# Patient Record
Sex: FEMALE | Race: WHITE | NOT HISPANIC OR LATINO | Employment: OTHER | URBAN - METROPOLITAN AREA
[De-identification: names, ages, dates, MRNs, and addresses within clinical notes are randomized per-mention and may not be internally consistent; named-entity substitution may affect disease eponyms.]

---

## 2017-03-09 DIAGNOSIS — I10 ESSENTIAL HYPERTENSION: ICD-10-CM

## 2017-03-09 RX ORDER — LISINOPRIL 10 MG/1
10 TABLET ORAL DAILY
Qty: 90 TABLET | Refills: 3 | Status: SHIPPED | OUTPATIENT
Start: 2017-03-09 | End: 2018-03-27

## 2017-05-16 ENCOUNTER — ALLIED HEALTH/NURSE VISIT (OUTPATIENT)
Dept: CARDIOLOGY | Facility: CLINIC | Age: 82
End: 2017-05-16
Attending: INTERNAL MEDICINE
Payer: MEDICARE

## 2017-05-16 DIAGNOSIS — I45.89 CHRONOTROPIC INCOMPETENCE: ICD-10-CM

## 2017-05-16 DIAGNOSIS — I44.30 ATRIOVENTRICULAR BLOCK, INCOMPLETE: Primary | ICD-10-CM

## 2017-05-16 PROCEDURE — 93294 REM INTERROG EVL PM/LDLS PM: CPT | Performed by: INTERNAL MEDICINE

## 2017-05-16 PROCEDURE — 93296 REM INTERROG EVL PM/IDS: CPT | Mod: ZF

## 2017-05-17 NOTE — PROGRESS NOTES
"Scheduled [employer] Carelink remote pacemaker transmission received and reviewed. Device transmission sent per MD orders. Her presenting rhythm is AP/ VS @ 80 bpm. 17 AT/AF episodes recorded lasting up to 2 hours. AF burden= 0.1%. 5 NSVT episodes recorded. The available EGMs show 1:1 AV conduction. Normal device function. AP= 98% and  <0.1%. 1 short V-V interval recorded. Lead trends appear stable. Battery estimates 9.5 years to [employer]MILE[employer]. Pt notified of transmission results. Plan for pt to RTC within the next 4 weeks with Dr. Lazo and pacemaker check to discuss AF. Previous episode max was 23 minutes and she is not anticoagulated.      5.25.17: Pt called stating she doesn't want to start coumadin, so she doesn't think she needs to see Dr. Lazo right now. She will call with any questions or concerns or increase of episodes. She states \"maybe I drank too much coffee or forgot to take some of my pills\". She states she will \"lay off\" the coffee and \"remember all my pills\". Per her request she will be scheduled for a device check and appointment with Dr. Lazo in 3 months.  "

## 2017-05-25 ENCOUNTER — TELEPHONE (OUTPATIENT)
Dept: CARDIOLOGY | Facility: CLINIC | Age: 82
End: 2017-05-25

## 2017-05-25 DIAGNOSIS — I10 ESSENTIAL HYPERTENSION: ICD-10-CM

## 2017-05-25 RX ORDER — HYDROCHLOROTHIAZIDE 12.5 MG/1
12.5 CAPSULE ORAL DAILY
Qty: 90 CAPSULE | Refills: 3 | Status: SHIPPED | OUTPATIENT
Start: 2017-05-25 | End: 2018-06-21

## 2017-05-25 NOTE — TELEPHONE ENCOUNTER
Per device nurses patient needs to see Dr. Lazo within 4 weeks. I called patient to offer her an appointment on 6/6/17 (double book any time ok per Lila) or 6/20/17 (double book any time before 1000). She stated that she runs a  and will check with her daughters to see who can take off/what date and will call me back to schedule it.

## 2017-08-11 ENCOUNTER — PRE VISIT (OUTPATIENT)
Dept: CARDIOLOGY | Facility: CLINIC | Age: 82
End: 2017-08-11

## 2017-08-11 DIAGNOSIS — I48.0 PAROXYSMAL ATRIAL FIBRILLATION (H): Primary | ICD-10-CM

## 2017-08-14 ENCOUNTER — ALLIED HEALTH/NURSE VISIT (OUTPATIENT)
Dept: CARDIOLOGY | Facility: CLINIC | Age: 82
End: 2017-08-14
Attending: NURSE PRACTITIONER
Payer: MEDICARE

## 2017-08-14 VITALS
SYSTOLIC BLOOD PRESSURE: 120 MMHG | OXYGEN SATURATION: 98 % | HEART RATE: 63 BPM | BODY MASS INDEX: 22.32 KG/M2 | WEIGHT: 126 LBS | DIASTOLIC BLOOD PRESSURE: 70 MMHG | HEIGHT: 63 IN

## 2017-08-14 DIAGNOSIS — I44.30 ATRIOVENTRICULAR BLOCK, INCOMPLETE: Primary | ICD-10-CM

## 2017-08-14 DIAGNOSIS — I48.0 PAROXYSMAL ATRIAL FIBRILLATION (H): ICD-10-CM

## 2017-08-14 PROCEDURE — 93288 INTERROG EVL PM/LDLS PM IP: CPT | Mod: 26 | Performed by: INTERNAL MEDICINE

## 2017-08-14 PROCEDURE — 93010 ELECTROCARDIOGRAM REPORT: CPT | Mod: ZP | Performed by: INTERNAL MEDICINE

## 2017-08-14 PROCEDURE — 99212 OFFICE O/P EST SF 10 MIN: CPT | Mod: ZF

## 2017-08-14 PROCEDURE — 93280 PM DEVICE PROGR EVAL DUAL: CPT | Mod: ZF

## 2017-08-14 PROCEDURE — 93005 ELECTROCARDIOGRAM TRACING: CPT | Mod: ZF

## 2017-08-14 PROCEDURE — 99213 OFFICE O/P EST LOW 20 MIN: CPT | Mod: 25 | Performed by: NURSE PRACTITIONER

## 2017-08-14 RX ORDER — POTASSIUM CHLORIDE 20MEQ/15ML
1 LIQUID (ML) ORAL EVERY OTHER DAY
COMMUNITY
End: 2021-05-06

## 2017-08-14 RX ORDER — LISINOPRIL/HYDROCHLOROTHIAZIDE 10-12.5 MG
TABLET ORAL
COMMUNITY
Start: 2016-01-15 | End: 2017-08-14

## 2017-08-14 RX ORDER — METOPROLOL TARTRATE 25 MG/1
25 TABLET, FILM COATED ORAL
COMMUNITY
End: 2017-08-14

## 2017-08-14 RX ORDER — GLUCOSAMINE HCL 500 MG
TABLET ORAL
COMMUNITY
End: 2021-05-06

## 2017-08-14 ASSESSMENT — PAIN SCALES - GENERAL: PAINLEVEL: NO PAIN (0)

## 2017-08-14 NOTE — PROGRESS NOTES
HPI:   Isadora Mendez returns for followup. Her past medical history includes non obstructive CAD (30-40% in 2009) tachybrady arrythmias including VT, sinus pauses (SSS) and with pacer insertion 2010 and paraxysmal afib    5/17/2016 She had frequent presyncope related to sinus pauses as long as 4.5 seconds. She ultimately agreed to placement of a dual-chamber permanent pacemaker with complete resolution of her symptoms. Her A-fib history includes one episode lasting over 6 hours, relatively assymptoamtic few second bursts. We have had several discussions about antithrombotic therapy. Her CHADS2-vasc score is 4 (age and hypertension). She has declined warfarin. She has been taking  an alternative medication (nattokinase) that is a herbal medication with reported anti=thrombotic effects, but her INR was normal. Pacemaker evaluation today shows normal function. There have been no episodes of atrial fibrillation longer than a few seconds.   Atrial Fibrtillation: longest prior episode of 6 hours, currently she has only had a few seconds at most.  We discussed chads2-vasc risk scores of 4, the risk of stroke, but her burden is very low. We did recommend anti-coagulation, but we did compromise by allowing frequent remote checks with intiation of therapy with warfarin vs. apixaban once this occurs.   Pacemaker: no significant changes, she need q3 month battery follow ups given the recall on her device.  BP at home when relaxed is reported at low 100's, but highs of 160s when active, thus we will continue with metoprolol, hctz, and lisinopril.  Wide pulse pressure likely decreased aortic compliance.     8/16/2017  Visit with June Mckinley NP  Had an incident of the sudden did not feel well with low blood pressures 90's/50's, normal heart rate and shortness of breath.  She reduced her metoprolol to 25 qd instead of BID. She has felt better this week and has resumed BID metoprolol.  Her NT pro BNP is significantly elevated at 3,938  today. No overt signs of CHF (lungs clear, no edema). Neck veins are mildly distended. Screening echo today with LVH and LAE but LV functioned appeared normal. Will obtain full echo to assess EF% and assess for any new wall motion abnormality. Coronary angio in 2009 without significant disease. Unknown cause for elevated Nt BNP but possibly acute diastolic dysfunction, small infarct, viral illness. She takes low dose aspirin and Nattokinase for anticoagulation and declines warfarin or NOACS.  Blood pressure: taking metoprolol 25 mg BID, HCTZ 12.5 mg per day and lisinopril 10 mg per day with recent low BP in the 90's/50's so she reduced her metoprolol to oncer per day until a few days ago resumed BID when her BP's increased. Todays BP's 110-130's systolic at rest. Microalbumin is significantly elevated with normal range creatinine, slightly reduced GFR from 72 to now 55 and upper range BUN at 28.     9/14/2016 Dual chamber ppm generator change    8/14/2017  Today she presents with concerns of chest discomfort for the past few weeks occurring mainly in the evening while trying to slee.  Denies pain with activity. Has a hx of left rib fx and did stumble ~2-3 weeks ago and fell on left side.  Pain has improved in the past 4-5 days.  Improves with burping and avoiding spicy foods.  Has taken Tylenol x 1 which has helped the pain slightly. Took tums x1 with no improvement otherwise has not taken any other antiacids. Walks to and plays in the park with her  children daily with no CV symptoms.  Does not regularly exercise. Denies headaches, dizziness, syncope, angina, dyspnea at rest or with exertion, palpitations, orthopnea, PND, abdominal pain, pedal edema, claudication, or any new numbness/weakness, hematuria, hematochezia, and epistaxis.    Today's EKG Atrial paced.  Unchanged from previous EKG; reviewed with Dr Hill      Current cardiac medications include lisinopril 10 mg daily, metoprolol XL 25 mg twice  daily, hydrocholorothiazide 12.5 mg daily, aspirin 81 mg, potassium 20 mEq daily.        PAST MEDICAL HISTORY:  Past Medical History:   Diagnosis Date     Atrial fibrillation (H) 2011     Facial fracture (H) 2006     Hypertension      NSVT (nonsustained ventricular tachycardia) (H) 2009    during exercise echo, neg EP study     Pacemaker 7-1-2010     S/P cardiac catheterization 2009    30-40% non obstructive lesion     Ventricular tachycardia, nonsustained (H) 2009    during stress echo       CURRENT MEDICATIONS:  Current Outpatient Prescriptions   Medication Sig Dispense Refill     MAGNESIUM PO one daily       hydrochlorothiazide (MICROZIDE) 12.5 MG capsule Take 1 capsule (12.5 mg) by mouth daily 90 capsule 3     lisinopril (PRINIVIL/ZESTRIL) 10 MG tablet Take 1 tablet (10 mg) by mouth daily 90 tablet 3     metoprolol (TOPROL-XL) 50 MG 24 hr tablet Take 0.5 tablets (25 mg) by mouth 2 times daily 90 tablet 3     Pyridoxine HCl (VITAMIN B-6 PO) Take 1 tablet by mouth daily       NEW MED Cumin - capsule daily  Vitor - capsule daily       Cyanocobalamin (VITAMIN B12 PO) Take 1 tablet by mouth daily.       BIOTIN PO Take 1 capsule by mouth daily.       Calcium Carbonate-Vitamin D (CALCIUM 500 + D PO) Take 1 tablet by mouth 2 times daily.       potassium chloride (KAYCIEL) 20 MEQ/15ML (10%) solution        coenzyme Q-10 100 MG TABS        NEW MED Mineral drops       COLLAGEN PO Take 1 capsule by mouth 2 times daily.       NEW MED 1 tablet daily Nattokinase, from fermented soybean .        aspirin 81 MG tablet Take 1 tablet by mouth daily.         PAST SURGICAL HISTORY:  Past Surgical History:   Procedure Laterality Date     IMPLANT PACEMAKER       PPM  6/30/2010    Permanent Pacemaker       ALLERGIES:   No Known Allergies      SOCIAL HISTORY:  Social History   Substance Use Topics     Smoking status: Never Smoker     Smokeless tobacco: Not on file     Alcohol use No       ROS:   Constitutional: No fever, chills, or  "sweats. Weight stable.   ENT: No visual disturbance, ear ache, epistaxis, sore throat.   Cardiovascular: As per HPI.   Respiratory: No cough, hemoptysis.    GI: No nausea, vomiting, hematemesis, melena, or hematochezia.   : No hematuria.   Integument: Negative.   Psychiatric: Negative.   Hematologic:  Easy bruising, no easy bleeding.  Neuro: Negative.   Endocrinology: No significant heat or cold intolerance   Musculoskeletal: No myalgia.    Exam:  /70 (BP Location: Left arm, Patient Position: Chair, Cuff Size: Adult Regular)  Pulse 63  Ht 1.6 m (5' 3\")  Wt 57.2 kg (126 lb)  SpO2 98%  BMI 22.32 kg/m2  GENERAL APPEARANCE: thin, healthy female alert and no distress  HEENT: no icterus, no central cyanosis  NECK: no adenopathy, no asymmetry, JVP not elevated  RESPIRATORY: lungs clear to auscultation - no rales, rhonchi or wheezes, no use of accessory muscles, no retractions, respirations are unlabored, normal respiratory rate  CARDIOVASCULAR: regular rhythm, normal S1, S2, no S3 or S4 and no murmur, click or rub, precordium quiet with normal PMI.  ABDOMEN: soft, non tender  EXTREMITIES: peripheral pulses normal, no edema, no bruits  NEURO: alert and oriented to person/place/time, normal speech,and affect  VASC: Radial, dorsalis pedis and posterior tibialis pulses are normal in volumes and symmetric bilaterally.   SKIN: no ecchymoses, no rashes        Labs:  CBC RESULTS:   Lab Results   Component Value Date    WBC 5.8 09/14/2016    RBC 4.26 09/14/2016    HGB 12.7 09/14/2016    HCT 39.4 09/14/2016    MCV 93 09/14/2016    MCH 29.8 09/14/2016    MCHC 32.2 09/14/2016    RDW 14.2 09/14/2016     09/14/2016       BMP RESULTS:  Lab Results   Component Value Date     09/14/2016    POTASSIUM 4.2 09/14/2016    CHLORIDE 104 09/14/2016    CO2 29 09/14/2016    ANIONGAP 7 09/14/2016    GLC 89 09/14/2016    BUN 21 09/14/2016    CR 0.81 09/14/2016    GFRESTIMATED 67 09/14/2016    GFRESTBLACK 81 09/14/2016    INO " 9.2 09/14/2016        INR RESULTS:  Lab Results   Component Value Date    INR 1.13 09/14/2016    INR 1.04 09/03/2013    INR 1.01 10/29/2012    INR 1.00 08/21/2012       Procedures:  ECHO 9/7/2017  Interpretation Summary  Global and regional left ventricular function is normal with an EF of 60-65%.  Right ventricular function, chamber size, wall motion, and thickness are  normal.  Dilation of the inferior vena cava is present with abnormal respiratory  variation in diameter.  Trivial pericardial effusion is present.  ______________________________________________________________________________           Left Ventricle  Global and regional left ventricular function is normal with an EF of 60-65%.  Left ventricular size is normal. Mild concentric wall thickening consistent  with left ventricular hypertrophy is present. Left ventricular diastolic  function cannot be assessed. No regional wall motion abnormalities are seen.     Right Ventricle  Right ventricular function, chamber size, wall motion, and thickness are  normal. A pacemaker lead is noted in the right ventricle.  Atria  Mild to moderate left atrial enlargement is present. Moderate to severe right  atrial enlargement is present.     Mitral Valve  Mild mitral annular calcification is present. Mild mitral insufficiency is  present.     Aortic Valve  Aortic valve is normal in structure and function.     Tricuspid Valve  The tricuspid valve is normal. Trace to mild tricuspid insufficiency is  present. The right ventricular systolic pressure is approximated at 15.3 mmHg  plus the right atrial pressure.     Pulmonic Valve  The pulmonic valve is normal. Trace to mild pulmonic insufficiency is  present.     Vessels  The aorta root is normal. The pulmonary artery is normal. Dilation of the  inferior vena cava is present with abnormal respiratory variation in  diameter.  Pericardium  Trivial pericardial effusion is  present.     ______________________________________________________________________________  MMode/2D Measurements & Calculations  IVSd: 0.74 cm  LVIDd: 4.2 cm  LVIDs: 2.8 cm  LVPWd: 0.48 cm  FS: 33.4 %  EDV(Teich): 76.4 ml  ESV(Teich): 28.7 ml  LV mass(C)d: 70.4 grams  Ao root diam: 2.7 cm  LVOT diam: 1.9 cm  LVOT area: 2.7 cm2  LA Volume (BP): 66.5 ml     LA Volume Index (BP): 42.4 ml/m2           Doppler Measurements & Calculations  MV E max sherley: 101.1 cm/sec  MV dec time: 0.17 sec  LV V1 max P.3 mmHg  LV V1 max: 76.4 cm/sec  LV V1 VTI: 14.3 cm  SV(LVOT): 39.3 ml  PA acc time: 0.10 sec  TR max sherley: 194.9 cm/sec  TR max PG: 15.3 mmHg    Lila Luo RN 2013 11:33 AM Signed   ECHOCARD STRESS ECHO 25 Sep 2009 09:23 AM   Normal exercise echo without ischemia or infarct at adequate rate pressure   product and borderline workload. However, non-sustained monomorphic VT   (LBBBtype, inferior axis) developed during exercise. The pt did not have sx.   Pt admitted to  for evaluation   PatientHeight: 63 in   PatientWeight: 120 lbs   BSA 1.6 m^2   Stress Findings   Maximum workload 75 farrar.   Peak MVO2 22.2 ml/kg/min.   Percent predicted MVO2 116 %.   RPP 23,380.   Patient was given 2ml mixture of 1.5ml Definity and 8.5ml saline.   Aortic Valve   The aortic valve is normal in structure and function.   Mitral Valve   The mitral valve is normal in structure and function.   There is mild mitral regurgitation.   Tricuspid Valve   There is trace tricuspid regurgitation.   RVSP = 29 mmHg + RAP.   Procedure:   Stress Echo Bike with two dimensional, color and spectral Doppler performed.   Contrast Definity.   Doppler Measurements & Calculations   TR Max P mmHg   Stress Results   Target HR: 121 bpm Maximum Predicted HR: 142 bpm   Stage Duration(mm:ss) HR(bpm) BP DOSE Comment   Baseline 00:00 67 148/48   Peak 03:28 140 167/71   Stress Duration: 03:28 mm:ss Recovery Time: 00:00 mm:ss   Maximum Stress HR: 140 bpm  METS:   Interpreting Physician: Pietro Patel MD electronically signed on   2009 09:23:09.      Assessment and Plan:   Isadora Mendez is an 86-year-old woman with a history of symptomatic sinus node dysfunction that resolved after she ultimately agreed to placement of a dual-chamber permanent pacemaker.     Atrial fibrillation  We discussed in detail with the patient management/treatment options for Atrial fibrillation includin. Stroke Prophylaxis:  CHADSVASC=age, female, HTN, CAD  4, corresponding to a 4.0% annual stroke / systemic emolism event rate. indicating need for long term oral anticoagulation.  In the past she has refused anticoagulation discussed risk and benefit of anticoagulation and the types of anticoagulation.   We discussed in length anticoagulants include warfarin (Coumadin) and the noval oral anticoagulant agents: dabigatran (Pradaxa), rivaroxaban (Xarelto), and apixaban (Eliquis). The benefits of warfarin include: low cost, established track record in reduction of stroke and systemic embolism, the ability to reverse the agents, the ability to titrate the agent.  Also discussed the disadvantages of warfarin include: frequent phlebotomy for INRs, difficulty in regulation of INR [typically 60-65% of INRs within therapeutic range], and dietary constraints secondary to bioavailability. The benefits of the novel oral anticoagulants, as a class, include: no phlebotomy, similar or significantly lower risk of stroke or systemic embolism depending on the agent, and similar or significantly lower risk of bleeding, particularly intracranial bleeding. The disadvantages of the novel agents, as a class, include: higher cost, the inability to reverse the agents (except Pradaxa), and the need to be cautious in patients with CKD. At this time she would like not to purse anticoagulation.  She will return in 6 months to visit with Dr. Lazo.   2. Rate Control: Currently on metoprolol XL 25 mg twice  daily.     4. Risk Factor Management: Continue current BP regemine and NIKHIL evaluation as needed.        Return to clinic in 6 months.        IBRAHIMA Townsend, CNP

## 2017-08-14 NOTE — PATIENT INSTRUCTIONS
It was a pleasure to see you in clinic today.  Please do not hesitate to call us if you have any questions or concerns.  Please return to clinic in 6 mos for a pacemaker check.    Next remote 9/18/17    Malia Wu R.N.  Electrophysiology nurse specialist  [facility]  [address] [place] [id number]     Aracelis Campbell  [phone] business hours    After business hours please call [phone], option #4, and ask for Job code [id number].

## 2017-08-14 NOTE — NURSING NOTE
Chief Complaint   Patient presents with     Follow Up For     ekg; device prior     Vitals were taken and medications were reconciled. EKG was performed    Africa Hernandez MA  11:05 AM

## 2017-08-14 NOTE — MR AVS SNAPSHOT
After Visit Summary   8/14/2017    Isadora Mendez    MRN: [id number]           Patient Information     Date Of Birth          3/9/1931        Visit Information        Provider Department      8/14/2017 11:30 AM Ann Hoffman APRN CNP [facility]        Today's Diagnoses     Paroxysmal atrial fibrillation (H)          Care Instructions    Cardiology Provider you saw in clinic today: IBRAHIMA Townsend CNP    Medication Changes:  None at this time    Labs/Tests needed:  None at this time     Follow-up Visit:  6 months with Dr. Lazo    Further Instructions:      You will receive all normal lab and testing results via [employer] or mail if not reviewed in clinic today. Please contact our office if you need assistance with setting up MyChart.    If you need a medication refill please contact your pharmacy. Please allow 3 business days for your refill to be completed.     As always, thank you for trusting us with your health care needs!    If you have any questions regarding your visit please contact your care team:   Cardiology  Telephone Number    EP RN  Electrophysiology Nurse Coordinator [phone]     Call for EP procedure scheduling concerns  SANJAY Evans  [phone]           Device Clinic (Pacemakers, ICDs, Loop)   RN's : Malia Palomo Connie, Dawn During business hours: [phone]    After business hours:   [phone]- select option 4 and ask for job code [id number].          Understanding Atrial Fibrillation    An arrhythmia is any problem with the speed or pattern of the heartbeat. Atrial fibrillation (AFib) is a common type of arrhythmia. It causes fast, chaotic electrical signals in the atria. This leads to poor functioning of the heart. It also affects how much blood your heart can pump out to the body.  Afib may occur once in a while and go away on its own. Or it may continue for longer periods and need treatment.  AFib can lead to serious problems, such  as stroke. Your healthcare provider will need to monitor and manage it.  What happens during atrial fibrillation?   The heart has an electrical system that sends signals to control the heartbeat. As signals move through the heart, they tell the heart s upper chambers (atria) and lower chambers (ventricles) when to squeeze (contract) and relax. This lets blood move through the heart and out to the body and lungs.  With AFib, the atria receive abnormal signals. This causes them to contract in a fast and irregular way, and out of sync with the ventricles. When this happens, the atria also have a harder time moving blood into the ventricles. Blood may then pool in the atria, which increases the risk for blood clots and stroke. The ventricles also may contract too quickly and irregularly. As a result, they may not pump blood to the body and lungs as well as they should. This can weaken the heart muscle over time and cause heart failure.  What causes atrial fibrillation?  AFib is more common in older adults. It has many possible causes including:    Coronary artery disease    Heart valve disease    Heart attack    Heart surgery    High blood pressure    Thyroid disease    Diabetes    Lung disease    Sleep apnea    Heavy alcohol use  In some cases of AFib, doctors do not know the cause.  What are the symptoms of atrial fibrillation?  AFib may or may not cause symptoms. If symptoms do occur, they may include:    A fast, pounding, irregular heartbeat    Shortness of breath    Tiredness    Dizziness or fainting    Chest pain  How is atrial fibrillation treated?  Treatments for AFib can include any of the options below.    Medicines. You may be prescribed:    Heart rate medicines to help slow down the heartbeat    Heart rhythm medicines to help the heart beat more regularly    Anti-clotting medicines to help reduce the risk for blood clots and stroke    Electrical cardioversion. Your healthcare provider uses special pads or  paddles to send one or more brief electrical shocks to the heart. This can help reset the heartbeat to normal.    Ablation. Long, thin tubes called catheters are threaded through a blood vessel to the heart. There, the catheters send out hot or cold energy to the areas causing the abnormal signals. This energy destroys the problem tissue or cells. This improves the chances that your heart will stay in normal rhythm without using medicines. If your heart rate and rhythm can t be controlled, you may need ablation and a pacemaker. These will help control the heart rate and regularity of the heartbeat.    Surgery. During surgery, your healthcare provider may use different methods to create scar tissue in the areas of the heart causing the abnormal signals. The scar tissue disrupts the abnormal signals and may stop AFib from occurring.  What are the complications of atrial fibrillation?  These can include:    Blood clots    Stroke    Heart failure. This problem occurs when the heart muscle weakens so much that it can no longer pump blood well.  When should I call my healthcare provider?  Call your healthcare provider right away if you have any of these:    Symptoms that don t get better with treatment, or get worse    New symptoms   Date Last Reviewed: 5/1/2016 2000-2017 [employer]. [address] [place] [address]. All rights reserved. This information is not intended as a substitute for professional medical care. Always follow your healthcare professional's instructions.                Follow-ups after your visit        Follow-up notes from your care team     Return in about 6 months (around 2/14/2018) for Violet Device check prior.      Your next 10 appointments already scheduled     Oct 06, 2017 12:30 PM CDT   (Arrive by 12:15 PM)   New Patient Visit with Jovana Macias MD   [facility] ([facility])    [address] [place] [address]  "[facility] [id number] [phone]            2018  9:00 AM CST   (Arrive by 8:45 AM)   Pacemaker Check with Uc Cv Device 1   [facility] ([facility])    [address] [place] [id number] [phone]            2018  9:30 AM CST   (Arrive by 9:15 AM)   RETURN ATRIAL FIBULATION VISIT with Dale Lazo MD   [facility] ([facility])    [address] [place] [id number] [phone]              Who to contact     If you have questions or need follow up information about today's clinic visit or your schedule please contact [facility] directly at [phone].  Normal or non-critical lab and imaging results will be communicated to you by [employer], letter or phone within 4 business days after the clinic has received the results. If you do not hear from us within 7 days, please contact the clinic through [employer] or phone. If you have a critical or abnormal lab result, we will notify you by phone as soon as possible.  Submit refill requests through [employer] or call your pharmacy and they will forward the refill request to us. Please allow 3 business days for your refill to be completed.          Additional Information About Your Visit        [employer] Information     [employer] lets you send messages to your doctor, view your test results, renew your prescriptions, schedule appointments and more. To sign up, go to [email or web address] . Click on \"Log in\" on the left side of the screen, which will take you to the Welcome page. Then click on \"Sign up Now\" on the right side of the page.     You will be asked to enter the access code listed below, as well as some personal information. Please follow the directions to create your username and password.     Your access code is: [email or web address]  Expires: 8/15/2017  8:11 AM     Your access code will  in 90 days. If you need " "help or a new code, please call your [place] clinic or [phone].        Care EveryWhere ID     This is your Care EveryWhere ID. This could be used by other organizations to access your [place] medical records  [id number]        Your Vitals Were     Pulse Height Pulse Oximetry BMI (Body Mass Index)          63 1.6 m (5' 3\") 98% 22.32 kg/m2         Blood Pressure from Last 3 Encounters:   08/14/17 120/70   09/14/16 135/57   08/26/16 130/67    Weight from Last 3 Encounters:   08/14/17 57.2 kg (126 lb)   09/14/16 56.2 kg (123 lb 14.4 oz)   08/26/16 56.2 kg (123 lb 14.4 oz)              Today, you had the following     No orders found for display         Today's Medication Changes          These changes are accurate as of: 8/14/17  1:08 PM.  If you have any questions, ask your nurse or doctor.               Stop taking these medicines if you haven't already. Please contact your care team if you have questions.     lisinopril-hydrochlorothiazide 10-12.5 MG per tablet   Commonly known as:  PRINZIDE/ZESTORETIC   Stopped by:  Ann Hoffman APRN CNP           metoprolol 25 MG tablet   Commonly known as:  LOPRESSOR   Stopped by:  Ann Hoffman APRN CNP                    Primary Care Provider    Physician No Ref-Primary       No address on file        Equal Access to Services     [facility] HospitalNAIDA : Hadii aad ku hadasho Soomaali, waaxda luqadaha, qaybta kaalmada adeegyada, nela osorio . So [facility] [phone].    ATENCIÓN: Si habla español, tiene a jason disposición servicios gratuitos de asistencia lingüística. Lebroname al [phone].    We comply with applicable federal civil rights laws and [place] laws. We do not discriminate on the basis of race, color, national origin, age, disability sex, sexual orientation or gender identity.            Thank you!     Thank you for choosing [facility]  for your care. Our goal is always to provide you with excellent care. " Hearing back from our patients is one way we can continue to improve our services. Please take a few minutes to complete the written survey that you may receive in the mail after your visit with us. Thank you!             Your Updated Medication List - Protect others around you: Learn how to safely use, store and throw away your medicines at www.disposemymeds.org.          This list is accurate as of: 8/14/17  1:08 PM.  Always use your most recent med list.                   Brand Name Dispense Instructions for use Diagnosis    aspirin 81 MG tablet      Take 1 tablet by mouth daily.        BIOTIN PO      Take 1 capsule by mouth daily.    Hypertension, New onset atrial fibrillation (H), Hyperlipidemia LDL goal <100       CALCIUM 500 + D PO      Take 1 tablet by mouth 2 times daily.        coenzyme Q-10 100 MG Tabs           COLLAGEN PO      Take 1 capsule by mouth 2 times daily.        hydrochlorothiazide 12.5 MG capsule    MICROZIDE    90 capsule    Take 1 capsule (12.5 mg) by mouth daily    Essential hypertension       lisinopril 10 MG tablet    PRINIVIL/ZESTRIL    90 tablet    Take 1 tablet (10 mg) by mouth daily    Essential hypertension       MAGNESIUM PO      one daily        metoprolol 50 MG 24 hr tablet    TOPROL-XL    90 tablet    Take 0.5 tablets (25 mg) by mouth 2 times daily    Essential hypertension       * NEW MED      1 tablet daily Nattokinase, from fermented soybean .    Hypertension, New onset atrial fibrillation (H)       * NEW MED      Cumin - capsule daily Vitor - capsule daily        * NEW MED      Mineral drops        potassium chloride 20 MEQ/15ML (10%) solution    KAYCIEL          VITAMIN B-6 PO      Take 1 tablet by mouth daily    Hypertension, Hyperlipidemia LDL goal <100       VITAMIN B12 PO      Take 1 tablet by mouth daily.        * Notice:  This list has 3 medication(s) that are the same as other medications prescribed for you. Read the directions carefully, and ask your doctor or other  care provider to review them with you.

## 2017-08-14 NOTE — MR AVS SNAPSHOT
After Visit Summary   8/14/2017    Isadora Mendez    MRN: [id number]           Patient Information     Date Of Birth          3/9/1931        Visit Information        Provider Department      8/14/2017 10:30 AM 1, Uc Cv Device [facility]        Today's Diagnoses     Atrioventricular block, incomplete    -  1      Care Instructions    It was a pleasure to see you in clinic today.  Please do not hesitate to call us if you have any questions or concerns.  Please return to clinic in 6 mos for a pacemaker check.    Next remote 9/18/17    Malia Wu R.N.  Electrophysiology nurse specialist  [facility]  [address] [place] [id number]     Aracelis Campbell  [phone] business hours    After business hours please call [phone], option #4, and ask for Job code [id number].                  Follow-ups after your visit        Follow-up notes from your care team     Discussed this visit Return in about 6 months (around 2/14/2018) for Pacemaker check, Dr Lazo.      Your next 10 appointments already scheduled     Feb 20, 2018  9:00 AM CST   (Arrive by 8:45 AM)   Pacemaker Check with Uc Cv Device 1   [facility] ([facility] [place])    [address] [place] [id number] [phone]            Feb 20, 2018  9:30 AM CST   (Arrive by 9:15 AM)   RETURN ATRIAL FIBULATION VISIT with Dale Lazo MD   [facility] ([facility] [place])    [address] [place] [id number] [phone]              Who to contact     If you have questions or need follow up information about today's clinic visit or your schedule please contact [facility] directly at [phone].  Normal or non-critical lab and imaging results will be communicated to you by MyChart, letter or phone within 4 business days after the clinic has received the results. If you do not hear  "from us within 7 days, please contact the clinic through [employer] or phone. If you have a critical or abnormal lab result, we will notify you by phone as soon as possible.  Submit refill requests through [employer] or call your pharmacy and they will forward the refill request to us. Please allow 3 business days for your refill to be completed.          Additional Information About Your Visit        [email or web address] Information     [employer] lets you send messages to your doctor, view your test results, renew your prescriptions, schedule appointments and more. To sign up, go to [email or web address] . Click on \"Log in\" on the left side of the screen, which will take you to the Welcome page. Then click on \"Sign up Now\" on the right side of the page.     You will be asked to enter the access code listed below, as well as some personal information. Please follow the directions to create your username and password.     Your access code is: [email or web address]  Expires: 8/15/2017  8:11 AM     Your access code will  in 90 days. If you need help or a new code, please call your [facility] clinic or [phone].        Care EveryWhere ID     This is your Care EveryWhere ID. This could be used by other organizations to access your [facility] medical records  [id number]         Blood Pressure from Last 3 Encounters:   17 120/70   16 135/57   16 130/67    Weight from Last 3 Encounters:   17 57.2 kg (126 lb)   16 56.2 kg (123 lb 14.4 oz)   16 56.2 kg (123 lb 14.4 oz)              We Performed the Following     PM DEVICE PROGRAMMING EVAL, DUAL LEAD PACER          Today's Medication Changes          These changes are accurate as of: 17 11:53 AM.  If you have any questions, ask your nurse or doctor.               Stop taking these medicines if you haven't already. Please contact your care team if you have questions.     lisinopril-hydrochlorothiazide 10-12.5 MG per tablet   Commonly known as:  " PRINZIDE/ZESTORETIC   Stopped by:  Ann Hoffman APRN CNP           metoprolol 25 MG tablet   Commonly known as:  LOPRESSOR   Stopped by:  Ann Hoffman APRN CNP                    Primary Care Provider    Physician No Ref-Primary       No address on file        Equal Access to Services     DWAYNE VAUGHAN : Hadii aad ku hadasho Soomaali, waaxda luqadaha, qaybta kaalmada adeegyada, waxay idiin hayaan adeeg kharash la'aan . So [facility] [phone].    ATENCIÓN: Si habla español, tiene a jason disposición servicios gratuitos de asistencia lingüística. Llame al [phone].    We comply with applicable federal civil rights laws and [place] laws. We do not discriminate on the basis of race, color, national origin, age, disability sex, sexual orientation or gender identity.            Thank you!     Thank you for choosing [facility]  for your care. Our goal is always to provide you with excellent care. Hearing back from our patients is one way we can continue to improve our services. Please take a few minutes to complete the written survey that you may receive in the mail after your visit with us. Thank you!             Your Updated Medication List - Protect others around you: Learn how to safely use, store and throw away your medicines at www.disposemymeds.org.          This list is accurate as of: 8/14/17 11:53 AM.  Always use your most recent med list.                   Brand Name Dispense Instructions for use Diagnosis    aspirin 81 MG tablet      Take 1 tablet by mouth daily.        BIOTIN PO      Take 1 capsule by mouth daily.    Hypertension, New onset atrial fibrillation (H), Hyperlipidemia LDL goal <100       CALCIUM 500 + D PO      Take 1 tablet by mouth 2 times daily.        coenzyme Q-10 100 MG Tabs           COLLAGEN PO      Take 1 capsule by mouth 2 times daily.        hydrochlorothiazide 12.5 MG capsule    MICROZIDE    90 capsule    Take 1 capsule (12.5 mg) by mouth daily     Essential hypertension       lisinopril 10 MG tablet    PRINIVIL/ZESTRIL    90 tablet    Take 1 tablet (10 mg) by mouth daily    Essential hypertension       MAGNESIUM PO      one daily        metoprolol 50 MG 24 hr tablet    TOPROL-XL    90 tablet    Take 0.5 tablets (25 mg) by mouth 2 times daily    Essential hypertension       * NEW MED      1 tablet daily Nattokinase, from fermented soybean .    Hypertension, New onset atrial fibrillation (H)       * NEW MED      Cumin - capsule daily Vitor - capsule daily        * NEW MED      Mineral drops        potassium chloride 20 MEQ/15ML (10%) solution    KAYCIEL          VITAMIN B-6 PO      Take 1 tablet by mouth daily    Hypertension, Hyperlipidemia LDL goal <100       VITAMIN B12 PO      Take 1 tablet by mouth daily.        * Notice:  This list has 3 medication(s) that are the same as other medications prescribed for you. Read the directions carefully, and ask your doctor or other care provider to review them with you.

## 2017-08-14 NOTE — LETTER
8/14/2017      RE: Isadora Mendez  [address] [facility] [id number]       Dear Colleague,    Thank you for the opportunity to participate in the care of your patient, Isadora Mendez, at the [facility] HEART [facility] at [facility]. Please see a copy of my visit note below.    HPI:   Isadora Mendez returns for followup. Her past medical history includes non obstructive CAD (30-40% in 2009) tachybrady arrythmias including VT, sinus pauses (SSS) and with pacer insertion 2010 and paraxysmal afib    5/17/2016 She had frequent presyncope related to sinus pauses as long as 4.5 seconds. She ultimately agreed to placement of a dual-chamber permanent pacemaker with complete resolution of her symptoms. Her A-fib history includes one episode lasting over 6 hours, relatively assymptoamtic few second bursts. We have had several discussions about antithrombotic therapy. Her CHADS2-vasc score is 4 (age and hypertension). She has declined warfarin. She has been taking  an alternative medication (nattokinase) that is a herbal medication with reported anti=thrombotic effects, but her INR was normal. Pacemaker evaluation today shows normal function. There have been no episodes of atrial fibrillation longer than a few seconds.   Atrial Fibrtillation: longest prior episode of 6 hours, currently she has only had a few seconds at most.  We discussed chads2-vasc risk scores of 4, the risk of stroke, but her burden is very low. We did recommend anti-coagulation, but we did compromise by allowing frequent remote checks with intiation of therapy with warfarin vs. apixaban once this occurs.   Pacemaker: no significant changes, she need q3 month battery follow ups given the recall on her device.  BP at home when relaxed is reported at low 100's, but highs of 160s when active, thus we will continue with metoprolol, hctz, and lisinopril.  Wide pulse pressure likely decreased aortic compliance.     8/16/2017  Visit  with June Mckinley, NP  Had an incident of the sudden did not feel well with low blood pressures 90's/50's, normal heart rate and shortness of breath.  She reduced her metoprolol to 25 qd instead of BID. She has felt better this week and has resumed BID metoprolol.  Her NT pro BNP is significantly elevated at 3,938 today. No overt signs of CHF (lungs clear, no edema). Neck veins are mildly distended. Screening echo today with LVH and LAE but LV functioned appeared normal. Will obtain full echo to assess EF% and assess for any new wall motion abnormality. Coronary angio in 2009 without significant disease. Unknown cause for elevated Nt BNP but possibly acute diastolic dysfunction, small infarct, viral illness. She takes low dose aspirin and Nattokinase for anticoagulation and declines warfarin or NOACS.  Blood pressure: taking metoprolol 25 mg BID, HCTZ 12.5 mg per day and lisinopril 10 mg per day with recent low BP in the 90's/50's so she reduced her metoprolol to oncer per day until a few days ago resumed BID when her BP's increased. Todays BP's 110-130's systolic at rest. Microalbumin is significantly elevated with normal range creatinine, slightly reduced GFR from 72 to now 55 and upper range BUN at 28.     9/14/2016 Dual chamber ppm generator change    8/14/2017  Today she presents with concerns of chest discomfort for the past few weeks occurring mainly in the evening while trying to slee.  Denies pain with activity. Has a hx of left rib fx and did stumble ~2-3 weeks ago and fell on left side.  Pain has improved in the past 4-5 days.  Improves with burping and avoiding spicy foods.  Has taken Tylenol x 1 which has helped the pain slightly. Took tums x1 with no improvement otherwise has not taken any other antiacids. Walks to and plays in the park with her  children daily with no CV symptoms.  Does not regularly exercise. Denies headaches, dizziness, syncope, angina, dyspnea at rest or with exertion,  palpitations, orthopnea, PND, abdominal pain, pedal edema, claudication, or any new numbness/weakness, hematuria, hematochezia, and epistaxis.    Today's EKG Atrial paced.  Unchanged from previous EKG; reviewed with Dr Hill      Current cardiac medications include lisinopril 10 mg daily, metoprolol XL 25 mg twice daily, hydrocholorothiazide 12.5 mg daily, aspirin 81 mg, potassium 20 mEq daily.        PAST MEDICAL HISTORY:  Past Medical History:   Diagnosis Date     Atrial fibrillation (H) 2011     Facial fracture (H) 2006     Hypertension      NSVT (nonsustained ventricular tachycardia) (H) 2009    during exercise echo, neg EP study     Pacemaker 7-1-2010     S/P cardiac catheterization 2009    30-40% non obstructive lesion     Ventricular tachycardia, nonsustained (H) 2009    during stress echo       CURRENT MEDICATIONS:  Current Outpatient Prescriptions   Medication Sig Dispense Refill     MAGNESIUM PO one daily       hydrochlorothiazide (MICROZIDE) 12.5 MG capsule Take 1 capsule (12.5 mg) by mouth daily 90 capsule 3     lisinopril (PRINIVIL/ZESTRIL) 10 MG tablet Take 1 tablet (10 mg) by mouth daily 90 tablet 3     metoprolol (TOPROL-XL) 50 MG 24 hr tablet Take 0.5 tablets (25 mg) by mouth 2 times daily 90 tablet 3     Pyridoxine HCl (VITAMIN B-6 PO) Take 1 tablet by mouth daily       NEW MED Cumin - capsule daily  Vitor - capsule daily       Cyanocobalamin (VITAMIN B12 PO) Take 1 tablet by mouth daily.       BIOTIN PO Take 1 capsule by mouth daily.       Calcium Carbonate-Vitamin D (CALCIUM 500 + D PO) Take 1 tablet by mouth 2 times daily.       potassium chloride (KAYCIEL) 20 MEQ/15ML (10%) solution        coenzyme Q-10 100 MG TABS        NEW MED Mineral drops       COLLAGEN PO Take 1 capsule by mouth 2 times daily.       NEW MED 1 tablet daily Nattokinase, from fermented soybean .        aspirin 81 MG tablet Take 1 tablet by mouth daily.         PAST SURGICAL HISTORY:  Past Surgical History:   Procedure  "Laterality Date     IMPLANT PACEMAKER       PPM  6/30/2010    Permanent Pacemaker       ALLERGIES:   No Known Allergies      SOCIAL HISTORY:  Social History   Substance Use Topics     Smoking status: Never Smoker     Smokeless tobacco: Not on file     Alcohol use No       ROS:   Constitutional: No fever, chills, or sweats. Weight stable.   ENT: No visual disturbance, ear ache, epistaxis, sore throat.   Cardiovascular: As per HPI.   Respiratory: No cough, hemoptysis.    GI: No nausea, vomiting, hematemesis, melena, or hematochezia.   : No hematuria.   Integument: Negative.   Psychiatric: Negative.   Hematologic:  Easy bruising, no easy bleeding.  Neuro: Negative.   Endocrinology: No significant heat or cold intolerance   Musculoskeletal: No myalgia.    Exam:  /70 (BP Location: Left arm, Patient Position: Chair, Cuff Size: Adult Regular)  Pulse 63  Ht 1.6 m (5' 3\")  Wt 57.2 kg (126 lb)  SpO2 98%  BMI 22.32 kg/m2  GENERAL APPEARANCE: thin, healthy female alert and no distress  HEENT: no icterus, no central cyanosis  NECK: no adenopathy, no asymmetry, JVP not elevated  RESPIRATORY: lungs clear to auscultation - no rales, rhonchi or wheezes, no use of accessory muscles, no retractions, respirations are unlabored, normal respiratory rate  CARDIOVASCULAR: regular rhythm, normal S1, S2, no S3 or S4 and no murmur, click or rub, precordium quiet with normal PMI.  ABDOMEN: soft, non tender  EXTREMITIES: peripheral pulses normal, no edema, no bruits  NEURO: alert and oriented to person/place/time, normal speech,and affect  VASC: Radial, dorsalis pedis and posterior tibialis pulses are normal in volumes and symmetric bilaterally.   SKIN: no ecchymoses, no rashes        Labs:  CBC RESULTS:   Lab Results   Component Value Date    WBC 5.8 09/14/2016    RBC 4.26 09/14/2016    HGB 12.7 09/14/2016    HCT 39.4 09/14/2016    MCV 93 09/14/2016    MCH 29.8 09/14/2016    MCHC 32.2 09/14/2016    RDW 14.2 09/14/2016     " 09/14/2016       BMP RESULTS:  Lab Results   Component Value Date     09/14/2016    POTASSIUM 4.2 09/14/2016    CHLORIDE 104 09/14/2016    CO2 29 09/14/2016    ANIONGAP 7 09/14/2016    GLC 89 09/14/2016    BUN 21 09/14/2016    CR 0.81 09/14/2016    GFRESTIMATED 67 09/14/2016    GFRESTBLACK 81 09/14/2016    INO 9.2 09/14/2016        INR RESULTS:  Lab Results   Component Value Date    INR 1.13 09/14/2016    INR 1.04 09/03/2013    INR 1.01 10/29/2012    INR 1.00 08/21/2012       Procedures:  ECHO 9/7/2017  Interpretation Summary  Global and regional left ventricular function is normal with an EF of 60-65%.  Right ventricular function, chamber size, wall motion, and thickness are  normal.  Dilation of the inferior vena cava is present with abnormal respiratory  variation in diameter.  Trivial pericardial effusion is present.  ______________________________________________________________________________           Left Ventricle  Global and regional left ventricular function is normal with an EF of 60-65%.  Left ventricular size is normal. Mild concentric wall thickening consistent  with left ventricular hypertrophy is present. Left ventricular diastolic  function cannot be assessed. No regional wall motion abnormalities are seen.     Right Ventricle  Right ventricular function, chamber size, wall motion, and thickness are  normal. A pacemaker lead is noted in the right ventricle.  Atria  Mild to moderate left atrial enlargement is present. Moderate to severe right  atrial enlargement is present.     Mitral Valve  Mild mitral annular calcification is present. Mild mitral insufficiency is  present.     Aortic Valve  Aortic valve is normal in structure and function.     Tricuspid Valve  The tricuspid valve is normal. Trace to mild tricuspid insufficiency is  present. The right ventricular systolic pressure is approximated at 15.3 mmHg  plus the right atrial pressure.     Pulmonic Valve  The pulmonic valve is normal.  Trace to mild pulmonic insufficiency is  present.     Vessels  The aorta root is normal. The pulmonary artery is normal. Dilation of the  inferior vena cava is present with abnormal respiratory variation in  diameter.  Pericardium  Trivial pericardial effusion is present.     ______________________________________________________________________________  MMode/2D Measurements & Calculations  IVSd: 0.74 cm  LVIDd: 4.2 cm  LVIDs: 2.8 cm  LVPWd: 0.48 cm  FS: 33.4 %  EDV(Teich): 76.4 ml  ESV(Teich): 28.7 ml  LV mass(C)d: 70.4 grams  Ao root diam: 2.7 cm  LVOT diam: 1.9 cm  LVOT area: 2.7 cm2  LA Volume (BP): 66.5 ml     LA Volume Index (BP): 42.4 ml/m2           Doppler Measurements & Calculations  MV E max sherley: 101.1 cm/sec  MV dec time: 0.17 sec  LV V1 max P.3 mmHg  LV V1 max: 76.4 cm/sec  LV V1 VTI: 14.3 cm  SV(LVOT): 39.3 ml  PA acc time: 0.10 sec  TR max sherley: 194.9 cm/sec  TR max PG: 15.3 mmHg    Lila Luo RN 2013 11:33 AM Signed   ECHOCARD STRESS ECHO 25 Sep 2009 09:23 AM   Normal exercise echo without ischemia or infarct at adequate rate pressure   product and borderline workload. However, non-sustained monomorphic VT   (LBBBtype, inferior axis) developed during exercise. The pt did not have sx.   Pt admitted to  for evaluation   PatientHeight: 63 in   PatientWeight: 120 lbs   BSA 1.6 m^2   Stress Findings   Maximum workload 75 farrar.   Peak MVO2 22.2 ml/kg/min.   Percent predicted MVO2 116 %.   RPP 23,380.   Patient was given 2ml mixture of 1.5ml Definity and 8.5ml saline.   Aortic Valve   The aortic valve is normal in structure and function.   Mitral Valve   The mitral valve is normal in structure and function.   There is mild mitral regurgitation.   Tricuspid Valve   There is trace tricuspid regurgitation.   RVSP = 29 mmHg + RAP.   Procedure:   Stress Echo Bike with two dimensional, color and spectral Doppler performed.   Contrast Definity.   Doppler Measurements & Calculations   TR Max P  mmHg   Stress Results   Target HR: 121 bpm Maximum Predicted HR: 142 bpm   Stage Duration(mm:ss) HR(bpm) BP DOSE Comment   Baseline 00:00 67 148/48   Peak 03:28 140 167/71   Stress Duration: 03:28 mm:ss Recovery Time: 00:00 mm:ss   Maximum Stress HR: 140 bpm METS:   Interpreting Physician: Pietro Patel MD electronically signed on   2009 09:23:09.      Assessment and Plan:   Isadora Mendez is an 86-year-old woman with a history of symptomatic sinus node dysfunction that resolved after she ultimately agreed to placement of a dual-chamber permanent pacemaker.     Atrial fibrillation  We discussed in detail with the patient management/treatment options for Atrial fibrillation includin. Stroke Prophylaxis:  CHADSVASC=age, female, HTN, CAD  4, corresponding to a 4.0% annual stroke / systemic emolism event rate. indicating need for long term oral anticoagulation.  In the past she has refused anticoagulation discussed risk and benefit of anticoagulation and the types of anticoagulation.   We discussed in length anticoagulants include warfarin (Coumadin) and the noval oral anticoagulant agents: dabigatran (Pradaxa), rivaroxaban (Xarelto), and apixaban (Eliquis). The benefits of warfarin include: low cost, established track record in reduction of stroke and systemic embolism, the ability to reverse the agents, the ability to titrate the agent.  Also discussed the disadvantages of warfarin include: frequent phlebotomy for INRs, difficulty in regulation of INR [typically 60-65% of INRs within therapeutic range], and dietary constraints secondary to bioavailability. The benefits of the novel oral anticoagulants, as a class, include: no phlebotomy, similar or significantly lower risk of stroke or systemic embolism depending on the agent, and similar or significantly lower risk of bleeding, particularly intracranial bleeding. The disadvantages of the novel agents, as a class, include: higher cost, the inability to  reverse the agents (except Pradaxa), and the need to be cautious in patients with CKD. At this time she would like not to purse anticoagulation.  She will return in 6 months to visit with Dr. Lazo.   2. Rate Control: Currently on metoprolol XL 25 mg twice daily.     4. Risk Factor Management: Continue current BP regemine and NIKHIL evaluation as needed.        Return to clinic in 6 months.        IBRAHIMA Townsend, CNP

## 2017-08-14 NOTE — PATIENT INSTRUCTIONS
Cardiology Provider you saw in clinic today: IBRAHIMA Townsend CNP    Medication Changes:  None at this time    Labs/Tests needed:  None at this time     Follow-up Visit:  6 months with Dr. Lazo    Further Instructions:      You will receive all normal lab and testing results via [facility] or mail if not reviewed in clinic today. Please contact our office if you need assistance with setting up MyChart.    If you need a medication refill please contact your pharmacy. Please allow 3 business days for your refill to be completed.     As always, thank you for trusting us with your health care needs!    If you have any questions regarding your visit please contact your care team:   Cardiology  Telephone Number    EP RN  Electrophysiology Nurse Coordinator [phone]     Call for EP procedure scheduling concerns  SANJAY Evans  [phone]           Device Clinic (Pacemakers, ICDs, Loop)   RN's : Malia Palomo Connie, Dawn During business hours: [phone]    After business hours:   [phone]- select option 4 and ask for job code [id number].          Understanding Atrial Fibrillation    An arrhythmia is any problem with the speed or pattern of the heartbeat. Atrial fibrillation (AFib) is a common type of arrhythmia. It causes fast, chaotic electrical signals in the atria. This leads to poor functioning of the heart. It also affects how much blood your heart can pump out to the body.  Afib may occur once in a while and go away on its own. Or it may continue for longer periods and need treatment.  AFib can lead to serious problems, such as stroke. Your healthcare provider will need to monitor and manage it.  What happens during atrial fibrillation?   The heart has an electrical system that sends signals to control the heartbeat. As signals move through the heart, they tell the heart s upper chambers (atria) and lower chambers (ventricles) when to squeeze (contract) and relax. This lets blood move  through the heart and out to the body and lungs.  With AFib, the atria receive abnormal signals. This causes them to contract in a fast and irregular way, and out of sync with the ventricles. When this happens, the atria also have a harder time moving blood into the ventricles. Blood may then pool in the atria, which increases the risk for blood clots and stroke. The ventricles also may contract too quickly and irregularly. As a result, they may not pump blood to the body and lungs as well as they should. This can weaken the heart muscle over time and cause heart failure.  What causes atrial fibrillation?  AFib is more common in older adults. It has many possible causes including:    Coronary artery disease    Heart valve disease    Heart attack    Heart surgery    High blood pressure    Thyroid disease    Diabetes    Lung disease    Sleep apnea    Heavy alcohol use  In some cases of AFib, doctors do not know the cause.  What are the symptoms of atrial fibrillation?  AFib may or may not cause symptoms. If symptoms do occur, they may include:    A fast, pounding, irregular heartbeat    Shortness of breath    Tiredness    Dizziness or fainting    Chest pain  How is atrial fibrillation treated?  Treatments for AFib can include any of the options below.    Medicines. You may be prescribed:    Heart rate medicines to help slow down the heartbeat    Heart rhythm medicines to help the heart beat more regularly    Anti-clotting medicines to help reduce the risk for blood clots and stroke    Electrical cardioversion. Your healthcare provider uses special pads or paddles to send one or more brief electrical shocks to the heart. This can help reset the heartbeat to normal.    Ablation. Long, thin tubes called catheters are threaded through a blood vessel to the heart. There, the catheters send out hot or cold energy to the areas causing the abnormal signals. This energy destroys the problem tissue or cells. This improves the  chances that your heart will stay in normal rhythm without using medicines. If your heart rate and rhythm can t be controlled, you may need ablation and a pacemaker. These will help control the heart rate and regularity of the heartbeat.    Surgery. During surgery, your healthcare provider may use different methods to create scar tissue in the areas of the heart causing the abnormal signals. The scar tissue disrupts the abnormal signals and may stop AFib from occurring.  What are the complications of atrial fibrillation?  These can include:    Blood clots    Stroke    Heart failure. This problem occurs when the heart muscle weakens so much that it can no longer pump blood well.  When should I call my healthcare provider?  Call your healthcare provider right away if you have any of these:    Symptoms that don t get better with treatment, or get worse    New symptoms   Date Last Reviewed: 5/1/2016 2000-2017 [employer]. [address] [place] [id number]. All rights reserved. This information is not intended as a substitute for professional medical care. Always follow your healthcare professional's instructions.

## 2017-08-14 NOTE — PROGRESS NOTES
Pt seen in clinic for evaluation and iterative programming of her [employer]tronic dual chamber pacemaker per MD order.  She looks well and she states that she is OK but she has been having some chest and arm paint the past couple of days.  She has an appt to see Ann Castillo NP today in clinic.  Her pacemaker check shows 1 NSVT episode for 3 sec 169 bpm.  She is RV pacing <0.1% of the time, her heart rate histograms show good heart rate variation and her pacemaker battery estimates 9 years left.  We will plan for her to send a remote check on 9/18/17 for wrap-it and see her back in clinic in 6 mos or 3 mos if she returns to see Dr Lazo    Dual pacemaker

## 2017-11-13 ENCOUNTER — ALLIED HEALTH/NURSE VISIT (OUTPATIENT)
Dept: CARDIOLOGY | Facility: CLINIC | Age: 82
End: 2017-11-13
Attending: INTERNAL MEDICINE
Payer: MEDICARE

## 2017-11-13 ENCOUNTER — OFFICE VISIT (OUTPATIENT)
Dept: CARDIOLOGY | Facility: CLINIC | Age: 82
End: 2017-11-13
Attending: NURSE PRACTITIONER
Payer: MEDICARE

## 2017-11-13 ENCOUNTER — PRE VISIT (OUTPATIENT)
Dept: CARDIOLOGY | Facility: CLINIC | Age: 82
End: 2017-11-13

## 2017-11-13 VITALS
SYSTOLIC BLOOD PRESSURE: 133 MMHG | HEIGHT: 63 IN | DIASTOLIC BLOOD PRESSURE: 77 MMHG | BODY MASS INDEX: 23.04 KG/M2 | HEART RATE: 67 BPM | WEIGHT: 130 LBS | OXYGEN SATURATION: 97 %

## 2017-11-13 DIAGNOSIS — Z95.0 CARDIAC PACEMAKER IN SITU: ICD-10-CM

## 2017-11-13 DIAGNOSIS — I50.9 CONGESTIVE HEART FAILURE, UNSPECIFIED CONGESTIVE HEART FAILURE CHRONICITY, UNSPECIFIED CONGESTIVE HEART FAILURE TYPE: ICD-10-CM

## 2017-11-13 DIAGNOSIS — I44.30 ATRIOVENTRICULAR BLOCK, INCOMPLETE: Primary | ICD-10-CM

## 2017-11-13 DIAGNOSIS — I48.0 PAROXYSMAL ATRIAL FIBRILLATION (H): ICD-10-CM

## 2017-11-13 DIAGNOSIS — R07.89 ATYPICAL CHEST PAIN: Primary | ICD-10-CM

## 2017-11-13 DIAGNOSIS — I50.9 CONGESTIVE HEART FAILURE, UNSPECIFIED CONGESTIVE HEART FAILURE CHRONICITY, UNSPECIFIED CONGESTIVE HEART FAILURE TYPE: Primary | ICD-10-CM

## 2017-11-13 LAB
ANION GAP SERPL CALCULATED.3IONS-SCNC: 5 MMOL/L (ref 3–14)
BUN SERPL-MCNC: 20 MG/DL (ref 7–30)
CALCIUM SERPL-MCNC: 9.4 MG/DL (ref 8.5–10.1)
CHLORIDE SERPL-SCNC: 106 MMOL/L (ref 94–109)
CO2 SERPL-SCNC: 26 MMOL/L (ref 20–32)
CREAT SERPL-MCNC: 0.91 MG/DL (ref 0.52–1.04)
ERYTHROCYTE [DISTWIDTH] IN BLOOD BY AUTOMATED COUNT: 13.6 % (ref 10–15)
GFR SERPL CREATININE-BSD FRML MDRD: 59 ML/MIN/1.7M2
GLUCOSE SERPL-MCNC: 130 MG/DL (ref 70–99)
HCT VFR BLD AUTO: 35.3 % (ref 35–47)
HGB BLD-MCNC: 11.5 G/DL (ref 11.7–15.7)
MCH RBC QN AUTO: 30.7 PG (ref 26.5–33)
MCHC RBC AUTO-ENTMCNC: 32.6 G/DL (ref 31.5–36.5)
MCV RBC AUTO: 94 FL (ref 78–100)
NT-PROBNP SERPL-MCNC: 484 PG/ML (ref 0–450)
PLATELET # BLD AUTO: 175 10E9/L (ref 150–450)
POTASSIUM SERPL-SCNC: 4.5 MMOL/L (ref 3.4–5.3)
RBC # BLD AUTO: 3.75 10E12/L (ref 3.8–5.2)
SODIUM SERPL-SCNC: 137 MMOL/L (ref 133–144)
TROPONIN I SERPL-MCNC: <0.015 UG/L (ref 0–0.04)
WBC # BLD AUTO: 6.4 10E9/L (ref 4–11)

## 2017-11-13 PROCEDURE — 93005 ELECTROCARDIOGRAM TRACING: CPT | Mod: ZF

## 2017-11-13 PROCEDURE — 83880 ASSAY OF NATRIURETIC PEPTIDE: CPT | Performed by: NURSE PRACTITIONER

## 2017-11-13 PROCEDURE — 93280 PM DEVICE PROGR EVAL DUAL: CPT | Mod: ZF

## 2017-11-13 PROCEDURE — 85027 COMPLETE CBC AUTOMATED: CPT | Performed by: NURSE PRACTITIONER

## 2017-11-13 PROCEDURE — 80048 BASIC METABOLIC PNL TOTAL CA: CPT | Performed by: NURSE PRACTITIONER

## 2017-11-13 PROCEDURE — 93010 ELECTROCARDIOGRAM REPORT: CPT | Mod: ZP | Performed by: INTERNAL MEDICINE

## 2017-11-13 PROCEDURE — 93288 INTERROG EVL PM/LDLS PM IP: CPT | Mod: 26 | Performed by: INTERNAL MEDICINE

## 2017-11-13 PROCEDURE — 99214 OFFICE O/P EST MOD 30 MIN: CPT | Mod: 25 | Performed by: NURSE PRACTITIONER

## 2017-11-13 PROCEDURE — 36415 COLL VENOUS BLD VENIPUNCTURE: CPT | Performed by: NURSE PRACTITIONER

## 2017-11-13 PROCEDURE — 84484 ASSAY OF TROPONIN QUANT: CPT | Performed by: NURSE PRACTITIONER

## 2017-11-13 ASSESSMENT — PAIN SCALES - GENERAL: PAINLEVEL: NO PAIN (0)

## 2017-11-13 NOTE — MR AVS SNAPSHOT
After Visit Summary   11/13/2017    Isadora Mendez    MRN: [id number]           Patient Information     Date Of Birth          3/9/1931        Visit Information        Provider Department      11/13/2017 3:30 PM Ann Hoffman APRN CNP [facility]        Today's Diagnoses     Atypical chest pain          Care Instructions    Cardiology Provider you saw in clinic today: IBRAHIMA Townsend, CNP    Medication Changes:    Purchase extra strength tylenol 500 mg.  Take 2 tablets (1000 mg); three times per day.  For three days.  See if the pain decreases.     Take the first dose when you get home.        Labs/Tests needed:  ECHO      Follow-up Visit:  After the ECHO    Further Instructions:  If your symptoms worsen or do not improve please proceed to the emergency room    You will receive all normal lab and testing results via [employer] or mail if not reviewed in clinic today. Please contact our office if you need assistance with setting up MyChart.    If you need a medication refill please contact your pharmacy. Please allow 3 business days for your refill to be completed.     As always, thank you for trusting us with your health care needs!    If you have any questions regarding your visit please contact your care team:   Cardiology  Telephone Number    EP RN  Electrophysiology Nurse Coordinator [phone]     Call for EP procedure scheduling concerns  SANJAY Evans  [phone]           Device Clinic (Pacemakers, ICDs, Loop)   RN's : Malia Palomo Connie, Dawn During business hours: [phone]    After business hours:   [phone]- select option 4 and ask for job code [phone].                  Follow-ups after your visit        Follow-up notes from your care team     Return for Ann Hoffman NP, ECHO.      Your next 10 appointments already scheduled     Nov 21, 2017 10:30 AM CST   Ech Complete with [address] Echo ([facility])    [address] [id number] [phone]           1. Please bring or wear a comfortable two-piece outfit. 2. You may eat, drink and take your normal medicines. 3. For any questions that cannot be answered, please contact the ordering physician            Nov 21, 2017 11:30 AM CST   (Arrive by 11:15 AM)   RETURN ARRHYTHMIA with IBRAHIMA Beck CNP   [facility] ([facility])    [address] [place] [id number] [phone]            Dec 01, 2017  9:00 AM CST   (Arrive by 8:45 AM)   New Patient Visit with Jovana Macias MD   [facility] ([facility])    [address] [place] [id number] [phone]            Feb 20, 2018  9:00 AM CST   (Arrive by 8:45 AM)   Pacemaker Check with Uc Cv Device 1   [facility] ([facility])    [address] [place] [address] [phone]            Feb 20, 2018  9:30 AM CST   (Arrive by 9:15 AM)   RETURN ATRIAL FIBULATION VISIT with Dale Lazo MD   [facility])    [address] [place] [id number] [phone]              Future tests that were ordered for you today     Open Future Orders        Priority Expected Expires Ordered    Echocardiogram Complete Routine  11/13/2018 11/13/2017            Who to contact     If you have questions or need follow up information about today's clinic visit or your schedule please contact [facility] directly at [phone].  Normal or non-critical lab and imaging results will be communicated to you by MyChart, letter or phone within 4 business days after the clinic has received the results. If you do not hear from us within 7 days, please contact the clinic through MyChart or phone. If you have a critical or abnormal lab  "result, we will notify you by phone as soon as possible.  Submit refill requests through [employer] or call your pharmacy and they will forward the refill request to us. Please allow 3 business days for your refill to be completed.          Additional Information About Your Visit        [employer] Information     [employer] lets you send messages to your doctor, view your test results, renew your prescriptions, schedule appointments and more. To sign up, go to [email or web address] . Click on \"Log in\" on the left side of the screen, which will take you to the Welcome page. Then click on \"Sign up Now\" on the right side of the page.     You will be asked to enter the access code listed below, as well as some personal information. Please follow the directions to create your username and password.     Your access code is: [email or web address]  Expires: 2017  5:30 AM     Your access code will  in 90 days. If you need help or a new code, please call your [facility] clinic or [phone].        Care EveryWhere ID     This is your Care EveryWhere ID. This could be used by other organizations to access your [facility] medical records  [id number]        Your Vitals Were     Pulse Height Pulse Oximetry BMI (Body Mass Index)          67 1.594 m (5' 2.75\") 97% 23.21 kg/m2         Blood Pressure from Last 3 Encounters:   17 133/77   17 120/70   16 135/57    Weight from Last 3 Encounters:   17 59 kg (130 lb)   17 57.2 kg (126 lb)   16 56.2 kg (123 lb 14.4 oz)              We Performed the Following     EKG 12-lead, tracing only (Future)        Primary Care Provider    Physician No Ref-Primary       NO REF-PRIMARY PHYSICIAN        Equal Access to Services     DWAYNE VAUGHAN : Whit Graves, paolo campos, seb zhang, nela valenzuela. [facility] [phone].    ATENCIÓN: Si habla español, tiene a jason disposición servicios gratuitos de asistencia " lingüísticaSelvin Hurtado al [phone].    We comply with applicable federal civil rights laws and [place] laws. We do not discriminate on the basis of race, color, national origin, age, disability, sex, sexual orientation, or gender identity.            Thank you!     Thank you for choosing [facility]  for your care. Our goal is always to provide you with excellent care. Hearing back from our patients is one way we can continue to improve our services. Please take a few minutes to complete the written survey that you may receive in the mail after your visit with us. Thank you!             Your Updated Medication List - Protect others around you: Learn how to safely use, store and throw away your medicines at www.disposemymeds.org.          This list is accurate as of: 11/13/17  5:04 PM.  Always use your most recent med list.                   Brand Name Dispense Instructions for use Diagnosis    aspirin 81 MG tablet      Take 1 tablet by mouth daily.        BIOTIN PO      Take 1 capsule by mouth daily.    Hypertension, New onset atrial fibrillation (H), Hyperlipidemia LDL goal <100       CALCIUM 500 + D PO      Take 1 tablet by mouth 2 times daily.        coenzyme Q-10 100 MG Tabs           COLLAGEN PO      Take 1 capsule by mouth 2 times daily.        hydrochlorothiazide 12.5 MG capsule    MICROZIDE    90 capsule    Take 1 capsule (12.5 mg) by mouth daily    Essential hypertension       lisinopril 10 MG tablet    PRINIVIL/ZESTRIL    90 tablet    Take 1 tablet (10 mg) by mouth daily    Essential hypertension       MAGNESIUM PO      one daily        metoprolol 50 MG 24 hr tablet    TOPROL-XL    90 tablet    Take 0.5 tablets (25 mg) by mouth 2 times daily    Essential hypertension       * NEW MED      1 tablet daily Nattokinase, from fermented soybean .    Hypertension, New onset atrial fibrillation (H)       * NEW MED      Cumin - capsule daily Vitor - capsule daily        * NEW MED      Mineral drops         potassium chloride 20 MEQ/15ML (10%) solution    ELISE          VITAMIN B-6 PO      Take 1 tablet by mouth daily    Hypertension, Hyperlipidemia LDL goal <100       VITAMIN B12 PO      Take 1 tablet by mouth daily.        VITAMIN C PO           * Notice:  This list has 3 medication(s) that are the same as other medications prescribed for you. Read the directions carefully, and ask your doctor or other care provider to review them with you.

## 2017-11-13 NOTE — MR AVS SNAPSHOT
After Visit Summary   11/13/2017    Isadora Mendez    MRN: [id number]           Patient Information     Date Of Birth          3/9/1931        Visit Information        Provider Department      11/13/2017 3:00 PM 1, Uc Cv Device [facility]        Today's Diagnoses     Atrioventricular block, incomplete    -  1      Care Instructions    It was a pleasure to see you in clinic today.  Please do not hesitate to call with any questions or concerns.  We look forward to seeing you in clinic at your next device check in 3 months.    Josefina Roger, RN, MS, CCRN  Electrophysiology Nurse Clinician  [facility]    During Business Hours Please Call:  [phone]  After Hours Please Call:  [phone] - select option #4 and ask for job code [phone]                                  Follow-ups after your visit        Follow-up notes from your care team     Return in about 3 months (around 2/13/2018) for Pacemaker Check.      Your next 10 appointments already scheduled     Dec 01, 2017  9:00 AM CST   (Arrive by 8:45 AM)   New Patient Visit with Jovana Macias MD   [facility] ([facility])    [address] [place] [id number] [phone]            Feb 20, 2018  9:00 AM CST   (Arrive by 8:45 AM)   Pacemaker Check with Uc Cv Device 1   [facility])    [address] [place] [id number] [phone]            Feb 20, 2018  9:30 AM CST   (Arrive by 9:15 AM)   RETURN ATRIAL FIBULATION VISIT with Dale Lazo MD   [facility])    [address] [place] [id number] [phone]              Who to contact     If you have questions or need follow up information about today's clinic visit or your schedule please contact [facility] directly at [phone].  Normal or  "non-critical lab and imaging results will be communicated to you by MyChart, letter or phone within 4 business days after the clinic has received the results. If you do not hear from us within 7 days, please contact the clinic through [employer] or phone. If you have a critical or abnormal lab result, we will notify you by phone as soon as possible.  Submit refill requests through [employer] or call your pharmacy and they will forward the refill request to us. Please allow 3 business days for your refill to be completed.          Additional Information About Your Visit        [employer] Information     Stats[employer] lets you send messages to your doctor, view your test results, renew your prescriptions, schedule appointments and more. To sign up, go to [email or web address] . Click on \"Log in\" on the left side of the screen, which will take you to the Welcome page. Then click on \"Sign up Now\" on the right side of the page.     You will be asked to enter the access code listed below, as well as some personal information. Please follow the directions to create your username and password.     Your access code is: [email or web address]  Expires: 2017  5:30 AM     Your access code will  in 90 days. If you need help or a new code, please call your [facility] clinic or [phone].        Care EveryWhere ID     This is your Care EveryWhere ID. This could be used by other organizations to access your [facility] medical records  [id number]         Blood Pressure from Last 3 Encounters:   17 133/77   17 120/70   16 135/57    Weight from Last 3 Encounters:   17 59 kg (130 lb)   17 57.2 kg (126 lb)   16 56.2 kg (123 lb 14.4 oz)              We Performed the Following     PM DEVICE PROGRAMMING EVAL, DUAL LEAD PACER        Primary Care Provider    Physician No Ref-Primary       NO REF-PRIMARY PHYSICIAN        Equal Access to Services     DWAYNE VAUGHAN AH: Hadii paolo Aguero, " nela beltran ah. [facility] [phone].    ATENCIÓN: Si david williamson, tiene a jason disposición servicios gratuitos de asistencia lingüística. Bryant al [phone].    We comply with applicable federal civil rights laws and [place] laws. We do not discriminate on the basis of race, color, national origin, age, disability, sex, sexual orientation, or gender identity.            Thank you!     Thank you for choosing [facility]  for your care. Our goal is always to provide you with excellent care. Hearing back from our patients is one way we can continue to improve our services. Please take a few minutes to complete the written survey that you may receive in the mail after your visit with us. Thank you!             Your Updated Medication List - Protect others around you: Learn how to safely use, store and throw away your medicines at www.disposemymeds.org.          This list is accurate as of: 11/13/17  4:18 PM.  Always use your most recent med list.                   Brand Name Dispense Instructions for use Diagnosis    aspirin 81 MG tablet      Take 1 tablet by mouth daily.        BIOTIN PO      Take 1 capsule by mouth daily.    Hypertension, New onset atrial fibrillation (H), Hyperlipidemia LDL goal <100       CALCIUM 500 + D PO      Take 1 tablet by mouth 2 times daily.        coenzyme Q-10 100 MG Tabs           COLLAGEN PO      Take 1 capsule by mouth 2 times daily.        hydrochlorothiazide 12.5 MG capsule    MICROZIDE    90 capsule    Take 1 capsule (12.5 mg) by mouth daily    Essential hypertension       lisinopril 10 MG tablet    PRINIVIL/ZESTRIL    90 tablet    Take 1 tablet (10 mg) by mouth daily    Essential hypertension       MAGNESIUM PO      one daily        metoprolol 50 MG 24 hr tablet    TOPROL-XL    90 tablet    Take 0.5 tablets (25 mg) by mouth 2 times daily    Essential hypertension       * NEW MED      1 tablet daily Nattokinase, from  fermented soybean .    Hypertension, New onset atrial fibrillation (H)       * NEW MED      Cumin - capsule daily Vitor - capsule daily        * NEW MED      Mineral drops        potassium chloride 20 MEQ/15ML (10%) solution    ELISE          VITAMIN B-6 PO      Take 1 tablet by mouth daily    Hypertension, Hyperlipidemia LDL goal <100       VITAMIN B12 PO      Take 1 tablet by mouth daily.        VITAMIN C PO           * Notice:  This list has 3 medication(s) that are the same as other medications prescribed for you. Read the directions carefully, and ask your doctor or other care provider to review them with you.

## 2017-11-13 NOTE — LETTER
11/13/2017      RE: Isadora Mendez  [address] [facility] [address]       Dear Colleague,    Thank you for the opportunity to participate in the care of your patient, Isadora Mendez, at the [facility]. Please see a copy of my visit note below.      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 States that activity level is high as she continues to manage and work at a  with at least 2 children daily.    States her symptoms of chest discomfort started 3 days ago and are characterized by heaviness on left side radiating at times to her left elbow, associated with stomach ache,slight nausea, gas, causing her not to sleep, lying down makes it worse, during the day the pain is better and was able to shop and continue with her  (ages 3 and 5 year old).  Is lifting the 3 year old onto the couch. She does have a history of fx rib from 6/2017.   Walked up and down the stairs carrying laundry and the pain did not worsen.  Took 650 mg of aspirin which relieved the symptoms. Denies headaches, dizziness, syncope, angina, dyspnea at rest or with exertion, dry cough, palpitations, orthopnea, PND, abdominal edema, pedal edema, or claudication.    When asking patient to point to where the pain is point to the area and when the provider pushes down in the area medial of her left arm am able to reproduce similar pain at less severity .    Today s Device check: normal check  Medtronic dual lead pacemaker per MD orders.  Normal pacemaker function.  617 AT/AF episodes recorded - < 1 min - 2 hrs 41 min in duration.  1 NSVT  episode recorded that appears to be SVT - 3 sec, 170 bpm.  Intrinsic rhythm = SB @ 52 bpm.  AP = 98.9%.   = <0.1%.  Estimated battery longevity to [facility] = 9 years.         Presenting EKG:   Atrial paced at 80 bpm with incomplete RBBB,     Rhythm monitoring:  Current cardiac medications:  Lisinopril 10 mg daily, hydrochlorothiazide 12.5 mg daily, metoprolol XL 50 mg daily, ASA 81 mg daily          Current Outpatient Prescriptions   Medication Sig Dispense Refill     potassium chloride (KAYCIEL) 20 MEQ/15ML (10%) solution        MAGNESIUM PO one daily       coenzyme Q-10 100 MG TABS        hydrochlorothiazide (MICROZIDE) 12.5 MG capsule Take 1 capsule (12.5 mg) by mouth daily 90 capsule 3     lisinopril (PRINIVIL/ZESTRIL) 10 MG tablet Take 1 tablet (10 mg) by mouth daily 90 tablet 3     metoprolol (TOPROL-XL) 50 MG 24 hr tablet Take 0.5 tablets (25 mg) by mouth 2 times daily 90 tablet 3     Pyridoxine HCl (VITAMIN B-6 PO) Take 1 tablet by mouth daily       NEW MED Cumin - capsule daily  Vitor - capsule daily       NEW MED Mineral drops       COLLAGEN PO Take 1 capsule by mouth 2 times daily.       Cyanocobalamin (VITAMIN B12 PO) Take 1 tablet by mouth daily.       NEW MED 1 tablet daily Nattokinase, from fermented soybean .        BIOTIN PO Take 1 capsule by mouth daily.       aspirin 81 MG tablet Take 1 tablet by mouth daily.       Calcium Carbonate-Vitamin D (CALCIUM 500 + D PO) Take 1 tablet by mouth 2 times daily.         Past Medical History:   Diagnosis Date     Atrial fibrillation (H) 2011     Facial fracture (H) 2006     Hypertension      NSVT (nonsustained ventricular tachycardia) (H) 2009    during exercise echo, neg EP study     Pacemaker 7-1-2010     S/P cardiac catheterization 2009    30-40% non obstructive lesion     Ventricular tachycardia, nonsustained (H) 2009    during stress echo       Past Surgical History:   Procedure Laterality Date     IMPLANT PACEMAKER       PPM  6/30/2010    Permanent Pacemaker  "      Family History   Problem Relation Age of Onset     Cardiovascular Father 76      age 80, MI 76 and CHF 80's     Cardiovascular Paternal Grandfather       age 76 of CVD     Myocardial Infarction Mother 88     lived to [facility] in [place]       Social History   Substance Use Topics     Smoking status: Never Smoker     Smokeless tobacco: Not on file     Alcohol use No       No Known Allergies      ROS:   Negative except for as indicated in HPI.    Physical Examination:  Vitals: /77 (BP Location: Left arm, Patient Position: Chair, Cuff Size: Adult Regular)  Pulse 67  Ht 1.594 m (5' 2.75\")  Wt 59 kg (130 lb)  SpO2 97%  BMI 23.21 kg/m2  BMI= Body mass index is 23.21 kg/(m^2).    GENERAL APPEARANCE: healthy, alert, and no acute distress  HEENT: no icterus, no xanthelasmas, normal pupil size and reaction, no cyanosis.  NECK: no asymmetry, thyroid normal to palpation, carotid upstrokes are normal bilaterally, no cervical bruits, no JVD   CHEST: lungs clear to auscultation - no rales, rhonchi or wheezes, no use of accessory muscles, no retractions, respirations are unlabored, normal respiratory rate  CARDIOVASCULAR: regular rhythm, normal S1 with physiologic split S2, no S3 or S4 and no murmur, click or rub, precordium quiet with normal PMI.  ABDOMEN: soft, non-tender, without hepatosplenomegaly, no masses palpable, bowel sounds normal, no abdominal bruits  EXTREMITIES: no clubbing, cyanosis, or edema  NEURO: alert and oriented to person/place/time, normal speech, gait and affect  VASC: Radial, and posterior tibialis pulses +1 and symmetric bilaterally.   SKIN: no ecchymoses, no rashes    Laboratory:       Lab Results   Component Value Date     2017    Lab Results   Component Value Date    CHLORIDE 106 2017    Lab Results   Component Value Date    BUN 20 2017      Lab Results   Component Value Date    POTASSIUM 4.5 2017    Lab Results   Component Value Date    CO2 26 2017    Lab " Results   Component Value Date    CR 0.91 2017        Lab Results   Component Value Date    NTBNP 484 (H) 2017     Lab Results   Component Value Date    WBC 6.4 2017    HGB 11.5 (L) 2017    HCT 35.3 2017    MCV 94 2017     2017     Lab Results   Component Value Date    TROPI <0.015 2017       No results found for: CKTOTAL, CKMB, TROPN  Cholesterol (mg/dL)   Date Value   2016 139   10/27/2014 155   2013 171   2012 173     Cholesterol/HDL Ratio (no units)   Date Value   10/27/2014 1.8   2013 2.6   2012 2.4   2010 2.7     HDL Cholesterol (mg/dL)   Date Value   2016 65   10/27/2014 84   2013 67   2012 73     LDL Cholesterol Calculated (mg/dL)   Date Value   2016 61   10/27/2014 61   2013 89   2012 88     ECHO:   No results found for this or any previous visit (from the past 8760 hour(s)).      Assessment and recommendations:    Atypical chest pain  - EKG reviewed with Dr. Hill of normal atrial pacing   - Troponins and BMP not elevated  - BMP and CBC unchanged from previous  - Purchase extra strength tylenol 500 mg.  Take 2 tablets (1000 mg); three times per day.  For three days.  See if the pain decreases.   Take the first dose when you get home.      HTN  - controlled  - lisinopril 10 mg daily    Ppm  - normal function    Atrial fibrillation  - patient is unaware of her atrial fibrillation.  She does not know when she is in atrial fibrillation.   -We discussed in detail with the patient management/treatment options for Darrian includin. Stroke Prophylaxis:  CHADSVASC=female, age++, HTN  4, corresponding to a 4.0% annual stroke / systemic emolism event rate. indicating need for long term oral anticoagulation.  She does not want to be on anticoagulation.  We have had this discussion in the past.  She will return in a few weeks to further discuss.   2. Rate Control: Continue with metoprolol  XL 25 mg BID.         Will return next week with ECHO.         Patient expresses understanding and agreement with the plan.    I appreciate the chance to help with Isadora Mendez [facility]. Please let me know if you have any questions or concerns.    IBRAHIMA Townsend, CNP

## 2017-11-13 NOTE — PATIENT INSTRUCTIONS
Cardiology Provider you saw in clinic today: IBRAHIMA Townsend, CNP    Medication Changes:    Purchase extra strength tylenol 500 mg.  Take 2 tablets (1000 mg); three times per day.  For three days.  See if the pain decreases.     Take the first dose when you get home.        Labs/Tests needed:  ECHO      Follow-up Visit:  After the ECHO    Further Instructions:  If your symptoms worsen or do not improve please proceed to the emergency room    You will receive all normal lab and testing results via [employer]hart or mail if not reviewed in clinic today. Please contact our office if you need assistance with setting up MyChart.    If you need a medication refill please contact your pharmacy. Please allow 3 business days for your refill to be completed.     As always, thank you for trusting us with your health care needs!    If you have any questions regarding your visit please contact your care team:   Cardiology  Telephone Number    EP RN  Electrophysiology Nurse Coordinator [phone]     Call for EP procedure scheduling concerns  SANJAY Evans  [phone]           Device Clinic (Pacemakers, ICDs, Loop)   RN's : Malia Palomo Connie, Dawn During business hours: [phone]    After business hours:   [phone]- select option 4 and ask for job code [phone].

## 2017-11-13 NOTE — PROGRESS NOTES
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 States that activity level is high as she continues to manage and work at a  with at least 2 children daily.    States her symptoms of chest discomfort started 3 days ago and are characterized by heaviness on left side radiating at times to her left elbow, associated with stomach ache,slight nausea, gas, causing her not to sleep, lying down makes it worse, during the day the pain is better and was able to shop and continue with her  (ages 3 and 5 year old).  Is lifting the 3 year old onto the couch. She does have a history of fx rib from 6/2017.   Walked up and down the stairs carrying laundry and the pain did not worsen.  Took 650 mg of aspirin which relieved the symptoms. Denies headaches, dizziness, syncope, angina, dyspnea at rest or with exertion, dry cough, palpitations, orthopnea, PND, abdominal edema, pedal edema, or claudication.    When asking patient to point to where the pain is point to the area and when the provider pushes down in the area medial of her left arm am able to reproduce similar pain at less severity .    Today s Device check: normal check  Medtronic dual lead pacemaker per MD orders.  Normal pacemaker function.  617 AT/AF episodes recorded - < 1 min - 2 hrs 41 min in duration.  1 NSVT episode recorded that appears to be SVT - 3 sec, 170 bpm.  Intrinsic rhythm = SB @ 52 bpm.  AP = 98.9%.   = <0.1%.  Estimated battery longevity to EMILEE = 9 years.        Presenting EKG:   Atrial paced at 80 bpm with incomplete RBBB,     Rhythm monitoring:  Current cardiac medications:  Lisinopril 10 mg  daily, hydrochlorothiazide 12.5 mg daily, metoprolol XL 50 mg daily, ASA 81 mg daily          Current Outpatient Prescriptions   Medication Sig Dispense Refill     potassium chloride (KAYCIEL) 20 MEQ/15ML (10%) solution        MAGNESIUM PO one daily       coenzyme Q-10 100 MG TABS        hydrochlorothiazide (MICROZIDE) 12.5 MG capsule Take 1 capsule (12.5 mg) by mouth daily 90 capsule 3     lisinopril (PRINIVIL/ZESTRIL) 10 MG tablet Take 1 tablet (10 mg) by mouth daily 90 tablet 3     metoprolol (TOPROL-XL) 50 MG 24 hr tablet Take 0.5 tablets (25 mg) by mouth 2 times daily 90 tablet 3     Pyridoxine HCl (VITAMIN B-6 PO) Take 1 tablet by mouth daily       NEW MED Cumin - capsule daily  Vitor - capsule daily       NEW MED Mineral drops       COLLAGEN PO Take 1 capsule by mouth 2 times daily.       Cyanocobalamin (VITAMIN B12 PO) Take 1 tablet by mouth daily.       NEW MED 1 tablet daily Nattokinase, from fermented soybean .        BIOTIN PO Take 1 capsule by mouth daily.       aspirin 81 MG tablet Take 1 tablet by mouth daily.       Calcium Carbonate-Vitamin D (CALCIUM 500 + D PO) Take 1 tablet by mouth 2 times daily.         Past Medical History:   Diagnosis Date     Atrial fibrillation (H)      Facial fracture (H)      Hypertension      NSVT (nonsustained ventricular tachycardia) (H)     during exercise echo, neg EP study     Pacemaker 2010     S/P cardiac catheterization     30-40% non obstructive lesion     Ventricular tachycardia, nonsustained (H)     during stress echo       Past Surgical History:   Procedure Laterality Date     IMPLANT PACEMAKER       PPM  2010    Permanent Pacemaker       Family History   Problem Relation Age of Onset     Cardiovascular Father 76      age 80, MI 76 and CHF 80's     Cardiovascular Paternal Grandfather       age 76 of CVD     Myocardial Infarction Mother 88     lived to 100       Social History   Substance Use Topics     Smoking status: Never  "Smoker     Smokeless tobacco: Not on file     Alcohol use No       No Known Allergies      ROS:   Negative except for as indicated in HPI.    Physical Examination:  Vitals: /77 (BP Location: Left arm, Patient Position: Chair, Cuff Size: Adult Regular)  Pulse 67  Ht 1.594 m (5' 2.75\")  Wt 59 kg (130 lb)  SpO2 97%  BMI 23.21 kg/m2  BMI= Body mass index is 23.21 kg/(m^2).    GENERAL APPEARANCE: healthy, alert, and no acute distress  HEENT: no icterus, no xanthelasmas, normal pupil size and reaction, no cyanosis.  NECK: no asymmetry, thyroid normal to palpation, carotid upstrokes are normal bilaterally, no cervical bruits, no JVD   CHEST: lungs clear to auscultation - no rales, rhonchi or wheezes, no use of accessory muscles, no retractions, respirations are unlabored, normal respiratory rate  CARDIOVASCULAR: regular rhythm, normal S1 with physiologic split S2, no S3 or S4 and no murmur, click or rub, precordium quiet with normal PMI.  ABDOMEN: soft, non-tender, without hepatosplenomegaly, no masses palpable, bowel sounds normal, no abdominal bruits  EXTREMITIES: no clubbing, cyanosis, or edema  NEURO: alert and oriented to person/place/time, normal speech, gait and affect  VASC: Radial, and posterior tibialis pulses +1 and symmetric bilaterally.   SKIN: no ecchymoses, no rashes    Laboratory:       Lab Results   Component Value Date     11/13/2017    Lab Results   Component Value Date    CHLORIDE 106 11/13/2017    Lab Results   Component Value Date    BUN 20 11/13/2017      Lab Results   Component Value Date    POTASSIUM 4.5 11/13/2017    Lab Results   Component Value Date    CO2 26 11/13/2017    Lab Results   Component Value Date    CR 0.91 11/13/2017        Lab Results   Component Value Date    NTBNP 484 (H) 11/13/2017     Lab Results   Component Value Date    WBC 6.4 11/13/2017    HGB 11.5 (L) 11/13/2017    HCT 35.3 11/13/2017    MCV 94 11/13/2017     11/13/2017     Lab Results   Component " Value Date    TROPI <0.015 2017       No results found for: CKTOTAL, CKMB, TROPN  Cholesterol (mg/dL)   Date Value   2016 139   10/27/2014 155   2013 171   2012 173     Cholesterol/HDL Ratio (no units)   Date Value   10/27/2014 1.8   2013 2.6   2012 2.4   2010 2.7     HDL Cholesterol (mg/dL)   Date Value   2016 65   10/27/2014 84   2013 67   2012 73     LDL Cholesterol Calculated (mg/dL)   Date Value   2016 61   10/27/2014 61   2013 89   2012 88     ECHO:   No results found for this or any previous visit (from the past 8760 hour(s)).      Assessment and recommendations:    Atypical chest pain  - EKG reviewed with Dr. Hill of normal atrial pacing   - Troponins and BMP not elevated  - BMP and CBC unchanged from previous  - Purchase extra strength tylenol 500 mg.  Take 2 tablets (1000 mg); three times per day.  For three days.  See if the pain decreases.   Take the first dose when you get home.      HTN  - controlled  - lisinopril 10 mg daily    Ppm  - normal function    Atrial fibrillation  - patient is unaware of her atrial fibrillation.  She does not know when she is in atrial fibrillation.   -We discussed in detail with the patient management/treatment options for A.fib includin. Stroke Prophylaxis:  CHADSVASC=female, age++, HTN  4, corresponding to a 4.0% annual stroke / systemic emolism event rate. indicating need for long term oral anticoagulation.  She does not want to be on anticoagulation.  We have had this discussion in the past.  She will return in a few weeks to further discuss.   2. Rate Control: Continue with metoprolol XL 25 mg BID.         Will return next week with ECHO.         Patient expresses understanding and agreement with the plan.    I appreciate the chance [facility]. Please let me know if you have any questions or concerns.      Ann Hoffman, APRN, CNP

## 2017-11-13 NOTE — PROGRESS NOTES
"Patient called device RN this morning to report ~ 3 day history of intermittent left sided chest heaviness that occasionally radiates down to her left elbow.  Patient also reports intermittent nausea and \"sour stomach\".  Patient states that the heaviness is much worse when she lays down and does not allow her to sleep.  Patient also states that when she is laying down she feels a little more \"breathless\".  Encouraged patient to go to the ER for further assessment.  Patient stated that she could not go at this time because her  kids were on there way to her house.  Patient states she could come this afternoon.  Discussed patient with Ann Hoffman NP who can see her this afternoon.  Patient instructed to go immediately to the ER with increase in symptoms.  Patient seen in clinic for evaluation and iterative programming of her Medtronic dual lead pacemaker per MD orders.  Patient is scheduled to see Ann Hoffman NP today.  Normal pacemaker function.  617 AT/AF episodes recorded - < 1 min - 2 hrs 41 min in duration.  1 NSVT episode recorded that appears to be SVT - 3 sec, 170 bpm.  Intrinsic rhythm = SB @ 52 bpm.  AP = 98.9%.   = <0.1%.  Estimated battery longevity to EMILEE = 9 years.  Patient reports that she currently has #2-3/10 left sided chest heaviness radiating to her left arm.  Ann Hoffman NP notified of interrogation results.    Dual lead pacemaker        "

## 2017-11-13 NOTE — PATIENT INSTRUCTIONS
It was a pleasure to see you in clinic today.  Please do not hesitate to call with any questions or concerns.  We look forward to seeing you in clinic at your next device check in 3 months.    Josefina Roger, RN, MS, CCRN  Electrophysiology Nurse Clinician  [facility]    During Business Hours Please Call:  [phone]  After Hours Please Call:  [phone] - select option #4 and ask for job code [phone]

## 2017-11-14 ENCOUNTER — PRE VISIT (OUTPATIENT)
Dept: CARDIOLOGY | Facility: CLINIC | Age: 82
End: 2017-11-14

## 2017-11-14 LAB — INTERPRETATION ECG - MUSE: NORMAL

## 2017-11-14 NOTE — TELEPHONE ENCOUNTER
Called patient this am.  States she took Tylenol last night, slept and felt better this am.  Pain has reduced this am.  IBRAHIMA Townsend, CNP

## 2017-11-21 ENCOUNTER — RADIANT APPOINTMENT (OUTPATIENT)
Dept: CARDIOLOGY | Facility: CLINIC | Age: 82
End: 2017-11-21

## 2017-11-21 ENCOUNTER — OFFICE VISIT (OUTPATIENT)
Dept: CARDIOLOGY | Facility: CLINIC | Age: 82
End: 2017-11-21
Attending: NURSE PRACTITIONER
Payer: COMMERCIAL

## 2017-11-21 VITALS
HEART RATE: 71 BPM | SYSTOLIC BLOOD PRESSURE: 178 MMHG | WEIGHT: 128.9 LBS | DIASTOLIC BLOOD PRESSURE: 76 MMHG | HEIGHT: 63 IN | BODY MASS INDEX: 22.84 KG/M2 | OXYGEN SATURATION: 99 %

## 2017-11-21 DIAGNOSIS — R07.89 ATYPICAL CHEST PAIN: ICD-10-CM

## 2017-11-21 DIAGNOSIS — Z95.0 CARDIAC PACEMAKER IN SITU: ICD-10-CM

## 2017-11-21 DIAGNOSIS — I48.0 PAROXYSMAL ATRIAL FIBRILLATION (H): ICD-10-CM

## 2017-11-21 DIAGNOSIS — I10 ESSENTIAL HYPERTENSION: Primary | ICD-10-CM

## 2017-11-21 PROCEDURE — 99214 OFFICE O/P EST MOD 30 MIN: CPT | Mod: ZP | Performed by: NURSE PRACTITIONER

## 2017-11-21 PROCEDURE — 40000809 ZZH STATISTIC NO DOCUMENTATION TO SUPPORT CHARGE

## 2017-11-21 PROCEDURE — 99212 OFFICE O/P EST SF 10 MIN: CPT | Mod: 25,ZF

## 2017-11-21 ASSESSMENT — PAIN SCALES - GENERAL: PAINLEVEL: NO PAIN (0)

## 2017-11-21 NOTE — NURSING NOTE
Chief Complaint   Patient presents with     Follow Up For     1 week follow up with echo prior     Vitals were taken and medications were reconciled.    Norah Harrell CMA     11:28 AM

## 2017-11-21 NOTE — MR AVS SNAPSHOT
After Visit Summary   11/21/2017    Isadora Mendez    MRN: [id number]           Patient Information     Date Of Birth          3/9/1931        Visit Information        Provider Department      11/21/2017 11:30 AM Ann Hoffman APRN CNP [facility]        Care Instructions    Cardiology Provider you saw in clinic today: IBRAHIMA Townsend CNP    Medication Changes:  Please discuss anticoagulation with Dr. Macias    Labs/Tests needed:  I will send you a letter in the mail regarding the ECHO results     Follow-up Visit:  With Dr. Lazo in February    Further Instructions:      You will receive all normal lab and testing results via [employer] or mail if not reviewed in clinic today. Please contact our office if you need assistance with setting up MyChart.    If you need a medication refill please contact your pharmacy. Please allow 3 business days for your refill to be completed.     As always, thank you for trusting us with your health care needs!    If you have any questions regarding your visit please contact your care team:   Cardiology  Telephone Number    EP RN  Electrophysiology Nurse Coordinator [phone]     Call for EP procedure scheduling concerns  SANJAY Evans  [phone]           Device Clinic (Pacemakers, ICDs, Loop)   RN's : Malia Palomo Connie, Dawn During business hours: [phone]    After business hours:   [phone]- select option 4 and ask for job code [phone].          Apixaban Oral tablet  What is this medicine?  APIXABAN (a PIX a ban) is an anticoagulant (blood thinner). It is used to lower the chance of stroke in people with a medical condition called atrial fibrillation. It is also used to treat or prevent blood clots in the lungs or in the veins.  This medicine may be used for other purposes; ask your health care provider or pharmacist if you have questions.  What should I tell my health care provider before I take this  medicine?  They need to know if you have any of these conditions:    bleeding disorders    bleeding in the brain    blood in your stools (black or tarry stools) or if you have blood in your vomit    history of stomach bleeding    kidney disease    liver disease    mechanical heart valve    an unusual or allergic reaction to apixaban, other medicines, foods, dyes, or preservatives    pregnant or trying to get pregnant    breast-feeding  How should I use this medicine?  Take this medicine by mouth with a glass of water. Follow the directions on the prescription label. You can take it with or without food. If it upsets your stomach, take it with food. Take your medicine at regular intervals. Do not take it more often than directed. Do not stop taking except on your doctor's advice. Stopping this medicine may increase your risk of a blot clot. Be sure to refill your prescription before you run out of medicine.  Talk to your pediatrician regarding the use of this medicine in children. Special care may be needed.  Overdosage: If you think you have taken too much of this medicine contact a poison control center or emergency room at once.  NOTE: This medicine is only for you. Do not share this medicine with others.  What if I miss a dose?  If you miss a dose, take it as soon as you can. If it is almost time for your next dose, take only that dose. Do not take double or extra doses.  What may interact with this medicine?  This medicine may interact with the following:    aspirin and aspirin-like medicines    certain medicines for fungal infections like ketoconazole and itraconazole    certain medicines for seizures like carbamazepine and phenytoin    certain medicines that treat or prevent blood clots like warfarin, enoxaparin, and dalteparin    clarithromycin    NSAIDs, medicines for pain and inflammation, like ibuprofen or naproxen    rifampin    ritonavir    Starla's wort  This list may not describe all possible  interactions. Give your health care provider a list of all the medicines, herbs, non-prescription drugs, or dietary supplements you use. Also tell them if you smoke, drink alcohol, or use illegal drugs. Some items may interact with your medicine.  What should I watch for while using this medicine?  Notify your doctor or health care professional and seek emergency treatment if you develop breathing problems; changes in vision; chest pain; severe, sudden headache; pain, swelling, warmth in the leg; trouble speaking; sudden numbness or weakness of the face, arm, or leg. These can be signs that your condition has gotten worse.  If you are going to have surgery, tell your doctor or health care professional that you are taking this medicine.  Tell your health care professional that you use this medicine before you have a spinal or epidural procedure. Sometimes people who take this medicine have bleeding problems around the spine when they have a spinal or epidural procedure. This bleeding is very rare. If you have a spinal or epidural procedure while on this medicine, call your health care professional immediately if you have back pain, numbness or tingling (especially in your legs and feet), muscle weakness, paralysis, or loss of bladder or bowel control.  Avoid sports and activities that might cause injury while you are using this medicine. Severe falls or injuries can cause unseen bleeding. Be careful when using sharp tools or knives. Consider using an electric razor. Take special care brushing or flossing your teeth. Report any injuries, bruising, or red spots on the skin to your doctor or health care professional.  What side effects may I notice from receiving this medicine?  Side effects that you should report to your doctor or health care professional as soon as possible:    allergic reactions like skin rash, itching or hives, swelling of the face, lips, or tongue    signs and symptoms of bleeding such as bloody or  black, tarry stools; red or dark-brown urine; spitting up blood or brown material that looks like coffee grounds; red spots on the skin; unusual bruising or bleeding from the eye, gums, or nose  This list may not describe all possible side effects. Call your doctor for medical advice about side effects. You may report side effects to FDA at 6-575-MUN-4501.  Where should I keep my medicine?  Keep out of the reach of children.  Store at room temperature between 20 and 25 degrees C (68 and 77 degrees F). Throw away any unused medicine after the expiration date.  NOTE: This sheet is a summary. It may not cover all possible information. If you have questions about this medicine, talk to your doctor, pharmacist, or health care provider.  NOTE:This sheet is a summary. It may not cover all possible information. If you have questions about this medicine, talk to your doctor, pharmacist, or health care provider. Copyright  2016 [employer]        Warfarin tablets  Brand Names: Coumadin, Jantoven  What is this medicine?  WARFARIN (WAR far in) is an anticoagulant. It is used to treat or prevent clots in the veins, arteries, lungs, or heart.  How should I use this medicine?  Take this medicine by mouth with a glass of water. Follow the directions on the prescription label. You can take this medicine with or without food. Take your medicine at the same time each day. Do not take it more often than directed. Do not stop taking except on your doctor's advice. Stopping this medicine may increase your risk of a blood clot. Be sure to refill your prescription before you run out of medicine.  If your doctor or healthcare professional calls to change your dose, write down the dose and any other instructions. Always read the dose and instructions back to him or her to make sure you understand them. Tell your doctor or healthcare professional what strength of tablets you have on hand. Ask how many tablets you should take to equal your  new dose. Write the date on the new instructions and keep them near your medicine. If you are told to stop taking your medicine until your next blood test, call your doctor or healthcare professional if you do not hear anything within 24 hours of the test to find out your new dose or when to restart your prior dose.  A special MedGuide will be given to you by the pharmacist with each prescription and refill. Be sure to read this information carefully each time.  Talk to your pediatrician regarding the use of this medicine in children. Special care may be needed.  What side effects may I notice from receiving this medicine?  Side effects that you should report to your doctor or health care professional as soon as possible:    allergic reactions like skin rash, itching or hives, swelling of the face, lips, or tongue    breathing problems    chest pain    dizziness    headache    heavy menstrual bleeding or vaginal bleeding    pain in the lower back or side    painful, blue or purple toes    painful skin ulcers that do not go away    signs and symptoms of bleeding such as bloody or black, tarry stools; red or dark-brown urine; spitting up blood or brown material that looks like coffee grounds; red spots on the skin; unusual bruising or bleeding from the eye, gums, or nose    stomach pain    unusually weak or tired  Side effects that usually do not require medical attention (report to your doctor or health care professional if they continue or are bothersome):    diarrhea    hair loss  What may interact with this medicine?  Do not take this medicine with any of the following medications:    agents that prevent or dissolve blood clots    aspirin or other salicylates    danshen    dextrothyroxine    mifepristone    Wood's Wort    red yeast rice  This medicine may also interact with the following medications:    acetaminophen    agents that lower cholesterol    alcohol    allopurinol    amiodarone    antibiotics or  medicines for treating bacterial, fungal or viral infections    azathioprine    barbiturate medicines for inducing sleep or treating seizures    certain medicines for diabetes    certain medicines for heart rhythm problems    certain medicines for high blood pressure    chloral hydrate    cisapride    disulfiram    female hormones, including contraceptive or birth control pills    general anesthetics    herbal or dietary products like garlic, ginkgo, ginseng, green tea, or kava kava    influenza virus vaccine    male hormones    medicines for mental depression or psychosis    medicines for some types of cancer    medicines for stomach problems    methylphenidate    NSAIDs, medicines for pain and inflammation, like ibuprofen or naproxen    propoxyphene    quinidine, quinine    raloxifene    seizure or epilepsy medicine like carbamazepine, phenytoin, and valproic acid    steroids like cortisone and prednisone    tamoxifen    thyroid medicine    tramadol    vitamin c, vitamin e, and vitamin K    zafirlukast    zileuton  What if I miss a dose?  It is important not to miss a dose. If you miss a dose, call your healthcare provider. Take the dose as soon as possible on the same day. If it is almost time for your next dose, take only that dose. Do not take double or extra doses to make up for a missed dose.  Where should I keep my medicine?  Keep out of the reach of children.  Store at room temperature between 15 and 30 degrees C (59 and 86 degrees F). Protect from light. Throw away any unused medicine after the expiration date. Do not flush down the toilet.  What should I tell my health care provider before I take this medicine?  They need to know if you have any of these conditions:    alcoholism    anemia    bleeding disorders    cancer    diabetes    heart disease    high blood pressure    history of bleeding in the gastrointestinal tract    history of stroke or other brain injury or disease    kidney or liver  disease    protein C deficiency    protein S deficiency    psychosis or dementia    recent injury, recent or planned surgery or procedure    an unusual or allergic reaction to warfarin, other medicines, foods, dyes, or preservatives    pregnant or trying to get pregnant    breast-feeding  What should I watch for while using this medicine?  Visit your doctor or health care professional for regular checks on your progress. You will need to have a blood test called a PT/INR regularly. The PT/INR blood test is done to make sure you are getting the right dose of this medicine. It is important to not miss your appointment for the blood tests. When you first start taking this medicine, these tests are done often. Once the correct dose is determined and you take your medicine properly, these tests can be done less often.  Notify your doctor or health care professional and seek emergency treatment if you develop breathing problems; changes in vision; chest pain; severe, sudden headache; pain, swelling, warmth in the leg; trouble speaking; sudden numbness or weakness of the face, arm or leg. These can be signs that your condition has gotten worse.  While you are taking this medicine, carry an identification card with your name, the name and dose of medicine(s) being used, and the name and phone number of your doctor or health care professional or person to contact in an emergency.  Do not start taking or stop taking any medicines or over-the-counter medicines except on the advice of your doctor or health care professional.  You should discuss your diet with your doctor or health care professional. Do not make major changes in your diet. Vitamin K can affect how well this medicine works. Many foods contain vitamin K. It is important to eat a consistent amount of foods with vitamin K. Other foods with vitamin K that you should eat in consistent amounts are asparagus, basil, beef or pork liver, black eyed peas, broccoli, brussel  sprouts, cabbage, chickpeas, cucumber with peel, green onions, green tea, okra, parsley, peas, thyme, and green leafy vegetables like beet greens, walt greens, endive, kale, mustard greens, spinach, turnip greens, watercress, or certain lettuces like green leaf or jeri.  This medicine can cause birth defects or bleeding in an unborn child. Women of childbearing age should use effective birth control while taking this medicine. If a woman becomes pregnant while taking this medicine, she should discuss the potential risks and her options with her health care professional.  Avoid sports and activities that might cause injury while you are using this medicine. Severe falls or injuries can cause unseen bleeding. Be careful when using sharp tools or knives. Consider using an electric razor. Take special care brushing or flossing your teeth. Report any injuries, bruising, or red spots on the skin to your doctor or health care professional.  If you have an illness that causes vomiting, diarrhea, or fever for more than a few days, contact your doctor. Also check with your doctor if you are unable to eat for several days. These problems can change the effect of this medicine.  Even after you stop taking this medicine, it takes several days before your body recovers its normal ability to clot blood. Ask your doctor or health care professional how long you need to be careful. If you are going to have surgery or dental work, tell your doctor or health care professional that you have been taking this medicine.  NOTE:This sheet is a summary. It may not cover all possible information. If you have questions about this medicine, talk to your doctor, pharmacist, or health care provider. Copyright  2017 [employer]                Follow-ups after your visit        Your next 10 appointments already scheduled     Dec 01, 2017  9:00 AM CST   (Arrive by 8:45 AM)   New Patient Visit with Jovana Macias MD   [facility]  "Clinic ([facility])    [address]  4th Floor  [facility] [address]   [phone]            Dec 18, 2017  1:30 PM CST   (Arrive by 1:15 PM)   Return Visit with IBRAHIMA Brown CNP [facility] ([facility])    [address] [place] [address]  5th Floor  [facility] [id number] [phone]            Feb 20, 2018  9:00 AM CST   (Arrive by 8:45 AM)   Pacemaker Check with Uc Cv Device 1   [facility] ([facility])    [address] [place] [address]  3rd Floor  [facility] [id number] [phone]            Feb 20, 2018  9:30 AM CST   (Arrive by 9:15 AM)   RETURN ATRIAL FIBULATION VISIT with Dale Lazo MD   [facility] ([facility])    [address] [place] [address]  [address] [facility] [id number] [phone]              Who to contact     If you have questions or need follow up information about today's clinic visit or your schedule please contact [facility] directly at [phone].  Normal or non-critical lab and imaging results will be communicated to you by MyChart, letter or phone within 4 business days after the clinic has received the results. If you do not hear from us within 7 days, please contact the clinic through MyChart or phone. If you have a critical or abnormal lab result, we will notify you by phone as soon as possible.  Submit refill requests through [employer] or call your pharmacy and they will forward the refill request to us. Please allow 3 business days for your refill to be completed.          Additional Information About Your Visit        [employer]hart Information     [employer] lets you send messages to your doctor, view your test results, renew your prescriptions, schedule appointments and more. To sign up, go to [email or web address] . Click on \"Log in\" on the left side of the screen, which will take you to the Welcome " "page. Then click on \"Sign up Now\" on the right side of the page.     You will be asked to enter the access code listed below, as well as some personal information. Please follow the directions to create your username and password.     Your access code is: [email or web address]  Expires: 2017  5:30 AM     Your access code will  in 90 days. If you need help or a new code, please call your [facility] clinic or [phone].        Care EveryWhere ID     This is your Care EveryWhere ID. This could be used by other organizations to access your [facility] medical records  [id number]        Your Vitals Were     Pulse Height Pulse Oximetry BMI (Body Mass Index)          71 1.6 m (5' 3\") 99% 22.83 kg/m2         Blood Pressure from Last 3 Encounters:   17 178/76   17 133/77   17 120/70    Weight from Last 3 Encounters:   17 58.5 kg (128 lb 14.4 oz)   17 59 kg (130 lb)   17 57.2 kg (126 lb)              Today, you had the following     No orders found for display       Primary Care Provider    Physician No Ref-Primary       NO REF-PRIMARY PHYSICIAN        Equal Access to Services     [facility]: Hadii aad ku hadasho Sosbali, waaxda luqadaha, qaybta kaalmada adeegyada, nela mongen aura osorio . So [facility] [phone].    ATENCIÓN: Si habla español, tiene a jason disposición servicios gratuitos de asistencia lingüística. Llame al [phone].    We comply with applicable federal civil rights laws and [place] laws. We do not discriminate on the basis of race, color, national origin, age, disability, sex, sexual orientation, or gender identity.            Thank you!     Thank you for choosing [facility]  for your care. Our goal is always to provide you with excellent care. Hearing back from our patients is one way we can continue to improve our services. Please take a few minutes to complete the written survey that you may receive in the mail after your visit with us. " Thank you!             Your Updated Medication List - Protect others around you: Learn how to safely use, store and throw away your medicines at www.disposemymeds.org.          This list is accurate as of: 11/21/17 12:02 PM.  Always use your most recent med list.                   Brand Name Dispense Instructions for use Diagnosis    aspirin 81 MG tablet      Take 1 tablet by mouth daily.        BIOTIN PO      Take 1 capsule by mouth daily.    Hypertension, New onset atrial fibrillation (H), Hyperlipidemia LDL goal <100       CALCIUM 500 + D PO      Take 1 tablet by mouth 2 times daily.        coenzyme Q-10 100 MG Tabs           COLLAGEN PO      Take 1 capsule by mouth 2 times daily.        hydrochlorothiazide 12.5 MG capsule    MICROZIDE    90 capsule    Take 1 capsule (12.5 mg) by mouth daily    Essential hypertension       lisinopril 10 MG tablet    PRINIVIL/ZESTRIL    90 tablet    Take 1 tablet (10 mg) by mouth daily    Essential hypertension       MAGNESIUM PO      one daily        metoprolol 50 MG 24 hr tablet    TOPROL-XL    90 tablet    Take 0.5 tablets (25 mg) by mouth 2 times daily    Essential hypertension       * NEW MED      1 tablet daily Nattokinase, from fermented soybean .    Hypertension, New onset atrial fibrillation (H)       * NEW MED      Cumin - capsule daily Vitor - capsule daily        * NEW MED      Mineral drops        potassium chloride 20 MEQ/15ML (10%) solution    KAGELACIO          VITAMIN B-6 PO      Take 1 tablet by mouth daily    Hypertension, Hyperlipidemia LDL goal <100       VITAMIN B12 PO      Take 1 tablet by mouth daily.        VITAMIN C PO           * Notice:  This list has 3 medication(s) that are the same as other medications prescribed for you. Read the directions carefully, and ask your doctor or other care provider to review them with you.

## 2017-11-21 NOTE — PATIENT INSTRUCTIONS
Cardiology Provider you saw in clinic today: IBRAHIMA Townsend CNP    Medication Changes:  Please discuss anticoagulation with Dr. Macias    Labs/Tests needed:  I will send you a letter in the mail regarding the ECHO results     Follow-up Visit:  With Dr. Lazo in February    Further Instructions:      You will receive all normal lab and testing results via [facility] or mail if not reviewed in clinic today. Please contact our office if you need assistance with setting up MyChart.    If you need a medication refill please contact your pharmacy. Please allow 3 business days for your refill to be completed.     As always, thank you for trusting us with your health care needs!    If you have any questions regarding your visit please contact your care team:   Cardiology  Telephone Number    EP RN  Electrophysiology Nurse Coordinator [phone]     Call for EP procedure scheduling concerns  SANJAY Evans  [phone]           [facility] (Pacemakers, ICDs, Loop)   RN's : Malia Palomo Connie, Dawn During business hours: [phone]    After business hours:   [phone]- select option 4 and ask for job code [phone].          Apixaban Oral tablet  What is this medicine?  APIXABAN (a PIX a ban) is an anticoagulant (blood thinner). It is used to lower the chance of stroke in people with a medical condition called atrial fibrillation. It is also used to treat or prevent blood clots in the lungs or in the veins.  This medicine may be used for other purposes; ask your health care provider or pharmacist if you have questions.  What should I tell my health care provider before I take this medicine?  They need to know if you have any of these conditions:    bleeding disorders    bleeding in the brain    blood in your stools (black or tarry stools) or if you have blood in your vomit    history of stomach bleeding    kidney disease    liver disease    mechanical heart valve    an unusual or allergic  reaction to apixaban, other medicines, foods, dyes, or preservatives    pregnant or trying to get pregnant    breast-feeding  How should I use this medicine?  Take this medicine by mouth with a glass of water. Follow the directions on the prescription label. You can take it with or without food. If it upsets your stomach, take it with food. Take your medicine at regular intervals. Do not take it more often than directed. Do not stop taking except on your doctor's advice. Stopping this medicine may increase your risk of a blot clot. Be sure to refill your prescription before you run out of medicine.  Talk to your pediatrician regarding the use of this medicine in children. Special care may be needed.  Overdosage: If you think you have taken too much of this medicine contact a poison control center or emergency room at once.  NOTE: This medicine is only for you. Do not share this medicine with others.  What if I miss a dose?  If you miss a dose, take it as soon as you can. If it is almost time for your next dose, take only that dose. Do not take double or extra doses.  What may interact with this medicine?  This medicine may interact with the following:    aspirin and aspirin-like medicines    certain medicines for fungal infections like ketoconazole and itraconazole    certain medicines for seizures like carbamazepine and phenytoin    certain medicines that treat or prevent blood clots like warfarin, enoxaparin, and dalteparin    clarithromycin    NSAIDs, medicines for pain and inflammation, like ibuprofen or naproxen    rifampin    ritonavir    Starla's wort  This list may not describe all possible interactions. Give your health care provider a list of all the medicines, herbs, non-prescription drugs, or dietary supplements you use. Also tell them if you smoke, drink alcohol, or use illegal drugs. Some items may interact with your medicine.  What should I watch for while using this medicine?  Notify your doctor or  health care professional and seek emergency treatment if you develop breathing problems; changes in vision; chest pain; severe, sudden headache; pain, swelling, warmth in the leg; trouble speaking; sudden numbness or weakness of the face, arm, or leg. These can be signs that your condition has gotten worse.  If you are going to have surgery, tell your doctor or health care professional that you are taking this medicine.  Tell your health care professional that you use this medicine before you have a spinal or epidural procedure. Sometimes people who take this medicine have bleeding problems around the spine when they have a spinal or epidural procedure. This bleeding is very rare. If you have a spinal or epidural procedure while on this medicine, call your health care professional immediately if you have back pain, numbness or tingling (especially in your legs and feet), muscle weakness, paralysis, or loss of bladder or bowel control.  Avoid sports and activities that might cause injury while you are using this medicine. Severe falls or injuries can cause unseen bleeding. Be careful when using sharp tools or knives. Consider using an electric razor. Take special care brushing or flossing your teeth. Report any injuries, bruising, or red spots on the skin to your doctor or health care professional.  What side effects may I notice from receiving this medicine?  Side effects that you should report to your doctor or health care professional as soon as possible:    allergic reactions like skin rash, itching or hives, swelling of the face, lips, or tongue    signs and symptoms of bleeding such as bloody or black, tarry stools; red or dark-brown urine; spitting up blood or brown material that looks like coffee grounds; red spots on the skin; unusual bruising or bleeding from the eye, gums, or nose  This list may not describe all possible side effects. Call your doctor for medical advice about side effects. You may report  side effects to FDA at 3-192-FDA-0576.  Where should I keep my medicine?  Keep out of the reach of children.  Store at room temperature between 20 and 25 degrees C (68 and 77 degrees F). Throw away any unused medicine after the expiration date.  NOTE: This sheet is a summary. It may not cover all possible information. If you have questions about this medicine, talk to your doctor, pharmacist, or health care provider.  NOTE:This sheet is a summary. It may not cover all possible information. If you have questions about this medicine, talk to your doctor, pharmacist, or health care provider. Copyright  2016 [employer]        Warfarin tablets  Brand Names: Coumadin, Jantoven  What is this medicine?  WARFARIN (WAR far in) is an anticoagulant. It is used to treat or prevent clots in the veins, arteries, lungs, or heart.  How should I use this medicine?  Take this medicine by mouth with a glass of water. Follow the directions on the prescription label. You can take this medicine with or without food. Take your medicine at the same time each day. Do not take it more often than directed. Do not stop taking except on your doctor's advice. Stopping this medicine may increase your risk of a blood clot. Be sure to refill your prescription before you run out of medicine.  If your doctor or healthcare professional calls to change your dose, write down the dose and any other instructions. Always read the dose and instructions back to him or her to make sure you understand them. Tell your doctor or healthcare professional what strength of tablets you have on hand. Ask how many tablets you should take to equal your new dose. Write the date on the new instructions and keep them near your medicine. If you are told to stop taking your medicine until your next blood test, call your doctor or healthcare professional if you do not hear anything within 24 hours of the test to find out your new dose or when to restart your prior dose.  A  special MedGuide will be given to you by the pharmacist with each prescription and refill. Be sure to read this information carefully each time.  Talk to your pediatrician regarding the use of this medicine in children. Special care may be needed.  What side effects may I notice from receiving this medicine?  Side effects that you should report to your doctor or health care professional as soon as possible:    allergic reactions like skin rash, itching or hives, swelling of the face, lips, or tongue    breathing problems    chest pain    dizziness    headache    heavy menstrual bleeding or vaginal bleeding    pain in the lower back or side    painful, blue or purple toes    painful skin ulcers that do not go away    signs and symptoms of bleeding such as bloody or black, tarry stools; red or dark-brown urine; spitting up blood or brown material that looks like coffee grounds; red spots on the skin; unusual bruising or bleeding from the eye, gums, or nose    stomach pain    unusually weak or tired  Side effects that usually do not require medical attention (report to your doctor or health care professional if they continue or are bothersome):    diarrhea    hair loss  What may interact with this medicine?  Do not take this medicine with any of the following medications:    agents that prevent or dissolve blood clots    aspirin or other salicylates    danshen    dextrothyroxine    mifepristone    Starla's Wort    red yeast rice  This medicine may also interact with the following medications:    acetaminophen    agents that lower cholesterol    alcohol    allopurinol    amiodarone    antibiotics or medicines for treating bacterial, fungal or viral infections    azathioprine    barbiturate medicines for inducing sleep or treating seizures    certain medicines for diabetes    certain medicines for heart rhythm problems    certain medicines for high blood pressure    chloral hydrate    cisapride    disulfiram    female  hormones, including contraceptive or birth control pills    general anesthetics    herbal or dietary products like garlic, ginkgo, ginseng, green tea, or kava kava    influenza virus vaccine    male hormones    medicines for mental depression or psychosis    medicines for some types of cancer    medicines for stomach problems    methylphenidate    NSAIDs, medicines for pain and inflammation, like ibuprofen or naproxen    propoxyphene    quinidine, quinine    raloxifene    seizure or epilepsy medicine like carbamazepine, phenytoin, and valproic acid    steroids like cortisone and prednisone    tamoxifen    thyroid medicine    tramadol    vitamin c, vitamin e, and vitamin K    zafirlukast    zileuton  What if I miss a dose?  It is important not to miss a dose. If you miss a dose, call your healthcare provider. Take the dose as soon as possible on the same day. If it is almost time for your next dose, take only that dose. Do not take double or extra doses to make up for a missed dose.  Where should I keep my medicine?  Keep out of the reach of children.  Store at room temperature between 15 and 30 degrees C (59 and 86 degrees F). Protect from light. Throw away any unused medicine after the expiration date. Do not flush down the toilet.  What should I tell my health care provider before I take this medicine?  They need to know if you have any of these conditions:    alcoholism    anemia    bleeding disorders    cancer    diabetes    heart disease    high blood pressure    history of bleeding in the gastrointestinal tract    history of stroke or other brain injury or disease    kidney or liver disease    protein C deficiency    protein S deficiency    psychosis or dementia    recent injury, recent or planned surgery or procedure    an unusual or allergic reaction to warfarin, other medicines, foods, dyes, or preservatives    pregnant or trying to get pregnant    breast-feeding  What should I watch for while using this  medicine?  Visit your doctor or health care professional for regular checks on your progress. You will need to have a blood test called a PT/INR regularly. The PT/INR blood test is done to make sure you are getting the right dose of this medicine. It is important to not miss your appointment for the blood tests. When you first start taking this medicine, these tests are done often. Once the correct dose is determined and you take your medicine properly, these tests can be done less often.  Notify your doctor or health care professional and seek emergency treatment if you develop breathing problems; changes in vision; chest pain; severe, sudden headache; pain, swelling, warmth in the leg; trouble speaking; sudden numbness or weakness of the face, arm or leg. These can be signs that your condition has gotten worse.  While you are taking this medicine, carry an identification card with your name, the name and dose of medicine(s) being used, and the name and phone number of your doctor or health care professional or person to contact in an emergency.  Do not start taking or stop taking any medicines or over-the-counter medicines except on the advice of your doctor or health care professional.  You should discuss your diet with your doctor or health care professional. Do not make major changes in your diet. Vitamin K can affect how well this medicine works. Many foods contain vitamin K. It is important to eat a consistent amount of foods with vitamin K. Other foods with vitamin K that you should eat in consistent amounts are asparagus, basil, beef or pork liver, black eyed peas, broccoli, brussel sprouts, cabbage, chickpeas, cucumber with peel, green onions, green tea, okra, parsley, peas, thyme, and green leafy vegetables like beet greens, walt greens, endive, kale, mustard greens, spinach, turnip greens, watercress, or certain lettuces like green leaf or jeri.  This medicine can cause birth defects or bleeding in  an unborn child. Women of childbearing age should use effective birth control while taking this medicine. If a woman becomes pregnant while taking this medicine, she should discuss the potential risks and her options with her health care professional.  Avoid sports and activities that might cause injury while you are using this medicine. Severe falls or injuries can cause unseen bleeding. Be careful when using sharp tools or knives. Consider using an electric razor. Take special care brushing or flossing your teeth. Report any injuries, bruising, or red spots on the skin to your doctor or health care professional.  If you have an illness that causes vomiting, diarrhea, or fever for more than a few days, contact your doctor. Also check with your doctor if you are unable to eat for several days. These problems can change the effect of this medicine.  Even after you stop taking this medicine, it takes several days before your body recovers its normal ability to clot blood. Ask your doctor or health care professional how long you need to be careful. If you are going to have surgery or dental work, tell your doctor or health care professional that you have been taking this medicine.  NOTE:This sheet is a summary. It may not cover all possible information. If you have questions about this medicine, talk to your doctor, pharmacist, or health care provider. Copyright  2017 [employer]

## 2017-11-21 NOTE — PROGRESS NOTES
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 States that activity level is high as she continues to manage and work at a  with at least 2 children daily.  States her symptoms of chest discomfort started 3 days ago and are characterized by heaviness on left side radiating at times to her left elbow, associated with stomach ache,slight nausea, gas, causing her not to sleep, lying down makes it worse, during the day the pain is better and was able to shop and continue with her  (ages 3 and 5 year old).  Is lifting the 3 year old onto the couch. She does have a history of fx rib from 6/2017. Walked up and down the stairs carrying laundry and the pain did not worsen.  Took 650 mg of aspirin which relieved the symptoms. Denies headaches, dizziness, syncope, angina, dyspnea at rest or with exertion, dry cough, palpitations, orthopnea, PND, abdominal edema, pedal edema, or claudication.    When asking patient to point to where the pain is point to the area and when the provider pushes down in the area medial of her left arm am able to reproduce similar pain at less severity . Today s Device check: normal check  Medtronic dual lead pacemaker per MD orders.  Normal pacemaker function.  617 AT/AF episodes recorded - < 1 min - 2 hrs 41 min in duration.  1 NSVT episode recorded that appears to be SVT - 3 sec, 170 bpm.  Intrinsic rhythm = SB @ 52 bpm.  AP = 98.9%.   = <0.1%.  Estimated battery longevity to [employer]MI[employer] = 9 years.  Presenting EKG:   Atrial paced at 80 bpm with incomplete RBBB,       11/21/2017   Today she presents after having an ECHO.   After 11/13  visit she was called and stated the chest pain improved with Tylenol.   Has taken ibuprofen, heat packs, TyLenol intermittently since 11/13 with good success in relief of the pain.  Today her blood pressure is elevated in clinic today.  She states she didn't take her medicine this morning.  She states she usually them in the morning however if she feels good therefore doesn't always take them or takes them at noon.    Denies headaches, dizziness, syncope, angina, dyspnea at rest or with exertion, palpitations, orthopnea, PND, pedal edema, claudication, or any new numbness/weakness, hematuria, hematochezia, and epistaxis.    Current cardiac medications:  Lisinopril 10 mg daily, hydrochlorothiazide 12.5 mg daily, metoprolol XL 50 mg daily, ASA 81 mg daily        Current Outpatient Prescriptions   Medication Sig Dispense Refill     Ascorbic Acid (VITAMIN C PO)        potassium chloride (KAYCIEL) 20 MEQ/15ML (10%) solution        MAGNESIUM PO one daily       coenzyme Q-10 100 MG TABS        hydrochlorothiazide (MICROZIDE) 12.5 MG capsule Take 1 capsule (12.5 mg) by mouth daily 90 capsule 3     lisinopril (PRINIVIL/ZESTRIL) 10 MG tablet Take 1 tablet (10 mg) by mouth daily 90 tablet 3     metoprolol (TOPROL-XL) 50 MG 24 hr tablet Take 0.5 tablets (25 mg) by mouth 2 times daily 90 tablet 3     Pyridoxine HCl (VITAMIN B-6 PO) Take 1 tablet by mouth daily       NEW MED Cumin - capsule daily  Vitor - capsule daily       NEW MED Mineral drops       COLLAGEN PO Take 1 capsule by mouth 2 times daily.       Cyanocobalamin (VITAMIN B12 PO) Take 1 tablet by mouth daily.       NEW MED 1 tablet daily Nattokinase, from fermented soybean .        BIOTIN PO Take 1 capsule by mouth daily.       aspirin 81 MG tablet Take 1 tablet by mouth daily.       Calcium Carbonate-Vitamin D (CALCIUM 500 + D PO) Take 1 tablet by mouth 2 times daily.         Past Medical History:   Diagnosis Date     Atrial fibrillation (H) 2011     Facial fracture (H)  "2006     Hypertension      NSVT (nonsustained ventricular tachycardia) (H) 2009    during exercise echo, neg EP study     Pacemaker 2010     S/P cardiac catheterization     30-40% non obstructive lesion     Ventricular tachycardia, nonsustained (H)     during stress echo       Past Surgical History:   Procedure Laterality Date     IMPLANT PACEMAKER       PPM  2010    Permanent Pacemaker       Family History   Problem Relation Age of Onset     Cardiovascular Father 76      age 80, MI 76 and CHF 80's     Cardiovascular Paternal Grandfather       age 76 of CVD     Myocardial Infarction Mother 88     lived to [facility]       Social History   Substance Use Topics     Smoking status: Never Smoker     Smokeless tobacco: Never Used     Alcohol use No       No Known Allergies      ROS:   Negative except for as indicated in HPI.    Physical Examination:  Vitals: /76 (BP Location: Left arm, Patient Position: Chair, Cuff Size: Adult Small)  Pulse 71  Ht 1.6 m (5' 3\")  Wt 58.5 kg (128 lb 14.4 oz)  SpO2 99%  BMI 22.83 kg/m2  BMI= Body mass index is 22.83 kg/(m^2).    GENERAL APPEARANCE: healthy, alert, and no acute distress  HEENT: no icterus, no xanthelasmas, normal pupil size and reaction, no cyanosis.  NECK: no asymmetry, thyroid normal to palpation, carotid upstrokes are normal bilaterally, no cervical bruits, no JVD   CHEST: lungs clear to auscultation - no rales, rhonchi or wheezes, no use of accessory muscles, no retractions, respirations are unlabored, normal respiratory rate  CARDIOVASCULAR: regular rhythm, normal S1 with physiologic split S2, no S3 or S4 and no murmur, click or rub, precordium quiet with normal PMI.  ABDOMEN: soft, non-tender, without hepatosplenomegaly, no masses palpable, bowel sounds normal, no abdominal bruits  EXTREMITIES: no clubbing, cyanosis, or 2+ edema  NEURO: alert and oriented to person/place/time, normal speech, gait and affect  VASC: Radial, and posterior " tibialis pulses +1 and symmetric bilaterally.   SKIN: no ecchymoses, no rashes    Laboratory:       Lab Results   Component Value Date     2017    Lab Results   Component Value Date    CHLORIDE 106 2017    Lab Results   Component Value Date    BUN 20 2017      Lab Results   Component Value Date    POTASSIUM 4.5 2017    Lab Results   Component Value Date    CO2 26 2017    Lab Results   Component Value Date    CR 0.91 2017        Lab Results   Component Value Date    NTBNP 484 (H) 2017     Lab Results   Component Value Date    WBC 6.4 2017    HGB 11.5 (L) 2017    HCT 35.3 2017    MCV 94 2017     2017     Lab Results   Component Value Date    TROPI <0.015 2017       No results found for: CKTOTAL, CKMB, TROPN  Cholesterol (mg/dL)   Date Value   2016 139   10/27/2014 155   2013 171   2012 173     Cholesterol/HDL Ratio (no units)   Date Value   10/27/2014 1.8   2013 2.6   2012 2.4   2010 2.7     HDL Cholesterol (mg/dL)   Date Value   2016 65   10/27/2014 84   2013 67   2012 73     LDL Cholesterol Calculated (mg/dL)   Date Value   2016 61   10/27/2014 61   2013 89   2012 88     ECHO:   No results found for this or any previous visit (from the past 8760 hour(s)).      Assessment and recommendations:    Atypical chest pain  - intermittent pain which is relieved with ibuprofen, tylenol or heat.    - awaiting the ECHO results    HTN  - uncontrolled - didn't take medication today  - lisinopril 10 mg daily  - HCTZ 12.5 mg   - Metoprolol 25 mg daily    Ppm  - normal function    Atrial fibrillation  - patient is unaware of her atrial fibrillation.  She does not know when she is in atrial fibrillation.   -We discussed in detail with the patient management/treatment options for A.fib includin. Stroke Prophylaxis:  CHADSVASC=female, age++, HTN  4, corresponding to a  4.0% annual stroke / systemic [facility] event rate. indicating need for long term oral anticoagulation.  She does not want to be on anticoagulation.  We have had this discussion in the past.  She doesn't want to start today; provided written information.     2. Rate Control: Continue with metoprolol XL 25 mg BID.       Patient expresses understanding and agreement with the plan.    I appreciate the chance to help with [facility]. Please let me know if you have any questions or concerns.      IBRAHIMA Townsend, CNP

## 2017-11-21 NOTE — LETTER
11/21/2017      RE: Isadora Mendez  [address] [facility] [address]       Dear Colleague,    Thank you for the opportunity to participate in the care of your patient, Isadora Mendez, at the [facility] HEART [facility]. Please see a copy of my visit note below.      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 States that activity level is high as she continues to manage and work at a  with at least 2 children daily.  States her symptoms of chest discomfort started 3 days ago and are characterized by heaviness on left side radiating at times to her left elbow, associated with stomach ache,slight nausea, gas, causing her not to sleep, lying down makes it worse, during the day the pain is better and was able to shop and continue with her  (ages 3 and 5 year old).  Is lifting the 3 year old onto the couch. She does have a history of fx rib from 6/2017. Walked up and down the stairs carrying laundry and the pain did not worsen.  Took 650 mg of aspirin which relieved the symptoms. Denies headaches, dizziness, syncope, angina, dyspnea at rest or with exertion, dry cough, palpitations, orthopnea, PND, abdominal edema, pedal edema, or claudication.    When asking patient to point to where the pain is point to the area and when the provider pushes down in the area medial of her left arm am able to reproduce similar pain at less severity . Today s Device check: normal check  Medtronic dual lead pacemaker per MD orders.  Normal pacemaker function.  617 AT/AF episodes recorded - < 1 min - 2 hrs 41 min in duration.  1  NSVT episode recorded that appears to be SVT - 3 sec, 170 bpm.  Intrinsic rhythm = SB @ 52 bpm.  AP = 98.9%.   = <0.1%.  Estimated battery longevity to [facility] = 9 years.  Presenting EKG:   Atrial paced at 80 bpm with incomplete RBBB,       11/21/2017   Today she presents after having an ECHO.   After 11/13 visit she was called and stated the chest pain improved with Tylenol.   Has taken ibuprofen, heat packs, TyLenol intermittently since 11/13 with good success in relief of the pain.  Today her blood pressure is elevated in clinic today.  She states she didn't take her medicine this morning.  She states she usually them in the morning however if she feels good therefore doesn't always take them or takes them at noon.    Denies headaches, dizziness, syncope, angina, dyspnea at rest or with exertion, palpitations, orthopnea, PND, pedal edema, claudication, or any new numbness/weakness, hematuria, hematochezia, and epistaxis.    Current cardiac medications:  Lisinopril 10 mg daily, hydrochlorothiazide 12.5 mg daily, metoprolol XL 50 mg daily, ASA 81 mg daily        Current Outpatient Prescriptions   Medication Sig Dispense Refill     Ascorbic Acid (VITAMIN C PO)        potassium chloride (KAYCIEL) 20 MEQ/15ML (10%) solution        MAGNESIUM PO one daily       coenzyme Q-10 100 MG TABS        hydrochlorothiazide (MICROZIDE) 12.5 MG capsule Take 1 capsule (12.5 mg) by mouth daily 90 capsule 3     lisinopril (PRINIVIL/ZESTRIL) 10 MG tablet Take 1 tablet (10 mg) by mouth daily 90 tablet 3     metoprolol (TOPROL-XL) 50 MG 24 hr tablet Take 0.5 tablets (25 mg) by mouth 2 times daily 90 tablet 3     Pyridoxine HCl (VITAMIN B-6 PO) Take 1 tablet by mouth daily       NEW MED Cumin - capsule daily  Vitor - capsule daily       NEW MED Mineral drops       COLLAGEN PO Take 1 capsule by mouth 2 times daily.       Cyanocobalamin (VITAMIN B12 PO) Take 1 tablet by mouth daily.       NEW MED 1 tablet daily Nattokinase, from fermented  "soybean .        BIOTIN PO Take 1 capsule by mouth daily.       aspirin 81 MG tablet Take 1 tablet by mouth daily.       Calcium Carbonate-Vitamin D (CALCIUM 500 + D PO) Take 1 tablet by mouth 2 times daily.         Past Medical History:   Diagnosis Date     Atrial fibrillation (H)      Facial fracture (H)      Hypertension      NSVT (nonsustained ventricular tachycardia) (H)     during exercise echo, neg EP study     Pacemaker 2010     S/P cardiac catheterization     30-40% non obstructive lesion     Ventricular tachycardia, nonsustained (H)     during stress echo       Past Surgical History:   Procedure Laterality Date     IMPLANT PACEMAKER       PPM  2010    Permanent Pacemaker       Family History   Problem Relation Age of Onset     Cardiovascular Father 76      age 80, MI 76 and CHF 80's     Cardiovascular Paternal Grandfather       age 76 of CVD     Myocardial Infarction Mother 88     lived to [facility]       Social History   Substance Use Topics     Smoking status: Never Smoker     Smokeless tobacco: Never Used     Alcohol use No       No Known Allergies      ROS:   Negative except for as indicated in HPI.    Physical Examination:  Vitals: /76 (BP Location: Left arm, Patient Position: Chair, Cuff Size: Adult Small)  Pulse 71  Ht 1.6 m (5' 3\")  Wt 58.5 kg (128 lb 14.4 oz)  SpO2 99%  BMI 22.83 kg/m2  BMI= Body mass index is 22.83 kg/(m^2).    GENERAL APPEARANCE: healthy, alert, and no acute distress  HEENT: no icterus, no xanthelasmas, normal pupil size and reaction, no cyanosis.  NECK: no asymmetry, thyroid normal to palpation, carotid upstrokes are normal bilaterally, no cervical bruits, no JVD   CHEST: lungs clear to auscultation - no rales, rhonchi or wheezes, no use of accessory muscles, no retractions, respirations are unlabored, normal respiratory rate  CARDIOVASCULAR: regular rhythm, normal S1 with physiologic split S2, no S3 or S4 and no murmur, click or rub, " precordium quiet with normal PMI.  ABDOMEN: soft, non-tender, without hepatosplenomegaly, no masses palpable, bowel sounds normal, no abdominal bruits  EXTREMITIES: no clubbing, cyanosis, or 2+ edema  NEURO: alert and oriented to person/place/time, normal speech, gait and affect  VASC: Radial, and posterior tibialis pulses +1 and symmetric bilaterally.   SKIN: no ecchymoses, no rashes    Laboratory:       Lab Results   Component Value Date     11/13/2017    Lab Results   Component Value Date    CHLORIDE 106 11/13/2017    Lab Results   Component Value Date    BUN 20 11/13/2017      Lab Results   Component Value Date    POTASSIUM 4.5 11/13/2017    Lab Results   Component Value Date    CO2 26 11/13/2017    Lab Results   Component Value Date    CR 0.91 11/13/2017        Lab Results   Component Value Date    NTBNP 484 (H) 11/13/2017     Lab Results   Component Value Date    WBC 6.4 11/13/2017    HGB 11.5 (L) 11/13/2017    HCT 35.3 11/13/2017    MCV 94 11/13/2017     11/13/2017     Lab Results   Component Value Date    TROPI <0.015 11/13/2017       No results found for: CKTOTAL, CKMB, TROPN  Cholesterol (mg/dL)   Date Value   08/26/2016 139   10/27/2014 155   09/03/2013 171   08/21/2012 173     Cholesterol/HDL Ratio (no units)   Date Value   10/27/2014 1.8   09/03/2013 2.6   08/21/2012 2.4   08/31/2010 2.7     HDL Cholesterol (mg/dL)   Date Value   08/26/2016 65   10/27/2014 84   09/03/2013 67   08/21/2012 73     LDL Cholesterol Calculated (mg/dL)   Date Value   08/26/2016 61   10/27/2014 61   09/03/2013 89   08/21/2012 88     ECHO:   No results found for this or any previous visit (from the past 8760 hour(s)).      Assessment and recommendations:    Atypical chest pain  - intermittent pain which is relieved with ibuprofen, tylenol or heat.    - awaiting the ECHO results    HTN  - uncontrolled - didn't take medication today  - lisinopril 10 mg daily  - HCTZ 12.5 mg   - Metoprolol 25 mg daily    Ppm  - normal  function    Atrial fibrillation  - patient is unaware of her atrial fibrillation.  She does not know when she is in atrial fibrillation.   -We discussed in detail with the patient management/treatment options for Darrian includin. Stroke Prophylaxis:  CHADSVASC=female, age++, HTN  4, corresponding to a 4.0% annual stroke / systemic emolism event rate. indicating need for long term oral anticoagulation.  She does not want to be on anticoagulation.  We have had this discussion in the past.  She doesn't want to start today; provided written information.     2. Rate Control: Continue with metoprolol XL 25 mg BID.       Patient expresses understanding and agreement with the plan.    I appreciate the chance to help with [facility]. Please let me know if you have any questions or concerns.      IBRAHIMA Townsend, CNP

## 2017-12-01 ENCOUNTER — OFFICE VISIT (OUTPATIENT)
Dept: INTERNAL MEDICINE | Facility: CLINIC | Age: 82
End: 2017-12-01

## 2017-12-01 VITALS
TEMPERATURE: 97.5 F | OXYGEN SATURATION: 98 % | HEART RATE: 71 BPM | DIASTOLIC BLOOD PRESSURE: 65 MMHG | WEIGHT: 126.2 LBS | SYSTOLIC BLOOD PRESSURE: 125 MMHG | RESPIRATION RATE: 16 BRPM | BODY MASS INDEX: 23.22 KG/M2 | HEIGHT: 62 IN

## 2017-12-01 DIAGNOSIS — E78.5 HYPERLIPIDEMIA LDL GOAL <100: ICD-10-CM

## 2017-12-01 DIAGNOSIS — Z23 PNEUMOCOCCAL VACCINATION GIVEN: Primary | ICD-10-CM

## 2017-12-01 DIAGNOSIS — D64.9 ANEMIA, UNSPECIFIED TYPE: ICD-10-CM

## 2017-12-01 DIAGNOSIS — N64.4 BREAST PAIN: ICD-10-CM

## 2017-12-01 LAB
ALT SERPL W P-5'-P-CCNC: 32 U/L (ref 0–50)
CHOLEST SERPL-MCNC: 177 MG/DL
ERYTHROCYTE [DISTWIDTH] IN BLOOD BY AUTOMATED COUNT: 13.7 % (ref 10–15)
FERRITIN SERPL-MCNC: 93 NG/ML (ref 8–252)
FOLATE SERPL-MCNC: 31.2 NG/ML
HCT VFR BLD AUTO: 37.7 % (ref 35–47)
HDLC SERPL-MCNC: 90 MG/DL
HGB BLD-MCNC: 12 G/DL (ref 11.7–15.7)
LDLC SERPL CALC-MCNC: 75 MG/DL
MCH RBC QN AUTO: 30.1 PG (ref 26.5–33)
MCHC RBC AUTO-ENTMCNC: 31.8 G/DL (ref 31.5–36.5)
MCV RBC AUTO: 95 FL (ref 78–100)
NONHDLC SERPL-MCNC: 87 MG/DL
PLATELET # BLD AUTO: 189 10E9/L (ref 150–450)
RBC # BLD AUTO: 3.99 10E12/L (ref 3.8–5.2)
TRIGL SERPL-MCNC: 56 MG/DL
VIT B12 SERPL-MCNC: 2967 PG/ML (ref 193–986)
WBC # BLD AUTO: 6.5 10E9/L (ref 4–11)

## 2017-12-01 ASSESSMENT — PAIN SCALES - GENERAL: PAINLEVEL: NO PAIN (0)

## 2017-12-01 NOTE — NURSING NOTE
Chief Complaint   Patient presents with     Establish Care     Patient is here to establish care for new PCP     Physical     Patient is also here for annual physical exam     James Dukes CMA ([facility]) at 9:06 AM on 12/1/2017

## 2017-12-01 NOTE — MR AVS SNAPSHOT
After Visit Summary   12/1/2017    Isadora Mendez    MRN: [id number]           Patient Information     Date Of Birth          3/9/1931        Visit Information        Provider Department      12/1/2017 9:00 AM Jovana Macias MD [facility]        Today's Diagnoses     Pneumococcal vaccination given    -  1    Breast pain        Anemia, unspecified type        Hyperlipidemia LDL goal <100          Care Instructions    Primary Care Center Medication Refill Request Information:  * Please contact your pharmacy regarding ANY request for medication refills.  ** [facility] Prescription Fax = [phone]  * Please allow 3 business days for routine medication refills.  * Please allow 5 business days for controlled substance medication refills.     Primary Care Center Test Result notification information:  *You will be notified with in 7-10 days of your appointment day regarding the results of your test.  If you are on MyChart you will be notified as soon as the provider has reviewed the results and signed off on them.    Primary Care Center [phone]   Mammogram Screening Tool    Mammogram   Does patient have a history of breast cancer? no  Does patient have breast implants? no  Reason for mammogram? Breast pain    I left a voicemail for Boaz, Breast Imaging Scheduler, to call you to schedule. His number is [phone]. Your mammogram and ultrasound will take 1 hour. Please check-in on 2nd floor. Thank you.          Follow-ups after your visit        Follow-up notes from your care team     Return in about 4 months (around 4/1/2018).      Your next 10 appointments already scheduled     Dec 01, 2017 10:45 AM CST   LAB with  LAB   [facility] ([facility])    [address] [place] [address]  1st Floor  [facility] [address] [phone]           Please do not eat 10-12 hours before your appointment if you are coming in fasting for labs on lipids, cholesterol, or glucose  (sugar). This does not apply to pregnant women. Water, hot tea and black coffee (with nothing added) are okay. Do not drink other fluids, diet soda or chew gum.            Dec 18, 2017  1:30 PM CST   (Arrive by 1:15 PM)   [facility] ([facility])    [address]  5th Floor  [facility] [address] [phone]            Feb 20, 2018  9:00 AM CST   (Arrive by 8:45 AM)   Pacemaker Check with Uc Cv Device 1   [facility] ([facility])    [address]  3rd Floor  [facility] [address] [phone]            Feb 20, 2018  9:30 AM CST   (Arrive by 9:15 AM)   RETURN ATRIAL FIBULATION VISIT with Dale Lazo MD   [facility] ([facility])    [address]  3rd [facility] [address] [phone]            Apr 13, 2018  7:30 AM CDT   (Arrive by 7:15 AM)   Return Visit with Jovana Macias MD   [facility] ([facility])    [address]  4th Floor  [facility] [address] [phone]              Future tests that were ordered for you today     Open Future Orders        Priority Expected Expires Ordered    Lipid Profile NON-FASTING Routine 12/1/2017 12/15/2017 12/1/2017    ALT Routine 12/1/2017 12/1/2018 12/1/2017    CBC with platelets Routine 12/1/2017 12/15/2017 12/1/2017    Ferritin Routine 12/1/2017 12/1/2018 12/1/2017    Vitamin B12 Routine 12/1/2017 12/1/2018 12/1/2017    Folate Routine 12/1/2017 12/1/2018 12/1/2017    US Breast Left Complete 4 Quadrants Routine  12/1/2018 12/1/2017    Mammogram, diagnostic, Left breast Routine  12/1/2018 12/1/2017            Who to contact     Please call your clinic at [phone] to:    Ask questions about your health    Make or cancel appointments    Discuss your medicines    Learn about your test  "results    Speak to your doctor   If you have compliments or concerns about an experience at your clinic, or if you wish to file a complaint, please contact [facility] Patient Relations at [phone] or email us at [email or web address]         Additional Information About Your Visit        [employer] Information     [employer] is an electronic gateway that provides easy, online access to your medical records. With [employer], you can request a clinic appointment, read your test results, renew a prescription or communicate with your care team.     To sign up for [employer] visit the website at [email or web address]   You will be asked to enter the access code listed below, as well as some personal information. Please follow the directions to create your username and password.     Your access code is: [email or web address]  Expires: 2017  5:30 AM     Your access code will  in 90 days. If you need help or a new code, please contact your [facility] or call [phone] for assistance.        Care EveryWhere ID     This is your Care EveryWhere ID. This could be used by other organizations to access your [facility] medical records  [id number]        Your Vitals Were     Pulse Temperature Respirations Height Pulse Oximetry Breastfeeding?    71 97.5  F (36.4  C) (Oral) 16 1.575 m (5' 2\") 98% No    BMI (Body Mass Index)                   23.08 kg/m2            Blood Pressure from Last 3 Encounters:   17 125/65   17 178/76   17 133/77    Weight from Last 3 Encounters:   17 57.2 kg (126 lb 3.2 oz)   17 58.5 kg (128 lb 14.4 oz)   17 59 kg (130 lb)              We Performed the Following     Pneumococcal vaccine 13 valent PCV13 IM (Prevnar) [34559]        Primary Care Provider Fax #    Physician No Ref-Primary [phone]       No address on file        Equal Access to Services     DWAYNE VAUGHAN AH: Whit hensley " Belgicahusam, jonathanda luqadaha, qatristinta kalaura zhang, nela arzatemone nicolas. So [facility] [phone].    ATENCIÓN: Si david williamson, tiene a jason disposición servicios gratuitos de asistencia lingüística. Bryant al [phone].    We comply with applicable federal civil rights laws and [place] laws. We do not discriminate on the basis of race, color, national origin, age, disability, sex, sexual orientation, or gender identity.            Thank you!     Thank you for choosing [facility]  for your care. Our goal is always to provide you with excellent care. Hearing back from our patients is one way we can continue to improve our services. Please take a few minutes to complete the written survey that you may receive in the mail after your visit with us. Thank you!             Your Updated Medication List - Protect others around you: Learn how to safely use, store and throw away your medicines at www.disposemymeds.org.          This list is accurate as of: 12/1/17 10:19 AM.  Always use your most recent med list.                   Brand Name Dispense Instructions for use Diagnosis    aspirin 81 MG tablet      Take 1 tablet by mouth daily.        BIOTIN PO      Take 1 capsule by mouth daily.    Hypertension, New onset atrial fibrillation (H), Hyperlipidemia LDL goal <100       CALCIUM 500 + D PO      Take 1 tablet by mouth 2 times daily.        coenzyme Q-10 100 MG Tabs           COLLAGEN PO      Take 1 capsule by mouth 2 times daily.        hydrochlorothiazide 12.5 MG capsule    MICROZIDE    90 capsule    Take 1 capsule (12.5 mg) by mouth daily    Essential hypertension       lisinopril 10 MG tablet    PRINIVIL/ZESTRIL    90 tablet    Take 1 tablet (10 mg) by mouth daily    Essential hypertension       MAGNESIUM PO      one daily        metoprolol 50 MG 24 hr tablet    TOPROL-XL    90 tablet    Take 0.5 tablets (25 mg) by mouth 2 times daily    Essential hypertension       * NEW MED       1 tablet daily Nattokinase, from fermented soybean .    Hypertension, New onset atrial fibrillation (H)       * NEW MED      Cumin - capsule daily Vitor - capsule daily        * NEW MED      Mineral drops        potassium chloride 20 MEQ/15ML (10%) solution    KAYCIEL          PREMARIN cream   Generic drug:  conjugated estrogens      Place vaginally twice a week        VITAMIN B-6 PO      Take 1 tablet by mouth daily    Hypertension, Hyperlipidemia LDL goal <100       VITAMIN B12 PO      Take 1 tablet by mouth daily.        VITAMIN C PO           * Notice:  This list has 3 medication(s) that are the same as other medications prescribed for you. Read the directions carefully, and ask your doctor or other care provider to review them with you.

## 2017-12-01 NOTE — PROGRESS NOTES
Rooming Note    Health Maintenance  Health Maintenance Due   Topic Date Due     HF ACTION PLAN Q3 YR  03/09/1931     ADVANCE DIRECTIVE PLANNING Q5 YRS  03/09/1986     FALL RISK ASSESSMENT  03/09/1996     PNEUMOCOCCAL (1 of 2 - PCV13) 03/09/1996     OP ANNUAL INR REFERRAL  05/15/2013     ALT Q1 YR  08/24/2016     LIPID MONITORING Q1 YEAR  08/26/2017     INFLUENZA VACCINE (SYSTEM ASSIGNED)  09/01/2017   Health maintenance items discussed. Patient declined Influenza Vaccine, states already had Pneumococcal vaccine. Advanced Directive form was given to patient.    Fall Risk  FALL RISK ASSESSMENT 12/1/2017   Fallen 2 or more times in the past year? No   Any fall with injury in the past year? No       Administered Pneumococcal Prevnar 13 Vaccine (see Immunizations in Chart Review). Patient tolerated well.        James Dukes CMA at 10:06 AM on 12/1/2017

## 2017-12-01 NOTE — PATIENT INSTRUCTIONS
[facility] Medication Refill Request Information:  * Please contact your pharmacy regarding ANY request for medication refills.  ** [facility] Prescription Fax = [phone]  * Please allow 3 business days for routine medication refills.  * Please allow 5 business days for controlled substance medication refills.     [facility] Test Result notification information:  *You will be notified with in 7-10 days of your appointment day regarding the results of your test.  If you are on MyChart you will be notified as soon as the provider has reviewed the results and signed off on them.    [facility] [phone]   Mammogram Screening Tool    Mammogram   Does patient have a history of breast cancer? no  Does patient have breast implants? no  Reason for mammogram? Breast pain    I left a voicemail for Boaz, Breast Imaging Scheduler, to call you to schedule. His number is [phone]. Your mammogram and ultrasound will take 1 hour. Please check-in on 2nd floor. Thank you.

## 2017-12-01 NOTE — PROGRESS NOTES
"Chief complaint:  Isadora Mendez is a 86 year old female presents for   Chief Complaint   Patient presents with     Establish Care     Patient is here to establish care for new PCP     Physical     Patient is also here for annual physical exam        SUBJECTIVE:  Pain goes up left shoulder.  And then tingling down arm.  Worse at night.  Keeps her up at night.  She works at a .  During the day it doesn't bother her.  Wonders if stomach related because burping helps relieve the pain.    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      Medications and allergies were reviewed by me today.     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    ROS    PE:  /65 (BP Location: Right arm, Patient Position: Chair, Cuff Size: Adult Small)  Pulse 71  Temp 97.5  F (36.4  C) (Oral)  Resp 16  Ht 1.575 m (5' 2\")  Wt 57.2 kg (126 lb 3.2 oz)  SpO2 98%  Breastfeeding? No  BMI 23.08 kg/m2  Gen: no distress, comfortable, pleasant   Eyes: anicteric, normal extra-ocular movements   Breast: ~1 in mass under L axilla that is mobile and slightly tender.  MSK: shoulders appear symmetric.  No warmth or erythema.    L shoulder without tenderness to palpation along the SC joint, clavicle, AC joint or scapula.  Full active ROM.  5/5 deltoid, biceps, and triceps strength bilaterally.  L shoulder " provacative testing: normal lift off test, normal empty can, normal external rotation, and normal internal rotation. Mild pain with [place] and [place]  Skin: no concerning lesions, no jaundice   Psychological: appropriate mood       ASSESSMENT/PLAN:    Pneumococcal vaccination given  - Pneumococcal vaccine 13 valent PCV13 IM (Prevnar) [68763]    Breast pain  Has palpable lump under L axilla.    - US Breast Left Complete 4 Quadrants  - Mammogram, diagnostic, Left breast    L sided atypical CP:  MSK versus breast (as above) versus GERD.  Suspect combination of all 3.  Exam notable for very mild pain with testing for impingement, although  Report of arm numbness more consistent with cervcal radiculopathy.  Exercises for cervical spine provided.  Also recommended dietary changes to help with GERD symptoms.    Anemia, unspecified type  - CBC with platelets  - Ferritin  - Vitamin B12  - Folate  - ALT    Hyperlipidemia LDL goal <100  - Lipid Profile NON-FASTING      RTC: 3-4 mo    Norma Macias MD

## 2017-12-18 ENCOUNTER — OFFICE VISIT (OUTPATIENT)
Dept: CARDIOLOGY | Facility: CLINIC | Age: 82
End: 2017-12-18
Payer: COMMERCIAL

## 2017-12-18 VITALS
DIASTOLIC BLOOD PRESSURE: 67 MMHG | WEIGHT: 124.9 LBS | BODY MASS INDEX: 22.13 KG/M2 | SYSTOLIC BLOOD PRESSURE: 150 MMHG | OXYGEN SATURATION: 98 % | HEIGHT: 63 IN

## 2017-12-18 DIAGNOSIS — R07.89 CHEST WALL PAIN: Primary | ICD-10-CM

## 2017-12-18 NOTE — LETTER
12/18/2017      RE: Isadora Mendez  [address] [facility] [address]       Dear Colleague,    Thank you for the opportunity to participate in the care of your patient, Isadora Mendez, at the [facility] at [facility]. Please see a copy of my visit note below.    PROBLEM LIST  Patient Active Problem List   Diagnosis     Hypertension     Hyperlipidemia LDL goal <100     Paroxysmal a-fib (H)     Cardiac pacemaker, Medtronic, Dual Chamber, NOT dependent     Chronotropic incompetence     Atrioventricular block, incomplete       HPI:   Isadora Mendez is a 86 year old year old female with a history of hypertension, non obstructive CAD (30-40% in 2009) tachybrady arrythmias including VT, sinus pauses (SSS) and with pacer insertion 2010 and paraxysmal afib. She follows with Dr. Lazo and Ann Hoffman NP who saw her 11-21-17. At the time she was having some atypical left axila chest pain mostly when she was supine, improved with changing position and being active. She did also have some left arm numbness. This pain has improved and is directly over an old scar possible from pacer insertion. She did report some of this discomfort during her visit with a heavy sensation in the localized area.     Her blood pressures at home have fluctuated greatly some elevated 160's systolic, some 120-130's and one as low as 89. With her low arterial compliance this is not unexpected.  She does have mild to moderate lower leg edema and shortness of breath with stair climbing. 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     We discussed anticoagulation again and reviewed the benefits and risks.We discussed some of the more recent medications that do not require dietary changes or drug monitoring. She will think about this option.    PAST MEDICAL HISTORY:  Past Medical History:   Diagnosis Date     Atrial fibrillation (H)       Facial fracture (H)      Hypertension      NSVT (nonsustained ventricular tachycardia) (H)     during exercise echo, neg EP study     Pacemaker 2010     S/P cardiac catheterization     30-40% non obstructive lesion     Ventricular tachycardia, nonsustained (H)     during stress echo       CURRENT MEDICATIONS:  Current Outpatient Prescriptions   Medication Sig Dispense Refill     Ascorbic Acid (VITAMIN C PO)        potassium chloride (KAYCIEL) 20 MEQ/15ML (10%) solution        MAGNESIUM PO one daily       coenzyme Q-10 100 MG TABS        hydrochlorothiazide (MICROZIDE) 12.5 MG capsule Take 1 capsule (12.5 mg) by mouth daily 90 capsule 3     lisinopril (PRINIVIL/ZESTRIL) 10 MG tablet Take 1 tablet (10 mg) by mouth daily 90 tablet 3     metoprolol (TOPROL-XL) 50 MG 24 hr tablet Take 0.5 tablets (25 mg) by mouth 2 times daily 90 tablet 3     NEW MED Cumin - capsule daily  Vitor - capsule daily       NEW MED Mineral drops       Cyanocobalamin (VITAMIN B12 PO) Take 1 tablet by mouth daily.       aspirin 81 MG tablet Take 1 tablet by mouth daily.       Calcium Carbonate-Vitamin D (CALCIUM 500 + D PO) Take 1 tablet by mouth 2 times daily.       conjugated estrogens (PREMARIN) cream Place vaginally twice a week       Pyridoxine HCl (VITAMIN B-6 PO) Take 1 tablet by mouth daily       COLLAGEN PO Take 1 capsule by mouth 2 times daily.       NEW MED 1 tablet daily Nattokinase, from fermented soybean .        BIOTIN PO Take 1 capsule by mouth daily.         PAST SURGICAL HISTORY:  Past Surgical History:   Procedure Laterality Date     IMPLANT PACEMAKER       PPM  2010    Permanent Pacemaker       ALLERGIES   No Known Allergies    FAMILY HISTORY:  Family History   Problem Relation Age of Onset     Cardiovascular Father 76      age 80, MI 76 and CHF 80's     Cardiovascular Paternal Grandfather       age 76 of CVD     Myocardial Infarction Mother 88     lived to 100       SOCIAL  "HISTORY:  Social History     Social History     Marital status:      Spouse name: N/A     Number of children: N/A     Years of education: N/A     Occupational History     retired      nurse     Social History Main Topics     Smoking status: Never Smoker     Smokeless tobacco: Never Used     Alcohol use No     Drug use: No     Sexual activity: Not Asked     Other Topics Concern     None     Social History Narrative       ROS:   Constitutional: No fever, chills, or sweats. No weight gain/loss   ENT: No visual disturbance, ear ache, epistaxis, sore throat  Allergies/Immunologic: Negative.   Respiratory: No cough, hemoptysia  Cardiovascular: As per HPI  GI: No nausea, vomiting, hematemesis, melena, or hematochezia  : No urinary frequency, dysuria, or hematuria  Integument: Negative  Psychiatric: Negative  Neuro: Negative  Endocrinology: Negative   Musculoskeletal: Negative    EXAM:  /67 (BP Location: Left arm, Patient Position: Left side, Cuff Size: Adult Large)  Ht 1.6 m (5' 3\")  Wt 56.7 kg (124 lb 14.4 oz)  SpO2 98%  BMI 22.13 kg/m2  In general, the patient is a pleasant female in no apparent distress.    HEENT: NC/AT.  PERRLA.  EOMI.  Sclerae white, not injected.  Nares clear.  Pharynx without erythema or exudate.  Dentition intact.    Neck: No adenopathy.  No thyromegaly. Carotids +4/4 bilaterally without bruits.  No jugular venous distension.   Heart: RRR. Normal S1, S2 splits physiologically. No murmur, rub, click, or gallop. The PMI is in the 5th ICS in the midclavicular line. There is no heave.    Lungs: CTA.  No ronchi, wheezes, rales.  No dullness to percussion.   Abdomen: Soft, nontender, nondistended. No organomegaly.  No bruits.   Extremities: No clubbing, cyanosis, or edema.  The pulses are +4/4 at the radial, brachial, femoral, popliteal, DP, and PT sites bilaterally.  No bruits are noted.      Labs:  LIPID RESULTS:  Lab Results   Component Value Date    CHOL 177 12/01/2017     Lab " Results   Component Value Date    HDL 90 12/01/2017     Lab Results   Component Value Date    LDL 75 12/01/2017     Lab Results   Component Value Date    TRIG 56 12/01/2017     Lab Results   Component Value Date    CHOLHDLRATIO 1.8 10/27/2014       Lab Results   Component Value Date    AST 29 08/24/2015     Lab Results   Component Value Date    ALT 32 12/01/2017       CBC RESULTS:  Lab Results   Component Value Date    HGB 12.0 12/01/2017     Lab Results   Component Value Date    WBC 6.5 12/01/2017     Lab Results   Component Value Date    RBC 3.99 12/01/2017     Lab Results   Component Value Date    HCT 37.7 12/01/2017     Lab Results   Component Value Date    MCV 95 12/01/2017     Lab Results   Component Value Date    MCH 30.1 12/01/2017     Lab Results   Component Value Date    MCHC 31.8 12/01/2017     Lab Results   Component Value Date    RDW 13.7 12/01/2017     Lab Results   Component Value Date     12/01/2017       BMP RESULTS:  Lab Results   Component Value Date     11/13/2017      Lab Results   Component Value Date    POTASSIUM 4.5 11/13/2017     Lab Results   Component Value Date    CHLORIDE 106 11/13/2017     Lab Results   Component Value Date    INO 9.4 11/13/2017     Lab Results   Component Value Date    CO2 26 11/13/2017     Lab Results   Component Value Date    BUN 20 11/13/2017     Lab Results   Component Value Date    CR 0.91 11/13/2017     Lab Results   Component Value Date    GFRESTIMATED 59 11/13/2017     Lab Results   Component Value Date     11/13/2017       No results found for: A1C  Lab Results   Component Value Date    INR 1.13 09/14/2016    INR 1.04 09/03/2013       Procedures:      Assessment and Plan:     Cardiovascular: ECG today atrial paced rate 66, anterioseptal infarct pattern unchanged from previous similar ECG in 2012. Atypical chest pain improving located over an area of scar in her left axilla. This pain is typically better with exertion. She has some shortness  of breath with stair climbing. EF% with recent echo remains 60-65%. Most recent echo indicates moderate to severe TI and severe bi atrial enlargement which has increased from one year. Her Nt pro BNP was high last year when her generated needed to be changed and is now at 484. These results were reviewed with her and she felt better about the lower BNP level.  Lipids are optimal. Taking ASA for anticoagulation. We discussed adding an anticoagulant and she will reconsider this recommendation.     Blood pressure: She is taking lisinopril 10 mg per day, HCTZ 12.5 mg per day and metoprolol 50 mg per day. She often takes her metoprolol at midnight when she goes to bed. Since she has noticed evening higher BP's recommend she change the time to earlier evening around dinner time. She will record some home BP's and let me know how they are in a few weeks. Could consider increasing lisinopril to 20 mg per day if needed. Blood pressure today was elevated  150/67.    Return visit in one year or sooner if needed.    Sincerely,     IBRAHIMA Brown CNP

## 2017-12-18 NOTE — PROGRESS NOTES
PROBLEM LIST  Patient Active Problem List   Diagnosis     Hypertension     Hyperlipidemia LDL goal <100     Paroxysmal a-fib (H)     Cardiac pacemaker, Medtronic, Dual Chamber, NOT dependent     Chronotropic incompetence     Atrioventricular block, incomplete       HPI:   Isadora Mendez is a 86 year old year old female with a history of hypertension, non obstructive CAD (30-40% in 2009) tachybrady arrythmias including VT, sinus pauses (SSS) and with pacer insertion 2010 and paraxysmal afib. She follows with Dr. Lazo and Ann Hoffman NP who saw her 11-21-17. At the time she was having some atypical left axila chest pain mostly when she was supine, improved with changing position and being active. She did also have some left arm numbness. This pain has improved and is directly over an old scar possible from pacer insertion. She did report some of this discomfort during her visit with a heavy sensation in the localized area.     Her blood pressures at home have fluctuated greatly some elevated 160's systolic, some 120-130's and one as low as 89. With her low arterial compliance this is not unexpected.  She does have mild to moderate lower leg edema and shortness of breath with stair climbing. 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     We discussed anticoagulation again and reviewed the benefits and risks.We discussed some of the more recent medications that do not require dietary changes or drug monitoring. She will think about this option.    PAST MEDICAL HISTORY:  Past Medical History:   Diagnosis Date     Atrial fibrillation (H) 2011     Facial fracture (H) 2006     Hypertension      NSVT (nonsustained ventricular tachycardia) (H) 2009    during exercise echo, neg EP study     Pacemaker 7-1-2010     S/P cardiac catheterization 2009    30-40% non obstructive lesion     Ventricular tachycardia, nonsustained (H) 2009    during stress echo       CURRENT  MEDICATIONS:  Current Outpatient Prescriptions   Medication Sig Dispense Refill     Ascorbic Acid (VITAMIN C PO)        potassium chloride (KAYCIEL) 20 MEQ/15ML (10%) solution        MAGNESIUM PO one daily       coenzyme Q-10 100 MG TABS        hydrochlorothiazide (MICROZIDE) 12.5 MG capsule Take 1 capsule (12.5 mg) by mouth daily 90 capsule 3     lisinopril (PRINIVIL/ZESTRIL) 10 MG tablet Take 1 tablet (10 mg) by mouth daily 90 tablet 3     metoprolol (TOPROL-XL) 50 MG 24 hr tablet Take 0.5 tablets (25 mg) by mouth 2 times daily 90 tablet 3     NEW MED Cumin - capsule daily  Vitor - capsule daily       NEW MED Mineral drops       Cyanocobalamin (VITAMIN B12 PO) Take 1 tablet by mouth daily.       aspirin 81 MG tablet Take 1 tablet by mouth daily.       Calcium Carbonate-Vitamin D (CALCIUM 500 + D PO) Take 1 tablet by mouth 2 times daily.       conjugated estrogens (PREMARIN) cream Place vaginally twice a week       Pyridoxine HCl (VITAMIN B-6 PO) Take 1 tablet by mouth daily       COLLAGEN PO Take 1 capsule by mouth 2 times daily.       NEW MED 1 tablet daily Nattokinase, from fermented soybean .        BIOTIN PO Take 1 capsule by mouth daily.         PAST SURGICAL HISTORY:  Past Surgical History:   Procedure Laterality Date     IMPLANT PACEMAKER       PPM  2010    Permanent Pacemaker       ALLERGIES   No Known Allergies    FAMILY HISTORY:  Family History   Problem Relation Age of Onset     Cardiovascular Father 76      age 80, MI 76 and CHF 80's     Cardiovascular Paternal Grandfather       age 76 of CVD     Myocardial Infarction Mother 88     lived to 100       SOCIAL HISTORY:  Social History     Social History     Marital status:      Spouse name: N/A     Number of children: N/A     Years of education: N/A     Occupational History     retired      nurse     Social History Main Topics     Smoking status: Never Smoker     Smokeless tobacco: Never Used     Alcohol use No     Drug use: No      "Sexual activity: Not Asked     Other Topics Concern     None     Social History Narrative       ROS:   Constitutional: No fever, chills, or sweats. No weight gain/loss   ENT: No visual disturbance, ear ache, epistaxis, sore throat  Allergies/Immunologic: Negative.   Respiratory: No cough, hemoptysia  Cardiovascular: As per HPI  GI: No nausea, vomiting, hematemesis, melena, or hematochezia  : No urinary frequency, dysuria, or hematuria  Integument: Negative  Psychiatric: Negative  Neuro: Negative  Endocrinology: Negative   Musculoskeletal: Negative    EXAM:  /67 (BP Location: Left arm, Patient Position: Left side, Cuff Size: Adult Large)  Ht 1.6 m (5' 3\")  Wt 56.7 kg (124 lb 14.4 oz)  SpO2 98%  BMI 22.13 kg/m2  In general, the patient is a pleasant female in no apparent distress.    HEENT: NC/AT.  PERRLA.  EOMI.  Sclerae white, not injected.  Nares clear.  Pharynx without erythema or exudate.  Dentition intact.    Neck: No adenopathy.  No thyromegaly. Carotids +4/4 bilaterally without bruits.  No jugular venous distension.   Heart: RRR. Normal S1, S2 splits physiologically. No murmur, rub, click, or gallop. The PMI is in the 5th ICS in the midclavicular line. There is no heave.    Lungs: CTA.  No ronchi, wheezes, rales.  No dullness to percussion.   Abdomen: Soft, nontender, nondistended. No organomegaly.  No bruits.   Extremities: No clubbing, cyanosis, or edema.  The pulses are +4/4 at the radial, brachial, femoral, popliteal, DP, and PT sites bilaterally.  No bruits are noted.      Labs:  LIPID RESULTS:  Lab Results   Component Value Date    CHOL 177 12/01/2017     Lab Results   Component Value Date    HDL 90 12/01/2017     Lab Results   Component Value Date    LDL 75 12/01/2017     Lab Results   Component Value Date    TRIG 56 12/01/2017     Lab Results   Component Value Date    CHOLHDLRATIO 1.8 10/27/2014       Lab Results   Component Value Date    AST 29 08/24/2015     Lab Results   Component Value " Date    ALT 32 12/01/2017       CBC RESULTS:  Lab Results   Component Value Date    HGB 12.0 12/01/2017     Lab Results   Component Value Date    WBC 6.5 12/01/2017     Lab Results   Component Value Date    RBC 3.99 12/01/2017     Lab Results   Component Value Date    HCT 37.7 12/01/2017     Lab Results   Component Value Date    MCV 95 12/01/2017     Lab Results   Component Value Date    MCH 30.1 12/01/2017     Lab Results   Component Value Date    MCHC 31.8 12/01/2017     Lab Results   Component Value Date    RDW 13.7 12/01/2017     Lab Results   Component Value Date     12/01/2017       BMP RESULTS:  Lab Results   Component Value Date     11/13/2017      Lab Results   Component Value Date    POTASSIUM 4.5 11/13/2017     Lab Results   Component Value Date    CHLORIDE 106 11/13/2017     Lab Results   Component Value Date    INO 9.4 11/13/2017     Lab Results   Component Value Date    CO2 26 11/13/2017     Lab Results   Component Value Date    BUN 20 11/13/2017     Lab Results   Component Value Date    CR 0.91 11/13/2017     Lab Results   Component Value Date    GFRESTIMATED 59 11/13/2017     Lab Results   Component Value Date     11/13/2017       No results found for: A1C  Lab Results   Component Value Date    INR 1.13 09/14/2016    INR 1.04 09/03/2013       Procedures:      Assessment and Plan:     Cardiovascular: ECG today atrial paced rate 66, anterioseptal infarct pattern unchanged from previous similar ECG in 2012. Atypical chest pain improving located over an area of scar in her left axilla. This pain is typically better with exertion. She has some shortness of breath with stair climbing. EF% with recent echo remains 60-65%. Most recent echo indicates moderate to severe TI and severe bi atrial enlargement which has increased from one year. Her Nt pro BNP was high last year when her generated needed to be changed and is now at 484. These results were reviewed with her and she felt better  about the lower BNP level.  Lipids are optimal. Taking ASA for anticoagulation. We discussed adding an anticoagulant and she will reconsider this recommendation.     Blood pressure: She is taking lisinopril 10 mg per day, HCTZ 12.5 mg per day and metoprolol 50 mg per day. She often takes her metoprolol at midnight when she goes to bed. Since she has noticed evening higher BP's recommend she change the time to earlier evening around dinner time. She will record some home BP's and let me know how they are in a few weeks. Could consider increasing lisinopril to 20 mg per day if needed. Blood pressure today was elevated  150/67.    Return visit in one year or sooner if needed.

## 2017-12-18 NOTE — PATIENT INSTRUCTIONS
Check your blood pressure at home every other day, take some in the morning, some in the afternoon and some in the evening. Call June with blood pressures in two weeks.    Take metoprolol at supper time.

## 2018-01-02 DIAGNOSIS — I10 ESSENTIAL HYPERTENSION: ICD-10-CM

## 2018-01-02 RX ORDER — METOPROLOL SUCCINATE 50 MG/1
25 TABLET, EXTENDED RELEASE ORAL 2 TIMES DAILY
Qty: 90 TABLET | Refills: 3 | Status: SHIPPED | OUTPATIENT
Start: 2018-01-02 | End: 2018-08-21

## 2018-01-05 DIAGNOSIS — I10 ESSENTIAL HYPERTENSION: Primary | ICD-10-CM

## 2018-01-05 RX ORDER — METOPROLOL SUCCINATE 50 MG/1
50 TABLET, EXTENDED RELEASE ORAL DAILY
Qty: 90 TABLET | Refills: 3 | Status: SHIPPED | OUTPATIENT
Start: 2018-01-05 | End: 2018-08-21

## 2018-02-14 ENCOUNTER — PRE VISIT (OUTPATIENT)
Dept: CARDIOLOGY | Facility: CLINIC | Age: 83
End: 2018-02-14

## 2018-02-21 ENCOUNTER — RESEARCH ENCOUNTER (OUTPATIENT)
Dept: CARDIOLOGY | Facility: CLINIC | Age: 83
End: 2018-02-21

## 2018-02-21 ENCOUNTER — ALLIED HEALTH/NURSE VISIT (OUTPATIENT)
Dept: CARDIOLOGY | Facility: CLINIC | Age: 83
End: 2018-02-21
Attending: INTERNAL MEDICINE
Payer: MEDICARE

## 2018-02-21 DIAGNOSIS — I44.30 ATRIOVENTRICULAR BLOCK, INCOMPLETE: Primary | ICD-10-CM

## 2018-02-21 PROCEDURE — 93294 REM INTERROG EVL PM/LDLS PM: CPT | Performed by: INTERNAL MEDICINE

## 2018-02-21 PROCEDURE — 93296 REM INTERROG EVL PM/IDS: CPT | Mod: ZF

## 2018-02-21 NOTE — MR AVS SNAPSHOT
"              After Visit Summary   2018    Isadora Mendez    MRN: [id number]           Patient Information     Date Of Birth          3/9/1931        Visit Information        Provider Department      2018 6:00 AM  ICD REMOTE [facility]        Today's Diagnoses     Atrioventricular block, incomplete    -  1       Follow-ups after your visit        Who to contact     If you have questions or need follow up information about today's clinic visit or your schedule please contact [facility] directly at [phone].  Normal or non-critical lab and imaging results will be communicated to you by OMEGA MORGANhart, letter or phone within 4 business days after the clinic has received the results. If you do not hear from us within 7 days, please contact the clinic through OMEGA MORGANhart or phone. If you have a critical or abnormal lab result, we will notify you by phone as soon as possible.  Submit refill requests through Anadys or call your pharmacy and they will forward the refill request to us. Please allow 3 business days for your refill to be completed.          Additional Information About Your Visit        MyChart Information     Anadys lets you send messages to your doctor, view your test results, renew your prescriptions, schedule appointments and more. To sign up, go to [email or web address] . Click on \"Log in\" on the left side of the screen, which will take you to the Welcome page. Then click on \"Sign up Now\" on the right side of the page.     You will be asked to enter the access code listed below, as well as some personal information. Please follow the directions to create your username and password.     Your access code is: [email or web address]  Expires: 2018  6:30 AM     Your access code will  in 90 days. If you need help or a new code, please call your [place] clinic or [phone].        Care EveryWhere ID     This is your Care EveryWhere ID. This could be used by other organizations to " access your [facility] medical records  [id number]         Blood Pressure from Last 3 Encounters:   12/18/17 150/67   12/01/17 125/65   11/21/17 178/76    Weight from Last 3 Encounters:   12/18/17 56.7 kg (124 lb 14.4 oz)   12/01/17 57.2 kg (126 lb 3.2 oz)   11/21/17 58.5 kg (128 lb 14.4 oz)              We Performed the Following     INTERROGATION DEVICE EVAL REMOTE, PACER/ICD        Primary Care Provider Office Phone # Fax #    Jovana June Macias [phone]       [address] [facility] [address] [place] [id number]        Equal Access to Services     DWAYNE VAUGHAN : Hadii aad ku hadasho Sohusam, waaxda luqadaha, qaybta kaalmada adeegyada, waxay thiagoin haygene osorio . So [facility] [phone].    ATENCIÓN: Si habla español, tiene a jason disposición servicios gratuitos de asistencia lingüística. [facility] [phone].    We comply with applicable federal civil rights laws and [place] laws. We do not discriminate on the basis of race, color, national origin, age, disability, sex, sexual orientation, or gender identity.            Thank you!     Thank you for choosing [facility]  for your care. Our goal is always to provide you with excellent care. Hearing back from our patients is one way we can continue to improve our services. Please take a few minutes to complete the written survey that you may receive in the mail after your visit with us. Thank you!             Your Updated Medication List - Protect others around you: Learn how to safely use, store and throw away your medicines at www.disposemymeds.org.          This list is accurate as of 2/21/18 11:59 PM.  Always use your most recent med list.                   Brand Name Dispense Instructions for use Diagnosis    aspirin 81 MG tablet      Take 1 tablet by mouth daily.        BIOTIN PO      Take 1 capsule by mouth daily.    Hypertension, New onset atrial fibrillation (H), Hyperlipidemia LDL goal <100       CALCIUM 500 + D  PO      Take 1 tablet by mouth 2 times daily.        coenzyme Q-10 100 MG Tabs           COLLAGEN PO      Take 1 capsule by mouth 2 times daily.        hydrochlorothiazide 12.5 MG capsule    MICROZIDE    90 capsule    Take 1 capsule (12.5 mg) by mouth daily    Essential hypertension       lisinopril 10 MG tablet    PRINIVIL/ZESTRIL    90 tablet    Take 1 tablet (10 mg) by mouth daily    Essential hypertension       MAGNESIUM PO      one daily        * metoprolol succinate 50 MG 24 hr tablet    TOPROL-XL    90 tablet    Take 0.5 tablets (25 mg) by mouth 2 times daily    Essential hypertension       * metoprolol succinate 50 MG 24 hr tablet    TOPROL-XL    90 tablet    Take 1 tablet (50 mg) by mouth daily    Essential hypertension       * NEW MED      1 tablet daily Nattokinase, from fermented soybean .    Hypertension, New onset atrial fibrillation (H)       * NEW MED      Cumin - capsule daily Vitor - capsule daily        * NEW MED      Mineral drops        potassium chloride 20 MEQ/15ML (10%) solution    KAYCIEL          PREMARIN cream   Generic drug:  conjugated estrogens      Place vaginally twice a week        VITAMIN B-6 PO      Take 1 tablet by mouth daily    Hypertension, Hyperlipidemia LDL goal <100       VITAMIN B12 PO      Take 1 tablet by mouth daily.        VITAMIN C PO           * Notice:  This list has 5 medication(s) that are the same as other medications prescribed for you. Read the directions carefully, and ask your doctor or other care provider to review them with you.

## 2018-02-27 NOTE — PROGRESS NOTES
Preliminary Device Interrogation Results.  Final physician signed paceart report to be scanned and attached.    Remote pacemaker transmission received and reviewed.  Device transmission sent per MD orders.  Patient has a Medtronic dual lead pacemaker.  Normal pacemaker function.  7 NSVT episodes recorded 4-10 beats of EGM's available which reveal a 1:1 conduction./ 2 AT/AF arrhythmias recorded, 76 - 119 sec.. AT/AF burden < 0.1%. Patient on baby ASA only. Presenting EGM = AP/VS @ 63 bpm.  AP = 98%.   = <0.1%.     Estimated battery longevity to EMILEE = 8.5 years.  Patient notified of interrogation results.  Patient reports that she is feeling well and denies specific complaints.  Plan for patient to send remote transmission in 3 months as scheduled. Encouraged to reschedule missed appointment with Dr. Lazo.    Remote pacemaker transmission

## 2018-03-01 NOTE — PROGRESS NOTES
WRAP-IT  Principle Investigator: Bacilio Hill MD, Jessica (Clinical Research Coordinator) [phone]    Exit Visit: 02/21/2018    Completed in Clinic: NO    Completed via Carelink and Phone Call: YES  Date of Transmission: February 21, 2018    Visit Follow-up    Has a diagnosis for CIED infection been made since last visit: NO    Has there been a GARETT alert, LNA alert, high voltage impedance out of range alert, or pace/sence impedance out of range? NO    Has a lead failure, header/connection issue, lead dislodgment, or perforation occurred since the procedure?  NO    Change in Medications:  NO    Did the subject have any invasive procedures or surgeries since last visit: No    Subject Completed Exit visit via Carelink and phone call. Questionnaires were answered.      Interrogation Collected: YES  USB: NO  Floppy Disk:  NO  printout/Strip:YES

## 2018-03-27 DIAGNOSIS — I10 ESSENTIAL HYPERTENSION: ICD-10-CM

## 2018-03-27 RX ORDER — LISINOPRIL 10 MG/1
10 TABLET ORAL DAILY
Qty: 90 TABLET | Refills: 3 | Status: SHIPPED | OUTPATIENT
Start: 2018-03-27 | End: 2019-03-18

## 2018-06-18 DIAGNOSIS — I10 ESSENTIAL HYPERTENSION: ICD-10-CM

## 2018-06-18 RX ORDER — HYDROCHLOROTHIAZIDE 12.5 MG/1
12.5 CAPSULE ORAL DAILY
Qty: 90 CAPSULE | Refills: 3 | Status: CANCELLED | OUTPATIENT
Start: 2018-06-18

## 2018-06-21 DIAGNOSIS — I10 ESSENTIAL HYPERTENSION: ICD-10-CM

## 2018-06-21 RX ORDER — HYDROCHLOROTHIAZIDE 12.5 MG/1
12.5 CAPSULE ORAL DAILY
Qty: 90 CAPSULE | Refills: 3 | Status: SHIPPED | OUTPATIENT
Start: 2018-06-21 | End: 2019-07-07

## 2018-06-21 NOTE — TELEPHONE ENCOUNTER
Health Call Center    Phone Message    May a detailed message be left on voicemail: yes    Reason for Call: Medication Refill Request    Has the patient contacted the pharmacy for the refill? Yes   Name of medication being requested: Pharmacy is calling to get update.  Per Pharmacy PT is completely out and needs refill ASAP.    Provider who prescribed the medication: RENA Mckinley  Pharmacy: [address] (Pharmacy)  Date medication is needed: ASAP         Action Taken: Message routed to:  [facility] ([facility] Cardiology

## 2018-07-14 ENCOUNTER — ALLIED HEALTH/NURSE VISIT (OUTPATIENT)
Dept: CARDIOLOGY | Facility: CLINIC | Age: 83
End: 2018-07-14
Attending: INTERNAL MEDICINE
Payer: MEDICARE

## 2018-07-14 DIAGNOSIS — I44.30 ATRIOVENTRICULAR BLOCK, INCOMPLETE: Primary | ICD-10-CM

## 2018-07-14 PROCEDURE — 93296 REM INTERROG EVL PM/IDS: CPT | Mod: ZF

## 2018-07-14 PROCEDURE — 93294 REM INTERROG EVL PM/LDLS PM: CPT | Performed by: INTERNAL MEDICINE

## 2018-07-14 NOTE — MR AVS SNAPSHOT
"              After Visit Summary   7/14/2018    Isadora Mendez    MRN: [id number]           Patient Information     Date Of Birth          3/9/1931        Visit Information        Provider Department      7/14/2018 6:00 AM UC ICD REMOTE [facility]        Today's Diagnoses     Atrioventricular block, incomplete    -  1       Follow-ups after your visit        Your next 10 appointments already scheduled     Aug 21, 2018  9:00 AM CDT   (Arrive by 8:45 AM)   RETURN ARRHYTHMIA with Dale Laoz MD   [facility] ([facility])    [address] [place] [address]  [address] [place] [id number] [phone]              Who to contact     If you have questions or need follow up information about today's clinic visit or your schedule please contact [facility] directly at [phone].  Normal or non-critical lab and imaging results will be communicated to you by POINT [employer] Basketballhart, letter or phone within 4 business days after the clinic has received the results. If you do not hear from us within 7 days, please contact the clinic through POINT 3 Basketballhart or phone. If you have a critical or abnormal lab result, we will notify you by phone as soon as possible.  Submit refill requests through Specific Media or call your pharmacy and they will forward the refill request to us. Please allow 3 business days for your refill to be completed.          Additional Information About Your Visit        POINT 3 Basketballhart Information     Specific Media lets you send messages to your doctor, view your test results, renew your prescriptions, schedule appointments and more. To sign up, go to [email or web address] . Click on \"Log in\" on the left side of the screen, which will take you to the Welcome page. Then click on \"Sign up Now\" on the right side of the page.     You will be asked to enter the access code listed below, as well as some personal information. Please follow the directions to create your username and password.     Your " access code is: [email or web address]  Expires: 10/23/2018  4:21 PM     Your access code will  in 90 days. If you need help or a new code, please call your [facility] clinic or [phone].        Care EveryWhere ID     This is your Care EveryWhere ID. This could be used by other organizations to access your [facility] medical records  [id number]         Blood Pressure from Last 3 Encounters:   17 150/67   17 125/65   17 178/76    Weight from Last 3 Encounters:   17 56.7 kg (124 lb 14.4 oz)   17 57.2 kg (126 lb 3.2 oz)   17 58.5 kg (128 lb 14.4 oz)              We Performed the Following     INTERROGATION DEVICE EVAL REMOTE, PACER/ICD        Primary Care Provider Office Phone # Fax #    Jovana June Macias [phone]       [address] [place] [address] [address] [place] [address]        Equal Access to Services     [facility]: Hadii aad ku hadasho Soomaali, waaxda luqadaha, qaybta kaalmada adeegyada, waxay idiin haygene osorio . So [facility] [phone].    ATENCIÓN: Si habla español, tiene a jason disposición servicios gratuitos de asistencia lingüística. Llame al [phone].    We comply with applicable federal civil rights laws and [place] laws. We do not discriminate on the basis of race, color, national origin, age, disability, sex, sexual orientation, or gender identity.            Thank you!     Thank you for choosing [facility]  for your care. Our goal is always to provide you with excellent care. Hearing back from our patients is one way we can continue to improve our services. Please take a few minutes to complete the written survey that you may receive in the mail after your visit with us. Thank you!             Your Updated Medication List - Protect others around you: Learn how to safely use, store and throw away your medicines at www.disposemymeds.org.          This list is accurate as of 18 11:59 PM.  Always use your most recent  med list.                   Brand Name Dispense Instructions for use Diagnosis    aspirin 81 MG tablet      Take 1 tablet by mouth daily.        BIOTIN PO      Take 1 capsule by mouth daily.    Hypertension, New onset atrial fibrillation (H), Hyperlipidemia LDL goal <100       CALCIUM 500 + D PO      Take 1 tablet by mouth 2 times daily.        coenzyme Q-10 100 MG Tabs           COLLAGEN PO      Take 1 capsule by mouth 2 times daily.        hydrochlorothiazide 12.5 MG capsule    MICROZIDE    90 capsule    Take 1 capsule (12.5 mg) by mouth daily    Essential hypertension       lisinopril 10 MG tablet    PRINIVIL/ZESTRIL    90 tablet    Take 1 tablet (10 mg) by mouth daily    Essential hypertension       MAGNESIUM PO      one daily        * metoprolol succinate 50 MG 24 hr tablet    TOPROL-XL    90 tablet    Take 0.5 tablets (25 mg) by mouth 2 times daily    Essential hypertension       * metoprolol succinate 50 MG 24 hr tablet    TOPROL-XL    90 tablet    Take 1 tablet (50 mg) by mouth daily    Essential hypertension       * NEW MED      1 tablet daily Nattokinase, from fermented soybean .    Hypertension, New onset atrial fibrillation (H)       * NEW MED      Cumin - capsule daily Vitor - capsule daily        * NEW MED      Mineral drops        potassium chloride 20 MEQ/15ML (10%) solution    KAYCIEL          PREMARIN cream   Generic drug:  conjugated estrogens      Place vaginally twice a week        VITAMIN B-6 PO      Take 1 tablet by mouth daily    Hypertension, Hyperlipidemia LDL goal <100       VITAMIN B12 PO      Take 1 tablet by mouth daily.        VITAMIN C PO           * Notice:  This list has 5 medication(s) that are the same as other medications prescribed for you. Read the directions carefully, and ask your doctor or other care provider to review them with you.

## 2018-07-25 NOTE — PROGRESS NOTES
Preliminary Device Interrogation Results.  Final physician signed paceart report to be scanned and attached.    Scheduled Medtronic, dual chamber pacemaker, remote transmission received and reviewed. Device transmission sent per MD orders. Her presenting rhythm is AP/VS @ 68 bpm. 143 AT/AF episodes recorded, lasting up to 63 minutes. AF burden= 0.1%. She is not anticoagulated per her request. 3 episodes recorded as NSVT. The available EGMs show 1:1 AV conduction. Normal device function. AP= 100% and = 0%. No short V-V intervals recorded. Lead trends appear stable. Battery estimates 8 years to EMILEE. Pt notified of transmission results. Plan for pt to send another transmission in 3 months as scheduled.  Remote pacemaker transmission

## 2018-08-21 ENCOUNTER — OFFICE VISIT (OUTPATIENT)
Dept: CARDIOLOGY | Facility: CLINIC | Age: 83
End: 2018-08-21
Attending: INTERNAL MEDICINE
Payer: COMMERCIAL

## 2018-08-21 VITALS
SYSTOLIC BLOOD PRESSURE: 125 MMHG | OXYGEN SATURATION: 97 % | DIASTOLIC BLOOD PRESSURE: 79 MMHG | HEART RATE: 76 BPM | HEIGHT: 63 IN | BODY MASS INDEX: 22.43 KG/M2 | WEIGHT: 126.6 LBS

## 2018-08-21 DIAGNOSIS — I10 ESSENTIAL HYPERTENSION: ICD-10-CM

## 2018-08-21 DIAGNOSIS — I48.0 PAROXYSMAL ATRIAL FIBRILLATION (H): Primary | ICD-10-CM

## 2018-08-21 DIAGNOSIS — I49.5 SINUS NODE DYSFUNCTION (H): ICD-10-CM

## 2018-08-21 DIAGNOSIS — Z95.0 CARDIAC PACEMAKER IN SITU: ICD-10-CM

## 2018-08-21 PROCEDURE — 99214 OFFICE O/P EST MOD 30 MIN: CPT | Mod: ZP | Performed by: INTERNAL MEDICINE

## 2018-08-21 RX ORDER — METOPROLOL SUCCINATE 50 MG/1
TABLET, EXTENDED RELEASE ORAL
Qty: 135 TABLET | Refills: 3 | Status: SHIPPED | OUTPATIENT
Start: 2018-08-21 | End: 2019-02-06

## 2018-08-21 ASSESSMENT — PAIN SCALES - GENERAL: PAINLEVEL: NO PAIN (0)

## 2018-08-21 NOTE — PATIENT INSTRUCTIONS
You will be scheduled for a follow up visit: 1 year with Dr Lazo and pacemaker clinic    Medication changes: Increase metoprolol to 50 mg in am, 25 mg in pm    New Medications: Apixaban 2.5 mg twice daily (blood thinner)   This was sent to your pharmacy  IF YOU START THE APIXABAN, STOP TAKING ASPIRIN    We encourage you to use My Chart as your primary form of communication if possible. If you need assistance in setting this up, please contact our office or ask at your follow up visit.     If you need a medication refill please contact your pharmacy. Please allow at least 3 business days for your refill to be completed.       Cardiology  Telephone Number    Lila Luo [phone]   Or send a message to your provider via my chart.   For scheduling procedures:    [facility]    Clinic appointments       [phone]   For the Device Clinic (Pacemakers and ICD's)   RN's :   Stacia Ga  During business hours: [phone]    After business hours:   [phone]- select option 4 and ask for job code [id number].          As always, Thank you for trusting us with your health care needs!

## 2018-08-21 NOTE — MR AVS SNAPSHOT
After Visit Summary   8/21/2018    Isadora Mendez    MRN: [id number]           Patient Information     Date Of Birth          3/9/1931        Visit Information        Provider Department      8/21/2018 9:00 AM Dale Lazo MD [facility]        Today's Diagnoses     Essential hypertension          Care Instructions    You will be scheduled for a follow up visit: 1 year with Dr Lazo and pacemaker clinic    Medication changes: Increase metoprolol to 50 mg in am, 25 mg in pm    New Medications: Apixaban 2.5 mg twice daily (blood thinner)   This was sent to your pharmacy  IF YOU START THE APIXABAN, STOP TAKING ASPIRIN    We encourage you to use My Chart as your primary form of communication if possible. If you need assistance in setting this up, please contact our office or ask at your follow up visit.     If you need a medication refill please contact your pharmacy. Please allow at least 3 business days for your refill to be completed.       Cardiology  Telephone Number    Lila Luo [phone]   Or send a message to your provider via my chart.   [facility] scheduling procedures:    [facility]    Clinic appointments       [phone]   For the Device Clinic (Pacemakers and ICD's)   RN's :   Stacia Ga  During business hours: [phone]    After business hours:   [phone]- select option 4 and ask for job code [id number].          As always, Thank you for trusting us with your health care needs!          Follow-ups after your visit        Who to contact     If you have questions or need follow up information about today's clinic visit or your schedule please contact [facility] directly at [phone].  Normal or non-critical lab and imaging results will be communicated to you by MyChart, letter or phone within 4 business days after the clinic has received the results. If you do not hear from us within 7 days, please contact the clinic through  "Hot Dothart or phone. If you have a critical or abnormal lab result, we will notify you by phone as soon as possible.  Submit refill requests through Bevy or call your pharmacy and they will forward the refill request to us. Please allow 3 business days for your refill to be completed.          Additional Information About Your Visit        Hot D[employer]art Information     Bevy lets you send messages to your doctor, view your test results, renew your prescriptions, schedule appointments and more. To sign up, go to [email or web address] . Click on \"Log in\" on the left side of the screen, which will take you to the Welcome page. Then click on \"Sign up Now\" on the right side of the page.     You will be asked to enter the access code listed below, as well as some personal information. Please follow the directions to create your username and password.     Your access code is: [email or web address]  Expires: 10/23/2018  4:21 PM     Your access code will  in 90 days. If you need help or a new code, please call your [place] clinic or [phone].        Care EveryWhere ID     This is your Care EveryWhere ID. This could be used by other organizations to access your [facility] medical records  [id number]        Your Vitals Were     Pulse Height Pulse Oximetry BMI (Body Mass Index)          76 1.588 m (5' 2.5\") 97% 22.79 kg/m2         Blood Pressure from Last 3 Encounters:   18 125/79   17 150/67   17 125/65    Weight from Last 3 Encounters:   18 57.4 kg (126 lb 9.6 oz)   17 56.7 kg (124 lb 14.4 oz)   17 57.2 kg (126 lb 3.2 oz)              Today, you had the following     No orders found for display         Today's Medication Changes          These changes are accurate as of 18  9:43 AM.  If you have any questions, ask your nurse or doctor.               Start taking these medicines.        Dose/Directions    apixaban ANTICOAGULANT 2.5 MG tablet   Commonly known as:  ELIQUIS   Used for:  " Essential hypertension   Started by:  Dale Lazo MD        Dose:  2.5 mg   Take 1 tablet (2.5 mg) by mouth 2 times daily   Quantity:  60 tablet   Refills:  11         These medicines have changed or have updated prescriptions.        Dose/Directions    metoprolol succinate 50 MG 24 hr tablet   Commonly known as:  TOPROL-XL   This may have changed:    - how much to take  - how to take this  - when to take this  - additional instructions  - Another medication with the same name was removed. Continue taking this medication, and follow the directions you see here.   Used for:  Essential hypertension   Changed by:  Dale Lazo MD        50 mg in am, 25 mg in pm   Quantity:  135 tablet   Refills:  3            Where to get your medicines      These medications were sent to [facility] - [place], [place] - [address] [facility] [address]  [address], [facility] [address]    Hours:  TRANSPLANT PHONE NUMBER [phone] Phone:  [phone]     apixaban ANTICOAGULANT 2.5 MG tablet    metoprolol succinate 50 MG 24 hr tablet                Primary Care Provider Office Phone # Fax #    Jovana June Macias [phone]       [address] [facility]  [facility] [address]        Equal Access to Services     DWAYNE VAUGHAN : Whit hickso Sohusam, waaxda luqadaha, qaybta kaalmada adeegyada, nela valenzuela. So [facility] [phone].    ATENCIÓN: Si habla español, tiene a jason disposición servicios gratuitos de asistencia lingüística. Llame al [phone].    We comply with applicable federal civil rights laws and [place] laws. We do not discriminate on the basis of race, color, national origin, age, disability, sex, sexual orientation, or gender identity.            Thank you!     Thank you for choosing [facility]  for your care. Our goal is always to provide you with excellent care. Hearing back from our patients is one way  we can continue to improve our services. Please take a few minutes to complete the written survey that you may receive in the mail after your visit with us. Thank you!             Your Updated Medication List - Protect others around you: Learn how to safely use, store and throw away your medicines at www.disposemymeds.org.          This list is accurate as of 8/21/18  9:43 AM.  Always use your most recent med list.                   Brand Name Dispense Instructions for use Diagnosis    apixaban ANTICOAGULANT 2.5 MG tablet    ELIQUIS    60 tablet    Take 1 tablet (2.5 mg) by mouth 2 times daily    Essential hypertension       aspirin 81 MG tablet      Take 1 tablet by mouth daily.        BIOTIN PO      Take 1 capsule by mouth daily.    Hypertension, New onset atrial fibrillation (H), Hyperlipidemia LDL goal <100       CALCIUM 500 + D PO      Take 1 tablet by mouth 2 times daily.        coenzyme Q-10 100 MG Tabs           COLLAGEN PO      Take 1 capsule by mouth 2 times daily.        hydrochlorothiazide 12.5 MG capsule    MICROZIDE    90 capsule    Take 1 capsule (12.5 mg) by mouth daily    Essential hypertension       lisinopril 10 MG tablet    PRINIVIL/ZESTRIL    90 tablet    Take 1 tablet (10 mg) by mouth daily    Essential hypertension       MAGNESIUM PO      one daily        metoprolol succinate 50 MG 24 hr tablet    TOPROL-XL    135 tablet    50 mg in am, 25 mg in pm    Essential hypertension       * NEW MED      1 tablet daily Nattokinase, from fermented soybean .    Hypertension, New onset atrial fibrillation (H)       * NEW MED      Cumin - capsule daily Vitor - capsule daily        * NEW MED      Mineral drops        potassium chloride 20 MEQ/15ML (10%) solution    KAYCIEL          PREMARIN cream   Generic drug:  conjugated estrogens      Place vaginally twice a week        VITAMIN B-6 PO      Take 1 tablet by mouth daily    Hypertension, Hyperlipidemia LDL goal <100       VITAMIN B12 PO      Take 1 tablet by  mouth daily.        VITAMIN C PO           * Notice:  This list has 3 medication(s) that are the same as other medications prescribed for you. Read the directions carefully, and ask your doctor or other care provider to review them with you.

## 2018-08-21 NOTE — NURSING NOTE
Chief Complaint   Patient presents with     Follow Up For     [place], afib, chest pain, htn, PPM-dual (2016- sliding scale)     Vitals were taken and medications were reconciled.    Clarice Felipe MA    9:11 AM

## 2018-08-21 NOTE — LETTER
8/21/2018      RE: Isadora Mendez  [address] [address]       Dear Colleague,    Thank you for the opportunity to participate in the care of your patient, Isadora Mendez, at the [facility] HEART [facility] at [facility]. Please see a copy of my visit note below.    HPI:  Isadora Mendez returns for follow-up. I last saw her in September 2016 when I replaced her pacemaker generator. She is an 87 year old woman with symptomatic sinus node dysfunction for which she had a dual chamber PPM implanted with resolution of her symptoms. She has seen Ann Hoffman in follow-up, most recently in November 2017 at which time her PPM check showed 2+ hours of AF. She is known to have asymptomatic PAF detected on her PPM. She has been declining anticoagulation. [id number] = 4 (age > 75, htn, female). Remote PPM check in July showed up to 1 hour AF.   She is on ASA 81 mg daily, metoprolol succinate was listed twice in her medication list.  She reports that she has been taking 25 mg twice daily.  At this time, she reports feeling well from a cardiac standpoint.  She denies chest pain, syncope, lightheadedness or palpitations.  She checks her blood pressure at home occasionally.  She shows me a list of pressures that is not unlike her clinic pressures.  They are either high (systolics 150s-160s) or in the 120s.  When she sees a high blood pressure she will take an extra 10 mg dose of lisinopril.      PAST MEDICAL HISTORY:  Past Medical History:   Diagnosis Date     Atrial fibrillation (H) 2011     Facial fracture (H) 2006     Hypertension      NSVT (nonsustained ventricular tachycardia) (H) 2009    during exercise echo, neg EP study     Pacemaker 7-1-2010     S/P cardiac catheterization 2009    30-40% non obstructive lesion     Ventricular tachycardia, nonsustained (H) 2009    during stress echo       CURRENT MEDICATIONS:  Current Outpatient Prescriptions   Medication Sig Dispense Refill      Ascorbic Acid (VITAMIN C PO)        aspirin 81 MG tablet Take 1 tablet by mouth daily.       BIOTIN PO Take 1 capsule by mouth daily.       Calcium Carbonate-Vitamin D (CALCIUM 500 + D PO) Take 1 tablet by mouth 2 times daily.       coenzyme Q-10 100 MG TABS        Cyanocobalamin (VITAMIN B12 PO) Take 1 tablet by mouth daily.       hydrochlorothiazide (MICROZIDE) 12.5 MG capsule Take 1 capsule (12.5 mg) by mouth daily 90 capsule 3     lisinopril (PRINIVIL/ZESTRIL) 10 MG tablet Take 1 tablet (10 mg) by mouth daily 90 tablet 3     MAGNESIUM PO one daily       metoprolol (TOPROL-XL) 50 MG 24 hr tablet Take 0.5 tablets (25 mg) by mouth 2 times daily 90 tablet 3     NEW MED Cumin - capsule daily  Vitor - capsule daily       NEW MED Mineral drops       NEW MED 1 tablet daily Nattokinase, from fermented soybean .        potassium chloride (KAYCIEL) 20 MEQ/15ML (10%) solution        Pyridoxine HCl (VITAMIN B-6 PO) Take 1 tablet by mouth daily       COLLAGEN PO Take 1 capsule by mouth 2 times daily.       conjugated estrogens (PREMARIN) cream Place vaginally twice a week       metoprolol (TOPROL-XL) 50 MG 24 hr tablet Take 1 tablet (50 mg) by mouth daily (Patient not taking: Reported on 2018) 90 tablet 3       PAST SURGICAL HISTORY:  Past Surgical History:   Procedure Laterality Date     IMPLANT PACEMAKER       PPM  2010    Permanent Pacemaker       ALLERGIES:   No Known Allergies    FAMILY HISTORY:  Family History   Problem Relation Age of Onset     Cardiovascular Father 76      age 80, MI 76 and CHF 80's     Cardiovascular Paternal Grandfather       age 76 of CVD     Myocardial Infarction Mother 88     lived to 100       SOCIAL HISTORY:  Social History   Substance Use Topics     Smoking status: Never Smoker     Smokeless tobacco: Never Used     Alcohol use No       ROS:   Constitutional: No fever, chills, or sweats. Weight stable.   ENT: No visual disturbance, ear ache, epistaxis, sore throat.  "  Cardiovascular: As per HPI.   Respiratory: No cough, hemoptysis.    GI: No nausea, vomiting, hematemesis, melena, or hematochezia.   : No hematuria.   Integument: Negative.   Psychiatric: Negative.   Hematologic:  Easy bruising, no easy bleeding.  Neuro: Negative.   Endocrinology: No significant heat or cold intolerance   Musculoskeletal: No myalgia.    Exam:  /79 (BP Location: Left arm, Patient Position: Chair, Cuff Size: Adult Regular)  Pulse 76  Ht 1.588 m (5' 2.5\")  Wt 57.4 kg (126 lb 9.6 oz)  SpO2 97%  BMI 22.79 kg/m2  No acute distress.  Alert and oriented.  HEENT:  Normocephalic, atraumatic, sclera non-icteric, EOM intact, mucosa moist  Neck: No lymphadenopathy.  JVP 6 cm without HJR.  Cor: RRR. Normal S1 and S2.  No murmur, rub, or gallop.  PMI in Lf 5th ICS.  Lungs:  Clear  Abd: Soft, nontender, bowel sounds present.  No hepatosplenomegaly..  Extremities: No C/C/E.      Labs:  CBC RESULTS:   Lab Results   Component Value Date    WBC 6.5 12/01/2017    RBC 3.99 12/01/2017    HGB 12.0 12/01/2017    HCT 37.7 12/01/2017    MCV 95 12/01/2017    MCH 30.1 12/01/2017    MCHC 31.8 12/01/2017    RDW 13.7 12/01/2017     12/01/2017       BMP RESULTS:  Lab Results   Component Value Date     11/13/2017    POTASSIUM 4.5 11/13/2017    CHLORIDE 106 11/13/2017    CO2 26 11/13/2017    ANIONGAP 5 11/13/2017     (H) 11/13/2017    BUN 20 11/13/2017    CR 0.91 11/13/2017    GFRESTIMATED 59 (L) 11/13/2017    GFRESTBLACK 71 11/13/2017    INO 9.4 11/13/2017        INR RESULTS:  Lab Results   Component Value Date    INR 1.13 09/14/2016    INR 1.04 09/03/2013    INR 1.01 10/29/2012    INR 1.00 08/21/2012       Procedures:  Echocardiogram:   Recent Results (from the past 8760 hour(s))   Echocardiogram Complete    Narrative    [id number]  [facility]  [id number]  [id number]^LEELA^PRASHANTH^[facility]  Diagnostic and Treamtent-3rd Floor  [address] [facility]" [place] [address]     Name: WOLF MACIAS  MRN: [id number]  : 1931  Study Date: 2017 10:45 AM  Age: 86 yrs  Gender: Female  [facility] [place] [facility]  Reason For Study: , Atypical chest pain  Ordering Physician: PRASHANTH SWENSON  Referring Physician: PRASHANTH SWENSON  Performed By: Nimisha Gunter RDCS     BSA: 1.6 m2  Height: 63 in  Weight: 130 lb  HR: 82  BP: 174/84 mmHg  _____________________________________________________________________________  __        Procedure  Echocardiogram with two-dimensional, color and spectral Doppler performed.  _____________________________________________________________________________  __        Interpretation Summary  Left ventricular function, chamber size, wall motion, and wall thickness are  normal.The EF is 60-65%. Normal left ventricular filling for age. No regional  wall motion abnormalities are seen.  Right ventricular function, chamber size, wall motion, and thickness are  normal.  Mild to moderate mitral annular calcification is present. Mild mitral  insufficiency is present. Mitral leaflet thickness is increased .  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  Mild (pulmonary artery systolic pressure<50mmHg) pulmonary hypertension is  present.  Estimated mean right atrial pressure is 15 mmHg.  No pericardial effusion is present.  _____________________________________________________________________________  __        Left Ventricle  Left ventricular function, chamber size, wall motion, and wall thickness are  normal.The EF is 60-65%. Normal left ventricular filling for age. No regional  wall motion abnormalities are seen.     Right Ventricle  Right ventricular function, chamber size, wall motion, and thickness are  normal. A pacemaker lead is noted in the right ventricle.     Atria  Severe biatrial enlargement is present. A pacemaker  lead is noted in the right  atrium.     Mitral Valve  Mild to moderate mitral annular calcification is present. Prolapse of the  posterior mitral valve leaflet noted. Mitral leaflet thickness is increased .  Mild mitral insufficiency is present.        Aortic Valve  Aortic valve is normal in structure and function. The aortic valve is  tricuspid. Trace aortic insufficiency is present.     Tricuspid Valve  The tricuspid valve is normal. Moderate to severe tricuspid insufficiency is  present. The right ventricular systolic pressure is approximated at 35.2 mmHg  plus the right atrial pressure. Mild (pulmonary artery systolic  pressure<50mmHg) pulmonary hypertension is present.     Pulmonic Valve  The pulmonic valve is normal. Trace pulmonic insufficiency is present.     Vessels  The aorta root is normal. Dilation of the inferior vena cava is present with  abnormal respiratory variation in diameter. Estimated mean right atrial  pressure is 15 mmHg.     Pericardium  No pericardial effusion is present.        Compared to Previous Study  There has been no change.  _____________________________________________________________________________  __  MMode/2D Measurements & Calculations  IVSd: 0.91 cm     LVIDd: 4.0 cm  LVIDs: 2.3 cm  LVPWd: 0.99 cm  FS: 43.0 %  EDV(Teich): 68.3 ml  ESV(Teich): 17.2 ml  LV mass(C)d: 116.3 grams  LV mass(C)dI: 72.2 grams/m2  Ao root diam: 2.9 cm  LA dimension: 4.0 cm  asc Aorta Diam: 3.1 cm  LA/Ao: 1.4  LVOT diam: 1.9 cm  LVOT area: 2.7 cm2  LA Volume (BP): 91.6 ml  LA Volume Index (BP): 56.9 ml/m2  RWT: 0.50           Doppler Measurements & Calculations  MV E max sherley: 94.3 cm/sec  MV A max sherley: 112.7 cm/sec  MV E/A: 0.84  MV dec time: 0.16 sec  Ao V2 max: 109.0 cm/sec  Ao max P.0 mmHg  AMRIK(V,D): 2.4 cm2  LV V1 max PG: 3.7 mmHg  LV V1 max: 95.5 cm/sec  LV V1 VTI: 23.5 cm  CO(LVOT): 4.0 l/min  CI(LVOT): 2.5 l/min/m2  SV(LVOT): 63.4 ml  SI(LVOT): 39.4 ml/m2  PI max sherley: 203.6 cm/sec  TR max  noe: 296.6 cm/sec  TR max P.2 mmHg  Pulm Sys Noe: 85.3 cm/sec  Pulm Olea Noe: 57.0 cm/sec  Pulm S/D: 1.5  E/E' av.3  Lateral E/e': 11.4  Medial E/e': 11.3        _____________________________________________________________________________  __        Report approved by: WAYNE Nelson 2017 12:53 PM          Assessment and Plan:  Isadora Mendez is an 87 year old woman with symptomatic sinus node dysfunction for which she had a dual chamber PPM implanted with resolution of her symptoms.  She has hypertension and her pacemaker has documented PAF lasting hours at a time.. Her [id number] = 4 (age > 75, htn, female).  We have discussed anticoagulation in the past and she has declined.  We discussed this again and I gave her more recent data on NOACs.  I also informed her that we now have reversal agents for all of the NOACs.  She is vacillating but said she would consider it and asked that we write a prescription for her.  We are providing her with a prescription for apixaban 2.5 mg (every 12 hours she is 87 years old and weighs less than 60 kg).  If she decides to start apixaban she will stop aspirin.  Her blood pressure control could be better.  I have asked her to increase her metoprolol to 50 mg in the morning and 25 mg at night.  If this is ineffective or she has side effects from the beta blockers we can try going back to 25 mg twice daily of metoprolol and increasing her lisinopril from 10-20 mg daily.  She has a pacemaker evaluations scheduled in the next 2 months.  She is planning a follow-up visit in the heart disease prevention clinic in the next 2 months.  In the electrophysiology clinic unless she has problems sooner, she will return for follow-up in 1 year.  It was a pleasure seeing Isadora Mendez today.    Sincerely,     MD PHIL Burt SCOTT

## 2018-08-21 NOTE — PROGRESS NOTES
HPI:  Isadora Mendez returns for follow-up. I last saw her in September 2016 when I replaced her pacemaker generator. She is an 87 year old woman with symptomatic sinus node dysfunction for which she had a dual chamber PPM implanted with resolution of her symptoms. She has seen Ann Hoffman in follow-up, most recently in November 2017 at which time her PPM check showed 2+ hours of AF. She is known to have asymptomatic PAF detected on her PPM. She has been declining anticoagulation. [id number] = 4 (age > 75, htn, female). Remote PPM check in July showed up to 1 hour AF.   She is on ASA 81 mg daily, metoprolol succinate was listed twice in her medication list.  She reports that she has been taking 25 mg twice daily.  At this time, she reports feeling well from a cardiac standpoint.  She denies chest pain, syncope, lightheadedness or palpitations.  She checks her blood pressure at home occasionally.  She shows me a list of pressures that is not unlike her clinic pressures.  They are either high (systolics 150s-160s) or in the 120s.  When she sees a high blood pressure she will take an extra 10 mg dose of lisinopril.      PAST MEDICAL HISTORY:  Past Medical History:   Diagnosis Date     Atrial fibrillation (H) 2011     Facial fracture (H) 2006     Hypertension      NSVT (nonsustained ventricular tachycardia) (H) 2009    during exercise echo, neg EP study     Pacemaker 7-1-2010     S/P cardiac catheterization 2009    30-40% non obstructive lesion     Ventricular tachycardia, nonsustained (H) 2009    during stress echo       CURRENT MEDICATIONS:  Current Outpatient Prescriptions   Medication Sig Dispense Refill     Ascorbic Acid (VITAMIN C PO)        aspirin 81 MG tablet Take 1 tablet by mouth daily.       BIOTIN PO Take 1 capsule by mouth daily.       Calcium Carbonate-Vitamin D (CALCIUM 500 + D PO) Take 1 tablet by mouth 2 times daily.       coenzyme Q-10 100 MG TABS        Cyanocobalamin (VITAMIN B12 PO) Take 1 tablet  by mouth daily.       hydrochlorothiazide (MICROZIDE) 12.5 MG capsule Take 1 capsule (12.5 mg) by mouth daily 90 capsule 3     lisinopril (PRINIVIL/ZESTRIL) 10 MG tablet Take 1 tablet (10 mg) by mouth daily 90 tablet 3     MAGNESIUM PO one daily       metoprolol (TOPROL-XL) 50 MG 24 hr tablet Take 0.5 tablets (25 mg) by mouth 2 times daily 90 tablet 3     NEW MED Cumin - capsule daily  Vitor - capsule daily       NEW MED Mineral drops       NEW MED 1 tablet daily Nattokinase, from fermented soybean .        potassium chloride (KAYCIEL) 20 MEQ/15ML (10%) solution        Pyridoxine HCl (VITAMIN B-6 PO) Take 1 tablet by mouth daily       COLLAGEN PO Take 1 capsule by mouth 2 times daily.       conjugated estrogens (PREMARIN) cream Place vaginally twice a week       metoprolol (TOPROL-XL) 50 MG 24 hr tablet Take 1 tablet (50 mg) by mouth daily (Patient not taking: Reported on 2018) 90 tablet 3       PAST SURGICAL HISTORY:  Past Surgical History:   Procedure Laterality Date     IMPLANT PACEMAKER       PPM  2010    Permanent Pacemaker       ALLERGIES:   No Known Allergies    FAMILY HISTORY:  Family History   Problem Relation Age of Onset     Cardiovascular Father 76      age 80, MI 76 and CHF 80's     Cardiovascular Paternal Grandfather       age 76 of CVD     Myocardial Infarction Mother 88     lived to 100       SOCIAL HISTORY:  Social History   Substance Use Topics     Smoking status: Never Smoker     Smokeless tobacco: Never Used     Alcohol use No       ROS:   Constitutional: No fever, chills, or sweats. Weight stable.   ENT: No visual disturbance, ear ache, epistaxis, sore throat.   Cardiovascular: As per HPI.   Respiratory: No cough, hemoptysis.    GI: No nausea, vomiting, hematemesis, melena, or hematochezia.   : No hematuria.   Integument: Negative.   Psychiatric: Negative.   Hematologic:  Easy bruising, no easy bleeding.  Neuro: Negative.   Endocrinology: No significant heat or cold  "intolerance   Musculoskeletal: No myalgia.    Exam:  /79 (BP Location: Left arm, Patient Position: Chair, Cuff Size: Adult Regular)  Pulse 76  Ht 1.588 m (5' 2.5\")  Wt 57.4 kg (126 lb 9.6 oz)  SpO2 97%  BMI 22.79 kg/m2  No acute distress.  Alert and oriented.  HEENT:  Normocephalic, atraumatic, sclera non-icteric, EOM intact, mucosa moist  Neck: No lymphadenopathy.  JVP 6 cm without HJR.  Cor: RRR. Normal S1 and S2.  No murmur, rub, or gallop.  PMI in Lf 5th ICS.  Lungs:  Clear  Abd: Soft, nontender, bowel sounds present.  No hepatosplenomegaly..  Extremities: No C/C/E.      Labs:  CBC RESULTS:   Lab Results   Component Value Date    WBC 6.5 2017    RBC 3.99 2017    HGB 12.0 2017    HCT 37.7 2017    MCV 95 2017    MCH 30.1 2017    MCHC 31.8 2017    RDW 13.7 2017     2017       BMP RESULTS:  Lab Results   Component Value Date     2017    POTASSIUM 4.5 2017    CHLORIDE 106 2017    CO2 26 2017    ANIONGAP 5 2017     (H) 2017    BUN 20 2017    CR 0.91 2017    GFRESTIMATED 59 (L) 2017    GFRESTBLACK 71 2017    INO 9.4 2017        INR RESULTS:  Lab Results   Component Value Date    INR 1.13 2016    INR 1.04 2013    INR 1.01 10/29/2012    INR 1.00 2012       Procedures:  Echocardiogram:   Recent Results (from the past 8760 hour(s))   Echocardiogram Complete    Narrative    [id number]  [id number]  [id number]  [id number]^LEELA^PRASHANTH^[facility]  Diagnostic and Treamtent-3rd Floor  [address] [place] [address]     Name: WOLF MACIAS  MRN: [id number]  : 1931  Study Date: 2017 10:45 AM  Age: 86 yrs  Gender: Female  [place] [facility]  Reason For Study: , Atypical chest pain  Ordering Physician: PRASHANTH SWENSON  Referring Physician: PRASHANTH SWENSON  Performed By: " Nimisha Gunter RDCS     BSA: 1.6 m2  Height: 63 in  Weight: 130 lb  HR: 82  BP: 174/84 mmHg  _____________________________________________________________________________  __        Procedure  Echocardiogram with two-dimensional, color and spectral Doppler performed.  _____________________________________________________________________________  __        Interpretation Summary  Left ventricular function, chamber size, wall motion, and wall thickness are  normal.The EF is 60-65%. Normal left ventricular filling for age. No regional  wall motion abnormalities are seen.  Right ventricular function, chamber size, wall motion, and thickness are  normal.  Mild to moderate mitral annular calcification is present. Mild mitral  insufficiency is present. Mitral leaflet thickness is increased .  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  Mild (pulmonary artery systolic pressure<50mmHg) pulmonary hypertension is  present.  Estimated mean right atrial pressure is 15 mmHg.  No pericardial effusion is present.  _____________________________________________________________________________  __        Left Ventricle  Left ventricular function, chamber size, wall motion, and wall thickness are  normal.The EF is 60-65%. Normal left ventricular filling for age. No regional  wall motion abnormalities are seen.     Right Ventricle  Right ventricular function, chamber size, wall motion, and thickness are  normal. A pacemaker lead is noted in the right ventricle.     Atria  Severe biatrial enlargement is present. A pacemaker lead is noted in the right  atrium.     Mitral Valve  Mild to moderate mitral annular calcification is present. Prolapse of the  posterior mitral valve leaflet noted. Mitral leaflet thickness is increased .  Mild mitral insufficiency is present.        Aortic Valve  Aortic valve is normal in structure and  function. The aortic valve is  tricuspid. Trace aortic insufficiency is present.     Tricuspid Valve  The tricuspid valve is normal. Moderate to severe tricuspid insufficiency is  present. The right ventricular systolic pressure is approximated at 35.2 mmHg  plus the right atrial pressure. Mild (pulmonary artery systolic  pressure<50mmHg) pulmonary hypertension is present.     Pulmonic Valve  The pulmonic valve is normal. Trace pulmonic insufficiency is present.     Vessels  The aorta root is normal. Dilation of the inferior vena cava is present with  abnormal respiratory variation in diameter. Estimated mean right atrial  pressure is 15 mmHg.     Pericardium  No pericardial effusion is present.        Compared to Previous Study  There has been no change.  _____________________________________________________________________________  __  MMode/2D Measurements & Calculations  IVSd: 0.91 cm     LVIDd: 4.0 cm  LVIDs: 2.3 cm  LVPWd: 0.99 cm  FS: 43.0 %  EDV(Teich): 68.3 ml  ESV(Teich): 17.2 ml  LV mass(C)d: 116.3 grams  LV mass(C)dI: 72.2 grams/m2  Ao root diam: 2.9 cm  LA dimension: 4.0 cm  asc Aorta Diam: 3.1 cm  LA/Ao: 1.4  LVOT diam: 1.9 cm  LVOT area: 2.7 cm2  LA Volume (BP): 91.6 ml  LA Volume Index (BP): 56.9 ml/m2  RWT: 0.50           Doppler Measurements & Calculations  MV E max noe: 94.3 cm/sec  MV A max noe: 112.7 cm/sec  MV E/A: 0.84  MV dec time: 0.16 sec  Ao V2 max: 109.0 cm/sec  Ao max P.0 mmHg  AMRIK(V,D): 2.4 cm2  LV V1 max PG: 3.7 mmHg  LV V1 max: 95.5 cm/sec  LV V1 VTI: 23.5 cm  CO(LVOT): 4.0 l/min  CI(LVOT): 2.5 l/min/m2  SV(LVOT): 63.4 ml  SI(LVOT): 39.4 ml/m2  PI max noe: 203.6 cm/sec  TR max noe: 296.6 cm/sec  TR max P.2 mmHg  Pulm Sys Noe: 85.3 cm/sec  Pulm Olea Noe: 57.0 cm/sec  Pulm S/D: 1.5  E/E' av.3  Lateral E/e': 11.4  Medial E/e': 11.3        _____________________________________________________________________________  __        Report approved by: WAYNE Nelson 2017  12:53 PM          Assessment and Plan:  Isadora Mendez is an 87 year old woman with symptomatic sinus node dysfunction for which she had a dual chamber PPM implanted with resolution of her symptoms.  She has hypertension and her pacemaker has documented PAF lasting hours at a time.. Her [id number] = 4 (age > 75, htn, female).  We have discussed anticoagulation in the past and she has declined.  We discussed this again and I gave her more recent data on NOACs.  I also informed her that we now have reversal agents for all of the NOACs.  She is vacillating but said she would consider it and asked that we write a prescription for her.  We are providing her with a prescription for apixaban 2.5 mg (every 12 hours she is 87 years old and weighs less than 60 kg).  If she decides to start apixaban she will stop aspirin.  Her blood pressure control could be better.  I have asked her to increase her metoprolol to 50 mg in the morning and 25 mg at night.  If this is ineffective or she has side effects from the beta blockers we can try going back to 25 mg twice daily of metoprolol and increasing her lisinopril from 10-20 mg daily.  She has a pacemaker evaluations scheduled in the next 2 months.  She is planning a follow-up visit in the heart disease prevention clinic in the next 2 months.  In the electrophysiology clinic unless she has problems sooner, she will return for follow-up in 1 year.  It was a pleasure seeing Isadora Mendez today.  ALOK Levy

## 2018-10-29 DIAGNOSIS — I10 ESSENTIAL HYPERTENSION: Primary | ICD-10-CM

## 2018-10-29 DIAGNOSIS — R06.02 SOB (SHORTNESS OF BREATH): ICD-10-CM

## 2018-10-29 DIAGNOSIS — E78.5 HYPERLIPIDEMIA LDL GOAL <100: ICD-10-CM

## 2018-11-28 ENCOUNTER — ALLIED HEALTH/NURSE VISIT (OUTPATIENT)
Dept: CARDIOLOGY | Facility: CLINIC | Age: 83
End: 2018-11-28
Attending: INTERNAL MEDICINE
Payer: MEDICARE

## 2018-11-28 DIAGNOSIS — I44.30 ATRIOVENTRICULAR BLOCK, INCOMPLETE: Primary | ICD-10-CM

## 2018-11-28 PROCEDURE — 93294 REM INTERROG EVL PM/LDLS PM: CPT | Performed by: INTERNAL MEDICINE

## 2018-11-28 PROCEDURE — 93296 REM INTERROG EVL PM/IDS: CPT | Mod: ZF

## 2018-11-28 NOTE — MR AVS SNAPSHOT
After Visit Summary   11/28/2018    Isadora Mendez    MRN: [id number]           Patient Information     Date Of Birth          3/9/1931        Visit Information        Provider Department      11/28/2018 6:00 AM [facility]        Today's Diagnoses     Atrioventricular block, incomplete    -  1       Follow-ups after your visit        Your next 10 appointments already scheduled     Dec 14, 2018  8:30 AM CST   Lab with [facility]    Health Lab ([facility])    [address]  1st Floor  [facility] [id number] [phone]            Dec 14, 2018  9:00 AM CST   (Arrive by 8:45 AM)   RETURN [facility] PREVENTATIVE VISIT with IBRAHIMA Brown CNP   [facility] ([facility])    [address]  5th Floor  [facility] [id number] [phone]           Please arrive 15 minutes prior to your appointment to obtain your laboratory samples. The abdominal ultrasound and laboratory tests require that you fast (no food, water only) for 12 hours and no alcohol for 24 hours prior to having your blood drawn.  You may take your medications with water. You are welcome to bring a snack to have after your laboratory sample has been obtained.  Please wear comfortable clothing and shoes for walking. We will check your blood pressure during a brief activity test using a treadmill. This is not a stress test.   You may find it more convenient not to wear panty hose since we will need to apply EKG stickers to the lower legs.  A urine sample will be needed when you arrive at the clinic.  Your appointment will include a photo of the back of your eye. Although we do not need to dilate your eyes, you may need to remove contact lenses for this test. Please bring any equipment you need to remove your contacts or wear glasses.  Except for having blood drawn, all of the diagnostic tests are conducted on the  "surface of the body and involve little or no discomfort or risk to your health.  We look forward to seeing you at your upcoming appointment. If you have additional questions please contact Nohemy Hicks at [phone]              Who to contact     If you have questions or need follow up information about today's clinic visit or your schedule please contact [facility] directly at [phone].  Normal or non-critical lab and imaging results will be communicated to you by MyChart, letter or phone within 4 business days after the clinic has received the results. If you do not hear from us within 7 days, please contact the clinic through [facility] or phone. If you have a critical or abnormal lab result, we will notify you by phone as soon as possible.  Submit refill requests through [facility] or call your pharmacy and they will forward the refill request to us. Please allow 3 business days for your refill to be completed.          Additional Information About Your Visit        [facility] Information     [employer] [employer] lets you send messages to your doctor, view your test results, renew your prescriptions, schedule appointments and more. To sign up, go to [email or web address] . Click on \"Log in\" on the left side of the screen, which will take you to the Welcome page. Then click on \"Sign up Now\" on the right side of the page.     You will be asked to enter the access code listed below, as well as some personal information. Please follow the directions to create your username and password.     Your access code is: [email or web address]  Expires: 2019  5:30 AM     Your access code will  in 90 days. If you need help or a new code, please call your [facility] clinic or [phone].        Care EveryWhere ID     This is your Care EveryWhere ID. This could be used by other organizations to access your [facility] medical records  [id number]         Blood Pressure from Last 3 Encounters:   18 125/79   17 150/67 "   12/01/17 125/65    Weight from Last 3 Encounters:   08/21/18 57.4 kg (126 lb 9.6 oz)   12/18/17 56.7 kg (124 lb 14.4 oz)   12/01/17 57.2 kg (126 lb 3.2 oz)              We Performed the Following     (93001)INTERROGATION DEVICE EVAL REMOTE, PACER/ICD        Primary Care Provider Office Phone # Fax #    Jovana June Macias [phone]       [address] [place] [address] [address] [place] [id number]        Equal Access to Services     [facility]: Hadii aad ku hadasho Soomaali, waaxda luqadaha, qaybta kaalmada adeegyada, nela rodrigues haygene osorio . So [facility] [phone].    ATENCIÓN: Si habla español, tiene a jason disposición servicios gratuitos de asistencia lingüística. [facility] [phone].    We comply with applicable federal civil rights laws and [place] laws. We do not discriminate on the basis of race, color, national origin, age, disability, sex, sexual orientation, or gender identity.            Thank you!     Thank you for choosing [facility]  for your care. Our goal is always to provide you with excellent care. Hearing back from our patients is one way we can continue to improve our services. Please take a few minutes to complete the written survey that you may receive in the mail after your visit with us. Thank you!             Your Updated Medication List - Protect others around you: Learn how to safely use, store and throw away your medicines at www.disposemymeds.org.          This list is accurate as of 11/28/18 11:59 PM.  Always use your most recent med list.                   Brand Name Dispense Instructions for use Diagnosis    apixaban ANTICOAGULANT 2.5 MG tablet    ELIQUIS    60 tablet    Take 1 tablet (2.5 mg) by mouth 2 times daily    Essential hypertension       aspirin 81 MG tablet    ASA     Take 1 tablet by mouth daily.        BIOTIN PO      Take 1 capsule by mouth daily.    Hypertension, New onset atrial fibrillation (H), Hyperlipidemia LDL goal <100        CALCIUM 500 + D PO      Take 1 tablet by mouth 2 times daily.        coenzyme Q-10 100 MG Tabs           COLLAGEN PO      Take 1 capsule by mouth 2 times daily.        hydrochlorothiazide 12.5 MG capsule    MICROZIDE    90 capsule    Take 1 capsule (12.5 mg) by mouth daily    Essential hypertension       lisinopril 10 MG tablet    PRINIVIL/ZESTRIL    90 tablet    Take 1 tablet (10 mg) by mouth daily    Essential hypertension       MAGNESIUM PO      one daily        metoprolol succinate ER 50 MG 24 hr tablet    TOPROL-XL    135 tablet    50 mg in am, 25 mg in pm    Essential hypertension       * NEW MED      1 tablet daily Nattokinase, from fermented soybean .    Hypertension, New onset atrial fibrillation (H)       * NEW MED      Cumin - capsule daily Vitor - capsule daily        * NEW MED      Mineral drops        potassium chloride 20 MEQ/15ML (10%) solution    KAYCIEL          PREMARIN 0.625 MG/GM vaginal cream   Generic drug:  conjugated estrogens      Place vaginally twice a week        VITAMIN B-6 PO      Take 1 tablet by mouth daily    Hypertension, Hyperlipidemia LDL goal <100       VITAMIN B12 PO      Take 1 tablet by mouth daily.        VITAMIN C PO           * Notice:  This list has 3 medication(s) that are the same as other medications prescribed for you. Read the directions carefully, and ask your doctor or other care provider to review them with you.

## 2018-12-14 ENCOUNTER — OFFICE VISIT (OUTPATIENT)
Dept: CARDIOLOGY | Facility: CLINIC | Age: 83
End: 2018-12-14
Payer: COMMERCIAL

## 2018-12-14 VITALS
HEART RATE: 64 BPM | SYSTOLIC BLOOD PRESSURE: 120 MMHG | WEIGHT: 127 LBS | BODY MASS INDEX: 22.5 KG/M2 | DIASTOLIC BLOOD PRESSURE: 67 MMHG | HEIGHT: 63 IN | OXYGEN SATURATION: 98 %

## 2018-12-14 DIAGNOSIS — I10 ESSENTIAL HYPERTENSION: ICD-10-CM

## 2018-12-14 DIAGNOSIS — R06.02 SOB (SHORTNESS OF BREATH): ICD-10-CM

## 2018-12-14 DIAGNOSIS — E78.5 HYPERLIPIDEMIA LDL GOAL <100: ICD-10-CM

## 2018-12-14 DIAGNOSIS — I73.89 OTHER SPECIFIED PERIPHERAL VASCULAR DISEASES (H): ICD-10-CM

## 2018-12-14 DIAGNOSIS — I48.0 PAROXYSMAL A-FIB (H): ICD-10-CM

## 2018-12-14 DIAGNOSIS — I10 ESSENTIAL HYPERTENSION: Primary | ICD-10-CM

## 2018-12-14 LAB
ALBUMIN SERPL-MCNC: 3.4 G/DL (ref 3.4–5)
ALP SERPL-CCNC: 100 U/L (ref 40–150)
ALT SERPL W P-5'-P-CCNC: 25 U/L (ref 0–50)
ANION GAP SERPL CALCULATED.3IONS-SCNC: 8 MMOL/L (ref 3–14)
AST SERPL W P-5'-P-CCNC: 27 U/L (ref 0–45)
BILIRUB SERPL-MCNC: 0.4 MG/DL (ref 0.2–1.3)
BUN SERPL-MCNC: 21 MG/DL (ref 7–30)
CALCIUM SERPL-MCNC: 9.5 MG/DL (ref 8.5–10.1)
CHLORIDE SERPL-SCNC: 103 MMOL/L (ref 94–109)
CHOLEST SERPL-MCNC: 166 MG/DL
CO2 SERPL-SCNC: 28 MMOL/L (ref 20–32)
CREAT SERPL-MCNC: 0.83 MG/DL (ref 0.52–1.04)
CREAT UR-MCNC: 62 MG/DL
CRP SERPL HS-MCNC: 3.7 MG/L
FEF 25/75: NORMAL
FEV-1: NORMAL
FEV1/FVC: NORMAL
FVC: NORMAL
GFR SERPL CREATININE-BSD FRML MDRD: 65 ML/MIN/1.7M2
GLUCOSE SERPL-MCNC: 87 MG/DL (ref 70–99)
HDLC SERPL-MCNC: 78 MG/DL
LDLC SERPL CALC-MCNC: 79 MG/DL
MICROALBUMIN UR-MCNC: 16 MG/L
MICROALBUMIN/CREAT UR: 25.36 MG/G CR (ref 0–25)
NONHDLC SERPL-MCNC: 88 MG/DL
NT-PROBNP SERPL-MCNC: 419 PG/ML (ref 0–450)
POTASSIUM SERPL-SCNC: 4.1 MMOL/L (ref 3.4–5.3)
PROT SERPL-MCNC: 7.4 G/DL (ref 6.8–8.8)
SODIUM SERPL-SCNC: 139 MMOL/L (ref 133–144)
TRIGL SERPL-MCNC: 45 MG/DL

## 2018-12-14 RX ORDER — MULTIVIT-MIN/IRON/FOLIC ACID/K 18-600-40
1 CAPSULE ORAL DAILY
COMMUNITY
End: 2023-07-24

## 2018-12-14 ASSESSMENT — MIFFLIN-ST. JEOR: SCORE: 972.26

## 2018-12-14 NOTE — LETTER
12/14/2018      RE: Isadora Mendez  [address] [facility] [address]       Dear Colleague,    Thank you for the opportunity to participate in the care of your patient, Isadora Mendez, at the [facility]ARDIOVASC[facility] DISEASE PREVENTION at [facility]. Please see a copy of my visit note below.        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    Pain Evaluation  Current history of pain associated with this visit is: denied.     HPI   Isadora Mendez is a 87 year old year old female with a history of hypertension non obstructive CAD (30-40% in 2009) tachybrady arrythmias including VT, sinus pauses (SSS) and with pacer insertion 2010 and paraxysmal afib. She estimates she has afib 1-2 x per year lasting for a few hours.She can feel when she is in afib that her her heart rhythm has changed but tolerates this without chest pain or shorn fabien of breath.  She often will take an extra metoprolol which will convert her. She has noticed that these episode happen usually 4-5 am. We discussed that possibility that sleep apnea could be a trigger for these events and getting a sleep study to see if she has sleep apnea. She is a mouth breather. She will consider this recommendation.    She is not taking eliquis as she says she can no longer use IBF prn.  Will discuss with Dr. Lazo if she could use  this limited amount. She has siblings with afib who are taking NOAC and doing well so she is considering starting the eliquis. She sees a kinesiologist and has started a new supplement pycnogenol (from pine bark) that is suppose to be heart healthy.    Nutrition assessment per patient report:   Foods with fat/cholesterol (fried foods, fatty meats, junk food):  1 serving per week , more chicken, red meat now and then  Fruits and vegetables (  cup cooked, 1 cup raw):1-  2 servings per day  Caffeine (1 cup coffee, soda, etc):  1 serving per day tea caffeine, decafe coffee   Alcohol servings (12 oz. beer, 4 oz. wine, 1  oz. in mixed drink):  1 serving per week, fun Friday  Calcium servings (dairy foods, 8 oz. milk, yogurt, cheese, ice cream): 2- 1 serving per day  Salt/sodium use:  light use  Special dietary habits:  None    Activity  Patient is active 6 times per week for 60 or more minutes with .    Laboratory Results Review  We discussed laboratory results today including lipids targets and how foods influence cholesterol.    Weight  Her perceived healthy weight is her current weight.  A normal BMI of 25 is equal to 136 pounds.    The current BMI of 22 is normal weight range.   PMH   Past Medical History:   Diagnosis Date     Atrial fibrillation (H) 2011     Facial fracture (H) 2006     Hypertension      NSVT (nonsustained ventricular tachycardia) (H) 2009    during exercise echo, neg EP study     Pacemaker 7-1-2010     S/P cardiac catheterization 2009    30-40% non obstructive lesion     Ventricular tachycardia, nonsustained (H) 2009    during stress echo       PS  Past Surgical History:   Procedure Laterality Date     IMPLANT PACEMAKER       PPM  6/30/2010    Permanent Pacemaker       Current Meds   Current Outpatient Medications   Medication Sig Dispense Refill     apixaban ANTICOAGULANT (ELIQUIS) 2.5 MG tablet Take 1 tablet (2.5 mg) by mouth 2 times daily 60 tablet 11     Ascorbic Acid (VITAMIN C PO)         aspirin 81 MG tablet Take 1 tablet by mouth daily.       BIOTIN PO Take 1 capsule by mouth daily.       Calcium Carbonate-Vitamin D (CALCIUM 500 + D PO) Take 1 tablet by mouth 2 times daily.       coenzyme Q-10 100 MG TABS        COLLAGEN PO Take 1 capsule by mouth 2 times daily.       conjugated estrogens (PREMARIN) cream Place vaginally twice a week       Cyanocobalamin (VITAMIN B12 PO) Take 1 tablet by mouth daily.       hydrochlorothiazide (MICROZIDE) 12.5 MG capsule Take 1 capsule (12.5 mg) by mouth daily 90 capsule 3     lisinopril (PRINIVIL/ZESTRIL) 10 MG tablet Take 1 tablet (10 mg) by mouth daily 90 tablet 3     MAGNESIUM PO one daily       metoprolol succinate (TOPROL-XL) 50 MG 24 hr tablet 50 mg in am, 25 mg in pm 135 tablet 3     NEW MED Cumin - capsule daily  Vitor - capsule daily       NEW MED Mineral drops       NEW MED 1 tablet daily Nattokinase, from fermented soybean .        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 .  She is  with 6 grown children..       Tobacco History  History   Smoking Status     Never Smoker   Smokeless Tobacco     Never Used       ROS  CONSTITUTIONAL:  No fever, chills, or sweats. No weight gain/loss.   EENT:  No visual disturbance, ear ache, epistaxis, sore throat  ALLERGIES/IMMUNOLOGIC:  Negative  RESPIRATORY:  No cough, hemoptysis  CARDIOVASCULAR:  As per HPI  GI:  No nausea, vomiting, hematemesis, melena  :  No urinary frequency, dysuria, or hematuria  INTEGUMENT:  Negative  PSYCHIATRIC:  Negative  NEURO:   "Negative  ENDOCRINE:  Negative  MUSCULOSKELETAL:  Negative     Vital Signs   /67 (BP Location: Right arm, Patient Position: Sitting, Cuff Size: Adult Regular)   Pulse 64   Ht 1.588 m (5' 2.5\")   Wt 57.6 kg (127 lb)   SpO2 98%   BMI 22.86 kg/m         Waist: 32 inches  Hip: 41 inches    Physical Exam   In general, the patient is a pleasant female in no apparent distress.    HEENT: NC/AT.  PERRLA.  EOMI.  Sclerae white, not injected.  Nares clear.  Pharynx without erythema or exudate.  Dentition intact.    Neck: No adenopathy.  No thyromegaly.Carotids +4/4 bilaterally without bruits.  No jugular venous distension.   Lungs: CTA.  No ronchi, wheezes, rales.     Cor: RRR. Normal S1, S2 splits physiologically. Grade 1-2 RICS and LLSB murmur,  No rub, click, or gallop. The PMI is in the 5th ICS in the midclavicular line. There is no heave.   Abdomen: Soft, nontender, nondistended. No organomegaly.  No bruits.   Extremities: No clubbing, cyanosis, or edema. The pulses are +2/2 at the post-tibial sites bilaterally. No bruits are noted.    Recent Labs  Lab Results   Component Value Date    GLC 87 12/14/2018      Lab Results   Component Value Date    NTBNP 419 12/14/2018     No results found for: NTBNPI   Lab Results   Component Value Date    UCRR 54 08/26/2016      Lab Results   Component Value Date    MICROL 16 12/14/2018      No results found for: MICROALBUMIN   Lab Results   Component Value Date    CRP 1.9 08/26/2016      Lab Results   Component Value Date    CHOL 166 12/14/2018      Lab Results   Component Value Date    TRIG 45 12/14/2018      Lab Results   Component Value Date    HDL 78 12/14/2018      Lab Results   Component Value Date    LDL 79 12/14/2018      Lab Results   Component Value Date    VLDL 9 10/27/2014      Lab Results   Component Value Date    CHOLHDLRATIO 1.8 10/27/2014     Lab Results   Component Value Date    NHDL 88 12/14/2018          Espinoza Test Results    BASIC SPIROMETRY: Summary of " two attempts (see printout for details of results)  Results Estimated range for ht/age   FVC: 3.32 liter FVC: 1.71-3.26 liter   FEV1: 3.00 liter FEV1: 1.38-2.76 liter     History of asthma:  NO   History of respiratory infection current/recent:  NO     Spirometry Results:  normal      WALKING BLOOD PRESSURE RESPONSE (3 minute, 5 MET level walk)   Pre BP: 150/70 mmHg  3 min BP: 160/60 mmHg  1 min post BP: 170/70 mmHg    Pre HR: 60 bpm  3 min HR: 100 bpm  1 min post HR: 70 bpm       RETINAL VASCULAR ASSESSMENT   Left Eye Abnormality:  none  AV Ratio: 0.69    Right Eye Abnormality:  none  AV Ratio: 0.82     Retinal Assessment:  normal      ABDOMINAL AORTA ULTRASOUND (< 2.5 normal, borderline 2.5-2.9, abnormal > 3)   SupraIliac 1.39 cm    SupraRenal 1.53 cm    InfraRenal Proximal 1.50 cm    InfraRenal Distal 1.48 cm      Abdominal Aorta Assessment:  normal      LEFT VENTRICULAR ULTRASOUND MEASUREMENTS (adjusted for BSA)  LVIDD 46.9 mm   Septa 8.4 mm   Posterior 8.8 mm     Left Ventricular US Assessment:  normal      Carotid Artery IMT measurements report and plaques in the small area examined:   Left IMT 0.981 mm  Plaques none    Right IMT 0.918 mm  Plaques none       ECG (see tracing):  normal sinus rhythm;  rate: 64 bpm      Arterial Elasticity per age and gender (see printout):   C1 7.6 mL/mmHg x 10  abnormal   C2 1.3 mL/mmHg x 100 abnormal   Supine blood pressure: 146/69 mmHg       Assessment:     Cardiovascular:  ECG today atrial paced rate 62 with slow R wave progression V1-3. Nt pro BNP similar to previous at 419. Some shortness of breath with stairs not unexpected for her age. She is still doing some day care for her grandchildren and is busy cooking and cleaning. No chest pain. Device check recently with afib 2+ hours which she was aware of. Will discuss NSAID use with lluvia with Dr. Lazo.    Blood Pressure:  Taking lisinopril 10 mg mg per day, hydrochlorothiazide 12.5 per day and metoprolol 50 mg every  am and 25 mg every PM. Creatinine normal range.    Lipids:  Optimal lipids with LDL at 79 and above average HDL at 78 without statin.         Health Habit Summary:  Nutrition: Heart Healthy Eating:  most of the time   Exercise:  regularly active  Weight:  normal weight range  Tobacco Use:  never used    Full report to follow prevention team review of test results with scanned final report.    Time spent for patient visit was 60 minutes with more than half the time spent on counseling and coordination of care.    IBRAHIMA Brown CNP       CC  Patient Care Team:  Jovana Macias MD as PCP - General (Internal Medicine)  June Mckinley APRN CNP as Nurse Practitioner (Nurse Practitioner)  Stacia Mendiola RN as Registered Nurse (Cardiology)  Clementine Castro CMA as Research Personnel  SELF, REFERRED

## 2018-12-14 NOTE — PROGRESS NOTES
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    Pain Evaluation  Current history of pain associated with this visit is: denied.     HPI   Isadora Mendez is a 87 year old year old female with a history of hypertension non obstructive CAD (30-40% in 2009) tachybrady arrythmias including VT, sinus pauses (SSS) and with pacer insertion 2010 and paraxysmal afib. She estimates she has afib 1-2 x per year lasting for a few hours.She can feel when she is in afib that her her heart rhythm has changed but tolerates this without chest pain or shorn fabien of breath.  She often will take an extra metoprolol which will convert her. She has noticed that these episode happen usually 4-5 am. We discussed that possibility that sleep apnea could be a trigger for these events and getting a sleep study to see if she has sleep apnea. She is a mouth breather. She will consider this recommendation.    She is not taking eliquis as she says she can no longer use IBF prn.  Will discuss with Dr. Lazo if she could use this limited amount. She has siblings with afib who are taking NOAC and doing well so she is considering starting the eliquis. She sees a kinesiologist and has started a new supplement pycnogenol (from pine bark) that is suppose to be heart healthy.    Nutrition assessment per patient report:   Foods with fat/cholesterol (fried foods, fatty meats,  junk food):  1 serving per week , more chicken, red meat now and then  Fruits and vegetables (  cup cooked, 1 cup raw):1-  2 servings per day  Caffeine (1 cup coffee, soda, etc):  1 serving per day tea caffeine, decafe coffee   Alcohol servings (12 oz. beer, 4 oz. wine, 1  oz. in mixed drink):  1 serving per week, fun Friday  Calcium servings (dairy foods, 8 oz. milk, yogurt, cheese, ice cream): 2- 1 serving per day  Salt/sodium use:  light use  Special dietary habits:  None    Activity  Patient is active 6 times per week for 60 or more minutes with .    Laboratory Results Review  We discussed laboratory results today including lipids targets and how foods influence cholesterol.    Weight  Her perceived healthy weight is her current weight.  A normal BMI of 25 is equal to 136 pounds.    The current BMI of 22 is normal weight range.   PMH   Past Medical History:   Diagnosis Date     Atrial fibrillation (H) 2011     Facial fracture (H) 2006     Hypertension      NSVT (nonsustained ventricular tachycardia) (H) 2009    during exercise echo, neg EP study     Pacemaker 7-1-2010     S/P cardiac catheterization 2009    30-40% non obstructive lesion     Ventricular tachycardia, nonsustained (H) 2009    during stress echo       PSH  Past Surgical History:   Procedure Laterality Date     IMPLANT PACEMAKER       PPM  6/30/2010    Permanent Pacemaker       Current Meds   Current Outpatient Medications   Medication Sig Dispense Refill     apixaban ANTICOAGULANT (ELIQUIS) 2.5 MG tablet Take 1 tablet (2.5 mg) by mouth 2 times daily 60 tablet 11     Ascorbic Acid (VITAMIN C PO)        aspirin 81 MG tablet Take 1 tablet by mouth daily.       BIOTIN PO Take 1 capsule by mouth daily.       Calcium Carbonate-Vitamin D (CALCIUM 500 + D PO) Take 1 tablet by mouth 2 times daily.       coenzyme Q-10 100 MG TABS        COLLAGEN PO Take 1 capsule by mouth 2 times daily.       conjugated estrogens (PREMARIN) cream Place vaginally twice  "a week       Cyanocobalamin (VITAMIN B12 PO) Take 1 tablet by mouth daily.       hydrochlorothiazide (MICROZIDE) 12.5 MG capsule Take 1 capsule (12.5 mg) by mouth daily 90 capsule 3     lisinopril (PRINIVIL/ZESTRIL) 10 MG tablet Take 1 tablet (10 mg) by mouth daily 90 tablet 3     MAGNESIUM PO one daily       metoprolol succinate (TOPROL-XL) 50 MG 24 hr tablet 50 mg in am, 25 mg in pm 135 tablet 3     NEW MED Cumin - capsule daily  Vitor - capsule daily       NEW MED Mineral drops       NEW MED 1 tablet daily Nattokinase, from fermented soybean .        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 .  She is  with 6 grown children..       Tobacco History  History   Smoking Status     Never Smoker   Smokeless Tobacco     Never Used       ROS  CONSTITUTIONAL:  No fever, chills, or sweats. No weight gain/loss.   EENT:  No visual disturbance, ear ache, epistaxis, sore throat  ALLERGIES/IMMUNOLOGIC:  Negative  RESPIRATORY:  No cough, hemoptysis  CARDIOVASCULAR:  As per HPI  GI:  No nausea, vomiting, hematemesis, melena  :  No urinary frequency, dysuria, or hematuria  INTEGUMENT:  Negative  PSYCHIATRIC:  Negative  NEURO:  Negative  ENDOCRINE:  Negative  MUSCULOSKELETAL:  Negative     Vital Signs   /67 (BP Location: Right arm, Patient Position: Sitting, Cuff Size: Adult Regular)   Pulse 64   Ht 1.588 m (5' 2.5\")   Wt 57.6 kg (127 lb)   SpO2 98%   BMI 22.86 kg/m        Waist: 32 inches  Hip: 41 inches    Physical Exam   In general, the patient is a pleasant female in " no apparent distress.    HEENT: NC/AT.  PERRLA.  EOMI.  Sclerae white, not injected.  Nares clear.  Pharynx without erythema or exudate.  Dentition intact.    Neck: No adenopathy.  No thyromegaly.Carotids +4/4 bilaterally without bruits.  No jugular venous distension.   Lungs: CTA.  No ronchi, wheezes, rales.     Cor: RRR. Normal S1, S2 splits physiologically. Grade 1-2 RICS and LLSB murmur,  No rub, click, or gallop. The PMI is in the 5th ICS in the midclavicular line. There is no heave.   Abdomen: Soft, nontender, nondistended. No organomegaly.  No bruits.   Extremities: No clubbing, cyanosis, or edema. The pulses are +2/2 at the post-tibial sites bilaterally. No bruits are noted.    Recent Labs  Lab Results   Component Value Date    GLC 87 12/14/2018      Lab Results   Component Value Date    NTBNP 419 12/14/2018     No results found for: NTBNPI   Lab Results   Component Value Date    UCRR 54 08/26/2016      Lab Results   Component Value Date    MICROL 16 12/14/2018      No results found for: MICROALBUMIN   Lab Results   Component Value Date    CRP 1.9 08/26/2016      Lab Results   Component Value Date    CHOL 166 12/14/2018      Lab Results   Component Value Date    TRIG 45 12/14/2018      Lab Results   Component Value Date    HDL 78 12/14/2018      Lab Results   Component Value Date    LDL 79 12/14/2018      Lab Results   Component Value Date    VLDL 9 10/27/2014      Lab Results   Component Value Date    CHOLHDLRATIO 1.8 10/27/2014     Lab Results   Component Value Date    NHDL 88 12/14/2018          Espinoza Test Results    BASIC SPIROMETRY: Summary of two attempts (see printout for details of results)  Results Estimated range for ht/age   FVC: 3.32 liter FVC: 1.71-3.26 liter   FEV1: 3.00 liter FEV1: 1.38-2.76 liter     History of asthma:  NO   History of respiratory infection current/recent:  NO     Spirometry Results:  normal      WALKING BLOOD PRESSURE RESPONSE (3 minute, 5 MET level walk)   Pre BP:  150/70 mmHg  3 min BP: 160/60 mmHg  1 min post BP: 170/70 mmHg    Pre HR: 60 bpm  3 min HR: 100 bpm  1 min post HR: 70 bpm       RETINAL VASCULAR ASSESSMENT   Left Eye Abnormality:  none  AV Ratio: 0.69    Right Eye Abnormality:  none  AV Ratio: 0.82     Retinal Assessment:  normal      ABDOMINAL AORTA ULTRASOUND (< 2.5 normal, borderline 2.5-2.9, abnormal > 3)   SupraIliac 1.39 cm    SupraRenal 1.53 cm    InfraRenal Proximal 1.50 cm    InfraRenal Distal 1.48 cm      Abdominal Aorta Assessment:  normal      LEFT VENTRICULAR ULTRASOUND MEASUREMENTS (adjusted for BSA)  LVIDD 46.9 mm   Septa 8.4 mm   Posterior 8.8 mm     Left Ventricular US Assessment:  normal      Carotid Artery IMT measurements report and plaques in the small area examined:   Left IMT 0.981 mm  Plaques none    Right IMT 0.918 mm  Plaques none       ECG (see tracing):  normal sinus rhythm;  rate: 64 bpm      Arterial Elasticity per age and gender (see printout):   C1 7.6 mL/mmHg x 10  abnormal   C2 1.3 mL/mmHg x 100 abnormal   Supine blood pressure: 146/69 mmHg       Assessment:     Cardiovascular:  ECG today atrial paced rate 62 with slow R wave progression V1-3. Nt pro BNP similar to previous at 419. Some shortness of breath with stairs not unexpected for her age. She is still doing some day care for her grandchildren and is busy cooking and cleaning. No chest pain. Device check recently with afib 2+ hours which she was aware of. Will discuss NSAID use with lluvia with Dr. Lazo.    Blood Pressure:  Taking lisinopril 10 mg mg per day, hydrochlorothiazide 12.5 per day and metoprolol 50 mg every am and 25 mg every PM. Creatinine normal range.    Lipids:  Optimal lipids with LDL at 79 and above average HDL at 78 without statin.         Health Habit Summary:  Nutrition: Heart Healthy Eating:  most of the time   Exercise:  regularly active  Weight:  normal weight range  Tobacco Use:  never used    Full report to follow prevention team review of test  results with scanned final report.    Time spent for patient visit was 60 minutes with more than half the time spent on counseling and coordination of care.    IBRAHIMA Brown CNP       CC  Patient Care Team:  Jovana Macias MD as PCP - General (Internal Medicine)  June Mckinley APRN CNP as Nurse Practitioner (Nurse Practitioner)  Stacia Mendiola RN as Registered Nurse (Cardiology)  Clementine Castro CMA as Research Personnel  SELF, REFERRED

## 2018-12-17 LAB — INTERPRETATION ECG - MUSE: NORMAL

## 2019-02-06 DIAGNOSIS — I10 ESSENTIAL HYPERTENSION: ICD-10-CM

## 2019-02-06 RX ORDER — METOPROLOL SUCCINATE 50 MG/1
TABLET, EXTENDED RELEASE ORAL
Qty: 135 TABLET | Refills: 3 | Status: SHIPPED | OUTPATIENT
Start: 2019-02-06 | End: 2020-01-16

## 2019-03-18 DIAGNOSIS — I10 ESSENTIAL HYPERTENSION: ICD-10-CM

## 2019-03-18 RX ORDER — LISINOPRIL 10 MG/1
10 TABLET ORAL DAILY
Qty: 90 TABLET | Refills: 2 | Status: SHIPPED | OUTPATIENT
Start: 2019-03-18 | End: 2019-12-19

## 2019-03-18 NOTE — TELEPHONE ENCOUNTER
Last Clinic Visit: 12/14/2018  [facility] Cardi[facility] Disease Prevention

## 2019-03-19 DIAGNOSIS — I10 ESSENTIAL HYPERTENSION: ICD-10-CM

## 2019-04-04 ENCOUNTER — ANCILLARY PROCEDURE (OUTPATIENT)
Dept: CARDIOLOGY | Facility: CLINIC | Age: 84
End: 2019-04-04
Attending: INTERNAL MEDICINE
Payer: COMMERCIAL

## 2019-04-04 DIAGNOSIS — I49.5 SICK SINUS SYNDROME (H): ICD-10-CM

## 2019-04-04 PROCEDURE — 93296 REM INTERROG EVL PM/IDS: CPT | Mod: ZF

## 2019-04-04 PROCEDURE — 93294 REM INTERROG EVL PM/LDLS PM: CPT | Performed by: INTERNAL MEDICINE

## 2019-04-08 LAB
MDC_IDC_EPISODE_DTM: NORMAL
MDC_IDC_EPISODE_DURATION: 0 S
MDC_IDC_EPISODE_DURATION: 1 S
MDC_IDC_EPISODE_DURATION: 1336 S
MDC_IDC_EPISODE_DURATION: 1387 S
MDC_IDC_EPISODE_DURATION: 1551 S
MDC_IDC_EPISODE_DURATION: 157 S
MDC_IDC_EPISODE_DURATION: 2780 S
MDC_IDC_EPISODE_DURATION: 348 S
MDC_IDC_EPISODE_DURATION: 365 S
MDC_IDC_EPISODE_DURATION: 4 S
MDC_IDC_EPISODE_DURATION: 45 S
MDC_IDC_EPISODE_DURATION: 470 S
MDC_IDC_EPISODE_DURATION: 568 S
MDC_IDC_EPISODE_DURATION: 657 S
MDC_IDC_EPISODE_DURATION: 926 S
MDC_IDC_EPISODE_ID: 83
MDC_IDC_EPISODE_ID: 84
MDC_IDC_EPISODE_ID: 85
MDC_IDC_EPISODE_ID: 86
MDC_IDC_EPISODE_ID: 87
MDC_IDC_EPISODE_ID: 88
MDC_IDC_EPISODE_ID: 89
MDC_IDC_EPISODE_ID: 90
MDC_IDC_EPISODE_ID: 91
MDC_IDC_EPISODE_ID: 92
MDC_IDC_EPISODE_ID: 93
MDC_IDC_EPISODE_ID: 94
MDC_IDC_EPISODE_ID: 95
MDC_IDC_EPISODE_ID: 96
MDC_IDC_EPISODE_ID: 97
MDC_IDC_EPISODE_TYPE: NORMAL
MDC_IDC_LEAD_IMPLANT_DT: NORMAL
MDC_IDC_LEAD_IMPLANT_DT: NORMAL
MDC_IDC_LEAD_LOCATION: NORMAL
MDC_IDC_LEAD_LOCATION: NORMAL
MDC_IDC_LEAD_LOCATION_DETAIL_1: NORMAL
MDC_IDC_LEAD_LOCATION_DETAIL_1: NORMAL
MDC_IDC_LEAD_MFG: NORMAL
MDC_IDC_LEAD_MFG: NORMAL
MDC_IDC_LEAD_MODEL: NORMAL
MDC_IDC_LEAD_MODEL: NORMAL
MDC_IDC_LEAD_POLARITY_TYPE: NORMAL
MDC_IDC_LEAD_POLARITY_TYPE: NORMAL
MDC_IDC_LEAD_SERIAL: NORMAL
MDC_IDC_LEAD_SERIAL: NORMAL
MDC_IDC_LEAD_SPECIAL_FUNCTION: NORMAL
MDC_IDC_LEAD_SPECIAL_FUNCTION: NORMAL
MDC_IDC_MSMT_BATTERY_DTM: NORMAL
MDC_IDC_MSMT_BATTERY_REMAINING_LONGEVITY: 94 MO
MDC_IDC_MSMT_BATTERY_RRT_TRIGGER: 2.83
MDC_IDC_MSMT_BATTERY_STATUS: NORMAL
MDC_IDC_MSMT_BATTERY_VOLTAGE: 3.02 V
MDC_IDC_MSMT_LEADCHNL_RA_IMPEDANCE_VALUE: 266 OHM
MDC_IDC_MSMT_LEADCHNL_RA_IMPEDANCE_VALUE: 418 OHM
MDC_IDC_MSMT_LEADCHNL_RA_PACING_THRESHOLD_AMPLITUDE: 0.75 V
MDC_IDC_MSMT_LEADCHNL_RA_PACING_THRESHOLD_PULSEWIDTH: 0.4 MS
MDC_IDC_MSMT_LEADCHNL_RA_SENSING_INTR_AMPL: 1.12 MV
MDC_IDC_MSMT_LEADCHNL_RA_SENSING_INTR_AMPL: 1.12 MV
MDC_IDC_MSMT_LEADCHNL_RV_IMPEDANCE_VALUE: 418 OHM
MDC_IDC_MSMT_LEADCHNL_RV_IMPEDANCE_VALUE: 456 OHM
MDC_IDC_MSMT_LEADCHNL_RV_PACING_THRESHOLD_AMPLITUDE: 0.5 V
MDC_IDC_MSMT_LEADCHNL_RV_PACING_THRESHOLD_PULSEWIDTH: 0.4 MS
MDC_IDC_MSMT_LEADCHNL_RV_SENSING_INTR_AMPL: 11.12 MV
MDC_IDC_MSMT_LEADCHNL_RV_SENSING_INTR_AMPL: 11.12 MV
MDC_IDC_PG_IMPLANT_DTM: NORMAL
MDC_IDC_PG_MFG: NORMAL
MDC_IDC_PG_MODEL: NORMAL
MDC_IDC_PG_SERIAL: NORMAL
MDC_IDC_PG_TYPE: NORMAL
MDC_IDC_SESS_CLINIC_NAME: NORMAL
MDC_IDC_SESS_DTM: NORMAL
MDC_IDC_SESS_TYPE: NORMAL
MDC_IDC_SET_BRADY_AT_MODE_SWITCH_RATE: 171 {BEATS}/MIN
MDC_IDC_SET_BRADY_HYSTRATE: NORMAL
MDC_IDC_SET_BRADY_LOWRATE: 60 {BEATS}/MIN
MDC_IDC_SET_BRADY_MAX_SENSOR_RATE: 130 {BEATS}/MIN
MDC_IDC_SET_BRADY_MAX_TRACKING_RATE: 130 {BEATS}/MIN
MDC_IDC_SET_BRADY_MODE: NORMAL
MDC_IDC_SET_BRADY_PAV_DELAY_LOW: 180 MS
MDC_IDC_SET_BRADY_SAV_DELAY_LOW: 150 MS
MDC_IDC_SET_LEADCHNL_RA_PACING_AMPLITUDE: 1.75 V
MDC_IDC_SET_LEADCHNL_RA_PACING_ANODE_ELECTRODE_1: NORMAL
MDC_IDC_SET_LEADCHNL_RA_PACING_ANODE_LOCATION_1: NORMAL
MDC_IDC_SET_LEADCHNL_RA_PACING_CAPTURE_MODE: NORMAL
MDC_IDC_SET_LEADCHNL_RA_PACING_CATHODE_ELECTRODE_1: NORMAL
MDC_IDC_SET_LEADCHNL_RA_PACING_CATHODE_LOCATION_1: NORMAL
MDC_IDC_SET_LEADCHNL_RA_PACING_POLARITY: NORMAL
MDC_IDC_SET_LEADCHNL_RA_PACING_PULSEWIDTH: 0.4 MS
MDC_IDC_SET_LEADCHNL_RA_SENSING_ANODE_ELECTRODE_1: NORMAL
MDC_IDC_SET_LEADCHNL_RA_SENSING_ANODE_LOCATION_1: NORMAL
MDC_IDC_SET_LEADCHNL_RA_SENSING_CATHODE_ELECTRODE_1: NORMAL
MDC_IDC_SET_LEADCHNL_RA_SENSING_CATHODE_LOCATION_1: NORMAL
MDC_IDC_SET_LEADCHNL_RA_SENSING_POLARITY: NORMAL
MDC_IDC_SET_LEADCHNL_RA_SENSING_SENSITIVITY: 0.3 MV
MDC_IDC_SET_LEADCHNL_RV_PACING_AMPLITUDE: 2 V
MDC_IDC_SET_LEADCHNL_RV_PACING_ANODE_ELECTRODE_1: NORMAL
MDC_IDC_SET_LEADCHNL_RV_PACING_ANODE_LOCATION_1: NORMAL
MDC_IDC_SET_LEADCHNL_RV_PACING_CAPTURE_MODE: NORMAL
MDC_IDC_SET_LEADCHNL_RV_PACING_CATHODE_ELECTRODE_1: NORMAL
MDC_IDC_SET_LEADCHNL_RV_PACING_CATHODE_LOCATION_1: NORMAL
MDC_IDC_SET_LEADCHNL_RV_PACING_POLARITY: NORMAL
MDC_IDC_SET_LEADCHNL_RV_PACING_PULSEWIDTH: 0.4 MS
MDC_IDC_SET_LEADCHNL_RV_SENSING_ANODE_ELECTRODE_1: NORMAL
MDC_IDC_SET_LEADCHNL_RV_SENSING_ANODE_LOCATION_1: NORMAL
MDC_IDC_SET_LEADCHNL_RV_SENSING_CATHODE_ELECTRODE_1: NORMAL
MDC_IDC_SET_LEADCHNL_RV_SENSING_CATHODE_LOCATION_1: NORMAL
MDC_IDC_SET_LEADCHNL_RV_SENSING_POLARITY: NORMAL
MDC_IDC_SET_LEADCHNL_RV_SENSING_SENSITIVITY: 0.9 MV
MDC_IDC_SET_ZONE_DETECTION_INTERVAL: 350 MS
MDC_IDC_SET_ZONE_DETECTION_INTERVAL: 400 MS
MDC_IDC_SET_ZONE_TYPE: NORMAL
MDC_IDC_STAT_AT_BURDEN_PERCENT: 0.1 %
MDC_IDC_STAT_AT_DTM_END: NORMAL
MDC_IDC_STAT_AT_DTM_START: NORMAL
MDC_IDC_STAT_BRADY_AP_VP_PERCENT: 0.05 %
MDC_IDC_STAT_BRADY_AP_VS_PERCENT: 99.48 %
MDC_IDC_STAT_BRADY_AS_VP_PERCENT: 0.01 %
MDC_IDC_STAT_BRADY_AS_VS_PERCENT: 0.46 %
MDC_IDC_STAT_BRADY_DTM_END: NORMAL
MDC_IDC_STAT_BRADY_DTM_START: NORMAL
MDC_IDC_STAT_BRADY_RA_PERCENT_PACED: 99.38 %
MDC_IDC_STAT_BRADY_RV_PERCENT_PACED: 0.06 %
MDC_IDC_STAT_EPISODE_RECENT_COUNT: 0
MDC_IDC_STAT_EPISODE_RECENT_COUNT: 0
MDC_IDC_STAT_EPISODE_RECENT_COUNT: 12
MDC_IDC_STAT_EPISODE_RECENT_COUNT: 3
MDC_IDC_STAT_EPISODE_RECENT_COUNT_DTM_END: NORMAL
MDC_IDC_STAT_EPISODE_RECENT_COUNT_DTM_START: NORMAL
MDC_IDC_STAT_EPISODE_TOTAL_COUNT: 0
MDC_IDC_STAT_EPISODE_TOTAL_COUNT: 0
MDC_IDC_STAT_EPISODE_TOTAL_COUNT: 23
MDC_IDC_STAT_EPISODE_TOTAL_COUNT: 73
MDC_IDC_STAT_EPISODE_TOTAL_COUNT_DTM_END: NORMAL
MDC_IDC_STAT_EPISODE_TOTAL_COUNT_DTM_START: NORMAL
MDC_IDC_STAT_EPISODE_TYPE: NORMAL

## 2019-07-03 DIAGNOSIS — I10 ESSENTIAL HYPERTENSION: ICD-10-CM

## 2019-07-07 RX ORDER — HYDROCHLOROTHIAZIDE 12.5 MG/1
12.5 CAPSULE ORAL DAILY
Qty: 90 CAPSULE | Refills: 1 | Status: SHIPPED | OUTPATIENT
Start: 2019-07-07 | End: 2020-01-14

## 2019-07-07 NOTE — TELEPHONE ENCOUNTER
Last [place]: [place]14/2018  [facility] [facility]

## 2019-08-26 ENCOUNTER — ANCILLARY PROCEDURE (OUTPATIENT)
Dept: CARDIOLOGY | Facility: CLINIC | Age: 84
End: 2019-08-26
Attending: INTERNAL MEDICINE
Payer: COMMERCIAL

## 2019-08-26 DIAGNOSIS — I49.5 SICK SINUS SYNDROME (H): ICD-10-CM

## 2019-08-26 PROCEDURE — 93294 REM INTERROG EVL PM/LDLS PM: CPT | Performed by: INTERNAL MEDICINE

## 2019-08-26 PROCEDURE — 93296 REM INTERROG EVL PM/IDS: CPT | Mod: ZF

## 2019-08-29 LAB
MDC_IDC_EPISODE_DTM: NORMAL
MDC_IDC_EPISODE_DURATION: 1 S
MDC_IDC_EPISODE_DURATION: 1030 S
MDC_IDC_EPISODE_DURATION: 132 S
MDC_IDC_EPISODE_DURATION: 228 S
MDC_IDC_EPISODE_DURATION: 260 S
MDC_IDC_EPISODE_DURATION: 3339 S
MDC_IDC_EPISODE_DURATION: 388 S
MDC_IDC_EPISODE_DURATION: 412 S
MDC_IDC_EPISODE_DURATION: 485 S
MDC_IDC_EPISODE_DURATION: 486 S
MDC_IDC_EPISODE_DURATION: 599 S
MDC_IDC_EPISODE_DURATION: 677 S
MDC_IDC_EPISODE_DURATION: 719 S
MDC_IDC_EPISODE_DURATION: 83 S
MDC_IDC_EPISODE_ID: 100
MDC_IDC_EPISODE_ID: 101
MDC_IDC_EPISODE_ID: 102
MDC_IDC_EPISODE_ID: 103
MDC_IDC_EPISODE_ID: 104
MDC_IDC_EPISODE_ID: 105
MDC_IDC_EPISODE_ID: 106
MDC_IDC_EPISODE_ID: 107
MDC_IDC_EPISODE_ID: 108
MDC_IDC_EPISODE_ID: 109
MDC_IDC_EPISODE_ID: 110
MDC_IDC_EPISODE_ID: 111
MDC_IDC_EPISODE_ID: 98
MDC_IDC_EPISODE_ID: 99
MDC_IDC_EPISODE_TYPE: NORMAL
MDC_IDC_LEAD_IMPLANT_DT: NORMAL
MDC_IDC_LEAD_IMPLANT_DT: NORMAL
MDC_IDC_LEAD_LOCATION: NORMAL
MDC_IDC_LEAD_LOCATION: NORMAL
MDC_IDC_LEAD_LOCATION_DETAIL_1: NORMAL
MDC_IDC_LEAD_LOCATION_DETAIL_1: NORMAL
MDC_IDC_LEAD_MFG: NORMAL
MDC_IDC_LEAD_MFG: NORMAL
MDC_IDC_LEAD_MODEL: NORMAL
MDC_IDC_LEAD_MODEL: NORMAL
MDC_IDC_LEAD_POLARITY_TYPE: NORMAL
MDC_IDC_LEAD_POLARITY_TYPE: NORMAL
MDC_IDC_LEAD_SERIAL: NORMAL
MDC_IDC_LEAD_SERIAL: NORMAL
MDC_IDC_LEAD_SPECIAL_FUNCTION: NORMAL
MDC_IDC_LEAD_SPECIAL_FUNCTION: NORMAL
MDC_IDC_MSMT_BATTERY_DTM: NORMAL
MDC_IDC_MSMT_BATTERY_REMAINING_LONGEVITY: 91 MO
MDC_IDC_MSMT_BATTERY_RRT_TRIGGER: 2.83
MDC_IDC_MSMT_BATTERY_STATUS: NORMAL
MDC_IDC_MSMT_BATTERY_VOLTAGE: 3.02 V
MDC_IDC_MSMT_LEADCHNL_RA_IMPEDANCE_VALUE: 266 OHM
MDC_IDC_MSMT_LEADCHNL_RA_IMPEDANCE_VALUE: 418 OHM
MDC_IDC_MSMT_LEADCHNL_RA_PACING_THRESHOLD_AMPLITUDE: 0.62 V
MDC_IDC_MSMT_LEADCHNL_RA_PACING_THRESHOLD_PULSEWIDTH: 0.4 MS
MDC_IDC_MSMT_LEADCHNL_RA_SENSING_INTR_AMPL: 1 MV
MDC_IDC_MSMT_LEADCHNL_RA_SENSING_INTR_AMPL: 1 MV
MDC_IDC_MSMT_LEADCHNL_RV_IMPEDANCE_VALUE: 437 OHM
MDC_IDC_MSMT_LEADCHNL_RV_IMPEDANCE_VALUE: 475 OHM
MDC_IDC_MSMT_LEADCHNL_RV_PACING_THRESHOLD_AMPLITUDE: 0.5 V
MDC_IDC_MSMT_LEADCHNL_RV_PACING_THRESHOLD_PULSEWIDTH: 0.4 MS
MDC_IDC_MSMT_LEADCHNL_RV_SENSING_INTR_AMPL: 10.62 MV
MDC_IDC_MSMT_LEADCHNL_RV_SENSING_INTR_AMPL: 10.62 MV
MDC_IDC_PG_IMPLANT_DTM: NORMAL
MDC_IDC_PG_MFG: NORMAL
MDC_IDC_PG_MODEL: NORMAL
MDC_IDC_PG_SERIAL: NORMAL
MDC_IDC_PG_TYPE: NORMAL
MDC_IDC_SESS_CLINIC_NAME: NORMAL
MDC_IDC_SESS_DTM: NORMAL
MDC_IDC_SESS_TYPE: NORMAL
MDC_IDC_SET_BRADY_AT_MODE_SWITCH_RATE: 171 {BEATS}/MIN
MDC_IDC_SET_BRADY_HYSTRATE: NORMAL
MDC_IDC_SET_BRADY_LOWRATE: 60 {BEATS}/MIN
MDC_IDC_SET_BRADY_MAX_SENSOR_RATE: 130 {BEATS}/MIN
MDC_IDC_SET_BRADY_MAX_TRACKING_RATE: 130 {BEATS}/MIN
MDC_IDC_SET_BRADY_MODE: NORMAL
MDC_IDC_SET_BRADY_PAV_DELAY_LOW: 180 MS
MDC_IDC_SET_BRADY_SAV_DELAY_LOW: 150 MS
MDC_IDC_SET_LEADCHNL_RA_PACING_AMPLITUDE: 1.5 V
MDC_IDC_SET_LEADCHNL_RA_PACING_ANODE_ELECTRODE_1: NORMAL
MDC_IDC_SET_LEADCHNL_RA_PACING_ANODE_LOCATION_1: NORMAL
MDC_IDC_SET_LEADCHNL_RA_PACING_CAPTURE_MODE: NORMAL
MDC_IDC_SET_LEADCHNL_RA_PACING_CATHODE_ELECTRODE_1: NORMAL
MDC_IDC_SET_LEADCHNL_RA_PACING_CATHODE_LOCATION_1: NORMAL
MDC_IDC_SET_LEADCHNL_RA_PACING_POLARITY: NORMAL
MDC_IDC_SET_LEADCHNL_RA_PACING_PULSEWIDTH: 0.4 MS
MDC_IDC_SET_LEADCHNL_RA_SENSING_ANODE_ELECTRODE_1: NORMAL
MDC_IDC_SET_LEADCHNL_RA_SENSING_ANODE_LOCATION_1: NORMAL
MDC_IDC_SET_LEADCHNL_RA_SENSING_CATHODE_ELECTRODE_1: NORMAL
MDC_IDC_SET_LEADCHNL_RA_SENSING_CATHODE_LOCATION_1: NORMAL
MDC_IDC_SET_LEADCHNL_RA_SENSING_POLARITY: NORMAL
MDC_IDC_SET_LEADCHNL_RA_SENSING_SENSITIVITY: 0.3 MV
MDC_IDC_SET_LEADCHNL_RV_PACING_AMPLITUDE: 2 V
MDC_IDC_SET_LEADCHNL_RV_PACING_ANODE_ELECTRODE_1: NORMAL
MDC_IDC_SET_LEADCHNL_RV_PACING_ANODE_LOCATION_1: NORMAL
MDC_IDC_SET_LEADCHNL_RV_PACING_CAPTURE_MODE: NORMAL
MDC_IDC_SET_LEADCHNL_RV_PACING_CATHODE_ELECTRODE_1: NORMAL
MDC_IDC_SET_LEADCHNL_RV_PACING_CATHODE_LOCATION_1: NORMAL
MDC_IDC_SET_LEADCHNL_RV_PACING_POLARITY: NORMAL
MDC_IDC_SET_LEADCHNL_RV_PACING_PULSEWIDTH: 0.4 MS
MDC_IDC_SET_LEADCHNL_RV_SENSING_ANODE_ELECTRODE_1: NORMAL
MDC_IDC_SET_LEADCHNL_RV_SENSING_ANODE_LOCATION_1: NORMAL
MDC_IDC_SET_LEADCHNL_RV_SENSING_CATHODE_ELECTRODE_1: NORMAL
MDC_IDC_SET_LEADCHNL_RV_SENSING_CATHODE_LOCATION_1: NORMAL
MDC_IDC_SET_LEADCHNL_RV_SENSING_POLARITY: NORMAL
MDC_IDC_SET_LEADCHNL_RV_SENSING_SENSITIVITY: 0.9 MV
MDC_IDC_SET_ZONE_DETECTION_INTERVAL: 350 MS
MDC_IDC_SET_ZONE_DETECTION_INTERVAL: 400 MS
MDC_IDC_SET_ZONE_TYPE: NORMAL
MDC_IDC_STAT_AT_BURDEN_PERCENT: 0.1 %
MDC_IDC_STAT_AT_DTM_END: NORMAL
MDC_IDC_STAT_AT_DTM_START: NORMAL
MDC_IDC_STAT_BRADY_AP_VP_PERCENT: 0.06 %
MDC_IDC_STAT_BRADY_AP_VS_PERCENT: 99.32 %
MDC_IDC_STAT_BRADY_AS_VP_PERCENT: 0.02 %
MDC_IDC_STAT_BRADY_AS_VS_PERCENT: 0.6 %
MDC_IDC_STAT_BRADY_DTM_END: NORMAL
MDC_IDC_STAT_BRADY_DTM_START: NORMAL
MDC_IDC_STAT_BRADY_RA_PERCENT_PACED: 99.28 %
MDC_IDC_STAT_BRADY_RV_PERCENT_PACED: 0.08 %
MDC_IDC_STAT_EPISODE_RECENT_COUNT: 0
MDC_IDC_STAT_EPISODE_RECENT_COUNT: 0
MDC_IDC_STAT_EPISODE_RECENT_COUNT: 1
MDC_IDC_STAT_EPISODE_RECENT_COUNT: 13
MDC_IDC_STAT_EPISODE_RECENT_COUNT_DTM_END: NORMAL
MDC_IDC_STAT_EPISODE_RECENT_COUNT_DTM_START: NORMAL
MDC_IDC_STAT_EPISODE_TOTAL_COUNT: 0
MDC_IDC_STAT_EPISODE_TOTAL_COUNT: 0
MDC_IDC_STAT_EPISODE_TOTAL_COUNT: 24
MDC_IDC_STAT_EPISODE_TOTAL_COUNT: 86
MDC_IDC_STAT_EPISODE_TOTAL_COUNT_DTM_END: NORMAL
MDC_IDC_STAT_EPISODE_TOTAL_COUNT_DTM_START: NORMAL
MDC_IDC_STAT_EPISODE_TYPE: NORMAL

## 2019-09-19 ENCOUNTER — ANCILLARY PROCEDURE (OUTPATIENT)
Dept: CARDIOLOGY | Facility: CLINIC | Age: 84
End: 2019-09-19
Attending: NURSE PRACTITIONER
Payer: COMMERCIAL

## 2019-09-19 DIAGNOSIS — I49.5 SICK SINUS SYNDROME (H): ICD-10-CM

## 2019-09-19 PROCEDURE — 93296 REM INTERROG EVL PM/IDS: CPT | Mod: ZF

## 2019-09-20 LAB
MDC_IDC_EPISODE_DTM: NORMAL
MDC_IDC_EPISODE_DURATION: 224 S
MDC_IDC_EPISODE_DURATION: 228 S
MDC_IDC_EPISODE_DURATION: 231 S
MDC_IDC_EPISODE_DURATION: 256 S
MDC_IDC_EPISODE_DURATION: 5562 S
MDC_IDC_EPISODE_ID: 112
MDC_IDC_EPISODE_ID: 113
MDC_IDC_EPISODE_ID: 114
MDC_IDC_EPISODE_ID: 115
MDC_IDC_EPISODE_ID: 116
MDC_IDC_EPISODE_ID: 117
MDC_IDC_EPISODE_TYPE: NORMAL
MDC_IDC_LEAD_IMPLANT_DT: NORMAL
MDC_IDC_LEAD_IMPLANT_DT: NORMAL
MDC_IDC_LEAD_LOCATION: NORMAL
MDC_IDC_LEAD_LOCATION: NORMAL
MDC_IDC_LEAD_LOCATION_DETAIL_1: NORMAL
MDC_IDC_LEAD_LOCATION_DETAIL_1: NORMAL
MDC_IDC_LEAD_MFG: NORMAL
MDC_IDC_LEAD_MFG: NORMAL
MDC_IDC_LEAD_MODEL: NORMAL
MDC_IDC_LEAD_MODEL: NORMAL
MDC_IDC_LEAD_POLARITY_TYPE: NORMAL
MDC_IDC_LEAD_POLARITY_TYPE: NORMAL
MDC_IDC_LEAD_SERIAL: NORMAL
MDC_IDC_LEAD_SERIAL: NORMAL
MDC_IDC_LEAD_SPECIAL_FUNCTION: NORMAL
MDC_IDC_LEAD_SPECIAL_FUNCTION: NORMAL
MDC_IDC_MSMT_BATTERY_DTM: NORMAL
MDC_IDC_MSMT_BATTERY_REMAINING_LONGEVITY: 91 MO
MDC_IDC_MSMT_BATTERY_RRT_TRIGGER: 2.83
MDC_IDC_MSMT_BATTERY_STATUS: NORMAL
MDC_IDC_MSMT_BATTERY_VOLTAGE: 3.02 V
MDC_IDC_MSMT_LEADCHNL_RA_IMPEDANCE_VALUE: 285 OHM
MDC_IDC_MSMT_LEADCHNL_RA_IMPEDANCE_VALUE: 399 OHM
MDC_IDC_MSMT_LEADCHNL_RA_PACING_THRESHOLD_AMPLITUDE: 0.62 V
MDC_IDC_MSMT_LEADCHNL_RA_PACING_THRESHOLD_PULSEWIDTH: 0.4 MS
MDC_IDC_MSMT_LEADCHNL_RA_SENSING_INTR_AMPL: 1 MV
MDC_IDC_MSMT_LEADCHNL_RA_SENSING_INTR_AMPL: 1 MV
MDC_IDC_MSMT_LEADCHNL_RV_IMPEDANCE_VALUE: 437 OHM
MDC_IDC_MSMT_LEADCHNL_RV_IMPEDANCE_VALUE: 456 OHM
MDC_IDC_MSMT_LEADCHNL_RV_PACING_THRESHOLD_AMPLITUDE: 0.5 V
MDC_IDC_MSMT_LEADCHNL_RV_PACING_THRESHOLD_PULSEWIDTH: 0.4 MS
MDC_IDC_MSMT_LEADCHNL_RV_SENSING_INTR_AMPL: 10.75 MV
MDC_IDC_MSMT_LEADCHNL_RV_SENSING_INTR_AMPL: 10.75 MV
MDC_IDC_PG_IMPLANT_DTM: NORMAL
MDC_IDC_PG_MFG: NORMAL
MDC_IDC_PG_MODEL: NORMAL
MDC_IDC_PG_SERIAL: NORMAL
MDC_IDC_PG_TYPE: NORMAL
MDC_IDC_SESS_CLINIC_NAME: NORMAL
MDC_IDC_SESS_DTM: NORMAL
MDC_IDC_SESS_TYPE: NORMAL
MDC_IDC_SET_BRADY_AT_MODE_SWITCH_RATE: 171 {BEATS}/MIN
MDC_IDC_SET_BRADY_HYSTRATE: NORMAL
MDC_IDC_SET_BRADY_LOWRATE: 60 {BEATS}/MIN
MDC_IDC_SET_BRADY_MAX_SENSOR_RATE: 130 {BEATS}/MIN
MDC_IDC_SET_BRADY_MAX_TRACKING_RATE: 130 {BEATS}/MIN
MDC_IDC_SET_BRADY_MODE: NORMAL
MDC_IDC_SET_BRADY_PAV_DELAY_LOW: 180 MS
MDC_IDC_SET_BRADY_SAV_DELAY_LOW: 150 MS
MDC_IDC_SET_LEADCHNL_RA_PACING_AMPLITUDE: 1.5 V
MDC_IDC_SET_LEADCHNL_RA_PACING_ANODE_ELECTRODE_1: NORMAL
MDC_IDC_SET_LEADCHNL_RA_PACING_ANODE_LOCATION_1: NORMAL
MDC_IDC_SET_LEADCHNL_RA_PACING_CAPTURE_MODE: NORMAL
MDC_IDC_SET_LEADCHNL_RA_PACING_CATHODE_ELECTRODE_1: NORMAL
MDC_IDC_SET_LEADCHNL_RA_PACING_CATHODE_LOCATION_1: NORMAL
MDC_IDC_SET_LEADCHNL_RA_PACING_POLARITY: NORMAL
MDC_IDC_SET_LEADCHNL_RA_PACING_PULSEWIDTH: 0.4 MS
MDC_IDC_SET_LEADCHNL_RA_SENSING_ANODE_ELECTRODE_1: NORMAL
MDC_IDC_SET_LEADCHNL_RA_SENSING_ANODE_LOCATION_1: NORMAL
MDC_IDC_SET_LEADCHNL_RA_SENSING_CATHODE_ELECTRODE_1: NORMAL
MDC_IDC_SET_LEADCHNL_RA_SENSING_CATHODE_LOCATION_1: NORMAL
MDC_IDC_SET_LEADCHNL_RA_SENSING_POLARITY: NORMAL
MDC_IDC_SET_LEADCHNL_RA_SENSING_SENSITIVITY: 0.3 MV
MDC_IDC_SET_LEADCHNL_RV_PACING_AMPLITUDE: 2 V
MDC_IDC_SET_LEADCHNL_RV_PACING_ANODE_ELECTRODE_1: NORMAL
MDC_IDC_SET_LEADCHNL_RV_PACING_ANODE_LOCATION_1: NORMAL
MDC_IDC_SET_LEADCHNL_RV_PACING_CAPTURE_MODE: NORMAL
MDC_IDC_SET_LEADCHNL_RV_PACING_CATHODE_ELECTRODE_1: NORMAL
MDC_IDC_SET_LEADCHNL_RV_PACING_CATHODE_LOCATION_1: NORMAL
MDC_IDC_SET_LEADCHNL_RV_PACING_POLARITY: NORMAL
MDC_IDC_SET_LEADCHNL_RV_PACING_PULSEWIDTH: 0.4 MS
MDC_IDC_SET_LEADCHNL_RV_SENSING_ANODE_ELECTRODE_1: NORMAL
MDC_IDC_SET_LEADCHNL_RV_SENSING_ANODE_LOCATION_1: NORMAL
MDC_IDC_SET_LEADCHNL_RV_SENSING_CATHODE_ELECTRODE_1: NORMAL
MDC_IDC_SET_LEADCHNL_RV_SENSING_CATHODE_LOCATION_1: NORMAL
MDC_IDC_SET_LEADCHNL_RV_SENSING_POLARITY: NORMAL
MDC_IDC_SET_LEADCHNL_RV_SENSING_SENSITIVITY: 0.9 MV
MDC_IDC_SET_ZONE_DETECTION_INTERVAL: 350 MS
MDC_IDC_SET_ZONE_DETECTION_INTERVAL: 400 MS
MDC_IDC_SET_ZONE_TYPE: NORMAL
MDC_IDC_STAT_AT_BURDEN_PERCENT: 0.7 %
MDC_IDC_STAT_AT_DTM_END: NORMAL
MDC_IDC_STAT_AT_DTM_START: NORMAL
MDC_IDC_STAT_BRADY_AP_VP_PERCENT: 0.08 %
MDC_IDC_STAT_BRADY_AP_VS_PERCENT: 99.03 %
MDC_IDC_STAT_BRADY_AS_VP_PERCENT: 0.05 %
MDC_IDC_STAT_BRADY_AS_VS_PERCENT: 0.84 %
MDC_IDC_STAT_BRADY_DTM_END: NORMAL
MDC_IDC_STAT_BRADY_DTM_START: NORMAL
MDC_IDC_STAT_BRADY_RA_PERCENT_PACED: 98.57 %
MDC_IDC_STAT_BRADY_RV_PERCENT_PACED: 0.12 %
MDC_IDC_STAT_EPISODE_RECENT_COUNT: 0
MDC_IDC_STAT_EPISODE_RECENT_COUNT: 6
MDC_IDC_STAT_EPISODE_RECENT_COUNT_DTM_END: NORMAL
MDC_IDC_STAT_EPISODE_RECENT_COUNT_DTM_START: NORMAL
MDC_IDC_STAT_EPISODE_TOTAL_COUNT: 0
MDC_IDC_STAT_EPISODE_TOTAL_COUNT: 0
MDC_IDC_STAT_EPISODE_TOTAL_COUNT: 24
MDC_IDC_STAT_EPISODE_TOTAL_COUNT: 92
MDC_IDC_STAT_EPISODE_TOTAL_COUNT_DTM_END: NORMAL
MDC_IDC_STAT_EPISODE_TOTAL_COUNT_DTM_START: NORMAL
MDC_IDC_STAT_EPISODE_TYPE: NORMAL

## 2019-12-19 DIAGNOSIS — I10 ESSENTIAL HYPERTENSION: ICD-10-CM

## 2019-12-19 RX ORDER — LISINOPRIL 10 MG/1
10 TABLET ORAL DAILY
Qty: 90 TABLET | Refills: 2 | Status: CANCELLED | OUTPATIENT
Start: 2019-12-19

## 2019-12-19 RX ORDER — LISINOPRIL 10 MG/1
10 TABLET ORAL DAILY
Qty: 90 TABLET | Refills: 0 | Status: SHIPPED | OUTPATIENT
Start: 2019-12-19 | End: 2020-01-16

## 2020-01-06 DIAGNOSIS — D64.9 ANEMIA, UNSPECIFIED TYPE: ICD-10-CM

## 2020-01-06 DIAGNOSIS — I10 ESSENTIAL HYPERTENSION: Primary | ICD-10-CM

## 2020-01-14 DIAGNOSIS — I10 ESSENTIAL HYPERTENSION: ICD-10-CM

## 2020-01-14 RX ORDER — HYDROCHLOROTHIAZIDE 12.5 MG/1
12.5 CAPSULE ORAL DAILY
Qty: 90 CAPSULE | Refills: 3 | Status: SHIPPED | OUTPATIENT
Start: 2020-01-14 | End: 2021-01-20

## 2020-01-14 RX ORDER — HYDROCHLOROTHIAZIDE 12.5 MG/1
12.5 CAPSULE ORAL DAILY
Qty: 90 CAPSULE | Refills: 1 | Status: CANCELLED | OUTPATIENT
Start: 2020-01-14

## 2020-01-16 ENCOUNTER — ANCILLARY PROCEDURE (OUTPATIENT)
Dept: CARDIOLOGY | Facility: CLINIC | Age: 85
End: 2020-01-16
Attending: INTERNAL MEDICINE
Payer: COMMERCIAL

## 2020-01-16 ENCOUNTER — OFFICE VISIT (OUTPATIENT)
Dept: CARDIOLOGY | Facility: CLINIC | Age: 85
End: 2020-01-16
Payer: COMMERCIAL

## 2020-01-16 VITALS
HEART RATE: 61 BPM | OXYGEN SATURATION: 98 % | HEIGHT: 63 IN | SYSTOLIC BLOOD PRESSURE: 130 MMHG | WEIGHT: 125 LBS | BODY MASS INDEX: 22.15 KG/M2 | DIASTOLIC BLOOD PRESSURE: 71 MMHG

## 2020-01-16 DIAGNOSIS — D64.9 ANEMIA, UNSPECIFIED TYPE: ICD-10-CM

## 2020-01-16 DIAGNOSIS — I10 ESSENTIAL HYPERTENSION: ICD-10-CM

## 2020-01-16 DIAGNOSIS — M19.90 ARTHRITIS: Primary | ICD-10-CM

## 2020-01-16 DIAGNOSIS — I49.5 SICK SINUS SYNDROME (H): ICD-10-CM

## 2020-01-16 DIAGNOSIS — I49.5 SICK SINUS SYNDROME (H): Primary | ICD-10-CM

## 2020-01-16 LAB
ANION GAP SERPL CALCULATED.3IONS-SCNC: 5 MMOL/L (ref 3–14)
BUN SERPL-MCNC: 22 MG/DL (ref 7–30)
CALCIUM SERPL-MCNC: 9.1 MG/DL (ref 8.5–10.1)
CHLORIDE SERPL-SCNC: 108 MMOL/L (ref 94–109)
CO2 SERPL-SCNC: 26 MMOL/L (ref 20–32)
CREAT SERPL-MCNC: 0.79 MG/DL (ref 0.52–1.04)
CRP SERPL HS-MCNC: 1 MG/L
ERYTHROCYTE [DISTWIDTH] IN BLOOD BY AUTOMATED COUNT: 13.5 % (ref 10–15)
GFR SERPL CREATININE-BSD FRML MDRD: 67 ML/MIN/{1.73_M2}
GLUCOSE SERPL-MCNC: 97 MG/DL (ref 70–99)
HCT VFR BLD AUTO: 35.5 % (ref 35–47)
HGB BLD-MCNC: 11.3 G/DL (ref 11.7–15.7)
MCH RBC QN AUTO: 29.6 PG (ref 26.5–33)
MCHC RBC AUTO-ENTMCNC: 31.8 G/DL (ref 31.5–36.5)
MCV RBC AUTO: 93 FL (ref 78–100)
PLATELET # BLD AUTO: 188 10E9/L (ref 150–450)
POTASSIUM SERPL-SCNC: 4.1 MMOL/L (ref 3.4–5.3)
RBC # BLD AUTO: 3.82 10E12/L (ref 3.8–5.2)
SODIUM SERPL-SCNC: 138 MMOL/L (ref 133–144)
WBC # BLD AUTO: 7.2 10E9/L (ref 4–11)

## 2020-01-16 RX ORDER — LISINOPRIL 10 MG/1
10 TABLET ORAL DAILY
Qty: 90 TABLET | Refills: 3 | Status: SHIPPED | OUTPATIENT
Start: 2020-01-16 | End: 2021-04-09

## 2020-01-16 RX ORDER — METOPROLOL SUCCINATE 50 MG/1
TABLET, EXTENDED RELEASE ORAL
Qty: 135 TABLET | Refills: 3 | Status: SHIPPED | OUTPATIENT
Start: 2020-01-16 | End: 2021-01-12

## 2020-01-16 ASSESSMENT — MIFFLIN-ST. JEOR: SCORE: 958.19

## 2020-01-16 NOTE — PROGRESS NOTES
PROBLEM LIST  Patient Active Problem List   Diagnosis     Hypertension     Hyperlipidemia LDL goal <100     Paroxysmal A-fib (H)     Cardiac pacemaker, Medtronic, Dual Chamber, NOT dependent     Chronotropic incompetence     Atrioventricular block, incomplete       HPI:   Isadora Mendez is a 88 year old year old female with a history of hypertension non obstructive CAD (30-40% in 2009) tachybrady arrythmias including VT, sinus pauses (SSS) and with pacer insertion 2010 and paraxysmal afib. She now and then will forget or be late with her metoprolol which she believes is the cause of recurrent afib now and then. She takes an extra metoprolol and this seems to return her to normal rhythm. She feels tired with mild shortness of breath when she is in afib. She denies chest pain and has mild lower leg edema but not in her feet. She continues to be active and does  for grandchildren with her .     PAST MEDICAL HISTORY:  Past Medical History:   Diagnosis Date     Atrial fibrillation (H) 2011     Facial fracture (H) 2006     Hypertension      NSVT (nonsustained ventricular tachycardia) (H) 2009    during exercise echo, neg EP study     Pacemaker 7-1-2010     S/P cardiac catheterization 2009    30-40% non obstructive lesion     Ventricular tachycardia, nonsustained (H) 2009    during stress echo       CURRENT MEDICATIONS:  Current Outpatient Medications   Medication Sig Dispense Refill     apixaban ANTICOAGULANT (ELIQUIS) 2.5 MG tablet Take 1 tablet (2.5 mg) by mouth 2 times daily (Patient not taking: Reported on 12/14/2018) 60 tablet 11     Ascorbic Acid (VITAMIN C PO)        aspirin 81 MG tablet Take 1 tablet by mouth daily.       BIOTIN PO Take 1 capsule by mouth daily.       Calcium Carbonate-Vitamin D (CALCIUM 500 + D PO) Take 1 tablet by mouth 2 times daily.       coenzyme Q-10 100 MG TABS        COLLAGEN PO Take 1 capsule by mouth 2 times daily.       conjugated estrogens (PREMARIN) cream Place vaginally  twice a week       Cyanocobalamin (VITAMIN B12 PO) Take 1 tablet by mouth every 14 days        hydrochlorothiazide (MICROZIDE) 12.5 MG capsule Take 1 capsule (12.5 mg) by mouth daily 90 capsule 3     lisinopril (PRINIVIL/ZESTRIL) 10 MG tablet Take 1 tablet (10 mg) by mouth daily 90 tablet 0     MAGNESIUM PO one daily       metoprolol succinate ER (TOPROL-XL) 50 MG 24 hr tablet 50 mg in am, 25 mg in pm 135 tablet 3     NEW MED Mineral drops       potassium chloride (KAYCIEL) 20 MEQ/15ML (10%) solution Take 1 mEq by mouth every other day        Pyridoxine HCl (VITAMIN B-6 PO) Take 1 tablet by mouth twice a week        TURMERIC PO Take 1 tablet by mouth       Vitamin D, Cholecalciferol, 1000 units TABS Take 1 tablet by mouth daily       VITAMIN K PO Take 2 tablets by mouth every other day         PAST SURGICAL HISTORY:  Past Surgical History:   Procedure Laterality Date     IMPLANT PACEMAKER       PPM  2010    Permanent Pacemaker       ALLERGIES   No Known Allergies    FAMILY HISTORY:  Family History   Problem Relation Age of Onset     Cardiovascular Father 76         age 80, MI 76 and CHF 80's     Cardiovascular Paternal Grandfather          age 76 of CVD     Myocardial Infarction Mother 88        lived to 100     Neuropathy Sister      Arrhythmia Brother         pacemaker     Arrhythmia Brother         pacemaker, hx rheumatic fever       SOCIAL HISTORY:  Social History     Socioeconomic History     Marital status:      Spouse name: Not on file     Number of children: Not on file     Years of education: Not on file     Highest education level: Not on file   Occupational History     Occupation: retired     Comment: nurse   Social Needs     Financial resource strain: Not on file     Food insecurity:     Worry: Not on file     Inability: Not on file     Transportation needs:     Medical: Not on file     Non-medical: Not on file   Tobacco Use     Smoking status: Never Smoker     Smokeless tobacco:  Never Used   Substance and Sexual Activity     Alcohol use: No     Drug use: No     Sexual activity: Not on file   Lifestyle     Physical activity:     Days per week: Not on file     Minutes per session: Not on file     Stress: Not on file   Relationships     Social connections:     Talks on phone: Not on file     Gets together: Not on file     Attends Spiritism service: Not on file     Active member of club or organization: Not on file     Attends meetings of clubs or organizations: Not on file     Relationship status: Not on file     Intimate partner violence:     Fear of current or ex partner: Not on file     Emotionally abused: Not on file     Physically abused: Not on file     Forced sexual activity: Not on file   Other Topics Concern     Parent/sibling w/ CABG, MI or angioplasty before 65F 55M? Not Asked   Social History Narrative     Not on file       ROS:   Constitutional: No fever, chills, or sweats. No weight gain/loss   ENT: No visual disturbance, ear ache, epistaxis, sore throat  Allergies/Immunologic: Negative.   Respiratory: No cough, hemoptysia  Cardiovascular: As per HPI  GI: No nausea, vomiting, hematemesis, melena, or hematochezia  : No urinary frequency, dysuria, or hematuria  Integument: Negative  Psychiatric: Negative  Neuro: Negative  Endocrinology: Negative   Musculoskeletal: Negative    EXAM:  There were no vitals taken for this visit.  In general, the patient is a pleasant female in no apparent distress.    HEENT: NC/AT.  PERRLA.  EOMI.  Sclerae white, not injected.  Nares clear.  Pharynx without erythema or exudate.  Dentition intact.    Neck: No adenopathy.  No thyromegaly. Carotids +4/4 bilaterally without bruits.  No jugular venous distension.   Heart: RRR. Normal S1, S2 splits physiologically. No murmur, rub, click, or gallop. The PMI is in the 5th ICS in the midclavicular line. There is no heave.    Lungs: CTA.  No ronchi, wheezes, rales.  No dullness to percussion.   Abdomen: Soft,  nontender, nondistended. No organomegaly.  No bruits.   Extremities: No clubbing, cyanosis, or edema.  The pulses are +4/4 at the radial, brachial, femoral, popliteal, DP, and PT sites bilaterally.  No bruits are noted.      Labs:  LIPID RESULTS:  Lab Results   Component Value Date    CHOL 166 12/14/2018     Lab Results   Component Value Date    HDL 78 12/14/2018     Lab Results   Component Value Date    LDL 79 12/14/2018     Lab Results   Component Value Date    TRIG 45 12/14/2018     Lab Results   Component Value Date    CHOLHDLRATIO 1.8 10/27/2014       Lab Results   Component Value Date    AST 27 12/14/2018     Lab Results   Component Value Date    ALT 25 12/14/2018       CBC RESULTS:  Lab Results   Component Value Date    HGB 11.3 01/16/2020     Lab Results   Component Value Date    WBC 7.2 01/16/2020     Lab Results   Component Value Date    RBC 3.82 01/16/2020     Lab Results   Component Value Date    HCT 35.5 01/16/2020     Lab Results   Component Value Date    MCV 93 01/16/2020     Lab Results   Component Value Date    MCH 29.6 01/16/2020     Lab Results   Component Value Date    MCHC 31.8 01/16/2020     Lab Results   Component Value Date    RDW 13.5 01/16/2020     Lab Results   Component Value Date     01/16/2020       BMP RESULTS:  Lab Results   Component Value Date     12/14/2018      Lab Results   Component Value Date    POTASSIUM 4.1 12/14/2018     Lab Results   Component Value Date    CHLORIDE 103 12/14/2018     Lab Results   Component Value Date    INO 9.5 12/14/2018     Lab Results   Component Value Date    CO2 28 12/14/2018     Lab Results   Component Value Date    BUN 21 12/14/2018     Lab Results   Component Value Date    CR 0.83 12/14/2018     Lab Results   Component Value Date    GFRESTIMATED 65 12/14/2018     Lab Results   Component Value Date    GLC 87 12/14/2018       No results found for: A1C  Lab Results   Component Value Date    INR 1.13 09/14/2016    INR 1.04 09/03/2013        Procedures:      Assessment and Plan:   Cardiovascular: Device check this morning. Last echo 2016 with normal EF% 60-65 with atrial enlargement. Cardiac cath 2009 with non obstructive lesion 30-40%.  She notices episodes of afib when she is late or forgets her metoprolol. When in afib she is more tired but no report of chest pain. She continues to struggle with taking anticoagulation. She has siblings who take NOACS for afib but she is still uncertain. We discussed pro and cons and she will consider a NOAC. She is taking aspirin and nutritional supplements that are suppose to provide some anticoagulation. She has mild lower leg edema more around her lower leg than feet. TSH was checked 5-2019 and was in low normal range.    Blood pressure: Clinic BP today 130/71 at target for her age.Home blood pressures range from 112/62 to 159/84 with most below 140/90 with lisinopril 10 mg per day, metoprolol 50 mg morning and 25 evening and HcTZ 12.5 mg per day.     Anemia: Hg at 11.3 most likely chronic anemia. Her HG has ranged from 11.4 to 12.7 in the recent past. We did discuss eating foods rich in iron. No report of blood in her stools or urine.She will see Dr. Dixon in April, will obtain recheck at that time.

## 2020-01-16 NOTE — PATIENT INSTRUCTIONS
It was a pleasure to see you in clinic today.  Please do not hesitate to call with any questions or concerns.  You will be scheduled for remote transmissions every 3 months. You're next scheduled transmission is on 4/16/20.  We look forward to seeing you in clinic at your next device check in 1 year.      Malia Florez RN, BSN  Electrophysiology Nurse Clinician  [facility]    During Business Hours Please Call:  [phone]  After Hours Please Call:  [phone] - select option #[phone] and ask for job code [phone]

## 2020-01-16 NOTE — LETTER
1/16/2020      RE: Isadora Mendez  [address] [facility] [address]       Dear Colleague,    Thank you for the opportunity to participate in the care of your patient, Isadora Mendez, at the [facility] at [facility]. Please see a copy of my visit note below.    PROBLEM LIST  Patient Active Problem List   Diagnosis     Hypertension     Hyperlipidemia LDL goal <100     Paroxysmal A-fib (H)     Cardiac pacemaker, Medtronic, Dual Chamber, NOT dependent     Chronotropic incompetence     Atrioventricular block, incomplete       HPI:   Isadora Mendez is a 88 year old year old female with a history of hypertension non obstructive CAD (30-40% in 2009) tachybrady arrythmias including VT, sinus pauses (SSS) and with pacer insertion 2010 and paraxysmal afib. She now and then will forget or be late with her metoprolol which she believes is the cause of recurrent afib now and then. She takes an extra metoprolol and this seems to return her to normal rhythm. She feels tired with mild shortness of breath when she is in afib. She denies chest pain and has mild lower leg edema but not in her feet. She continues to be active and does  for grandchildren with her .     PAST MEDICAL HISTORY:  Past Medical History:   Diagnosis Date     Atrial fibrillation (H) 2011     Facial fracture (H) 2006     Hypertension      NSVT (nonsustained ventricular tachycardia) (H) 2009    during exercise echo, neg EP study     Pacemaker 7-1-2010     S/P cardiac catheterization 2009    30-40% non obstructive lesion     Ventricular tachycardia, nonsustained (H) 2009    during stress echo       CURRENT MEDICATIONS:  Current Outpatient Medications   Medication Sig Dispense Refill     apixaban ANTICOAGULANT (ELIQUIS) 2.5 MG tablet Take 1 tablet (2.5 mg) by mouth 2 times daily (Patient not taking: Reported on 12/14/2018) 60 tablet 11     Ascorbic Acid (VITAMIN C PO)         aspirin 81 MG tablet Take 1 tablet by mouth daily.       BIOTIN PO Take 1 capsule by mouth daily.       Calcium Carbonate-Vitamin D (CALCIUM 500 + D PO) Take 1 tablet by mouth 2 times daily.       coenzyme Q-10 100 MG TABS        COLLAGEN PO Take 1 capsule by mouth 2 times daily.       conjugated estrogens (PREMARIN) cream Place vaginally twice a week       Cyanocobalamin (VITAMIN B12 PO) Take 1 tablet by mouth every 14 days        hydrochlorothiazide (MICROZIDE) 12.5 MG capsule Take 1 capsule (12.5 mg) by mouth daily 90 capsule 3     lisinopril (PRINIVIL/ZESTRIL) 10 MG tablet Take 1 tablet (10 mg) by mouth daily 90 tablet 0     MAGNESIUM PO one daily       metoprolol succinate ER (TOPROL-XL) 50 MG 24 hr tablet 50 mg in am, 25 mg in pm 135 tablet 3     NEW MED Mineral drops       potassium chloride (KAYCIEL) 20 MEQ/15ML (10%) solution Take 1 mEq by mouth every other day        Pyridoxine HCl (VITAMIN B-6 PO) Take 1 tablet by mouth twice a week        TURMERIC PO Take 1 tablet by mouth       Vitamin D, Cholecalciferol, 1000 units TABS Take 1 tablet by mouth daily       VITAMIN K PO Take 2 tablets by mouth every other day         PAST SURGICAL HISTORY:  Past Surgical History:   Procedure Laterality Date     IMPLANT PACEMAKER       PPM  2010    Permanent Pacemaker       ALLERGIES   No Known Allergies    FAMILY HISTORY:  Family History   Problem Relation Age of Onset     Cardiovascular Father 76         age 80, MI 76 and CHF 80's     Cardiovascular Paternal Grandfather          age 76 of CVD     Myocardial Infarction Mother 88        lived to 100     Neuropathy Sister      Arrhythmia Brother         pacemaker     Arrhythmia Brother         pacemaker, hx rheumatic fever       SOCIAL HISTORY:  Social History     Socioeconomic History     Marital status:      Spouse name: Not on file     Number of children: Not on file     Years of education: Not on file     Highest education level: Not on file    Occupational History     Occupation: retired     Comment: nurse   Social Needs     Financial resource strain: Not on file     Food insecurity:     Worry: Not on file     Inability: Not on file     Transportation needs:     Medical: Not on file     Non-medical: Not on file   Tobacco Use     Smoking status: Never Smoker     Smokeless tobacco: Never Used   Substance and Sexual Activity     Alcohol use: No     Drug use: No     Sexual activity: Not on file   Lifestyle     Physical activity:     Days per week: Not on file     Minutes per session: Not on file     Stress: Not on file   Relationships     Social connections:     Talks on phone: Not on file     Gets together: Not on file     Attends Catholic service: Not on file     Active member of club or organization: Not on file     Attends meetings of clubs or organizations: Not on file     Relationship status: Not on file     Intimate partner violence:     Fear of current or ex partner: Not on file     Emotionally abused: Not on file     Physically abused: Not on file     Forced sexual activity: Not on file   Other Topics Concern     Parent/sibling w/ CABG, MI or angioplasty before 65F 55M? Not Asked   Social History Narrative     Not on file       ROS:   Constitutional: No fever, chills, or sweats. No weight gain/loss   ENT: No visual disturbance, ear ache, epistaxis, sore throat  Allergies/Immunologic: Negative.   Respiratory: No cough, hemoptysia  Cardiovascular: As per HPI  GI: No nausea, vomiting, hematemesis, melena, or hematochezia  : No urinary frequency, dysuria, or hematuria  Integument: Negative  Psychiatric: Negative  Neuro: Negative  Endocrinology: Negative   Musculoskeletal: Negative    EXAM:  There were no vitals taken for this visit.  In general, the patient is a pleasant female in no apparent distress.    HEENT: NC/AT.  KIMMY.  EOMI.  Sclerae white, not injected.  Nares clear.  Pharynx without erythema or exudate.  Dentition intact.    Neck: No  adenopathy.  No thyromegaly. Carotids +4/4 bilaterally without bruits.  No jugular venous distension.   Heart: RRR. Normal S1, S2 splits physiologically. No murmur, rub, click, or gallop. The PMI is in the 5th ICS in the midclavicular line. There is no heave.    Lungs: CTA.  No ronchi, wheezes, rales.  No dullness to percussion.   Abdomen: Soft, nontender, nondistended. No organomegaly.  No bruits.   Extremities: No clubbing, cyanosis, or edema.  The pulses are +4/4 at the radial, brachial, femoral, popliteal, DP, and PT sites bilaterally.  No bruits are noted.      Labs:  LIPID RESULTS:  Lab Results   Component Value Date    CHOL 166 12/14/2018     Lab Results   Component Value Date    HDL 78 12/14/2018     Lab Results   Component Value Date    LDL 79 12/14/2018     Lab Results   Component Value Date    TRIG 45 12/14/2018     Lab Results   Component Value Date    CHOLHDLRATIO 1.8 10/27/2014       Lab Results   Component Value Date    AST 27 12/14/2018     Lab Results   Component Value Date    ALT 25 12/14/2018       CBC RESULTS:  Lab Results   Component Value Date    HGB 11.3 01/16/2020     Lab Results   Component Value Date    WBC 7.2 01/16/2020     Lab Results   Component Value Date    RBC 3.82 01/16/2020     Lab Results   Component Value Date    HCT 35.5 01/16/2020     Lab Results   Component Value Date    MCV 93 01/16/2020     Lab Results   Component Value Date    MCH 29.6 01/16/2020     Lab Results   Component Value Date    MCHC 31.8 01/16/2020     Lab Results   Component Value Date    RDW 13.5 01/16/2020     Lab Results   Component Value Date     01/16/2020       BMP RESULTS:  Lab Results   Component Value Date     12/14/2018      Lab Results   Component Value Date    POTASSIUM 4.1 12/14/2018     Lab Results   Component Value Date    CHLORIDE 103 12/14/2018     Lab Results   Component Value Date    INO 9.5 12/14/2018     Lab Results   Component Value Date    CO2 28 12/14/2018     Lab Results    Component Value Date    BUN 21 12/14/2018     Lab Results   Component Value Date    CR 0.83 12/14/2018     Lab Results   Component Value Date    GFRESTIMATED 65 12/14/2018     Lab Results   Component Value Date    GLC 87 12/14/2018       No results found for: A1C  Lab Results   Component Value Date    INR 1.13 09/14/2016    INR 1.04 09/03/2013       Procedures:      Assessment and Plan:   Cardiovascular: Device check this morning. Last echo 2016 with normal EF% 60-65 with atrial enlargement. Cardiac cath 2009 with non obstructive lesion 30-40%.  She notices episodes of afib when she is late or forgets her metoprolol. When in afib she is more tired but no report of chest pain. She continues to struggle with taking anticoagulation. She has siblings who take NOACS for afib but she is still uncertain. We discussed pro and cons and she will consider a NOAC. She is taking aspirin and nutritional supplements that are suppose to provide some anticoagulation. She has mild lower leg edema more around her lower leg than feet. TSH was checked 5-2019 and was in low normal range.    Blood pressure: Clinic BP today 130/71 at target for her age.Home blood pressures range from 112/62 to 159/84 with most below 140/90 with lisinopril 10 mg per day, metoprolol 50 mg morning and 25 evening and HcTZ 12.5 mg per day.     Anemia: Hg at 11.3 most likely chronic anemia. Her HG has ranged from 11.4 to 12.7 in the recent past. We did discuss eating foods rich in iron. No report of blood in her stools or urine.She will see Dr. Dixon in April, will obtain recheck at that time.    Please do not hesitate to contact me if you have any questions/concerns.     Sincerely,     IBRAHIMA Brown CNP

## 2020-01-20 LAB
MDC_IDC_EPISODE_DTM: NORMAL
MDC_IDC_EPISODE_DURATION: 0 S
MDC_IDC_EPISODE_DURATION: 1 S
MDC_IDC_EPISODE_DURATION: 1 S
MDC_IDC_EPISODE_DURATION: 1096 S
MDC_IDC_EPISODE_DURATION: 1588 S
MDC_IDC_EPISODE_DURATION: 1632 S
MDC_IDC_EPISODE_DURATION: 2270 S
MDC_IDC_EPISODE_DURATION: 2707 S
MDC_IDC_EPISODE_DURATION: 301 S
MDC_IDC_EPISODE_DURATION: 42 S
MDC_IDC_EPISODE_DURATION: 773 S
MDC_IDC_EPISODE_DURATION: 88 S
MDC_IDC_EPISODE_ID: 118
MDC_IDC_EPISODE_ID: 119
MDC_IDC_EPISODE_ID: 120
MDC_IDC_EPISODE_ID: 121
MDC_IDC_EPISODE_ID: 122
MDC_IDC_EPISODE_ID: 123
MDC_IDC_EPISODE_ID: 124
MDC_IDC_EPISODE_ID: 125
MDC_IDC_EPISODE_ID: 126
MDC_IDC_EPISODE_ID: 127
MDC_IDC_EPISODE_ID: 128
MDC_IDC_EPISODE_ID: 129
MDC_IDC_EPISODE_TYPE: NORMAL
MDC_IDC_LEAD_IMPLANT_DT: NORMAL
MDC_IDC_LEAD_IMPLANT_DT: NORMAL
MDC_IDC_LEAD_LOCATION: NORMAL
MDC_IDC_LEAD_LOCATION: NORMAL
MDC_IDC_LEAD_LOCATION_DETAIL_1: NORMAL
MDC_IDC_LEAD_LOCATION_DETAIL_1: NORMAL
MDC_IDC_LEAD_MFG: NORMAL
MDC_IDC_LEAD_MFG: NORMAL
MDC_IDC_LEAD_MODEL: NORMAL
MDC_IDC_LEAD_MODEL: NORMAL
MDC_IDC_LEAD_POLARITY_TYPE: NORMAL
MDC_IDC_LEAD_POLARITY_TYPE: NORMAL
MDC_IDC_LEAD_SERIAL: NORMAL
MDC_IDC_LEAD_SERIAL: NORMAL
MDC_IDC_LEAD_SPECIAL_FUNCTION: NORMAL
MDC_IDC_LEAD_SPECIAL_FUNCTION: NORMAL
MDC_IDC_MSMT_BATTERY_DTM: NORMAL
MDC_IDC_MSMT_BATTERY_REMAINING_LONGEVITY: 89 MO
MDC_IDC_MSMT_BATTERY_RRT_TRIGGER: 2.83
MDC_IDC_MSMT_BATTERY_STATUS: NORMAL
MDC_IDC_MSMT_BATTERY_VOLTAGE: 3.02 V
MDC_IDC_MSMT_LEADCHNL_RA_IMPEDANCE_VALUE: 266 OHM
MDC_IDC_MSMT_LEADCHNL_RA_IMPEDANCE_VALUE: 418 OHM
MDC_IDC_MSMT_LEADCHNL_RA_PACING_THRESHOLD_AMPLITUDE: 0.62 V
MDC_IDC_MSMT_LEADCHNL_RA_PACING_THRESHOLD_AMPLITUDE: 0.75 V
MDC_IDC_MSMT_LEADCHNL_RA_PACING_THRESHOLD_PULSEWIDTH: 0.4 MS
MDC_IDC_MSMT_LEADCHNL_RA_PACING_THRESHOLD_PULSEWIDTH: 0.4 MS
MDC_IDC_MSMT_LEADCHNL_RA_SENSING_INTR_AMPL: 1.25 MV
MDC_IDC_MSMT_LEADCHNL_RA_SENSING_INTR_AMPL: 1.25 MV
MDC_IDC_MSMT_LEADCHNL_RV_IMPEDANCE_VALUE: 437 OHM
MDC_IDC_MSMT_LEADCHNL_RV_IMPEDANCE_VALUE: 475 OHM
MDC_IDC_MSMT_LEADCHNL_RV_PACING_THRESHOLD_AMPLITUDE: 0.62 V
MDC_IDC_MSMT_LEADCHNL_RV_PACING_THRESHOLD_AMPLITUDE: 0.75 V
MDC_IDC_MSMT_LEADCHNL_RV_PACING_THRESHOLD_PULSEWIDTH: 0.4 MS
MDC_IDC_MSMT_LEADCHNL_RV_PACING_THRESHOLD_PULSEWIDTH: 0.4 MS
MDC_IDC_MSMT_LEADCHNL_RV_SENSING_INTR_AMPL: 11.12 MV
MDC_IDC_MSMT_LEADCHNL_RV_SENSING_INTR_AMPL: 11.62 MV
MDC_IDC_PG_IMPLANT_DTM: NORMAL
MDC_IDC_PG_MFG: NORMAL
MDC_IDC_PG_MODEL: NORMAL
MDC_IDC_PG_SERIAL: NORMAL
MDC_IDC_PG_TYPE: NORMAL
MDC_IDC_SESS_CLINIC_NAME: NORMAL
MDC_IDC_SESS_DTM: NORMAL
MDC_IDC_SESS_TYPE: NORMAL
MDC_IDC_SET_BRADY_AT_MODE_SWITCH_RATE: 171 {BEATS}/MIN
MDC_IDC_SET_BRADY_HYSTRATE: NORMAL
MDC_IDC_SET_BRADY_LOWRATE: 60 {BEATS}/MIN
MDC_IDC_SET_BRADY_MAX_SENSOR_RATE: 130 {BEATS}/MIN
MDC_IDC_SET_BRADY_MAX_TRACKING_RATE: 130 {BEATS}/MIN
MDC_IDC_SET_BRADY_MODE: NORMAL
MDC_IDC_SET_BRADY_PAV_DELAY_LOW: 180 MS
MDC_IDC_SET_BRADY_SAV_DELAY_LOW: 150 MS
MDC_IDC_SET_LEADCHNL_RA_PACING_AMPLITUDE: 1.5 V
MDC_IDC_SET_LEADCHNL_RA_PACING_ANODE_ELECTRODE_1: NORMAL
MDC_IDC_SET_LEADCHNL_RA_PACING_ANODE_LOCATION_1: NORMAL
MDC_IDC_SET_LEADCHNL_RA_PACING_CAPTURE_MODE: NORMAL
MDC_IDC_SET_LEADCHNL_RA_PACING_CATHODE_ELECTRODE_1: NORMAL
MDC_IDC_SET_LEADCHNL_RA_PACING_CATHODE_LOCATION_1: NORMAL
MDC_IDC_SET_LEADCHNL_RA_PACING_POLARITY: NORMAL
MDC_IDC_SET_LEADCHNL_RA_PACING_PULSEWIDTH: 0.4 MS
MDC_IDC_SET_LEADCHNL_RA_SENSING_ANODE_ELECTRODE_1: NORMAL
MDC_IDC_SET_LEADCHNL_RA_SENSING_ANODE_LOCATION_1: NORMAL
MDC_IDC_SET_LEADCHNL_RA_SENSING_CATHODE_ELECTRODE_1: NORMAL
MDC_IDC_SET_LEADCHNL_RA_SENSING_CATHODE_LOCATION_1: NORMAL
MDC_IDC_SET_LEADCHNL_RA_SENSING_POLARITY: NORMAL
MDC_IDC_SET_LEADCHNL_RA_SENSING_SENSITIVITY: 0.3 MV
MDC_IDC_SET_LEADCHNL_RV_PACING_AMPLITUDE: 2 V
MDC_IDC_SET_LEADCHNL_RV_PACING_ANODE_ELECTRODE_1: NORMAL
MDC_IDC_SET_LEADCHNL_RV_PACING_ANODE_LOCATION_1: NORMAL
MDC_IDC_SET_LEADCHNL_RV_PACING_CAPTURE_MODE: NORMAL
MDC_IDC_SET_LEADCHNL_RV_PACING_CATHODE_ELECTRODE_1: NORMAL
MDC_IDC_SET_LEADCHNL_RV_PACING_CATHODE_LOCATION_1: NORMAL
MDC_IDC_SET_LEADCHNL_RV_PACING_POLARITY: NORMAL
MDC_IDC_SET_LEADCHNL_RV_PACING_PULSEWIDTH: 0.4 MS
MDC_IDC_SET_LEADCHNL_RV_SENSING_ANODE_ELECTRODE_1: NORMAL
MDC_IDC_SET_LEADCHNL_RV_SENSING_ANODE_LOCATION_1: NORMAL
MDC_IDC_SET_LEADCHNL_RV_SENSING_CATHODE_ELECTRODE_1: NORMAL
MDC_IDC_SET_LEADCHNL_RV_SENSING_CATHODE_LOCATION_1: NORMAL
MDC_IDC_SET_LEADCHNL_RV_SENSING_POLARITY: NORMAL
MDC_IDC_SET_LEADCHNL_RV_SENSING_SENSITIVITY: 0.9 MV
MDC_IDC_SET_ZONE_DETECTION_INTERVAL: 350 MS
MDC_IDC_SET_ZONE_DETECTION_INTERVAL: 400 MS
MDC_IDC_SET_ZONE_TYPE: NORMAL
MDC_IDC_STAT_AT_BURDEN_PERCENT: 0.1 %
MDC_IDC_STAT_AT_DTM_END: NORMAL
MDC_IDC_STAT_AT_DTM_START: NORMAL
MDC_IDC_STAT_BRADY_AP_VP_PERCENT: 0.06 %
MDC_IDC_STAT_BRADY_AP_VS_PERCENT: 99.17 %
MDC_IDC_STAT_BRADY_AS_VP_PERCENT: 0.01 %
MDC_IDC_STAT_BRADY_AS_VS_PERCENT: 0.76 %
MDC_IDC_STAT_BRADY_DTM_END: NORMAL
MDC_IDC_STAT_BRADY_DTM_START: NORMAL
MDC_IDC_STAT_BRADY_RA_PERCENT_PACED: 99.09 %
MDC_IDC_STAT_BRADY_RV_PERCENT_PACED: 0.07 %
MDC_IDC_STAT_EPISODE_RECENT_COUNT: 0
MDC_IDC_STAT_EPISODE_RECENT_COUNT: 0
MDC_IDC_STAT_EPISODE_RECENT_COUNT: 18
MDC_IDC_STAT_EPISODE_RECENT_COUNT: 65
MDC_IDC_STAT_EPISODE_RECENT_COUNT_DTM_END: NORMAL
MDC_IDC_STAT_EPISODE_RECENT_COUNT_DTM_START: NORMAL
MDC_IDC_STAT_EPISODE_TOTAL_COUNT: 0
MDC_IDC_STAT_EPISODE_TOTAL_COUNT: 0
MDC_IDC_STAT_EPISODE_TOTAL_COUNT: 101
MDC_IDC_STAT_EPISODE_TOTAL_COUNT: 27
MDC_IDC_STAT_EPISODE_TOTAL_COUNT_DTM_END: NORMAL
MDC_IDC_STAT_EPISODE_TOTAL_COUNT_DTM_START: NORMAL
MDC_IDC_STAT_EPISODE_TYPE: NORMAL

## 2020-04-20 ENCOUNTER — ANCILLARY PROCEDURE (OUTPATIENT)
Dept: CARDIOLOGY | Facility: CLINIC | Age: 85
End: 2020-04-20
Attending: INTERNAL MEDICINE
Payer: COMMERCIAL

## 2020-04-20 DIAGNOSIS — I49.5 SICK SINUS SYNDROME (H): ICD-10-CM

## 2020-04-20 PROCEDURE — 93294 REM INTERROG EVL PM/LDLS PM: CPT | Mod: ZP | Performed by: INTERNAL MEDICINE

## 2020-04-20 PROCEDURE — 93296 REM INTERROG EVL PM/IDS: CPT | Mod: ZF

## 2020-04-21 ENCOUNTER — VIRTUAL VISIT (OUTPATIENT)
Dept: CARDIOLOGY | Facility: CLINIC | Age: 85
End: 2020-04-21
Attending: INTERNAL MEDICINE
Payer: COMMERCIAL

## 2020-04-21 DIAGNOSIS — Z95.0 CARDIAC PACEMAKER IN SITU: ICD-10-CM

## 2020-04-21 DIAGNOSIS — I47.29 PAROXYSMAL VENTRICULAR TACHYCARDIA (H): ICD-10-CM

## 2020-04-21 DIAGNOSIS — I10 ESSENTIAL HYPERTENSION: Primary | ICD-10-CM

## 2020-04-21 DIAGNOSIS — I48.0 PAROXYSMAL ATRIAL FIBRILLATION (H): ICD-10-CM

## 2020-04-21 DIAGNOSIS — I49.5 SICK SINUS SYNDROME (H): ICD-10-CM

## 2020-04-21 PROCEDURE — 99214 OFFICE O/P EST MOD 30 MIN: CPT | Mod: 95 | Performed by: INTERNAL MEDICINE

## 2020-04-21 ASSESSMENT — PAIN SCALES - GENERAL: PAINLEVEL: NO PAIN (0)

## 2020-04-21 NOTE — PATIENT INSTRUCTIONS
"You were evaluated today in the Cardiovascular Telemedicine Clinic at the [facility].     Cardiology Provider during your visit: Dr. Mary Dixon    Diagnosis:  Sick sinus, atrial fibrillation    Results: discussed with patient    Orders:   None    Medication Changes:   None    Recommendations:   None    Follow-up:   Keep remote pacemaker check as previously scheduled on 2020.  Pacemaker check every 3 months afterwards.    Please follow up with primary care provider for medication refills         Please feel free to call me with any questions or concerns.       Nancy West RN     Questions and [phone].   First press #1 for the TeamSupport and then press #3 for \"Medical Questions\" to reach us Cardiology Nurses.      On Call Cardiologist for after hours or on weekends: [phone]   option #4 and ask to speak to the on-call Cardiologist.          If you need a medication refill please contact your pharmacy.  Please allow 3 business days for your refill to be completed.   "

## 2020-04-21 NOTE — PROGRESS NOTES
"Isadoar Mendez is a 89 year old female who is being evaluated via a billable telephone visit.      The patient has been notified of following:     \"This telephone visit will be conducted via a call between you and your physician/provider. We have found that certain health care needs can be provided without the need for a physical exam.  This service lets us provide the care you need with a short phone conversation.  If a prescription is necessary we can send it directly to your pharmacy.  If lab work is needed we can place an order for that and you can then stop by our lab to have the test done at a later time.    Telephone visits are billed at different rates depending on your insurance coverage. During this emergency period, for some insurers they may be billed the same as an in-person visit.  Please reach out to your insurance provider with any questions.    If during the course of the call the physician/provider feels a telephone visit is not appropriate, you will not be charged for this service.\"    Patient has given verbal consent for Telephone visit?  Yes    How would you like to obtain your AVS? Mail a copy    Additional provider notes:   Electrophysiology Clinic Telephone Visit    HPI:   88 yo s/p pacemaker for sick sinus syndrome. Last seen by Dr. Lazo 8/21/2018.    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 Currently has only 2 kids. She denies lightheadedness, syncope, chest pressure, dyspnea, orthopnea. She has intermittent palpitations which had in the past corresponded to episodes of atrial fibrillation on there pacemaker, usually lasts an hour or less. She denies associated symptoms of chest pain or dyspnea with afib but has been taking an extra dose of metoprolol when she feels irregular heart beats. She is not anticoagulated - discussions had been previously undertaken but she has declined anticoagulation in favor of herbal supplements.    PAST MEDICAL HISTORY:  1. Pacemaker " 2010. Leads are Medtronic 5076.  2. Nonsustained VT  3. Hypertension  4. Atrial fibrillation, detected on device lasting > 1 hour, declined anticoagulation      CURRENT MEDICATIONS:  Aspirin 81 every day  hydrochlorothiazide 12.5 mg every day  Lisinopril 10 every day  Metoprolol succinate 50 qam/25 pm   Pycnogenol  KCL 20 meq      ALLERGIES:   No Known Allergies    FAMILY HISTORY:  Family History   Problem Relation Age of Onset     Cardiovascular Father 76         age 80, MI 76 and CHF 80's     Cardiovascular Paternal Grandfather          age 76 of CVD     Myocardial Infarction Mother 88        lived to 100     Neuropathy Sister      Arrhythmia Brother         pacemaker     Arrhythmia Brother         pacemaker, hx rheumatic fever       SOCIAL HISTORY:  Social History     Tobacco Use     Smoking status: Never Smoker     Smokeless tobacco: Never Used   Substance Use Topics     Alcohol use: No     Drug use: No       ROS:  10 point ROS neg other than the symptoms noted above in the HPI.    Labs:  Reviewed.     Testing/Procedures:  ECHOCARDIOGRAM:   2017: EF 65%, mild to mod MAC, posterior mitral valve prolapse with mild MR, BISHNU 56.    DEVICE INTERROGATION:  2020: Medtronic pacemaker, AAIR+  bpm, mode switch 171 bpm.  %,  <1%.  Episodes of nonsustained VT.   Last episode of atrial fibrillation 2019, lasting 27 minutes, average ventricular rate 108 bpm.  ~ 6 years to EMILEE.    Assessment and Plan:   1. Sick sinus syndrome s/p pacemaker implantation. PPM in good working order.  2. Atrial fibrillation, paroxysmal, minimally symptomatic with some irregular heart beats. Discussed with her that she doesn't need to take metoprolol succinate prn. If she feels irregular heart beats frequently and if it's bothersome, I favor increasing regular doses of metoprolol.  [id number] score is 4. Anticoagulation had been discussed with her before, and again today. She declines.  3. Hypertension.  Stable.  4. Nonsustained VT, known for several years, asymptomatic, normal EF.    Continue remote monitor check 7/21/2020 as previously scheduled.    I have spent 22 minutes of medical discussion.        Mary Dixon MD  Cardiology

## 2020-04-21 NOTE — PROGRESS NOTES
"Isadora Mendez is a 89 year old female who is being evaluated via a billable telephone visit.      The patient has been notified of following:     \"This telephone visit will be conducted via a call between you and your physician/provider. We have found that certain health care needs can be provided without the need for a physical exam.  This service lets us provide the care you need with a short phone conversation.  If a prescription is necessary we can send it directly to your pharmacy.  If lab work is needed we can place an order for that and you can then stop by our lab to have the test done at a later time.    Telephone visits are billed at different rates depending on your insurance coverage. During this emergency period, for some insurers they may be billed the same as an in-person visit.  Please reach out to your insurance provider with any questions.    If during the course of the call the physician/provider feels a telephone visit is not appropriate, you will not be charged for this service.\"    Patient has given verbal consent for Telephone visit?  Yes    How would you like to obtain your AVS? Mail a copy    "

## 2020-04-27 LAB
MDC_IDC_EPISODE_DTM: NORMAL
MDC_IDC_EPISODE_DURATION: 0 S
MDC_IDC_EPISODE_DURATION: 1 S
MDC_IDC_EPISODE_DURATION: 1 S
MDC_IDC_EPISODE_ID: 130
MDC_IDC_EPISODE_ID: 131
MDC_IDC_EPISODE_ID: 132
MDC_IDC_EPISODE_TYPE: NORMAL
MDC_IDC_LEAD_IMPLANT_DT: NORMAL
MDC_IDC_LEAD_IMPLANT_DT: NORMAL
MDC_IDC_LEAD_LOCATION: NORMAL
MDC_IDC_LEAD_LOCATION: NORMAL
MDC_IDC_LEAD_LOCATION_DETAIL_1: NORMAL
MDC_IDC_LEAD_LOCATION_DETAIL_1: NORMAL
MDC_IDC_LEAD_MFG: NORMAL
MDC_IDC_LEAD_MFG: NORMAL
MDC_IDC_LEAD_MODEL: NORMAL
MDC_IDC_LEAD_MODEL: NORMAL
MDC_IDC_LEAD_POLARITY_TYPE: NORMAL
MDC_IDC_LEAD_POLARITY_TYPE: NORMAL
MDC_IDC_LEAD_SERIAL: NORMAL
MDC_IDC_LEAD_SERIAL: NORMAL
MDC_IDC_LEAD_SPECIAL_FUNCTION: NORMAL
MDC_IDC_LEAD_SPECIAL_FUNCTION: NORMAL
MDC_IDC_MSMT_BATTERY_DTM: NORMAL
MDC_IDC_MSMT_BATTERY_REMAINING_LONGEVITY: 79 MO
MDC_IDC_MSMT_BATTERY_RRT_TRIGGER: 2.83
MDC_IDC_MSMT_BATTERY_STATUS: NORMAL
MDC_IDC_MSMT_BATTERY_VOLTAGE: 3.01 V
MDC_IDC_MSMT_LEADCHNL_RA_IMPEDANCE_VALUE: 285 OHM
MDC_IDC_MSMT_LEADCHNL_RA_IMPEDANCE_VALUE: 418 OHM
MDC_IDC_MSMT_LEADCHNL_RA_PACING_THRESHOLD_AMPLITUDE: 0.88 V
MDC_IDC_MSMT_LEADCHNL_RA_PACING_THRESHOLD_PULSEWIDTH: 0.4 MS
MDC_IDC_MSMT_LEADCHNL_RA_SENSING_INTR_AMPL: 1.12 MV
MDC_IDC_MSMT_LEADCHNL_RA_SENSING_INTR_AMPL: 1.12 MV
MDC_IDC_MSMT_LEADCHNL_RV_IMPEDANCE_VALUE: 418 OHM
MDC_IDC_MSMT_LEADCHNL_RV_IMPEDANCE_VALUE: 456 OHM
MDC_IDC_MSMT_LEADCHNL_RV_PACING_THRESHOLD_AMPLITUDE: 0.5 V
MDC_IDC_MSMT_LEADCHNL_RV_PACING_THRESHOLD_PULSEWIDTH: 0.4 MS
MDC_IDC_MSMT_LEADCHNL_RV_SENSING_INTR_AMPL: 11.12 MV
MDC_IDC_MSMT_LEADCHNL_RV_SENSING_INTR_AMPL: 11.12 MV
MDC_IDC_PG_IMPLANT_DTM: NORMAL
MDC_IDC_PG_MFG: NORMAL
MDC_IDC_PG_MODEL: NORMAL
MDC_IDC_PG_SERIAL: NORMAL
MDC_IDC_PG_TYPE: NORMAL
MDC_IDC_SESS_CLINIC_NAME: NORMAL
MDC_IDC_SESS_DTM: NORMAL
MDC_IDC_SESS_TYPE: NORMAL
MDC_IDC_SET_BRADY_AT_MODE_SWITCH_RATE: 171 {BEATS}/MIN
MDC_IDC_SET_BRADY_HYSTRATE: NORMAL
MDC_IDC_SET_BRADY_LOWRATE: 60 {BEATS}/MIN
MDC_IDC_SET_BRADY_MAX_SENSOR_RATE: 130 {BEATS}/MIN
MDC_IDC_SET_BRADY_MAX_TRACKING_RATE: 130 {BEATS}/MIN
MDC_IDC_SET_BRADY_MODE: NORMAL
MDC_IDC_SET_BRADY_PAV_DELAY_LOW: 180 MS
MDC_IDC_SET_BRADY_SAV_DELAY_LOW: 150 MS
MDC_IDC_SET_LEADCHNL_RA_PACING_AMPLITUDE: 1.75 V
MDC_IDC_SET_LEADCHNL_RA_PACING_ANODE_ELECTRODE_1: NORMAL
MDC_IDC_SET_LEADCHNL_RA_PACING_ANODE_LOCATION_1: NORMAL
MDC_IDC_SET_LEADCHNL_RA_PACING_CAPTURE_MODE: NORMAL
MDC_IDC_SET_LEADCHNL_RA_PACING_CATHODE_ELECTRODE_1: NORMAL
MDC_IDC_SET_LEADCHNL_RA_PACING_CATHODE_LOCATION_1: NORMAL
MDC_IDC_SET_LEADCHNL_RA_PACING_POLARITY: NORMAL
MDC_IDC_SET_LEADCHNL_RA_PACING_PULSEWIDTH: 0.4 MS
MDC_IDC_SET_LEADCHNL_RA_SENSING_ANODE_ELECTRODE_1: NORMAL
MDC_IDC_SET_LEADCHNL_RA_SENSING_ANODE_LOCATION_1: NORMAL
MDC_IDC_SET_LEADCHNL_RA_SENSING_CATHODE_ELECTRODE_1: NORMAL
MDC_IDC_SET_LEADCHNL_RA_SENSING_CATHODE_LOCATION_1: NORMAL
MDC_IDC_SET_LEADCHNL_RA_SENSING_POLARITY: NORMAL
MDC_IDC_SET_LEADCHNL_RA_SENSING_SENSITIVITY: 0.3 MV
MDC_IDC_SET_LEADCHNL_RV_PACING_AMPLITUDE: 2 V
MDC_IDC_SET_LEADCHNL_RV_PACING_ANODE_ELECTRODE_1: NORMAL
MDC_IDC_SET_LEADCHNL_RV_PACING_ANODE_LOCATION_1: NORMAL
MDC_IDC_SET_LEADCHNL_RV_PACING_CAPTURE_MODE: NORMAL
MDC_IDC_SET_LEADCHNL_RV_PACING_CATHODE_ELECTRODE_1: NORMAL
MDC_IDC_SET_LEADCHNL_RV_PACING_CATHODE_LOCATION_1: NORMAL
MDC_IDC_SET_LEADCHNL_RV_PACING_POLARITY: NORMAL
MDC_IDC_SET_LEADCHNL_RV_PACING_PULSEWIDTH: 0.4 MS
MDC_IDC_SET_LEADCHNL_RV_SENSING_ANODE_ELECTRODE_1: NORMAL
MDC_IDC_SET_LEADCHNL_RV_SENSING_ANODE_LOCATION_1: NORMAL
MDC_IDC_SET_LEADCHNL_RV_SENSING_CATHODE_ELECTRODE_1: NORMAL
MDC_IDC_SET_LEADCHNL_RV_SENSING_CATHODE_LOCATION_1: NORMAL
MDC_IDC_SET_LEADCHNL_RV_SENSING_POLARITY: NORMAL
MDC_IDC_SET_LEADCHNL_RV_SENSING_SENSITIVITY: 0.9 MV
MDC_IDC_SET_ZONE_DETECTION_INTERVAL: 350 MS
MDC_IDC_SET_ZONE_DETECTION_INTERVAL: 400 MS
MDC_IDC_SET_ZONE_TYPE: NORMAL
MDC_IDC_STAT_AT_BURDEN_PERCENT: 0 %
MDC_IDC_STAT_AT_DTM_END: NORMAL
MDC_IDC_STAT_AT_DTM_START: NORMAL
MDC_IDC_STAT_BRADY_AP_VP_PERCENT: 0.04 %
MDC_IDC_STAT_BRADY_AP_VS_PERCENT: 99.73 %
MDC_IDC_STAT_BRADY_AS_VP_PERCENT: 0 %
MDC_IDC_STAT_BRADY_AS_VS_PERCENT: 0.22 %
MDC_IDC_STAT_BRADY_DTM_END: NORMAL
MDC_IDC_STAT_BRADY_DTM_START: NORMAL
MDC_IDC_STAT_BRADY_RA_PERCENT_PACED: 99.73 %
MDC_IDC_STAT_BRADY_RV_PERCENT_PACED: 0.05 %
MDC_IDC_STAT_EPISODE_RECENT_COUNT: 0
MDC_IDC_STAT_EPISODE_RECENT_COUNT: 3
MDC_IDC_STAT_EPISODE_RECENT_COUNT_DTM_END: NORMAL
MDC_IDC_STAT_EPISODE_RECENT_COUNT_DTM_START: NORMAL
MDC_IDC_STAT_EPISODE_TOTAL_COUNT: 0
MDC_IDC_STAT_EPISODE_TOTAL_COUNT: 0
MDC_IDC_STAT_EPISODE_TOTAL_COUNT: 101
MDC_IDC_STAT_EPISODE_TOTAL_COUNT: 30
MDC_IDC_STAT_EPISODE_TOTAL_COUNT_DTM_END: NORMAL
MDC_IDC_STAT_EPISODE_TOTAL_COUNT_DTM_START: NORMAL
MDC_IDC_STAT_EPISODE_TYPE: NORMAL

## 2020-08-10 ENCOUNTER — ANCILLARY PROCEDURE (OUTPATIENT)
Dept: CARDIOLOGY | Facility: CLINIC | Age: 85
End: 2020-08-10
Attending: INTERNAL MEDICINE
Payer: COMMERCIAL

## 2020-08-10 DIAGNOSIS — I49.5 SICK SINUS SYNDROME (H): ICD-10-CM

## 2020-08-10 PROCEDURE — 93294 REM INTERROG EVL PM/LDLS PM: CPT | Mod: ZP | Performed by: INTERNAL MEDICINE

## 2020-08-10 PROCEDURE — 93296 REM INTERROG EVL PM/IDS: CPT | Mod: ZF

## 2020-08-12 LAB
MDC_IDC_EPISODE_DTM: NORMAL
MDC_IDC_EPISODE_DURATION: 1459 S
MDC_IDC_EPISODE_DURATION: 2 S
MDC_IDC_EPISODE_DURATION: 242 S
MDC_IDC_EPISODE_DURATION: 28 S
MDC_IDC_EPISODE_DURATION: 321 S
MDC_IDC_EPISODE_DURATION: 335 S
MDC_IDC_EPISODE_DURATION: 362 S
MDC_IDC_EPISODE_DURATION: 375 S
MDC_IDC_EPISODE_DURATION: 487 S
MDC_IDC_EPISODE_DURATION: 744 S
MDC_IDC_EPISODE_DURATION: 7514 S
MDC_IDC_EPISODE_ID: 133
MDC_IDC_EPISODE_ID: 134
MDC_IDC_EPISODE_ID: 135
MDC_IDC_EPISODE_ID: 136
MDC_IDC_EPISODE_ID: 137
MDC_IDC_EPISODE_ID: 138
MDC_IDC_EPISODE_ID: 139
MDC_IDC_EPISODE_ID: 140
MDC_IDC_EPISODE_ID: 141
MDC_IDC_EPISODE_ID: 142
MDC_IDC_EPISODE_ID: 143
MDC_IDC_EPISODE_TYPE: NORMAL
MDC_IDC_LEAD_IMPLANT_DT: NORMAL
MDC_IDC_LEAD_IMPLANT_DT: NORMAL
MDC_IDC_LEAD_LOCATION: NORMAL
MDC_IDC_LEAD_LOCATION: NORMAL
MDC_IDC_LEAD_LOCATION_DETAIL_1: NORMAL
MDC_IDC_LEAD_LOCATION_DETAIL_1: NORMAL
MDC_IDC_LEAD_MFG: NORMAL
MDC_IDC_LEAD_MFG: NORMAL
MDC_IDC_LEAD_MODEL: NORMAL
MDC_IDC_LEAD_MODEL: NORMAL
MDC_IDC_LEAD_POLARITY_TYPE: NORMAL
MDC_IDC_LEAD_POLARITY_TYPE: NORMAL
MDC_IDC_LEAD_SERIAL: NORMAL
MDC_IDC_LEAD_SERIAL: NORMAL
MDC_IDC_LEAD_SPECIAL_FUNCTION: NORMAL
MDC_IDC_LEAD_SPECIAL_FUNCTION: NORMAL
MDC_IDC_MSMT_BATTERY_DTM: NORMAL
MDC_IDC_MSMT_BATTERY_REMAINING_LONGEVITY: 78 MO
MDC_IDC_MSMT_BATTERY_RRT_TRIGGER: 2.83
MDC_IDC_MSMT_BATTERY_STATUS: NORMAL
MDC_IDC_MSMT_BATTERY_VOLTAGE: 3.01 V
MDC_IDC_MSMT_LEADCHNL_RA_IMPEDANCE_VALUE: 304 OHM
MDC_IDC_MSMT_LEADCHNL_RA_IMPEDANCE_VALUE: 437 OHM
MDC_IDC_MSMT_LEADCHNL_RA_PACING_THRESHOLD_AMPLITUDE: 0.75 V
MDC_IDC_MSMT_LEADCHNL_RA_PACING_THRESHOLD_PULSEWIDTH: 0.4 MS
MDC_IDC_MSMT_LEADCHNL_RA_SENSING_INTR_AMPL: 0.75 MV
MDC_IDC_MSMT_LEADCHNL_RA_SENSING_INTR_AMPL: 0.75 MV
MDC_IDC_MSMT_LEADCHNL_RV_IMPEDANCE_VALUE: 456 OHM
MDC_IDC_MSMT_LEADCHNL_RV_IMPEDANCE_VALUE: 475 OHM
MDC_IDC_MSMT_LEADCHNL_RV_PACING_THRESHOLD_AMPLITUDE: 0.62 V
MDC_IDC_MSMT_LEADCHNL_RV_PACING_THRESHOLD_PULSEWIDTH: 0.4 MS
MDC_IDC_MSMT_LEADCHNL_RV_SENSING_INTR_AMPL: 11.75 MV
MDC_IDC_MSMT_LEADCHNL_RV_SENSING_INTR_AMPL: 11.75 MV
MDC_IDC_PG_IMPLANT_DTM: NORMAL
MDC_IDC_PG_MFG: NORMAL
MDC_IDC_PG_MODEL: NORMAL
MDC_IDC_PG_SERIAL: NORMAL
MDC_IDC_PG_TYPE: NORMAL
MDC_IDC_SESS_CLINIC_NAME: NORMAL
MDC_IDC_SESS_DTM: NORMAL
MDC_IDC_SESS_TYPE: NORMAL
MDC_IDC_SET_BRADY_AT_MODE_SWITCH_RATE: 171 {BEATS}/MIN
MDC_IDC_SET_BRADY_HYSTRATE: NORMAL
MDC_IDC_SET_BRADY_LOWRATE: 60 {BEATS}/MIN
MDC_IDC_SET_BRADY_MAX_SENSOR_RATE: 130 {BEATS}/MIN
MDC_IDC_SET_BRADY_MAX_TRACKING_RATE: 130 {BEATS}/MIN
MDC_IDC_SET_BRADY_MODE: NORMAL
MDC_IDC_SET_BRADY_PAV_DELAY_LOW: 180 MS
MDC_IDC_SET_BRADY_SAV_DELAY_LOW: 150 MS
MDC_IDC_SET_LEADCHNL_RA_PACING_AMPLITUDE: 1.75 V
MDC_IDC_SET_LEADCHNL_RA_PACING_ANODE_ELECTRODE_1: NORMAL
MDC_IDC_SET_LEADCHNL_RA_PACING_ANODE_LOCATION_1: NORMAL
MDC_IDC_SET_LEADCHNL_RA_PACING_CAPTURE_MODE: NORMAL
MDC_IDC_SET_LEADCHNL_RA_PACING_CATHODE_ELECTRODE_1: NORMAL
MDC_IDC_SET_LEADCHNL_RA_PACING_CATHODE_LOCATION_1: NORMAL
MDC_IDC_SET_LEADCHNL_RA_PACING_POLARITY: NORMAL
MDC_IDC_SET_LEADCHNL_RA_PACING_PULSEWIDTH: 0.4 MS
MDC_IDC_SET_LEADCHNL_RA_SENSING_ANODE_ELECTRODE_1: NORMAL
MDC_IDC_SET_LEADCHNL_RA_SENSING_ANODE_LOCATION_1: NORMAL
MDC_IDC_SET_LEADCHNL_RA_SENSING_CATHODE_ELECTRODE_1: NORMAL
MDC_IDC_SET_LEADCHNL_RA_SENSING_CATHODE_LOCATION_1: NORMAL
MDC_IDC_SET_LEADCHNL_RA_SENSING_POLARITY: NORMAL
MDC_IDC_SET_LEADCHNL_RA_SENSING_SENSITIVITY: 0.3 MV
MDC_IDC_SET_LEADCHNL_RV_PACING_AMPLITUDE: 2 V
MDC_IDC_SET_LEADCHNL_RV_PACING_ANODE_ELECTRODE_1: NORMAL
MDC_IDC_SET_LEADCHNL_RV_PACING_ANODE_LOCATION_1: NORMAL
MDC_IDC_SET_LEADCHNL_RV_PACING_CAPTURE_MODE: NORMAL
MDC_IDC_SET_LEADCHNL_RV_PACING_CATHODE_ELECTRODE_1: NORMAL
MDC_IDC_SET_LEADCHNL_RV_PACING_CATHODE_LOCATION_1: NORMAL
MDC_IDC_SET_LEADCHNL_RV_PACING_POLARITY: NORMAL
MDC_IDC_SET_LEADCHNL_RV_PACING_PULSEWIDTH: 0.4 MS
MDC_IDC_SET_LEADCHNL_RV_SENSING_ANODE_ELECTRODE_1: NORMAL
MDC_IDC_SET_LEADCHNL_RV_SENSING_ANODE_LOCATION_1: NORMAL
MDC_IDC_SET_LEADCHNL_RV_SENSING_CATHODE_ELECTRODE_1: NORMAL
MDC_IDC_SET_LEADCHNL_RV_SENSING_CATHODE_LOCATION_1: NORMAL
MDC_IDC_SET_LEADCHNL_RV_SENSING_POLARITY: NORMAL
MDC_IDC_SET_LEADCHNL_RV_SENSING_SENSITIVITY: 0.9 MV
MDC_IDC_SET_ZONE_DETECTION_INTERVAL: 350 MS
MDC_IDC_SET_ZONE_DETECTION_INTERVAL: 400 MS
MDC_IDC_SET_ZONE_TYPE: NORMAL
MDC_IDC_STAT_AT_BURDEN_PERCENT: 0.1 %
MDC_IDC_STAT_AT_DTM_END: NORMAL
MDC_IDC_STAT_AT_DTM_START: NORMAL
MDC_IDC_STAT_BRADY_AP_VP_PERCENT: 0.06 %
MDC_IDC_STAT_BRADY_AP_VS_PERCENT: 99.63 %
MDC_IDC_STAT_BRADY_AS_VP_PERCENT: 0.01 %
MDC_IDC_STAT_BRADY_AS_VS_PERCENT: 0.3 %
MDC_IDC_STAT_BRADY_DTM_END: NORMAL
MDC_IDC_STAT_BRADY_DTM_START: NORMAL
MDC_IDC_STAT_BRADY_RA_PERCENT_PACED: 99.58 %
MDC_IDC_STAT_BRADY_RV_PERCENT_PACED: 0.07 %
MDC_IDC_STAT_EPISODE_RECENT_COUNT: 0
MDC_IDC_STAT_EPISODE_RECENT_COUNT: 0
MDC_IDC_STAT_EPISODE_RECENT_COUNT: 1
MDC_IDC_STAT_EPISODE_RECENT_COUNT: 10
MDC_IDC_STAT_EPISODE_RECENT_COUNT_DTM_END: NORMAL
MDC_IDC_STAT_EPISODE_RECENT_COUNT_DTM_START: NORMAL
MDC_IDC_STAT_EPISODE_TOTAL_COUNT: 0
MDC_IDC_STAT_EPISODE_TOTAL_COUNT: 0
MDC_IDC_STAT_EPISODE_TOTAL_COUNT: 111
MDC_IDC_STAT_EPISODE_TOTAL_COUNT: 31
MDC_IDC_STAT_EPISODE_TOTAL_COUNT_DTM_END: NORMAL
MDC_IDC_STAT_EPISODE_TOTAL_COUNT_DTM_START: NORMAL
MDC_IDC_STAT_EPISODE_TYPE: NORMAL

## 2020-12-31 ENCOUNTER — ANCILLARY PROCEDURE (OUTPATIENT)
Dept: CARDIOLOGY | Facility: CLINIC | Age: 85
End: 2020-12-31
Attending: INTERNAL MEDICINE
Payer: COMMERCIAL

## 2020-12-31 DIAGNOSIS — I49.5 SICK SINUS SYNDROME (H): ICD-10-CM

## 2020-12-31 PROCEDURE — 93294 REM INTERROG EVL PM/LDLS PM: CPT | Performed by: INTERNAL MEDICINE

## 2020-12-31 PROCEDURE — 93296 REM INTERROG EVL PM/IDS: CPT

## 2021-01-11 DIAGNOSIS — I10 ESSENTIAL HYPERTENSION: ICD-10-CM

## 2021-01-12 RX ORDER — METOPROLOL SUCCINATE 50 MG/1
TABLET, EXTENDED RELEASE ORAL
Qty: 135 TABLET | Refills: 3 | Status: SHIPPED | OUTPATIENT
Start: 2021-01-12 | End: 2021-12-06

## 2021-01-13 LAB
MDC_IDC_EPISODE_DTM: NORMAL
MDC_IDC_EPISODE_DURATION: 1 S
MDC_IDC_EPISODE_DURATION: 130 S
MDC_IDC_EPISODE_DURATION: 197 S
MDC_IDC_EPISODE_DURATION: 281 S
MDC_IDC_EPISODE_DURATION: 7810 S
MDC_IDC_EPISODE_ID: 144
MDC_IDC_EPISODE_ID: 145
MDC_IDC_EPISODE_ID: 146
MDC_IDC_EPISODE_ID: 147
MDC_IDC_EPISODE_ID: 148
MDC_IDC_EPISODE_TYPE: NORMAL
MDC_IDC_LEAD_IMPLANT_DT: NORMAL
MDC_IDC_LEAD_IMPLANT_DT: NORMAL
MDC_IDC_LEAD_LOCATION: NORMAL
MDC_IDC_LEAD_LOCATION: NORMAL
MDC_IDC_LEAD_LOCATION_DETAIL_1: NORMAL
MDC_IDC_LEAD_LOCATION_DETAIL_1: NORMAL
MDC_IDC_LEAD_MFG: NORMAL
MDC_IDC_LEAD_MFG: NORMAL
MDC_IDC_LEAD_MODEL: NORMAL
MDC_IDC_LEAD_MODEL: NORMAL
MDC_IDC_LEAD_POLARITY_TYPE: NORMAL
MDC_IDC_LEAD_POLARITY_TYPE: NORMAL
MDC_IDC_LEAD_SERIAL: NORMAL
MDC_IDC_LEAD_SERIAL: NORMAL
MDC_IDC_LEAD_SPECIAL_FUNCTION: NORMAL
MDC_IDC_LEAD_SPECIAL_FUNCTION: NORMAL
MDC_IDC_MSMT_BATTERY_DTM: NORMAL
MDC_IDC_MSMT_BATTERY_REMAINING_LONGEVITY: 78 MO
MDC_IDC_MSMT_BATTERY_RRT_TRIGGER: 2.83
MDC_IDC_MSMT_BATTERY_STATUS: NORMAL
MDC_IDC_MSMT_BATTERY_VOLTAGE: 3.01 V
MDC_IDC_MSMT_LEADCHNL_RA_IMPEDANCE_VALUE: 266 OHM
MDC_IDC_MSMT_LEADCHNL_RA_IMPEDANCE_VALUE: 418 OHM
MDC_IDC_MSMT_LEADCHNL_RA_PACING_THRESHOLD_AMPLITUDE: 0.75 V
MDC_IDC_MSMT_LEADCHNL_RA_PACING_THRESHOLD_PULSEWIDTH: 0.4 MS
MDC_IDC_MSMT_LEADCHNL_RA_SENSING_INTR_AMPL: 1.25 MV
MDC_IDC_MSMT_LEADCHNL_RA_SENSING_INTR_AMPL: 1.25 MV
MDC_IDC_MSMT_LEADCHNL_RV_IMPEDANCE_VALUE: 418 OHM
MDC_IDC_MSMT_LEADCHNL_RV_IMPEDANCE_VALUE: 456 OHM
MDC_IDC_MSMT_LEADCHNL_RV_PACING_THRESHOLD_AMPLITUDE: 0.5 V
MDC_IDC_MSMT_LEADCHNL_RV_PACING_THRESHOLD_PULSEWIDTH: 0.4 MS
MDC_IDC_MSMT_LEADCHNL_RV_SENSING_INTR_AMPL: 11.12 MV
MDC_IDC_MSMT_LEADCHNL_RV_SENSING_INTR_AMPL: 11.12 MV
MDC_IDC_PG_IMPLANT_DTM: NORMAL
MDC_IDC_PG_MFG: NORMAL
MDC_IDC_PG_MODEL: NORMAL
MDC_IDC_PG_SERIAL: NORMAL
MDC_IDC_PG_TYPE: NORMAL
MDC_IDC_SESS_CLINIC_NAME: NORMAL
MDC_IDC_SESS_DTM: NORMAL
MDC_IDC_SESS_TYPE: NORMAL
MDC_IDC_SET_BRADY_AT_MODE_SWITCH_RATE: 171 {BEATS}/MIN
MDC_IDC_SET_BRADY_HYSTRATE: NORMAL
MDC_IDC_SET_BRADY_LOWRATE: 60 {BEATS}/MIN
MDC_IDC_SET_BRADY_MAX_SENSOR_RATE: 130 {BEATS}/MIN
MDC_IDC_SET_BRADY_MAX_TRACKING_RATE: 130 {BEATS}/MIN
MDC_IDC_SET_BRADY_MODE: NORMAL
MDC_IDC_SET_BRADY_PAV_DELAY_LOW: 180 MS
MDC_IDC_SET_BRADY_SAV_DELAY_LOW: 150 MS
MDC_IDC_SET_LEADCHNL_RA_PACING_AMPLITUDE: 1.5 V
MDC_IDC_SET_LEADCHNL_RA_PACING_ANODE_ELECTRODE_1: NORMAL
MDC_IDC_SET_LEADCHNL_RA_PACING_ANODE_LOCATION_1: NORMAL
MDC_IDC_SET_LEADCHNL_RA_PACING_CAPTURE_MODE: NORMAL
MDC_IDC_SET_LEADCHNL_RA_PACING_CATHODE_ELECTRODE_1: NORMAL
MDC_IDC_SET_LEADCHNL_RA_PACING_CATHODE_LOCATION_1: NORMAL
MDC_IDC_SET_LEADCHNL_RA_PACING_POLARITY: NORMAL
MDC_IDC_SET_LEADCHNL_RA_PACING_PULSEWIDTH: 0.4 MS
MDC_IDC_SET_LEADCHNL_RA_SENSING_ANODE_ELECTRODE_1: NORMAL
MDC_IDC_SET_LEADCHNL_RA_SENSING_ANODE_LOCATION_1: NORMAL
MDC_IDC_SET_LEADCHNL_RA_SENSING_CATHODE_ELECTRODE_1: NORMAL
MDC_IDC_SET_LEADCHNL_RA_SENSING_CATHODE_LOCATION_1: NORMAL
MDC_IDC_SET_LEADCHNL_RA_SENSING_POLARITY: NORMAL
MDC_IDC_SET_LEADCHNL_RA_SENSING_SENSITIVITY: 0.3 MV
MDC_IDC_SET_LEADCHNL_RV_PACING_AMPLITUDE: 2 V
MDC_IDC_SET_LEADCHNL_RV_PACING_ANODE_ELECTRODE_1: NORMAL
MDC_IDC_SET_LEADCHNL_RV_PACING_ANODE_LOCATION_1: NORMAL
MDC_IDC_SET_LEADCHNL_RV_PACING_CAPTURE_MODE: NORMAL
MDC_IDC_SET_LEADCHNL_RV_PACING_CATHODE_ELECTRODE_1: NORMAL
MDC_IDC_SET_LEADCHNL_RV_PACING_CATHODE_LOCATION_1: NORMAL
MDC_IDC_SET_LEADCHNL_RV_PACING_POLARITY: NORMAL
MDC_IDC_SET_LEADCHNL_RV_PACING_PULSEWIDTH: 0.4 MS
MDC_IDC_SET_LEADCHNL_RV_SENSING_ANODE_ELECTRODE_1: NORMAL
MDC_IDC_SET_LEADCHNL_RV_SENSING_ANODE_LOCATION_1: NORMAL
MDC_IDC_SET_LEADCHNL_RV_SENSING_CATHODE_ELECTRODE_1: NORMAL
MDC_IDC_SET_LEADCHNL_RV_SENSING_CATHODE_LOCATION_1: NORMAL
MDC_IDC_SET_LEADCHNL_RV_SENSING_POLARITY: NORMAL
MDC_IDC_SET_LEADCHNL_RV_SENSING_SENSITIVITY: 0.9 MV
MDC_IDC_SET_ZONE_DETECTION_INTERVAL: 350 MS
MDC_IDC_SET_ZONE_DETECTION_INTERVAL: 400 MS
MDC_IDC_SET_ZONE_TYPE: NORMAL
MDC_IDC_STAT_AT_BURDEN_PERCENT: 0.1 %
MDC_IDC_STAT_AT_DTM_END: NORMAL
MDC_IDC_STAT_AT_DTM_START: NORMAL
MDC_IDC_STAT_BRADY_AP_VP_PERCENT: 0.05 %
MDC_IDC_STAT_BRADY_AP_VS_PERCENT: 99.58 %
MDC_IDC_STAT_BRADY_AS_VP_PERCENT: 0.01 %
MDC_IDC_STAT_BRADY_AS_VS_PERCENT: 0.37 %
MDC_IDC_STAT_BRADY_DTM_END: NORMAL
MDC_IDC_STAT_BRADY_DTM_START: NORMAL
MDC_IDC_STAT_BRADY_RA_PERCENT_PACED: 99.53 %
MDC_IDC_STAT_BRADY_RV_PERCENT_PACED: 0.06 %
MDC_IDC_STAT_EPISODE_RECENT_COUNT: 0
MDC_IDC_STAT_EPISODE_RECENT_COUNT: 0
MDC_IDC_STAT_EPISODE_RECENT_COUNT: 14
MDC_IDC_STAT_EPISODE_RECENT_COUNT: 5
MDC_IDC_STAT_EPISODE_RECENT_COUNT_DTM_END: NORMAL
MDC_IDC_STAT_EPISODE_RECENT_COUNT_DTM_START: NORMAL
MDC_IDC_STAT_EPISODE_TOTAL_COUNT: 0
MDC_IDC_STAT_EPISODE_TOTAL_COUNT: 0
MDC_IDC_STAT_EPISODE_TOTAL_COUNT: 115
MDC_IDC_STAT_EPISODE_TOTAL_COUNT: 32
MDC_IDC_STAT_EPISODE_TOTAL_COUNT_DTM_END: NORMAL
MDC_IDC_STAT_EPISODE_TOTAL_COUNT_DTM_START: NORMAL
MDC_IDC_STAT_EPISODE_TYPE: NORMAL

## 2021-04-09 ENCOUNTER — TELEPHONE (OUTPATIENT)
Dept: CARDIOLOGY | Facility: CLINIC | Age: 86
End: 2021-04-09

## 2021-04-09 NOTE — TELEPHONE ENCOUNTER
Unable to reach patient by phone (voice mail box full)  to discuss reminder for yearly visit for refills. Last chemistry labs were 7-2020. Will refill for 90 days and try to contact her again by phone.

## 2021-04-13 ENCOUNTER — TELEPHONE (OUTPATIENT)
Dept: CARDIOLOGY | Facility: CLINIC | Age: 86
End: 2021-04-13

## 2021-04-13 NOTE — CONFIDENTIAL NOTE
Discussed need for a visit with either her cardiologist Dr. Dixon or her PCP for additional refills. She would like to have another Espinoza testing visit since her last one was in 2018 per her preference. It is recommended that she follow up with her PCP or Dr. Dixon for her care.

## 2021-05-02 DIAGNOSIS — I10 ESSENTIAL HYPERTENSION: Primary | ICD-10-CM

## 2021-05-02 DIAGNOSIS — I49.5 SICK SINUS SYNDROME (H): ICD-10-CM

## 2021-05-02 DIAGNOSIS — I48.0 PAROXYSMAL ATRIAL FIBRILLATION (H): ICD-10-CM

## 2021-05-06 ENCOUNTER — OFFICE VISIT (OUTPATIENT)
Dept: CARDIOLOGY | Facility: CLINIC | Age: 86
End: 2021-05-06
Payer: COMMERCIAL

## 2021-05-06 ENCOUNTER — OFFICE VISIT (OUTPATIENT)
Dept: CARDIOLOGY | Facility: CLINIC | Age: 86
End: 2021-05-06
Attending: NURSE PRACTITIONER
Payer: COMMERCIAL

## 2021-05-06 ENCOUNTER — ANCILLARY PROCEDURE (OUTPATIENT)
Dept: CARDIOLOGY | Facility: CLINIC | Age: 86
End: 2021-05-06
Attending: INTERNAL MEDICINE
Payer: COMMERCIAL

## 2021-05-06 VITALS
SYSTOLIC BLOOD PRESSURE: 134 MMHG | WEIGHT: 128 LBS | OXYGEN SATURATION: 92 % | HEART RATE: 77 BPM | BODY MASS INDEX: 23.55 KG/M2 | DIASTOLIC BLOOD PRESSURE: 71 MMHG | HEIGHT: 62 IN

## 2021-05-06 DIAGNOSIS — I10 ESSENTIAL HYPERTENSION: ICD-10-CM

## 2021-05-06 DIAGNOSIS — I48.0 PAROXYSMAL A-FIB (H): Primary | ICD-10-CM

## 2021-05-06 DIAGNOSIS — I48.0 PAROXYSMAL ATRIAL FIBRILLATION (H): ICD-10-CM

## 2021-05-06 DIAGNOSIS — I49.5 SICK SINUS SYNDROME (H): ICD-10-CM

## 2021-05-06 DIAGNOSIS — R09.89 OTHER SPECIFIED SYMPTOMS AND SIGNS INVOLVING THE CIRCULATORY AND RESPIRATORY SYSTEMS: ICD-10-CM

## 2021-05-06 LAB
CHOLEST SERPL-MCNC: 161 MG/DL
CREAT UR-MCNC: 60 MG/DL
CRP SERPL HS-MCNC: 4.8 MG/L
HDLC SERPL-MCNC: 73 MG/DL
LDLC SERPL CALC-MCNC: 76 MG/DL
MICROALBUMIN UR-MCNC: 7 MG/L
MICROALBUMIN/CREAT UR: 11.99 MG/G CR (ref 0–25)
NONHDLC SERPL-MCNC: 88 MG/DL
TRIGL SERPL-MCNC: 62 MG/DL

## 2021-05-06 PROCEDURE — 99215 OFFICE O/P EST HI 40 MIN: CPT | Mod: 25 | Performed by: NURSE PRACTITIONER

## 2021-05-06 PROCEDURE — 93005 ELECTROCARDIOGRAM TRACING: CPT

## 2021-05-06 PROCEDURE — 93280 PM DEVICE PROGR EVAL DUAL: CPT | Performed by: INTERNAL MEDICINE

## 2021-05-06 PROCEDURE — 80061 LIPID PANEL: CPT | Performed by: PATHOLOGY

## 2021-05-06 PROCEDURE — 36415 COLL VENOUS BLD VENIPUNCTURE: CPT | Performed by: PATHOLOGY

## 2021-05-06 PROCEDURE — 82043 UR ALBUMIN QUANTITATIVE: CPT | Performed by: PATHOLOGY

## 2021-05-06 PROCEDURE — G0463 HOSPITAL OUTPT CLINIC VISIT: HCPCS | Mod: 25

## 2021-05-06 PROCEDURE — 93922 UPR/L XTREMITY ART 2 LEVELS: CPT | Performed by: NURSE PRACTITIONER

## 2021-05-06 PROCEDURE — 86141 C-REACTIVE PROTEIN HS: CPT | Performed by: PATHOLOGY

## 2021-05-06 RX ORDER — MAGNESIUM OXIDE/MAG AA CHELATE 300 MG
1 CAPSULE ORAL
COMMUNITY
End: 2023-05-24

## 2021-05-06 ASSESSMENT — MIFFLIN-ST. JEOR
SCORE: 953.85
SCORE: 959.07

## 2021-05-06 NOTE — LETTER
5/6/2021      RE: Isadora M Andrea  [address] [facility] [address]       Dear Colleague,    Thank you for the opportunity to participate in the care of your patient, Isadora Mendez, at the [facility] [place] at [facility]. Please see a copy of my visit note below.    EKG per Tata Marina and Stacia SMILEY.          Please do not hesitate to contact me if you have any questions/concerns.     Sincerely,      CVC CMA

## 2021-05-06 NOTE — PROGRESS NOTES
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    Pain Evaluation  Current history of pain associated with this visit is: denied    HPI   Isadora Mendez is a 89 year old year old female with a history of SSS s/p PPM in 2010, paroxysmal atrial fibrillation, currently taking 81 mg, hypertension, and mild CAD (30-40% lesions ion angiogram in 2009).  She has an episode of AF every 3-4 months.  Her PM is monitored through [facility].  She takes an extra dose of metoprolol when she experiences AF.  Her PCP is Dr. Jovana Macias at  in [facility].  Her cardiologist is Dr. Lazo. She continues to see a Kinesiologist.  Device check  12/31/20 dual lead PM, 170 AT/AF < 1 minute to 2 hours, 1 NSVT 2 seconds in length.  Her  has just been diagnosed with bladder cancer and is planning to have surgery next week.  She has taken care of children in her home before COVID19.   She is starting to watch children again this month.  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  She has palpitations approximately every 3-4 months and then takes an additional dose of metoprolol succinate.  She takes 81 mg asa, has refused NOAC, CHADSVASC score of 4, gets SOB with stairs, has issues with her balance and occasional dizziness, ingestion, and memory issues.  She denies syncope, chest pressure/pressure, dyspnea at rest,  orthopnea, lower and leg edema.    Nutrition assessment per patient report:   Foods with fat/cholesterol (fried foods, fatty meats, junk food):  0 servings   Fruits and vegetables (  cup cooked, 1 cup raw):  She has a decreased appetite, 2 servings of vegetables/day, 1 serving of fruit/day  Caffeine (1 cup coffee, soda, etc):  3 cups of decaf coffee/day  Alcohol servings (12 oz. beer, 4 oz. wine, 1  oz. in mixed drink):  sip of wine on Friday nights  Calcium servings (dairy foods, 8 oz. milk, yogurt, cheese, ice cream):  brocolli, cottage cheese, hot chocolate before bed  Salt/sodium use:  no  Special dietary habits:  None    Activity  Patient is active 7 times per week for 30 or more minutes with 6 minutes on a bike, leg stretches for 15 minutes to help with balance.    Laboratory Results Review  We discussed laboratory results today including lipids targets and how foods influence cholesterol.    Weight  Her perceived healthy weight is 136-142 pounds.  A normal BMI of 25 is equal to 138 pounds.  The current BMI of 22.9 is normal weight range.     PMH   Past Medical History:   Diagnosis Date     Atrial fibrillation (H) 2011     Facial fracture (H) 2006     Hypertension      NSVT (nonsustained ventricular tachycardia) (H) 2009    during exercise echo, neg EP study     Pacemaker 7-1-2010     S/P cardiac catheterization 2009    30-40% non obstructive lesion     Ventricular tachycardia, nonsustained (H) 2009    during stress echo       PSH  Past Surgical History:   Procedure Laterality Date     IMPLANT PACEMAKER       PPM  6/30/2010    Permanent Pacemaker       Current Meds   Current Outpatient Medications   Medication Sig Dispense Refill     Ascorbic Acid (VITAMIN C PO)        aspirin 81 MG tablet Take 1 tablet by mouth daily.       BIOTIN PO Take 1 capsule by mouth daily.       conjugated estrogens (PREMARIN) cream Place vaginally twice a week       Cyanocobalamin (VITAMIN B12 PO) Take 1 tablet by mouth every 14 days         hydrochlorothiazide (MICROZIDE) 12.5 MG capsule TAKE ONE CAPSULE BY MOUTH EVERY DAY 90 capsule 1     lisinopril (ZESTRIL) 10 MG tablet TAKE 1 TABLET BY MOUTH DAILY. 90 tablet 0     Magnesium 300 MG CAPS Take 1 tablet by mouth       metoprolol succinate ER (TOPROL-XL) 50 MG 24 hr tablet TAKE 1 TABLET BY MOUTH EVERY MORNING AND ONE-HALF TABLET EVERY EVENING 135 tablet 3     Nutritional Supplements (PYCNOGENOL) 300-30 MG CAPS per capsule Take 1 capsule by mouth daily       Vitamin D, Cholecalciferol, 1000 units TABS Take 1 tablet by mouth daily       VITAMIN K PO Take 1 tablet by mouth every other day          Allergies    No Known Allergies    Family Hx   Family History   Problem Relation Age of Onset     Cardiovascular Father 76         age 80, MI 76 and CHF 80's     Cardiovascular Paternal Grandfather          age 76 of CVD     Myocardial Infarction Mother 88        lived to 100     Arrhythmia Sister         PPM     Neuropathy Sister      Arrhythmia Brother         pacemaker     Arrhythmia Brother         pacemaker, hx rheumatic fever, heart failure     Blood Disease Son         hemocromtosis?     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    She is  6 children, 15 grandchildren.     Enjoyment of life is 10 with 10 being enjoys life fully.    Tobacco History  History   Smoking Status     Never Smoker   Smokeless Tobacco     Never Used       ROS  CONSTITUTIONAL:  No fever, chills, or sweats. No weight gain/loss.   EENT:  No visual disturbance, ear ache, epistaxis, sore throat  ALLERGIES/IMMUNOLOGIC:  Negative  RESPIRATORY:  No cough, hemoptysis  CARDIOVASCULAR:  As per HPI  GI:  No nausea, vomiting, hematemesis, melena  :  No urinary frequency, dysuria, or hematuria  INTEGUMENT:  Negative  PSYCHIATRIC:  Negative  NEURO:  Negative  ENDOCRINE:  Negative  MUSCULOSKELETAL:   "Negative     Vital Signs   /71 (BP Location: Left arm, Patient Position: Sitting, Cuff Size: Adult Regular)   Pulse 77   Ht 1.588 m (5' 2.5\")   Wt 57.8 kg (127 lb 6.4 oz)   SpO2 97%   BMI 22.93 kg/m        Waist: 32 inches  Hip: 41 inches    Physical Exam   In general, the patient is a pleasant female in no apparent distress   HEENT: NC/AT, PERRLA, EOMI, sclerae white, not injected. Nares clear, pharynx without erythema or exudate, dentition intact    Neck: No adenopathy, no thyromegaly, carotids +4/4 bilaterally without bruits,  no jugular venous distension   Lungs: Breath sounds clear bilaterally, without crackles, ronchi, or wheezes  Cor: RRR, S1S2, 2-3/6 systolic ejection murmur, rub, click, or gallop, the PMI is in the 5th ICS in the midclavicular line  Abdomen: Soft, nontender, nondistended, BS+ in all 4 quadrants, without hepatomegaly, no aorta or renal artery bruits  Extremities: No clubbing, cyanosis, or edema. DP and PT pulses +1/2 bilaterally, fungal infection on most toes on both feet    The 10-year ASCVD risk score (Rod DC Jr., et al., 2013) is: 32.4% PLUS (Age was adjusted to 79 to be able to use calculator)  Values used to calculate the score:   Age: 89 year old   Sex: female   Is Non- : No   Diabetic: No   Tobacco smoker: No   Systolic Blood Press: 127 mmHg   Is BP treated: Yes   HDL Cholesterol: 73 mg/dL   Total Cholesterol: 161 mg/dL    Recent Labs  Lab Results   Component Value Date    GLC 97 01/16/2020      Lab Results   Component Value Date    NTBNP 419 12/14/2018     No results found for: NTBNPI   Lab Results   Component Value Date    UCRR 62 12/14/2018      Lab Results   Component Value Date    MICROL 16 12/14/2018      No results found for: MICROALBUMIN   Lab Results   Component Value Date    CRP 1.0 01/16/2020      Lab Results   Component Value Date    CHOL 161 05/06/2021      Lab Results   Component Value Date    TRIG 62 05/06/2021      Lab Results "   Component Value Date    HDL 73 05/06/2021      Lab Results   Component Value Date    LDL 76 05/06/2021      Lab Results   Component Value Date    VLDL 9 10/27/2014      Lab Results   Component Value Date    CHOLHDLRATIO 1.8 10/27/2014     Lab Results   Component Value Date    NHDL 88 05/06/2021         ECHOCARDIOGRAM:   11/21/2017: EF 65%, mild to mod mitral annulus calcification, posterior mitral valve prolapse with mild MR    Pacemaker check 12/31/20  Dual lead PM, 170 AT/AF < 1 minute to 2 hours, 1 NSVT 2 seconds in length    Assessment:    Cardiovascular:  Asymptomatic, currently not complaining of chest pain, SSS s/p PPM, she follows up in device clinic for pacemaker interrelation.    Blood Pressure: controlled, 125-134/71 mmHg, taking hydrochlorothiazide 12.5 mg, lisinopril 10 mg, metoprolol succinate 50 mg    Lipids:  She does not take a statin  !LIPID/HEPATIC Latest Ref Rng & Units 8/27/2007 8/11/2008   CHOLESTEROL <200 mg/dL 154 153   TRIGLYCERIDES <150 mg/dL 99 64   HDL CHOLESTEROL >49 mg/dL 73 78   LDL CHOLESTEROL, CALCULATED <100 mg/dL 61 62   VLDL-CHOLESTEROL 0 - 30 mg/dL 20 13   NON HDL CHOLESTEROL <130 mg/dL     CHOLESTEROL/HDL RATIO 0.0 - 5.0 2.1 2.0   AST 0 - 45 U/L     ALT 0 - 50 U/L       !LIPID/HEPATIC Latest Ref Rng & Units 9/26/2009 8/31/2010   CHOLESTEROL <200 mg/dL 159 190   TRIGLYCERIDES <150 mg/dL 53 66   HDL CHOLESTEROL >49 mg/dL 57 70   LDL CHOLESTEROL, CALCULATED <100 mg/dL 91 107   VLDL-CHOLESTEROL 0 - 30 mg/dL 11 13   NON HDL CHOLESTEROL <130 mg/dL     CHOLESTEROL/HDL RATIO 0.0 - 5.0 2.8 2.7   AST 0 - 45 U/L     ALT 0 - 50 U/L       !LIPID/HEPATIC Latest Ref Rng & Units 8/21/2012 9/3/2013   CHOLESTEROL <200 mg/dL 173 171   TRIGLYCERIDES <150 mg/dL 60 80   HDL CHOLESTEROL >49 mg/dL 73 67   LDL CHOLESTEROL, CALCULATED <100 mg/dL 88 89   VLDL-CHOLESTEROL 0 - 30 mg/dL 12 16   NON HDL CHOLESTEROL <130 mg/dL     CHOLESTEROL/HDL RATIO 0.0 - 5.0 2.4 2.6   AST 0 - 45 U/L  33   ALT 0 - 50 U/L   32     !LIPID/HEPATIC Latest Ref Rng & Units 10/27/2014 8/24/2015   CHOLESTEROL <200 mg/dL 155    TRIGLYCERIDES <150 mg/dL 47    HDL CHOLESTEROL >49 mg/dL 84    LDL CHOLESTEROL, CALCULATED <100 mg/dL 61    VLDL-CHOLESTEROL 0 - 30 mg/dL 9    NON HDL CHOLESTEROL <130 mg/dL     CHOLESTEROL/HDL RATIO 0.0 - 5.0 1.8    AST 0 - 45 U/L 34 29   ALT 0 - 50 U/L 34 24     !LIPID/HEPATIC Latest Ref Rng & Units 8/26/2016 12/1/2017   CHOLESTEROL <200 mg/dL 139 177   TRIGLYCERIDES <150 mg/dL 68 56   HDL CHOLESTEROL >49 mg/dL 65 90   LDL CHOLESTEROL, CALCULATED <100 mg/dL 61 75   VLDL-CHOLESTEROL 0 - 30 mg/dL     NON HDL CHOLESTEROL <130 mg/dL 74 87   CHOLESTEROL/HDL RATIO 0.0 - 5.0     AST 0 - 45 U/L     ALT 0 - 50 U/L  32     !LIPID/HEPATIC Latest Ref Rng & Units 12/14/2018 5/6/2021   CHOLESTEROL <200 mg/dL 166 161   TRIGLYCERIDES <150 mg/dL 45 62   HDL CHOLESTEROL >49 mg/dL 78 73   LDL CHOLESTEROL, CALCULATED <100 mg/dL 79 76   VLDL-CHOLESTEROL 0 - 30 mg/dL     NON HDL CHOLESTEROL <130 mg/dL 88 88   CHOLESTEROL/HDL RATIO 0.0 - 5.0     AST 0 - 45 U/L 27    ALT 0 - 50 U/L 25      Glucose: 97    Weight Management: BMI 22.9    Return to Clinic: 2 years    Health Habit Summary:  Nutrition: Heart Healthy Eating:  most of the time   Exercise:  regularly active  Weight:  normal weight range  Tobacco Use:  never used    Full report to follow prevention team review of test results with scanned final report.    Time spent for patient visit was 60 minutes with more than half the time spent on counseling and coordination of care.    IBRAHIMA Oliveira CNP       CC  Patient Care Team:  Jovana Macias MD as PCP - General (Internal Medicine)  June Mckinley APRN CNP as Nurse Practitioner (Nurse Practitioner)  Stacia Mendiola RN as Registered Nurse (Cardiology)  Nancy West RN as Specialty Care Coordinator (Clinical Cardiac Electrophysiology)  Mary Dixon MD as MD (Clinical Cardiac Electrophysiology)  Mary Dixon MD as  Assigned Heart and Vascular Provider  Lorraine Marina APRN CNP as Nurse Practitioner (Cardiovascular Disease)  SELF, REFERRED

## 2021-05-06 NOTE — LETTER
5/6/2021      RE: Isadora Mendez  [address]  [facility] [address]       Dear Colleague,    Thank you for the opportunity to participate in the care of your patient, Isadora Mendez, at the [facility] FOR CARDIOVASCULAR DISEASE PREVENTION [facility]. Please see a copy of my visit note below.        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    Pain Evaluation  Current history of pain associated with this visit is: denied    HPI   Isadora Mendez is a 89 year old year old female with a history of SSS s/p PPM in 2010, paroxysmal atrial fibrillation, currently taking 81 mg, hypertension, and mild CAD (30-40% lesions ion angiogram in 2009).  She has an episode of AF every 3-4 months.  Her PM is monitored through [facility].  She takes an extra dose of metoprolol when she experiences AF.  Her PCP is Dr. Jovana Macias at  in [facility].  Her cardiologist is Dr. Lazo. She continues to see a Kinesiologist.  Device check  12/31/20 dual lead PM, 170 AT/AF < 1 minute to 2 hours, 1 NSVT 2 seconds in length.  Her  has just been diagnosed with bladder cancer and is planning to have surgery next week.  She has taken care of children in her home before COVID19.   She is starting to watch children again this month.  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  She has palpitations approximately every 3-4 months and then takes an additional dose of metoprolol succinate.  She takes 81 mg asa, has refused NOAC, CHADSVASC score of 4, gets SOB with stairs, has issues with her balance and occasional dizziness, ingestion, and memory issues.  She denies syncope, chest pressure/pressure, dyspnea at rest, orthopnea, lower and leg edema.    Nutrition assessment per patient report:   Foods with fat/cholesterol (fried foods, fatty meats, junk food):  0 servings   Fruits and vegetables (  cup cooked, 1 cup raw):  She has a decreased appetite, 2 servings of vegetables/day, 1 serving of fruit/day  Caffeine (1 cup coffee, soda, etc):  3 cups of decaf coffee/day  Alcohol servings (12 oz. beer, 4 oz. wine, 1  oz. in mixed drink):  sip of wine on Friday nights  Calcium servings (dairy foods, 8 oz. milk, yogurt, cheese, ice cream):  brocolli, cottage cheese, hot chocolate before bed  Salt/sodium use:  no  Special dietary habits:  None    Activity  Patient is active 7 times per week for 30 or more minutes with 6 minutes on a bike, leg stretches for 15 minutes to help with balance.    Laboratory Results Review  We discussed laboratory results today including lipids targets and how foods influence cholesterol.    Weight  Her perceived healthy weight is 136-142 pounds.  A normal BMI of 25 is equal to 138 pounds.  The current BMI of 22.9 is normal weight range.     PMH   Past Medical History:   Diagnosis Date     Atrial fibrillation (H) 2011     Facial fracture (H) 2006     Hypertension      NSVT (nonsustained ventricular tachycardia) (H) 2009    during exercise echo, neg EP study     Pacemaker 7-1-2010     S/P cardiac catheterization 2009    30-40% non obstructive lesion     Ventricular tachycardia, nonsustained (H) 2009    during stress echo       PSH  Past Surgical History:   Procedure Laterality Date     IMPLANT PACEMAKER       PPM  6/30/2010     Permanent Pacemaker       Current Meds   Current Outpatient Medications   Medication Sig Dispense Refill     Ascorbic Acid (VITAMIN C PO)        aspirin 81 MG tablet Take 1 tablet by mouth daily.       BIOTIN PO Take 1 capsule by mouth daily.       conjugated estrogens (PREMARIN) cream Place vaginally twice a week       Cyanocobalamin (VITAMIN B12 PO) Take 1 tablet by mouth every 14 days        hydrochlorothiazide (MICROZIDE) 12.5 MG capsule TAKE ONE CAPSULE BY MOUTH EVERY DAY 90 capsule 1     lisinopril (ZESTRIL) 10 MG tablet TAKE 1 TABLET BY MOUTH DAILY. 90 tablet 0     Magnesium 300 MG CAPS Take 1 tablet by mouth       metoprolol succinate ER (TOPROL-XL) 50 MG 24 hr tablet TAKE 1 TABLET BY MOUTH EVERY MORNING AND ONE-HALF TABLET EVERY EVENING 135 tablet 3     Nutritional Supplements (PYCNOGENOL) 300-30 MG CAPS per capsule Take 1 capsule by mouth daily       Vitamin D, Cholecalciferol, 1000 units TABS Take 1 tablet by mouth daily       VITAMIN K PO Take 1 tablet by mouth every other day          Allergies    No Known Allergies    Family Hx   Family History   Problem Relation Age of Onset     Cardiovascular Father 76         age 80, MI 76 and CHF 80's     Cardiovascular Paternal Grandfather          age 76 of CVD     Myocardial Infarction Mother 88        lived to 100     Arrhythmia Sister         PPM     Neuropathy Sister      Arrhythmia Brother         pacemaker     Arrhythmia Brother         pacemaker, hx rheumatic fever, heart failure     Blood Disease Son         hemocromtosis?     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    She is  6 children, 15 grandchildren.     Enjoyment of life is 10 with 10 being enjoys life fully.    Tobacco History  History   Smoking Status     Never Smoker   Smokeless Tobacco     Never Used       ROS  CONSTITUTIONAL:  No fever, chills, or sweats.  "No weight gain/loss.   EENT:  No visual disturbance, ear ache, epistaxis, sore throat  ALLERGIES/IMMUNOLOGIC:  Negative  RESPIRATORY:  No cough, hemoptysis  CARDIOVASCULAR:  As per HPI  GI:  No nausea, vomiting, hematemesis, melena  :  No urinary frequency, dysuria, or hematuria  INTEGUMENT:  Negative  PSYCHIATRIC:  Negative  NEURO:  Negative  ENDOCRINE:  Negative  MUSCULOSKELETAL:  Negative     Vital Signs   /71 (BP Location: Left arm, Patient Position: Sitting, Cuff Size: Adult Regular)   Pulse 77   Ht 1.588 m (5' 2.5\")   Wt 57.8 kg (127 lb 6.4 oz)   SpO2 97%   BMI 22.93 kg/m        Waist: 32 inches  Hip: 41 inches    Physical Exam   In general, the patient is a pleasant female in no apparent distress   HEENT: NC/AT, PERRLA, EOMI, sclerae white, not injected. Nares clear, pharynx without erythema or exudate, dentition intact    Neck: No adenopathy, no thyromegaly, carotids +4/4 bilaterally without bruits,  no jugular venous distension   Lungs: Breath sounds clear bilaterally, without crackles, ronchi, or wheezes  Cor: RRR, S1S2, 2-3/6 systolic ejection murmur, rub, click, or gallop, the PMI is in the 5th ICS in the midclavicular line  Abdomen: Soft, nontender, nondistended, BS+ in all 4 quadrants, without hepatomegaly, no aorta or renal artery bruits  Extremities: No clubbing, cyanosis, or edema. DP and PT pulses +1/2 bilaterally, fungal infection on most toes on both feet    The 10-year ASCVD risk score (Rod DC Jr., et al., 2013) is: 32.4% PLUS (Age was adjusted to 79 to be able to use calculator)  Values used to calculate the score:   Age: 89 year old   Sex: female   Is Non- : No   Diabetic: No   Tobacco smoker: No   Systolic Blood Press: 127 mmHg   Is BP treated: Yes   HDL Cholesterol: 73 mg/dL   Total Cholesterol: 161 mg/dL    Recent Labs  Lab Results   Component Value Date    GLC 97 01/16/2020      Lab Results   Component Value Date    NTBNP 419 12/14/2018     No results " found for: NTBNPI   Lab Results   Component Value Date    UCRR 62 12/14/2018      Lab Results   Component Value Date    MICROL 16 12/14/2018      No results found for: MICROALBUMIN   Lab Results   Component Value Date    CRP 1.0 01/16/2020      Lab Results   Component Value Date    CHOL 161 05/06/2021      Lab Results   Component Value Date    TRIG 62 05/06/2021      Lab Results   Component Value Date    HDL 73 05/06/2021      Lab Results   Component Value Date    LDL 76 05/06/2021      Lab Results   Component Value Date    VLDL 9 10/27/2014      Lab Results   Component Value Date    CHOLHDLRATIO 1.8 10/27/2014     Lab Results   Component Value Date    NHDL 88 05/06/2021         ECHOCARDIOGRAM:   11/21/2017: EF 65%, mild to mod mitral annulus calcification, posterior mitral valve prolapse with mild MR    Pacemaker check 12/31/20  Dual lead PM, 170 AT/AF < 1 minute to 2 hours, 1 NSVT 2 seconds in length    Assessment:    Cardiovascular:  Asymptomatic, currently not complaining of chest pain, SSS s/p PPM, she follows up in device clinic for pacemaker interrelation.    Blood Pressure: controlled, 125-134/71 mmHg, taking hydrochlorothiazide 12.5 mg, lisinopril 10 mg, metoprolol succinate 50 mg    Lipids:  She does not take a statin  !LIPID/HEPATIC Latest Ref Rng & Units 8/27/2007 8/11/2008   CHOLESTEROL <200 mg/dL 154 153   TRIGLYCERIDES <150 mg/dL 99 64   HDL CHOLESTEROL >49 mg/dL 73 78   LDL CHOLESTEROL, CALCULATED <100 mg/dL 61 62   VLDL-CHOLESTEROL 0 - 30 mg/dL 20 13   NON HDL CHOLESTEROL <130 mg/dL     CHOLESTEROL/HDL RATIO 0.0 - 5.0 2.1 2.0   AST 0 - 45 U/L     ALT 0 - 50 U/L       !LIPID/HEPATIC Latest Ref Rng & Units 9/26/2009 8/31/2010   CHOLESTEROL <200 mg/dL 159 190   TRIGLYCERIDES <150 mg/dL 53 66   HDL CHOLESTEROL >49 mg/dL 57 70   LDL CHOLESTEROL, CALCULATED <100 mg/dL 91 107   VLDL-CHOLESTEROL 0 - 30 mg/dL 11 13   NON HDL CHOLESTEROL <130 mg/dL     CHOLESTEROL/HDL RATIO 0.0 - 5.0 2.8 2.7   AST 0 - 45 U/L      ALT 0 - 50 U/L       !LIPID/HEPATIC Latest Ref Rng & Units 8/21/2012 9/3/2013   CHOLESTEROL <200 mg/dL 173 171   TRIGLYCERIDES <150 mg/dL 60 80   HDL CHOLESTEROL >49 mg/dL 73 67   LDL CHOLESTEROL, CALCULATED <100 mg/dL 88 89   VLDL-CHOLESTEROL 0 - 30 mg/dL 12 16   NON HDL CHOLESTEROL <130 mg/dL     CHOLESTEROL/HDL RATIO 0.0 - 5.0 2.4 2.6   AST 0 - 45 U/L  33   ALT 0 - 50 U/L  32     !LIPID/HEPATIC Latest Ref Rng & Units 10/27/2014 8/24/2015   CHOLESTEROL <200 mg/dL 155    TRIGLYCERIDES <150 mg/dL 47    HDL CHOLESTEROL >49 mg/dL 84    LDL CHOLESTEROL, CALCULATED <100 mg/dL 61    VLDL-CHOLESTEROL 0 - 30 mg/dL 9    NON HDL CHOLESTEROL <130 mg/dL     CHOLESTEROL/HDL RATIO 0.0 - 5.0 1.8    AST 0 - 45 U/L 34 29   ALT 0 - 50 U/L 34 24     !LIPID/HEPATIC Latest Ref Rng & Units 8/26/2016 12/1/2017   CHOLESTEROL <200 mg/dL 139 177   TRIGLYCERIDES <150 mg/dL 68 56   HDL CHOLESTEROL >49 mg/dL 65 90   LDL CHOLESTEROL, CALCULATED <100 mg/dL 61 75   VLDL-CHOLESTEROL 0 - 30 mg/dL     NON HDL CHOLESTEROL <130 mg/dL 74 87   CHOLESTEROL/HDL RATIO 0.0 - 5.0     AST 0 - 45 U/L     ALT 0 - 50 U/L  32     !LIPID/HEPATIC Latest Ref Rng & Units 12/14/2018 5/6/2021   CHOLESTEROL <200 mg/dL 166 161   TRIGLYCERIDES <150 mg/dL 45 62   HDL CHOLESTEROL >49 mg/dL 78 73   LDL CHOLESTEROL, CALCULATED <100 mg/dL 79 76   VLDL-CHOLESTEROL 0 - 30 mg/dL     NON HDL CHOLESTEROL <130 mg/dL 88 88   CHOLESTEROL/HDL RATIO 0.0 - 5.0     AST 0 - 45 U/L 27    ALT 0 - 50 U/L 25      Glucose: 97    Weight Management: BMI 22.9    Return to Clinic: 2 years    Health Habit Summary:  Nutrition: Heart Healthy Eating:  most of the time   Exercise:  regularly active  Weight:  normal weight range  Tobacco Use:  never used    Full report to follow prevention team review of test results with scanned final report.    Time spent for patient visit was 60 minutes with more than half the time spent on counseling and coordination of care.    IBRAHIMA Oliveiar CNP        CC  Patient Care Team:  Jovana Macias MD as PCP - General (Internal Medicine)  June Mckinley APRN CNP as Nurse Practitioner (Nurse Practitioner)  Stacia Mendiola RN as Registered Nurse (Cardiology)  Nancy West, RN as Specialty Care Coordinator (Clinical Cardiac Electrophysiology)  Mary Dixon MD as MD (Clinical Cardiac Electrophysiology)  Mary Dixon MD as Assigned Heart and Vascular Provider  Lorraine Marina APRN CNP as Nurse Practitioner (Cardiovascular Disease)  SELF, REFERRED    Espino[facility] Test Results    WALKING BLOOD PRESSURE RESPONSE (3 minute, 5 MET level walk)   Pre BP: 150/84 mmHg  3 min BP: 200/66 mmHg  1 min post BP: 182/78 mmHg    Pre HR: 85 bpm  3 min HR: 135 bpm  1 min post HR: 60 bpm       ABDOMINAL AORTA ULTRASOUND (< 2.5 normal, borderline 2.5-2.9, abnormal > 3)   SupraIliac 1.6 cm    SupraRenal 1.7 cm    InfraRenal Proximal 1.6 cm    InfraRenal Distal 1.6 cm      Abdominal Aorta Assessment:  normal    LEFT VENTRICULAR ULTRASOUND MEASUREMENTS (adjusted for BSA)  LVIDD 39.90 mm   Septa 8.1 mm   Posterior 7.8 mm     Left Ventricular US Assessment:  normal    Carotid Artery IMT measurements report and plaques in the small area examined:   Left IMT 0.894 mm  Plaques none    Right IMT 1.006 mm  Plaques none     ECG (see tracing):  normal sinus rhythm;  rate: 77 bpm    Arterial Elasticity per age and gender (see printout):   C1 5.0 mL/mmHg x 10  abnormal   C2 1.7 mL/mmHg x 100 abnormal   Supine blood pressure: 158/73 mmHg     Nohemy Hicks      Please do not hesitate to contact me if you have any questions/concerns.     Sincerely,     IBRAHIMA Oliveira CNP

## 2021-05-06 NOTE — PATIENT INSTRUCTIONS
It was a pleasure to see you in clinic today. Please do not hesitate to call with any questions or concerns. You are scheduled for a remote pacemaker transmission on 8/4/21. We will call in 1-2 business days to discuss the results with you. We look forward to seeing you in clinic at your next device check in 6 months.    Stacia Nash, RN  Electrophysiology Nurse Clinician  [facility]  During business hours call:  [phone]  Urgent needs after hours- please call: [phone]- select option #4 and ask for job code [phone].

## 2021-05-07 LAB — INTERPRETATION ECG - MUSE: NORMAL

## 2021-05-07 NOTE — PROGRESS NOTES
Espino[facility] Test Results    WALKING BLOOD PRESSURE RESPONSE (3 minute, 5 MET level walk)   Pre BP: 150/84 mmHg  3 min BP: 200/66 mmHg  1 min post BP: 182/78 mmHg    Pre HR: 85 bpm  3 min HR: 135 bpm  1 min post HR: 60 bpm       ABDOMINAL AORTA ULTRASOUND (< 2.5 normal, borderline 2.5-2.9, abnormal > 3)   SupraIliac 1.6 cm    SupraRenal 1.7 cm    InfraRenal Proximal 1.6 cm    InfraRenal Distal 1.6 cm      Abdominal Aorta Assessment:  normal    LEFT VENTRICULAR ULTRASOUND MEASUREMENTS (adjusted for BSA)  LVIDD 39.90 mm   Septa 8.1 mm   Posterior 7.8 mm     Left Ventricular US Assessment:  normal    Carotid Artery IMT measurements report and plaques in the small area examined:   Left IMT 0.894 mm  Plaques none    Right IMT 1.006 mm  Plaques none     ECG (see tracing):  normal sinus rhythm;  rate: 77 bpm    Arterial Elasticity per age and gender (see printout):   C1 5.0 mL/mmHg x 10  abnormal   C2 1.7 mL/mmHg x 100 abnormal   Supine blood pressure: 158/73 mmHg     Nohemy Hicks

## 2021-05-08 LAB
MDC_IDC_EPISODE_DTM: NORMAL
MDC_IDC_EPISODE_DURATION: 1 S
MDC_IDC_EPISODE_DURATION: 1 S
MDC_IDC_EPISODE_DURATION: 138 S
MDC_IDC_EPISODE_DURATION: 223 S
MDC_IDC_EPISODE_DURATION: 3 S
MDC_IDC_EPISODE_DURATION: 397 S
MDC_IDC_EPISODE_DURATION: 514 S
MDC_IDC_EPISODE_DURATION: 591 S
MDC_IDC_EPISODE_DURATION: 657 S
MDC_IDC_EPISODE_DURATION: 69 S
MDC_IDC_EPISODE_DURATION: 8183 S
MDC_IDC_EPISODE_ID: 149
MDC_IDC_EPISODE_ID: 150
MDC_IDC_EPISODE_ID: 151
MDC_IDC_EPISODE_ID: 152
MDC_IDC_EPISODE_ID: 153
MDC_IDC_EPISODE_ID: 154
MDC_IDC_EPISODE_ID: 155
MDC_IDC_EPISODE_ID: 156
MDC_IDC_EPISODE_ID: 157
MDC_IDC_EPISODE_ID: 158
MDC_IDC_EPISODE_ID: 159
MDC_IDC_EPISODE_TYPE: NORMAL
MDC_IDC_LEAD_IMPLANT_DT: NORMAL
MDC_IDC_LEAD_IMPLANT_DT: NORMAL
MDC_IDC_LEAD_LOCATION: NORMAL
MDC_IDC_LEAD_LOCATION: NORMAL
MDC_IDC_LEAD_LOCATION_DETAIL_1: NORMAL
MDC_IDC_LEAD_LOCATION_DETAIL_1: NORMAL
MDC_IDC_LEAD_MFG: NORMAL
MDC_IDC_LEAD_MFG: NORMAL
MDC_IDC_LEAD_MODEL: NORMAL
MDC_IDC_LEAD_MODEL: NORMAL
MDC_IDC_LEAD_POLARITY_TYPE: NORMAL
MDC_IDC_LEAD_POLARITY_TYPE: NORMAL
MDC_IDC_LEAD_SERIAL: NORMAL
MDC_IDC_LEAD_SERIAL: NORMAL
MDC_IDC_LEAD_SPECIAL_FUNCTION: NORMAL
MDC_IDC_LEAD_SPECIAL_FUNCTION: NORMAL
MDC_IDC_MSMT_BATTERY_DTM: NORMAL
MDC_IDC_MSMT_BATTERY_REMAINING_LONGEVITY: 69 MO
MDC_IDC_MSMT_BATTERY_RRT_TRIGGER: 2.83
MDC_IDC_MSMT_BATTERY_STATUS: NORMAL
MDC_IDC_MSMT_BATTERY_VOLTAGE: 3 V
MDC_IDC_MSMT_LEADCHNL_RA_IMPEDANCE_VALUE: 304 OHM
MDC_IDC_MSMT_LEADCHNL_RA_IMPEDANCE_VALUE: 437 OHM
MDC_IDC_MSMT_LEADCHNL_RA_PACING_THRESHOLD_AMPLITUDE: 0.75 V
MDC_IDC_MSMT_LEADCHNL_RA_PACING_THRESHOLD_PULSEWIDTH: 0.4 MS
MDC_IDC_MSMT_LEADCHNL_RA_SENSING_INTR_AMPL: 0.88 MV
MDC_IDC_MSMT_LEADCHNL_RA_SENSING_INTR_AMPL: 1.25 MV
MDC_IDC_MSMT_LEADCHNL_RV_IMPEDANCE_VALUE: 456 OHM
MDC_IDC_MSMT_LEADCHNL_RV_IMPEDANCE_VALUE: 494 OHM
MDC_IDC_MSMT_LEADCHNL_RV_PACING_THRESHOLD_AMPLITUDE: 0.5 V
MDC_IDC_MSMT_LEADCHNL_RV_PACING_THRESHOLD_PULSEWIDTH: 0.4 MS
MDC_IDC_PG_IMPLANT_DTM: NORMAL
MDC_IDC_PG_MFG: NORMAL
MDC_IDC_PG_MODEL: NORMAL
MDC_IDC_PG_SERIAL: NORMAL
MDC_IDC_PG_TYPE: NORMAL
MDC_IDC_SESS_CLINIC_NAME: NORMAL
MDC_IDC_SESS_DTM: NORMAL
MDC_IDC_SESS_TYPE: NORMAL
MDC_IDC_SET_BRADY_AT_MODE_SWITCH_RATE: 171 {BEATS}/MIN
MDC_IDC_SET_BRADY_HYSTRATE: NORMAL
MDC_IDC_SET_BRADY_LOWRATE: 60 {BEATS}/MIN
MDC_IDC_SET_BRADY_MAX_SENSOR_RATE: 130 {BEATS}/MIN
MDC_IDC_SET_BRADY_MAX_TRACKING_RATE: 130 {BEATS}/MIN
MDC_IDC_SET_BRADY_MODE: NORMAL
MDC_IDC_SET_BRADY_PAV_DELAY_LOW: 180 MS
MDC_IDC_SET_BRADY_SAV_DELAY_LOW: 150 MS
MDC_IDC_SET_LEADCHNL_RA_PACING_AMPLITUDE: 1.75 V
MDC_IDC_SET_LEADCHNL_RA_PACING_ANODE_ELECTRODE_1: NORMAL
MDC_IDC_SET_LEADCHNL_RA_PACING_ANODE_LOCATION_1: NORMAL
MDC_IDC_SET_LEADCHNL_RA_PACING_CAPTURE_MODE: NORMAL
MDC_IDC_SET_LEADCHNL_RA_PACING_CATHODE_ELECTRODE_1: NORMAL
MDC_IDC_SET_LEADCHNL_RA_PACING_CATHODE_LOCATION_1: NORMAL
MDC_IDC_SET_LEADCHNL_RA_PACING_POLARITY: NORMAL
MDC_IDC_SET_LEADCHNL_RA_PACING_PULSEWIDTH: 0.4 MS
MDC_IDC_SET_LEADCHNL_RA_SENSING_ANODE_ELECTRODE_1: NORMAL
MDC_IDC_SET_LEADCHNL_RA_SENSING_ANODE_LOCATION_1: NORMAL
MDC_IDC_SET_LEADCHNL_RA_SENSING_CATHODE_ELECTRODE_1: NORMAL
MDC_IDC_SET_LEADCHNL_RA_SENSING_CATHODE_LOCATION_1: NORMAL
MDC_IDC_SET_LEADCHNL_RA_SENSING_POLARITY: NORMAL
MDC_IDC_SET_LEADCHNL_RA_SENSING_SENSITIVITY: 0.3 MV
MDC_IDC_SET_LEADCHNL_RV_PACING_AMPLITUDE: 2 V
MDC_IDC_SET_LEADCHNL_RV_PACING_ANODE_ELECTRODE_1: NORMAL
MDC_IDC_SET_LEADCHNL_RV_PACING_ANODE_LOCATION_1: NORMAL
MDC_IDC_SET_LEADCHNL_RV_PACING_CAPTURE_MODE: NORMAL
MDC_IDC_SET_LEADCHNL_RV_PACING_CATHODE_ELECTRODE_1: NORMAL
MDC_IDC_SET_LEADCHNL_RV_PACING_CATHODE_LOCATION_1: NORMAL
MDC_IDC_SET_LEADCHNL_RV_PACING_POLARITY: NORMAL
MDC_IDC_SET_LEADCHNL_RV_PACING_PULSEWIDTH: 0.4 MS
MDC_IDC_SET_LEADCHNL_RV_SENSING_ANODE_ELECTRODE_1: NORMAL
MDC_IDC_SET_LEADCHNL_RV_SENSING_ANODE_LOCATION_1: NORMAL
MDC_IDC_SET_LEADCHNL_RV_SENSING_CATHODE_ELECTRODE_1: NORMAL
MDC_IDC_SET_LEADCHNL_RV_SENSING_CATHODE_LOCATION_1: NORMAL
MDC_IDC_SET_LEADCHNL_RV_SENSING_POLARITY: NORMAL
MDC_IDC_SET_LEADCHNL_RV_SENSING_SENSITIVITY: 0.9 MV
MDC_IDC_SET_ZONE_DETECTION_INTERVAL: 350 MS
MDC_IDC_SET_ZONE_DETECTION_INTERVAL: 400 MS
MDC_IDC_SET_ZONE_TYPE: NORMAL
MDC_IDC_STAT_AT_BURDEN_PERCENT: 0.1 %
MDC_IDC_STAT_AT_DTM_END: NORMAL
MDC_IDC_STAT_AT_DTM_START: NORMAL
MDC_IDC_STAT_BRADY_AP_VP_PERCENT: 0.05 %
MDC_IDC_STAT_BRADY_AP_VS_PERCENT: 99.58 %
MDC_IDC_STAT_BRADY_AS_VP_PERCENT: 0.01 %
MDC_IDC_STAT_BRADY_AS_VS_PERCENT: 0.36 %
MDC_IDC_STAT_BRADY_DTM_END: NORMAL
MDC_IDC_STAT_BRADY_DTM_START: NORMAL
MDC_IDC_STAT_BRADY_RA_PERCENT_PACED: 99.53 %
MDC_IDC_STAT_BRADY_RV_PERCENT_PACED: 0.06 %
MDC_IDC_STAT_EPISODE_RECENT_COUNT: 0
MDC_IDC_STAT_EPISODE_RECENT_COUNT: 0
MDC_IDC_STAT_EPISODE_RECENT_COUNT: 22
MDC_IDC_STAT_EPISODE_RECENT_COUNT: 8
MDC_IDC_STAT_EPISODE_RECENT_COUNT_DTM_END: NORMAL
MDC_IDC_STAT_EPISODE_RECENT_COUNT_DTM_START: NORMAL
MDC_IDC_STAT_EPISODE_TOTAL_COUNT: 0
MDC_IDC_STAT_EPISODE_TOTAL_COUNT: 0
MDC_IDC_STAT_EPISODE_TOTAL_COUNT: 123
MDC_IDC_STAT_EPISODE_TOTAL_COUNT: 35
MDC_IDC_STAT_EPISODE_TOTAL_COUNT_DTM_END: NORMAL
MDC_IDC_STAT_EPISODE_TOTAL_COUNT_DTM_START: NORMAL
MDC_IDC_STAT_EPISODE_TYPE: NORMAL

## 2021-06-15 ENCOUNTER — OFFICE VISIT (OUTPATIENT)
Dept: CARDIOLOGY | Facility: CLINIC | Age: 86
End: 2021-06-15
Attending: INTERNAL MEDICINE
Payer: COMMERCIAL

## 2021-06-15 VITALS
WEIGHT: 127 LBS | OXYGEN SATURATION: 98 % | BODY MASS INDEX: 23.37 KG/M2 | DIASTOLIC BLOOD PRESSURE: 90 MMHG | HEIGHT: 62 IN | SYSTOLIC BLOOD PRESSURE: 145 MMHG | HEART RATE: 67 BPM

## 2021-06-15 DIAGNOSIS — R00.1 SINUS BRADYCARDIA: ICD-10-CM

## 2021-06-15 DIAGNOSIS — I47.29 NSVT (NONSUSTAINED VENTRICULAR TACHYCARDIA) (H): ICD-10-CM

## 2021-06-15 DIAGNOSIS — I48.0 PAROXYSMAL A-FIB (H): Primary | ICD-10-CM

## 2021-06-15 PROCEDURE — 99214 OFFICE O/P EST MOD 30 MIN: CPT | Mod: GC | Performed by: INTERNAL MEDICINE

## 2021-06-15 PROCEDURE — G0463 HOSPITAL OUTPT CLINIC VISIT: HCPCS | Mod: 25

## 2021-06-15 ASSESSMENT — PAIN SCALES - GENERAL: PAINLEVEL: NO PAIN (0)

## 2021-06-15 ASSESSMENT — MIFFLIN-ST. JEOR: SCORE: 948.83

## 2021-06-15 NOTE — PROGRESS NOTES
Electrophysiology Clinic Visit    HPI:   89 yo F s/p pacemaker for sick sinus syndrome. Last seen by Dr. Lazo 8/21/2018.    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 Currently has only 2 kids. She denies lightheadedness, syncope, chest pressure, dyspnea, orthopnea. She has intermittent palpitations which had in the past corresponded to episodes of atrial fibrillation on there pacemaker, usually lasts an hour or less. She denies associated symptoms of chest pain or dyspnea with afib but has been taking an extra dose of metoprolol when she feels irregular heart beats. She is not anticoagulated - discussions had been previously undertaken but she has declined anticoagulation in favor of herbal supplements.    PAST MEDICAL HISTORY:  1. Pacemaker 6/30/2010. Leads are Medtronic 5076.  2. Nonsustained VT  3. Hypertension  4. Atrial fibrillation, detected on device lasting > 1 hour, declined anticoagulation    CURRENT MEDICATIONS:  Current Outpatient Medications   Medication Sig Dispense Refill     Ascorbic Acid (VITAMIN C PO)        aspirin 81 MG tablet Take 1 tablet by mouth daily.       BIOTIN PO Take 1 capsule by mouth daily.       Cyanocobalamin (VITAMIN B12 PO) Take 1 tablet by mouth every 14 days        hydrochlorothiazide (MICROZIDE) 12.5 MG capsule TAKE ONE CAPSULE BY MOUTH EVERY DAY 90 capsule 1     lisinopril (ZESTRIL) 10 MG tablet TAKE 1 TABLET BY MOUTH DAILY. 90 tablet 0     Magnesium 300 MG CAPS Take 1 tablet by mouth       metoprolol succinate ER (TOPROL-XL) 50 MG 24 hr tablet TAKE 1 TABLET BY MOUTH EVERY MORNING AND ONE-HALF TABLET EVERY EVENING 135 tablet 3     Nutritional Supplements (PYCNOGENOL) 300-30 MG CAPS per capsule Take 1 capsule by mouth daily       Vitamin D, Cholecalciferol, 1000 units TABS Take 1 tablet by mouth daily       VITAMIN K PO Take 1 tablet by mouth every other day        conjugated estrogens (PREMARIN) cream Place vaginally twice a week         ALLERGIES:    "No Known Allergies    FAMILY HISTORY:  Family History   Problem Relation Age of Onset     Cardiovascular Father 76         age 80, MI 76 and CHF 80's     Cardiovascular Paternal Grandfather          age 76 of CVD     Myocardial Infarction Mother 88        lived to 100     Arrhythmia Sister         PPM     Neuropathy Sister      Colon Cancer Sister      Arrhythmia Brother         pacemaker     Arrhythmia Brother         pacemaker, hx rheumatic fever, heart failure     Blood Disease Son         hemochromatosis?     Atrial fibrillation Son      Cancer Daughter         Breast, uterine, 2nd breast cancer, HTN       SOCIAL HISTORY:  Social History     Tobacco Use     Smoking status: Never Smoker     Smokeless tobacco: Never Used   Substance Use Topics     Alcohol use: No     Drug use: No       ROS:  10 point ROS neg other than the symptoms noted above in the HPI.    BP (!) 145/90 (BP Location: Right arm, Patient Position: Chair, Cuff Size: Adult Regular)   Pulse 67   Ht 1.574 m (5' 1.97\")   Wt 57.6 kg (127 lb)   SpO2 98%   BMI 23.25 kg/m    Gen: alert, interactive, NAD  HEENT: EOMI, neck supple, no adenopathy, JVP ~ 8cm  CV: RRR, soft holosystolic murmur along apex  Lungs: CTAB, no wheezes or crackles  Abd: soft, NT, ND, +BS  Ext: warm and well perfused, trace peripheral edema  Neuro: alert and oriented x4, no focal deficits  Psych: normal mood and affect  Skin: no rashes on exposed surfaces    Labs:  Reviewed.     Testing/Procedures:  ECHOCARDIOGRAM:   2017: EF 65%, mild to mod MAC, posterior mitral valve prolapse with mild MR, BISHNU 56.    DEVICE INTERROGATION 21:  Device: Medtronic [id number] Advisa DR LAWLER  Normal device function  Mode: AAIR-DDDR  bpm  AP: 99.6%  : <0.1%  Intrinsic Rhythm: /AS @ 30 bpm  Short V-V intervals: None  Lead Trends Appear Stable: Yes  Estimated battery longevity to RRT = 5.5 years.   Atrial Arrhythmia: 22 AF episodes lasting up to 2 hours.  AF burden= " <0.1%  Anticoagulation: None per pt wishes  Ventricular Arrhythmia: 8 NSVT. EGMs show atrial origination  Setting Changes: None    Assessment and Plan:   1. Sick sinus syndrome s/p pacemaker implantation. PPM in good working order  2. Atrial fibrillation, paroxysmal, minimally symptomatic with some irregular heart beats. Discussed with her that she doesn't need to take metoprolol succinate prn. If she feels irregular heart beats frequently and if it's bothersome, I favor increasing regular doses of metoprolol. [id number] score is 4 Anticoagulation had been discussed with her before, and again today. She declines  3. Hypertension. Slightly elevated, but reports stable otherwise on lisinopril, hydrochlorothiazide, and metoprolol XL  4. Nonsustained VT, known for several years, asymptomatic, normal EF  5. Dyspnea on exertion, worse in the last year. No evidence of volume overload on exam. Will repeat TTE    Follow up: pending results of TTE    Patient was seen and discussed with staff attending, Dr. Dixon, who agrees with the above.    Clementine Prieto MD  Cardiology Fellow

## 2021-06-15 NOTE — LETTER
6/15/2021      RE: Isadora HAMPTON Andrea  [address] [facility] [address]       Dear Colleague,    Thank you for the opportunity to participate in the care of your patient, Isadora Mendez, at the [facility] HEART CLINIC [place] [facility]. Please see a copy of my visit note below.    Electrophysiology Clinic Visit    HPI:   89 yo F s/p pacemaker for sick sinus syndrome. Last seen by Dr. Lazo 8/21/2018.    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 Currently has only 2 kids. She denies lightheadedness, syncope, chest pressure, dyspnea, orthopnea. She has intermittent palpitations which had in the past corresponded to episodes of atrial fibrillation on there pacemaker, usually lasts an hour or less. She denies associated symptoms of chest pain or dyspnea with afib but has been taking an extra dose of metoprolol when she feels irregular heart beats. She is not anticoagulated - discussions had been previously undertaken but she has declined anticoagulation in favor of herbal supplements.    PAST MEDICAL HISTORY:  1. Pacemaker 6/30/2010. Leads are Medtronic 5076.  2. Nonsustained VT  3. Hypertension  4. Atrial fibrillation, detected on device lasting > 1 hour, declined anticoagulation    CURRENT MEDICATIONS:  Current Outpatient Medications   Medication Sig Dispense Refill     Ascorbic Acid (VITAMIN C PO)        aspirin 81 MG tablet Take 1 tablet by mouth daily.       BIOTIN PO Take 1 capsule by mouth daily.       Cyanocobalamin (VITAMIN B12 PO) Take 1 tablet by mouth every 14 days        hydrochlorothiazide (MICROZIDE) 12.5 MG capsule TAKE ONE CAPSULE BY MOUTH EVERY DAY 90 capsule 1     lisinopril (ZESTRIL) 10 MG tablet TAKE 1 TABLET BY MOUTH DAILY. 90 tablet 0     Magnesium 300 MG CAPS Take 1 tablet by mouth       metoprolol succinate ER (TOPROL-XL) 50 MG 24 hr tablet TAKE 1 TABLET BY MOUTH EVERY MORNING AND ONE-HALF TABLET EVERY EVENING 135  "tablet 3     Nutritional Supplements (PYCNOGENOL) 300-30 MG CAPS per capsule Take 1 capsule by mouth daily       Vitamin D, Cholecalciferol, 1000 units TABS Take 1 tablet by mouth daily       VITAMIN K PO Take 1 tablet by mouth every other day        conjugated estrogens (PREMARIN) cream Place vaginally twice a week         ALLERGIES:   No Known Allergies    FAMILY HISTORY:  Family History   Problem Relation Age of Onset     Cardiovascular Father 76         age 80, MI 76 and CHF 80's     Cardiovascular Paternal Grandfather          age 76 of CVD     Myocardial Infarction Mother 88        lived to 100     Arrhythmia Sister         PPM     Neuropathy Sister      Colon Cancer Sister      Arrhythmia Brother         pacemaker     Arrhythmia Brother         pacemaker, hx rheumatic fever, heart failure     Blood Disease Son         hemochromatosis?     Atrial fibrillation Son      Cancer Daughter         Breast, uterine, 2nd breast cancer, HTN       SOCIAL HISTORY:  Social History     Tobacco Use     Smoking status: Never Smoker     Smokeless tobacco: Never Used   Substance Use Topics     Alcohol use: No     Drug use: No       ROS:  10 point ROS neg other than the symptoms noted above in the HPI.    BP (!) 145/90 (BP Location: Right arm, Patient Position: Chair, Cuff Size: Adult Regular)   Pulse 67   Ht 1.574 m (5' 1.97\")   Wt 57.6 kg (127 lb)   SpO2 98%   BMI 23.25 kg/m    Gen: alert, interactive, NAD  HEENT: EOMI, neck supple, no adenopathy, JVP ~ 8cm  CV: RRR, soft holosystolic murmur along apex  Lungs: CTAB, no wheezes or crackles  Abd: soft, NT, ND, +BS  Ext: warm and well perfused, trace peripheral edema  Neuro: alert and oriented x4, no focal deficits  Psych: normal mood and affect  Skin: no rashes on exposed surfaces    Labs:  Reviewed.     Testing/Procedures:  ECHOCARDIOGRAM:   2017: EF 65%, mild to mod MAC, posterior mitral valve prolapse with mild MR, BISHNU 56.    DEVICE INTERROGATION " 5/6/21:  Device: Medtronic A2DR01 Advisa DR LAWLER  Normal device function  Mode: AAIR-DDDR  bpm  AP: 99.6%  : <0.1%  Intrinsic Rhythm: /AS @ 30 bpm  Short V-V intervals: None  Lead Trends Appear Stable: Yes  Estimated battery longevity to RRT = 5.5 years.   Atrial Arrhythmia: 22 AF episodes lasting up to 2 hours.  AF burden= <0.1%  Anticoagulation: None per pt wishes  Ventricular Arrhythmia: 8 NSVT. EGMs show atrial origination  Setting Changes: None    Assessment and Plan:   1. Sick sinus syndrome s/p pacemaker implantation. PPM in good working order  2. Atrial fibrillation, paroxysmal, minimally symptomatic with some irregular heart beats. Discussed with her that she doesn't need to take metoprolol succinate prn. If she feels irregular heart beats frequently and if it's bothersome, I favor increasing regular doses of metoprolol. [id number] score is 4 Anticoagulation had been discussed with her before, and again today. She declines  3. Hypertension. Slightly elevated, but reports stable otherwise on lisinopril, hydrochlorothiazide, and metoprolol XL  4. Nonsustained VT, known for several years, asymptomatic, normal EF  5. Dyspnea on exertion, worse in the last year. No evidence of volume overload on exam. Will repeat TTE    Follow up: pending results of TTE    Patient was seen and discussed with staff attending, Dr. Dixon, who agrees with the above.    Clementine Prieto MD  Cardiology Fellow       Attestation signed by Mary Dixon MD at 6/15/2021  3:20 PM:  Patient seen and examined with Dr. Prieto. The history and physical findings are accurate as recorded.   Briefly, SSS s/p pacemaker, PAF, declined anticoagulation.     All labs, imaging studies, ECG and telemetry data have been reviewed personally. Specifically,   Pacemaker interrogation 5/6/2021 - PAF, longest duration 2 hours, average ventricular rate about 100 bpm.    The assessment and plans outlined reflect our joint decision making.  She is  reporting more dyspnea than before, likely deconditioning but will get 2D echo for evaluation.  She is not volume overloaded today.    Mary Dixon MD  Cardiology

## 2021-06-15 NOTE — PATIENT INSTRUCTIONS
"You were seen today in the Cardiovascular Clinic at the [facility].     Cardiology Providers you saw during your visit: Dr. Mary Dixon    Diagnosis:  Shortness of breath, paroxysmal atrial fibrillation    Results: discussed with patient    Orders:   None    Medication Changes:   None    Recommendations:   Echocardiogram    Follow-up:   As needed  Please follow up with primary care provider for medication refills         Please feel free to call me with any questions or concerns.       Nicol Amin RN     Questions and [phone].   First press #1 for the Fanergies and then press #3 for \"Medical Questions\" to reach us Cardiology Nurses.      On Call Cardiologist for after hours or on weekends: [phone]   option #4 and ask to speak to the on-call Cardiologist.          If you need a medication refill please contact your pharmacy.  Please allow 3 business days for your refill to be completed.    "

## 2021-06-29 ENCOUNTER — ANCILLARY PROCEDURE (OUTPATIENT)
Dept: CARDIOLOGY | Facility: CLINIC | Age: 86
End: 2021-06-29
Payer: COMMERCIAL

## 2021-06-29 DIAGNOSIS — I48.0 PAROXYSMAL A-FIB (H): ICD-10-CM

## 2021-06-29 PROCEDURE — 93306 TTE W/DOPPLER COMPLETE: CPT | Performed by: INTERNAL MEDICINE

## 2021-07-07 ENCOUNTER — TELEPHONE (OUTPATIENT)
Dept: CARDIOLOGY | Facility: CLINIC | Age: 86
End: 2021-07-07

## 2021-07-07 NOTE — TELEPHONE ENCOUNTER
Spoke with pt via telephone. After previous phone call last week I told pt I would contact Dr. Dixon about ongoing SOB.     Dr. Dixon was able to look at pt's echo which shows no changes since previous echo in 2017. She also looked at her device which shows very few AF episodes. Dr. Dixon recommending pt follow up with her PCP because there is no cardiac cause for her SOB.    Pt reports understanding. States she is relieved her SOB is not caused by a cardiac issue. States she will make an appointment with her PCP to talk to them about her SOB.     Pt denies any further questions at this time.     Edouard Amin, RN   Cardiology Nurse Coordinator

## 2021-07-28 DIAGNOSIS — I10 ESSENTIAL HYPERTENSION: ICD-10-CM

## 2021-08-02 RX ORDER — HYDROCHLOROTHIAZIDE 12.5 MG/1
CAPSULE ORAL
Qty: 90 CAPSULE | Refills: 1 | OUTPATIENT
Start: 2021-08-02

## 2021-08-02 NOTE — TELEPHONE ENCOUNTER
Can you please refill hydrochlorothiazide for her? I have not had a recent visit with her.  Thanks!

## 2021-08-05 NOTE — TELEPHONE ENCOUNTER
Attempted to call pt to request she ask for Hydrochlorothiazide refilled by PCP. Pt did not answer and her VM box was full. I rerouted to June Mckinley APRN CNP since she is provider who prescribed medication.    Edouard oRsas, RN   Cardiology Nurse Coordinator

## 2021-08-06 DIAGNOSIS — I10 ESSENTIAL HYPERTENSION: ICD-10-CM

## 2021-08-06 RX ORDER — HYDROCHLOROTHIAZIDE 12.5 MG/1
1 CAPSULE ORAL DAILY
Qty: 90 CAPSULE | Refills: 0 | Status: SHIPPED | OUTPATIENT
Start: 2021-08-06 | End: 2021-08-27

## 2021-08-27 DIAGNOSIS — I10 ESSENTIAL HYPERTENSION: Primary | ICD-10-CM

## 2021-08-27 RX ORDER — HYDROCHLOROTHIAZIDE 12.5 MG/1
12.5 TABLET ORAL DAILY
Qty: 90 TABLET | Refills: 0 | Status: SHIPPED | OUTPATIENT
Start: 2021-08-27 | End: 2022-03-01

## 2021-09-29 ENCOUNTER — ANCILLARY PROCEDURE (OUTPATIENT)
Dept: CARDIOLOGY | Facility: CLINIC | Age: 86
End: 2021-09-29
Attending: INTERNAL MEDICINE
Payer: COMMERCIAL

## 2021-09-29 DIAGNOSIS — I49.5 SICK SINUS SYNDROME (H): ICD-10-CM

## 2021-09-29 PROCEDURE — 93296 REM INTERROG EVL PM/IDS: CPT

## 2021-09-29 PROCEDURE — 93294 REM INTERROG EVL PM/LDLS PM: CPT | Performed by: INTERNAL MEDICINE

## 2021-10-10 LAB
MDC_IDC_EPISODE_DTM: NORMAL
MDC_IDC_EPISODE_DURATION: 0 S
MDC_IDC_EPISODE_DURATION: 1 S
MDC_IDC_EPISODE_DURATION: 1 S
MDC_IDC_EPISODE_DURATION: 112 S
MDC_IDC_EPISODE_DURATION: 125 S
MDC_IDC_EPISODE_DURATION: 1282 S
MDC_IDC_EPISODE_DURATION: 1371 S
MDC_IDC_EPISODE_DURATION: 141 S
MDC_IDC_EPISODE_DURATION: 1431 S
MDC_IDC_EPISODE_DURATION: 1465 S
MDC_IDC_EPISODE_DURATION: 164 S
MDC_IDC_EPISODE_DURATION: 1947 S
MDC_IDC_EPISODE_DURATION: 209 S
MDC_IDC_EPISODE_DURATION: 2151 S
MDC_IDC_EPISODE_DURATION: 2317 S
MDC_IDC_EPISODE_DURATION: 2346 S
MDC_IDC_EPISODE_DURATION: 24 S
MDC_IDC_EPISODE_DURATION: 2586 S
MDC_IDC_EPISODE_DURATION: 259 S
MDC_IDC_EPISODE_DURATION: 2601 S
MDC_IDC_EPISODE_DURATION: 2659 S
MDC_IDC_EPISODE_DURATION: 2745 S
MDC_IDC_EPISODE_DURATION: 3 S
MDC_IDC_EPISODE_DURATION: 32 S
MDC_IDC_EPISODE_DURATION: 345 S
MDC_IDC_EPISODE_DURATION: 369 S
MDC_IDC_EPISODE_DURATION: 3932 S
MDC_IDC_EPISODE_DURATION: 3986 S
MDC_IDC_EPISODE_DURATION: 42 S
MDC_IDC_EPISODE_DURATION: 4223 S
MDC_IDC_EPISODE_DURATION: 43 S
MDC_IDC_EPISODE_DURATION: 45 S
MDC_IDC_EPISODE_DURATION: 47 S
MDC_IDC_EPISODE_DURATION: 530 S
MDC_IDC_EPISODE_DURATION: 5524 S
MDC_IDC_EPISODE_DURATION: 556 S
MDC_IDC_EPISODE_DURATION: 57 S
MDC_IDC_EPISODE_DURATION: 59 S
MDC_IDC_EPISODE_DURATION: 6342 S
MDC_IDC_EPISODE_DURATION: 639 S
MDC_IDC_EPISODE_DURATION: 69 S
MDC_IDC_EPISODE_DURATION: 72 S
MDC_IDC_EPISODE_ID: 160
MDC_IDC_EPISODE_ID: 161
MDC_IDC_EPISODE_ID: 162
MDC_IDC_EPISODE_ID: 163
MDC_IDC_EPISODE_ID: 164
MDC_IDC_EPISODE_ID: 165
MDC_IDC_EPISODE_ID: 166
MDC_IDC_EPISODE_ID: 167
MDC_IDC_EPISODE_ID: 168
MDC_IDC_EPISODE_ID: 169
MDC_IDC_EPISODE_ID: 170
MDC_IDC_EPISODE_ID: 171
MDC_IDC_EPISODE_ID: 172
MDC_IDC_EPISODE_ID: 173
MDC_IDC_EPISODE_ID: 174
MDC_IDC_EPISODE_ID: 175
MDC_IDC_EPISODE_ID: 176
MDC_IDC_EPISODE_ID: 177
MDC_IDC_EPISODE_ID: 178
MDC_IDC_EPISODE_ID: 179
MDC_IDC_EPISODE_ID: 180
MDC_IDC_EPISODE_ID: 181
MDC_IDC_EPISODE_ID: 182
MDC_IDC_EPISODE_ID: 183
MDC_IDC_EPISODE_ID: 184
MDC_IDC_EPISODE_ID: 185
MDC_IDC_EPISODE_ID: 186
MDC_IDC_EPISODE_ID: 187
MDC_IDC_EPISODE_ID: 188
MDC_IDC_EPISODE_ID: 189
MDC_IDC_EPISODE_ID: 190
MDC_IDC_EPISODE_ID: 191
MDC_IDC_EPISODE_ID: 192
MDC_IDC_EPISODE_ID: 193
MDC_IDC_EPISODE_ID: 194
MDC_IDC_EPISODE_ID: 195
MDC_IDC_EPISODE_ID: 196
MDC_IDC_EPISODE_ID: 197
MDC_IDC_EPISODE_ID: 198
MDC_IDC_EPISODE_ID: 199
MDC_IDC_EPISODE_ID: 200
MDC_IDC_EPISODE_ID: 201
MDC_IDC_EPISODE_ID: 202
MDC_IDC_EPISODE_ID: 203
MDC_IDC_EPISODE_TYPE: NORMAL
MDC_IDC_LEAD_IMPLANT_DT: NORMAL
MDC_IDC_LEAD_IMPLANT_DT: NORMAL
MDC_IDC_LEAD_LOCATION: NORMAL
MDC_IDC_LEAD_LOCATION: NORMAL
MDC_IDC_LEAD_LOCATION_DETAIL_1: NORMAL
MDC_IDC_LEAD_LOCATION_DETAIL_1: NORMAL
MDC_IDC_LEAD_MFG: NORMAL
MDC_IDC_LEAD_MFG: NORMAL
MDC_IDC_LEAD_MODEL: NORMAL
MDC_IDC_LEAD_MODEL: NORMAL
MDC_IDC_LEAD_POLARITY_TYPE: NORMAL
MDC_IDC_LEAD_POLARITY_TYPE: NORMAL
MDC_IDC_LEAD_SERIAL: NORMAL
MDC_IDC_LEAD_SERIAL: NORMAL
MDC_IDC_LEAD_SPECIAL_FUNCTION: NORMAL
MDC_IDC_LEAD_SPECIAL_FUNCTION: NORMAL
MDC_IDC_MSMT_BATTERY_DTM: NORMAL
MDC_IDC_MSMT_BATTERY_REMAINING_LONGEVITY: 69 MO
MDC_IDC_MSMT_BATTERY_RRT_TRIGGER: 2.83
MDC_IDC_MSMT_BATTERY_STATUS: NORMAL
MDC_IDC_MSMT_BATTERY_VOLTAGE: 3 V
MDC_IDC_MSMT_LEADCHNL_RA_IMPEDANCE_VALUE: 285 OHM
MDC_IDC_MSMT_LEADCHNL_RA_IMPEDANCE_VALUE: 399 OHM
MDC_IDC_MSMT_LEADCHNL_RA_PACING_THRESHOLD_AMPLITUDE: 0.75 V
MDC_IDC_MSMT_LEADCHNL_RA_PACING_THRESHOLD_PULSEWIDTH: 0.4 MS
MDC_IDC_MSMT_LEADCHNL_RA_SENSING_INTR_AMPL: 0.88 MV
MDC_IDC_MSMT_LEADCHNL_RA_SENSING_INTR_AMPL: 0.88 MV
MDC_IDC_MSMT_LEADCHNL_RV_IMPEDANCE_VALUE: 418 OHM
MDC_IDC_MSMT_LEADCHNL_RV_IMPEDANCE_VALUE: 456 OHM
MDC_IDC_MSMT_LEADCHNL_RV_PACING_THRESHOLD_AMPLITUDE: 0.5 V
MDC_IDC_MSMT_LEADCHNL_RV_PACING_THRESHOLD_PULSEWIDTH: 0.4 MS
MDC_IDC_MSMT_LEADCHNL_RV_SENSING_INTR_AMPL: 10 MV
MDC_IDC_MSMT_LEADCHNL_RV_SENSING_INTR_AMPL: 10 MV
MDC_IDC_PG_IMPLANT_DTM: NORMAL
MDC_IDC_PG_MFG: NORMAL
MDC_IDC_PG_MODEL: NORMAL
MDC_IDC_PG_SERIAL: NORMAL
MDC_IDC_PG_TYPE: NORMAL
MDC_IDC_SESS_CLINIC_NAME: NORMAL
MDC_IDC_SESS_DTM: NORMAL
MDC_IDC_SESS_TYPE: NORMAL
MDC_IDC_SET_BRADY_AT_MODE_SWITCH_RATE: 171 {BEATS}/MIN
MDC_IDC_SET_BRADY_HYSTRATE: NORMAL
MDC_IDC_SET_BRADY_LOWRATE: 60 {BEATS}/MIN
MDC_IDC_SET_BRADY_MAX_SENSOR_RATE: 130 {BEATS}/MIN
MDC_IDC_SET_BRADY_MAX_TRACKING_RATE: 130 {BEATS}/MIN
MDC_IDC_SET_BRADY_MODE: NORMAL
MDC_IDC_SET_BRADY_PAV_DELAY_LOW: 180 MS
MDC_IDC_SET_BRADY_SAV_DELAY_LOW: 150 MS
MDC_IDC_SET_LEADCHNL_RA_PACING_AMPLITUDE: 1.5 V
MDC_IDC_SET_LEADCHNL_RA_PACING_ANODE_ELECTRODE_1: NORMAL
MDC_IDC_SET_LEADCHNL_RA_PACING_ANODE_LOCATION_1: NORMAL
MDC_IDC_SET_LEADCHNL_RA_PACING_CAPTURE_MODE: NORMAL
MDC_IDC_SET_LEADCHNL_RA_PACING_CATHODE_ELECTRODE_1: NORMAL
MDC_IDC_SET_LEADCHNL_RA_PACING_CATHODE_LOCATION_1: NORMAL
MDC_IDC_SET_LEADCHNL_RA_PACING_POLARITY: NORMAL
MDC_IDC_SET_LEADCHNL_RA_PACING_PULSEWIDTH: 0.4 MS
MDC_IDC_SET_LEADCHNL_RA_SENSING_ANODE_ELECTRODE_1: NORMAL
MDC_IDC_SET_LEADCHNL_RA_SENSING_ANODE_LOCATION_1: NORMAL
MDC_IDC_SET_LEADCHNL_RA_SENSING_CATHODE_ELECTRODE_1: NORMAL
MDC_IDC_SET_LEADCHNL_RA_SENSING_CATHODE_LOCATION_1: NORMAL
MDC_IDC_SET_LEADCHNL_RA_SENSING_POLARITY: NORMAL
MDC_IDC_SET_LEADCHNL_RA_SENSING_SENSITIVITY: 0.3 MV
MDC_IDC_SET_LEADCHNL_RV_PACING_AMPLITUDE: 2 V
MDC_IDC_SET_LEADCHNL_RV_PACING_ANODE_ELECTRODE_1: NORMAL
MDC_IDC_SET_LEADCHNL_RV_PACING_ANODE_LOCATION_1: NORMAL
MDC_IDC_SET_LEADCHNL_RV_PACING_CAPTURE_MODE: NORMAL
MDC_IDC_SET_LEADCHNL_RV_PACING_CATHODE_ELECTRODE_1: NORMAL
MDC_IDC_SET_LEADCHNL_RV_PACING_CATHODE_LOCATION_1: NORMAL
MDC_IDC_SET_LEADCHNL_RV_PACING_POLARITY: NORMAL
MDC_IDC_SET_LEADCHNL_RV_PACING_PULSEWIDTH: 0.4 MS
MDC_IDC_SET_LEADCHNL_RV_SENSING_ANODE_ELECTRODE_1: NORMAL
MDC_IDC_SET_LEADCHNL_RV_SENSING_ANODE_LOCATION_1: NORMAL
MDC_IDC_SET_LEADCHNL_RV_SENSING_CATHODE_ELECTRODE_1: NORMAL
MDC_IDC_SET_LEADCHNL_RV_SENSING_CATHODE_LOCATION_1: NORMAL
MDC_IDC_SET_LEADCHNL_RV_SENSING_POLARITY: NORMAL
MDC_IDC_SET_LEADCHNL_RV_SENSING_SENSITIVITY: 0.9 MV
MDC_IDC_SET_ZONE_DETECTION_INTERVAL: 350 MS
MDC_IDC_SET_ZONE_DETECTION_INTERVAL: 400 MS
MDC_IDC_SET_ZONE_TYPE: NORMAL
MDC_IDC_STAT_AT_BURDEN_PERCENT: 0.4 %
MDC_IDC_STAT_AT_DTM_END: NORMAL
MDC_IDC_STAT_AT_DTM_START: NORMAL
MDC_IDC_STAT_BRADY_AP_VP_PERCENT: 0.08 %
MDC_IDC_STAT_BRADY_AP_VS_PERCENT: 99.29 %
MDC_IDC_STAT_BRADY_AS_VP_PERCENT: 0.04 %
MDC_IDC_STAT_BRADY_AS_VS_PERCENT: 0.59 %
MDC_IDC_STAT_BRADY_DTM_END: NORMAL
MDC_IDC_STAT_BRADY_DTM_START: NORMAL
MDC_IDC_STAT_BRADY_RA_PERCENT_PACED: 99.07 %
MDC_IDC_STAT_BRADY_RV_PERCENT_PACED: 0.11 %
MDC_IDC_STAT_EPISODE_RECENT_COUNT: 0
MDC_IDC_STAT_EPISODE_RECENT_COUNT: 0
MDC_IDC_STAT_EPISODE_RECENT_COUNT: 38
MDC_IDC_STAT_EPISODE_RECENT_COUNT: 6
MDC_IDC_STAT_EPISODE_RECENT_COUNT_DTM_END: NORMAL
MDC_IDC_STAT_EPISODE_RECENT_COUNT_DTM_START: NORMAL
MDC_IDC_STAT_EPISODE_TOTAL_COUNT: 0
MDC_IDC_STAT_EPISODE_TOTAL_COUNT: 0
MDC_IDC_STAT_EPISODE_TOTAL_COUNT: 161
MDC_IDC_STAT_EPISODE_TOTAL_COUNT: 41
MDC_IDC_STAT_EPISODE_TOTAL_COUNT_DTM_END: NORMAL
MDC_IDC_STAT_EPISODE_TOTAL_COUNT_DTM_START: NORMAL
MDC_IDC_STAT_EPISODE_TYPE: NORMAL

## 2021-12-06 DIAGNOSIS — I10 ESSENTIAL HYPERTENSION: ICD-10-CM

## 2021-12-06 RX ORDER — METOPROLOL SUCCINATE 50 MG/1
TABLET, EXTENDED RELEASE ORAL
Qty: 135 TABLET | Refills: 3 | Status: SHIPPED | OUTPATIENT
Start: 2021-12-06 | End: 2022-02-10

## 2021-12-06 NOTE — TELEPHONE ENCOUNTER
metoprolol succinate ER (TOPROL-XL) 50 MG 24 hr tablet  Last Written Prescription Date:  1/12/2021  Last Fill Quantity: 135,   # refills: 3  Last Office Visit : 6/15/2021  Future Office visit:  1/6/2022  135 Tabs, 3 Refills sent to pharm 12/6/2021      Gretta Romero RN  Central Triage Red Flags/Med Refills

## 2021-12-06 NOTE — TELEPHONE ENCOUNTER
[facility] Call Center    Phone Message    May a detailed message be left on voicemail: yes     Reason for Call: Medication Refill Request    Has the patient contacted the pharmacy for the refill? Yes   Name of medication being requested: metoprolol succinate ER (TOPROL-XL) 50 MG 24 hr tablet  Provider who prescribed the medication: Dr. Dixon  Pharmacy: [facility] #[id number] [address]  Date medication is needed: asap     Action Taken: Message routed to:  [facility] Cardio    Travel Screening: Not Applicable

## 2022-02-10 ENCOUNTER — OFFICE VISIT (OUTPATIENT)
Dept: CARDIOLOGY | Facility: CLINIC | Age: 87
End: 2022-02-10
Attending: INTERNAL MEDICINE
Payer: COMMERCIAL

## 2022-02-10 VITALS
DIASTOLIC BLOOD PRESSURE: 78 MMHG | WEIGHT: 123.9 LBS | SYSTOLIC BLOOD PRESSURE: 165 MMHG | HEART RATE: 81 BPM | BODY MASS INDEX: 22.8 KG/M2 | HEIGHT: 62 IN | OXYGEN SATURATION: 97 %

## 2022-02-10 DIAGNOSIS — I49.5 SSS (SICK SINUS SYNDROME) (H): ICD-10-CM

## 2022-02-10 DIAGNOSIS — I10 ESSENTIAL HYPERTENSION: ICD-10-CM

## 2022-02-10 DIAGNOSIS — I48.0 PAF (PAROXYSMAL ATRIAL FIBRILLATION) (H): Primary | ICD-10-CM

## 2022-02-10 DIAGNOSIS — I49.5 SICK SINUS SYNDROME (H): ICD-10-CM

## 2022-02-10 PROBLEM — H40.001 GLAUCOMA SUSPECT OF RIGHT EYE: Status: ACTIVE | Noted: 2017-09-14

## 2022-02-10 PROBLEM — H18.519 FUCHS' CORNEAL DYSTROPHY: Status: ACTIVE | Noted: 2018-05-16

## 2022-02-10 PROCEDURE — G0463 HOSPITAL OUTPT CLINIC VISIT: HCPCS

## 2022-02-10 PROCEDURE — 93280 PM DEVICE PROGR EVAL DUAL: CPT | Performed by: INTERNAL MEDICINE

## 2022-02-10 PROCEDURE — 99214 OFFICE O/P EST MOD 30 MIN: CPT | Mod: 25 | Performed by: INTERNAL MEDICINE

## 2022-02-10 RX ORDER — METOPROLOL SUCCINATE 100 MG/1
100 TABLET, EXTENDED RELEASE ORAL DAILY
Qty: 90 TABLET | Refills: 3 | Status: SHIPPED | OUTPATIENT
Start: 2022-02-10 | End: 2022-02-10

## 2022-02-10 RX ORDER — METOPROLOL SUCCINATE 100 MG/1
100 TABLET, EXTENDED RELEASE ORAL DAILY
Qty: 90 TABLET | Refills: 3 | Status: SHIPPED | OUTPATIENT
Start: 2022-02-10 | End: 2022-10-27

## 2022-02-10 ASSESSMENT — PAIN SCALES - GENERAL: PAINLEVEL: NO PAIN (0)

## 2022-02-10 ASSESSMENT — MIFFLIN-ST. JEOR: SCORE: 935.26

## 2022-02-10 NOTE — PATIENT INSTRUCTIONS
It was a pleasure to see you in clinic today. Please do not hesitate to call with any questions or concerns. You are scheduled for a remote pacemaker transmission in 3 months. We look forward to seeing you in clinic at your next device check in 6 months.    Stacia Nash, RN  Electrophysiology Nurse Clinician  [facility]  During business hours call:  [phone]  Urgent needs after hours- please call: [phone]- select option #4 and ask for job code [phone].

## 2022-02-10 NOTE — LETTER
2/10/2022      RE: Isadora HAMPTON Andrea   [address]  [facility] [address]       Dear Colleague,    Thank you for the opportunity to participate in the care of your patient, Isadora Mendez, at the [facility] HEART CLINIC [place] [facility]. Please see a copy of my visit note below.    Electrophysiology Clinic Visit    HPI:   89 yo F who presents for follow up and ongoing evaluation for paroxysmal atrial fibrillation and SSS s/p pacemaker implantation. She was last seen in clinic in 2021.    She has a h/o PAF and PAT, symptoms of palpitations. Declined anticoagulation. I saw her 2021. She's been well, not working in  because she's been taking care of her sick  who is now better. She wants to go back to working in  this summer.    She reports some episodes of feeling her heart race, doesn't last long, no associated dizziness. She has a habit of taking prn metoprolol when she has symptoms.    PAST MEDICAL HISTORY:  1. Pacemaker 2010. Leads are Medtronic 5076. Generator change 2016.  2. Nonsustained VT  3. Hypertension  4. Atrial fibrillation, detected on device lasting > 1 hour, declined anticoagulation    CURRENT MEDICATIONS:  Aspirin 81 every day  hydrochlorothiazide 12.5 every day  Lisinopril 10 every day (~ noon)  Metoprolol succinate 50 ~ noon/25 midnight before bed    ALLERGIES:   No Known Allergies    FAMILY HISTORY:  Family History   Problem Relation Age of Onset     Cardiovascular Father 76         age 80, MI 76 and CHF 80's     Cardiovascular Paternal Grandfather          age 76 of CVD     Myocardial Infarction Mother 88        lived to 100     Arrhythmia Sister         PPM     Neuropathy Sister      Colon Cancer Sister      Arrhythmia Brother         pacemaker     Arrhythmia Brother         pacemaker, hx rheumatic fever, heart failure     Blood Disease Son         hemochromatosis?     Atrial fibrillation  Son      Cancer Daughter         Breast, uterine, 2nd breast cancer, HTN       SOCIAL HISTORY:  Social History     Tobacco Use     Smoking status: Never Smoker     Smokeless tobacco: Never Used   Substance Use Topics     Alcohol use: No     Drug use: No       ROS:  10 point ROS neg other than the symptoms noted above in the HPI.    Vital signs: 165/78, HR 81, BMI 22.66 (56 kg)  Gen: alert, interactive, NAD, she is very alert, converses knowledgeably regarding her medical status  HCV: RRR, soft holosystolic murmur along apex  Left chest ppm incision site clean    Labs:  No recent labs    Testing/Procedures:  ECHOCARDIOGRAM:   11/21/2017: EF 65%, mild to mod MAC, posterior mitral valve prolapse with mild MR, BISHNU 56.    DEVICE INTERROGATION today 2/10/2022 since last check 9/29/2021:  Device: [employer] A2DR01 Advisa DR LAWLER  Normal device function  Mode: AAIR-DDDR  bpm  AP: 99.6% (atrially dependent)  : <0.1%  Intrinsic Rhythm: /AS @ 30 bpm; conducts with AP  Short V-V intervals: None  Lead Trends Appear Stable: Yes  Estimated battery longevity to RRT = 5 years.   Atrial Arrhythmia: brief episodes of AT, longest 47 seconds, rate 97 bpm  Most recent AF episode was 9/28/2021, 1 hour, average rage 118 bpm    Assessment and Plan:   1. Sick sinus syndrome s/p pacemaker implantation.   2. Atrial fibrillation, paroxysmal, minimally symptomatic with some irregular heart beats which are brief. Mosts of her symptoms occur in the middle of the night but her events of atrial tachycardia recorded are usually during the day. Her last true AF episode was in Sept 2021. I had previously discussed with her that taking metoprolol succinate prn is not effective since her episodes are so short. At this time I will increase metoprolol succinate to 100 mg to take at dinner time, and again stressed that she should not these as needed. [id number] score is 4. Anticoagulation had been discussed with her before, and again today. She  declines  3. Hypertension. Will increase metoprolol XL to 100 every day.  4. Nonsustained VT, known for several years, asymptomatic, normal EF, on metoprolol    Follow up: 3 months remote pacemaker check.    I spent a total of 20 minutes face to face with  Isadora Mendez during today's office visit. I have spend an additional 10 minutes today on chart review and documentation.      Mary Dixon MD  Cardiology

## 2022-02-10 NOTE — PROGRESS NOTES
Electrophysiology Clinic Visit    HPI:   89 yo F who presents for follow up and ongoing evaluation for paroxysmal atrial fibrillation and SSS s/p pacemaker implantation. She was last seen in clinic in 2021.    She has a h/o PAF and PAT, symptoms of palpitations. Declined anticoagulation. I saw her 2021. She's been well, not working in  because she's been taking care of her sick  who is now better. She wants to go back to working in  this summer.    She reports some episodes of feeling her heart race, doesn't last long, no associated dizziness. She has a habit of taking prn metoprolol when she has symptoms.    PAST MEDICAL HISTORY:  1. Pacemaker 2010. Leads are Medtronic 5076. Generator change 2016.  2. Nonsustained VT  3. Hypertension  4. Atrial fibrillation, detected on device lasting > 1 hour, declined anticoagulation    CURRENT MEDICATIONS:  Aspirin 81 every day  hydrochlorothiazide 12.5 every day  Lisinopril 10 every day (~ noon)  Metoprolol succinate 50 ~ noon/25 midnight before bed    ALLERGIES:   No Known Allergies    FAMILY HISTORY:  Family History   Problem Relation Age of Onset     Cardiovascular Father 76         age 80, MI 76 and CHF 80's     Cardiovascular Paternal Grandfather          age 76 of CVD     Myocardial Infarction Mother 88        lived to 100     Arrhythmia Sister         PPM     Neuropathy Sister      Colon Cancer Sister      Arrhythmia Brother         pacemaker     Arrhythmia Brother         pacemaker, hx rheumatic fever, heart failure     Blood Disease Son         hemochromatosis?     Atrial fibrillation Son      Cancer Daughter         Breast, uterine, 2nd breast cancer, HTN       SOCIAL HISTORY:  Social History     Tobacco Use     Smoking status: Never Smoker     Smokeless tobacco: Never Used   Substance Use Topics     Alcohol use: No     Drug use: No       ROS:  10 point ROS neg other than the symptoms noted above in the HPI.    Vital  signs: 165/78, HR 81, BMI 22.66 (56 kg)  Gen: alert, interactive, NAD, she is very alert, converses knowledgeably regarding her medical status  HCV: RRR, soft holosystolic murmur along apex  Left chest ppm incision site clean    Labs:  No recent labs    Testing/Procedures:  ECHOCARDIOGRAM:   11/21/2017: EF 65%, mild to mod MAC, posterior mitral valve prolapse with mild MR, BISHNU 56.    DEVICE INTERROGATION today 2/10/2022 since last check 9/29/2021:  Device: Medtronic A2DR01 Advisa DR LAWLER  Normal device function  Mode: AAIR-DDDR  bpm  AP: 99.6% (atrially dependent)  : <0.1%  Intrinsic Rhythm: /AS @ 30 bpm; conducts with AP  Short V-V intervals: None  Lead Trends Appear Stable: Yes  Estimated battery longevity to RRT = 5 years.   Atrial Arrhythmia: brief episodes of AT, longest 47 seconds, rate 97 bpm  Most recent AF episode was 9/28/2021, 1 hour, average rage 118 bpm    Assessment and Plan:   1. Sick sinus syndrome s/p pacemaker implantation.   2. Atrial fibrillation, paroxysmal, minimally symptomatic with some irregular heart beats which are brief. Mosts of her symptoms occur in the middle of the night but her events of atrial tachycardia recorded are usually during the day. Her last true AF episode was in Sept 2021. I had previously discussed with her that taking metoprolol succinate prn is not effective since her episodes are so short. At this time I will increase metoprolol succinate to 100 mg to take at dinner time, and again stressed that she should not these as needed. [id number] score is 4. Anticoagulation had been discussed with her before, and again today. She declines  3. Hypertension. Will increase metoprolol XL to 100 every day.  4. Nonsustained VT, known for several years, asymptomatic, normal EF, on metoprolol    Follow up: 3 months remote pacemaker check.    I spent a total of 20 minutes face to face with  Isadora Mendez during today's office visit. I have spend an additional 10 minutes  today on chart review and documentation.    Mary Dixon MD  Cardiology

## 2022-02-10 NOTE — NURSING NOTE
Metoprolol dose increased to 100 mg daily.     Continue remote device checks every 3 months.     Follow up with Dr. Dixon in clinic as needed.    Edouard Rosas RN   Cardiology Nurse Coordinator

## 2022-02-10 NOTE — NURSING NOTE
Chief Complaint   Patient presents with     Follow Up     cardiology follow up      Vitals were taken and medications were reconciled.   TAMANNA Carmichael  11:08 AM

## 2022-02-10 NOTE — PATIENT INSTRUCTIONS
"You were seen today in the Cardiovascular Clinic at the [facility].     Cardiology Providers you saw during your visit: Dr. Mary Dixon    Diagnosis: atrial fibrillation, pacemaker, high blood pressure    Results: discussed with patient    Orders:   None    Medication Changes:   For now: take metoprolol 50 mg pills - 2 pills (100 mg) with dinner until gone  Then, new metoprolol 100 mg - 1 pill per day at dinner time    Recommendations:   None    Follow-up:   Pacemaker remote check every 3 months    Please follow up with primary care provider for medication refills         Please feel free to call me with any questions or concerns.       Nicol Amin RN     Questions and [phone].   First press #1 for the Oobafit and then press #3 for \"Medical Questions\" to reach us Cardiology Nurses.      On Call Cardiologist for after hours or on weekends: [phone]   option #4 and ask to speak to the on-call Cardiologist.          If you need a medication refill please contact your pharmacy.  Please allow 3 business days for your refill to be completed.    "

## 2022-02-11 LAB
MDC_IDC_EPISODE_DTM: NORMAL
MDC_IDC_EPISODE_DURATION: 0 S
MDC_IDC_EPISODE_DURATION: 1 S
MDC_IDC_EPISODE_DURATION: 2 S
MDC_IDC_EPISODE_DURATION: 2 S
MDC_IDC_EPISODE_DURATION: 26 S
MDC_IDC_EPISODE_DURATION: 3 S
MDC_IDC_EPISODE_DURATION: 41 S
MDC_IDC_EPISODE_DURATION: 45 S
MDC_IDC_EPISODE_DURATION: 47 S
MDC_IDC_EPISODE_ID: 204
MDC_IDC_EPISODE_ID: 205
MDC_IDC_EPISODE_ID: 206
MDC_IDC_EPISODE_ID: 207
MDC_IDC_EPISODE_ID: 208
MDC_IDC_EPISODE_ID: 209
MDC_IDC_EPISODE_ID: 210
MDC_IDC_EPISODE_ID: 211
MDC_IDC_EPISODE_ID: 212
MDC_IDC_EPISODE_ID: 213
MDC_IDC_EPISODE_ID: 214
MDC_IDC_EPISODE_ID: 215
MDC_IDC_EPISODE_TYPE: NORMAL
MDC_IDC_LEAD_IMPLANT_DT: NORMAL
MDC_IDC_LEAD_IMPLANT_DT: NORMAL
MDC_IDC_LEAD_LOCATION: NORMAL
MDC_IDC_LEAD_LOCATION: NORMAL
MDC_IDC_LEAD_LOCATION_DETAIL_1: NORMAL
MDC_IDC_LEAD_LOCATION_DETAIL_1: NORMAL
MDC_IDC_LEAD_MFG: NORMAL
MDC_IDC_LEAD_MFG: NORMAL
MDC_IDC_LEAD_MODEL: NORMAL
MDC_IDC_LEAD_MODEL: NORMAL
MDC_IDC_LEAD_POLARITY_TYPE: NORMAL
MDC_IDC_LEAD_POLARITY_TYPE: NORMAL
MDC_IDC_LEAD_SERIAL: NORMAL
MDC_IDC_LEAD_SERIAL: NORMAL
MDC_IDC_LEAD_SPECIAL_FUNCTION: NORMAL
MDC_IDC_LEAD_SPECIAL_FUNCTION: NORMAL
MDC_IDC_MSMT_BATTERY_DTM: NORMAL
MDC_IDC_MSMT_BATTERY_REMAINING_LONGEVITY: 61 MO
MDC_IDC_MSMT_BATTERY_RRT_TRIGGER: 2.83
MDC_IDC_MSMT_BATTERY_STATUS: NORMAL
MDC_IDC_MSMT_BATTERY_VOLTAGE: 3 V
MDC_IDC_MSMT_LEADCHNL_RA_IMPEDANCE_VALUE: 266 OHM
MDC_IDC_MSMT_LEADCHNL_RA_IMPEDANCE_VALUE: 380 OHM
MDC_IDC_MSMT_LEADCHNL_RA_PACING_THRESHOLD_AMPLITUDE: 0.75 V
MDC_IDC_MSMT_LEADCHNL_RA_PACING_THRESHOLD_PULSEWIDTH: 0.4 MS
MDC_IDC_MSMT_LEADCHNL_RV_IMPEDANCE_VALUE: 456 OHM
MDC_IDC_MSMT_LEADCHNL_RV_IMPEDANCE_VALUE: 494 OHM
MDC_IDC_MSMT_LEADCHNL_RV_PACING_THRESHOLD_AMPLITUDE: 0.5 V
MDC_IDC_MSMT_LEADCHNL_RV_PACING_THRESHOLD_PULSEWIDTH: 0.4 MS
MDC_IDC_MSMT_LEADCHNL_RV_SENSING_INTR_AMPL: 10.62 MV
MDC_IDC_MSMT_LEADCHNL_RV_SENSING_INTR_AMPL: 11.75 MV
MDC_IDC_PG_IMPLANT_DTM: NORMAL
MDC_IDC_PG_MFG: NORMAL
MDC_IDC_PG_MODEL: NORMAL
MDC_IDC_PG_SERIAL: NORMAL
MDC_IDC_PG_TYPE: NORMAL
MDC_IDC_SESS_CLINIC_NAME: NORMAL
MDC_IDC_SESS_DTM: NORMAL
MDC_IDC_SESS_TYPE: NORMAL
MDC_IDC_SET_BRADY_AT_MODE_SWITCH_RATE: 171 {BEATS}/MIN
MDC_IDC_SET_BRADY_HYSTRATE: NORMAL
MDC_IDC_SET_BRADY_LOWRATE: 60 {BEATS}/MIN
MDC_IDC_SET_BRADY_MAX_SENSOR_RATE: 130 {BEATS}/MIN
MDC_IDC_SET_BRADY_MAX_TRACKING_RATE: 130 {BEATS}/MIN
MDC_IDC_SET_BRADY_MODE: NORMAL
MDC_IDC_SET_BRADY_PAV_DELAY_LOW: 180 MS
MDC_IDC_SET_BRADY_SAV_DELAY_LOW: 150 MS
MDC_IDC_SET_LEADCHNL_RA_PACING_AMPLITUDE: 1.5 V
MDC_IDC_SET_LEADCHNL_RA_PACING_ANODE_ELECTRODE_1: NORMAL
MDC_IDC_SET_LEADCHNL_RA_PACING_ANODE_LOCATION_1: NORMAL
MDC_IDC_SET_LEADCHNL_RA_PACING_CAPTURE_MODE: NORMAL
MDC_IDC_SET_LEADCHNL_RA_PACING_CATHODE_ELECTRODE_1: NORMAL
MDC_IDC_SET_LEADCHNL_RA_PACING_CATHODE_LOCATION_1: NORMAL
MDC_IDC_SET_LEADCHNL_RA_PACING_POLARITY: NORMAL
MDC_IDC_SET_LEADCHNL_RA_PACING_PULSEWIDTH: 0.4 MS
MDC_IDC_SET_LEADCHNL_RA_SENSING_ANODE_ELECTRODE_1: NORMAL
MDC_IDC_SET_LEADCHNL_RA_SENSING_ANODE_LOCATION_1: NORMAL
MDC_IDC_SET_LEADCHNL_RA_SENSING_CATHODE_ELECTRODE_1: NORMAL
MDC_IDC_SET_LEADCHNL_RA_SENSING_CATHODE_LOCATION_1: NORMAL
MDC_IDC_SET_LEADCHNL_RA_SENSING_POLARITY: NORMAL
MDC_IDC_SET_LEADCHNL_RA_SENSING_SENSITIVITY: 0.3 MV
MDC_IDC_SET_LEADCHNL_RV_PACING_AMPLITUDE: 2 V
MDC_IDC_SET_LEADCHNL_RV_PACING_ANODE_ELECTRODE_1: NORMAL
MDC_IDC_SET_LEADCHNL_RV_PACING_ANODE_LOCATION_1: NORMAL
MDC_IDC_SET_LEADCHNL_RV_PACING_CAPTURE_MODE: NORMAL
MDC_IDC_SET_LEADCHNL_RV_PACING_CATHODE_ELECTRODE_1: NORMAL
MDC_IDC_SET_LEADCHNL_RV_PACING_CATHODE_LOCATION_1: NORMAL
MDC_IDC_SET_LEADCHNL_RV_PACING_POLARITY: NORMAL
MDC_IDC_SET_LEADCHNL_RV_PACING_PULSEWIDTH: 0.4 MS
MDC_IDC_SET_LEADCHNL_RV_SENSING_ANODE_ELECTRODE_1: NORMAL
MDC_IDC_SET_LEADCHNL_RV_SENSING_ANODE_LOCATION_1: NORMAL
MDC_IDC_SET_LEADCHNL_RV_SENSING_CATHODE_ELECTRODE_1: NORMAL
MDC_IDC_SET_LEADCHNL_RV_SENSING_CATHODE_LOCATION_1: NORMAL
MDC_IDC_SET_LEADCHNL_RV_SENSING_POLARITY: NORMAL
MDC_IDC_SET_LEADCHNL_RV_SENSING_SENSITIVITY: 0.9 MV
MDC_IDC_SET_ZONE_DETECTION_INTERVAL: 350 MS
MDC_IDC_SET_ZONE_DETECTION_INTERVAL: 400 MS
MDC_IDC_SET_ZONE_TYPE: NORMAL
MDC_IDC_STAT_AT_BURDEN_PERCENT: 0.2 %
MDC_IDC_STAT_AT_DTM_END: NORMAL
MDC_IDC_STAT_AT_DTM_START: NORMAL
MDC_IDC_STAT_BRADY_AP_VP_PERCENT: 0.06 %
MDC_IDC_STAT_BRADY_AP_VS_PERCENT: 99.5 %
MDC_IDC_STAT_BRADY_AS_VP_PERCENT: 0.02 %
MDC_IDC_STAT_BRADY_AS_VS_PERCENT: 0.42 %
MDC_IDC_STAT_BRADY_DTM_END: NORMAL
MDC_IDC_STAT_BRADY_DTM_START: NORMAL
MDC_IDC_STAT_BRADY_RA_PERCENT_PACED: 99.37 %
MDC_IDC_STAT_BRADY_RV_PERCENT_PACED: 0.08 %
MDC_IDC_STAT_EPISODE_RECENT_COUNT: 0
MDC_IDC_STAT_EPISODE_RECENT_COUNT: 0
MDC_IDC_STAT_EPISODE_RECENT_COUNT: 14
MDC_IDC_STAT_EPISODE_RECENT_COUNT: 42
MDC_IDC_STAT_EPISODE_RECENT_COUNT_DTM_END: NORMAL
MDC_IDC_STAT_EPISODE_RECENT_COUNT_DTM_START: NORMAL
MDC_IDC_STAT_EPISODE_TOTAL_COUNT: 0
MDC_IDC_STAT_EPISODE_TOTAL_COUNT: 0
MDC_IDC_STAT_EPISODE_TOTAL_COUNT: 165
MDC_IDC_STAT_EPISODE_TOTAL_COUNT: 49
MDC_IDC_STAT_EPISODE_TOTAL_COUNT_DTM_END: NORMAL
MDC_IDC_STAT_EPISODE_TOTAL_COUNT_DTM_START: NORMAL
MDC_IDC_STAT_EPISODE_TYPE: NORMAL

## 2022-02-12 DIAGNOSIS — I10 ESSENTIAL HYPERTENSION: ICD-10-CM

## 2022-02-16 RX ORDER — METOPROLOL SUCCINATE 100 MG/1
TABLET, EXTENDED RELEASE ORAL
Qty: 135 TABLET | OUTPATIENT
Start: 2022-02-16

## 2022-02-16 NOTE — TELEPHONE ENCOUNTER
metoprolol succinate ER (TOPROL-XL) 100 MG 24 hr tablet  Take 1 tablet (100 mg) by mouth daily TAKE 1 TABLET BY MOUTH EVERY MORNING AND ONE-HALF TABLET EVERY EVENING        Last Written Prescription Date:  2/10/22-2/10/22  Last Fill Quantity: 90,   # refills: 3  Last Office Visit : 2/10/22  Future Office visit:  none    Routing refill request to provider for review/approval because:  Adjustment  in therapy. Same day at 11:23 am..     metoprolol succinate ER (TOPROL-XL) 100 MG 24 hr tablet 90 tablet 3 2/10/2022  No   Sig - Route: Take 1 tablet (100 mg) by mouth daily - Oral   Sent to pharmacy as: Metoprolol Succinate  MG Oral Tablet Extended Release 24 Hour (TOPROL-XL)   Class: E-Prescribe   Order: [id number]   E-Prescribing Status: Receipt confirmed by pharmacy (2/10/2022 11:23 AM CST)

## 2022-03-01 ENCOUNTER — TELEPHONE (OUTPATIENT)
Dept: CARDIOLOGY | Facility: CLINIC | Age: 87
End: 2022-03-01
Payer: COMMERCIAL

## 2022-03-01 DIAGNOSIS — I10 ESSENTIAL HYPERTENSION: ICD-10-CM

## 2022-03-01 RX ORDER — HYDROCHLOROTHIAZIDE 12.5 MG/1
12.5 TABLET ORAL DAILY
Qty: 90 TABLET | Refills: 0 | Status: SHIPPED | OUTPATIENT
Start: 2022-03-01 | End: 2022-09-02

## 2022-03-01 RX ORDER — LISINOPRIL 10 MG/1
10 TABLET ORAL DAILY
Qty: 90 TABLET | Refills: 0 | Status: SHIPPED | OUTPATIENT
Start: 2022-03-01 | End: 2022-09-02

## 2022-03-01 NOTE — TELEPHONE ENCOUNTER
[facility] Call Center    Phone Message    May a detailed message be left on voicemail: yes     Reason for Call: Medication Refill Request    Has the patient contacted the pharmacy for the refill? Yes   Name of medication being requested: lisinopril (ZESTRIL) 10 MG tablet , hydrochlorothiazide (HYDRODIURIL) 12.5 MG tablet   Provider who prescribed the medication: Dr. Dixon  Pharmacy: [facility] #[id number] [address]  Date medication is needed: asap         Action Taken: Message routed to:  [facility] clinical pool    Travel Screening: Not Applicable

## 2022-03-01 NOTE — TELEPHONE ENCOUNTER
Centralized Medication Refill Team note:   lisinopril (ZESTRIL) 10 MG tablet , hydrochlorothiazide (HYDRODIURIL) 12.5 MG tablet   Provider who prescribed the medication: Dr. Dixon  Pharmacy: [facility] #[id number]       lisinopril (ZESTRIL) 10 MG tablet  Last Written Prescription Date:  6/28/21  Last Fill Quantity: 90,   # refills: 1      hydrochlorothiazide (HYDRODIURIL) 12.5 MG tablet   Last Written Prescription Date:  8/27/21  Last Fill Quantity: 90,   # refills: 0  Last Office Visit : 2/10/2022  Future Office visit:  None    Routing refill request to provider for review/approval because:   - Na, K, Cr due( last 1/16/2020)     02/10/22 (!) 165/78 ( metoprolol increased by Dr Dixon at clinic visit)   06/15/21 (!) 145/90   05/06/21 134/71

## 2022-03-08 ENCOUNTER — TELEPHONE (OUTPATIENT)
Dept: INTERNAL MEDICINE | Facility: CLINIC | Age: 87
End: 2022-03-08
Payer: COMMERCIAL

## 2022-03-08 NOTE — TELEPHONE ENCOUNTER
[facility] Call Center    Phone Message    May a detailed message be left on voicemail: yes     Reason for Call: Other: Patient requesting a call to remind her about her upcoming appt. Writer reviewed communication preferences with patient in her chart.     Action Taken: Message routed to:  [facility] PCC    Travel Screening: Not Applicable

## 2022-05-12 ENCOUNTER — ANCILLARY PROCEDURE (OUTPATIENT)
Dept: CARDIOLOGY | Facility: CLINIC | Age: 87
End: 2022-05-12
Attending: INTERNAL MEDICINE
Payer: COMMERCIAL

## 2022-05-12 DIAGNOSIS — I49.5 SICK SINUS SYNDROME (H): ICD-10-CM

## 2022-05-12 PROCEDURE — 93294 REM INTERROG EVL PM/LDLS PM: CPT | Performed by: INTERNAL MEDICINE

## 2022-05-12 PROCEDURE — 93296 REM INTERROG EVL PM/IDS: CPT

## 2022-05-13 DIAGNOSIS — I10 ESSENTIAL HYPERTENSION: ICD-10-CM

## 2022-05-16 LAB
MDC_IDC_EPISODE_DTM: NORMAL
MDC_IDC_EPISODE_DURATION: 0 S
MDC_IDC_EPISODE_DURATION: 1 S
MDC_IDC_EPISODE_DURATION: 10 S
MDC_IDC_EPISODE_DURATION: 112 S
MDC_IDC_EPISODE_DURATION: 1166 S
MDC_IDC_EPISODE_DURATION: 1302 S
MDC_IDC_EPISODE_DURATION: 1902 S
MDC_IDC_EPISODE_DURATION: 194 S
MDC_IDC_EPISODE_DURATION: 216 S
MDC_IDC_EPISODE_DURATION: 27 S
MDC_IDC_EPISODE_DURATION: 302 S
MDC_IDC_EPISODE_DURATION: 327 S
MDC_IDC_EPISODE_DURATION: 333 S
MDC_IDC_EPISODE_DURATION: 335 S
MDC_IDC_EPISODE_DURATION: 3504 S
MDC_IDC_EPISODE_DURATION: 356 S
MDC_IDC_EPISODE_DURATION: 449 S
MDC_IDC_EPISODE_DURATION: 470 S
MDC_IDC_EPISODE_DURATION: 479 S
MDC_IDC_EPISODE_DURATION: 626 S
MDC_IDC_EPISODE_DURATION: 690 S
MDC_IDC_EPISODE_DURATION: 71 S
MDC_IDC_EPISODE_DURATION: 741 S
MDC_IDC_EPISODE_DURATION: 78 S
MDC_IDC_EPISODE_DURATION: 85 S
MDC_IDC_EPISODE_DURATION: 999 S
MDC_IDC_EPISODE_DURATION: NORMAL S
MDC_IDC_EPISODE_ID: 216
MDC_IDC_EPISODE_ID: 217
MDC_IDC_EPISODE_ID: 218
MDC_IDC_EPISODE_ID: 219
MDC_IDC_EPISODE_ID: 220
MDC_IDC_EPISODE_ID: 221
MDC_IDC_EPISODE_ID: 222
MDC_IDC_EPISODE_ID: 223
MDC_IDC_EPISODE_ID: 224
MDC_IDC_EPISODE_ID: 225
MDC_IDC_EPISODE_ID: 226
MDC_IDC_EPISODE_ID: 227
MDC_IDC_EPISODE_ID: 228
MDC_IDC_EPISODE_ID: 229
MDC_IDC_EPISODE_ID: 230
MDC_IDC_EPISODE_ID: 231
MDC_IDC_EPISODE_ID: 232
MDC_IDC_EPISODE_ID: 233
MDC_IDC_EPISODE_ID: 234
MDC_IDC_EPISODE_ID: 235
MDC_IDC_EPISODE_ID: 236
MDC_IDC_EPISODE_ID: 237
MDC_IDC_EPISODE_ID: 238
MDC_IDC_EPISODE_ID: 239
MDC_IDC_EPISODE_ID: 240
MDC_IDC_EPISODE_ID: 241
MDC_IDC_EPISODE_ID: 242
MDC_IDC_EPISODE_ID: 243
MDC_IDC_EPISODE_ID: 244
MDC_IDC_EPISODE_ID: 245
MDC_IDC_EPISODE_ID: 246
MDC_IDC_EPISODE_TYPE: NORMAL
MDC_IDC_LEAD_IMPLANT_DT: NORMAL
MDC_IDC_LEAD_IMPLANT_DT: NORMAL
MDC_IDC_LEAD_LOCATION: NORMAL
MDC_IDC_LEAD_LOCATION: NORMAL
MDC_IDC_LEAD_LOCATION_DETAIL_1: NORMAL
MDC_IDC_LEAD_LOCATION_DETAIL_1: NORMAL
MDC_IDC_LEAD_MFG: NORMAL
MDC_IDC_LEAD_MFG: NORMAL
MDC_IDC_LEAD_MODEL: NORMAL
MDC_IDC_LEAD_MODEL: NORMAL
MDC_IDC_LEAD_POLARITY_TYPE: NORMAL
MDC_IDC_LEAD_POLARITY_TYPE: NORMAL
MDC_IDC_LEAD_SERIAL: NORMAL
MDC_IDC_LEAD_SERIAL: NORMAL
MDC_IDC_LEAD_SPECIAL_FUNCTION: NORMAL
MDC_IDC_LEAD_SPECIAL_FUNCTION: NORMAL
MDC_IDC_MSMT_BATTERY_DTM: NORMAL
MDC_IDC_MSMT_BATTERY_REMAINING_LONGEVITY: 58 MO
MDC_IDC_MSMT_BATTERY_RRT_TRIGGER: 2.83
MDC_IDC_MSMT_BATTERY_STATUS: NORMAL
MDC_IDC_MSMT_BATTERY_VOLTAGE: 2.99 V
MDC_IDC_MSMT_LEADCHNL_RA_IMPEDANCE_VALUE: 285 OHM
MDC_IDC_MSMT_LEADCHNL_RA_IMPEDANCE_VALUE: 399 OHM
MDC_IDC_MSMT_LEADCHNL_RA_PACING_THRESHOLD_AMPLITUDE: 0.75 V
MDC_IDC_MSMT_LEADCHNL_RA_PACING_THRESHOLD_PULSEWIDTH: 0.4 MS
MDC_IDC_MSMT_LEADCHNL_RA_SENSING_INTR_AMPL: 1 MV
MDC_IDC_MSMT_LEADCHNL_RA_SENSING_INTR_AMPL: 1 MV
MDC_IDC_MSMT_LEADCHNL_RV_IMPEDANCE_VALUE: 418 OHM
MDC_IDC_MSMT_LEADCHNL_RV_IMPEDANCE_VALUE: 456 OHM
MDC_IDC_MSMT_LEADCHNL_RV_PACING_THRESHOLD_AMPLITUDE: 0.5 V
MDC_IDC_MSMT_LEADCHNL_RV_PACING_THRESHOLD_PULSEWIDTH: 0.4 MS
MDC_IDC_MSMT_LEADCHNL_RV_SENSING_INTR_AMPL: 10.25 MV
MDC_IDC_MSMT_LEADCHNL_RV_SENSING_INTR_AMPL: 10.25 MV
MDC_IDC_PG_IMPLANT_DTM: NORMAL
MDC_IDC_PG_MFG: NORMAL
MDC_IDC_PG_MODEL: NORMAL
MDC_IDC_PG_SERIAL: NORMAL
MDC_IDC_PG_TYPE: NORMAL
MDC_IDC_SESS_CLINIC_NAME: NORMAL
MDC_IDC_SESS_DTM: NORMAL
MDC_IDC_SESS_TYPE: NORMAL
MDC_IDC_SET_BRADY_AT_MODE_SWITCH_RATE: 171 {BEATS}/MIN
MDC_IDC_SET_BRADY_HYSTRATE: NORMAL
MDC_IDC_SET_BRADY_LOWRATE: 60 {BEATS}/MIN
MDC_IDC_SET_BRADY_MAX_SENSOR_RATE: 130 {BEATS}/MIN
MDC_IDC_SET_BRADY_MAX_TRACKING_RATE: 130 {BEATS}/MIN
MDC_IDC_SET_BRADY_MODE: NORMAL
MDC_IDC_SET_BRADY_PAV_DELAY_LOW: 180 MS
MDC_IDC_SET_BRADY_SAV_DELAY_LOW: 150 MS
MDC_IDC_SET_LEADCHNL_RA_PACING_AMPLITUDE: 1.5 V
MDC_IDC_SET_LEADCHNL_RA_PACING_ANODE_ELECTRODE_1: NORMAL
MDC_IDC_SET_LEADCHNL_RA_PACING_ANODE_LOCATION_1: NORMAL
MDC_IDC_SET_LEADCHNL_RA_PACING_CAPTURE_MODE: NORMAL
MDC_IDC_SET_LEADCHNL_RA_PACING_CATHODE_ELECTRODE_1: NORMAL
MDC_IDC_SET_LEADCHNL_RA_PACING_CATHODE_LOCATION_1: NORMAL
MDC_IDC_SET_LEADCHNL_RA_PACING_POLARITY: NORMAL
MDC_IDC_SET_LEADCHNL_RA_PACING_PULSEWIDTH: 0.4 MS
MDC_IDC_SET_LEADCHNL_RA_SENSING_ANODE_ELECTRODE_1: NORMAL
MDC_IDC_SET_LEADCHNL_RA_SENSING_ANODE_LOCATION_1: NORMAL
MDC_IDC_SET_LEADCHNL_RA_SENSING_CATHODE_ELECTRODE_1: NORMAL
MDC_IDC_SET_LEADCHNL_RA_SENSING_CATHODE_LOCATION_1: NORMAL
MDC_IDC_SET_LEADCHNL_RA_SENSING_POLARITY: NORMAL
MDC_IDC_SET_LEADCHNL_RA_SENSING_SENSITIVITY: 0.3 MV
MDC_IDC_SET_LEADCHNL_RV_PACING_AMPLITUDE: 2 V
MDC_IDC_SET_LEADCHNL_RV_PACING_ANODE_ELECTRODE_1: NORMAL
MDC_IDC_SET_LEADCHNL_RV_PACING_ANODE_LOCATION_1: NORMAL
MDC_IDC_SET_LEADCHNL_RV_PACING_CAPTURE_MODE: NORMAL
MDC_IDC_SET_LEADCHNL_RV_PACING_CATHODE_ELECTRODE_1: NORMAL
MDC_IDC_SET_LEADCHNL_RV_PACING_CATHODE_LOCATION_1: NORMAL
MDC_IDC_SET_LEADCHNL_RV_PACING_POLARITY: NORMAL
MDC_IDC_SET_LEADCHNL_RV_PACING_PULSEWIDTH: 0.4 MS
MDC_IDC_SET_LEADCHNL_RV_SENSING_ANODE_ELECTRODE_1: NORMAL
MDC_IDC_SET_LEADCHNL_RV_SENSING_ANODE_LOCATION_1: NORMAL
MDC_IDC_SET_LEADCHNL_RV_SENSING_CATHODE_ELECTRODE_1: NORMAL
MDC_IDC_SET_LEADCHNL_RV_SENSING_CATHODE_LOCATION_1: NORMAL
MDC_IDC_SET_LEADCHNL_RV_SENSING_POLARITY: NORMAL
MDC_IDC_SET_LEADCHNL_RV_SENSING_SENSITIVITY: 0.9 MV
MDC_IDC_SET_ZONE_DETECTION_INTERVAL: 350 MS
MDC_IDC_SET_ZONE_DETECTION_INTERVAL: 400 MS
MDC_IDC_SET_ZONE_TYPE: NORMAL
MDC_IDC_STAT_AT_BURDEN_PERCENT: 0.4 %
MDC_IDC_STAT_AT_DTM_END: NORMAL
MDC_IDC_STAT_AT_DTM_START: NORMAL
MDC_IDC_STAT_BRADY_AP_VP_PERCENT: 0.08 %
MDC_IDC_STAT_BRADY_AP_VS_PERCENT: 99.35 %
MDC_IDC_STAT_BRADY_AS_VP_PERCENT: 0.03 %
MDC_IDC_STAT_BRADY_AS_VS_PERCENT: 0.53 %
MDC_IDC_STAT_BRADY_DTM_END: NORMAL
MDC_IDC_STAT_BRADY_DTM_START: NORMAL
MDC_IDC_STAT_BRADY_RA_PERCENT_PACED: 99.02 %
MDC_IDC_STAT_BRADY_RV_PERCENT_PACED: 0.11 %
MDC_IDC_STAT_EPISODE_RECENT_COUNT: 0
MDC_IDC_STAT_EPISODE_RECENT_COUNT: 0
MDC_IDC_STAT_EPISODE_RECENT_COUNT: 25
MDC_IDC_STAT_EPISODE_RECENT_COUNT: 6
MDC_IDC_STAT_EPISODE_RECENT_COUNT_DTM_END: NORMAL
MDC_IDC_STAT_EPISODE_RECENT_COUNT_DTM_START: NORMAL
MDC_IDC_STAT_EPISODE_TOTAL_COUNT: 0
MDC_IDC_STAT_EPISODE_TOTAL_COUNT: 0
MDC_IDC_STAT_EPISODE_TOTAL_COUNT: 190
MDC_IDC_STAT_EPISODE_TOTAL_COUNT: 55
MDC_IDC_STAT_EPISODE_TOTAL_COUNT_DTM_END: NORMAL
MDC_IDC_STAT_EPISODE_TOTAL_COUNT_DTM_START: NORMAL
MDC_IDC_STAT_EPISODE_TYPE: NORMAL

## 2022-05-16 RX ORDER — LISINOPRIL 10 MG/1
10 TABLET ORAL DAILY
OUTPATIENT
Start: 2022-05-16

## 2022-05-16 NOTE — TELEPHONE ENCOUNTER
LISINOPRIL 10MG TABLETS      Last Written Prescription Date:  3-1-22  Last Fill Quantity: 90,   # refills: 0  Last Office Visit : 2-10-22  Future Office visit:  none    Routing refill request to provider for review/approval because:  Overdue labs: Cr, K -- previous  90 day ysabel RF  ? RTC, ? PCP to fill        Clinic requested RF be sent to PCP.   Pharm called they will send to PCP   Nathaly Tierney, IBRAHIMA, CNP    [facility]    [address] [place] [address] [phone]

## 2022-05-23 DIAGNOSIS — I10 ESSENTIAL HYPERTENSION: ICD-10-CM

## 2022-05-23 RX ORDER — LISINOPRIL 10 MG/1
10 TABLET ORAL DAILY
Qty: 90 TABLET | Refills: 0 | OUTPATIENT
Start: 2022-05-23

## 2022-05-23 NOTE — TELEPHONE ENCOUNTER
lisinopril (ZESTRIL) 10 MG tablet    Refused 1 week ago (5/16/2022):   OTHER (Please send to PCP GENE Tierney CNP)

## 2022-05-23 NOTE — TELEPHONE ENCOUNTER
[facility] Call Center    Phone Message    May a detailed message be left on voicemail: yes     Reason for Call: Medication Refill Request    Has the patient contacted the pharmacy for the refill? Yes   Name of medication being requested: lisinopril  Provider who prescribed the medication: Destiny  Pharmacy: [facility] #[id number] [facility] [address] [facility]  Date medication is needed: Very soon only has 3 pills left please call her if you cannot refill states already req through Pharmacy but they have not heard back.         Action Taken: Message routed to:  [facility] Cardiology     Travel Screening: Not Applicable

## 2022-05-27 DIAGNOSIS — I10 ESSENTIAL HYPERTENSION: ICD-10-CM

## 2022-06-02 RX ORDER — HYDROCHLOROTHIAZIDE 12.5 MG/1
TABLET ORAL
Qty: 90 TABLET | Refills: 3 | OUTPATIENT
Start: 2022-06-02

## 2022-06-02 NOTE — TELEPHONE ENCOUNTER
HYDROCHLOROTHIAZIDE 12.5MG TABLETS    Last Written Prescription Date:  3/1/22  Last Fill Quantity: 90,   # refills: 0  Last Office Visit : 2/10/22  Future Office visit:  None> recommended 3 months    Routing refill request to provider for review/approval because:  Blood pressure out of range, last Cr and K  7/24/20 @ [facility]  02/10/22 (!) 165/78 Last Dr Dixon visit   06/15/21 (!) 145/90   05/06/21 134/71

## 2022-06-02 NOTE — TELEPHONE ENCOUNTER
Per Dr Dixon/ Cardiology> requests that PCP refill medication  HYDROCHLOROTHIAZIDE 12.5MG TABLETS    Last Written Prescription Date:  3/1/22  Last Fill Quantity: 90,   # refills: 0    Pharmacy contacted to  send to PCP   Nathaly Tierney APRN, CNP    [facility]    [address] [place] [address] [phone]

## 2022-09-02 ENCOUNTER — LAB (OUTPATIENT)
Dept: LAB | Facility: CLINIC | Age: 87
End: 2022-09-02
Payer: COMMERCIAL

## 2022-09-02 ENCOUNTER — OFFICE VISIT (OUTPATIENT)
Dept: INTERNAL MEDICINE | Facility: CLINIC | Age: 87
End: 2022-09-02
Payer: COMMERCIAL

## 2022-09-02 VITALS
DIASTOLIC BLOOD PRESSURE: 77 MMHG | HEART RATE: 77 BPM | OXYGEN SATURATION: 97 % | SYSTOLIC BLOOD PRESSURE: 155 MMHG | BODY MASS INDEX: 23.55 KG/M2 | WEIGHT: 128 LBS | HEIGHT: 62 IN

## 2022-09-02 DIAGNOSIS — I10 ESSENTIAL HYPERTENSION: ICD-10-CM

## 2022-09-02 LAB
ALBUMIN SERPL BCG-MCNC: 4 G/DL (ref 3.5–5.2)
ANION GAP SERPL CALCULATED.3IONS-SCNC: 11 MMOL/L (ref 7–15)
BUN SERPL-MCNC: 22 MG/DL (ref 8–23)
CALCIUM SERPL-MCNC: 9.8 MG/DL (ref 8.2–9.6)
CHLORIDE SERPL-SCNC: 101 MMOL/L (ref 98–107)
CREAT SERPL-MCNC: 0.87 MG/DL (ref 0.51–0.95)
DEPRECATED HCO3 PLAS-SCNC: 27 MMOL/L (ref 22–29)
GFR SERPL CREATININE-BSD FRML MDRD: 63 ML/MIN/1.73M2
GLUCOSE SERPL-MCNC: 107 MG/DL (ref 70–99)
POTASSIUM SERPL-SCNC: 4.5 MMOL/L (ref 3.4–5.3)
SODIUM SERPL-SCNC: 139 MMOL/L (ref 136–145)

## 2022-09-02 PROCEDURE — 90732 PPSV23 VACC 2 YRS+ SUBQ/IM: CPT | Performed by: INTERNAL MEDICINE

## 2022-09-02 PROCEDURE — 36415 COLL VENOUS BLD VENIPUNCTURE: CPT | Performed by: PATHOLOGY

## 2022-09-02 PROCEDURE — 82040 ASSAY OF SERUM ALBUMIN: CPT | Performed by: PATHOLOGY

## 2022-09-02 PROCEDURE — G0009 ADMIN PNEUMOCOCCAL VACCINE: HCPCS | Performed by: INTERNAL MEDICINE

## 2022-09-02 PROCEDURE — 99214 OFFICE O/P EST MOD 30 MIN: CPT | Mod: 25 | Performed by: INTERNAL MEDICINE

## 2022-09-02 PROCEDURE — 80048 BASIC METABOLIC PNL TOTAL CA: CPT | Performed by: PATHOLOGY

## 2022-09-02 RX ORDER — HYDROCHLOROTHIAZIDE 12.5 MG/1
12.5 TABLET ORAL DAILY
Qty: 90 TABLET | Refills: 3 | Status: SHIPPED | OUTPATIENT
Start: 2022-09-02 | End: 2023-05-24

## 2022-09-02 RX ORDER — LISINOPRIL 10 MG/1
10 TABLET ORAL DAILY
Qty: 90 TABLET | Refills: 3 | Status: SHIPPED | OUTPATIENT
Start: 2022-09-02 | End: 2023-05-24

## 2022-09-02 NOTE — PATIENT INSTRUCTIONS
My RN (Lila) will call you next week to clarify your blood pressure medications  Please keep checking your blood pressure, and we can make adjustments if needed.

## 2022-09-02 NOTE — PROGRESS NOTES
Assessment & Plan   Essential hypertension  Recent lisinopril + hydrochlorothiazide as separate pills per her preference  Will have RN call on Monday to clarify how much metoprolol she has been taking.  I believe it is 100mg of the long acting  Will repeat BMP given elevated K seen on last check  - lisinopril (ZESTRIL) 10 MG tablet  Dispense: 90 tablet; Refill: 3  - hydrochlorothiazide (HYDRODIURIL) 12.5 MG tablet  Dispense: 90 tablet; Refill: 3  - Basic metabolic panel    Paroxysmal Afib  Has follow-up with Dr. Dixon    HM:  Discussed vaccines    RTC: Return in about 3 months (around 12/2/2022).  I spent a total of 30 minutes on the day of the visit.  Time was spent doing chart review, history and exam, documentation and further activities as noted above.    Jovana Macias MD  Securely message with the Vocera Web Console      Chief Complaint   Isadora Mendez is a 89 year old female presents for the following health issues    History of Present Illness   Feeling unsteady, but no recent falls.  She is working with PT  No longer running  out of her home, but does have some kids over the summer  Now that school is starting they ill be back at school    BP:  Notes it is higher at night.  One episode of up to 170-190.  She took extra lisinopril.  She was recently switched to a combination lisinopril/hctz pill.  She preferred the separate pills.  Last BMP incidentally showed elevated K.  She does use salt substitutes in addition to take an extra lisinopril when her BP is elevated  Once she woke up with a SBP in the 90's after she took an extra lisinopril  She also reports cutting the metorpolol, but is unclear exactly how much she is taking.  We discussed that she is on an extended release pill that should not be cut    Has been noticing palpitations at night      Tobacco Use      Smoking status: Never Smoker      Smokeless tobacco: Never Used    PHQ-2 Score:   PHQ-2 ( 1999 Pfizer) 2/10/2022   Q1: Little  "interest or pleasure in doing things 0   Q2: Feeling down, depressed or hopeless 0   PHQ-2 Score 0       ROS    Physical Exam   BP (!) 155/77 (BP Location: Right arm, Patient Position: Sitting, Cuff Size: Adult Regular)   Pulse 77   Ht 1.575 m (5' 2\")   Wt 58.1 kg (128 lb)   SpO2 97%   BMI 23.41 kg/m    Gen: no distress, comfortable, pleasant   Eyes: anicteric, normal extra-ocular movements   Skin: no concerning lesions, no jaundice   Psychological: appropriate mood     Items reviewed:  Tobacco  Allergies  Meds              "

## 2022-09-02 NOTE — NURSING NOTE
"Isadora Mendez is a 89 year old female patient that presents today in clinic for the following:    Chief Complaint   Patient presents with     Physical     BP higher at night per pt. Check out pt left lower leg     The patient's allergies and medications were reviewed as noted. A set of vitals were recorded as noted without incident: BP (!) 155/77 (BP Location: Right arm, Patient Position: Sitting, Cuff Size: Adult Regular)   Pulse 77   Ht 1.575 m (5' 2\")   Wt 58.1 kg (128 lb)   SpO2 97%   BMI 23.41 kg/m  . The patient does not have any other questions for the provider.    Sunshine Solis, EMT at 9:52 AM on 9/2/2022    "

## 2022-09-02 NOTE — Clinical Note
There is confusion about her metoprolol.  Can you give her a call on Monday or Tuesday and clarify the dose of metoprolol as well as if it is the long acting form.

## 2022-09-09 ENCOUNTER — TELEPHONE (OUTPATIENT)
Dept: INTERNAL MEDICINE | Facility: CLINIC | Age: 87
End: 2022-09-09

## 2022-09-09 DIAGNOSIS — E83.52 HYPERCALCEMIA: Primary | ICD-10-CM

## 2022-09-12 NOTE — TELEPHONE ENCOUNTER
RN called patient and relayed Dr. Macias' message.  Patient was agreeable to having a lab appt for tomorrow at [facility], and appt was made.    Lila Aguilar RN on 9/12/2022 at 10:05 AM

## 2022-09-13 ENCOUNTER — LAB (OUTPATIENT)
Dept: LAB | Facility: CLINIC | Age: 87
End: 2022-09-13
Payer: COMMERCIAL

## 2022-09-13 DIAGNOSIS — E83.52 HYPERCALCEMIA: ICD-10-CM

## 2022-09-13 LAB
CA-I BLD-MCNC: 5 MG/DL (ref 4.4–5.2)
DEPRECATED CALCIDIOL+CALCIFEROL SERPL-MC: 66 UG/L (ref 20–75)
PTH-INTACT SERPL-MCNC: 14 PG/ML (ref 15–65)

## 2022-09-13 PROCEDURE — 82330 ASSAY OF CALCIUM: CPT | Performed by: PATHOLOGY

## 2022-09-13 PROCEDURE — 82306 VITAMIN D 25 HYDROXY: CPT | Performed by: INTERNAL MEDICINE

## 2022-09-13 PROCEDURE — 83970 ASSAY OF PARATHORMONE: CPT | Performed by: PATHOLOGY

## 2022-09-13 PROCEDURE — 36415 COLL VENOUS BLD VENIPUNCTURE: CPT | Performed by: PATHOLOGY

## 2022-09-27 ENCOUNTER — ANCILLARY PROCEDURE (OUTPATIENT)
Dept: CARDIOLOGY | Facility: CLINIC | Age: 87
End: 2022-09-27
Attending: INTERNAL MEDICINE
Payer: COMMERCIAL

## 2022-09-27 DIAGNOSIS — I49.5 SICK SINUS SYNDROME (H): ICD-10-CM

## 2022-09-27 PROCEDURE — 93280 PM DEVICE PROGR EVAL DUAL: CPT | Performed by: INTERNAL MEDICINE

## 2022-09-27 NOTE — PROGRESS NOTES
It was a pleasure to see you in clinic today.  Please do not hesitate to call with any questions or concerns. You will be scheduled for a remote transmission every 3 months, which will take place automatically. Your next remote transmission is scheduled for 3 months from today.  We look forward to seeing you in clinic at your next device check in 6-12 months (with your routine cardiology appointments).    BANDAR Guerra, RN  Electrophysiology Nurse Clinician  [facility]    During Business Hours Please Call:  [phone]  After Hours Please Call:  [phone] - select option #4 and ask for job code [phone]

## 2022-09-27 NOTE — PATIENT INSTRUCTIONS
It was a pleasure to see you in clinic today.  Please do not hesitate to call with any questions or concerns. You will be scheduled for a remote transmission every 3 months, which will take place automatically. Your next remote transmission is scheduled for 12/30/22.  We look forward to seeing you in clinic at your next device check on 3/9/23 when you return to follow-up with Dr. Mary Dixon.    TUYET GuerraN, RN  Electrophysiology Nurse Clinician  [facility]    During Business Hours Please Call:  [phone]  After Hours Please Call:  [phone] - select option #4 and ask for job code [phone]

## 2022-10-04 LAB
MDC_IDC_EPISODE_DTM: NORMAL
MDC_IDC_EPISODE_DURATION: 1062 S
MDC_IDC_EPISODE_DURATION: 110 S
MDC_IDC_EPISODE_DURATION: 1322 S
MDC_IDC_EPISODE_DURATION: 1363 S
MDC_IDC_EPISODE_DURATION: 181 S
MDC_IDC_EPISODE_DURATION: 2086 S
MDC_IDC_EPISODE_DURATION: 228 S
MDC_IDC_EPISODE_DURATION: 233 S
MDC_IDC_EPISODE_DURATION: 297 S
MDC_IDC_EPISODE_DURATION: 32 S
MDC_IDC_EPISODE_DURATION: 333 S
MDC_IDC_EPISODE_DURATION: 3365 S
MDC_IDC_EPISODE_DURATION: 361 S
MDC_IDC_EPISODE_DURATION: 368 S
MDC_IDC_EPISODE_DURATION: 39 S
MDC_IDC_EPISODE_DURATION: 390 S
MDC_IDC_EPISODE_DURATION: 400 S
MDC_IDC_EPISODE_DURATION: 428 S
MDC_IDC_EPISODE_DURATION: 4967 S
MDC_IDC_EPISODE_DURATION: 536 S
MDC_IDC_EPISODE_DURATION: 537 S
MDC_IDC_EPISODE_DURATION: 589 S
MDC_IDC_EPISODE_DURATION: 656 S
MDC_IDC_EPISODE_DURATION: 662 S
MDC_IDC_EPISODE_DURATION: 82 S
MDC_IDC_EPISODE_DURATION: 88 S
MDC_IDC_EPISODE_DURATION: 974 S
MDC_IDC_EPISODE_ID: 247
MDC_IDC_EPISODE_ID: 248
MDC_IDC_EPISODE_ID: 249
MDC_IDC_EPISODE_ID: 250
MDC_IDC_EPISODE_ID: 251
MDC_IDC_EPISODE_ID: 252
MDC_IDC_EPISODE_ID: 253
MDC_IDC_EPISODE_ID: 254
MDC_IDC_EPISODE_ID: 255
MDC_IDC_EPISODE_ID: 256
MDC_IDC_EPISODE_ID: 257
MDC_IDC_EPISODE_ID: 258
MDC_IDC_EPISODE_ID: 259
MDC_IDC_EPISODE_ID: 260
MDC_IDC_EPISODE_ID: 261
MDC_IDC_EPISODE_ID: 262
MDC_IDC_EPISODE_ID: 263
MDC_IDC_EPISODE_ID: 264
MDC_IDC_EPISODE_ID: 265
MDC_IDC_EPISODE_ID: 266
MDC_IDC_EPISODE_ID: 267
MDC_IDC_EPISODE_ID: 268
MDC_IDC_EPISODE_ID: 269
MDC_IDC_EPISODE_ID: 270
MDC_IDC_EPISODE_ID: 271
MDC_IDC_EPISODE_ID: 272
MDC_IDC_EPISODE_ID: 273
MDC_IDC_EPISODE_TYPE: NORMAL
MDC_IDC_LEAD_IMPLANT_DT: NORMAL
MDC_IDC_LEAD_IMPLANT_DT: NORMAL
MDC_IDC_LEAD_LOCATION: NORMAL
MDC_IDC_LEAD_LOCATION: NORMAL
MDC_IDC_LEAD_LOCATION_DETAIL_1: NORMAL
MDC_IDC_LEAD_LOCATION_DETAIL_1: NORMAL
MDC_IDC_LEAD_MFG: NORMAL
MDC_IDC_LEAD_MFG: NORMAL
MDC_IDC_LEAD_MODEL: NORMAL
MDC_IDC_LEAD_MODEL: NORMAL
MDC_IDC_LEAD_POLARITY_TYPE: NORMAL
MDC_IDC_LEAD_POLARITY_TYPE: NORMAL
MDC_IDC_LEAD_SERIAL: NORMAL
MDC_IDC_LEAD_SERIAL: NORMAL
MDC_IDC_LEAD_SPECIAL_FUNCTION: NORMAL
MDC_IDC_LEAD_SPECIAL_FUNCTION: NORMAL
MDC_IDC_MSMT_BATTERY_DTM: NORMAL
MDC_IDC_MSMT_BATTERY_REMAINING_LONGEVITY: 56 MO
MDC_IDC_MSMT_BATTERY_RRT_TRIGGER: 2.83
MDC_IDC_MSMT_BATTERY_STATUS: NORMAL
MDC_IDC_MSMT_BATTERY_VOLTAGE: 2.99 V
MDC_IDC_MSMT_LEADCHNL_RA_IMPEDANCE_VALUE: 418 OHM
MDC_IDC_MSMT_LEADCHNL_RA_PACING_THRESHOLD_AMPLITUDE: 0.75 V
MDC_IDC_MSMT_LEADCHNL_RA_PACING_THRESHOLD_PULSEWIDTH: 0.4 MS
MDC_IDC_MSMT_LEADCHNL_RA_SENSING_INTR_AMPL: 1 MV
MDC_IDC_MSMT_LEADCHNL_RV_IMPEDANCE_VALUE: 475 OHM
MDC_IDC_MSMT_LEADCHNL_RV_PACING_THRESHOLD_AMPLITUDE: 0.5 V
MDC_IDC_MSMT_LEADCHNL_RV_PACING_THRESHOLD_PULSEWIDTH: 0.4 MS
MDC_IDC_MSMT_LEADCHNL_RV_SENSING_INTR_AMPL: 11.4 MV
MDC_IDC_PG_IMPLANT_DTM: NORMAL
MDC_IDC_PG_MFG: NORMAL
MDC_IDC_PG_MODEL: NORMAL
MDC_IDC_PG_SERIAL: NORMAL
MDC_IDC_PG_TYPE: NORMAL
MDC_IDC_SESS_CLINIC_NAME: NORMAL
MDC_IDC_SESS_DTM: NORMAL
MDC_IDC_SESS_TYPE: NORMAL
MDC_IDC_SET_BRADY_AT_MODE_SWITCH_RATE: 171 {BEATS}/MIN
MDC_IDC_SET_BRADY_HYSTRATE: NORMAL
MDC_IDC_SET_BRADY_LOWRATE: 60 {BEATS}/MIN
MDC_IDC_SET_BRADY_MAX_SENSOR_RATE: 130 {BEATS}/MIN
MDC_IDC_SET_BRADY_MAX_TRACKING_RATE: 130 {BEATS}/MIN
MDC_IDC_SET_BRADY_MODE: NORMAL
MDC_IDC_SET_BRADY_PAV_DELAY_LOW: 180 MS
MDC_IDC_SET_BRADY_SAV_DELAY_LOW: 150 MS
MDC_IDC_SET_LEADCHNL_RA_PACING_AMPLITUDE: 1.5 V
MDC_IDC_SET_LEADCHNL_RA_PACING_ANODE_ELECTRODE_1: NORMAL
MDC_IDC_SET_LEADCHNL_RA_PACING_ANODE_LOCATION_1: NORMAL
MDC_IDC_SET_LEADCHNL_RA_PACING_CAPTURE_MODE: NORMAL
MDC_IDC_SET_LEADCHNL_RA_PACING_CATHODE_ELECTRODE_1: NORMAL
MDC_IDC_SET_LEADCHNL_RA_PACING_CATHODE_LOCATION_1: NORMAL
MDC_IDC_SET_LEADCHNL_RA_PACING_POLARITY: NORMAL
MDC_IDC_SET_LEADCHNL_RA_PACING_PULSEWIDTH: 0.4 MS
MDC_IDC_SET_LEADCHNL_RA_SENSING_ANODE_ELECTRODE_1: NORMAL
MDC_IDC_SET_LEADCHNL_RA_SENSING_ANODE_LOCATION_1: NORMAL
MDC_IDC_SET_LEADCHNL_RA_SENSING_CATHODE_ELECTRODE_1: NORMAL
MDC_IDC_SET_LEADCHNL_RA_SENSING_CATHODE_LOCATION_1: NORMAL
MDC_IDC_SET_LEADCHNL_RA_SENSING_POLARITY: NORMAL
MDC_IDC_SET_LEADCHNL_RA_SENSING_SENSITIVITY: 0.3 MV
MDC_IDC_SET_LEADCHNL_RV_PACING_AMPLITUDE: 2 V
MDC_IDC_SET_LEADCHNL_RV_PACING_ANODE_ELECTRODE_1: NORMAL
MDC_IDC_SET_LEADCHNL_RV_PACING_ANODE_LOCATION_1: NORMAL
MDC_IDC_SET_LEADCHNL_RV_PACING_CAPTURE_MODE: NORMAL
MDC_IDC_SET_LEADCHNL_RV_PACING_CATHODE_ELECTRODE_1: NORMAL
MDC_IDC_SET_LEADCHNL_RV_PACING_CATHODE_LOCATION_1: NORMAL
MDC_IDC_SET_LEADCHNL_RV_PACING_POLARITY: NORMAL
MDC_IDC_SET_LEADCHNL_RV_PACING_PULSEWIDTH: 0.4 MS
MDC_IDC_SET_LEADCHNL_RV_SENSING_ANODE_ELECTRODE_1: NORMAL
MDC_IDC_SET_LEADCHNL_RV_SENSING_ANODE_LOCATION_1: NORMAL
MDC_IDC_SET_LEADCHNL_RV_SENSING_CATHODE_ELECTRODE_1: NORMAL
MDC_IDC_SET_LEADCHNL_RV_SENSING_CATHODE_LOCATION_1: NORMAL
MDC_IDC_SET_LEADCHNL_RV_SENSING_POLARITY: NORMAL
MDC_IDC_SET_LEADCHNL_RV_SENSING_SENSITIVITY: 0.9 MV
MDC_IDC_SET_ZONE_DETECTION_INTERVAL: 350 MS
MDC_IDC_SET_ZONE_DETECTION_INTERVAL: 400 MS
MDC_IDC_SET_ZONE_TYPE: NORMAL
MDC_IDC_STAT_AT_BURDEN_PERCENT: 0.3 %
MDC_IDC_STAT_AT_DTM_END: NORMAL
MDC_IDC_STAT_AT_DTM_START: NORMAL
MDC_IDC_STAT_BRADY_AP_VP_PERCENT: 0.09 %
MDC_IDC_STAT_BRADY_AP_VS_PERCENT: 99.42 %
MDC_IDC_STAT_BRADY_AS_VP_PERCENT: 0.04 %
MDC_IDC_STAT_BRADY_AS_VS_PERCENT: 0.44 %
MDC_IDC_STAT_BRADY_DTM_END: NORMAL
MDC_IDC_STAT_BRADY_DTM_START: NORMAL
MDC_IDC_STAT_BRADY_RA_PERCENT_PACED: 99.22 %
MDC_IDC_STAT_BRADY_RV_PERCENT_PACED: 0.12 %
MDC_IDC_STAT_EPISODE_RECENT_COUNT: 0
MDC_IDC_STAT_EPISODE_RECENT_COUNT: 27
MDC_IDC_STAT_EPISODE_RECENT_COUNT_DTM_END: NORMAL
MDC_IDC_STAT_EPISODE_RECENT_COUNT_DTM_START: NORMAL
MDC_IDC_STAT_EPISODE_TOTAL_COUNT: 0
MDC_IDC_STAT_EPISODE_TOTAL_COUNT: 0
MDC_IDC_STAT_EPISODE_TOTAL_COUNT: 217
MDC_IDC_STAT_EPISODE_TOTAL_COUNT: 55
MDC_IDC_STAT_EPISODE_TOTAL_COUNT_DTM_END: NORMAL
MDC_IDC_STAT_EPISODE_TOTAL_COUNT_DTM_START: NORMAL
MDC_IDC_STAT_EPISODE_TYPE: NORMAL

## 2022-10-17 ENCOUNTER — CARE COORDINATION (OUTPATIENT)
Dept: CARDIOLOGY | Facility: CLINIC | Age: 87
End: 2022-10-17

## 2022-10-17 ENCOUNTER — ANCILLARY PROCEDURE (OUTPATIENT)
Dept: CARDIOLOGY | Facility: CLINIC | Age: 87
End: 2022-10-17
Attending: INTERNAL MEDICINE
Payer: COMMERCIAL

## 2022-10-17 DIAGNOSIS — I49.5 SICK SINUS SYNDROME (H): ICD-10-CM

## 2022-10-17 PROCEDURE — 93296 REM INTERROG EVL PM/IDS: CPT

## 2022-10-17 PROCEDURE — 93294 REM INTERROG EVL PM/LDLS PM: CPT | Performed by: INTERNAL MEDICINE

## 2022-10-17 NOTE — PROGRESS NOTES
"Patient called the clinic reporting some \"chest discomfort\" on the side of her pacemaker. She reported that it feels \"heavy\" and makes her breathing heavier. She denied outright shortness of breath. She reported that it has been \"on and off\" but over the last couple weeks it has become increasingly worse. She reported that it is dull, and that it is not painful, but just uncomfortable. She reported that it can be worse when she lies down on her side, and sometimes it is relieved with a burp. She also mentioned that sometimes she feels it all the way to her elbow.     Offered to do a remote transmission at this time, but encouraged patient to seek out more emergent treatment for her reported symptoms. The patient mentioned that she is planning to travel in the near future, and agreed that she would feel better if she knew what was going on before leaving. Patient given the phone number and address of the [facility]. Her  will drive her there.  "

## 2022-10-19 NOTE — PROGRESS NOTES
Cardiology Clinic Visit  October 20, 2022      HPI:   89 yo F with paroxysmal atrial fibrillation and atrial tachycardia (declined anticoagulation) and SSS s/p pacemaker implantation who presents for evaluation of chest discomfort. Follows with Dr. Hernandez and was last seen 2/2022 at which time she reported feeling well.     She has no known CAD. Her risk factor profile is notable for +HTN, -HLD, -tobacco use, + family history of CAD, -DM.     She present with her daughter, Aida. She says that over the past few weeks she has noticed noticed worsening chest discomfort. She describes is as a heavy feeling and an ache in her left upper chest. Pain sometimes radiates to the elbow. Also reports that her breathing is a little heavier when she has the episodes. She says this has been going for quite some time, but she ignored it, over the past two weeks it has been happening more frequently and episodes are longer in duration. The episodes come on randomly, not necessarily associated with exertion. Episodes can last anywhere from minutes to several hours. She thinks it may be related to indigestion as sometimes the episodes improve with belching.  She sometimes wakes up with it in the middle of the night, improves with turning on her left sided. Currently having an episode of chest pressure in clinic today rated 3-4/10. Reports episodes of palpitations every few months, last episode of AF/AT was a few weeks ago, woke her up in the middle of the night, resolved after a few hours. No heart failure, no lightheadedness, no syncope.     PAST MEDICAL HISTORY:  1. Pacemaker 6/30/2010. Leads are Medtronic 5076. Generator change 9/14/2016.  2. Nonsustained VT  3. Hypertension  4. Atrial fibrillation, detected on device lasting > 1 hour, declined anticoagulation    CURRENT MEDICATIONS:  Aspirin 81 every day  hydrochlorothiazide 12.5 every day  Lisinopril 10 every day (~ noon)  Metoprolol succinate 100 morning /50 midnight before bed  - takes 50 mg PRN if she has an arrhythmia     ALLERGIES:   No Known Allergies    FAMILY HISTORY:  Family History   Problem Relation Age of Onset     Cardiovascular Father 76         age 80, MI 76 and CHF 80's     Cardiovascular Paternal Grandfather          age 76 of CVD     Myocardial Infarction Mother 88        lived to 100     Arrhythmia Sister         PPM     Neuropathy Sister      Colon Cancer Sister      Arrhythmia Brother         pacemaker     Arrhythmia Brother         pacemaker, hx rheumatic fever, heart failure     Blood Disease Son         hemochromatosis?     Atrial fibrillation Son      Cancer Daughter         Breast, uterine, 2nd breast cancer, HTN       SOCIAL HISTORY:  Social History     Tobacco Use     Smoking status: Never     Smokeless tobacco: Never   Substance Use Topics     Alcohol use: No     Drug use: No       ROS:  10 point ROS neg other than the symptoms noted above in the HPI.    Vital signs: 165/78, HR 81, BMI 22.66 (56 kg)  Gen: alert, interactive, NAD, she is very alert, converses knowledgeably regarding her medical status  HCV: RRR, soft holosystolic murmur along apex  Left chest ppm incision site clean    Labs:  No recent labs    Testing/Procedures:    ECG today:   A-paced HR 73     ECHOCARDIOGRAM:   2017: EF 65%, mild to mod MAC, posterior mitral valve prolapse with mild MR, BISHNU 56.    DEVICE INTERROGATION today 2/10/2022 since last check 2021:  Device: Medtronic [id number] Advisa DR LAWLER  Normal device function  Mode: AAIR-DDDR  bpm  AP: 99.6% (atrially dependent)  : <0.1%  Intrinsic Rhythm: /AS @ 30 bpm; conducts with AP  Short V-V intervals: None  Lead Trends Appear Stable: Yes  Estimated battery longevity to RRT = 5 years.   Atrial Arrhythmia: brief episodes of AT, longest 47 seconds, rate 97 bpm  Most recent AF episode was 2021, 1 hour, average rage 118 bpm    Assessment and Plan:     1. Atypical chest pain:  Patient is an active 89 year old with  history of PAF (declines AC) and PPM for SSS presenting with waxing and waning chest discomfort. No known history of CAD, risk factors include HTN and family history of CAD. Her chest pain has some typical and atypical features. She describes the pain as left sided chest heaviness, dull ache, radiating to the left arm, though is not related to exertion and improves with belching. EKG today shows no ischemic changes, though she is paced. Considered performing outpatient ischemic evaluation; however, patient is having active chest pain in the office and no stress test available for 1 week; therefore, I am recommending she should go to the ER for ACS rule out and ischemic evaluation.     Follow up: post hospital follow-up.

## 2022-10-20 ENCOUNTER — APPOINTMENT (OUTPATIENT)
Dept: GENERAL RADIOLOGY | Facility: CLINIC | Age: 87
End: 2022-10-20
Attending: EMERGENCY MEDICINE
Payer: COMMERCIAL

## 2022-10-20 ENCOUNTER — HOSPITAL ENCOUNTER (OUTPATIENT)
Facility: CLINIC | Age: 87
Setting detail: OBSERVATION
Discharge: HOME | End: 2022-10-21
Attending: EMERGENCY MEDICINE | Admitting: PHYSICIAN ASSISTANT
Payer: COMMERCIAL

## 2022-10-20 ENCOUNTER — OFFICE VISIT (OUTPATIENT)
Dept: CARDIOLOGY | Facility: CLINIC | Age: 87
End: 2022-10-20
Attending: NURSE PRACTITIONER
Payer: COMMERCIAL

## 2022-10-20 VITALS
DIASTOLIC BLOOD PRESSURE: 76 MMHG | BODY MASS INDEX: 22.96 KG/M2 | HEART RATE: 75 BPM | WEIGHT: 129.6 LBS | OXYGEN SATURATION: 98 % | SYSTOLIC BLOOD PRESSURE: 155 MMHG | HEIGHT: 63 IN

## 2022-10-20 DIAGNOSIS — R06.02 SHORTNESS OF BREATH: ICD-10-CM

## 2022-10-20 DIAGNOSIS — R94.31 ABNORMAL EKG: ICD-10-CM

## 2022-10-20 DIAGNOSIS — R07.89 OTHER CHEST PAIN: Primary | ICD-10-CM

## 2022-10-20 DIAGNOSIS — I48.0 PAROXYSMAL ATRIAL FIBRILLATION (H): ICD-10-CM

## 2022-10-20 DIAGNOSIS — R07.9 CHEST PAIN, UNSPECIFIED TYPE: ICD-10-CM

## 2022-10-20 DIAGNOSIS — K21.00 GASTROESOPHAGEAL REFLUX DISEASE WITH ESOPHAGITIS WITHOUT HEMORRHAGE: Primary | ICD-10-CM

## 2022-10-20 DIAGNOSIS — Z20.822 CONTACT WITH AND (SUSPECTED) EXPOSURE TO COVID-19: ICD-10-CM

## 2022-10-20 DIAGNOSIS — I10 ESSENTIAL HYPERTENSION, BENIGN: ICD-10-CM

## 2022-10-20 LAB
ANION GAP SERPL CALCULATED.3IONS-SCNC: 10 MMOL/L (ref 7–15)
ATRIAL RATE - MUSE: 61 BPM
BASOPHILS # BLD AUTO: 0.1 10E3/UL (ref 0–0.2)
BASOPHILS NFR BLD AUTO: 1 %
BUN SERPL-MCNC: 23.5 MG/DL (ref 8–23)
CALCIUM SERPL-MCNC: 9.6 MG/DL (ref 8.2–9.6)
CHLORIDE SERPL-SCNC: 103 MMOL/L (ref 98–107)
CREAT SERPL-MCNC: 0.79 MG/DL (ref 0.51–0.95)
CREAT SERPL-MCNC: 0.89 MG/DL (ref 0.51–0.95)
DEPRECATED HCO3 PLAS-SCNC: 25 MMOL/L (ref 22–29)
DIASTOLIC BLOOD PRESSURE - MUSE: NORMAL MMHG
EOSINOPHIL # BLD AUTO: 0.3 10E3/UL (ref 0–0.7)
EOSINOPHIL NFR BLD AUTO: 4 %
ERYTHROCYTE [DISTWIDTH] IN BLOOD BY AUTOMATED COUNT: 14.1 % (ref 10–15)
GFR SERPL CREATININE-BSD FRML MDRD: 61 ML/MIN/1.73M2
GFR SERPL CREATININE-BSD FRML MDRD: 70 ML/MIN/1.73M2
GLUCOSE SERPL-MCNC: 99 MG/DL (ref 70–99)
HCT VFR BLD AUTO: 34.8 % (ref 35–47)
HGB BLD-MCNC: 11.1 G/DL (ref 11.7–15.7)
HOLD SPECIMEN: NORMAL
IMM GRANULOCYTES # BLD: 0 10E3/UL
IMM GRANULOCYTES NFR BLD: 0 %
INTERPRETATION ECG - MUSE: NORMAL
LYMPHOCYTES # BLD AUTO: 1.8 10E3/UL (ref 0.8–5.3)
LYMPHOCYTES NFR BLD AUTO: 28 %
MCH RBC QN AUTO: 29.2 PG (ref 26.5–33)
MCHC RBC AUTO-ENTMCNC: 31.9 G/DL (ref 31.5–36.5)
MCV RBC AUTO: 92 FL (ref 78–100)
MDC_IDC_EPISODE_DTM: NORMAL
MDC_IDC_EPISODE_DURATION: 191 S
MDC_IDC_EPISODE_DURATION: 275 S
MDC_IDC_EPISODE_DURATION: 940 S
MDC_IDC_EPISODE_ID: 274
MDC_IDC_EPISODE_ID: 275
MDC_IDC_EPISODE_ID: 276
MDC_IDC_EPISODE_TYPE: NORMAL
MDC_IDC_LEAD_IMPLANT_DT: NORMAL
MDC_IDC_LEAD_IMPLANT_DT: NORMAL
MDC_IDC_LEAD_LOCATION: NORMAL
MDC_IDC_LEAD_LOCATION: NORMAL
MDC_IDC_LEAD_LOCATION_DETAIL_1: NORMAL
MDC_IDC_LEAD_LOCATION_DETAIL_1: NORMAL
MDC_IDC_LEAD_MFG: NORMAL
MDC_IDC_LEAD_MFG: NORMAL
MDC_IDC_LEAD_MODEL: NORMAL
MDC_IDC_LEAD_MODEL: NORMAL
MDC_IDC_LEAD_POLARITY_TYPE: NORMAL
MDC_IDC_LEAD_POLARITY_TYPE: NORMAL
MDC_IDC_LEAD_SERIAL: NORMAL
MDC_IDC_LEAD_SERIAL: NORMAL
MDC_IDC_LEAD_SPECIAL_FUNCTION: NORMAL
MDC_IDC_LEAD_SPECIAL_FUNCTION: NORMAL
MDC_IDC_MSMT_BATTERY_DTM: NORMAL
MDC_IDC_MSMT_BATTERY_REMAINING_LONGEVITY: 56 MO
MDC_IDC_MSMT_BATTERY_RRT_TRIGGER: 2.83
MDC_IDC_MSMT_BATTERY_STATUS: NORMAL
MDC_IDC_MSMT_BATTERY_VOLTAGE: 2.99 V
MDC_IDC_MSMT_LEADCHNL_RA_IMPEDANCE_VALUE: 266 OHM
MDC_IDC_MSMT_LEADCHNL_RA_IMPEDANCE_VALUE: 399 OHM
MDC_IDC_MSMT_LEADCHNL_RA_PACING_THRESHOLD_AMPLITUDE: 0.75 V
MDC_IDC_MSMT_LEADCHNL_RA_PACING_THRESHOLD_PULSEWIDTH: 0.4 MS
MDC_IDC_MSMT_LEADCHNL_RA_SENSING_INTR_AMPL: 1 MV
MDC_IDC_MSMT_LEADCHNL_RV_IMPEDANCE_VALUE: 418 OHM
MDC_IDC_MSMT_LEADCHNL_RV_IMPEDANCE_VALUE: 456 OHM
MDC_IDC_MSMT_LEADCHNL_RV_PACING_THRESHOLD_AMPLITUDE: 0.62 V
MDC_IDC_MSMT_LEADCHNL_RV_PACING_THRESHOLD_PULSEWIDTH: 0.4 MS
MDC_IDC_MSMT_LEADCHNL_RV_SENSING_INTR_AMPL: 11 MV
MDC_IDC_PG_IMPLANT_DTM: NORMAL
MDC_IDC_PG_MFG: NORMAL
MDC_IDC_PG_MODEL: NORMAL
MDC_IDC_PG_SERIAL: NORMAL
MDC_IDC_PG_TYPE: NORMAL
MDC_IDC_SESS_CLINIC_NAME: NORMAL
MDC_IDC_SESS_DTM: NORMAL
MDC_IDC_SESS_TYPE: NORMAL
MDC_IDC_SET_BRADY_AT_MODE_SWITCH_RATE: 171 {BEATS}/MIN
MDC_IDC_SET_BRADY_HYSTRATE: NORMAL
MDC_IDC_SET_BRADY_LOWRATE: 60 {BEATS}/MIN
MDC_IDC_SET_BRADY_MAX_SENSOR_RATE: 130 {BEATS}/MIN
MDC_IDC_SET_BRADY_MAX_TRACKING_RATE: 130 {BEATS}/MIN
MDC_IDC_SET_BRADY_MODE: NORMAL
MDC_IDC_SET_BRADY_PAV_DELAY_LOW: 180 MS
MDC_IDC_SET_BRADY_SAV_DELAY_LOW: 150 MS
MDC_IDC_SET_LEADCHNL_RA_PACING_AMPLITUDE: 1.5 V
MDC_IDC_SET_LEADCHNL_RA_PACING_ANODE_ELECTRODE_1: NORMAL
MDC_IDC_SET_LEADCHNL_RA_PACING_ANODE_LOCATION_1: NORMAL
MDC_IDC_SET_LEADCHNL_RA_PACING_CAPTURE_MODE: NORMAL
MDC_IDC_SET_LEADCHNL_RA_PACING_CATHODE_ELECTRODE_1: NORMAL
MDC_IDC_SET_LEADCHNL_RA_PACING_CATHODE_LOCATION_1: NORMAL
MDC_IDC_SET_LEADCHNL_RA_PACING_POLARITY: NORMAL
MDC_IDC_SET_LEADCHNL_RA_PACING_PULSEWIDTH: 0.4 MS
MDC_IDC_SET_LEADCHNL_RA_SENSING_ANODE_ELECTRODE_1: NORMAL
MDC_IDC_SET_LEADCHNL_RA_SENSING_ANODE_LOCATION_1: NORMAL
MDC_IDC_SET_LEADCHNL_RA_SENSING_CATHODE_ELECTRODE_1: NORMAL
MDC_IDC_SET_LEADCHNL_RA_SENSING_CATHODE_LOCATION_1: NORMAL
MDC_IDC_SET_LEADCHNL_RA_SENSING_POLARITY: NORMAL
MDC_IDC_SET_LEADCHNL_RA_SENSING_SENSITIVITY: 0.3 MV
MDC_IDC_SET_LEADCHNL_RV_PACING_AMPLITUDE: 2 V
MDC_IDC_SET_LEADCHNL_RV_PACING_ANODE_ELECTRODE_1: NORMAL
MDC_IDC_SET_LEADCHNL_RV_PACING_ANODE_LOCATION_1: NORMAL
MDC_IDC_SET_LEADCHNL_RV_PACING_CAPTURE_MODE: NORMAL
MDC_IDC_SET_LEADCHNL_RV_PACING_CATHODE_ELECTRODE_1: NORMAL
MDC_IDC_SET_LEADCHNL_RV_PACING_CATHODE_LOCATION_1: NORMAL
MDC_IDC_SET_LEADCHNL_RV_PACING_POLARITY: NORMAL
MDC_IDC_SET_LEADCHNL_RV_PACING_PULSEWIDTH: 0.4 MS
MDC_IDC_SET_LEADCHNL_RV_SENSING_ANODE_ELECTRODE_1: NORMAL
MDC_IDC_SET_LEADCHNL_RV_SENSING_ANODE_LOCATION_1: NORMAL
MDC_IDC_SET_LEADCHNL_RV_SENSING_CATHODE_ELECTRODE_1: NORMAL
MDC_IDC_SET_LEADCHNL_RV_SENSING_CATHODE_LOCATION_1: NORMAL
MDC_IDC_SET_LEADCHNL_RV_SENSING_POLARITY: NORMAL
MDC_IDC_SET_LEADCHNL_RV_SENSING_SENSITIVITY: 0.9 MV
MDC_IDC_SET_ZONE_DETECTION_INTERVAL: 350 MS
MDC_IDC_SET_ZONE_DETECTION_INTERVAL: 400 MS
MDC_IDC_SET_ZONE_TYPE: NORMAL
MDC_IDC_STAT_AT_BURDEN_PERCENT: 0.1 %
MDC_IDC_STAT_AT_DTM_END: NORMAL
MDC_IDC_STAT_AT_DTM_START: NORMAL
MDC_IDC_STAT_BRADY_AP_VP_PERCENT: 0.06 %
MDC_IDC_STAT_BRADY_AP_VS_PERCENT: 99.52 %
MDC_IDC_STAT_BRADY_AS_VP_PERCENT: 0.01 %
MDC_IDC_STAT_BRADY_AS_VS_PERCENT: 0.4 %
MDC_IDC_STAT_BRADY_DTM_END: NORMAL
MDC_IDC_STAT_BRADY_DTM_START: NORMAL
MDC_IDC_STAT_BRADY_RA_PERCENT_PACED: 99.35 %
MDC_IDC_STAT_BRADY_RV_PERCENT_PACED: 0.08 %
MDC_IDC_STAT_EPISODE_RECENT_COUNT: 0
MDC_IDC_STAT_EPISODE_RECENT_COUNT: 3
MDC_IDC_STAT_EPISODE_RECENT_COUNT_DTM_END: NORMAL
MDC_IDC_STAT_EPISODE_RECENT_COUNT_DTM_START: NORMAL
MDC_IDC_STAT_EPISODE_TOTAL_COUNT: 0
MDC_IDC_STAT_EPISODE_TOTAL_COUNT: 0
MDC_IDC_STAT_EPISODE_TOTAL_COUNT: 220
MDC_IDC_STAT_EPISODE_TOTAL_COUNT: 55
MDC_IDC_STAT_EPISODE_TOTAL_COUNT_DTM_END: NORMAL
MDC_IDC_STAT_EPISODE_TOTAL_COUNT_DTM_START: NORMAL
MDC_IDC_STAT_EPISODE_TYPE: NORMAL
MONOCYTES # BLD AUTO: 0.5 10E3/UL (ref 0–1.3)
MONOCYTES NFR BLD AUTO: 8 %
NEUTROPHILS # BLD AUTO: 3.8 10E3/UL (ref 1.6–8.3)
NEUTROPHILS NFR BLD AUTO: 59 %
NRBC # BLD AUTO: 0 10E3/UL
NRBC BLD AUTO-RTO: 0 /100
P AXIS - MUSE: 14 DEGREES
PLATELET # BLD AUTO: 196 10E3/UL (ref 150–450)
POTASSIUM SERPL-SCNC: 4.4 MMOL/L (ref 3.4–5.3)
PR INTERVAL - MUSE: 212 MS
QRS DURATION - MUSE: 88 MS
QT - MUSE: 396 MS
QTC - MUSE: 398 MS
R AXIS - MUSE: -34 DEGREES
RBC # BLD AUTO: 3.8 10E6/UL (ref 3.8–5.2)
SARS-COV-2 RNA RESP QL NAA+PROBE: NEGATIVE
SODIUM SERPL-SCNC: 138 MMOL/L (ref 136–145)
SYSTOLIC BLOOD PRESSURE - MUSE: NORMAL MMHG
T AXIS - MUSE: 9 DEGREES
TROPONIN T SERPL HS-MCNC: 19 NG/L
TROPONIN T SERPL HS-MCNC: 20 NG/L
TROPONIN T SERPL HS-MCNC: 23 NG/L
VENTRICULAR RATE- MUSE: 61 BPM
WBC # BLD AUTO: 6.4 10E3/UL (ref 4–11)

## 2022-10-20 PROCEDURE — 71046 X-RAY EXAM CHEST 2 VIEWS: CPT

## 2022-10-20 PROCEDURE — 85025 COMPLETE CBC W/AUTO DIFF WBC: CPT | Performed by: EMERGENCY MEDICINE

## 2022-10-20 PROCEDURE — 99220 PR INITIAL OBSERVATION CARE,LEVEL III: CPT | Performed by: PHYSICIAN ASSISTANT

## 2022-10-20 PROCEDURE — 82565 ASSAY OF CREATININE: CPT | Performed by: EMERGENCY MEDICINE

## 2022-10-20 PROCEDURE — 84484 ASSAY OF TROPONIN QUANT: CPT | Performed by: PHYSICIAN ASSISTANT

## 2022-10-20 PROCEDURE — 84484 ASSAY OF TROPONIN QUANT: CPT | Performed by: EMERGENCY MEDICINE

## 2022-10-20 PROCEDURE — 96374 THER/PROPH/DIAG INJ IV PUSH: CPT

## 2022-10-20 PROCEDURE — 99285 EMERGENCY DEPT VISIT HI MDM: CPT | Mod: 25 | Performed by: EMERGENCY MEDICINE

## 2022-10-20 PROCEDURE — 93005 ELECTROCARDIOGRAM TRACING: CPT

## 2022-10-20 PROCEDURE — U0005 INFEC AGEN DETEC AMPLI PROBE: HCPCS | Performed by: EMERGENCY MEDICINE

## 2022-10-20 PROCEDURE — G0378 HOSPITAL OBSERVATION PER HR: HCPCS

## 2022-10-20 PROCEDURE — G0463 HOSPITAL OUTPT CLINIC VISIT: HCPCS

## 2022-10-20 PROCEDURE — 250N000013 HC RX MED GY IP 250 OP 250 PS 637: Performed by: EMERGENCY MEDICINE

## 2022-10-20 PROCEDURE — 83880 ASSAY OF NATRIURETIC PEPTIDE: CPT | Performed by: PHYSICIAN ASSISTANT

## 2022-10-20 PROCEDURE — 36415 COLL VENOUS BLD VENIPUNCTURE: CPT | Performed by: PHYSICIAN ASSISTANT

## 2022-10-20 PROCEDURE — C9803 HOPD COVID-19 SPEC COLLECT: HCPCS | Performed by: EMERGENCY MEDICINE

## 2022-10-20 PROCEDURE — 71046 X-RAY EXAM CHEST 2 VIEWS: CPT | Mod: 26 | Performed by: RADIOLOGY

## 2022-10-20 PROCEDURE — 99214 OFFICE O/P EST MOD 30 MIN: CPT | Performed by: NURSE PRACTITIONER

## 2022-10-20 PROCEDURE — 250N000013 HC RX MED GY IP 250 OP 250 PS 637: Performed by: PHYSICIAN ASSISTANT

## 2022-10-20 PROCEDURE — 80048 BASIC METABOLIC PNL TOTAL CA: CPT | Performed by: EMERGENCY MEDICINE

## 2022-10-20 PROCEDURE — 36415 COLL VENOUS BLD VENIPUNCTURE: CPT | Performed by: EMERGENCY MEDICINE

## 2022-10-20 PROCEDURE — 250N000011 HC RX IP 250 OP 636: Performed by: PHYSICIAN ASSISTANT

## 2022-10-20 PROCEDURE — C9113 INJ PANTOPRAZOLE SODIUM, VIA: HCPCS | Performed by: PHYSICIAN ASSISTANT

## 2022-10-20 PROCEDURE — 93010 ELECTROCARDIOGRAM REPORT: CPT | Performed by: EMERGENCY MEDICINE

## 2022-10-20 PROCEDURE — 93005 ELECTROCARDIOGRAM TRACING: CPT | Performed by: EMERGENCY MEDICINE

## 2022-10-20 RX ORDER — METOPROLOL SUCCINATE 50 MG/1
100 TABLET, EXTENDED RELEASE ORAL ONCE
Status: COMPLETED | OUTPATIENT
Start: 2022-10-20 | End: 2022-10-20

## 2022-10-20 RX ORDER — UBIDECARENONE 100 MG
100 CAPSULE ORAL
COMMUNITY
End: 2023-06-02

## 2022-10-20 RX ORDER — METOPROLOL SUCCINATE 50 MG/1
50 TABLET, EXTENDED RELEASE ORAL EVERY EVENING
COMMUNITY
End: 2022-12-09

## 2022-10-20 RX ORDER — MAGNESIUM HYDROXIDE/ALUMINUM HYDROXICE/SIMETHICONE 120; 1200; 1200 MG/30ML; MG/30ML; MG/30ML
30 SUSPENSION ORAL EVERY 4 HOURS PRN
Status: DISCONTINUED | OUTPATIENT
Start: 2022-10-20 | End: 2022-10-21 | Stop reason: HOSPADM

## 2022-10-20 RX ORDER — METOPROLOL TARTRATE 50 MG
50-100 TABLET ORAL
Status: DISCONTINUED | OUTPATIENT
Start: 2022-10-20 | End: 2022-10-21 | Stop reason: HOSPADM

## 2022-10-20 RX ORDER — METOPROLOL SUCCINATE 50 MG/1
50 TABLET, EXTENDED RELEASE ORAL EVERY EVENING
Status: DISCONTINUED | OUTPATIENT
Start: 2022-10-20 | End: 2022-10-21 | Stop reason: HOSPADM

## 2022-10-20 RX ORDER — CARBOXYMETHYLCELLULOSE SODIUM 5 MG/ML
1 SOLUTION/ DROPS OPHTHALMIC 4 TIMES DAILY
Status: DISCONTINUED | OUTPATIENT
Start: 2022-10-20 | End: 2022-10-21 | Stop reason: HOSPADM

## 2022-10-20 RX ORDER — METOPROLOL SUCCINATE 50 MG/1
100 TABLET, EXTENDED RELEASE ORAL DAILY
Status: DISCONTINUED | OUTPATIENT
Start: 2022-10-21 | End: 2022-10-21 | Stop reason: HOSPADM

## 2022-10-20 RX ORDER — LISINOPRIL 5 MG/1
10 TABLET ORAL DAILY
Status: DISCONTINUED | OUTPATIENT
Start: 2022-10-20 | End: 2022-10-20

## 2022-10-20 RX ORDER — LISINOPRIL 10 MG/1
10 TABLET ORAL DAILY
Status: DISCONTINUED | OUTPATIENT
Start: 2022-10-20 | End: 2022-10-21 | Stop reason: HOSPADM

## 2022-10-20 RX ORDER — HYDROCHLOROTHIAZIDE 12.5 MG/1
12.5 TABLET ORAL DAILY
Status: DISCONTINUED | OUTPATIENT
Start: 2022-10-21 | End: 2022-10-21 | Stop reason: HOSPADM

## 2022-10-20 RX ORDER — NITROGLYCERIN 0.4 MG/1
0.4 TABLET SUBLINGUAL EVERY 5 MIN PRN
Status: DISCONTINUED | OUTPATIENT
Start: 2022-10-20 | End: 2022-10-21 | Stop reason: HOSPADM

## 2022-10-20 RX ORDER — ACETAMINOPHEN 325 MG/1
650 TABLET ORAL EVERY 4 HOURS PRN
Status: DISCONTINUED | OUTPATIENT
Start: 2022-10-20 | End: 2022-10-21 | Stop reason: HOSPADM

## 2022-10-20 RX ORDER — CALCIUM CARBONATE 500 MG/1
500 TABLET, CHEWABLE ORAL 3 TIMES DAILY PRN
Status: DISCONTINUED | OUTPATIENT
Start: 2022-10-20 | End: 2022-10-21 | Stop reason: HOSPADM

## 2022-10-20 RX ORDER — HYDRALAZINE HYDROCHLORIDE 20 MG/ML
10 INJECTION INTRAMUSCULAR; INTRAVENOUS EVERY 6 HOURS PRN
Status: DISCONTINUED | OUTPATIENT
Start: 2022-10-20 | End: 2022-10-21 | Stop reason: HOSPADM

## 2022-10-20 RX ORDER — ASPIRIN 81 MG/1
81 TABLET, CHEWABLE ORAL DAILY
Status: DISCONTINUED | OUTPATIENT
Start: 2022-10-21 | End: 2022-10-21 | Stop reason: HOSPADM

## 2022-10-20 RX ADMIN — LISINOPRIL 10 MG: 10 TABLET ORAL at 15:25

## 2022-10-20 RX ADMIN — CARBOXYMETHYLCELLULOSE SODIUM 1 DROP: 5 SOLUTION/ DROPS OPHTHALMIC at 22:23

## 2022-10-20 RX ADMIN — METOPROLOL SUCCINATE 50 MG: 50 TABLET, EXTENDED RELEASE ORAL at 22:23

## 2022-10-20 RX ADMIN — METOPROLOL SUCCINATE 100 MG: 50 TABLET, EXTENDED RELEASE ORAL at 15:26

## 2022-10-20 RX ADMIN — PANTOPRAZOLE SODIUM 40 MG: 40 INJECTION, POWDER, FOR SOLUTION INTRAVENOUS at 21:27

## 2022-10-20 ASSESSMENT — ACTIVITIES OF DAILY LIVING (ADL)
ADLS_ACUITY_SCORE: 33
ADLS_ACUITY_SCORE: 35

## 2022-10-20 ASSESSMENT — PAIN SCALES - GENERAL: PAINLEVEL: MODERATE PAIN (5)

## 2022-10-20 NOTE — ED TRIAGE NOTES
"89 yr old female hx afib, SSS with pacemaker, ambulatory to triage presenting with chest pain for the past couple months, sometimes intermittent but mostly constant. Was seen in clinic today and referred to ED due to chest pain. Did not take any meds yet today. No light headedness or dizziness. No palpitations. Chest pain not worse with exertion. SOB at rest and on exertion.      Triage Assessment     Row Name 10/20/22 1016       Triage Assessment (Adult)    Airway WDL WDL       Respiratory WDL    Respiratory WDL WDL       Skin Circulation/Temperature WDL    Skin Circulation/Temperature WDL WDL       Cardiac WDL    Cardiac WDL X;chest pain       Chest Pain Assessment    Chest Pain Location anterior chest, left    Chest Pain Radiation arm    Character pressure    Precipitating Factors nothing    Alleviating Factors other (comment)  \"burping sometimes\"    Chest Pain Intervention 12-lead ECG obtained       Peripheral/Neurovascular WDL    Peripheral Neurovascular WDL WDL       Cognitive/Neuro/Behavioral WDL    Cognitive/Neuro/Behavioral WDL WDL              "

## 2022-10-20 NOTE — H&P
[facility]    History and Physical - ED Observation Service       Date of Admission:  10/20/2022    Assessment & Plan    Isadora Mendez is a 89 year old female with PMH of paroxysmal atrial fibrillation and atrial tachycardia (declined anticoagulation), SSS s/p PPM, HTN who presented to the ED with chest pain.    #Chest pain  Patient has history of paroxysmal atrial fibrillation, HTN, SSS s/p PPM and was seen by her cardiology NP this morning for chest pain. She has a heavy aching feeling in her left upper chest that occasionally radiates into the elbow, occurring intermittently over the last few weeks. It has been occurring more frequently and lasting longer in duration. Symptoms are not necessarily associated with exertion. Sometimes these episodes improve with belching. She does sometimes wake up in the middle of the night with the pain and it improves when turning on her left side. Cardiology recommended patient present to the ED for ACS rule out and ischemic work up, as they were unable to schedule an outpatient stress test for another week. In the ED, /77 and HR 82. EKG showed an atrial paced rhythm with prolonged AV conduction and evidence of anteroseptal infarct undetermined age. Troponin 23, repeat 19. Labs with hgb 11.1, otherwise unremarkable. CXR was NAD. Patient was admitted to ED observation for ACS rule out and ischemic work up.  -Lexiscan stress test tomorrow AM  -Resting echocardiogram tomorrow AM  -Continuous telemetry  -Repeat troponin  -Continue PTA Aspirin  -Nitroglycerin as needed for chest pain  -NPO after midnight for cardiac testing  -IV Protonix daily with tums as needed    #Paroxysmal atrial fibrillation  #SSS s/p PPM  #HTN  Patient is not on anticoagulation at baseline (declined).  -Continue PTA Metoprolol, Aspirin  -Continue PTA Lisinopril, hydrochlorothiazide     Diet: Regular Diet Adult  DVT Prophylaxis: Low Risk/Ambulatory with no VTE  prophylaxis indicated  Covarrubias Catheter: Not present  Central Lines: None  Cardiac Monitoring: ACTIVE order. Indication: Chest pain/ ACS rule out (24 hours)  Code Status:   Full    Disposition Plan      Expected Discharge Date: 10/21/2022                The patient's care was discussed with the Attending Physician, Dr. Russell, Bedside Nurse and Patient.    Malia Vogt PA-C  ED Observation Service  [facility]  Securely message with the Vocera Web Console (learn more here)  Text page via Tagstr Paging/Directory   ______________________________________________________________________    Chief Complaint   Chest pain    History is obtained from the patient    History of Present Illness   Isadora Mendez is a 89 year old female with PMH of paroxysmal atrial fibrillation and atrial tachycardia (declined anticoagulation), SSS s/p PPM, HTN who presented to the ED with chest pain. Patient has history of paroxysmal atrial fibrillation, HTN, SSS s/p PPM and was seen by her cardiology NP this morning for chest pain. She has a heavy aching feeling in her left upper chest that occasionally radiates into the elbow, occurring intermittently over the last few weeks. It has been occurring more frequently and lasting longer in duration. Symptoms are not necessarily associated with exertion. Sometimes these episodes improve with belching. She does sometimes wake up in the middle of the night with the pain and it improves when turning on her left side. Cardiology recommended patient present to the ED for ACS rule out and ischemic work up, as they were unable to schedule an outpatient stress test for another week. In the ED, /77 and HR 82. EKG showed an atrial paced rhythm with prolonged AV conduction and evidence of anteroseptal infarct undetermined age. Troponin 23, repeat 19. Labs with hgb 11.1, otherwise unremarkable. CXR was NAD. Patient was admitted to ED observation for ACS rule  out and ischemic work up.    Review of Systems    The 10 point Review of Systems is negative other than noted in the HPI or here.     Past Medical History    I have reviewed this patient's medical history and updated it with pertinent information if needed.   Past Medical History:   Diagnosis Date     Atrial fibrillation (H)      Facial fracture (H)      Hypertension      NSVT (nonsustained ventricular tachycardia)     during exercise echo, neg EP study     Pacemaker 2010     S/P cardiac catheterization     30-40% non obstructive lesion     Ventricular tachycardia, nonsustained     during stress echo       Past Surgical History   I have reviewed this patient's surgical history and updated it with pertinent information if needed.  Past Surgical History:   Procedure Laterality Date     IMPLANT PACEMAKER       PPM  2010    Permanent Pacemaker       Social History   I have reviewed this patient's social history and updated it with pertinent information if needed.  Social History     Tobacco Use     Smoking status: Never     Smokeless tobacco: Never   Substance Use Topics     Alcohol use: No     Drug use: No       Family History   I have reviewed this patient's family history and updated it with pertinent information if needed.  Family History   Problem Relation Age of Onset     Cardiovascular Father 76         age 80, MI 76 and CHF 80's     Cardiovascular Paternal Grandfather          age 76 of CVD     Myocardial Infarction Mother 88        lived to 100     Arrhythmia Sister         PPM     Neuropathy Sister      Colon Cancer Sister      Arrhythmia Brother         pacemaker     Arrhythmia Brother         pacemaker, hx rheumatic fever, heart failure     Blood Disease Son         hemochromatosis?     Atrial fibrillation Son      Cancer Daughter         Breast, uterine, 2nd breast cancer, HTN       Prior to Admission Medications   Prior to Admission Medications   Prescriptions Last Dose  Informant Patient Reported? Taking?   Ascorbic Acid (VITAMIN C PO)   Yes No   BIOTIN PO  Self Yes No   Sig: Take 1 capsule by mouth daily.   Cyanocobalamin (VITAMIN B12 PO)  Self Yes No   Sig: Take 1 tablet by mouth every 14 days    Magnesium 300 MG CAPS   Yes No   Sig: Take 1 tablet by mouth   Nutritional Supplements (PYCNOGENOL) 300-30 MG CAPS per capsule   Yes No   Sig: Take 1 capsule by mouth daily   VITAMIN K PO   Yes No   Sig: Take 1 tablet by mouth every other day    Vitamin D, Cholecalciferol, 1000 units TABS   Yes No   Sig: Take 1 tablet by mouth daily   aspirin 81 MG tablet  Self Yes No   Sig: Take 1 tablet by mouth daily.   co-enzyme Q-10 100 MG CAPS capsule   Yes No   Sig: Take 100 mg by mouth   hydrochlorothiazide (HYDRODIURIL) 12.5 MG tablet   No No   Sig: Take 1 tablet (12.5 mg) by mouth daily   lisinopril (ZESTRIL) 10 MG tablet   No No   Sig: Take 1 tablet (10 mg) by mouth daily   metoprolol succinate ER (TOPROL-XL) 100 MG 24 hr tablet   No No   Sig: Take 1 tablet (100 mg) by mouth daily      Facility-Administered Medications: None     Allergies   No Known Allergies    Physical Exam   Vital Signs: Temp: 98.2  F (36.8  C)   BP: (!) 190/78 Pulse: 68   Resp: 18 SpO2: 99 % O2 Device: None (Room air)    Weight: 129 lbs 0 oz  Exam:  Constitutional: alert, no distress, and cooperative  Head: normocephalic, atraumatic  Neck: no asymmetry, masses, or scars  ENT: throat normal without erythema or exudate  Cardiovascular: RRR  Respiratory: diminished, respirations unlabored  Gastrointestinal: (+) BS, non tender, soft  Musculoskeletal: normal muscle tone, no pitting edema  Skin: no suspicious lesions or rashes  Neurologic: oriented x 3, moves all extremities, no slurred speech  Psychiatric: normal affect and mood    Data   Data reviewed today: I reviewed all medications, new labs and imaging results over the last 24 hours.    Recent Labs   Lab 10/20/22  1032   WBC 6.4   HGB 11.1*   MCV 92          POTASSIUM 4.4   CHLORIDE 103   CO2 25   BUN 23.5*   CR 0.89   ANIONGAP 10   INO 9.6   GLC 99     Recent Results (from the past 24 hour(s))   XR Chest 2 Views    Narrative    EXAM: XR CHEST 2 VIEWS  10/20/2022 11:21 AM      HISTORY: cp    ADDITIONAL HISTORY: 89 year old female?patient with increased chest  discomfort.     COMPARISON: Chest radiograph 7/1/2010    TECHNIQUE: Chest radiograph PA and lateral views.    FINDINGS:   Devices, lines, tubes: Pacemaker overlies left hemithorax with  extending lead tips projecting over right atrial appendage and right  ventricle.    Trachea is midline. Normal cardiac silhouette. Aortic arch  atherosclerosis No acute airspace disease. No pneumothorax. No pleural  effusion. Chronic dextrocurvature of the upper lumbar spine.  Degenerative changes of bilateral shoulder joints and visualized  spine.      Impression    IMPRESSION:  No acute cardiopulmonary findings.    I have personally reviewed the examination and initial interpretation  and I agree with the findings.    MANOLO JOE MD         SYSTEM ID:  [id number]

## 2022-10-20 NOTE — LETTER
10/20/2022      RE: Isadora HAMPTON Andrea  [address] [facility] [address]       Dear Colleague,    Thank you for the opportunity to participate in the care of your patient, Isadora Mendez, at the [facility] [place] at [facility]. Please see a copy of my visit note below.    Cardiology Clinic Visit  October 20, 2022      HPI:   89 yo F with paroxysmal atrial fibrillation and atrial tachycardia (declined anticoagulation) and SSS s/p pacemaker implantation who presents for evaluation of chest discomfort. Follows with Dr. Hernandez and was last seen 2/2022 at which time she reported feeling well.     She has no known CAD. Her risk factor profile is notable for +HTN, -HLD, -tobacco use, + family history of CAD, -DM.     She present with her daughter, Aida. She says that over the past few weeks she has noticed noticed worsening chest discomfort. She describes is as a heavy feeling and an ache in her left upper chest. Pain sometimes radiates to the elbow. Also reports that her breathing is a little heavier when she has the episodes. She says this has been going for quite some time, but she ignored it, over the past two weeks it has been happening more frequently and episodes are longer in duration. The episodes come on randomly, not necessarily associated with exertion. Episodes can last anywhere from minutes to several hours. She thinks it may be related to indigestion as sometimes the episodes improve with belching.  She sometimes wakes up with it in the middle of the night, improves with turning on her left sided. Currently having an episode of chest pressure in clinic today rated 3-4/10. Reports episodes of palpitations every few months, last episode of AF/AT was a few weeks ago, woke her up in the middle of the night, resolved after a few hours. No heart failure, no lightheadedness, no syncope.     PAST MEDICAL HISTORY:  1. Pacemaker 6/30/2010. Leads are  Medtronic 5076. Generator change 2016.  2. Nonsustained VT  3. Hypertension  4. Atrial fibrillation, detected on device lasting > 1 hour, declined anticoagulation    CURRENT MEDICATIONS:  Aspirin 81 every day  hydrochlorothiazide 12.5 every day  Lisinopril 10 every day (~ noon)  Metoprolol succinate 100 morning /50 midnight before bed - takes 50 mg PRN if she has an arrhythmia     ALLERGIES:   No Known Allergies    FAMILY HISTORY:  Family History   Problem Relation Age of Onset     Cardiovascular Father 76         age 80, MI 76 and CHF 80's     Cardiovascular Paternal Grandfather          age 76 of CVD     Myocardial Infarction Mother 88        lived to 100     Arrhythmia Sister         PPM     Neuropathy Sister      Colon Cancer Sister      Arrhythmia Brother         pacemaker     Arrhythmia Brother         pacemaker, hx rheumatic fever, heart failure     Blood Disease Son         hemochromatosis?     Atrial fibrillation Son      Cancer Daughter         Breast, uterine, 2nd breast cancer, HTN       SOCIAL HISTORY:  Social History     Tobacco Use     Smoking status: Never     Smokeless tobacco: Never   Substance Use Topics     Alcohol use: No     Drug use: No       ROS:  10 point ROS neg other than the symptoms noted above in the HPI.    Vital signs: 165/78, HR 81, BMI 22.66 (56 kg)  Gen: alert, interactive, NAD, she is very alert, converses knowledgeably regarding her medical status  HCV: RRR, soft holosystolic murmur along apex  Left chest ppm incision site clean    Labs:  No recent labs    Testing/Procedures:    ECG today:   A-paced HR 73     ECHOCARDIOGRAM:   2017: EF 65%, mild to mod MAC, posterior mitral valve prolapse with mild MR, BISHNU 56.    DEVICE INTERROGATION today 2/10/2022 since last check 2021:  Device: Medtronic [id number] Advisivy LAWLER  Normal device function  Mode: AAIR-DDDR  bpm  AP: 99.6% (atrially dependent)  : <0.1%  Intrinsic Rhythm: /AS @ 30 bpm; conducts with  AP  Short V-V intervals: None  Lead Trends Appear Stable: Yes  Estimated battery longevity to RRT = 5 years.   Atrial Arrhythmia: brief episodes of AT, longest 47 seconds, rate 97 bpm  Most recent AF episode was 9/28/2021, 1 hour, average rage 118 bpm    Assessment and Plan:     1. Atypical chest pain:  Patient is an active 89 year old with history of PAF (declines AC) and PPM for SSS presenting with waxing and waning chest discomfort. No known history of CAD, risk factors include HTN and family history of CAD. Her chest pain has some typical and atypical features. She describes the pain as left sided chest heaviness, dull ache, radiating to the left arm, though is not related to exertion and improves with belching. EKG today shows no ischemic changes, though she is paced. Considered performing outpatient ischemic evaluation; however, patient is having active chest pain in the office and no stress test available for 1 week; therefore, I am recommending she should go to the ER for ACS rule out and ischemic evaluation.     Follow up: post hospital follow-up.     Please do not hesitate to contact me if you have any questions/concerns.     Sincerely,     Balbina Manzanares NP

## 2022-10-20 NOTE — ED PROVIDER NOTES
[place] EMERGENCY DEPARTMENT ([facility])  10/20/22     History     Chief Complaint   Patient presents with     Chest Pain     The history is provided by the patient and medical records.     Isadora Mendez is a 89 year old female with hypertension, sick sinus syndrome, paroxysmal atrial fibrillation and atrial tachycardia s/p pacemaker placement in 2010 who presents to the emergency department with CP and MCKNIGHT.  She was just seen in the cardiology clinic this morning reporting increased chest discomfort, chest heaviness, and aching in her left upper chest that occasionally radiates to her left elbow. She has heavier breathing with this, and decreased exertional tolerance.  These episodes last anywhere from minutes to several hours.  The symptoms sometimes improve with belching or laying on her left side.  She was sent in for further evaluation.    No associated abdominal pain, no vomiting.  No recent falls or trauma.  No lightheadedness syncope or palpitations    Patient is not on anticoagulation.  She is on aspirin 81 mg daily, hydrochlorothiazide, lisinopril, metoprolol.    Past Medical History  Past Medical History:   Diagnosis Date     Atrial fibrillation (H) 2011     Facial fracture (H) 2006     Hypertension      NSVT (nonsustained ventricular tachycardia) 2009    during exercise echo, neg EP study     Pacemaker 7-1-2010     S/P cardiac catheterization 2009    30-40% non obstructive lesion     Ventricular tachycardia, nonsustained 2009    during stress echo     Past Surgical History:   Procedure Laterality Date     IMPLANT PACEMAKER       PPM  6/30/2010    Permanent Pacemaker     Ascorbic Acid (VITAMIN C PO)  aspirin 81 MG tablet  BIOTIN PO  co-enzyme Q-10 100 MG CAPS capsule  Cyanocobalamin (VITAMIN B12 PO)  hydrochlorothiazide (HYDRODIURIL) 12.5 MG tablet  lisinopril (ZESTRIL) 10 MG tablet  Magnesium 300 MG CAPS  metoprolol succinate ER (TOPROL-XL) 100 MG 24 hr tablet  Nutritional Supplements  (PYCNOGENOL) 300-30 MG CAPS per capsule  Vitamin D, Cholecalciferol, 1000 units TABS  VITAMIN K PO      No Known Allergies  Family History  Family History   Problem Relation Age of Onset     Cardiovascular Father 76         age 80, MI 76 and CHF 80's     Cardiovascular Paternal Grandfather          age 76 of CVD     Myocardial Infarction Mother 88        lived to 100     Arrhythmia Sister         PPM     Neuropathy Sister      Colon Cancer Sister      Arrhythmia Brother         pacemaker     Arrhythmia Brother         pacemaker, hx rheumatic fever, heart failure     Blood Disease Son         hemochromatosis?     Atrial fibrillation Son      Cancer Daughter         Breast, uterine, 2nd breast cancer, HTN     Social History   Social History     Tobacco Use     Smoking status: Never     Smokeless tobacco: Never   Substance Use Topics     Alcohol use: No     Drug use: No      Past medical history, past surgical history, medications, allergies, family history, and social history were reviewed with the patient. No additional pertinent items.       Review of Systems  A complete review of systems was performed with pertinent positives and negatives noted in the HPI, and all other systems negative.    Physical Exam   BP: (!) 175/77  Pulse: 82  Temp: 98.2  F (36.8  C)  Resp: 18  Weight: 58.5 kg (129 lb)  SpO2: 98 %  Physical Exam  GEN: Well appearing, non toxic, cooperative and conversant.   HEENT: The head is normocephalic and atraumatic. Pupils are equal round and reactive to light. Extraocular motions are intact. There is no facial swelling. The neck is nontender and supple.   CV: Regular rate and rhythm without murmurs rubs or gallops. 2+ radial pulses bilaterally.  PULM: Clear to auscultation bilaterally.  ABD: Soft, nontender, nondistended.   EXT: Full range of motion.  No edema.  NEURO: Cranial nerves II through XII are intact and symmetric. Bilateral upper and lower extremities grossly show full range of  motion without any focal deficits.   SKIN: No rashes, ecchymosis, or lacerations  PSYCH: Calm and cooperative, interactive.    ED Course      Procedures                EKG at 10:26 AM. Chest pain and shortness of breath at time of ECG.  ECG interpretted by me within 10 minutes of production  Atrial paced rhythm with prolonged AV conduction at 61 bpm.  Left axis.  Normal intervals. Nl R-wave progress. No ST-T elevations or depressions. Pathologic T-wave inversion are absent     Interpretation: abn ECG, no significant changes        Results for orders placed or performed during the hospital encounter of 10/20/22   XR Chest 2 Views     Status: None    Narrative    EXAM: XR CHEST 2 VIEWS  10/20/2022 11:21 AM      HISTORY: cp    ADDITIONAL HISTORY: 89 year old female?patient with increased chest  discomfort.     COMPARISON: Chest radiograph 7/1/2010    TECHNIQUE: Chest radiograph PA and lateral views.    FINDINGS:   Devices, lines, tubes: Pacemaker overlies left hemithorax with  extending lead tips projecting over right atrial appendage and right  ventricle.    Trachea is midline. Normal cardiac silhouette. Aortic arch  atherosclerosis No acute airspace disease. No pneumothorax. No pleural  effusion. Chronic dextrocurvature of the upper lumbar spine.  Degenerative changes of bilateral shoulder joints and visualized  spine.      Impression    IMPRESSION:  No acute cardiopulmonary findings.    I have personally reviewed the examination and initial interpretation  and I agree with the findings.    MANOLO JOE MD         SYSTEM ID:  [id number]   [place] Draw     Status: None    Narrative    The following orders were created for panel order [place] Draw.  Procedure                               Abnormality         Status                     ---------                               -----------         ------                     Extra Blue Top Tube[[id number]]                              Final result               Extra  Red Top Tube[[id number]]                               Final result               Extra Green Top (Lithium...[[id number]]                      Final result               Extra Purple Top Tube[[id number]]                            Final result                 Please view results for these tests on the individual orders.   Extra Blue Top Tube     Status: None   Result Value Ref Range    Hold Specimen JIC    Extra Red Top Tube     Status: None   Result Value Ref Range    Hold Specimen JIC    Extra Green Top (Lithium Heparin) Tube     Status: None   Result Value Ref Range    Hold Specimen JIC    Extra Purple Top Tube     Status: None   Result Value Ref Range    Hold Specimen JIC    Basic metabolic panel     Status: Abnormal   Result Value Ref Range    Sodium 138 136 - 145 mmol/L    Potassium 4.4 3.4 - 5.3 mmol/L    Chloride 103 98 - 107 mmol/L    Carbon Dioxide (CO2) 25 22 - 29 mmol/L    Anion Gap 10 7 - 15 mmol/L    Urea Nitrogen 23.5 (H) 8.0 - 23.0 mg/dL    Creatinine 0.89 0.51 - 0.95 mg/dL    Calcium 9.6 8.2 - 9.6 mg/dL    Glucose 99 70 - 99 mg/dL    GFR Estimate 61 >60 mL/min/1.73m2   Troponin T, High Sensitivity     Status: Abnormal   Result Value Ref Range    Troponin T, High Sensitivity 23 (H) <=14 ng/L   CBC with platelets and differential     Status: Abnormal   Result Value Ref Range    WBC Count 6.4 4.0 - 11.0 10e3/uL    RBC Count 3.80 3.80 - 5.20 10e6/uL    Hemoglobin 11.1 (L) 11.7 - 15.7 g/dL    Hematocrit 34.8 (L) 35.0 - 47.0 %    MCV 92 78 - 100 fL    MCH 29.2 26.5 - 33.0 pg    MCHC 31.9 31.5 - 36.5 g/dL    RDW 14.1 10.0 - 15.0 %    Platelet Count 196 150 - 450 10e3/uL    % Neutrophils 59 %    % Lymphocytes 28 %    % Monocytes 8 %    % Eosinophils 4 %    % Basophils 1 %    % Immature Granulocytes 0 %    NRBCs per 100 WBC 0 <1 /100    Absolute Neutrophils 3.8 1.6 - 8.3 10e3/uL    Absolute Lymphocytes 1.8 0.8 - 5.3 10e3/uL    Absolute Monocytes 0.5 0.0 - 1.3 10e3/uL    Absolute Eosinophils 0.3 0.0 - 0.7  10e3/uL    Absolute Basophils 0.1 0.0 - 0.2 10e3/uL    Absolute Immature Granulocytes 0.0 <=0.4 10e3/uL    Absolute NRBCs 0.0 10e3/uL   EKG 12 lead     Status: None   Result Value Ref Range    Systolic Blood Pressure  mmHg    Diastolic Blood Pressure  mmHg    Ventricular Rate 61 BPM    Atrial Rate 61 BPM    CT Interval 212 ms    QRS Duration 88 ms     ms    QTc 398 ms    P Axis 14 degrees    R AXIS -34 degrees    T Axis 9 degrees    Interpretation ECG       Atrial-paced rhythm with prolonged AV conduction  Left axis deviation  Anteroseptal infarct , age undetermined  Abnormal ECG  Unconfirmed report - interpretation of this ECG is computer generated - see medical record for final interpretation  Confirmed by - EMERGENCY ROOM, PHYSICIAN ([id number]),  DONOVAN LOCKE ([id number]) on 10/20/2022 10:40:34 AM     CBC with platelets differential     Status: Abnormal    Narrative    The following orders were created for panel order CBC with platelets differential.  Procedure                               Abnormality         Status                     ---------                               -----------         ------                     CBC with platelets and d...[[id number]]  Abnormal            Final result                 Please view results for these tests on the individual orders.   Results for orders placed or performed in visit on 10/20/22   EKG 12-lead, tracing only (Same Day)     Status: None (Preliminary result)   Result Value Ref Range    Systolic Blood Pressure  mmHg    Diastolic Blood Pressure  mmHg    Ventricular Rate 73 BPM    Atrial Rate 73 BPM    CT Interval 222 ms    QRS Duration 86 ms     ms    QTc 398 ms    P Axis -29 degrees    R AXIS -44 degrees    T Axis 6 degrees    Interpretation ECG       Atrial-paced rhythm with prolonged AV conduction  Left axis deviation  Septal infarct (cited on or before 14-SEP-2016)  Abnormal ECG  When compared with ECG of 06-MAY-2021 09:16,  No significant change was  found       Medications   lisinopril (ZESTRIL) tablet 10 mg (10 mg Oral Given 10/20/22 1525)   metoprolol succinate ER (TOPROL XL) 24 hr tablet 100 mg (100 mg Oral Given 10/20/22 1526)        Assessments & Plan (with Medical Decision Making)   89-year-old female with multiple medical problems as described presenting with chest pain and change in exertional tolerance over the last several months to weeks.  Clinically patient is well-appearing.    DDx includes  CHF,  Acute coronary syndrome, pulmonary embolism, pneumonia, pneumothorax, congestive heart failure, uremia, renal failure, among other causes    Laboratories notable for weakly positive troponin T of 23, but otherwise unrevealing  ECG nonischemic  Chest x-ray clear  Trop 2 pending    Due to patient's advanced age but absence of known coronary artery, will admit to ED obs for acs w/u.   Attempted to acquire CTCA, but staphing not available to perform, requiring admit to ed obs for w/u   Discussed with pt who is agreeable to the plan     I have reviewed the nursing notes. I have reviewed the findings, diagnosis, plan and need for follow up with the patient.    New Prescriptions    No medications on file       Final diagnoses:   Chest pain, unspecified type       --  Alen Russell MD   [facility] EMERGENCY DEPARTMENT  10/20/2022     Alen Russell MD  10/20/22 [id number]

## 2022-10-20 NOTE — NURSING NOTE
Chief Complaint   Patient presents with     Follow Up     Return Cardiology          Vitals were taken, medications reconciled and EKG performed.     Rome Dawson, EMT   9:10 AM

## 2022-10-21 ENCOUNTER — APPOINTMENT (OUTPATIENT)
Dept: CARDIOLOGY | Facility: CLINIC | Age: 87
End: 2022-10-21
Attending: PHYSICIAN ASSISTANT
Payer: COMMERCIAL

## 2022-10-21 ENCOUNTER — APPOINTMENT (OUTPATIENT)
Dept: NUCLEAR MEDICINE | Facility: CLINIC | Age: 87
End: 2022-10-21
Attending: PHYSICIAN ASSISTANT
Payer: COMMERCIAL

## 2022-10-21 VITALS
TEMPERATURE: 97.9 F | OXYGEN SATURATION: 98 % | SYSTOLIC BLOOD PRESSURE: 160 MMHG | RESPIRATION RATE: 16 BRPM | HEART RATE: 63 BPM | DIASTOLIC BLOOD PRESSURE: 84 MMHG | WEIGHT: 129 LBS | BODY MASS INDEX: 23.15 KG/M2

## 2022-10-21 LAB
ALBUMIN SERPL BCG-MCNC: 3.5 G/DL (ref 3.5–5.2)
ALP SERPL-CCNC: 87 U/L (ref 35–104)
ALT SERPL W P-5'-P-CCNC: 20 U/L (ref 10–35)
ANION GAP SERPL CALCULATED.3IONS-SCNC: 7 MMOL/L (ref 7–15)
AST SERPL W P-5'-P-CCNC: 27 U/L (ref 10–35)
ATRIAL RATE - MUSE: 73 BPM
BASOPHILS # BLD AUTO: 0 10E3/UL (ref 0–0.2)
BASOPHILS NFR BLD AUTO: 1 %
BILIRUB SERPL-MCNC: 0.4 MG/DL
BUN SERPL-MCNC: 22.1 MG/DL (ref 8–23)
CALCIUM SERPL-MCNC: 9.8 MG/DL (ref 8.2–9.6)
CHLORIDE SERPL-SCNC: 104 MMOL/L (ref 98–107)
CREAT SERPL-MCNC: 0.85 MG/DL (ref 0.51–0.95)
CV STRESS MAX HR HE: 66
DEPRECATED HCO3 PLAS-SCNC: 28 MMOL/L (ref 22–29)
DIASTOLIC BLOOD PRESSURE - MUSE: NORMAL MMHG
EOSINOPHIL # BLD AUTO: 0.3 10E3/UL (ref 0–0.7)
EOSINOPHIL NFR BLD AUTO: 4 %
ERYTHROCYTE [DISTWIDTH] IN BLOOD BY AUTOMATED COUNT: 13.8 % (ref 10–15)
GFR SERPL CREATININE-BSD FRML MDRD: 64 ML/MIN/1.73M2
GLUCOSE SERPL-MCNC: 85 MG/DL (ref 70–99)
HCT VFR BLD AUTO: 35.9 % (ref 35–47)
HGB BLD-MCNC: 11.4 G/DL (ref 11.7–15.7)
IMM GRANULOCYTES # BLD: 0 10E3/UL
IMM GRANULOCYTES NFR BLD: 0 %
INTERPRETATION ECG - MUSE: NORMAL
LIPASE SERPL-CCNC: 26 U/L (ref 13–60)
LVEF ECHO: NORMAL
LYMPHOCYTES # BLD AUTO: 2 10E3/UL (ref 0.8–5.3)
LYMPHOCYTES NFR BLD AUTO: 31 %
MCH RBC QN AUTO: 29.4 PG (ref 26.5–33)
MCHC RBC AUTO-ENTMCNC: 31.8 G/DL (ref 31.5–36.5)
MCV RBC AUTO: 93 FL (ref 78–100)
MONOCYTES # BLD AUTO: 0.6 10E3/UL (ref 0–1.3)
MONOCYTES NFR BLD AUTO: 9 %
NEUTROPHILS # BLD AUTO: 3.5 10E3/UL (ref 1.6–8.3)
NEUTROPHILS NFR BLD AUTO: 55 %
NRBC # BLD AUTO: 0 10E3/UL
NRBC BLD AUTO-RTO: 0 /100
NT-PROBNP SERPL-MCNC: 820 PG/ML (ref 0–1800)
P AXIS - MUSE: -29 DEGREES
PLATELET # BLD AUTO: 174 10E3/UL (ref 150–450)
POTASSIUM SERPL-SCNC: 4.5 MMOL/L (ref 3.4–5.3)
PR INTERVAL - MUSE: 222 MS
PROT SERPL-MCNC: 6.4 G/DL (ref 6.4–8.3)
QRS DURATION - MUSE: 86 MS
QT - MUSE: 362 MS
QTC - MUSE: 398 MS
R AXIS - MUSE: -44 DEGREES
RATE PRESSURE PRODUCT: 8250
RBC # BLD AUTO: 3.88 10E6/UL (ref 3.8–5.2)
SODIUM SERPL-SCNC: 139 MMOL/L (ref 136–145)
STRESS ECHO BASELINE DIASTOLIC HE: 56
STRESS ECHO BASELINE HR: 60 BPM
STRESS ECHO BASELINE SYSTOLIC BP: 128
STRESS ECHO CALCULATED PERCENT HR: 51 %
STRESS ECHO LAST STRESS DIASTOLIC BP: 44
STRESS ECHO LAST STRESS SYSTOLIC BP: 125
STRESS ECHO TARGET HR: 129
SYSTOLIC BLOOD PRESSURE - MUSE: NORMAL MMHG
T AXIS - MUSE: 6 DEGREES
VENTRICULAR RATE- MUSE: 73 BPM
WBC # BLD AUTO: 6.4 10E3/UL (ref 4–11)

## 2022-10-21 PROCEDURE — 36415 COLL VENOUS BLD VENIPUNCTURE: CPT | Performed by: PHYSICIAN ASSISTANT

## 2022-10-21 PROCEDURE — 93017 CV STRESS TEST TRACING ONLY: CPT

## 2022-10-21 PROCEDURE — G0378 HOSPITAL OBSERVATION PER HR: HCPCS

## 2022-10-21 PROCEDURE — 83690 ASSAY OF LIPASE: CPT | Performed by: INTERNAL MEDICINE

## 2022-10-21 PROCEDURE — 36415 COLL VENOUS BLD VENIPUNCTURE: CPT | Performed by: INTERNAL MEDICINE

## 2022-10-21 PROCEDURE — A9502 TC99M TETROFOSMIN: HCPCS

## 2022-10-21 PROCEDURE — 93016 CV STRESS TEST SUPVJ ONLY: CPT | Performed by: INTERNAL MEDICINE

## 2022-10-21 PROCEDURE — 250N000013 HC RX MED GY IP 250 OP 250 PS 637: Performed by: PHYSICIAN ASSISTANT

## 2022-10-21 PROCEDURE — 85025 COMPLETE CBC W/AUTO DIFF WBC: CPT | Performed by: PHYSICIAN ASSISTANT

## 2022-10-21 PROCEDURE — 93306 TTE W/DOPPLER COMPLETE: CPT | Mod: 26 | Performed by: INTERNAL MEDICINE

## 2022-10-21 PROCEDURE — 99217 PR OBSERVATION CARE DISCHARGE: CPT | Mod: FS | Performed by: INTERNAL MEDICINE

## 2022-10-21 PROCEDURE — 78452 HT MUSCLE IMAGE SPECT MULT: CPT | Mod: 26

## 2022-10-21 PROCEDURE — 80053 COMPREHEN METABOLIC PANEL: CPT | Performed by: PHYSICIAN ASSISTANT

## 2022-10-21 PROCEDURE — 93306 TTE W/DOPPLER COMPLETE: CPT

## 2022-10-21 PROCEDURE — 250N000011 HC RX IP 250 OP 636: Performed by: INTERNAL MEDICINE

## 2022-10-21 PROCEDURE — 78452 HT MUSCLE IMAGE SPECT MULT: CPT

## 2022-10-21 PROCEDURE — 93018 CV STRESS TEST I&R ONLY: CPT | Performed by: INTERNAL MEDICINE

## 2022-10-21 PROCEDURE — 343N000001 HC RX 343

## 2022-10-21 RX ORDER — FAMOTIDINE 20 MG/1
20 TABLET, FILM COATED ORAL 2 TIMES DAILY
Qty: 60 TABLET | Refills: 0 | Status: SHIPPED | OUTPATIENT
Start: 2022-10-21 | End: 2022-12-09

## 2022-10-21 RX ORDER — REGADENOSON 0.08 MG/ML
0.4 INJECTION, SOLUTION INTRAVENOUS ONCE
Status: COMPLETED | OUTPATIENT
Start: 2022-10-21 | End: 2022-10-21

## 2022-10-21 RX ADMIN — LISINOPRIL 10 MG: 10 TABLET ORAL at 08:40

## 2022-10-21 RX ADMIN — CARBOXYMETHYLCELLULOSE SODIUM 1 DROP: 5 SOLUTION/ DROPS OPHTHALMIC at 14:06

## 2022-10-21 RX ADMIN — REGADENOSON 0.4 MG: 0.08 INJECTION, SOLUTION INTRAVENOUS at 10:11

## 2022-10-21 RX ADMIN — CARBOXYMETHYLCELLULOSE SODIUM 1 DROP: 5 SOLUTION/ DROPS OPHTHALMIC at 08:40

## 2022-10-21 RX ADMIN — METOPROLOL SUCCINATE 100 MG: 50 TABLET, EXTENDED RELEASE ORAL at 08:39

## 2022-10-21 RX ADMIN — TETROFOSMIN 38 MCI.: 1.38 INJECTION, POWDER, LYOPHILIZED, FOR SOLUTION INTRAVENOUS at 10:13

## 2022-10-21 RX ADMIN — ASPIRIN 81 MG CHEWABLE TABLET 81 MG: 81 TABLET CHEWABLE at 08:39

## 2022-10-21 RX ADMIN — HYDROCHLOROTHIAZIDE 12.5 MG: 12.5 TABLET ORAL at 08:40

## 2022-10-21 RX ADMIN — TETROFOSMIN 10.2 MCI.: 1.38 INJECTION, POWDER, LYOPHILIZED, FOR SOLUTION INTRAVENOUS at 09:09

## 2022-10-21 ASSESSMENT — ACTIVITIES OF DAILY LIVING (ADL)
ADLS_ACUITY_SCORE: 33

## 2022-10-21 NOTE — PROGRESS NOTES
Care Management Follow Up    Length of Stay (days): 0    Expected Discharge Date: 10/21/2022     Concerns to be Addressed:     GARIBAY  Patient plan of care discussed at interdisciplinary rounds: Yes    Anticipated Discharge Disposition:       Anticipated Discharge Services:    Anticipated Discharge DME:      Patient/family educated on Medicare website which has current facility and service quality ratings:    Education Provided on the Discharge Plan:    Patient/Family in Agreement with the Plan:      Referrals Placed by CM/SW:    Private pay costs discussed: Not applicable    Additional Information:  Writer met with patient and pt''s daughter to discuss and complete GARIBAY paperwork. Writer answered any of patient/daughter's questions regarding insurance coverage. Writer encouraged patient to follow up with their insurance plan directly to receive the most accurate information. Patient signed GARIBAY form and the form was placed on the patient's chart.    ADDENDUM 1526:  Writer sent a [employer] code for pt/daughter to sign up for mychart per the request of pt & daughter.    ________________    MARGIE Marcus, [facility]  ED/Observation   [facility]  Phone: [phone]  Pager: [phone]  Fax: [phone]    On-call pager, [phone], 4:00pm to midnight

## 2022-10-21 NOTE — DISCHARGE SUMMARY
[facility]  ED Observation Discharge Summary      Date of Admission:  10/20/2022  Date of Discharge:  10/21/2022  Discharging Provider: Malia Vogt PA-C  Discharge Service: ED Observation Service    Discharge Diagnoses   Chest pain  Paroxysmal atrial fibrillation  SSS s/p PPM  HTN    Follow-ups Needed After Discharge   Follow up with PCP within 1 week    Unresulted Labs Ordered in the Past 30 Days of this Admission     No orders found for last 31 day(s).        Discharge Disposition   Discharged to home  Condition at discharge: Stable    Hospital Course   Isadora Mendez is a 89 year old female with PMH of paroxysmal atrial fibrillation and atrial tachycardia (declined anticoagulation), SSS s/p PPM, HTN who presented to the ED with chest pain.     #Chest pain  Patient has history of paroxysmal atrial fibrillation, HTN, SSS s/p PPM and was seen by her cardiology NP this morning for chest pain. She has a heavy aching feeling in her left upper chest that occasionally radiates into the elbow, occurring intermittently over the last few weeks. It has been occurring more frequently and lasting longer in duration. Symptoms are not necessarily associated with exertion. Sometimes these episodes improve with belching. She does sometimes wake up in the middle of the night with the pain and it improves when turning on her left side. Cardiology recommended patient present to the ED for ACS rule out and ischemic work up, as they were unable to schedule an outpatient stress test for another week. In the ED, /77 and HR 82. EKG showed an atrial paced rhythm with prolonged AV conduction and evidence of anteroseptal infarct undetermined age. Troponin flat at 23, 19, 20. Labs with hgb 11.1, otherwise unremarkable. CXR was NAD. Patient was admitted to ED observation for ACS rule out and ischemic work up.    Pacemaker was recently interrogated 10/17/22 and had shown normal  pacemaker function, rare episodes of atrial fibrillation lasting 3-16 minutes and no ventricular arrhythmias. She underwent Lexiscan stress test which was negative for inducible myocardial ischemia or infarction. Resting echocardiogram with EF 60-65%, mild mitral and tricuspid insufficiency, trivial pericardial effusion, no RWMA. There were no events on telemetry. She was started on Pepcid as some of her symptoms sound GI related. Stable for discharge home to follow up with PCP within 1 week.     #Paroxysmal atrial fibrillation  #SSS s/p PPM  #HTN  Patient is not on anticoagulation at baseline (declined).  -Continue PTA Metoprolol, Aspirin  -Continue PTA Lisinopril, hydrochlorothiazide    Consultations This Hospital Stay   None    Code Status   Full Code    Time Spent on this Encounter   IMalia PA-C, personally saw the patient today and spent greater than 30 minutes discharging this patient.     Malia Vogt PA-C  [facility] [address] [address]  Phone: [phone]  Fax: [phone]  ______________________________________________________________________    Physical Exam   Vital Signs: Temp: 97.9  F (36.6  C) Temp src: Oral BP: (!) 160/84 Pulse: 63   Resp: 16 SpO2: 98 % O2 Device: None (Room air)    Weight: 129 lbs 0 oz  Exam:  Constitutional: alert, no distress, and cooperative  Head: normocephalic, atraumatic  Neck: no asymmetry, masses, or scars  ENT: throat normal without erythema or exudate  Cardiovascular: RRR  Respiratory: diminished, respirations unlabored  Gastrointestinal: (+) BS, non tender, soft  Musculoskeletal: normal muscle tone, no pitting edema  Skin: no suspicious lesions or rashes  Neurologic: oriented x 3, moves all extremities, no slurred speech  Psychiatric: normal affect and mood       Primary Care Physician   Nathaly Tierney    Discharge Orders      Reason for your hospital stay    You were hospitalized  for evaluation of chest pain. Your stress test was negative. Your echo was also reassuring. You did not have any abnormal heart rhythms while on the heart monitor here, and recently had your pacemaker interrogated which was also reassuring. We have started you on Pepcid, as some of this may be GI related. It is also possible that there is a musculoskeletal component. We recommend taking Tylenol for pain. Please follow up with your primary care provider within 1 week.     Activity    Your activity upon discharge: activity as tolerated     Follow Up and recommended labs and tests    Follow up with primary care provider, Nathaly Tierney, within 7 days for hospital follow- up.     Diet    Follow this diet upon discharge: Regular Diet Adult       Significant Results and Procedures   Results for orders placed or performed during the hospital encounter of 10/20/22   XR Chest 2 Views    Narrative    EXAM: XR CHEST 2 VIEWS  10/20/2022 11:21 AM      HISTORY: cp    ADDITIONAL HISTORY: 89 year old female?patient with increased chest  discomfort.     COMPARISON: Chest radiograph 7/1/2010    TECHNIQUE: Chest radiograph PA and lateral views.    FINDINGS:   Devices, lines, tubes: Pacemaker overlies left hemithorax with  extending lead tips projecting over right atrial appendage and right  ventricle.    Trachea is midline. Normal cardiac silhouette. Aortic arch  atherosclerosis No acute airspace disease. No pneumothorax. No pleural  effusion. Chronic dextrocurvature of the upper lumbar spine.  Degenerative changes of bilateral shoulder joints and visualized  spine.      Impression    IMPRESSION:  No acute cardiopulmonary findings.    I have personally reviewed the examination and initial interpretation  and I agree with the findings.    MANOLO JOE MD         SYSTEM ID:  [id number]   NM MPI Radiologist Consult For Cardiology    Narrative    EXAMINATION: NM MPI RADIOLOGIST CONSULT FOR CARDIOLOGY, 10/21/2022  11:21 AM      COMPARISON: Chest radiograph 10/20/2022    HISTORY: Chest pain    TECHNIQUE: Limited field of view 5 mm CT images were acquired for  attenuation correction purposes for nuclear medicine cardiac study.  Radiology consulted to review the CT images. Please refer to separate  dictation for the nuclear medicine portion of the study.    FINDINGS:     Heart size is within normal limits. Normal caliber of the visualized  thoracic aorta and main pulmonary artery. No significant coronary  artery atherosclerotic calcifications. No pericardial effusion. The  visualized mediastinal and hilar lymph nodes are not enlarged. Left  chest wall pacemaker with leads terminating in the right atrium and  ventricle. Vascular calcifications.    No pleural effusion or pneumothorax at this level. The visualized  lungs are clear.    Visualized upper abdomen is unremarkable. Left anterior sixth rib and  right eighth rib deformities, likely chronic. Degenerative changes of  the thoracic spine with scoliosis.      Impression    IMPRESSION:  1. No acute abnormality on the CT images of the lower chest and upper  abdomen.  2. Please see separate report for the nuclear medicine portion of the  study.    I have personally reviewed the examination and initial interpretation  and I agree with the findings.    VIV HAND MD         SYSTEM ID:  [id number]   Echocardiogram Complete     Value    LVEF  60-65%    Narrative    [id number]  [id number]  [id number]  [id number]^JERALD^LEOBARDO^ALINE     [facility],[place]  Echocardiography Laboratory  [address] [place], [place] [address]     Name: WOLF MACIAS  MRN: [id number]  : 1931  Study Date: 10/21/2022 01:00 PM  Age: 89 yrs  Gender: Female  Patient Location: [facility]  Reason For Study: Chest Pain  Ordering Physician: LEOBARDO MARQUES  Performed By: Cornelio Montiel     BSA: 1.6 m2  Height: 62 in  Weight: 129 lb  HR: 60  BP: 160/84  mmHg  ______________________________________________________________________________  Procedure  Complete Portable Echo Adult. Echocardiogram with two-dimensional, color and  spectral Doppler performed. Adequate quality two-dimensional was performed and  interpreted.  ______________________________________________________________________________  Interpretation Summary  Global and regional left ventricular function is normal with an EF of 60-65%.  Global right ventricular function is normal.  Mild mitral and tricuspid insufficiency present.  Pulmonary artery systolic pressure is normal.  The inferior vena cava is normal.  Trivial pericardial effusion is present.  ______________________________________________________________________________  Left Ventricle  Left ventricular size is normal. Global and regional left ventricular function  is normal with an EF of 60-65%. Mild concentric wall thickening consistent  with left ventricular hypertrophy is present. No regional wall motion  abnormalities are seen.     Right Ventricle  The right ventricle is normal size. Global right ventricular function is  normal. A pacemaker lead is noted in the right ventricle.     Atria  Mild biatrial enlargement is present.     Mitral Valve  Mild mitral annular calcification is present. Mild mitral insufficiency is  present.     Aortic Valve  Aortic valve is normal in structure and function.     Tricuspid Valve  Mild tricuspid insufficiency is present. Estimated pulmonary artery systolic  pressure is 20 mmHg plus right atrial pressure. Pulmonary artery systolic  pressure is normal.     Pulmonic Valve  Mild pulmonic insufficiency is present.     Vessels  The aorta root is normal. The inferior vena cava is normal.     Pericardium  Trivial pericardial effusion is present.     Compared to Previous Study  This study was compared with the study from 6.29.21 . Biventricular function  is  stable.  ______________________________________________________________________________  Report approved by: Brianne Doe 10/21/2022 01:58 PM     ______________________________________________________________________________      NM Lexiscan stress test (nuc card)     Value    Target     Baseline Systolic     Baseline Diastolic BP 56    Last Stress Systolic     Last Stress Diastolic BP 44    Baseline HR 60    Max HR  66    Max Predicted HR  51    Rate Pressure Product 8,250.0    Narrative       The nuclear stress test is negative for inducible myocardial ischemia   or infarction.     Left ventricular function is hyperdynamic.     There is no prior study for comparison.           Discharge Medications   Current Discharge Medication List      START taking these medications    Details   famotidine (PEPCID) 20 MG tablet Take 1 tablet (20 mg) by mouth 2 times daily  Qty: 60 tablet, Refills: 0    Associated Diagnoses: Gastroesophageal reflux disease with esophagitis without hemorrhage         CONTINUE these medications which have NOT CHANGED    Details   !! metoprolol succinate ER (TOPROL XL) 50 MG 24 hr tablet Take 50 mg by mouth every evening      Ascorbic Acid (VITAMIN C PO)       aspirin 81 MG tablet Take 1 tablet by mouth daily.      BIOTIN PO Take 1 capsule by mouth daily.    Associated Diagnoses: Hypertension; New onset atrial fibrillation (H); Hyperlipidemia LDL goal <100      co-enzyme Q-10 100 MG CAPS capsule Take 100 mg by mouth      Cyanocobalamin (VITAMIN B12 PO) Take 1 tablet by mouth every 14 days       hydrochlorothiazide (HYDRODIURIL) 12.5 MG tablet Take 1 tablet (12.5 mg) by mouth daily  Qty: 90 tablet, Refills: 3    Associated Diagnoses: Essential hypertension      lisinopril (ZESTRIL) 10 MG tablet Take 1 tablet (10 mg) by mouth daily  Qty: 90 tablet, Refills: 3    Associated Diagnoses: Essential hypertension      Magnesium 300 MG CAPS Take 1 tablet by mouth      !! metoprolol  succinate ER (TOPROL-XL) 100 MG 24 hr tablet Take 1 tablet (100 mg) by mouth daily  Qty: 90 tablet, Refills: 3    Associated Diagnoses: Essential hypertension      Nutritional Supplements (PYCNOGENOL) 300-30 MG CAPS per capsule Take 1 capsule by mouth daily      Vitamin D, Cholecalciferol, 1000 units TABS Take 1 tablet by mouth daily      VITAMIN K PO Take 1 tablet by mouth every other day        !! - Potential duplicate medications found. Please discuss with provider.        Allergies   No Known Allergies

## 2022-10-21 NOTE — DISCHARGE INSTRUCTIONS
Echocardiogram results:  Global and regional left ventricular function is normal with an EF of 60-65%.  Global right ventricular function is normal.  Mild mitral and tricuspid insufficiency present.  Pulmonary artery systolic pressure is normal.  The inferior vena cava is normal.  Trivial pericardial effusion is present.    Lexiscan results:    The nuclear stress test is negative for inducible myocardial ischemia or infarction.    Left ventricular function is hyperdynamic. (Echo confirmed this is not the case, as above)    There is no prior study for comparison.

## 2022-10-21 NOTE — PLAN OF CARE
Goal Outcome Evaluation:  Goal Outcome Evaluation:  - Serial troponins and stress test complete. Yes, awaiting nuc stress results.  - Seen and cleared by consultant if applicable. No  - Adequate pain control on oral analgesia. Yes  - Vital signs normal or at patient baseline . No. B/p was slightly elevated.  - Safe disposition plan has been identified. No

## 2022-10-21 NOTE — PLAN OF CARE
Discharge instructions given and explained to the patient. The patient verbalized understanding. The peripheral IV was removed. The patient discharged with her belongings.

## 2022-10-21 NOTE — PROGRESS NOTES
Patient interviewed and examined. Labs, imaging and chart notes reviewed.  Isadora Mendez presented to the ED yesterday with chest pain and dyspnea with exertion. She has a history of hypertension, SSS, paroxysmal atrial fibrillation, non sustained VT pacemaker . She was referred from cardiology clinic after reporting chest discomfort, heaviness, dyspnea on exertion and left upper chest pain radiating to the left arm. She has a history of HTN, and family history of CAD, but no personal history of CAD. She had noted that symptoms were not necessarily related to exertion were more frequent aqtnight and noted that belching or position change sometime improved the symptoms. Symptoms have been present for months, but increased in the past 2 weeks.    In the ED she was mildly hypertensive. EKG had atrial pacing with long PA but no acute ischemic changes. CXR was normal. CBC notable for mild anemia with Hgb 11. Electrolytes were normal. Troponin 23. She was admitted for chest pain observation. Serial troponin was stable. LFTs normal. Pacemaker interrogation had normal pacemaker function, rare episodes of atrial fibrillation lasting 3-16 minutes and no ventricular arrhythmias. Nuclear medicine Lexiscan was normal.     Past Medical History:   Diagnosis Date     Atrial fibrillation (H)      Facial fracture (H)      Hypertension      NSVT (nonsustained ventricular tachycardia)     during exercise echo, neg EP study     Pacemaker 2010     S/P cardiac catheterization     30-40% non obstructive lesion     Ventricular tachycardia, nonsustained     during stress echo       Past Surgical History:   Procedure Laterality Date     IMPLANT PACEMAKER       PPM  2010    Permanent Pacemaker       Family History   Problem Relation Age of Onset     Cardiovascular Father 76         age 80, MI 76 and CHF 80's     Cardiovascular Paternal Grandfather          age 76 of CVD     Myocardial Infarction Mother 88         lived to 100     Arrhythmia Sister         PPM     Neuropathy Sister      Colon Cancer Sister      Arrhythmia Brother         pacemaker     Arrhythmia Brother         pacemaker, hx rheumatic fever, heart failure     Blood Disease Son         hemochromatosis?     Atrial fibrillation Son      Cancer Daughter         Breast, uterine, 2nd breast cancer, HTN       Social History     Tobacco Use     Smoking status: Never     Smokeless tobacco: Never   Substance Use Topics     Alcohol use: No     Physical Exam:  Elderly female with thoracic kyphosis in NAD.  BP (!) 160/84   Pulse 63   Temp 97.9  F (36.6  C) (Oral)   Resp 16   Wt 58.5 kg (129 lb)   SpO2 98%   BMI 23.15 kg/m    HEENT: PERRLA. EOMI.  Neck: No mass or bruit. Non tender.  Lungs: Clear.  Cardiac: RRR. Normal S1 and S2. No JVD.  Lungs: Clear to auscultation. No axillary adenopathy. Mild kyphosis.  Abdomen: Soft, non tender.  Extrem: No calf tenderness or edema. Mild varicose veins with venous stasis dermatitis.  Neuro: Alert, oriented. CN, speech, motor, coordination intact.  Psych: Normal mood and affect.    Results for orders placed or performed during the hospital encounter of 10/20/22 (from the past 48 hour(s))   EKG 12 lead   Result Value Ref Range    Systolic Blood Pressure  mmHg    Diastolic Blood Pressure  mmHg    Ventricular Rate 61 BPM    Atrial Rate 61 BPM    MO Interval 212 ms    QRS Duration 88 ms     ms    QTc 398 ms    P Axis 14 degrees    R AXIS -34 degrees    T Axis 9 degrees    Interpretation ECG       Atrial-paced rhythm with prolonged AV conduction  Left axis deviation  Anteroseptal infarct , age undetermined  Abnormal ECG  Unconfirmed report - interpretation of this ECG is computer generated - see medical record for final interpretation  Confirmed by - EMERGENCY ROOM, PHYSICIAN ([id number]),  DONOVAN LOCKE ([id number]) on 10/20/2022 10:40:34 AM     [facility] Draw    Narrative    The following orders were created for panel order  [facility] Draw.  Procedure                               Abnormality         Status                     ---------                               -----------         ------                     Extra Blue Top Tube[[id number]]                              Final result               Extra Red Top Tube[[id number]]                               Final result               Extra Green Top (Lithium...[[id number]]                      Final result               Extra Purple Top Tube[[id number]]                            Final result                 Please view results for these tests on the individual orders.   Extra Blue Top Tube   Result Value Ref Range    Hold Specimen JIC    Extra Red Top Tube   Result Value Ref Range    Hold Specimen JIC    Extra Green Top (Lithium Heparin) Tube   Result Value Ref Range    Hold Specimen JIC    Extra Purple Top Tube   Result Value Ref Range    Hold Specimen JIC    CBC with platelets differential    Narrative    The following orders were created for panel order CBC with platelets differential.  Procedure                               Abnormality         Status                     ---------                               -----------         ------                     CBC with platelets and d...[[id number]]  Abnormal            Final result                 Please view results for these tests on the individual orders.   Basic metabolic panel   Result Value Ref Range    Sodium 138 136 - 145 mmol/L    Potassium 4.4 3.4 - 5.3 mmol/L    Chloride 103 98 - 107 mmol/L    Carbon Dioxide (CO2) 25 22 - 29 mmol/L    Anion Gap 10 7 - 15 mmol/L    Urea Nitrogen 23.5 (H) 8.0 - 23.0 mg/dL    Creatinine 0.89 0.51 - 0.95 mg/dL    Calcium 9.6 8.2 - 9.6 mg/dL    Glucose 99 70 - 99 mg/dL    GFR Estimate 61 >60 mL/min/1.73m2   Troponin T, High Sensitivity   Result Value Ref Range    Troponin T, High Sensitivity 23 (H) <=14 ng/L   CBC with platelets and differential   Result Value Ref Range    WBC Count 6.4 4.0 - 11.0  10e3/uL    RBC Count 3.80 3.80 - 5.20 10e6/uL    Hemoglobin 11.1 (L) 11.7 - 15.7 g/dL    Hematocrit 34.8 (L) 35.0 - 47.0 %    MCV 92 78 - 100 fL    MCH 29.2 26.5 - 33.0 pg    MCHC 31.9 31.5 - 36.5 g/dL    RDW 14.1 10.0 - 15.0 %    Platelet Count 196 150 - 450 10e3/uL    % Neutrophils 59 %    % Lymphocytes 28 %    % Monocytes 8 %    % Eosinophils 4 %    % Basophils 1 %    % Immature Granulocytes 0 %    NRBCs per 100 WBC 0 <1 /100    Absolute Neutrophils 3.8 1.6 - 8.3 10e3/uL    Absolute Lymphocytes 1.8 0.8 - 5.3 10e3/uL    Absolute Monocytes 0.5 0.0 - 1.3 10e3/uL    Absolute Eosinophils 0.3 0.0 - 0.7 10e3/uL    Absolute Basophils 0.1 0.0 - 0.2 10e3/uL    Absolute Immature Granulocytes 0.0 <=0.4 10e3/uL    Absolute NRBCs 0.0 10e3/uL   XR Chest 2 Views    Narrative    EXAM: XR CHEST 2 VIEWS  10/20/2022 11:21 AM      HISTORY: cp    ADDITIONAL HISTORY: 89 year old female?patient with increased chest  discomfort.     COMPARISON: Chest radiograph 7/1/2010    TECHNIQUE: Chest radiograph PA and lateral views.    FINDINGS:   Devices, lines, tubes: Pacemaker overlies left hemithorax with  extending lead tips projecting over right atrial appendage and right  ventricle.    Trachea is midline. Normal cardiac silhouette. Aortic arch  atherosclerosis No acute airspace disease. No pneumothorax. No pleural  effusion. Chronic dextrocurvature of the upper lumbar spine.  Degenerative changes of bilateral shoulder joints and visualized  spine.      Impression    IMPRESSION:  No acute cardiopulmonary findings.    I have personally reviewed the examination and initial interpretation  and I agree with the findings.    MANOLO JOE MD         SYSTEM ID:  [id number]   Troponin T, High Sensitivity   Result Value Ref Range    Troponin T, High Sensitivity 19 (H) <=14 ng/L   Creatinine   Result Value Ref Range    Creatinine 0.79 0.51 - 0.95 mg/dL    GFR Estimate 70 >60 mL/min/1.73m2   Asymptomatic COVID-19 Virus (Coronavirus) by PCR Nose     Specimen: Nose; Swab   Result Value Ref Range    SARS CoV2 PCR Negative Negative    Narrative    Testing was performed using the Xpert Xpress SARS-CoV-2 Assay on the  Cepheid Gene-Xpert Instrument Systems. Additional information about  this Emergency Use Authorization (EUA) assay can be found via the Lab  Guide. This test should be ordered for the detection of SARS-CoV-2 in  individuals who meet SARS-CoV-2 clinical and/or epidemiological  criteria. Test performance is unknown in asymptomatic patients. This  test is for in vitro diagnostic use under the FDA EUA for  laboratories certified under CLIA to perform high complexity testing.  This test has not been FDA cleared or approved. A negative result  does not rule out the presence of PCR inhibitors in the specimen or  target RNA in concentration below the limit of detection for the  assay. The possibility of a false negative should be considered if  the patient's recent exposure or clinical presentation suggests  COVID-19. This test was validated by the [facility]. This laboratory is certified under  the Clinical Laboratory Improvement Amendments of 1988 (CLIA-88) as  qualified to perform high complexity laboratory testing.     Troponin T, High Sensitivity   Result Value Ref Range    Troponin T, High Sensitivity 20 (H) <=14 ng/L   Nt probnp inpatient   Result Value Ref Range    N terminal Pro BNP Inpatient 820 0 - 1,800 pg/mL   CBC with Platelets & Differential    Narrative    The following orders were created for panel order CBC with Platelets & Differential.  Procedure                               Abnormality         Status                     ---------                               -----------         ------                     CBC with platelets and d...[[id number]]  Abnormal            Final result                 Please view results for these tests on the individual orders.   Comprehensive metabolic panel    Result Value Ref Range    Sodium 139 136 - 145 mmol/L    Potassium 4.5 3.4 - 5.3 mmol/L    Chloride 104 98 - 107 mmol/L    Carbon Dioxide (CO2) 28 22 - 29 mmol/L    Anion Gap 7 7 - 15 mmol/L    Urea Nitrogen 22.1 8.0 - 23.0 mg/dL    Creatinine 0.85 0.51 - 0.95 mg/dL    Calcium 9.8 (H) 8.2 - 9.6 mg/dL    Glucose 85 70 - 99 mg/dL    Alkaline Phosphatase 87 35 - 104 U/L    AST 27 10 - 35 U/L    ALT 20 10 - 35 U/L    Protein Total 6.4 6.4 - 8.3 g/dL    Albumin 3.5 3.5 - 5.2 g/dL    Bilirubin Total 0.4 <=1.2 mg/dL    GFR Estimate 64 >60 mL/min/1.73m2   CBC with platelets and differential   Result Value Ref Range    WBC Count 6.4 4.0 - 11.0 10e3/uL    RBC Count 3.88 3.80 - 5.20 10e6/uL    Hemoglobin 11.4 (L) 11.7 - 15.7 g/dL    Hematocrit 35.9 35.0 - 47.0 %    MCV 93 78 - 100 fL    MCH 29.4 26.5 - 33.0 pg    MCHC 31.8 31.5 - 36.5 g/dL    RDW 13.8 10.0 - 15.0 %    Platelet Count 174 150 - 450 10e3/uL    % Neutrophils 55 %    % Lymphocytes 31 %    % Monocytes 9 %    % Eosinophils 4 %    % Basophils 1 %    % Immature Granulocytes 0 %    NRBCs per 100 WBC 0 <1 /100    Absolute Neutrophils 3.5 1.6 - 8.3 10e3/uL    Absolute Lymphocytes 2.0 0.8 - 5.3 10e3/uL    Absolute Monocytes 0.6 0.0 - 1.3 10e3/uL    Absolute Eosinophils 0.3 0.0 - 0.7 10e3/uL    Absolute Basophils 0.0 0.0 - 0.2 10e3/uL    Absolute Immature Granulocytes 0.0 <=0.4 10e3/uL    Absolute NRBCs 0.0 10e3/uL   NM Lexiscan stress test (nuc card)   Result Value Ref Range    Target      Baseline Systolic      Baseline Diastolic BP 56     Last Stress Systolic      Last Stress Diastolic BP 44     Baseline HR 60 bpm    Max HR  66     Max Predicted HR  51 %    Rate Pressure Product 8,250.0     Narrative       The nuclear stress test is negative for inducible myocardial ischemia   or infarction.     Left ventricular function is hyperdynamic.     There is no prior study for comparison.     NM MPI Radiologist Consult For Cardiology    Narrative     EXAMINATION: NM MPI RADIOLOGIST CONSULT FOR CARDIOLOGY, 10/21/2022  11:21 AM     COMPARISON: Chest radiograph 10/20/2022    HISTORY: Chest pain    TECHNIQUE: Limited field of view 5 mm CT images were acquired for  attenuation correction purposes for nuclear medicine cardiac study.  Radiology consulted to review the CT images. Please refer to separate  dictation for the nuclear medicine portion of the study.    FINDINGS:     Heart size is within normal limits. Normal caliber of the visualized  thoracic aorta and main pulmonary artery. No significant coronary  artery atherosclerotic calcifications. No pericardial effusion. The  visualized mediastinal and hilar lymph nodes are not enlarged. Left  chest wall pacemaker with leads terminating in the right atrium and  ventricle. Vascular calcifications.    No pleural effusion or pneumothorax at this level. The visualized  lungs are clear.    Visualized upper abdomen is unremarkable. Left anterior sixth rib and  right eighth rib deformities, likely chronic. Degenerative changes of  the thoracic spine with scoliosis.      Impression    IMPRESSION:  1. No acute abnormality on the CT images of the lower chest and upper  abdomen.  2. Please see separate report for the nuclear medicine portion of the  study.    I have personally reviewed the examination and initial interpretation  and I agree with the findings.    VIV HAND MD         SYSTEM ID:  [id number]     Impression:  Elderly female with history of chronic intermittent atrial fibrillation, SSS and pacemaker presents with left upper chest pain intermittently over the past several months. She has some discomfort with respiration, symptoms are sometimes present at night. Certain positions can be associated with discomfort. She has normal serial troponin and Lexiscan. CXR noprmal.  Interrogation of her pacemaker had rare brief episodes of atrial fibrillation/tachycardia which are not frequent enough to explain her  symptoms. Resting echocardiogram is pending. It seems unlikely her symptoms are related to cardiac origin. Chest wall discomfort (possibly related to kyphosis/ scoliosis) is possible. GERD is also a consideration.     Plan:  If resting echo is normal, she can be discharged.   Trial of H2 blocker.  Recommended Covid booster and flu shot this fall.  Follow up with primary providers.    Reilly Henry MD

## 2022-10-21 NOTE — PLAN OF CARE
Outpatient/Observation goals to be met before discharge home:   BP (!) 162/63 (BP Location: Left arm, Patient Position: Sitting, Cuff Size: Adult Regular)   Pulse 81   Temp 98.2  F (36.8  C) (Oral)   Resp 16   Wt 58.5 kg (129 lb)   SpO2 100%   BMI 23.15 kg/m      - Serial troponins and stress test complete.: Not met  - Seen and cleared by consultant if applicable: Not met  - Adequate pain control on oral analgesia: Met  - Vital signs normal or at patient baseline: Met  - Safe disposition plan has been identified: Not met

## 2022-10-21 NOTE — PLAN OF CARE
Goal Outcome Evaluation:  - Serial troponins and stress test complete. No  - Seen and cleared by consultant if applicable. No  - Adequate pain control on oral analgesia. Yes  - Vital signs normal or at patient baseline . No. B/p was slightly elevated.  - Safe disposition plan has been identified. No

## 2022-10-21 NOTE — PLAN OF CARE
Outpatient/Observation goals to be met before discharge home:   BP (!) 158/77 (BP Location: Left arm, Patient Position: Semi-Ramírez's, Cuff Size: Adult Regular)   Pulse 64   Temp 97.3  F (36.3  C) (Oral)   Resp 12   Wt 58.5 kg (129 lb)   SpO2 97%   BMI 23.15 kg/m      - Serial troponins and stress test complete: Not met  - Seen and cleared by consultant if applicable: Not met  - Adequate pain control on oral analgesia: Met  - Vital signs normal or at patient baseline: Met  - Safe disposition plan has been identified: Not met

## 2022-10-27 DIAGNOSIS — I10 ESSENTIAL HYPERTENSION: ICD-10-CM

## 2022-10-27 RX ORDER — METOPROLOL SUCCINATE 100 MG/1
100 TABLET, EXTENDED RELEASE ORAL DAILY
Qty: 90 TABLET | Refills: 0 | Status: SHIPPED | OUTPATIENT
Start: 2022-10-27 | End: 2022-12-09

## 2022-10-27 NOTE — TELEPHONE ENCOUNTER
[facility] Call Center    Phone Message    May a detailed message be left on voicemail: yes     Reason for Call: Medication Refill Request    Has the patient contacted the pharmacy for the refill? Yes   Name of medication being requested: Metoprolol 100mg      Provider who prescribed the medication: Mary Dixon  Pharmacy: [facility] [place]  Date medication is needed: 10/27/2022   Pt out of town needs refill ASAP pharmacy# [phone]      Action Taken: Other: Cardio    Travel Screening: Not Applicable

## 2022-10-27 NOTE — TELEPHONE ENCOUNTER
metoprolol succinate ER (TOPROL-XL) 100 MG 24 hr tablet      Last Written Prescription Date:  2/10/22 to local retail  Last Fill Quantity: 90,   # refills: 3  Last Office Visit : Balbina Manzanares NP  Cardiovascular Disease  10/20/2022  [facility] Office visit:  3/9/23    Routing refill request to provider for review/approval because:  Blood pressure out of range   BP Readings from Last 3 Encounters:   10/21/22 (!) 160/84   10/20/22 (!) 155/76   09/02/22 (!) 155/77       90 day refill per protocol, routed to cardiology  Call to Isadora, message left, advised prescription sent to requested pharmacy

## 2022-12-03 ENCOUNTER — HEALTH MAINTENANCE LETTER (OUTPATIENT)
Age: 87
End: 2022-12-03

## 2022-12-09 ENCOUNTER — OFFICE VISIT (OUTPATIENT)
Dept: INTERNAL MEDICINE | Facility: CLINIC | Age: 87
End: 2022-12-09
Payer: COMMERCIAL

## 2022-12-09 VITALS
SYSTOLIC BLOOD PRESSURE: 166 MMHG | OXYGEN SATURATION: 97 % | WEIGHT: 133.7 LBS | DIASTOLIC BLOOD PRESSURE: 75 MMHG | HEIGHT: 63 IN | BODY MASS INDEX: 23.69 KG/M2 | HEART RATE: 63 BPM

## 2022-12-09 DIAGNOSIS — K21.00 GASTROESOPHAGEAL REFLUX DISEASE WITH ESOPHAGITIS WITHOUT HEMORRHAGE: ICD-10-CM

## 2022-12-09 DIAGNOSIS — R06.09 DYSPNEA ON EXERTION: ICD-10-CM

## 2022-12-09 DIAGNOSIS — M79.3 LIPODERMATOSCLEROSIS OF LEFT LOWER EXTREMITY: ICD-10-CM

## 2022-12-09 DIAGNOSIS — I48.0 PAROXYSMAL A-FIB (H): Primary | ICD-10-CM

## 2022-12-09 DIAGNOSIS — I10 ESSENTIAL HYPERTENSION: ICD-10-CM

## 2022-12-09 PROCEDURE — 99214 OFFICE O/P EST MOD 30 MIN: CPT | Mod: 25 | Performed by: INTERNAL MEDICINE

## 2022-12-09 PROCEDURE — G0008 ADMIN INFLUENZA VIRUS VAC: HCPCS | Performed by: INTERNAL MEDICINE

## 2022-12-09 PROCEDURE — 90662 IIV NO PRSV INCREASED AG IM: CPT | Performed by: INTERNAL MEDICINE

## 2022-12-09 RX ORDER — FAMOTIDINE 20 MG/1
20 TABLET, FILM COATED ORAL 2 TIMES DAILY
Qty: 180 TABLET | Refills: 3 | Status: SHIPPED | OUTPATIENT
Start: 2022-12-09 | End: 2024-01-03

## 2022-12-09 RX ORDER — METOPROLOL SUCCINATE 50 MG/1
50 TABLET, EXTENDED RELEASE ORAL EVERY EVENING
Qty: 90 TABLET | Refills: 3 | Status: SHIPPED | OUTPATIENT
Start: 2022-12-09 | End: 2023-06-29

## 2022-12-09 RX ORDER — METOPROLOL SUCCINATE 100 MG/1
100 TABLET, EXTENDED RELEASE ORAL DAILY
Qty: 90 TABLET | Refills: 3 | Status: SHIPPED | OUTPATIENT
Start: 2022-12-09 | End: 2023-06-29

## 2022-12-09 NOTE — PROGRESS NOTES
Isadora Mendez presents in the primary clinic today at the request of Dr. Macias/ patient for an ear wash. The patient's ears were assessed for cerumen. After this, an ear wash was performed in which a significant amount of impacted cerumen was removed from both left and right ear/s. After the ear wash, the tympanic membrane was visible. The ear wash was provided today under the supervision of Dr. Macias who was present if help was needed.    Rosario Gordon, EMT at 9:33 AM on 12/9/2022.  Primary Care Clinic: [phone]

## 2022-12-09 NOTE — NURSING NOTE
Isadora Mendez is a 89 year old female that presents in clinic today for the following:     Chief Complaint   Patient presents with     Follow Up     Pt here to follow up with provider. Pt was recently in the ER 10/20.        The patient's allergies and medications were reviewed. The patient's vitals were obtained without incident. The patient does not have any other questions for the provider.     Rosario Gordon, EMT at 7:58 AM on 12/9/2022.  Primary Care Clinic: [phone]

## 2022-12-09 NOTE — PROGRESS NOTES
Assessment & Plan   Gastroesophageal reflux disease with esophagitis without hemorrhage  Pepcid started on hospital discharge.  It has been overall helpful for symptoms, will continue  - famotidine (PEPCID) 20 MG tablet  Dispense: 180 tablet; Refill: 3    Dyspnea on Exertion:  This has been stable.  Recent cardiac testing in 10/2022 unremarkable.  Encouraged her to discuss with EP.  Metoprolol and SSS could be contributing to exercise intolerance    Essential hypertension  Reviewed home BP readings.  Generally 120-140's.  Occasional BP in the lower 100's or 160's.  Will continue current medications  - metoprolol succinate ER (TOPROL XL) 100 MG 24 hr tablet  Dispense: 90 tablet; Refill: 3  - metoprolol succinate ER (TOPROL XL) 50 MG 24 hr tablet  Dispense: 90 tablet; Refill: 3    Paroxysmal A-fib (H)  Has upcoming appointment with Dr. Dixon.    - metoprolol succinate ER (TOPROL XL) 50 MG 24 hr tablet  Dispense: 90 tablet; Refill: 3    Lipodermatosclerosis of left lower extremity  Confirmed on biopsy with dermatology  Advised to moisturize the area well.  If worsening symptoms, could consider topical steroid      HM:  Flu shot today  She is scheduled for COVID booster next week    RTC: Return in about 6 months (around 6/9/2023).  I spent a total of 38 minutes on the day of the visit.  Time was spent doing chart review, history and exam, documentation and further activities as noted above.    Jovana Macias MD  Securely message with the Vocera Web Console      Chief Complaint   Isadora Mendez is a 89 year old female presents for the following health issues    History of Present Illness   Was hospitalized in October.    She has been getting out of breath with walking  Can walk 1/2 block  Has 1.5 flights stairs at home  She did some PT and that helped  Has been less active since COVID  Daughter is a PT  She has a bike at home.  Will do 15-18 minutes  No falls at home    Lipodermatosclerosis        Tobacco Use       "Smoking status: Never      Smokeless tobacco: Never    PHQ-2 Score:   PHQ-2 ( 1999 Pfizer) 2/10/2022   Q1: Little interest or pleasure in doing things 0   Q2: Feeling down, depressed or hopeless 0   PHQ-2 Score 0       ROS    Physical Exam   BP (!) 166/75 (BP Location: Right arm, Patient Position: Sitting, Cuff Size: Adult Regular)   Pulse 63   Ht 1.59 m (5' 2.6\")   Wt 60.6 kg (133 lb 11.2 oz)   SpO2 97%   BMI 23.99 kg/m    Gen: no distress, comfortable, pleasant   Eyes: anicteric, normal extra-ocular movements   Cardiovascular: regular rate and rhythm, normal S1 and S2, no murmurs, rubs or gallops, peripheral pulses full and symmetric   Respiratory: clear to auscultation, no wheezes or crackles, normal breath sounds   Gastrointestinal: positive bowel sounds, nontender, nondistended  Skin: area of skin darkening on lower left leg   Psychological: appropriate mood     Items reviewed:   Allergies  Meds            "

## 2023-01-05 NOTE — PATIENT INSTRUCTIONS
Follow up with Dr. Dixon in April to discuss Eliquis.  Continue to check your blood pressure now and then at rest, sitting for 5-10 minutes.   Continue your good health habits, stay active, eat healthy.   Suggest getting a weekly pill container to remember.   Humira Counseling:  I discussed with the patient the risks of adalimumab including but not limited to myelosuppression, immunosuppression, autoimmune hepatitis, demyelinating diseases, lymphoma, and serious infections.  The patient understands that monitoring is required including a PPD at baseline and must alert us or the primary physician if symptoms of infection or other concerning signs are noted.

## 2023-03-12 ENCOUNTER — TELEPHONE (OUTPATIENT)
Dept: CARDIOLOGY | Facility: CLINIC | Age: 88
End: 2023-03-12
Payer: COMMERCIAL

## 2023-03-12 NOTE — TELEPHONE ENCOUNTER
Ms Isadora Mendez is a 89 year old lady with a history of paroxsymal atrial fibrillation, she is rate controlled at baseline with metoprolol succinate 100mg QAM and 50mg QPM.     The patient reports onset of palpitations at 0200 this AM which did not resolve after taking her morning dose of metoprolol. Since that time, she reports some mild dyspnea but otherwise no symptoms including no chest pain or lightheadedness. She is concerned that she is in atrial fibrillation since this feels similar to prior episodes. Her prior episodes have resolved after taking her metoprolol. The patient has a cardiology     We discussed a trial of 100mg Metoprolol Succinate now in place of her 50mg QPM dose. If she does not have resolution in symptoms by ~2030, she will call back for additional discussion and instruction.       Burton Vilchis MD [facility], PGY-4  Fellow, Cardiovascular Disease

## 2023-03-16 ENCOUNTER — OFFICE VISIT (OUTPATIENT)
Dept: CARDIOLOGY | Facility: CLINIC | Age: 88
End: 2023-03-16
Attending: INTERNAL MEDICINE
Payer: COMMERCIAL

## 2023-03-16 VITALS
HEIGHT: 62 IN | SYSTOLIC BLOOD PRESSURE: 122 MMHG | BODY MASS INDEX: 24.14 KG/M2 | OXYGEN SATURATION: 97 % | DIASTOLIC BLOOD PRESSURE: 76 MMHG | WEIGHT: 131.2 LBS | HEART RATE: 77 BPM

## 2023-03-16 DIAGNOSIS — Z95.0 CARDIAC PACEMAKER IN SITU: ICD-10-CM

## 2023-03-16 DIAGNOSIS — I49.5 SICK SINUS SYNDROME (H): ICD-10-CM

## 2023-03-16 DIAGNOSIS — I49.5 SSS (SICK SINUS SYNDROME) (H): ICD-10-CM

## 2023-03-16 DIAGNOSIS — I48.0 PAROXYSMAL A-FIB (H): Primary | ICD-10-CM

## 2023-03-16 PROCEDURE — 93280 PM DEVICE PROGR EVAL DUAL: CPT | Performed by: INTERNAL MEDICINE

## 2023-03-16 PROCEDURE — G0463 HOSPITAL OUTPT CLINIC VISIT: HCPCS | Performed by: INTERNAL MEDICINE

## 2023-03-16 PROCEDURE — 99215 OFFICE O/P EST HI 40 MIN: CPT | Mod: 25 | Performed by: INTERNAL MEDICINE

## 2023-03-16 ASSESSMENT — PAIN SCALES - GENERAL: PAINLEVEL: MILD PAIN (3)

## 2023-03-16 NOTE — PROGRESS NOTES
"I am delighted to see Isadora Mendez for follow up of Atrial Fibrillation     The patient is a 89 year old lady who presents for follow up and ongoing evaluation for paroxysmal atrial fibrillation and SSS s/p pacemaker implantation. She was last seen in clinic in February 2022.     She has a h/o PAF and PAT, symptoms of palpitations. Declined anticoagulation. She was last seen in February 2022. Since that time she's been well, still working in  without significant limitation. Did have an episode of palpitations and associated MCKNIGHT last weekend which resolved after an extra dose of metoprolol. She otherwise feels at baseline.      She continues to have some episodes of feeling her heart race, most of these don't last long, and she has no associated dizziness. Her last episode, 03/11/23, was unique as she has MCKNIGHT.       Past Medical History:  1. Pacemaker 6/30/2010. Leads are Medtronic 5076. Generator change 9/14/2016.  2. Nonsustained VT  3. Hypertension  4. Atrial fibrillation, detected on device lasting > 1 hour, declined anticoagulation        Allergies:  No Known Allergies    Medications:   Aspirin 81 every day  hydrochlorothiazide 12.5 every day  Lisinopril 10 every day (~ noon)  Metoprolol succinate 100mg AM and 50mg PM      Physical examination  Vitals: /76 (BP Location: Right arm, Patient Position: Sitting, Cuff Size: Adult Regular)   Pulse 77   Ht 1.585 m (5' 2.4\")   Wt 59.5 kg (131 lb 3.2 oz)   SpO2 97%   BMI 23.69 kg/m    BMI= Body mass index is 23.69 kg/m .    Constitutional: In general, the patient is a pleasant female in no acute distress.    Targeted cardiovascular exam:  Regular rate and rhythm. Normal S1, S2. No murmur, rub, click, or gallop.   Extremities:Pulses are normal bilaterally throughout. No peripheral edema.  Respiratory: Clear to asculation.    Chest (if device implant): Pacer pocket clean/dry/intact, no evidence of infection, unremarkable     have personally and " independently reviewed the following:  Labs:  10/21/2022: K 4.5, cr 0.85, hbg 11.4, plt 174     Testing/Procedures:  ECHOCARDIOGRAM:   11/21/2017: EF 65%, mild to mod MAC, posterior mitral valve prolapse with mild MR, BISHNU 56.     Device Interrogations:  03/16/2023  Device: Medtronic A2DR01 [address] DR LAWLER  Normal device function  Mode: AAIR<=>DDDR  bpm  AP: 98.8%  : 0.4%  Intrinsic Rhythm: AP/VS 30 bpm with occasional PACs  Short V-V intervals: 0  Lead Trends Appear Stable: yes  Estimated battery longevity to RRT = 4 years.(3 years min-5 years max)/Battery voltage = 2.97 V.   Atrial Arrhythmia:  129 AT/AF episodes since last remote transmission on 10/17/22 lasting 4 sec-8 hr 43 min with max V-rates 102-158 bpm.  1 Fast A&V episode lasting 6 sec at  bpm.  AF [place] = 1.2%  Anticoagulant: None  Ventricular Arrhythmia: 5 NSVT episodes lasting 1 sec at 154-158 bpm. Available EGMs reveal AT/AF with RVR  Setting Changes: None    DEVICE INTERROGATION 2/10/2022 since last check 9/29/2021:  Device: Medtronic [id number] Advisa DR LAWLER  Normal device function  Mode: AAIR-DDDR  bpm  AP: 99.6% (atrially dependent)  : <0.1%  Intrinsic Rhythm: /AS @ 30 bpm; conducts with AP  Short V-V intervals: None  Lead Trends Appear Stable: Yes  Estimated battery longevity to RRT = 5 years.   Atrial Arrhythmia: brief episodes of AT, longest 47 seconds, rate 97 bpm  Most recent AF episode was 9/28/2021, 1 hour, average rage 118 bpm      Assessment :  1. Sick sinus syndrome s/p pacemaker implantation.   2. Atrial fibrillation, paroxysmal, intermittently symptomatic with some irregular heart beats which are brief and rarely MCKNIGHT. Based on device interrogation today, her episodes of atrial arrhythmia are becoming more frequent and longer despite her 100mg AM 50mg PM metoprolol succinate. We discussed other options for controlling her arrhythmia and suggested initiating amiodarone which she will think about. Her last LFTs were  10/2022 and were normal. She does not have a recent TSH. We do not think that she will get adequate control with metoprolol at this point. [id number]-[id number] score is 4. Anticoagulation had been discussed with her before, and again today she declines.  3. Hypertension. Normal today, continuing metoprolol, lisinopril, and hydrochlorothiazide   4. Nonsustained VT, known for several years, asymptomatic, normal EF, on metoprolol    Plan:  - Continue current regimen  - Patient would like to think about amiodarone before starting, would start with 400mg daily for 2 weeks followed by 200mg daily given her age and size if she is agreeable. She will need a TSH prior to starting amiodarone and will need ongoing LFT and thyroid monitoring after.   - Follow up in 1 year, if patient decides to start amiodarone she will call sooner     The following tests will be ordered at this visit:  None      This patient's care was discussed with Dr Mary Dixon, attending cardiologist, who agrees with the assessment and plan unless otherwise indicated.    Burton Vilchis MD [facility], PGY-4  Fellow, Cardiovascular Disease      Attending note:    Patient seen and examined with Dr. Vilchis. The history and physical findings are accurate as recorded.   Briefly, h/o PAF, declined anticoagulation. This past weekend she called with persistent palpitation and fatigue.  Pacemaker interrogation today showed AF episodes up to 8 hours with average ventricular rates 100-150s.    All labs, imaging studies, ECG and telemetry data have been reviewed personally.    The assessment and plans outlined reflect our joint decision making.  PAF, episodes are symptomatic and longer in duration.  She is already on metoprolol 100 am/50 pm. I offered amiodarone for symptomatic relief. She is very reluctant to start new medications and would like to think about it more. I also discussed anticoagulation with her, and we discussed how treatment with amiodarone would not affect her  stroke risk. She again declines anticoagulation but will notify me if AF episodes are frequent enough that she would like to try amiodarone.      Dr. Vilchis and I spent a total of 30 minutes face to face with  Isadora Mendez during today's office visit. We spent an additional 20 minutes today on chart review and documentation.      Mary Dixon MD  Cardiology

## 2023-03-16 NOTE — PATIENT INSTRUCTIONS
You were seen in the Electrophysiology Clinic today by: Dr Mary Dixon    Plan:   Medication Changes: consider amiodarone for atrial fibrillation    Labs/Tests Needed: none    Follow up Visit: 1 year    Device Follow up: remote transmission in 3 months        If you have further questions, please utilize [employer] to contact us.     Your Care Team:    EP Cardiology   Telephone Number     Nurse Line  Sera Saldivar, RN   Lyndsey Bradshaw, RN   [phone]     For scheduling appointments:   [place]   [phone]   For procedure scheduling:    Freya Ivory     [phone]   For the Device Clinic (Pacemakers, ICDs, Loop Recorders)    During business hours: [phone]  After business hours:   [phone]- select option 4 and ask for job code [id number].       On-call cardiologist for after hours or on weekends:   [phone], option #4, and ask to speak to the on-call cardiologist.     Cardiovascular Clinic:   [address] [place] [address]. [place] [address]      As always, Thank you for trusting us with your health care needs!

## 2023-03-16 NOTE — LETTER
"3/16/2023      RE: Isadora Mendez  [address] [facility] [address]       Dear Colleague,    Thank you for the opportunity to participate in the care of your patient, Isadora Mendez, at the [facility] [place] [facility]. Please see a copy of my visit note below.    I am delighted to see Isadora Mendez for follow up of Atrial Fibrillation     The patient is a 89 year old lady who presents for follow up and ongoing evaluation for paroxysmal atrial fibrillation and SSS s/p pacemaker implantation. She was last seen in clinic in February 2022.     She has a h/o PAF and PAT, symptoms of palpitations. Declined anticoagulation. She was last seen in February 2022. Since that time she's been well, still working in  without significant limitation. Did have an episode of palpitations and associated MCKNIGHT last weekend which resolved after an extra dose of metoprolol. She otherwise feels at baseline.      She continues to have some episodes of feeling her heart race, most of these don't last long, and she has no associated dizziness. Her last episode, 03/11/23, was unique as she has MCKNIGHT.       Past Medical History:  1. Pacemaker 6/30/2010. Leads are Medtronic 5076. Generator change 9/14/2016.  2. Nonsustained VT  3. Hypertension  4. Atrial fibrillation, detected on device lasting > 1 hour, declined anticoagulation        Allergies:  No Known Allergies    Medications:   Aspirin 81 every day  hydrochlorothiazide 12.5 every day  Lisinopril 10 every day (~ noon)  Metoprolol succinate 100mg AM and 50mg PM      Physical examination  Vitals: /76 (BP Location: Right arm, Patient Position: Sitting, Cuff Size: Adult Regular)   Pulse 77   Ht 1.585 m (5' 2.4\")   Wt 59.5 kg (131 lb 3.2 oz)   SpO2 97%   BMI 23.69 kg/m    BMI= Body mass index is 23.69 kg/m .    Constitutional: In general, the patient is a pleasant female in no acute distress.    Targeted " cardiovascular exam:  Regular rate and rhythm. Normal S1, S2. No murmur, rub, click, or gallop.   Extremities:Pulses are normal bilaterally throughout. No peripheral edema.  Respiratory: Clear to asculation.    Chest (if device implant): Pacer pocket clean/dry/intact, no evidence of infection, unremarkable     have personally and independently reviewed the following:  Labs:  10/21/2022: K 4.5, cr 0.85, hbg 11.4, plt 174     Testing/Procedures:  ECHOCARDIOGRAM:   11/21/2017: EF 65%, mild to mod MAC, posterior mitral valve prolapse with mild MR, BISHNU 56.     Device Interrogations:  03/16/2023  Device: Medtronic A2DR01 Griselda LAWLER  Normal device function  Mode: AAIR<=>DDDR  bpm  AP: 98.8%  : 0.4%  Intrinsic Rhythm: AP/VS 30 bpm with occasional PACs  Short V-V intervals: 0  Lead Trends Appear Stable: yes  Estimated battery longevity to RRT = 4 years.(3 years min-5 years max)/Battery voltage = 2.97 V.   Atrial Arrhythmia:  129 AT/AF episodes since last remote transmission on 10/17/22 lasting 4 sec-8 hr 43 min with max V-rates 102-158 bpm.  1 Fast A&V episode lasting 6 sec at  bpm.  AF [place] = 1.2%  Anticoagulant: None  Ventricular Arrhythmia: 5 NSVT episodes lasting 1 sec at 154-158 bpm. Available EGMs reveal AT/AF with RVR  Setting Changes: None    DEVICE INTERROGATION 2/10/2022 since last check 9/29/2021:  Device: Medtronic A2DR01 Griselda LAWLER  Normal device function  Mode: AAIR-DDDR  bpm  AP: 99.6% (atrially dependent)  : <0.1%  Intrinsic Rhythm: /AS @ 30 bpm; conducts with AP  Short V-V intervals: None  Lead Trends Appear Stable: Yes  Estimated battery longevity to RRT = 5 years.   Atrial Arrhythmia: brief episodes of AT, longest 47 seconds, rate 97 bpm  Most recent AF episode was 9/28/2021, 1 hour, average rage 118 bpm      Assessment :  1. Sick sinus syndrome s/p pacemaker implantation.   2. Atrial fibrillation, paroxysmal, intermittently symptomatic with some irregular heart beats which  are brief and rarely MCKNIGHT. Based on device interrogation today, her episodes of atrial arrhythmia are becoming more frequent and longer despite her 100mg AM 50mg PM metoprolol succinate. We discussed other options for controlling her arrhythmia and suggested initiating amiodarone which she will think about. Her last LFTs were 10/2022 and were normal. She does not have a recent TSH. We do not think that she will get adequate control with metoprolol at this point. [id number]-SEYs score is 4. Anticoagulation had been discussed with her before, and again today she declines.  3. Hypertension. Normal today, continuing metoprolol, lisinopril, and hydrochlorothiazide   4. Nonsustained VT, known for several years, asymptomatic, normal EF, on metoprolol    Plan:  - Continue current regimen  - Patient would like to think about amiodarone before starting, would start with 400mg daily for 2 weeks followed by 200mg daily given her age and size if she is agreeable. She will need a TSH prior to starting amiodarone and will need ongoing LFT and thyroid monitoring after.   - Follow up in 1 year, if patient decides to start amiodarone she will call sooner     The following tests will be ordered at this visit:  None      This patient's care was discussed with Dr Mary Dixon, attending cardiologist, who agrees with the assessment and plan unless otherwise indicated.    Burton Vilchis MD [facility], PGY-4  Fellow, Cardiovascular Disease      Attending note:    Patient seen and examined with Dr. Vilchis. The history and physical findings are accurate as recorded.   Briefly, h/o PAF, declined anticoagulation. This past weekend she called with persistent palpitation and fatigue.  Pacemaker interrogation today showed AF episodes up to 8 hours with average ventricular rates 100-150s.    All labs, imaging studies, ECG and telemetry data have been reviewed personally.    The assessment and plans outlined reflect our joint decision making.  PAF, episodes  are symptomatic and longer in duration.  She is already on metoprolol 100 am/50 pm. I offered amiodarone for symptomatic relief. She is very reluctant to start new medications and would like to think about it more. I also discussed anticoagulation with her, and we discussed how treatment with amiodarone would not affect her stroke risk. She again declines anticoagulation but will notify me if AF episodes are frequent enough that she would like to try amiodarone.      Dr. Vilchis and I spent a total of 30 minutes face to face with  Isadora Mendez during today's office visit. We spent an additional 20 minutes today on chart review and documentation.      Mary Dixon MD  Cardiology

## 2023-03-16 NOTE — PATIENT INSTRUCTIONS
It was a pleasure to see you in clinic today.  Please do not hesitate to call with any questions or concerns.  You are scheduled for a remote transmission on 6/16/23.  We look forward to seeing you in clinic at your next device check in 6 months.    Josefina Roger, RN, MS, CCRN  Electrophysiology Nurse Clinician  [facility]    During Business Hours Please Call:  [phone]  After Hours Please Call:  [phone] - select option #4 and ask for job code [phone]

## 2023-03-16 NOTE — NURSING NOTE
Chief Complaint   Patient presents with     Follow Up     1 yr f/u for SSS/ ppm /afib, nsvt, htn          Vitals were taken and medications reconciled.     Rome Dawson, EMT   10:29 AM

## 2023-03-27 LAB
MDC_IDC_EPISODE_DTM: NORMAL
MDC_IDC_EPISODE_DURATION: 0 S
MDC_IDC_EPISODE_DURATION: 1 S
MDC_IDC_EPISODE_DURATION: 1017 S
MDC_IDC_EPISODE_DURATION: 102 S
MDC_IDC_EPISODE_DURATION: 104 S
MDC_IDC_EPISODE_DURATION: 1080 S
MDC_IDC_EPISODE_DURATION: 1102 S
MDC_IDC_EPISODE_DURATION: 1236 S
MDC_IDC_EPISODE_DURATION: 1281 S
MDC_IDC_EPISODE_DURATION: 14 S
MDC_IDC_EPISODE_DURATION: 140 S
MDC_IDC_EPISODE_DURATION: 144 S
MDC_IDC_EPISODE_DURATION: 149 S
MDC_IDC_EPISODE_DURATION: 158 S
MDC_IDC_EPISODE_DURATION: 1598 S
MDC_IDC_EPISODE_DURATION: 161 S
MDC_IDC_EPISODE_DURATION: 172 S
MDC_IDC_EPISODE_DURATION: 173 S
MDC_IDC_EPISODE_DURATION: 179 S
MDC_IDC_EPISODE_DURATION: 184 S
MDC_IDC_EPISODE_DURATION: 187 S
MDC_IDC_EPISODE_DURATION: 1889 S
MDC_IDC_EPISODE_DURATION: 205 S
MDC_IDC_EPISODE_DURATION: 2112 S
MDC_IDC_EPISODE_DURATION: 2217 S
MDC_IDC_EPISODE_DURATION: 224 S
MDC_IDC_EPISODE_DURATION: 239 S
MDC_IDC_EPISODE_DURATION: 25 S
MDC_IDC_EPISODE_DURATION: 264 S
MDC_IDC_EPISODE_DURATION: 285 S
MDC_IDC_EPISODE_DURATION: 303 S
MDC_IDC_EPISODE_DURATION: 337 S
MDC_IDC_EPISODE_DURATION: 34 S
MDC_IDC_EPISODE_DURATION: 4 S
MDC_IDC_EPISODE_DURATION: 404 S
MDC_IDC_EPISODE_DURATION: 418 S
MDC_IDC_EPISODE_DURATION: 428 S
MDC_IDC_EPISODE_DURATION: 466 S
MDC_IDC_EPISODE_DURATION: 471 S
MDC_IDC_EPISODE_DURATION: 4964 S
MDC_IDC_EPISODE_DURATION: 501 S
MDC_IDC_EPISODE_DURATION: 528 S
MDC_IDC_EPISODE_DURATION: 550 S
MDC_IDC_EPISODE_DURATION: 571 S
MDC_IDC_EPISODE_DURATION: 6 S
MDC_IDC_EPISODE_DURATION: 6003 S
MDC_IDC_EPISODE_DURATION: 643 S
MDC_IDC_EPISODE_DURATION: 65 S
MDC_IDC_EPISODE_DURATION: 663 S
MDC_IDC_EPISODE_DURATION: 7 S
MDC_IDC_EPISODE_DURATION: 927 S
MDC_IDC_EPISODE_DURATION: NORMAL S
MDC_IDC_EPISODE_DURATION: NORMAL S
MDC_IDC_EPISODE_ID: 323
MDC_IDC_EPISODE_ID: 324
MDC_IDC_EPISODE_ID: 332
MDC_IDC_EPISODE_ID: 335
MDC_IDC_EPISODE_ID: 338
MDC_IDC_EPISODE_ID: 347
MDC_IDC_EPISODE_ID: 362
MDC_IDC_EPISODE_ID: 363
MDC_IDC_EPISODE_ID: 364
MDC_IDC_EPISODE_ID: 365
MDC_IDC_EPISODE_ID: 366
MDC_IDC_EPISODE_ID: 367
MDC_IDC_EPISODE_ID: 368
MDC_IDC_EPISODE_ID: 369
MDC_IDC_EPISODE_ID: 370
MDC_IDC_EPISODE_ID: 371
MDC_IDC_EPISODE_ID: 372
MDC_IDC_EPISODE_ID: 373
MDC_IDC_EPISODE_ID: 374
MDC_IDC_EPISODE_ID: 375
MDC_IDC_EPISODE_ID: 376
MDC_IDC_EPISODE_ID: 377
MDC_IDC_EPISODE_ID: 378
MDC_IDC_EPISODE_ID: 379
MDC_IDC_EPISODE_ID: 380
MDC_IDC_EPISODE_ID: 381
MDC_IDC_EPISODE_ID: 382
MDC_IDC_EPISODE_ID: 383
MDC_IDC_EPISODE_ID: 384
MDC_IDC_EPISODE_ID: 385
MDC_IDC_EPISODE_ID: 386
MDC_IDC_EPISODE_ID: 387
MDC_IDC_EPISODE_ID: 388
MDC_IDC_EPISODE_ID: 389
MDC_IDC_EPISODE_ID: 390
MDC_IDC_EPISODE_ID: 391
MDC_IDC_EPISODE_ID: 392
MDC_IDC_EPISODE_ID: 393
MDC_IDC_EPISODE_ID: 394
MDC_IDC_EPISODE_ID: 395
MDC_IDC_EPISODE_ID: 396
MDC_IDC_EPISODE_ID: 397
MDC_IDC_EPISODE_ID: 398
MDC_IDC_EPISODE_ID: 399
MDC_IDC_EPISODE_ID: 400
MDC_IDC_EPISODE_ID: 401
MDC_IDC_EPISODE_ID: 402
MDC_IDC_EPISODE_ID: 403
MDC_IDC_EPISODE_ID: 404
MDC_IDC_EPISODE_ID: 405
MDC_IDC_EPISODE_ID: 406
MDC_IDC_EPISODE_ID: 407
MDC_IDC_EPISODE_ID: 408
MDC_IDC_EPISODE_ID: 409
MDC_IDC_EPISODE_ID: 410
MDC_IDC_EPISODE_ID: 411
MDC_IDC_EPISODE_TYPE: NORMAL
MDC_IDC_LEAD_IMPLANT_DT: NORMAL
MDC_IDC_LEAD_IMPLANT_DT: NORMAL
MDC_IDC_LEAD_LOCATION: NORMAL
MDC_IDC_LEAD_LOCATION: NORMAL
MDC_IDC_LEAD_LOCATION_DETAIL_1: NORMAL
MDC_IDC_LEAD_LOCATION_DETAIL_1: NORMAL
MDC_IDC_LEAD_MFG: NORMAL
MDC_IDC_LEAD_MFG: NORMAL
MDC_IDC_LEAD_MODEL: NORMAL
MDC_IDC_LEAD_MODEL: NORMAL
MDC_IDC_LEAD_POLARITY_TYPE: NORMAL
MDC_IDC_LEAD_POLARITY_TYPE: NORMAL
MDC_IDC_LEAD_SERIAL: NORMAL
MDC_IDC_LEAD_SERIAL: NORMAL
MDC_IDC_LEAD_SPECIAL_FUNCTION: NORMAL
MDC_IDC_LEAD_SPECIAL_FUNCTION: NORMAL
MDC_IDC_MSMT_BATTERY_DTM: NORMAL
MDC_IDC_MSMT_BATTERY_REMAINING_LONGEVITY: 51 MO
MDC_IDC_MSMT_BATTERY_RRT_TRIGGER: 2.83
MDC_IDC_MSMT_BATTERY_STATUS: NORMAL
MDC_IDC_MSMT_BATTERY_VOLTAGE: 2.97 V
MDC_IDC_MSMT_LEADCHNL_RA_IMPEDANCE_VALUE: 418 OHM
MDC_IDC_MSMT_LEADCHNL_RA_PACING_THRESHOLD_AMPLITUDE: 0.75 V
MDC_IDC_MSMT_LEADCHNL_RA_PACING_THRESHOLD_PULSEWIDTH: 0.4 MS
MDC_IDC_MSMT_LEADCHNL_RA_SENSING_INTR_AMPL: 1.8 MV
MDC_IDC_MSMT_LEADCHNL_RV_IMPEDANCE_VALUE: 475 OHM
MDC_IDC_MSMT_LEADCHNL_RV_PACING_THRESHOLD_AMPLITUDE: 0.75 V
MDC_IDC_MSMT_LEADCHNL_RV_PACING_THRESHOLD_PULSEWIDTH: 0.4 MS
MDC_IDC_MSMT_LEADCHNL_RV_SENSING_INTR_AMPL: 12.8 MV
MDC_IDC_PG_IMPLANT_DTM: NORMAL
MDC_IDC_PG_MFG: NORMAL
MDC_IDC_PG_MODEL: NORMAL
MDC_IDC_PG_SERIAL: NORMAL
MDC_IDC_PG_TYPE: NORMAL
MDC_IDC_SESS_CLINIC_NAME: NORMAL
MDC_IDC_SESS_DTM: NORMAL
MDC_IDC_SESS_TYPE: NORMAL
MDC_IDC_SET_BRADY_AT_MODE_SWITCH_RATE: 171 {BEATS}/MIN
MDC_IDC_SET_BRADY_HYSTRATE: NORMAL
MDC_IDC_SET_BRADY_LOWRATE: 60 {BEATS}/MIN
MDC_IDC_SET_BRADY_MAX_SENSOR_RATE: 130 {BEATS}/MIN
MDC_IDC_SET_BRADY_MAX_TRACKING_RATE: 130 {BEATS}/MIN
MDC_IDC_SET_BRADY_MODE: NORMAL
MDC_IDC_SET_BRADY_PAV_DELAY_LOW: 180 MS
MDC_IDC_SET_BRADY_SAV_DELAY_LOW: 150 MS
MDC_IDC_SET_LEADCHNL_RA_PACING_AMPLITUDE: 1.5 V
MDC_IDC_SET_LEADCHNL_RA_PACING_ANODE_ELECTRODE_1: NORMAL
MDC_IDC_SET_LEADCHNL_RA_PACING_ANODE_LOCATION_1: NORMAL
MDC_IDC_SET_LEADCHNL_RA_PACING_CAPTURE_MODE: NORMAL
MDC_IDC_SET_LEADCHNL_RA_PACING_CATHODE_ELECTRODE_1: NORMAL
MDC_IDC_SET_LEADCHNL_RA_PACING_CATHODE_LOCATION_1: NORMAL
MDC_IDC_SET_LEADCHNL_RA_PACING_POLARITY: NORMAL
MDC_IDC_SET_LEADCHNL_RA_PACING_PULSEWIDTH: 0.4 MS
MDC_IDC_SET_LEADCHNL_RA_SENSING_ANODE_ELECTRODE_1: NORMAL
MDC_IDC_SET_LEADCHNL_RA_SENSING_ANODE_LOCATION_1: NORMAL
MDC_IDC_SET_LEADCHNL_RA_SENSING_CATHODE_ELECTRODE_1: NORMAL
MDC_IDC_SET_LEADCHNL_RA_SENSING_CATHODE_LOCATION_1: NORMAL
MDC_IDC_SET_LEADCHNL_RA_SENSING_POLARITY: NORMAL
MDC_IDC_SET_LEADCHNL_RA_SENSING_SENSITIVITY: 0.3 MV
MDC_IDC_SET_LEADCHNL_RV_PACING_AMPLITUDE: 2 V
MDC_IDC_SET_LEADCHNL_RV_PACING_ANODE_ELECTRODE_1: NORMAL
MDC_IDC_SET_LEADCHNL_RV_PACING_ANODE_LOCATION_1: NORMAL
MDC_IDC_SET_LEADCHNL_RV_PACING_CAPTURE_MODE: NORMAL
MDC_IDC_SET_LEADCHNL_RV_PACING_CATHODE_ELECTRODE_1: NORMAL
MDC_IDC_SET_LEADCHNL_RV_PACING_CATHODE_LOCATION_1: NORMAL
MDC_IDC_SET_LEADCHNL_RV_PACING_POLARITY: NORMAL
MDC_IDC_SET_LEADCHNL_RV_PACING_PULSEWIDTH: 0.4 MS
MDC_IDC_SET_LEADCHNL_RV_SENSING_ANODE_ELECTRODE_1: NORMAL
MDC_IDC_SET_LEADCHNL_RV_SENSING_ANODE_LOCATION_1: NORMAL
MDC_IDC_SET_LEADCHNL_RV_SENSING_CATHODE_ELECTRODE_1: NORMAL
MDC_IDC_SET_LEADCHNL_RV_SENSING_CATHODE_LOCATION_1: NORMAL
MDC_IDC_SET_LEADCHNL_RV_SENSING_POLARITY: NORMAL
MDC_IDC_SET_LEADCHNL_RV_SENSING_SENSITIVITY: 0.9 MV
MDC_IDC_SET_ZONE_DETECTION_INTERVAL: 350 MS
MDC_IDC_SET_ZONE_DETECTION_INTERVAL: 400 MS
MDC_IDC_SET_ZONE_TYPE: NORMAL
MDC_IDC_STAT_AT_BURDEN_PERCENT: 1.2 %
MDC_IDC_STAT_AT_DTM_END: NORMAL
MDC_IDC_STAT_AT_DTM_START: NORMAL
MDC_IDC_STAT_BRADY_AP_VP_PERCENT: 0.2 %
MDC_IDC_STAT_BRADY_AP_VS_PERCENT: 98.57 %
MDC_IDC_STAT_BRADY_AS_VP_PERCENT: 0.21 %
MDC_IDC_STAT_BRADY_AS_VS_PERCENT: 1.02 %
MDC_IDC_STAT_BRADY_DTM_END: NORMAL
MDC_IDC_STAT_BRADY_DTM_START: NORMAL
MDC_IDC_STAT_BRADY_RA_PERCENT_PACED: 98.05 %
MDC_IDC_STAT_BRADY_RV_PERCENT_PACED: 0.37 %
MDC_IDC_STAT_EPISODE_RECENT_COUNT: 0
MDC_IDC_STAT_EPISODE_RECENT_COUNT: 0
MDC_IDC_STAT_EPISODE_RECENT_COUNT: 129
MDC_IDC_STAT_EPISODE_RECENT_COUNT: 5
MDC_IDC_STAT_EPISODE_RECENT_COUNT_DTM_END: NORMAL
MDC_IDC_STAT_EPISODE_RECENT_COUNT_DTM_START: NORMAL
MDC_IDC_STAT_EPISODE_TOTAL_COUNT: 0
MDC_IDC_STAT_EPISODE_TOTAL_COUNT: 0
MDC_IDC_STAT_EPISODE_TOTAL_COUNT: 349
MDC_IDC_STAT_EPISODE_TOTAL_COUNT: 60
MDC_IDC_STAT_EPISODE_TOTAL_COUNT_DTM_END: NORMAL
MDC_IDC_STAT_EPISODE_TOTAL_COUNT_DTM_START: NORMAL
MDC_IDC_STAT_EPISODE_TYPE: NORMAL

## 2023-04-05 ENCOUNTER — OFFICE VISIT (OUTPATIENT)
Dept: INTERNAL MEDICINE | Facility: CLINIC | Age: 88
End: 2023-04-05
Payer: COMMERCIAL

## 2023-04-05 ENCOUNTER — LAB (OUTPATIENT)
Dept: LAB | Facility: CLINIC | Age: 88
End: 2023-04-05
Payer: COMMERCIAL

## 2023-04-05 VITALS
HEIGHT: 62 IN | SYSTOLIC BLOOD PRESSURE: 144 MMHG | HEART RATE: 83 BPM | OXYGEN SATURATION: 99 % | WEIGHT: 129.6 LBS | BODY MASS INDEX: 23.85 KG/M2 | DIASTOLIC BLOOD PRESSURE: 82 MMHG | TEMPERATURE: 98.2 F

## 2023-04-05 DIAGNOSIS — M79.3 LIPODERMATOSCLEROSIS OF LEFT LOWER EXTREMITY: ICD-10-CM

## 2023-04-05 DIAGNOSIS — L03.116 CELLULITIS OF LEFT LOWER EXTREMITY: ICD-10-CM

## 2023-04-05 DIAGNOSIS — R11.2 NAUSEA AND VOMITING, UNSPECIFIED VOMITING TYPE: Primary | ICD-10-CM

## 2023-04-05 DIAGNOSIS — R11.2 NAUSEA AND VOMITING, UNSPECIFIED VOMITING TYPE: ICD-10-CM

## 2023-04-05 DIAGNOSIS — R74.8 ELEVATED LIPASE: ICD-10-CM

## 2023-04-05 DIAGNOSIS — K21.00 GASTROESOPHAGEAL REFLUX DISEASE WITH ESOPHAGITIS WITHOUT HEMORRHAGE: ICD-10-CM

## 2023-04-05 LAB
ALBUMIN SERPL BCG-MCNC: 3.7 G/DL (ref 3.5–5.2)
ALP SERPL-CCNC: 134 U/L (ref 35–104)
ALT SERPL W P-5'-P-CCNC: 24 U/L (ref 10–35)
ANION GAP SERPL CALCULATED.3IONS-SCNC: 8 MMOL/L (ref 7–15)
AST SERPL W P-5'-P-CCNC: 27 U/L (ref 10–35)
BASOPHILS # BLD AUTO: 0 10E3/UL (ref 0–0.2)
BASOPHILS NFR BLD AUTO: 1 %
BILIRUB SERPL-MCNC: 0.3 MG/DL
BUN SERPL-MCNC: 22.8 MG/DL (ref 8–23)
CALCIUM SERPL-MCNC: 9.8 MG/DL (ref 8.2–9.6)
CHLORIDE SERPL-SCNC: 101 MMOL/L (ref 98–107)
CREAT SERPL-MCNC: 0.9 MG/DL (ref 0.51–0.95)
DEPRECATED HCO3 PLAS-SCNC: 26 MMOL/L (ref 22–29)
EOSINOPHIL # BLD AUTO: 0.3 10E3/UL (ref 0–0.7)
EOSINOPHIL NFR BLD AUTO: 4 %
ERYTHROCYTE [DISTWIDTH] IN BLOOD BY AUTOMATED COUNT: 13.3 % (ref 10–15)
GFR SERPL CREATININE-BSD FRML MDRD: 60 ML/MIN/1.73M2
GLUCOSE SERPL-MCNC: 138 MG/DL (ref 70–99)
HCT VFR BLD AUTO: 34.1 % (ref 35–47)
HGB BLD-MCNC: 11 G/DL (ref 11.7–15.7)
IMM GRANULOCYTES # BLD: 0 10E3/UL
IMM GRANULOCYTES NFR BLD: 1 %
LIPASE SERPL-CCNC: 270 U/L (ref 13–60)
LYMPHOCYTES # BLD AUTO: 1.8 10E3/UL (ref 0.8–5.3)
LYMPHOCYTES NFR BLD AUTO: 27 %
MCH RBC QN AUTO: 29.2 PG (ref 26.5–33)
MCHC RBC AUTO-ENTMCNC: 32.3 G/DL (ref 31.5–36.5)
MCV RBC AUTO: 91 FL (ref 78–100)
MONOCYTES # BLD AUTO: 0.6 10E3/UL (ref 0–1.3)
MONOCYTES NFR BLD AUTO: 9 %
NEUTROPHILS # BLD AUTO: 3.9 10E3/UL (ref 1.6–8.3)
NEUTROPHILS NFR BLD AUTO: 58 %
NRBC # BLD AUTO: 0 10E3/UL
NRBC BLD AUTO-RTO: 0 /100
PLATELET # BLD AUTO: 268 10E3/UL (ref 150–450)
POTASSIUM SERPL-SCNC: 5.3 MMOL/L (ref 3.4–5.3)
PROT SERPL-MCNC: 7.5 G/DL (ref 6.4–8.3)
RBC # BLD AUTO: 3.77 10E6/UL (ref 3.8–5.2)
SODIUM SERPL-SCNC: 135 MMOL/L (ref 136–145)
WBC # BLD AUTO: 6.7 10E3/UL (ref 4–11)

## 2023-04-05 PROCEDURE — 36415 COLL VENOUS BLD VENIPUNCTURE: CPT | Performed by: PATHOLOGY

## 2023-04-05 PROCEDURE — 99215 OFFICE O/P EST HI 40 MIN: CPT | Performed by: NURSE PRACTITIONER

## 2023-04-05 PROCEDURE — 83690 ASSAY OF LIPASE: CPT | Performed by: PATHOLOGY

## 2023-04-05 PROCEDURE — 80053 COMPREHEN METABOLIC PANEL: CPT | Performed by: PATHOLOGY

## 2023-04-05 PROCEDURE — 82150 ASSAY OF AMYLASE: CPT | Performed by: PATHOLOGY

## 2023-04-05 PROCEDURE — 85025 COMPLETE CBC W/AUTO DIFF WBC: CPT | Performed by: PATHOLOGY

## 2023-04-05 RX ORDER — CEPHALEXIN 500 MG/1
500 CAPSULE ORAL 3 TIMES DAILY
Qty: 21 CAPSULE | Refills: 0 | Status: SHIPPED | OUTPATIENT
Start: 2023-04-05 | End: 2023-04-14

## 2023-04-05 RX ORDER — ONDANSETRON 4 MG/1
4 TABLET, FILM COATED ORAL EVERY 6 HOURS PRN
Qty: 30 TABLET | Refills: 0 | Status: SHIPPED | OUTPATIENT
Start: 2023-04-05 | End: 2023-05-23

## 2023-04-05 NOTE — NURSING NOTE
"Isadora Mendez is a 89 year old female patient that presents today in clinic for the following:    Chief Complaint   Patient presents with     Gastrointestinal Problem     Nausea.  No appetite.  Fatigue.     Pain     Left Leg pain.  Sore spot on left leg     The patient's allergies and medications were reviewed as noted. A set of vitals were recorded as noted without incident: BP (!) 144/82 (BP Location: Right arm, Patient Position: Sitting, Cuff Size: Adult Regular)   Pulse 83   Temp 98.2  F (36.8  C) (Oral)   Ht 1.585 m (5' 2.4\")   Wt 58.8 kg (129 lb 9.6 oz)   SpO2 99%   BMI 23.40 kg/m  . The patient does not have any other questions for the provider.    Jared Palacios, EMT at 3:15 PM on 4/5/2023.  Primary care clinic: [phone]  "

## 2023-04-05 NOTE — Clinical Note
Hi, can you follow-up to make sure Isadora gets her CT scan scheduled? I signed the order late and transferred her to scheduling but didn't know if someone would still be in the office to help her schedule. Thanks! -Angela

## 2023-04-05 NOTE — PROGRESS NOTES
Assessment & Plan     Nausea and vomiting, unspecified vomiting type  Nausea over the past week with low appetite, with significant abdominal pain at onset. Check CMP, Lipase, and CBC. Will provide Rx for Ondansetron to help with any residual nausea, continue pushing fluids and gradually advance diet as tolerated, starting with bland foods.  - Comprehensive metabolic panel (BMP + Alb, Alk Phos, ALT, AST, Total. Bili, TP); Future  - Lipase; Future  - CBC with platelets and differential; Future  - ondansetron (ZOFRAN) 4 MG tablet; Take 1 tablet (4 mg) by mouth every 6 hours as needed for nausea or vomiting    Gastroesophageal reflux disease with esophagitis without hemorrhage  Continue Famotidine 20 mg BID; she believes she ran out of this briefly, but I wouldn't have expected such severe abdominal pain from a few missed doses.     Cellulitis of left lower extremity  Lipodermatosclerosis of left lower extremity  Increased pain and erythema around prior biopsy-proven lipodermatosclerosis of LLE; given exam findings and increased fatigue, will treat for cellulitis with Keflex. She will start wearing compression stockings as well.   - cephALEXin (KEFLEX) 500 MG capsule; Take 1 capsule (500 mg) by mouth 3 times daily    Elevated lipase  Lipase elevated at 270. Recommend Abd/Pelvis CT for further evaluation. Reviewed with pt, she agrees with the plan.  - CT Abdomen Pelvis w Contrast; Future  - Amylase; Future      53 minutes spent by me on the date of the encounter doing chart review, history and exam, documentation and further activities per the note       Return in about 2 months (around 6/5/2023).    IBRAHIMA Mondragon [facility] INTERNAL MEDICINE [facility]   Isadora is a 89 year old, presenting for the following health issues:  Gastrointestinal Problem (Nausea./No appetite./Fatigue.) and Pain (Left Leg pain./Sore spot on left leg)    HPI     Nausea/Fatigue/Loss of appetite--over  "the past week. 2-3 episodes of non-bloody emesis on the first day. Symptoms came on fast, \"worst stomach ache I've ever had in my life,\" lasted all day, and into the next few days but pain was not as severe. For 2 days all she could drink was water, but didn't want to eat because stomach was still hurting.Stomach still feels \"uneasy,\" though reports that today is the first day with less nausea.  No known ill exposures, though some grandkids had stomach flu this week. Pain is mostly resolved.  The first AM had 1 small watery stool, and no further episodes of diarrhea. No black/tarry or bloody stools.  Had briefly run out of Famotidine, restarted it this weekend. Weight is down a few pounds since her last clinic visit last month.     L leg--darkened area in L medial lower leg, initially noticed ~50 years ago while pregnant with her youngest daughter. Biopsy-proven lipodermatosclerosis of LLE, has always been sensitive to the touch, but now pain is worse and is painful with walking and more tender to the touch, and more red than normal. Had applied homeopathic aluminum drops, seemed to help slightly with the puffiness.     6 children, ran a  for years.     Review of Systems   Constitutional, HEENT, cardiovascular, pulmonary, gi and gu systems are negative, except as otherwise noted.      Objective    BP (!) 144/82 (BP Location: Right arm, Patient Position: Sitting, Cuff Size: Adult Regular)   Pulse 83   Temp 98.2  F (36.8  C) (Oral)   Ht 1.585 m (5' 2.4\")   Wt 58.8 kg (129 lb 9.6 oz)   SpO2 99%   BMI 23.40 kg/m    Body mass index is 23.4 kg/m .  Physical Exam   GENERAL: healthy, alert and no distress  EYES: Eyes grossly normal to inspection, and conjunctivae and sclerae normal  HENT: ear canals and TM's normal, nose and mouth without ulcers or lesions  NECK: no adenopathy, no asymmetry, masses, or scars and thyroid normal to palpation  RESP: lungs clear to auscultation - no rales, rhonchi or wheezes  CV: " regular rate and rhythm, normal S1 S2, no S3 or S4, no murmur, click or rub, no peripheral edema and peripheral pulses strong  ABDOMEN: soft, non-distended, mild tenderness in bilateral lower quadrants without rebound or guarding, no hepatosplenomegaly, no masses and bowel sounds normal  MS: no gross musculoskeletal defects noted, trace edema in LLE  SKIN: hyperpigmentation over LLE with erythema, warmth and tenderness over lateral aspect of distal left leg  NEURO: Normal strength and tone, mentation intact and speech normal  PSYCH: mentation appears normal, affect normal/bright

## 2023-04-06 ENCOUNTER — ANCILLARY PROCEDURE (OUTPATIENT)
Dept: CT IMAGING | Facility: CLINIC | Age: 88
End: 2023-04-06
Attending: NURSE PRACTITIONER
Payer: COMMERCIAL

## 2023-04-06 ENCOUNTER — TELEPHONE (OUTPATIENT)
Dept: INTERNAL MEDICINE | Facility: CLINIC | Age: 88
End: 2023-04-06

## 2023-04-06 DIAGNOSIS — R74.8 ELEVATED LIPASE: ICD-10-CM

## 2023-04-06 DIAGNOSIS — K82.8 THICKENING OF WALL OF GALLBLADDER: Primary | ICD-10-CM

## 2023-04-06 LAB — AMYLASE SERPL-CCNC: 129 U/L (ref 28–100)

## 2023-04-06 PROCEDURE — 74177 CT ABD & PELVIS W/CONTRAST: CPT | Performed by: STUDENT IN AN ORGANIZED HEALTH CARE EDUCATION/TRAINING PROGRAM

## 2023-04-06 RX ORDER — IOPAMIDOL 755 MG/ML
72 INJECTION, SOLUTION INTRAVASCULAR ONCE
Status: COMPLETED | OUTPATIENT
Start: 2023-04-06 | End: 2023-04-06

## 2023-04-06 RX ADMIN — IOPAMIDOL 72 ML: 755 INJECTION, SOLUTION INTRAVASCULAR at 07:17

## 2023-04-06 NOTE — TELEPHONE ENCOUNTER
Attempted to reach Isadora over the phone re: preliminary reading from abd/pelvis CT--recommend RUQ US for further evaluation of gallbladder. Orders placed for this. Voicemail box was full, Visualantt message sent, will request clinic RN to follow-up to ensure that she receives message and is able to schedule US.  IBRAHIMA Mondragon CNP

## 2023-04-07 ENCOUNTER — ANCILLARY PROCEDURE (OUTPATIENT)
Dept: ULTRASOUND IMAGING | Facility: CLINIC | Age: 88
End: 2023-04-07
Attending: NURSE PRACTITIONER
Payer: COMMERCIAL

## 2023-04-07 DIAGNOSIS — K82.8 THICKENING OF WALL OF GALLBLADDER: ICD-10-CM

## 2023-04-07 LAB — RADIOLOGIST FLAGS: ABNORMAL

## 2023-04-07 PROCEDURE — 76705 ECHO EXAM OF ABDOMEN: CPT | Mod: GC | Performed by: RADIOLOGY

## 2023-04-07 NOTE — TELEPHONE ENCOUNTER
Pt scheduled Abdominal Ultrasound last night for this morning. Pt already had completed ultrasound this morning.     JAMEL LYLES RN on 4/7/2023 at 9:27 AM

## 2023-04-14 ENCOUNTER — LAB (OUTPATIENT)
Dept: LAB | Facility: CLINIC | Age: 88
End: 2023-04-14
Payer: COMMERCIAL

## 2023-04-14 ENCOUNTER — OFFICE VISIT (OUTPATIENT)
Dept: INTERNAL MEDICINE | Facility: CLINIC | Age: 88
End: 2023-04-14
Payer: COMMERCIAL

## 2023-04-14 VITALS
WEIGHT: 132.6 LBS | HEART RATE: 67 BPM | DIASTOLIC BLOOD PRESSURE: 66 MMHG | BODY MASS INDEX: 23.94 KG/M2 | OXYGEN SATURATION: 97 % | SYSTOLIC BLOOD PRESSURE: 149 MMHG

## 2023-04-14 DIAGNOSIS — R10.13 ABDOMINAL PAIN, EPIGASTRIC: Primary | ICD-10-CM

## 2023-04-14 DIAGNOSIS — K86.2 PANCREATIC CYST: ICD-10-CM

## 2023-04-14 DIAGNOSIS — R10.13 ABDOMINAL PAIN, EPIGASTRIC: ICD-10-CM

## 2023-04-14 DIAGNOSIS — K76.9 LIVER LESION: ICD-10-CM

## 2023-04-14 LAB
ALBUMIN SERPL BCG-MCNC: 3.7 G/DL (ref 3.5–5.2)
ALP SERPL-CCNC: 110 U/L (ref 35–104)
ALT SERPL W P-5'-P-CCNC: 24 U/L (ref 10–35)
ANION GAP SERPL CALCULATED.3IONS-SCNC: 6 MMOL/L (ref 7–15)
AST SERPL W P-5'-P-CCNC: 29 U/L (ref 10–35)
BILIRUB SERPL-MCNC: 0.3 MG/DL
BUN SERPL-MCNC: 22.2 MG/DL (ref 8–23)
CALCIUM SERPL-MCNC: 9.8 MG/DL (ref 8.2–9.6)
CHLORIDE SERPL-SCNC: 103 MMOL/L (ref 98–107)
CREAT SERPL-MCNC: 0.84 MG/DL (ref 0.51–0.95)
DEPRECATED HCO3 PLAS-SCNC: 28 MMOL/L (ref 22–29)
GFR SERPL CREATININE-BSD FRML MDRD: 65 ML/MIN/1.73M2
GLUCOSE SERPL-MCNC: 134 MG/DL (ref 70–99)
LIPASE SERPL-CCNC: 177 U/L (ref 13–60)
POTASSIUM SERPL-SCNC: 4.8 MMOL/L (ref 3.4–5.3)
PROT SERPL-MCNC: 7.3 G/DL (ref 6.4–8.3)
SODIUM SERPL-SCNC: 137 MMOL/L (ref 136–145)

## 2023-04-14 PROCEDURE — 36415 COLL VENOUS BLD VENIPUNCTURE: CPT | Performed by: PATHOLOGY

## 2023-04-14 PROCEDURE — 80053 COMPREHEN METABOLIC PANEL: CPT | Performed by: PATHOLOGY

## 2023-04-14 PROCEDURE — 99214 OFFICE O/P EST MOD 30 MIN: CPT | Performed by: INTERNAL MEDICINE

## 2023-04-14 PROCEDURE — 83690 ASSAY OF LIPASE: CPT | Performed by: PATHOLOGY

## 2023-04-14 NOTE — PROGRESS NOTES
Assessment & Plan   Abdominal pain, epigastric  Recent imaging and history suggests likely gallstone pancreatitis (now improving) and symptomatic cholelithiasis  Will get HIDA scan.  Referral to surgery pending results  Weight has been stable, will monitor closely  - NM Hepatobiliary Scan w GB EF  - Lipase  - Comprehensive metabolic panel (BMP + Alb, Alk Phos, ALT, AST, Total. 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  I spent a total of 32 minutes on the day of the visit.  Time was spent doing chart review, history and exam, documentation and further activities as noted above.    Jovana Macias MD  Securely message with the Vocera Web Console      Chief Complaint   Isadora Mendez is a 89 year old female presents for the following health issues    History of Present Illness   Ongoing nausea  Taking zofran about once per day  Largely unchanged compared to 10 days ago  Poor appetite  Eating seems to make symptoms better      Has been having more fatigue        Tobacco Use      Smoking status: Never      Smokeless tobacco: Never    Vaping Use      Vaping status: None    PHQ-2 Score:       3/16/2023    10:29 AM 2/10/2022    11:05 AM   PHQ-2 ( 1999 [employer])   Q1: Little interest or pleasure in doing things 0 0   Q2: Feeling down, depressed or hopeless 0 0   PHQ-2 Score 0 0       ROS    Physical Exam   BP (!) 149/66 (BP Location: Right arm, Patient Position: Sitting, Cuff Size: Adult Regular)   Pulse 67   Wt 60.1 kg (132 lb 9.6 oz)   SpO2 97%    BMI 23.94 kg/m    Gen: no distress, comfortable, pleasant   Eyes: anicteric, normal extra-ocular movements   Gastrointestinal: mild RUQ tenderness without rebound tenderness or guarding.  Skin: no concerning lesions, no jaundice   Psychological: appropriate mood     Items reviewed:  Tobacco  Allergies  Meds

## 2023-04-14 NOTE — NURSING NOTE
Isadora Mendez is a 89 year old female that presents in clinic today for the following:     Chief Complaint   Patient presents with     Follow Up     Pt here for a follow up; pt has concern about her leg; wants to hear results/ plan regarding ultrasound       The patient's allergies and medications were reviewed. The patient's vitals were obtained without incident. The patient does not have any other questions for the provider.     Britta Guerrero, EMT at 11:05 AM on 4/14/2023.  Primary Care Clinic: [phone]

## 2023-04-18 DIAGNOSIS — R93.5 ABNORMAL CT OF THE ABDOMEN: Primary | ICD-10-CM

## 2023-04-19 ENCOUNTER — TELEPHONE (OUTPATIENT)
Dept: CARE COORDINATION | Facility: CLINIC | Age: 88
End: 2023-04-19
Payer: COMMERCIAL

## 2023-04-26 ENCOUNTER — HOSPITAL ENCOUNTER (OUTPATIENT)
Dept: MRI IMAGING | Facility: CLINIC | Age: 88
Discharge: HOME | End: 2023-04-26
Attending: INTERNAL MEDICINE
Payer: COMMERCIAL

## 2023-04-26 ENCOUNTER — ANCILLARY PROCEDURE (OUTPATIENT)
Dept: CARDIOLOGY | Facility: CLINIC | Age: 88
End: 2023-04-26
Attending: INTERNAL MEDICINE
Payer: COMMERCIAL

## 2023-04-26 ENCOUNTER — HOSPITAL ENCOUNTER (OUTPATIENT)
Dept: NUCLEAR MEDICINE | Facility: CLINIC | Age: 88
Setting detail: NUCLEAR MEDICINE
Discharge: HOME | End: 2023-04-26
Attending: INTERNAL MEDICINE
Payer: COMMERCIAL

## 2023-04-26 VITALS — HEART RATE: 80 BPM | OXYGEN SATURATION: 97 %

## 2023-04-26 DIAGNOSIS — I49.5 SICK SINUS SYNDROME (H): Primary | ICD-10-CM

## 2023-04-26 DIAGNOSIS — Z45.02 ENCOUNTER FOR ADJUSTMENT AND MANAGEMENT OF AUTOMATIC IMPLANTABLE CARDIAC DEFIBRILLATOR: ICD-10-CM

## 2023-04-26 DIAGNOSIS — I49.5 SICK SINUS SYNDROME (H): ICD-10-CM

## 2023-04-26 DIAGNOSIS — Z95.0 CARDIAC PACEMAKER IN SITU: ICD-10-CM

## 2023-04-26 DIAGNOSIS — K86.2 PANCREATIC CYST: ICD-10-CM

## 2023-04-26 DIAGNOSIS — R10.13 ABDOMINAL PAIN, EPIGASTRIC: ICD-10-CM

## 2023-04-26 LAB
MDC_IDC_LEAD_IMPLANT_DT: NORMAL
MDC_IDC_LEAD_IMPLANT_DT: NORMAL
MDC_IDC_LEAD_LOCATION: NORMAL
MDC_IDC_LEAD_LOCATION: NORMAL
MDC_IDC_LEAD_LOCATION_DETAIL_1: NORMAL
MDC_IDC_LEAD_LOCATION_DETAIL_1: NORMAL
MDC_IDC_LEAD_MFG: NORMAL
MDC_IDC_LEAD_MFG: NORMAL
MDC_IDC_LEAD_MODEL: NORMAL
MDC_IDC_LEAD_MODEL: NORMAL
MDC_IDC_LEAD_POLARITY_TYPE: NORMAL
MDC_IDC_LEAD_POLARITY_TYPE: NORMAL
MDC_IDC_LEAD_SERIAL: NORMAL
MDC_IDC_LEAD_SERIAL: NORMAL
MDC_IDC_LEAD_SPECIAL_FUNCTION: NORMAL
MDC_IDC_LEAD_SPECIAL_FUNCTION: NORMAL
MDC_IDC_MSMT_BATTERY_DTM: NORMAL
MDC_IDC_MSMT_BATTERY_REMAINING_LONGEVITY: 53 MO
MDC_IDC_MSMT_BATTERY_RRT_TRIGGER: 2.83
MDC_IDC_MSMT_BATTERY_STATUS: NORMAL
MDC_IDC_MSMT_BATTERY_VOLTAGE: 2.98 V
MDC_IDC_MSMT_LEADCHNL_RA_IMPEDANCE_VALUE: 380 OHM
MDC_IDC_MSMT_LEADCHNL_RA_PACING_THRESHOLD_AMPLITUDE: 0.75 V
MDC_IDC_MSMT_LEADCHNL_RA_PACING_THRESHOLD_PULSEWIDTH: 0.4 MS
MDC_IDC_MSMT_LEADCHNL_RA_SENSING_INTR_AMPL: 1.4 MV
MDC_IDC_MSMT_LEADCHNL_RV_IMPEDANCE_VALUE: 456 OHM
MDC_IDC_MSMT_LEADCHNL_RV_PACING_THRESHOLD_AMPLITUDE: 0.75 V
MDC_IDC_MSMT_LEADCHNL_RV_PACING_THRESHOLD_PULSEWIDTH: 0.4 MS
MDC_IDC_MSMT_LEADCHNL_RV_SENSING_INTR_AMPL: 11.5 MV
MDC_IDC_PG_IMPLANT_DTM: NORMAL
MDC_IDC_PG_MFG: NORMAL
MDC_IDC_PG_MODEL: NORMAL
MDC_IDC_PG_SERIAL: NORMAL
MDC_IDC_PG_TYPE: NORMAL
MDC_IDC_SESS_CLINIC_NAME: NORMAL
MDC_IDC_SESS_DTM: NORMAL
MDC_IDC_SESS_TYPE: NORMAL
MDC_IDC_SET_BRADY_AT_MODE_SWITCH_RATE: 171 {BEATS}/MIN
MDC_IDC_SET_BRADY_HYSTRATE: NORMAL
MDC_IDC_SET_BRADY_LOWRATE: 60 {BEATS}/MIN
MDC_IDC_SET_BRADY_MAX_SENSOR_RATE: 130 {BEATS}/MIN
MDC_IDC_SET_BRADY_MAX_TRACKING_RATE: 130 {BEATS}/MIN
MDC_IDC_SET_BRADY_MODE: NORMAL
MDC_IDC_SET_BRADY_PAV_DELAY_LOW: 180 MS
MDC_IDC_SET_BRADY_SAV_DELAY_LOW: 150 MS
MDC_IDC_SET_LEADCHNL_RA_PACING_AMPLITUDE: 1.5 V
MDC_IDC_SET_LEADCHNL_RA_PACING_ANODE_ELECTRODE_1: NORMAL
MDC_IDC_SET_LEADCHNL_RA_PACING_ANODE_LOCATION_1: NORMAL
MDC_IDC_SET_LEADCHNL_RA_PACING_CAPTURE_MODE: NORMAL
MDC_IDC_SET_LEADCHNL_RA_PACING_CATHODE_ELECTRODE_1: NORMAL
MDC_IDC_SET_LEADCHNL_RA_PACING_CATHODE_LOCATION_1: NORMAL
MDC_IDC_SET_LEADCHNL_RA_PACING_POLARITY: NORMAL
MDC_IDC_SET_LEADCHNL_RA_PACING_PULSEWIDTH: 0.4 MS
MDC_IDC_SET_LEADCHNL_RA_SENSING_ANODE_ELECTRODE_1: NORMAL
MDC_IDC_SET_LEADCHNL_RA_SENSING_ANODE_LOCATION_1: NORMAL
MDC_IDC_SET_LEADCHNL_RA_SENSING_CATHODE_ELECTRODE_1: NORMAL
MDC_IDC_SET_LEADCHNL_RA_SENSING_CATHODE_LOCATION_1: NORMAL
MDC_IDC_SET_LEADCHNL_RA_SENSING_POLARITY: NORMAL
MDC_IDC_SET_LEADCHNL_RA_SENSING_SENSITIVITY: 0.3 MV
MDC_IDC_SET_LEADCHNL_RV_PACING_AMPLITUDE: 2 V
MDC_IDC_SET_LEADCHNL_RV_PACING_ANODE_ELECTRODE_1: NORMAL
MDC_IDC_SET_LEADCHNL_RV_PACING_ANODE_LOCATION_1: NORMAL
MDC_IDC_SET_LEADCHNL_RV_PACING_CAPTURE_MODE: NORMAL
MDC_IDC_SET_LEADCHNL_RV_PACING_CATHODE_ELECTRODE_1: NORMAL
MDC_IDC_SET_LEADCHNL_RV_PACING_CATHODE_LOCATION_1: NORMAL
MDC_IDC_SET_LEADCHNL_RV_PACING_POLARITY: NORMAL
MDC_IDC_SET_LEADCHNL_RV_PACING_PULSEWIDTH: 0.4 MS
MDC_IDC_SET_LEADCHNL_RV_SENSING_ANODE_ELECTRODE_1: NORMAL
MDC_IDC_SET_LEADCHNL_RV_SENSING_ANODE_LOCATION_1: NORMAL
MDC_IDC_SET_LEADCHNL_RV_SENSING_CATHODE_ELECTRODE_1: NORMAL
MDC_IDC_SET_LEADCHNL_RV_SENSING_CATHODE_LOCATION_1: NORMAL
MDC_IDC_SET_LEADCHNL_RV_SENSING_POLARITY: NORMAL
MDC_IDC_SET_LEADCHNL_RV_SENSING_SENSITIVITY: 0.9 MV
MDC_IDC_SET_ZONE_DETECTION_INTERVAL: 350 MS
MDC_IDC_SET_ZONE_DETECTION_INTERVAL: 400 MS
MDC_IDC_SET_ZONE_TYPE: NORMAL
MDC_IDC_STAT_AT_BURDEN_PERCENT: 0.6 %
MDC_IDC_STAT_AT_DTM_END: NORMAL
MDC_IDC_STAT_AT_DTM_START: NORMAL
MDC_IDC_STAT_BRADY_AP_VP_PERCENT: 0.12 %
MDC_IDC_STAT_BRADY_AP_VS_PERCENT: 99.03 %
MDC_IDC_STAT_BRADY_AS_VP_PERCENT: 0.07 %
MDC_IDC_STAT_BRADY_AS_VS_PERCENT: 0.78 %
MDC_IDC_STAT_BRADY_DTM_END: NORMAL
MDC_IDC_STAT_BRADY_DTM_START: NORMAL
MDC_IDC_STAT_BRADY_RA_PERCENT_PACED: 98.61 %
MDC_IDC_STAT_BRADY_RV_PERCENT_PACED: 0.18 %
MDC_IDC_STAT_EPISODE_RECENT_COUNT: 0
MDC_IDC_STAT_EPISODE_RECENT_COUNT: 0
MDC_IDC_STAT_EPISODE_RECENT_COUNT: 1
MDC_IDC_STAT_EPISODE_RECENT_COUNT: 15
MDC_IDC_STAT_EPISODE_RECENT_COUNT_DTM_END: NORMAL
MDC_IDC_STAT_EPISODE_RECENT_COUNT_DTM_START: NORMAL
MDC_IDC_STAT_EPISODE_TOTAL_COUNT: 0
MDC_IDC_STAT_EPISODE_TOTAL_COUNT: 0
MDC_IDC_STAT_EPISODE_TOTAL_COUNT: 364
MDC_IDC_STAT_EPISODE_TOTAL_COUNT: 61
MDC_IDC_STAT_EPISODE_TOTAL_COUNT_DTM_END: NORMAL
MDC_IDC_STAT_EPISODE_TOTAL_COUNT_DTM_START: NORMAL
MDC_IDC_STAT_EPISODE_TYPE: NORMAL

## 2023-04-26 PROCEDURE — A9585 GADOBUTROL INJECTION: HCPCS | Performed by: INTERNAL MEDICINE

## 2023-04-26 PROCEDURE — 93286 PERI-PX EVAL PM/LDLS PM IP: CPT

## 2023-04-26 PROCEDURE — 93286 PERI-PX EVAL PM/LDLS PM IP: CPT | Mod: 26 | Performed by: INTERNAL MEDICINE

## 2023-04-26 PROCEDURE — 255N000002 HC RX 255 OP 636: Performed by: INTERNAL MEDICINE

## 2023-04-26 PROCEDURE — 74183 MRI ABD W/O CNTR FLWD CNTR: CPT | Mod: 26 | Performed by: RADIOLOGY

## 2023-04-26 PROCEDURE — 343N000001 HC RX 343: Performed by: INTERNAL MEDICINE

## 2023-04-26 PROCEDURE — 74183 MRI ABD W/O CNTR FLWD CNTR: CPT

## 2023-04-26 PROCEDURE — 250N000011 HC RX IP 250 OP 636: Performed by: RADIOLOGY

## 2023-04-26 PROCEDURE — A9537 TC99M MEBROFENIN: HCPCS | Performed by: INTERNAL MEDICINE

## 2023-04-26 PROCEDURE — 78830 RP LOCLZJ TUM SPECT W/CT 1: CPT

## 2023-04-26 PROCEDURE — 78226 HEPATOBILIARY SYSTEM IMAGING: CPT | Mod: 26 | Performed by: RADIOLOGY

## 2023-04-26 RX ORDER — GADOBUTROL 604.72 MG/ML
6 INJECTION INTRAVENOUS ONCE
Status: COMPLETED | OUTPATIENT
Start: 2023-04-26 | End: 2023-04-26

## 2023-04-26 RX ORDER — KIT FOR THE PREPARATION OF TECHNETIUM TC 99M MEBROFENIN 45 MG/10ML
6.3 INJECTION, POWDER, LYOPHILIZED, FOR SOLUTION INTRAVENOUS ONCE
Status: COMPLETED | OUTPATIENT
Start: 2023-04-26 | End: 2023-04-26

## 2023-04-26 RX ORDER — MORPHINE SULFATE 2 MG/ML
2 INJECTION, SOLUTION INTRAMUSCULAR; INTRAVENOUS ONCE
Status: COMPLETED | OUTPATIENT
Start: 2023-04-26 | End: 2023-04-26

## 2023-04-26 RX ADMIN — MORPHINE SULFATE 2 MG: 2 INJECTION, SOLUTION INTRAMUSCULAR; INTRAVENOUS at 09:46

## 2023-04-26 RX ADMIN — MEBROFENIN 6.3 MILLICURIE: 45 INJECTION, POWDER, LYOPHILIZED, FOR SOLUTION INTRAVENOUS at 08:24

## 2023-04-26 RX ADMIN — GADOBUTROL 6 ML: 604.72 INJECTION INTRAVENOUS at 12:07

## 2023-05-02 ENCOUNTER — TELEPHONE (OUTPATIENT)
Dept: CARE COORDINATION | Facility: CLINIC | Age: 88
End: 2023-05-02
Payer: COMMERCIAL

## 2023-05-14 LAB
MDC_IDC_EPISODE_DTM: NORMAL
MDC_IDC_EPISODE_DURATION: 0 S
MDC_IDC_EPISODE_DURATION: 1146 S
MDC_IDC_EPISODE_DURATION: 118 S
MDC_IDC_EPISODE_DURATION: 1198 S
MDC_IDC_EPISODE_DURATION: 1284 S
MDC_IDC_EPISODE_DURATION: 225 S
MDC_IDC_EPISODE_DURATION: 2291 S
MDC_IDC_EPISODE_DURATION: 237 S
MDC_IDC_EPISODE_DURATION: 242 S
MDC_IDC_EPISODE_DURATION: 260 S
MDC_IDC_EPISODE_DURATION: 277 S
MDC_IDC_EPISODE_DURATION: 3640 S
MDC_IDC_EPISODE_DURATION: 46 S
MDC_IDC_EPISODE_DURATION: 507 S
MDC_IDC_EPISODE_DURATION: 552 S
MDC_IDC_EPISODE_DURATION: 6633 S
MDC_IDC_EPISODE_ID: 412
MDC_IDC_EPISODE_ID: 413
MDC_IDC_EPISODE_ID: 414
MDC_IDC_EPISODE_ID: 415
MDC_IDC_EPISODE_ID: 416
MDC_IDC_EPISODE_ID: 417
MDC_IDC_EPISODE_ID: 418
MDC_IDC_EPISODE_ID: 419
MDC_IDC_EPISODE_ID: 420
MDC_IDC_EPISODE_ID: 421
MDC_IDC_EPISODE_ID: 422
MDC_IDC_EPISODE_ID: 423
MDC_IDC_EPISODE_ID: 424
MDC_IDC_EPISODE_ID: 425
MDC_IDC_EPISODE_ID: 426
MDC_IDC_EPISODE_ID: 427
MDC_IDC_EPISODE_TYPE: NORMAL
MDC_IDC_LEAD_IMPLANT_DT: NORMAL
MDC_IDC_LEAD_IMPLANT_DT: NORMAL
MDC_IDC_LEAD_LOCATION: NORMAL
MDC_IDC_LEAD_LOCATION: NORMAL
MDC_IDC_LEAD_LOCATION_DETAIL_1: NORMAL
MDC_IDC_LEAD_LOCATION_DETAIL_1: NORMAL
MDC_IDC_LEAD_MFG: NORMAL
MDC_IDC_LEAD_MFG: NORMAL
MDC_IDC_LEAD_MODEL: NORMAL
MDC_IDC_LEAD_MODEL: NORMAL
MDC_IDC_LEAD_POLARITY_TYPE: NORMAL
MDC_IDC_LEAD_POLARITY_TYPE: NORMAL
MDC_IDC_LEAD_SERIAL: NORMAL
MDC_IDC_LEAD_SERIAL: NORMAL
MDC_IDC_LEAD_SPECIAL_FUNCTION: NORMAL
MDC_IDC_LEAD_SPECIAL_FUNCTION: NORMAL
MDC_IDC_MSMT_BATTERY_DTM: NORMAL
MDC_IDC_MSMT_BATTERY_REMAINING_LONGEVITY: 53 MO
MDC_IDC_MSMT_BATTERY_RRT_TRIGGER: 2.83
MDC_IDC_MSMT_BATTERY_STATUS: NORMAL
MDC_IDC_MSMT_BATTERY_VOLTAGE: 2.98 V
MDC_IDC_MSMT_LEADCHNL_RA_IMPEDANCE_VALUE: 437 OHM
MDC_IDC_MSMT_LEADCHNL_RA_PACING_THRESHOLD_AMPLITUDE: 0.75 V
MDC_IDC_MSMT_LEADCHNL_RA_PACING_THRESHOLD_PULSEWIDTH: 0.4 MS
MDC_IDC_MSMT_LEADCHNL_RA_SENSING_INTR_AMPL: 1.3 MV
MDC_IDC_MSMT_LEADCHNL_RV_IMPEDANCE_VALUE: 475 OHM
MDC_IDC_MSMT_LEADCHNL_RV_PACING_THRESHOLD_AMPLITUDE: 0.75 V
MDC_IDC_MSMT_LEADCHNL_RV_PACING_THRESHOLD_PULSEWIDTH: 0.4 MS
MDC_IDC_MSMT_LEADCHNL_RV_SENSING_INTR_AMPL: 11.3 MV
MDC_IDC_PG_IMPLANT_DTM: NORMAL
MDC_IDC_PG_MFG: NORMAL
MDC_IDC_PG_MODEL: NORMAL
MDC_IDC_PG_SERIAL: NORMAL
MDC_IDC_PG_TYPE: NORMAL
MDC_IDC_SESS_CLINIC_NAME: NORMAL
MDC_IDC_SESS_DTM: NORMAL
MDC_IDC_SESS_TYPE: NORMAL
MDC_IDC_SET_BRADY_AT_MODE_SWITCH_RATE: 171 {BEATS}/MIN
MDC_IDC_SET_BRADY_HYSTRATE: NORMAL
MDC_IDC_SET_BRADY_LOWRATE: 80 {BEATS}/MIN
MDC_IDC_SET_BRADY_MAX_SENSOR_RATE: 130 {BEATS}/MIN
MDC_IDC_SET_BRADY_MAX_TRACKING_RATE: 130 {BEATS}/MIN
MDC_IDC_SET_BRADY_MODE: NORMAL
MDC_IDC_SET_BRADY_PAV_DELAY_LOW: 180 MS
MDC_IDC_SET_BRADY_SAV_DELAY_LOW: 150 MS
MDC_IDC_SET_LEADCHNL_RA_PACING_AMPLITUDE: 5 V
MDC_IDC_SET_LEADCHNL_RA_PACING_ANODE_ELECTRODE_1: NORMAL
MDC_IDC_SET_LEADCHNL_RA_PACING_ANODE_LOCATION_1: NORMAL
MDC_IDC_SET_LEADCHNL_RA_PACING_CAPTURE_MODE: NORMAL
MDC_IDC_SET_LEADCHNL_RA_PACING_CATHODE_ELECTRODE_1: NORMAL
MDC_IDC_SET_LEADCHNL_RA_PACING_CATHODE_LOCATION_1: NORMAL
MDC_IDC_SET_LEADCHNL_RA_PACING_POLARITY: NORMAL
MDC_IDC_SET_LEADCHNL_RA_PACING_PULSEWIDTH: 1 MS
MDC_IDC_SET_LEADCHNL_RA_SENSING_ANODE_ELECTRODE_1: NORMAL
MDC_IDC_SET_LEADCHNL_RA_SENSING_ANODE_LOCATION_1: NORMAL
MDC_IDC_SET_LEADCHNL_RA_SENSING_CATHODE_ELECTRODE_1: NORMAL
MDC_IDC_SET_LEADCHNL_RA_SENSING_CATHODE_LOCATION_1: NORMAL
MDC_IDC_SET_LEADCHNL_RA_SENSING_POLARITY: NORMAL
MDC_IDC_SET_LEADCHNL_RA_SENSING_SENSITIVITY: 0.3 MV
MDC_IDC_SET_LEADCHNL_RV_PACING_AMPLITUDE: 5 V
MDC_IDC_SET_LEADCHNL_RV_PACING_ANODE_ELECTRODE_1: NORMAL
MDC_IDC_SET_LEADCHNL_RV_PACING_ANODE_LOCATION_1: NORMAL
MDC_IDC_SET_LEADCHNL_RV_PACING_CAPTURE_MODE: NORMAL
MDC_IDC_SET_LEADCHNL_RV_PACING_CATHODE_ELECTRODE_1: NORMAL
MDC_IDC_SET_LEADCHNL_RV_PACING_CATHODE_LOCATION_1: NORMAL
MDC_IDC_SET_LEADCHNL_RV_PACING_POLARITY: NORMAL
MDC_IDC_SET_LEADCHNL_RV_PACING_PULSEWIDTH: 1 MS
MDC_IDC_SET_LEADCHNL_RV_SENSING_ANODE_ELECTRODE_1: NORMAL
MDC_IDC_SET_LEADCHNL_RV_SENSING_ANODE_LOCATION_1: NORMAL
MDC_IDC_SET_LEADCHNL_RV_SENSING_CATHODE_ELECTRODE_1: NORMAL
MDC_IDC_SET_LEADCHNL_RV_SENSING_CATHODE_LOCATION_1: NORMAL
MDC_IDC_SET_LEADCHNL_RV_SENSING_POLARITY: NORMAL
MDC_IDC_SET_LEADCHNL_RV_SENSING_SENSITIVITY: 0.9 MV
MDC_IDC_SET_ZONE_DETECTION_INTERVAL: 350 MS
MDC_IDC_SET_ZONE_DETECTION_INTERVAL: 400 MS
MDC_IDC_SET_ZONE_TYPE: NORMAL
MDC_IDC_STAT_AT_BURDEN_PERCENT: 0.6 %
MDC_IDC_STAT_AT_DTM_END: NORMAL
MDC_IDC_STAT_AT_DTM_START: NORMAL
MDC_IDC_STAT_BRADY_AP_VP_PERCENT: 0.12 %
MDC_IDC_STAT_BRADY_AP_VS_PERCENT: 99.03 %
MDC_IDC_STAT_BRADY_AS_VP_PERCENT: 0.07 %
MDC_IDC_STAT_BRADY_AS_VS_PERCENT: 0.78 %
MDC_IDC_STAT_BRADY_DTM_END: NORMAL
MDC_IDC_STAT_BRADY_DTM_START: NORMAL
MDC_IDC_STAT_BRADY_RA_PERCENT_PACED: 99.1 %
MDC_IDC_STAT_BRADY_RV_PERCENT_PACED: 0.2 %
MDC_IDC_STAT_EPISODE_RECENT_COUNT: 0
MDC_IDC_STAT_EPISODE_RECENT_COUNT: 1
MDC_IDC_STAT_EPISODE_RECENT_COUNT: 15
MDC_IDC_STAT_EPISODE_RECENT_COUNT_DTM_END: NORMAL
MDC_IDC_STAT_EPISODE_RECENT_COUNT_DTM_START: NORMAL
MDC_IDC_STAT_EPISODE_TOTAL_COUNT: 0
MDC_IDC_STAT_EPISODE_TOTAL_COUNT: 0
MDC_IDC_STAT_EPISODE_TOTAL_COUNT: 364
MDC_IDC_STAT_EPISODE_TOTAL_COUNT: 61
MDC_IDC_STAT_EPISODE_TOTAL_COUNT_DTM_END: NORMAL
MDC_IDC_STAT_EPISODE_TOTAL_COUNT_DTM_START: NORMAL
MDC_IDC_STAT_EPISODE_TYPE: NORMAL

## 2023-05-22 ENCOUNTER — NURSE TRIAGE (OUTPATIENT)
Dept: NURSING | Facility: CLINIC | Age: 88
End: 2023-05-22
Payer: COMMERCIAL

## 2023-05-22 NOTE — TELEPHONE ENCOUNTER
Nausea and loose stools x2 days. Pt states she isn't vomiting. Pt has been more nauseous today than yesterday. Tolerating po fluids but states that she probably should be drinking more. Pt did say that she felt a little wobbly earlier today but was better now. Pt denied the need to be seen sooner than tomorrow.  Pt encouraged to increase fluids and limit caffeine until her appt to stay hydrated, and to seek care at either urgent care or ED if her symptoms worsen between now and her appt tomorrow afternoon.          Reason for Disposition    Patient wants to be seen    Additional Information    Negative: Shock suspected (e.g., cold/pale/clammy skin, too weak to stand, low BP, rapid pulse)    Negative: Sounds like a life-threatening emergency to the triager    Negative: Nausea or vomiting and pregnancy < 20 weeks    Negative: Menstrual Period - Missed or Late (i.e., pregnancy suspected)    Negative: Heat exhaustion suspected (i.e., dehydration from heat exposure)    Negative: Anxiety or stress suspected (i.e., nausea with anxiety attacks or stressful situations)    Negative: Traumatic Brain Injury (TBI) suspected    Negative: Vomiting occurs    Negative: Other symptom is present, see that guideline.  (e.g., chest pain, headache, dizziness, abdominal pain, colds, sore throat, etc.).    Negative: Unable to walk, or can only walk with assistance (e.g., requires support)    Negative: Difficulty breathing    Negative: Insulin-dependent diabetes (Type I) and glucose > 400 mg/dL (22 mmol/L)    Negative: Drinking very little and dehydration suspected (e.g., no urine > 12 hours, very dry mouth, very lightheaded)    Negative: Patient sounds very sick or weak to the triager    Negative: Fever > 104 F  (40 C)    Negative: Fever > 101 F  (38.3 C) and over 60 years of age    Negative: Fever > 100.0 F  (37.8 C) and bedridden (e.g., nursing home patient, CVA, chronic illness, recovering from surgery)    Negative: Fever > 100.0 F   "(37.8 C) and diabetes mellitus or weak immune system (e.g., HIV positive, cancer chemo, splenectomy, chronic steroids)    Negative: Taking any of the following medications: digoxin (Lanoxin), lithium, theophylline, phenytoin (Dilantin)    Negative: Yellowish color of the skin or white of the eye (i.e., jaundice)    Negative: Fever present > 3 days (72 hours)    Answer Assessment - Initial Assessment Questions  1. NAUSEA SEVERITY: \"How bad is the nausea?\" (e.g., mild, moderate, severe; dehydration, weight loss)    - MILD: loss of appetite without change in eating habits    - MODERATE: decreased oral intake without significant weight loss, dehydration, or malnutrition    - SEVERE: inadequate caloric or fluid intake, significant weight loss, symptoms of dehydration      Moderate - not drinking enough water    2. ONSET: \"When did the nausea begin?\"      April - has been off and on    3. VOMITING: \"Any vomiting?\" If Yes, ask: \"How many times today?\"      None    4. RECURRENT SYMPTOM: \"Have you had nausea before?\" If Yes, ask: \"When was the last time?\" \"What happened that time?\"      Currently experiencing    5. CAUSE: \"What do you think is causing the nausea?\"      No idea    6. PREGNANCY: \"Is there any chance you are pregnant?\" (e.g., unprotected intercourse, missed birth control pill, broken condom)      none    Protocols used: NAUSEA-A-OH      "

## 2023-05-23 ENCOUNTER — LAB (OUTPATIENT)
Dept: LAB | Facility: CLINIC | Age: 88
End: 2023-05-23
Payer: COMMERCIAL

## 2023-05-23 ENCOUNTER — OFFICE VISIT (OUTPATIENT)
Dept: INTERNAL MEDICINE | Facility: CLINIC | Age: 88
End: 2023-05-23
Payer: COMMERCIAL

## 2023-05-23 VITALS
WEIGHT: 126.9 LBS | DIASTOLIC BLOOD PRESSURE: 60 MMHG | SYSTOLIC BLOOD PRESSURE: 153 MMHG | OXYGEN SATURATION: 96 % | BODY MASS INDEX: 23.35 KG/M2 | HEIGHT: 62 IN | HEART RATE: 78 BPM

## 2023-05-23 DIAGNOSIS — R42 DIZZINESS: Primary | ICD-10-CM

## 2023-05-23 DIAGNOSIS — R11.2 NAUSEA AND VOMITING, UNSPECIFIED VOMITING TYPE: ICD-10-CM

## 2023-05-23 DIAGNOSIS — R42 DIZZINESS: ICD-10-CM

## 2023-05-23 DIAGNOSIS — I10 PRIMARY HYPERTENSION: ICD-10-CM

## 2023-05-23 LAB
ALBUMIN SERPL BCG-MCNC: 3.8 G/DL (ref 3.5–5.2)
ALP SERPL-CCNC: 116 U/L (ref 35–104)
ALT SERPL W P-5'-P-CCNC: 35 U/L (ref 10–35)
ANION GAP SERPL CALCULATED.3IONS-SCNC: 10 MMOL/L (ref 7–15)
AST SERPL W P-5'-P-CCNC: 48 U/L (ref 10–35)
BILIRUB SERPL-MCNC: 0.7 MG/DL
BUN SERPL-MCNC: 34.8 MG/DL (ref 8–23)
CALCIUM SERPL-MCNC: 10.4 MG/DL (ref 8.2–9.6)
CHLORIDE SERPL-SCNC: 103 MMOL/L (ref 98–107)
CREAT SERPL-MCNC: 0.85 MG/DL (ref 0.51–0.95)
DEPRECATED HCO3 PLAS-SCNC: 25 MMOL/L (ref 22–29)
ERYTHROCYTE [DISTWIDTH] IN BLOOD BY AUTOMATED COUNT: 13.7 % (ref 10–15)
GFR SERPL CREATININE-BSD FRML MDRD: 64 ML/MIN/1.73M2
GLUCOSE SERPL-MCNC: 137 MG/DL (ref 70–99)
HCT VFR BLD AUTO: 35.1 % (ref 35–47)
HGB BLD-MCNC: 11.5 G/DL (ref 11.7–15.7)
LIPASE SERPL-CCNC: 40 U/L (ref 13–60)
MCH RBC QN AUTO: 29.6 PG (ref 26.5–33)
MCHC RBC AUTO-ENTMCNC: 32.8 G/DL (ref 31.5–36.5)
MCV RBC AUTO: 91 FL (ref 78–100)
PLATELET # BLD AUTO: 171 10E3/UL (ref 150–450)
POTASSIUM SERPL-SCNC: 4.2 MMOL/L (ref 3.4–5.3)
PROT SERPL-MCNC: 7.3 G/DL (ref 6.4–8.3)
RBC # BLD AUTO: 3.88 10E6/UL (ref 3.8–5.2)
SODIUM SERPL-SCNC: 138 MMOL/L (ref 136–145)
WBC # BLD AUTO: 7 10E3/UL (ref 4–11)

## 2023-05-23 PROCEDURE — 85027 COMPLETE CBC AUTOMATED: CPT | Performed by: PATHOLOGY

## 2023-05-23 PROCEDURE — 83690 ASSAY OF LIPASE: CPT | Performed by: PATHOLOGY

## 2023-05-23 PROCEDURE — 36415 COLL VENOUS BLD VENIPUNCTURE: CPT | Performed by: PATHOLOGY

## 2023-05-23 PROCEDURE — 80053 COMPREHEN METABOLIC PANEL: CPT | Performed by: PATHOLOGY

## 2023-05-23 PROCEDURE — 99213 OFFICE O/P EST LOW 20 MIN: CPT | Mod: GC

## 2023-05-23 RX ORDER — ONDANSETRON 4 MG/1
4 TABLET, FILM COATED ORAL EVERY 6 HOURS PRN
Qty: 30 TABLET | Refills: 0 | Status: SHIPPED | OUTPATIENT
Start: 2023-05-23 | End: 2023-06-02

## 2023-05-23 RX ORDER — LISINOPRIL/HYDROCHLOROTHIAZIDE 10-12.5 MG
1 TABLET ORAL DAILY
COMMUNITY
Start: 2023-05-10 | End: 2023-05-23

## 2023-05-23 RX ORDER — LISINOPRIL/HYDROCHLOROTHIAZIDE 10-12.5 MG
2 TABLET ORAL DAILY
Qty: 60 TABLET | Refills: 0 | Status: ON HOLD | OUTPATIENT
Start: 2023-05-23 | End: 2023-05-27

## 2023-05-23 NOTE — NURSING NOTE
"Isadora Mendez is a 89 year old female patient that presents today in clinic for the following:    Chief Complaint   Patient presents with     Nausea     Pt here for nausea since end of March this year. Pt also reports dizziness when getting up, shortness of breath with activity, and muscle fatigue worsened for past week.     The patient's allergies and medications were reviewed as noted. A set of vitals were recorded as noted without incident: BP (!) 153/60 (BP Location: Right arm, Patient Position: Sitting, Cuff Size: Adult Small)   Pulse 78   Ht 1.585 m (5' 2.4\")   Wt 57.6 kg (126 lb 14.4 oz)   SpO2 97%   BMI 22.91 kg/m  . The patient does not have any other questions for the provider.    Kareen Silva, EMT at 2:35 PM on 5/23/2023  "

## 2023-05-23 NOTE — PROGRESS NOTES
[facility]         HPI:       Isadora Menedz is a 89 year old female with PMH of pancreatitis, sick sinus syndrome s/p PM implantation, exercise induced VT, atrial tachyarrhyrhmias and hypertension  who presents for dizziness and nausea. Shortness of breath onsetting in the last three weeks. She feels like she is gonna fall when she walks. The room does not spin. Her symptoms get worse when she stands up. Has a pacemaker in place. The device per the patient is working appropriately. Does not get dizziness when she is lying down or when she ambulates. Has new onset shortness of breath after walking about 50 feet which is new for the patient. Denies any weight change. The breathing does not change with her lying down. She has nausea that comes and goes. Does have some abdominal discomfort in the midline that does not bother the patient right now.              Review of Systems:     ROS  I have personally reviewed and updated the complete ROS on the day of the visit.             Past Medical History:     Past Medical History:   Diagnosis Date     Atrial fibrillation (H) 2011     Facial fracture (H) 2006     Hypertension      NSVT (nonsustained ventricular tachycardia) (H) 2009    during exercise echo, neg EP study     Pacemaker 7-1-2010     S/P cardiac catheterization 2009    30-40% non obstructive lesion     Ventricular tachycardia, nonsustained (H) 2009    during stress echo            Past Surgical History:     Past Surgical History:   Procedure Laterality Date     IMPLANT PACEMAKER       PPM  6/30/2010    Permanent Pacemaker       Current Outpatient Medications   Medication Sig Dispense Refill     Ascorbic Acid (VITAMIN C PO)        aspirin 81 MG tablet Take 1 tablet by mouth daily.       BIOTIN PO Take 1 capsule by mouth daily.       Cyanocobalamin (VITAMIN B12 PO) Take 1 tablet by mouth every 14 days        famotidine (PEPCID) 20 MG tablet Take 1 tablet (20 mg) by mouth 2 times daily 180 tablet 3      lisinopril-hydrochlorothiazide (ZESTORETIC) 10-12.5 MG tablet Take 2 tablets by mouth daily for 30 days 60 tablet 0     Magnesium 300 MG CAPS Take 1 tablet by mouth       metoprolol succinate ER (TOPROL XL) 100 MG 24 hr tablet Take 1 tablet (100 mg) by mouth daily Use with 50 mg tab in the pm 90 tablet 3     metoprolol succinate ER (TOPROL XL) 50 MG 24 hr tablet Take 1 tablet (50 mg) by mouth every evening Use with 100mg tab in the am 90 tablet 3     Nutritional Supplements (PYCNOGENOL) 300-30 MG CAPS per capsule Take 1 capsule by mouth daily       ondansetron (ZOFRAN) 4 MG tablet Take 1 tablet (4 mg) by mouth every 6 hours as needed for nausea or vomiting 30 tablet 0     Vitamin D, Cholecalciferol, 1000 units TABS Take 1 tablet by mouth daily       VITAMIN K PO Take 1 tablet by mouth daily       co-enzyme Q-10 100 MG CAPS capsule Take 100 mg by mouth (Patient not taking: Reported on 2023)       hydrochlorothiazide (HYDRODIURIL) 12.5 MG tablet Take 1 tablet (12.5 mg) by mouth daily (Patient not taking: Reported on 2023) 90 tablet 3     lisinopril (ZESTRIL) 10 MG tablet Take 1 tablet (10 mg) by mouth daily (Patient not taking: Reported on 2023) 90 tablet 3        No Known Allergies         Family History:     Family History   Problem Relation Age of Onset     Cardiovascular Father 76         age 80, MI 76 and CHF 80's     Cardiovascular Paternal Grandfather          age 76 of CVD     Myocardial Infarction Mother 88        lived to 100     Arrhythmia Sister         PPM     Neuropathy Sister      Colon Cancer Sister      Arrhythmia Brother         pacemaker     Arrhythmia Brother         pacemaker, hx rheumatic fever, heart failure     Blood Disease Son         hemochromatosis?     Atrial fibrillation Son      Cancer Daughter         Breast, uterine, 2nd breast cancer, HTN            Social History:     Social History     Socioeconomic History     Marital status:      Spouse name: Not on  "file     Number of children: Not on file     Years of education: Not on file     Highest education level: Not on file   Occupational History     Occupation: retired     Comment: nurse   Tobacco Use     Smoking status: Never     Smokeless tobacco: Never   Vaping Use     Vaping status: Not on file   Substance and Sexual Activity     Alcohol use: No     Drug use: No     Sexual activity: Not on file   Other Topics Concern     Parent/sibling w/ CABG, MI or angioplasty before 65F 55M? Not Asked   Social History Narrative     Not on file     Social Determinants of Health     Financial Resource Strain: Not on file   Food Insecurity: Not on file   Transportation Needs: Not on file   Physical Activity: Not on file   Stress: Not on file   Social Connections: Not on file   Intimate Partner Violence: Not on file   Housing Stability: Not on file            Physical Exam:   BP (!) 153/60 (BP Location: Right arm, Patient Position: Sitting, Cuff Size: Adult Small)   Pulse 78   Ht 1.585 m (5' 2.4\")   Wt 57.6 kg (126 lb 14.4 oz)   SpO2 96%   BMI 22.91 kg/m    Body mass index is 22.91 kg/m .  Vitals were reviewed     Gen: no distress, comfortable, pleasant   Eyes: anicteric, normal extra-ocular movements   Gastrointestinal: mild RUQ tenderness without rebound tenderness or guarding.  Skin: no concerning lesions, no jaundice   Psychological: appropriate mood      Assessment and Plan     Isadora was seen today for nausea.    Diagnoses and all orders for this visit:    Dizziness  -     Comprehensive metabolic panel (BMP + Alb, Alk Phos, ALT, AST, Total. Bili, TP); Future  -     Lipase; Future  -     CBC with platelets; Future    Nausea and vomiting, unspecified vomiting type  -     ondansetron (ZOFRAN) 4 MG tablet; Take 1 tablet (4 mg) by mouth every 6 hours as needed for nausea or vomiting  -     Comprehensive metabolic panel (BMP + Alb, Alk Phos, ALT, AST, Total. Bili, TP); Future  -     Lipase; Future  -     CBC with platelets; " Future    Primary hypertension  -     lisinopril-hydrochlorothiazide (ZESTORETIC) 10-12.5 MG tablet; Take 2 tablets by mouth daily for 30 days    Patient presenting with vague symptoms. The nausea appears to be chronic and recent exacerbation to be related to running out of zofran, refilled the medication to assess for potential improvement. We performed orthostatic blood pressure readings and the patient had a bp of 160/60 lying and 200/60 standing with repeat measurement to verify. Likely dizziness and shortness of breath are related to worsening blood pressure due to autonomic dysfunction/increased vascular rigidity seconary to ageing. We increased her hydrochlorothiazide/lisinoril to 25/20 and will have her follow-up with her PCP in 10 days to assess for any interval symptom improvement. The patient's rates appear to be essentially normal on the current doses of metoprolol, we will defer from making any adjustments to the 100mg qam/50 qpm regimen. We will also recheck electrolytes and kidney function.    Options for treatment and follow-up care were reviewed with the patient. Isadora Mendez engaged in the decision making process and verbalized understanding of the options discussed and agreed with the final plan.    Hailey Sandy MD  Internal Medicine, PGY-2  [facility]  [phone]   May 23, 2023    Follow-up with PCP on 6/2/23    Pt was seen and plan of care discussed with Dr Gomez.

## 2023-05-24 ENCOUNTER — TELEPHONE (OUTPATIENT)
Dept: CARDIOLOGY | Facility: CLINIC | Age: 88
End: 2023-05-24

## 2023-05-24 ENCOUNTER — APPOINTMENT (OUTPATIENT)
Dept: CT IMAGING | Facility: CLINIC | Age: 88
DRG: 643 | End: 2023-05-24
Attending: STUDENT IN AN ORGANIZED HEALTH CARE EDUCATION/TRAINING PROGRAM
Payer: COMMERCIAL

## 2023-05-24 ENCOUNTER — APPOINTMENT (OUTPATIENT)
Dept: ULTRASOUND IMAGING | Facility: CLINIC | Age: 88
DRG: 643 | End: 2023-05-24
Attending: STUDENT IN AN ORGANIZED HEALTH CARE EDUCATION/TRAINING PROGRAM
Payer: COMMERCIAL

## 2023-05-24 ENCOUNTER — HOSPITAL ENCOUNTER (INPATIENT)
Facility: CLINIC | Age: 88
LOS: 3 days | Discharge: HOME | DRG: 643 | End: 2023-05-27
Attending: STUDENT IN AN ORGANIZED HEALTH CARE EDUCATION/TRAINING PROGRAM | Admitting: PEDIATRICS
Payer: COMMERCIAL

## 2023-05-24 ENCOUNTER — APPOINTMENT (OUTPATIENT)
Dept: GENERAL RADIOLOGY | Facility: CLINIC | Age: 88
DRG: 643 | End: 2023-05-24
Attending: STUDENT IN AN ORGANIZED HEALTH CARE EDUCATION/TRAINING PROGRAM
Payer: COMMERCIAL

## 2023-05-24 ENCOUNTER — ANCILLARY PROCEDURE (OUTPATIENT)
Dept: CARDIOLOGY | Facility: CLINIC | Age: 88
DRG: 643 | End: 2023-05-24
Attending: INTERNAL MEDICINE
Payer: COMMERCIAL

## 2023-05-24 ENCOUNTER — NURSE TRIAGE (OUTPATIENT)
Dept: CARDIOLOGY | Facility: CLINIC | Age: 88
End: 2023-05-24

## 2023-05-24 DIAGNOSIS — J90 PLEURAL EFFUSION: ICD-10-CM

## 2023-05-24 DIAGNOSIS — R79.89 ELEVATED BRAIN NATRIURETIC PEPTIDE (BNP) LEVEL: ICD-10-CM

## 2023-05-24 DIAGNOSIS — R11.2 NAUSEA AND VOMITING, UNSPECIFIED VOMITING TYPE: ICD-10-CM

## 2023-05-24 DIAGNOSIS — M71.20 SYNOVIAL CYST OF POPLITEAL SPACE, UNSPECIFIED LATERALITY: ICD-10-CM

## 2023-05-24 DIAGNOSIS — Z95.0 PACEMAKER: ICD-10-CM

## 2023-05-24 DIAGNOSIS — E05.90 HYPERTHYROIDISM: ICD-10-CM

## 2023-05-24 DIAGNOSIS — E03.9 HYPOTHYROIDISM, UNSPECIFIED TYPE: ICD-10-CM

## 2023-05-24 DIAGNOSIS — I48.0 PAROXYSMAL A-FIB (H): ICD-10-CM

## 2023-05-24 DIAGNOSIS — J90 PLEURAL EFFUSION ON RIGHT: ICD-10-CM

## 2023-05-24 DIAGNOSIS — I50.30 DIASTOLIC HEART FAILURE, UNSPECIFIED HF CHRONICITY (H): ICD-10-CM

## 2023-05-24 DIAGNOSIS — R06.09 DYSPNEA ON EXERTION: ICD-10-CM

## 2023-05-24 DIAGNOSIS — R42 DYSEQUILIBRIUM: ICD-10-CM

## 2023-05-24 DIAGNOSIS — G89.29 OTHER CHRONIC PAIN: ICD-10-CM

## 2023-05-24 DIAGNOSIS — K81.1 CHRONIC CHOLECYSTITIS: ICD-10-CM

## 2023-05-24 DIAGNOSIS — R26.81 UNSTEADINESS ON FEET: ICD-10-CM

## 2023-05-24 DIAGNOSIS — R94.31 ABNORMAL EKG: ICD-10-CM

## 2023-05-24 DIAGNOSIS — I50.30 HEART FAILURE WITH PRESERVED EJECTION FRACTION, NYHA CLASS I (H): Primary | ICD-10-CM

## 2023-05-24 LAB
ALBUMIN SERPL BCG-MCNC: 3.5 G/DL (ref 3.5–5.2)
ALP SERPL-CCNC: 103 U/L (ref 35–104)
ALT SERPL W P-5'-P-CCNC: 38 U/L (ref 10–35)
ANION GAP SERPL CALCULATED.3IONS-SCNC: 11 MMOL/L (ref 7–15)
AST SERPL W P-5'-P-CCNC: 51 U/L (ref 10–35)
BASOPHILS # BLD AUTO: 0 10E3/UL (ref 0–0.2)
BASOPHILS NFR BLD AUTO: 0 %
BILIRUB SERPL-MCNC: 0.7 MG/DL
BUN SERPL-MCNC: 36 MG/DL (ref 8–23)
CALCIUM SERPL-MCNC: 9.9 MG/DL (ref 8.2–9.6)
CHLORIDE SERPL-SCNC: 103 MMOL/L (ref 98–107)
CREAT SERPL-MCNC: 0.89 MG/DL (ref 0.51–0.95)
DEPRECATED HCO3 PLAS-SCNC: 22 MMOL/L (ref 22–29)
EOSINOPHIL # BLD AUTO: 0.1 10E3/UL (ref 0–0.7)
EOSINOPHIL NFR BLD AUTO: 1 %
ERYTHROCYTE [DISTWIDTH] IN BLOOD BY AUTOMATED COUNT: 13.5 % (ref 10–15)
GFR SERPL CREATININE-BSD FRML MDRD: 60 ML/MIN/1.73M2
GLUCOSE SERPL-MCNC: 122 MG/DL (ref 70–99)
HCT VFR BLD AUTO: 35.1 % (ref 35–47)
HGB BLD-MCNC: 10.7 G/DL (ref 11.7–15.7)
HOLD SPECIMEN: NORMAL
IMM GRANULOCYTES # BLD: 0 10E3/UL
IMM GRANULOCYTES NFR BLD: 0 %
INR PPP: 1.26 (ref 0.85–1.15)
LACTATE SERPL-SCNC: 1.4 MMOL/L (ref 0.7–2)
LIPASE SERPL-CCNC: 46 U/L (ref 13–60)
LYMPHOCYTES # BLD AUTO: 1.8 10E3/UL (ref 0.8–5.3)
LYMPHOCYTES NFR BLD AUTO: 26 %
MAGNESIUM SERPL-MCNC: 2.3 MG/DL (ref 1.7–2.3)
MCH RBC QN AUTO: 28.8 PG (ref 26.5–33)
MCHC RBC AUTO-ENTMCNC: 30.5 G/DL (ref 31.5–36.5)
MCV RBC AUTO: 94 FL (ref 78–100)
MONOCYTES # BLD AUTO: 1.1 10E3/UL (ref 0–1.3)
MONOCYTES NFR BLD AUTO: 16 %
NEUTROPHILS # BLD AUTO: 4 10E3/UL (ref 1.6–8.3)
NEUTROPHILS NFR BLD AUTO: 57 %
NRBC # BLD AUTO: 0 10E3/UL
NRBC BLD AUTO-RTO: 0 /100
NT-PROBNP SERPL-MCNC: 2414 PG/ML (ref 0–1800)
PLATELET # BLD AUTO: 146 10E3/UL (ref 150–450)
POTASSIUM SERPL-SCNC: 4.2 MMOL/L (ref 3.4–5.3)
PROT SERPL-MCNC: 6.8 G/DL (ref 6.4–8.3)
RBC # BLD AUTO: 3.72 10E6/UL (ref 3.8–5.2)
SODIUM SERPL-SCNC: 136 MMOL/L (ref 136–145)
T4 FREE SERPL-MCNC: >7.77 NG/DL (ref 0.9–1.7)
TROPONIN T SERPL HS-MCNC: 34 NG/L
TROPONIN T SERPL HS-MCNC: 38 NG/L
TSH SERPL DL<=0.005 MIU/L-ACNC: <0.01 UIU/ML (ref 0.3–4.2)
WBC # BLD AUTO: 7 10E3/UL (ref 4–11)

## 2023-05-24 PROCEDURE — 70450 CT HEAD/BRAIN W/O DYE: CPT

## 2023-05-24 PROCEDURE — 84439 ASSAY OF FREE THYROXINE: CPT | Performed by: STUDENT IN AN ORGANIZED HEALTH CARE EDUCATION/TRAINING PROGRAM

## 2023-05-24 PROCEDURE — 76700 US EXAM ABDOM COMPLETE: CPT | Mod: 26 | Performed by: RADIOLOGY

## 2023-05-24 PROCEDURE — 36415 COLL VENOUS BLD VENIPUNCTURE: CPT | Performed by: STUDENT IN AN ORGANIZED HEALTH CARE EDUCATION/TRAINING PROGRAM

## 2023-05-24 PROCEDURE — 83880 ASSAY OF NATRIURETIC PEPTIDE: CPT | Performed by: STUDENT IN AN ORGANIZED HEALTH CARE EDUCATION/TRAINING PROGRAM

## 2023-05-24 PROCEDURE — 83735 ASSAY OF MAGNESIUM: CPT | Performed by: STUDENT IN AN ORGANIZED HEALTH CARE EDUCATION/TRAINING PROGRAM

## 2023-05-24 PROCEDURE — 99223 1ST HOSP IP/OBS HIGH 75: CPT | Mod: GC | Performed by: PEDIATRICS

## 2023-05-24 PROCEDURE — 93010 ELECTROCARDIOGRAM REPORT: CPT | Performed by: STUDENT IN AN ORGANIZED HEALTH CARE EDUCATION/TRAINING PROGRAM

## 2023-05-24 PROCEDURE — 71046 X-RAY EXAM CHEST 2 VIEWS: CPT | Mod: 26 | Performed by: RADIOLOGY

## 2023-05-24 PROCEDURE — 85025 COMPLETE CBC W/AUTO DIFF WBC: CPT | Performed by: STUDENT IN AN ORGANIZED HEALTH CARE EDUCATION/TRAINING PROGRAM

## 2023-05-24 PROCEDURE — 120N000002 HC R&B MED SURG/OB UMMC

## 2023-05-24 PROCEDURE — 93294 REM INTERROG EVL PM/LDLS PM: CPT | Performed by: INTERNAL MEDICINE

## 2023-05-24 PROCEDURE — 84445 ASSAY OF TSI GLOBULIN: CPT

## 2023-05-24 PROCEDURE — 82306 VITAMIN D 25 HYDROXY: CPT | Performed by: STUDENT IN AN ORGANIZED HEALTH CARE EDUCATION/TRAINING PROGRAM

## 2023-05-24 PROCEDURE — 85610 PROTHROMBIN TIME: CPT | Performed by: STUDENT IN AN ORGANIZED HEALTH CARE EDUCATION/TRAINING PROGRAM

## 2023-05-24 PROCEDURE — 258N000003 HC RX IP 258 OP 636: Performed by: STUDENT IN AN ORGANIZED HEALTH CARE EDUCATION/TRAINING PROGRAM

## 2023-05-24 PROCEDURE — 96360 HYDRATION IV INFUSION INIT: CPT | Performed by: STUDENT IN AN ORGANIZED HEALTH CARE EDUCATION/TRAINING PROGRAM

## 2023-05-24 PROCEDURE — 76700 US EXAM ABDOM COMPLETE: CPT

## 2023-05-24 PROCEDURE — 70450 CT HEAD/BRAIN W/O DYE: CPT | Mod: 26 | Performed by: RADIOLOGY

## 2023-05-24 PROCEDURE — 83690 ASSAY OF LIPASE: CPT | Performed by: STUDENT IN AN ORGANIZED HEALTH CARE EDUCATION/TRAINING PROGRAM

## 2023-05-24 PROCEDURE — 80053 COMPREHEN METABOLIC PANEL: CPT | Performed by: STUDENT IN AN ORGANIZED HEALTH CARE EDUCATION/TRAINING PROGRAM

## 2023-05-24 PROCEDURE — 71046 X-RAY EXAM CHEST 2 VIEWS: CPT

## 2023-05-24 PROCEDURE — 83605 ASSAY OF LACTIC ACID: CPT | Performed by: STUDENT IN AN ORGANIZED HEALTH CARE EDUCATION/TRAINING PROGRAM

## 2023-05-24 PROCEDURE — 93296 REM INTERROG EVL PM/IDS: CPT

## 2023-05-24 PROCEDURE — 84484 ASSAY OF TROPONIN QUANT: CPT | Performed by: STUDENT IN AN ORGANIZED HEALTH CARE EDUCATION/TRAINING PROGRAM

## 2023-05-24 PROCEDURE — 93005 ELECTROCARDIOGRAM TRACING: CPT | Performed by: STUDENT IN AN ORGANIZED HEALTH CARE EDUCATION/TRAINING PROGRAM

## 2023-05-24 PROCEDURE — 84443 ASSAY THYROID STIM HORMONE: CPT | Performed by: STUDENT IN AN ORGANIZED HEALTH CARE EDUCATION/TRAINING PROGRAM

## 2023-05-24 PROCEDURE — 99285 EMERGENCY DEPT VISIT HI MDM: CPT | Mod: 25 | Performed by: STUDENT IN AN ORGANIZED HEALTH CARE EDUCATION/TRAINING PROGRAM

## 2023-05-24 RX ORDER — VITAMIN B COMPLEX
25 TABLET ORAL DAILY
Status: DISCONTINUED | OUTPATIENT
Start: 2023-05-25 | End: 2023-05-27 | Stop reason: HOSPADM

## 2023-05-24 RX ORDER — FAMOTIDINE 20 MG/1
20 TABLET, FILM COATED ORAL EVERY 24 HOURS
Status: DISCONTINUED | OUTPATIENT
Start: 2023-05-24 | End: 2023-05-27 | Stop reason: HOSPADM

## 2023-05-24 RX ORDER — METOPROLOL SUCCINATE 50 MG/1
50 TABLET, EXTENDED RELEASE ORAL EVERY EVENING
Status: DISCONTINUED | OUTPATIENT
Start: 2023-05-24 | End: 2023-05-27 | Stop reason: HOSPADM

## 2023-05-24 RX ORDER — SODIUM CHLORIDE, SODIUM LACTATE, POTASSIUM CHLORIDE, CALCIUM CHLORIDE 600; 310; 30; 20 MG/100ML; MG/100ML; MG/100ML; MG/100ML
125 INJECTION, SOLUTION INTRAVENOUS CONTINUOUS
Status: DISCONTINUED | OUTPATIENT
Start: 2023-05-24 | End: 2023-05-24

## 2023-05-24 RX ORDER — METOPROLOL SUCCINATE 50 MG/1
100 TABLET, EXTENDED RELEASE ORAL DAILY
Status: DISCONTINUED | OUTPATIENT
Start: 2023-05-25 | End: 2023-05-27 | Stop reason: HOSPADM

## 2023-05-24 RX ORDER — ACETAMINOPHEN 325 MG/1
650 TABLET ORAL EVERY 6 HOURS PRN
Status: DISCONTINUED | OUTPATIENT
Start: 2023-05-24 | End: 2023-05-27 | Stop reason: HOSPADM

## 2023-05-24 RX ORDER — LIDOCAINE 40 MG/G
CREAM TOPICAL
Status: DISCONTINUED | OUTPATIENT
Start: 2023-05-24 | End: 2023-05-27 | Stop reason: HOSPADM

## 2023-05-24 RX ORDER — ONDANSETRON 4 MG/1
4 TABLET, FILM COATED ORAL EVERY 6 HOURS PRN
Status: DISCONTINUED | OUTPATIENT
Start: 2023-05-24 | End: 2023-05-25

## 2023-05-24 RX ORDER — ACETAMINOPHEN 650 MG/1
650 SUPPOSITORY RECTAL EVERY 6 HOURS PRN
Status: DISCONTINUED | OUTPATIENT
Start: 2023-05-24 | End: 2023-05-27 | Stop reason: HOSPADM

## 2023-05-24 RX ORDER — AMOXICILLIN 250 MG
2 CAPSULE ORAL 2 TIMES DAILY PRN
Status: DISCONTINUED | OUTPATIENT
Start: 2023-05-24 | End: 2023-05-27 | Stop reason: HOSPADM

## 2023-05-24 RX ORDER — ONDANSETRON 2 MG/ML
4 INJECTION INTRAMUSCULAR; INTRAVENOUS EVERY 30 MIN PRN
Status: DISCONTINUED | OUTPATIENT
Start: 2023-05-24 | End: 2023-05-25

## 2023-05-24 RX ORDER — AMOXICILLIN 250 MG
1 CAPSULE ORAL 2 TIMES DAILY PRN
Status: DISCONTINUED | OUTPATIENT
Start: 2023-05-24 | End: 2023-05-27 | Stop reason: HOSPADM

## 2023-05-24 RX ORDER — LISINOPRIL/HYDROCHLOROTHIAZIDE 10-12.5 MG
2 TABLET ORAL DAILY
Status: DISCONTINUED | OUTPATIENT
Start: 2023-05-25 | End: 2023-05-26

## 2023-05-24 RX ORDER — POLYETHYLENE GLYCOL 3350 17 G/17G
17 POWDER, FOR SOLUTION ORAL DAILY PRN
Status: DISCONTINUED | OUTPATIENT
Start: 2023-05-24 | End: 2023-05-27 | Stop reason: HOSPADM

## 2023-05-24 RX ADMIN — SODIUM CHLORIDE, POTASSIUM CHLORIDE, SODIUM LACTATE AND CALCIUM CHLORIDE 1000 ML: 600; 310; 30; 20 INJECTION, SOLUTION INTRAVENOUS at 19:11

## 2023-05-24 ASSESSMENT — ACTIVITIES OF DAILY LIVING (ADL)
ADLS_ACUITY_SCORE: 37
ADLS_ACUITY_SCORE: 35
ADLS_ACUITY_SCORE: 35

## 2023-05-24 NOTE — TELEPHONE ENCOUNTER
Patient's daughter-in-law, Balbina called with Isadora to report symptoms of nausea, diarrhea, SOB and lightheaded when she walks, very tired- sleeping more than normal. She says she isn't drinking enough because of the nausea. She had 3 cups of water today. She said she just has diarrhea the first thing in the morning. She said she has had stomach problems since the end of March. She did see a doctor at her primary clinic yesterday who ordered labs, increase in Lisinopril-hydrochlorothiazide from 1 to 2 tabs daily due to her blood pressure going up when she stands up, and Ondansetron. She took Ondansetron yesterday but not today yet. I told her she should take her Ondansetron and drink some more fluids. I told her nausea can be related to not enough fluids. While we are talking her daughter Aida came in and we spoke briefly. She said she wants to take her Mother to the ER for some fluids. I told her I think that would be better than waiting for a call back from Dr. Dixon's office tomorrow. She asked if I would tell her Mother that she should go to the ER. Isadora came back on the phone and I told her that her daughter is willing to take her to the ER and I think that would be a really good idea. Isadora said she will go with her daughter because she wants to feel better. I told her I will still send a note to Dr. Dixon, too.   Reason for Disposition    Patient sounds very sick or weak to the triager    MILD difficulty breathing (e.g., minimal/no SOB at rest, SOB with walking, pulse < 100) of new-onset or worse than normal    Patient wants to be seen    Additional Information    Negative: SEVERE difficulty breathing (e.g., struggling for each breath, speaks in single words, pulse > 120)    Negative: Breathing stopped and hasn't returned    Negative: Choking on something    Negative: Bluish (or gray) lips or face    Negative: Difficult to awaken or acting confused (e.g., disoriented, slurred speech)    Negative: Passed out  "(i.e., fainted, collapsed and was not responding)    Negative: Wheezing started suddenly after medicine, an allergic food, or bee sting    Negative: Stridor    Negative: Slow, shallow and weak breathing    Negative: Sounds like a life-threatening emergency to the triager    Negative: Chest pain    Negative: Wheezing (high pitched whistling sound) and previous asthma attacks or use of asthma medicines    Negative: Difficulty breathing and within 14 days of COVID-19 Exposure    Negative: Difficulty breathing and only present when coughing    Negative: Difficulty breathing and only from stuffy nose    Negative: Difficulty breathing and only from stuffy nose or runny nose from common cold    Negative: MODERATE difficulty breathing (e.g., speaks in phrases, SOB even at rest, pulse 100-120) of new-onset or worse than normal    Negative: Oxygen level (e.g., pulse oximetry) 90 percent or lower    Negative: Wheezing can be heard across the room    Negative: Drooling or spitting out saliva (because can't swallow)    Negative: Any history of prior \"blood clot\" in leg or lungs    Negative: Illness requiring prolonged bedrest in past month (e.g., immobilization, long hospital stay)    Negative: Hip or leg fracture (broken bone) in past month (or had cast on leg or ankle in past month)    Negative: Major surgery in the past month    Negative: Long-distance travel in past month (e.g., car, bus, train, plane; with trip lasting 6 or more hours)    Negative: Cancer treatment in past six months (or has cancer now)    Negative: Extra heart beats OR irregular heart beating (i.e., \"palpitations\")    Negative: Fever > 103 F (39.4 C)    Negative: Fever > 101 F (38.3 C) and over 60 years of age    Negative: Fever > 100.0 F (37.8 C) and bedridden (e.g., nursing home patient, stroke, chronic illness, recovering from surgery)    Negative: Fever > 100.0 F (37.8 C) and diabetes mellitus or weak immune system (e.g., HIV positive, cancer chemo, " "splenectomy, organ transplant, chronic steroids)    Negative: Periods where breathing stops and then resumes normally and bedridden (e.g., nursing home patient, CVA)    Negative: Pregnant or postpartum (from 0 to 6 weeks after delivery)    Negative: Longstanding difficulty breathing (e.g., CHF, COPD, emphysema) and worse than normal    Negative: Continuous (nonstop) coughing    Negative: Longstanding difficulty breathing and not responding to usual therapy    Answer Assessment - Initial Assessment Questions  1. RESPIRATORY STATUS: \"Describe your breathing?\" (e.g., wheezing, shortness of breath, unable to speak, severe coughing)       Increased SOB  2. ONSET: \"When did this breathing problem begin?\"      Past few days  3. PATTERN \"Does the difficult breathing come and go, or has it been constant since it started?\"      SOB with walking a short distance  4. SEVERITY: \"How bad is your breathing?\" (e.g., mild, moderate, severe)     - MILD: No SOB at rest, mild SOB with walking, speaks normally in sentences, can lie down, no retractions, pulse < 100.     - MODERATE: SOB at rest, SOB with minimal exertion and prefers to sit, cannot lie down flat, speaks in phrases, mild retractions, audible wheezing, pulse 100-120.     - SEVERE: Very SOB at rest, speaks in single words, struggling to breathe, sitting hunched forward, retractions, pulse > 120    mild  5. RECURRENT SYMPTOM: \"Have you had difficulty breathing before?\" If Yes, ask: \"When was the last time?\" and \"What happened that time?\"      Yes but it happens even walking a short distance  6. 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 LUNG HISTORY: \"Do you have any history of lung disease?\"  (e.g., pulmonary embolus, asthma, emphysema)      no  8. CAUSE: \"What do you think is causing the breathing problem?\"      " "Doesn't know  9. OTHER SYMPTOMS: \"Do you have any other symptoms? (e.g., dizziness, runny nose, cough, chest pain, fever)    Lightheaded, wobbly when she walks, nauseated so much that she can't drink much,diarrhea every morning, very tired- sleeps from about 11 PM to 8 AM, today she fell back to sleep from 9 AM- Noon, highly unusual for her  10. O2 SATURATION MONITOR:  \"Do you use an oxygen saturation monitor (pulse oximeter) at home?\" If Yes, \"What is your reading (oxygen level) today?\" \"What is your usual oxygen saturation reading?\" (e.g., 95%)       no  11. PREGNANCY: \"Is there any chance you are pregnant?\" \"When was your last menstrual period?\"      n/a  12. TRAVEL: \"Have you traveled out of the country in the last month?\" (e.g., travel history, exposures)       n/a    Protocols used: BREATHING DIFFICULTY-A-OH    "

## 2023-05-24 NOTE — ED PROVIDER NOTES
[place] [facility]ERGENC[facility] DEPARTMENT ([facility])    5/24/23      History     Chief Complaint   Patient presents with     Generalized Weakness     Nausea, Vomiting, & Diarrhea     HPI  Isadora Mendez is a 89 year old female with PMH significant for pancreatitis, sick sinus syndrome s/p pacemaker implantation ([employer]tr[employer]), exercise-induced VT, paroxysmal atrial fibrillation (not anticoagulated), and HTN who presents to the ED for evaluation of unsteadiness on her feet, fatigue, nausea, vomiting, diarrhea, and decreased appetite.  Patient is accompanied by her daughter who helps provide history.    Patient reports that for the past few weeks she has been unsteady on her feet.  She indicates she has also had nausea and vomiting intermittently during this time as well as decreased appetite.  Patient did have imaging approximately a month ago suggesting likely gallstone pancreatitis and symptomatic cholelithiasis, possibly representing chronic cholecystitis.  She also had a appointment yesterday where she felt very dizzy, thought might be related to elevated blood pressures which measured up to the 200s systolic when standing.  Patient's lisinopril/hydrochlorothiazide was increased.      Patient indicates she was scheduled to follow-up in another week, but has had continuing symptoms as well as severely increased fatigue over the past 3-4 days, which family notes is very irregular for the patient.  Patient does states she feels extremely fatigued much earlier than normal when exerting herself.  Family does feel that there is some shortness of breath noted while talking to her on the phone.  Patient states that she has nausea every day, but vomits infrequently.  Over the past few days she has had diarrhea.  Endorses current nausea, but no abdominal pain.    The family also reports that she seems to be somewhat less sharp mentally than she is normally, not truly altered just slightly different from baseline.  Patient  notes that her voice does not feel as strong as well.  Denies cough or sore throat.  Family also indicates she has had some leg swelling which is not normal for her.    Past Medical History  Past Medical History:   Diagnosis Date     Atrial fibrillation (H)      Facial fracture (H)      Hypertension      NSVT (nonsustained ventricular tachycardia) (H)     during exercise echo, neg EP study     Pacemaker 2010     S/P cardiac catheterization     30-40% non obstructive lesion     Ventricular tachycardia, nonsustained (H)     during stress echo     Past Surgical History:   Procedure Laterality Date     IMPLANT PACEMAKER       PPM  2010    Permanent Pacemaker     Ascorbic Acid (VITAMIN C PO)  aspirin 81 MG tablet  co-enzyme Q-10 100 MG CAPS capsule  famotidine (PEPCID) 20 MG tablet  lisinopril-hydrochlorothiazide (ZESTORETIC) 10-12.5 MG tablet  metoprolol succinate ER (TOPROL XL) 100 MG 24 hr tablet  metoprolol succinate ER (TOPROL XL) 50 MG 24 hr tablet  Nutritional Supplements (PYCNOGENOL) 300-30 MG CAPS per capsule  ondansetron (ZOFRAN) 4 MG tablet  Vitamin D, Cholecalciferol, 1000 units TABS  VITAMIN K PO      No Known Allergies  Family History  Family History   Problem Relation Age of Onset     Cardiovascular Father 76         age 80, MI 76 and CHF 80's     Cardiovascular Paternal Grandfather          age 76 of CVD     Myocardial Infarction Mother 88        lived to 100     Arrhythmia Sister         PPM     Neuropathy Sister      Colon Cancer Sister      Arrhythmia Brother         pacemaker     Arrhythmia Brother         pacemaker, hx rheumatic fever, heart failure     Blood Disease Son         hemochromatosis?     Atrial fibrillation Son      Cancer Daughter         Breast, uterine, 2nd breast cancer, HTN     Social History   Social History     Tobacco Use     Smoking status: Never     Smokeless tobacco: Never   Substance Use Topics     Alcohol use: No     Drug use: No         A  medically appropriate review of systems was performed with pertinent positives and negatives noted in the HPI, and all other systems negative.    Physical Exam   BP: 136/60  Pulse: 77  Temp: 97.7  F (36.5  C)  Resp: 18  SpO2: 97 %  Physical Exam  Vital Signs Reviewed  Gen: Well nourished, well developed, resting comfortably, no acute distress  HEENT: NC/AT, PERRL, EOMI, MMM  Neck: Supple, FROM  CV: Regular Rate, no murmur/rub/gallop  Lungs/Chest: Normal Effort, CTAB  Abd: Non-distended, non-tender  MSK/Back: FROM, no visible deformity  Neuro: A&Ox3, GCS 15, CN II-XII unremarkable. 5/5 strength BUE/BLE, sensation intact. No difficulty with finger-nose/heel-shin coordination. Positive romberg. Gait assessment referred.  Skin: Warm, Dry, Intact, no visible lesions    ED Course, Procedures, & Data    5:53 PM  The patient was seen and examined by Gianfarnco Stallings MD in Room [facility].     ED Course as of 05/24/23 2201   Wed May 24, 2023   1908 Twelve-lead EKG reviewed.  Computer interpreted possible acute MI, there do appear to be nonspecific ST segment changes in V3 V4 V5, associate with significant hypertension.  Appears atypical given the clinical presentation.  We will follow troponins and monitor.   1913 I have independently interpreted the chest x-ray.  I do not appreciate focal consolidation consistent with pneumonia, significant pneumothorax.  Final read pending.   2002 Labs reviewed.  X-ray findings plus BNP concerning for new diagnosis of CHF.  TSH undetectable, reflex pending.  Troponin elevated likely secondary to demand.  Will get delta.  Metabolic panel otherwise reassuring.  No significant anemia or leukocytosis.   Patient and family updated in waiting room, moved to ED room  Device interrogation showing occasional runs of nonsustained V. tach and atrial tachyarrhythmias.  Consults placed to neurology, general surgery, cardiology  Patient stable for admission.  Additional labs resulted.  Thyroid hormones  abnormal.  Patient appears stable to admission to the medicine service at this time for further investigations and management of numerous organ system abnormalities without clear unifying diagnosis at this time    Procedures       ED Course Selections:        EKG Interpretation:      Interpreted by Gianfranco Giordano MD  Time reviewed: 1900  Symptoms at time of EKG: Nausea, dizziness   Rhythm: paced   Rate: Normal  Axis: Left Axis Deviation  Ectopy: none  Conduction: normal  ST Segments/ T Waves: nonspecific ST changes in V4-V5, prolonged QT 499ms      Clinical Impression: Atrially paced rhythm, no ANNE MARIE, nonspecific changes, long QTc        Results for orders placed or performed during the hospital encounter of 05/24/23   Head CT w/o contrast     Status: None    Narrative    CT HEAD W/O CONTRAST 5/24/2023 7:19 PM    History: Dizziness, gait instability, fatigue   ICD-10:    Comparison: None    Technique: Using multidetector thin collimation helical acquisition  technique, axial, coronal and sagittal CT images from the skull base  to the vertex were obtained without intravenous contrast.   (topogram) image(s) also obtained and reviewed.    Findings: There is no intracranial hemorrhage, mass effect, or midline  shift. Gray/white matter differentiation in both cerebral hemispheres  is preserved. Ventricles are proportionate to the cerebral sulci. The  basal cisterns are clear. Mild diffuse cerebral volume loss.  Subcortical and periventricular white matter hypodensities, likely  chronic small vessel ischemic disease. 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    The bony calvaria and the bones of the skull base are normal. Mild  mucosal thickening in the left sphenoid sinus. The remainder of the  visualized portions of the paranasal sinuses and mastoid air cells are  clear.      Impression    Impression:  1. No acute intracranial pathology.   2. Diffuse cerebral  volume loss and leukoaraiosis  3. Chronic infarctions as described above.    I have personally reviewed the examination and initial interpretation  and I agree with the findings.    CARRILLO HANNA MD         SYSTEM ID:  [id number]   Chest XR,  PA & LAT     Status: None    Narrative    EXAM: XR CHEST 2 VIEWS  5/24/2023 6:31 PM     HISTORY:  Dyspnea on exertion, hx pacemaker       COMPARISON:  10/20/2022    FINDINGS:   PA and lateral views of the chest. Stable left chest wall pacing  device with leads in the right atrium and ventricle.    Trachea is midline. Cardiomediastinal silhouette and pulmonary  vasculature are within normal limits. No focal airspace opacity or  appreciable pneumothorax. Small right pleural effusion. No significant  left pleural effusion.    No acute osseous abnormality. Visualized upper abdomen is  unremarkable.        Impression    IMPRESSION: Small right pleural effusion. Otherwise no definitive  evidence of pulmonary edema. No focal air space opacities.    I have personally reviewed the examination and initial interpretation  and I agree with the findings.    PARVEZ MAYO MD         SYSTEM ID:  [id number]   US Abdomen Complete     Status: None    Narrative    EXAMINATION: US ABDOMEN COMPLETE, 5/24/2023 7:16 PM    COMPARISON: Ultrasound 4/7/2023. Abdominal MRI 4/26/2023.    HISTORY: Nausea, vomiting, pain. History of gallstones.    TECHNIQUE: The abdomen was scanned in standard fashion with  specialized ultrasound transducer(s) using both grey scale and limited  color Doppler techniques.    Findings:    Liver: Hepatic parenchyma is of normal echogenicity without evidence  for focal mass.     Gallbladder: Numerous echogenic shadowing calculi obscuring majority  of the gallbladder. Gallbladder wall thickening of 4.6 mm, unchanged  from comparison MRI and ultrasound. No pericholecystic fluid or wall  hyperemia. Negative sonographic Polanco's sign.    Bile Ducts: Ectatic right and left common bile  duct measuring 3 mm on  the right and 4 mm on the left, overall similar to comparison MRI.  Common duct measuring 9.5 mm in diameter, unchanged from comparison  MRI.    Pancreas: Visualized portions of the head and body of the pancreas are  unremarkable. No peripancreatic fluid is visualized.    Kidneys: Both kidneys are of normal echotexture, without mass nor  hydronephrosis. The craniocaudal dimensions are: right- 9.7 cm, left-  9.2 cm.    Spleen: The spleen is unremarkable and measures 9.6 cm in sagittal  dimension.    Aorta and IVC: The visualized portions of the aorta and IVC are  unremarkable.    Fluid: Right-sided pleural effusion.      Impression    Impression:     1. Cholelithiasis without sonographic evidence for acute  cholecystitis. Persistent diffuse thickening of the gallbladder wall  is favored reactive.    2. Unchanged ectatic common bile duct and mild intrahepatic biliary  dilatation compared to MRI of 4/26/2023.    3. Right-sided pleural effusion appears slightly larger than  comparison MRI given differences in modalities.    I have personally reviewed the examination and initial interpretation  and I agree with the findings.    PARVEZ MAYO MD         SYSTEM ID:  [id number]   INR     Status: Abnormal   Result Value Ref Range    INR 1.26 (H) 0.85 - 1.15   Comprehensive metabolic panel     Status: Abnormal   Result Value Ref Range    Sodium 136 136 - 145 mmol/L    Potassium 4.2 3.4 - 5.3 mmol/L    Chloride 103 98 - 107 mmol/L    Carbon Dioxide (CO2) 22 22 - 29 mmol/L    Anion Gap 11 7 - 15 mmol/L    Urea Nitrogen 36.0 (H) 8.0 - 23.0 mg/dL    Creatinine 0.89 0.51 - 0.95 mg/dL    Calcium 9.9 (H) 8.2 - 9.6 mg/dL    Glucose 122 (H) 70 - 99 mg/dL    Alkaline Phosphatase 103 35 - 104 U/L    AST 51 (H) 10 - 35 U/L    ALT 38 (H) 10 - 35 U/L    Protein Total 6.8 6.4 - 8.3 g/dL    Albumin 3.5 3.5 - 5.2 g/dL    Bilirubin Total 0.7 <=1.2 mg/dL    GFR Estimate 60 (L) >60 mL/min/1.73m2   Lipase     Status:  Normal   Result Value Ref Range    Lipase 46 13 - 60 U/L   Troponin T, High Sensitivity     Status: Abnormal   Result Value Ref Range    Troponin T, High Sensitivity 38 (H) <=14 ng/L   Lactic acid whole blood     Status: Normal   Result Value Ref Range    Lactic Acid 1.4 0.7 - 2.0 mmol/L   Magnesium     Status: Normal   Result Value Ref Range    Magnesium 2.3 1.7 - 2.3 mg/dL   TSH with free T4 reflex     Status: Abnormal   Result Value Ref Range    TSH <0.01 (L) 0.30 - 4.20 uIU/mL   Nt probnp inpatient (BNP)     Status: Abnormal   Result Value Ref Range    N terminal Pro BNP Inpatient 2,414 (H) 0 - 1,800 pg/mL   [place] Draw     Status: None    Narrative    The following orders were created for panel order [place] Draw.  Procedure                               Abnormality         Status                     ---------                               -----------         ------                     Extra Red Top Tube[[id number]]                               Final result                 Please view results for these tests on the individual orders.   CBC with platelets and differential     Status: Abnormal   Result Value Ref Range    WBC Count 7.0 4.0 - 11.0 10e3/uL    RBC Count 3.72 (L) 3.80 - 5.20 10e6/uL    Hemoglobin 10.7 (L) 11.7 - 15.7 g/dL    Hematocrit 35.1 35.0 - 47.0 %    MCV 94 78 - 100 fL    MCH 28.8 26.5 - 33.0 pg    MCHC 30.5 (L) 31.5 - 36.5 g/dL    RDW 13.5 10.0 - 15.0 %    Platelet Count 146 (L) 150 - 450 10e3/uL    % Neutrophils 57 %    % Lymphocytes 26 %    % Monocytes 16 %    % Eosinophils 1 %    % Basophils 0 %    % Immature Granulocytes 0 %    NRBCs per 100 WBC 0 <1 /100    Absolute Neutrophils 4.0 1.6 - 8.3 10e3/uL    Absolute Lymphocytes 1.8 0.8 - 5.3 10e3/uL    Absolute Monocytes 1.1 0.0 - 1.3 10e3/uL    Absolute Eosinophils 0.1 0.0 - 0.7 10e3/uL    Absolute Basophils 0.0 0.0 - 0.2 10e3/uL    Absolute Immature Granulocytes 0.0 <=0.4 10e3/uL    Absolute NRBCs 0.0 10e3/uL   Extra Red Top Tube      Status: None   Result Value Ref Range    Hold Specimen JIC    T4 free     Status: Abnormal   Result Value Ref Range    Free T4 >7.77 (H) 0.90 - 1.70 ng/dL   Troponin T, High Sensitivity     Status: Abnormal   Result Value Ref Range    Troponin T, High Sensitivity 34 (H) <=14 ng/L   CBC with platelets differential     Status: Abnormal    Narrative    The following orders were created for panel order CBC with platelets differential.  Procedure                               Abnormality         Status                     ---------                               -----------         ------                     CBC with platelets and d...[[id number]]  Abnormal            Final result                 Please view results for these tests on the individual orders.     Medications   lactated ringers BOLUS 1,000 mL (1,000 mLs Intravenous $New Bag 5/24/23 1911)     Followed by   lactated ringers infusion (has no administration in time range)   ondansetron (ZOFRAN) injection 4 mg (has no administration in time range)     Labs Ordered and Resulted from Time of ED Arrival to Time of ED Departure   INR - Abnormal       Result Value    INR 1.26 (*)    COMPREHENSIVE METABOLIC PANEL - Abnormal    Sodium 136      Potassium 4.2      Chloride 103      Carbon Dioxide (CO2) 22      Anion Gap 11      Urea Nitrogen 36.0 (*)     Creatinine 0.89      Calcium 9.9 (*)     Glucose 122 (*)     Alkaline Phosphatase 103      AST 51 (*)     ALT 38 (*)     Protein Total 6.8      Albumin 3.5      Bilirubin Total 0.7      GFR Estimate 60 (*)    TROPONIN T, HIGH SENSITIVITY - Abnormal    Troponin T, High Sensitivity 38 (*)    TSH WITH FREE T4 REFLEX - Abnormal    TSH <0.01 (*)    NT PROBNP INPATIENT - Abnormal    N terminal Pro BNP Inpatient 2,414 (*)    CBC WITH PLATELETS AND DIFFERENTIAL - Abnormal    WBC Count 7.0      RBC Count 3.72 (*)     Hemoglobin 10.7 (*)     Hematocrit 35.1      MCV 94      MCH 28.8      MCHC 30.5 (*)     RDW 13.5      Platelet  Count 146 (*)     % Neutrophils 57      % Lymphocytes 26      % Monocytes 16      % Eosinophils 1      % Basophils 0      % Immature Granulocytes 0      NRBCs per 100 WBC 0      Absolute Neutrophils 4.0      Absolute Lymphocytes 1.8      Absolute Monocytes 1.1      Absolute Eosinophils 0.1      Absolute Basophils 0.0      Absolute Immature Granulocytes 0.0      Absolute NRBCs 0.0     T4 FREE - Abnormal    Free T4 >7.77 (*)    TROPONIN T, HIGH SENSITIVITY - Abnormal    Troponin T, High Sensitivity 34 (*)    LIPASE - Normal    Lipase 46     LACTIC ACID WHOLE BLOOD - Normal    Lactic Acid 1.4     MAGNESIUM - Normal    Magnesium 2.3     ROUTINE UA WITH MICROSCOPIC REFLEX TO CULTURE     Head CT w/o contrast   Final Result   Impression:   1. No acute intracranial pathology.    2. Diffuse cerebral volume loss and leukoaraiosis   3. Chronic infarctions as described above.      I have personally reviewed the examination and initial interpretation   and I agree with the findings.      CARRILLO HANNA MD            SYSTEM ID:  [id number]      US Abdomen Complete   Final Result   Impression:       1. Cholelithiasis without sonographic evidence for acute   cholecystitis. Persistent diffuse thickening of the gallbladder wall   is favored reactive.      2. Unchanged ectatic common bile duct and mild intrahepatic biliary   dilatation compared to MRI of 4/26/2023.      3. Right-sided pleural effusion appears slightly larger than   comparison MRI given differences in modalities.      I have personally reviewed the examination and initial interpretation   and I agree with the findings.      PARVEZ MAYO MD            SYSTEM ID:  [id number]      Chest XR,  PA & LAT   Final Result   IMPRESSION: Small right pleural effusion. Otherwise no definitive   evidence of pulmonary edema. No focal air space opacities.      I have personally reviewed the examination and initial interpretation   and I agree with the findings.      PARVEZ MAYO MD             SYSTEM ID:  [id number]      Cardiac Device Check - Inpatient    (Results Pending)   Echocardiogram Complete    (Results Pending)   MR Brain w/o Contrast    (Results Pending)          Critical care was not performed.     Medical Decision Making  The patient's presentation was of high complexity (an acute health issue posing potential threat to life or bodily function).    The patient's evaluation involved:  ordering and/or review of 3+ test(s) in this encounter (see separate area of note for details)  discussion of management or test interpretation with another health professional (Internal medicine, nephrology, neurology, cardiology)    The patient's management necessitated high risk (a decision regarding hospitalization).      Assessment & Plan    Isadora Mendez is a 89 year old female with PMH significant for pancreatitis, sick sinus syndrome s/p pacemaker implantation ([employer]tr[employer]), exercise-induced VT, paroxysmal atrial fibrillation (not anticoagulated), and HTN who presents to the ED for evaluation of unsteadiness on her feet, fatigue, nausea, vomiting, diarrhea, and decreased appetite.  Patient is accompanied by her daughter who helps provide history.  Triage vitals reviewed and reassuring.  Examination is largely nonfocal from a neurologic standpoint.  She does have some increased unsteadiness on her feet.  Symptoms have been ongoing for some time, outside of any thrombolytic window if there is central ischemic etiology.    Twelve-lead EKG was obtained, atrially paced with nonspecific changes.  Erroneous computer interpretation of acute MI.  Following troponins.  ACS is definitely a concern but given the duration of symptoms I feel like ANNE MARIE is less likely.    Appropriate labs and imaging will be obtained to follow any intracranial injury, infectious metabolic or endocrine causes of her symptoms.  We will start with ultrasound and head CT, likely will get an brain MRI as well.    Labs reviewed.  Troponin  was elevated but stable, likely demand ischemia.  BNP elevated with pleural effusion concern for possible development of CHF which could explain some of her shortness of breath on exertion and lower extremity swelling.    Thyroid hormones were abnormal with an undetectable TSH and a T4 greater than detectable limits.  She does not appear to be in thyroid storm at this time.  No known history of thyroid issues.    Head CT without acute pathology.  There is chronic atrophy in the crisis.  There are also evidence of chronic infarcts.  Patient and daughter unaware of any prior history of stroke.  Consulted neurology.  We will get an MRI but this cannot be performed until the next day due to patient having a pacemaker.  Additional neurology recs are pending at this time.    Ultrasound of the abdomen showing essentially stable chronic cholecystitis.  General surgery consulted for additional recommendations.  Question utility of HIDA scan to determine if this might be contributing to some of her abdominal discomfort nausea and vomiting.    Unclear if there is a unifying diagnosis for this patient.  May have several different chronic medical conditions that are exacerbating and culminating in the milleau presenting today. Patient appears stable to admit at this point for further investigations with the internal medicine team and appropriate consulting services.    ADDENDUM: Call from Neurology, they do not recommend MRI at this time and think we should discontinue the order. Order discontinued.    New Prescriptions    No medications on file       Final diagnoses:   Dysequilibrium   Pleural effusion on right   Dyspnea on exertion   Elevated brain natriuretic peptide (BNP) level   Hyperthyroidism   Nausea and vomiting, unspecified vomiting type   Chronic cholecystitis     Jasbir GLASER, am serving as a trained medical scribe to document services personally performed by Gianfranco Stallings MD, based on the provider's  statements to me.     I, Gianfranco Stallings MD, was physically present and have reviewed and verified the accuracy of this note documented by Jasbir Frank.     Gianfranco Stallings Jr., MD   [facility] EMERGENCY DEPARTMENT  5/24/2023     Gianfranco Stallings MD  05/24/23 221       Gianfranco Stallings MD  05/25/23 0149

## 2023-05-24 NOTE — ED TRIAGE NOTES
C/o gait changes ( 2 weeks ago), generalized weakness, nausea, dizziness, not eating well, not drinking well per daughter  Has had gall stones recently

## 2023-05-24 NOTE — TELEPHONE ENCOUNTER
Caller reporting the following red-flag symptom(s): Patient's daughter in law Ailyn called stating patient has been feeling super tired nauseous, shortness of breath and having weakness in the legs along with diarrhea.     Per the system red-flag symptom policy, patient was instructed to:  speak with a Registered Nurse    Action:  Patient warm transferred to a Registered Nurse

## 2023-05-25 ENCOUNTER — APPOINTMENT (OUTPATIENT)
Dept: PHYSICAL THERAPY | Facility: CLINIC | Age: 88
DRG: 643 | End: 2023-05-25
Attending: PEDIATRICS
Payer: COMMERCIAL

## 2023-05-25 ENCOUNTER — APPOINTMENT (OUTPATIENT)
Dept: CARDIOLOGY | Facility: CLINIC | Age: 88
DRG: 643 | End: 2023-05-25
Attending: PEDIATRICS
Payer: COMMERCIAL

## 2023-05-25 LAB
ALBUMIN SERPL BCG-MCNC: 3 G/DL (ref 3.5–5.2)
ALBUMIN UR-MCNC: NEGATIVE MG/DL
ALP SERPL-CCNC: 83 U/L (ref 35–104)
ALT SERPL W P-5'-P-CCNC: 27 U/L (ref 10–35)
ANION GAP SERPL CALCULATED.3IONS-SCNC: 14 MMOL/L (ref 7–15)
APPEARANCE UR: CLEAR
AST SERPL W P-5'-P-CCNC: 42 U/L (ref 10–35)
ATRIAL RATE - MUSE: 64 BPM
BASOPHILS # BLD AUTO: 0 10E3/UL (ref 0–0.2)
BASOPHILS NFR BLD AUTO: 0 %
BILIRUB SERPL-MCNC: 0.7 MG/DL
BILIRUB UR QL STRIP: NEGATIVE
BUN SERPL-MCNC: 33.9 MG/DL (ref 8–23)
CALCIUM SERPL-MCNC: 9.8 MG/DL (ref 8.2–9.6)
CHLORIDE SERPL-SCNC: 103 MMOL/L (ref 98–107)
COLOR UR AUTO: ABNORMAL
CREAT SERPL-MCNC: 0.8 MG/DL (ref 0.51–0.95)
CRP SERPL-MCNC: 9.1 MG/L
DEPRECATED CALCIDIOL+CALCIFEROL SERPL-MC: 54 UG/L (ref 20–75)
DEPRECATED HCO3 PLAS-SCNC: 20 MMOL/L (ref 22–29)
DIASTOLIC BLOOD PRESSURE - MUSE: NORMAL MMHG
EOSINOPHIL # BLD AUTO: 0.1 10E3/UL (ref 0–0.7)
EOSINOPHIL NFR BLD AUTO: 2 %
ERYTHROCYTE [DISTWIDTH] IN BLOOD BY AUTOMATED COUNT: 13.5 % (ref 10–15)
FERRITIN SERPL-MCNC: 119 NG/ML (ref 11–328)
FOLATE SERPL-MCNC: >40 NG/ML (ref 4.6–34.8)
GFR SERPL CREATININE-BSD FRML MDRD: 69 ML/MIN/1.73M2
GLUCOSE SERPL-MCNC: 111 MG/DL (ref 70–99)
GLUCOSE UR STRIP-MCNC: NEGATIVE MG/DL
HCT VFR BLD AUTO: 31.5 % (ref 35–47)
HGB BLD-MCNC: 9.5 G/DL (ref 11.7–15.7)
HGB UR QL STRIP: NEGATIVE
IMM GRANULOCYTES # BLD: 0 10E3/UL
IMM GRANULOCYTES NFR BLD: 0 %
INTERPRETATION ECG - MUSE: NORMAL
IRON BINDING CAPACITY (ROCHE): 169 UG/DL (ref 240–430)
IRON SATN MFR SERPL: 25 % (ref 15–46)
IRON SERPL-MCNC: 43 UG/DL (ref 37–145)
KETONES UR STRIP-MCNC: ABNORMAL MG/DL
LEUKOCYTE ESTERASE UR QL STRIP: ABNORMAL
LVEF ECHO: NORMAL
LYMPHOCYTES # BLD AUTO: 1.7 10E3/UL (ref 0.8–5.3)
LYMPHOCYTES NFR BLD AUTO: 27 %
MCH RBC QN AUTO: 28.6 PG (ref 26.5–33)
MCHC RBC AUTO-ENTMCNC: 30.2 G/DL (ref 31.5–36.5)
MCV RBC AUTO: 95 FL (ref 78–100)
MONOCYTES # BLD AUTO: 0.9 10E3/UL (ref 0–1.3)
MONOCYTES NFR BLD AUTO: 15 %
MUCOUS THREADS #/AREA URNS LPF: PRESENT /LPF
NEUTROPHILS # BLD AUTO: 3.4 10E3/UL (ref 1.6–8.3)
NEUTROPHILS NFR BLD AUTO: 56 %
NITRATE UR QL: NEGATIVE
NRBC # BLD AUTO: 0 10E3/UL
NRBC BLD AUTO-RTO: 0 /100
P AXIS - MUSE: 113 DEGREES
PH UR STRIP: 5 [PH] (ref 5–7)
PLATELET # BLD AUTO: 144 10E3/UL (ref 150–450)
POTASSIUM SERPL-SCNC: 4 MMOL/L (ref 3.4–5.3)
PR INTERVAL - MUSE: 90 MS
PROT SERPL-MCNC: 5.9 G/DL (ref 6.4–8.3)
QRS DURATION - MUSE: 108 MS
QT - MUSE: 484 MS
QTC - MUSE: 499 MS
R AXIS - MUSE: -35 DEGREES
RBC # BLD AUTO: 3.32 10E6/UL (ref 3.8–5.2)
RBC URINE: <1 /HPF
SODIUM SERPL-SCNC: 137 MMOL/L (ref 136–145)
SP GR UR STRIP: 1.01 (ref 1–1.03)
SQUAMOUS EPITHELIAL: <1 /HPF
SYSTOLIC BLOOD PRESSURE - MUSE: NORMAL MMHG
T AXIS - MUSE: 85 DEGREES
T3 SERPL-MCNC: 420 NG/DL (ref 85–202)
T4 FREE SERPL-MCNC: 7.71 NG/DL (ref 0.9–1.7)
T4 SERPL-MCNC: 16.9 UG/DL (ref 4.5–11.7)
TSH SERPL DL<=0.005 MIU/L-ACNC: <0.01 UIU/ML (ref 0.3–4.2)
UROBILINOGEN UR STRIP-MCNC: NORMAL MG/DL
VENTRICULAR RATE- MUSE: 64 BPM
VIT B12 SERPL-MCNC: 1210 PG/ML (ref 232–1245)
WBC # BLD AUTO: 6.2 10E3/UL (ref 4–11)
WBC URINE: 2 /HPF

## 2023-05-25 PROCEDURE — 36415 COLL VENOUS BLD VENIPUNCTURE: CPT | Performed by: STUDENT IN AN ORGANIZED HEALTH CARE EDUCATION/TRAINING PROGRAM

## 2023-05-25 PROCEDURE — 93306 TTE W/DOPPLER COMPLETE: CPT

## 2023-05-25 PROCEDURE — 81001 URINALYSIS AUTO W/SCOPE: CPT

## 2023-05-25 PROCEDURE — 120N000002 HC R&B MED SURG/OB UMMC

## 2023-05-25 PROCEDURE — 36415 COLL VENOUS BLD VENIPUNCTURE: CPT

## 2023-05-25 PROCEDURE — 93306 TTE W/DOPPLER COMPLETE: CPT | Mod: 26 | Performed by: STUDENT IN AN ORGANIZED HEALTH CARE EDUCATION/TRAINING PROGRAM

## 2023-05-25 PROCEDURE — 80053 COMPREHEN METABOLIC PANEL: CPT

## 2023-05-25 PROCEDURE — 250N000011 HC RX IP 250 OP 636: Performed by: STUDENT IN AN ORGANIZED HEALTH CARE EDUCATION/TRAINING PROGRAM

## 2023-05-25 PROCEDURE — 83550 IRON BINDING TEST: CPT | Performed by: STUDENT IN AN ORGANIZED HEALTH CARE EDUCATION/TRAINING PROGRAM

## 2023-05-25 PROCEDURE — 84436 ASSAY OF TOTAL THYROXINE: CPT

## 2023-05-25 PROCEDURE — 97161 PT EVAL LOW COMPLEX 20 MIN: CPT | Mod: GP

## 2023-05-25 PROCEDURE — 99222 1ST HOSP IP/OBS MODERATE 55: CPT

## 2023-05-25 PROCEDURE — 99232 SBSQ HOSP IP/OBS MODERATE 35: CPT | Mod: GC | Performed by: STUDENT IN AN ORGANIZED HEALTH CARE EDUCATION/TRAINING PROGRAM

## 2023-05-25 PROCEDURE — 99221 1ST HOSP IP/OBS SF/LOW 40: CPT | Mod: 25 | Performed by: INTERNAL MEDICINE

## 2023-05-25 PROCEDURE — 97530 THERAPEUTIC ACTIVITIES: CPT | Mod: GP

## 2023-05-25 PROCEDURE — 999N000128 HC STATISTIC PERIPHERAL IV START W/O US GUIDANCE

## 2023-05-25 PROCEDURE — 86140 C-REACTIVE PROTEIN: CPT

## 2023-05-25 PROCEDURE — 250N000013 HC RX MED GY IP 250 OP 250 PS 637: Performed by: STUDENT IN AN ORGANIZED HEALTH CARE EDUCATION/TRAINING PROGRAM

## 2023-05-25 PROCEDURE — 84439 ASSAY OF FREE THYROXINE: CPT

## 2023-05-25 PROCEDURE — 99221 1ST HOSP IP/OBS SF/LOW 40: CPT | Mod: GC | Performed by: STUDENT IN AN ORGANIZED HEALTH CARE EDUCATION/TRAINING PROGRAM

## 2023-05-25 PROCEDURE — 250N000011 HC RX IP 250 OP 636: Performed by: INTERNAL MEDICINE

## 2023-05-25 PROCEDURE — 84443 ASSAY THYROID STIM HORMONE: CPT

## 2023-05-25 PROCEDURE — 82746 ASSAY OF FOLIC ACID SERUM: CPT | Performed by: STUDENT IN AN ORGANIZED HEALTH CARE EDUCATION/TRAINING PROGRAM

## 2023-05-25 PROCEDURE — 85025 COMPLETE CBC W/AUTO DIFF WBC: CPT

## 2023-05-25 PROCEDURE — 93005 ELECTROCARDIOGRAM TRACING: CPT

## 2023-05-25 PROCEDURE — 84480 ASSAY TRIIODOTHYRONINE (T3): CPT

## 2023-05-25 PROCEDURE — 250N000013 HC RX MED GY IP 250 OP 250 PS 637

## 2023-05-25 PROCEDURE — 82728 ASSAY OF FERRITIN: CPT | Performed by: STUDENT IN AN ORGANIZED HEALTH CARE EDUCATION/TRAINING PROGRAM

## 2023-05-25 PROCEDURE — 82607 VITAMIN B-12: CPT | Performed by: STUDENT IN AN ORGANIZED HEALTH CARE EDUCATION/TRAINING PROGRAM

## 2023-05-25 RX ORDER — ONDANSETRON 2 MG/ML
4 INJECTION INTRAMUSCULAR; INTRAVENOUS EVERY 6 HOURS PRN
Status: DISCONTINUED | OUTPATIENT
Start: 2023-05-25 | End: 2023-05-27 | Stop reason: HOSPADM

## 2023-05-25 RX ORDER — METHIMAZOLE 10 MG/1
20 TABLET ORAL ONCE
Status: COMPLETED | OUTPATIENT
Start: 2023-05-25 | End: 2023-05-25

## 2023-05-25 RX ORDER — ONDANSETRON 4 MG/1
4 TABLET, ORALLY DISINTEGRATING ORAL EVERY 6 HOURS PRN
Status: DISCONTINUED | OUTPATIENT
Start: 2023-05-25 | End: 2023-05-27 | Stop reason: HOSPADM

## 2023-05-25 RX ORDER — PROCHLORPERAZINE 25 MG
12.5 SUPPOSITORY, RECTAL RECTAL EVERY 12 HOURS PRN
Status: DISCONTINUED | OUTPATIENT
Start: 2023-05-25 | End: 2023-05-27 | Stop reason: HOSPADM

## 2023-05-25 RX ORDER — FUROSEMIDE 10 MG/ML
20 INJECTION INTRAMUSCULAR; INTRAVENOUS ONCE
Status: COMPLETED | OUTPATIENT
Start: 2023-05-25 | End: 2023-05-25

## 2023-05-25 RX ORDER — PROCHLORPERAZINE MALEATE 5 MG
5 TABLET ORAL EVERY 6 HOURS PRN
Status: DISCONTINUED | OUTPATIENT
Start: 2023-05-25 | End: 2023-05-27 | Stop reason: HOSPADM

## 2023-05-25 RX ORDER — METHIMAZOLE 10 MG/1
10 TABLET ORAL 3 TIMES DAILY
Status: DISCONTINUED | OUTPATIENT
Start: 2023-05-25 | End: 2023-05-27 | Stop reason: HOSPADM

## 2023-05-25 RX ADMIN — Medication 1 MG: at 01:17

## 2023-05-25 RX ADMIN — FUROSEMIDE 20 MG: 10 INJECTION, SOLUTION INTRAVENOUS at 14:41

## 2023-05-25 RX ADMIN — FAMOTIDINE 20 MG: 20 TABLET ORAL at 22:26

## 2023-05-25 RX ADMIN — METHIMAZOLE 10 MG: 10 TABLET ORAL at 20:03

## 2023-05-25 RX ADMIN — METHIMAZOLE 20 MG: 10 TABLET ORAL at 14:42

## 2023-05-25 RX ADMIN — LISINOPRIL AND HYDROCHLOROTHIAZIDE 2 TABLET: 12.5; 1 TABLET ORAL at 10:30

## 2023-05-25 RX ADMIN — METOPROLOL SUCCINATE 50 MG: 50 TABLET, EXTENDED RELEASE ORAL at 20:03

## 2023-05-25 RX ADMIN — FAMOTIDINE 20 MG: 20 TABLET ORAL at 01:17

## 2023-05-25 RX ADMIN — METOPROLOL SUCCINATE 50 MG: 50 TABLET, EXTENDED RELEASE ORAL at 01:17

## 2023-05-25 RX ADMIN — Medication 25 MCG: at 08:57

## 2023-05-25 RX ADMIN — ONDANSETRON 4 MG: 4 TABLET, ORALLY DISINTEGRATING ORAL at 12:02

## 2023-05-25 RX ADMIN — METOPROLOL SUCCINATE 100 MG: 50 TABLET, EXTENDED RELEASE ORAL at 08:56

## 2023-05-25 ASSESSMENT — ACTIVITIES OF DAILY LIVING (ADL)
TOILETING_ISSUES: NO
ADLS_ACUITY_SCORE: 30
ADLS_ACUITY_SCORE: 37
ADLS_ACUITY_SCORE: 30
CONCENTRATING,_REMEMBERING_OR_MAKING_DECISIONS_DIFFICULTY: YES
ADLS_ACUITY_SCORE: 30
FALL_HISTORY_WITHIN_LAST_SIX_MONTHS: NO
DOING_ERRANDS_INDEPENDENTLY_DIFFICULTY: NO
ADLS_ACUITY_SCORE: 30
CHANGE_IN_FUNCTIONAL_STATUS_SINCE_ONSET_OF_CURRENT_ILLNESS/INJURY: YES
ADLS_ACUITY_SCORE: 30
ADLS_ACUITY_SCORE: 30
VISION_MANAGEMENT: GLASSES
DIFFICULTY_COMMUNICATING: NO
DRESSING/BATHING_DIFFICULTY: NO
WERE_AUXILIARY_AIDS_OFFERED?: YES
WEAR_GLASSES_OR_BLIND: YES
WALKING_OR_CLIMBING_STAIRS: AMBULATION DIFFICULTY, ASSISTANCE 1 PERSON;OTHER (SEE COMMENTS)
ADLS_ACUITY_SCORE: 30
DIFFICULTY_EATING/SWALLOWING: NO
ADLS_ACUITY_SCORE: 27
HEARING_DIFFICULTY_OR_DEAF: YES
ADLS_ACUITY_SCORE: 30
EQUIPMENT_CURRENTLY_USED_AT_HOME: NONE;OTHER (SEE COMMENTS)
ADLS_ACUITY_SCORE: 30
WALKING_OR_CLIMBING_STAIRS_DIFFICULTY: YES
ADLS_ACUITY_SCORE: 30

## 2023-05-25 NOTE — MEDICATION SCRIBE - ADMISSION MEDICATION HISTORY
Admission Medication History    Admission medication history is complete. The information provided in this note is only as accurate as the sources available at the time of the update.    Medication reconciliation/reorder completed by provider prior to medication history? Yes    Information Source(s): Patient and Family member via in-person    Pertinent Information:   No pertinent information at this time  Changes made to PTA medication list:    Added: None    Deleted: co-enzyme Q 10 100 MG capsule,hydrodiuril 12.5 mg, lisinopril 10 mg magnesium 300 MG, cyanocobalamin (vitamin B 12),  Biotin dose unknown, patient not taking these medications and supplements.     Changed: None    Medication Affordability:  Not including over the counter (OTC) medications, was there a time in the past 3 months when you did not take your medications as prescribed because of cost?: No    Allergies reviewed with patient and updates made in EHR: yes    Medication History Completed By: Dianna Gold 5/24/2023 8:44 PM    Prior to Admission medications    Medication Sig Last Dose Taking? Auth Provider Long Term End Date   Ascorbic Acid (VITAMIN C PO)  Unknown at unknown Yes Reported, Patient     aspirin 81 MG tablet Take 1 tablet by mouth daily. 5/23/2023 at am Yes Reported, Patient     co-enzyme Q-10 100 MG CAPS capsule Take 100 mg by mouth Past Week at unknown Yes Reported, Patient No    lisinopril-hydrochlorothiazide (ZESTORETIC) 10-12.5 MG tablet Take 2 tablets by mouth daily for 30 days 5/24/2023 at am Yes Dave Gomez MD Yes 6/22/23   metoprolol succinate ER (TOPROL XL) 100 MG 24 hr tablet Take 1 tablet (100 mg) by mouth daily Use with 50 mg tab in the pm 5/24/2023 at 4:30pm Yes Jovana Macias MD Yes    metoprolol succinate ER (TOPROL XL) 50 MG 24 hr tablet Take 1 tablet (50 mg) by mouth every evening Use with 100mg tab in the am 5/23/2023 at evening Yes Jovana Macias MD Yes    Nutritional Supplements  (PYCNOGENOL) 300-30 MG CAPS per capsule Take 1 capsule by mouth daily 5/23/2023 at unknown Yes Reported, Patient     ondansetron (ZOFRAN) 4 MG tablet Take 1 tablet (4 mg) by mouth every 6 hours as needed for nausea or vomiting 5/23/2023 at unknown Yes Dave Gomez MD     Vitamin D, Cholecalciferol, 1000 units TABS Take 1 tablet by mouth daily 5/21/2023 at unknown Yes Reported, Patient     VITAMIN K PO Take 1 tablet by mouth daily 5/21/2023 at unknown Yes Reported, Patient     famotidine (PEPCID) 20 MG tablet Take 1 tablet (20 mg) by mouth 2 times daily   Jovana Macias MD

## 2023-05-25 NOTE — CONSULTS
[facility]  Neurology Consultation    Patient Name:  Isadora Mendez  MRN:  [id number]    :  3/9/1931  Date of Service:  May 25, 2023  Primary care provider:  Jovana Macias      Neurology consultation service was asked to see Isadora Mendez by Dr. Giordano to evaluate imbalance.    Chief Complaint:  Unsteadiness while walking    History of Present Illness:   Isadora Mendez is a 89 year old female with history of A-fib not on anticoagulation, sick sinus syndrome s/p pacemaker, hypertension, hypothyroidism, and gallstone pancreatitis who presents to the [facility] emergency department for evaluation of generalized weakness, nausea, vomiting, and diarrhea, as well as decreased appetite.      Neurology was consulted for further evaluation of imbalance.    Patient reports that over the last several weeks she has become more unsteady and imbalanced, particularly while walking.  She denies vertiginous symptoms.  This imbalance has been in the setting of fatigue, nausea, vomiting, diarrhea and decreased appetite.  She is also had elevated blood pressures, reportedly up to 200 systolic-she was seen in clinic yesterday for this and her lisinopril and hydrochlorothiazide was increased.    Upon evaluation in the emergency department patient was found to have a TSH less than 0.01, and T4 greater than 7.77.  It was noted by internal medicine that patient takes supplements at home which contain biotin which may falsely elevate T4/T3 levels and may falsely lower TSH.    Of note, patient has history of atrial fibrillation and is not on anticoagulation.  Per review of the chart, patient's cardiologist has discussed recommendation for anticoagulation with the patient, which she has declined.    ROS  A comprehensive ROS was performed and pertinent findings were included in HPI.     PMH  Past Medical History:   Diagnosis Date    Atrial fibrillation (H)     Facial fracture (H)      Hypertension     NSVT (nonsustained ventricular tachycardia) (H)     during exercise echo, neg EP study    Pacemaker 2010    S/P cardiac catheterization 2009    30-40% non obstructive lesion    Ventricular tachycardia, nonsustained (H)     during stress echo     Past Surgical History:   Procedure Laterality Date    IMPLANT PACEMAKER      PPM  2010    Permanent Pacemaker       Medications   I have personally reviewed the patient's medication list.     Allergies  I have personally reviewed the patient's allergy list.     Social History  Social History     Socioeconomic History    Marital status:    Occupational History    Occupation: retired     Comment: nurse   Tobacco Use    Smoking status: Never    Smokeless tobacco: Never   Substance and Sexual Activity    Alcohol use: No    Drug use: No       Family History    Family History   Problem Relation Age of Onset    Cardiovascular Father 76         age 80, MI 76 and CHF 80's    Cardiovascular Paternal Grandfather          age 76 of CVD    Myocardial Infarction Mother 88        lived to 100    Arrhythmia Sister         PPM    Neuropathy Sister     Colon Cancer Sister     Arrhythmia Brother         pacemaker    Arrhythmia Brother         pacemaker, hx rheumatic fever, heart failure    Blood Disease Son         hemochromatosis?    Atrial fibrillation Son     Cancer Daughter         Breast, uterine, 2nd breast cancer, HTN         Physical Examination   Vitals: BP (!) 144/55 (BP Location: Right arm, Patient Position: Semi-Ramírez's, Cuff Size: Adult Regular)   Pulse 65   Temp 98.4  F (36.9  C) (Oral)   Resp 18   SpO2 94%   General: Lying in bed, NAD  Head: NC/AT  Eyes: no icterus, op pink and moist  Cardiac: RRR. Extremities warm, no edema.   Respiratory: non-labored on RA  GI: S/NT/ND  Skin: No rash or lesion on exposed skin  Psych: Mood pleasant, affect congruent  Neuro:  Mental status: Awake, alert, attentive, oriented to self, time,  place, and circumstance. Language is fluent and coherent with intact comprehension of complex commands, naming and repetition. Able to spell world forward and backward, able to state amount of quarter in $1.75.  Unable to perform serial 7s.  Cranial nerves: VFF, PERRL, conjugate gaze, EOMI, facial sensation intact, face symmetric, shoulder shrug strong, tongue/uvula midline, no dysarthria.   Motor: Normal bulk and tone. Mild/moderate tremor with outstretched hands, no resting tremor. 5/5 strength bilaterally in upper and lower extremities 4+/5 with right hip flexions  Reflexes: Mildly brisk but symmetric in upper and lower extremities, toes down-going.  Absent patellar reflexes bilaterally  Sensory: Reduced sensation to light touch at the feet, normal above the ankle.  Impaired proprioception at the great toes bilaterally, normal at the ankles bilaterally.  Coordination: FNF and HS without ataxia or dysmetria, though there is some tremor while trying to meet target on FNF.  Gait: Using a walker, ambulating throughout the joiner with standby assist.    Investigations   I have personally reviewed pertinent labs, tests, and radiological imaging. Discussion of notable findings is included under Impression.     Was patient transferred from outside hospital?   No    Impression  Isadora Mendez is a 89 year old female with history of A-fib not on anticoagulation, sick sinus syndrome s/p pacemaker, hypertension, hypothyroidism, and gallstone pancreatitis who presents to the [facility] emergency department for evaluation of generalized weakness, nausea, vomiting, and diarrhea, as well as decreased appetite.    Per patient, there not seem to be an acute onset of her symptoms.  Patient reports that she has felt increased imbalance while walking, although she does acknowledge some imbalance at baseline.  This increased imbalance was in the setting of fatigue, nausea, vomiting, and diarrhea.  Of note patient also has chronic  cholecystitis.      On exam, there were some minor pain limitation at the right hip, as well as decreased vibratory sensation at the toes and ankles.  Patient was found to have elevated T4 and nearly undetectable TSH.  It certainly possible the increased imbalance could be secondary to fatigue and his metabolic derangements.  It also appears that patient has a peripheral large fiber neuropathy affecting her feet which can also be contributing to imbalance.  As the symptoms appear to be worsening of chronic issues, it is not likely an vascular process such as acute ischemic stroke could be responsible for the symptoms.  MRI is not recommended at this time.  Physical therapy to work on balance could be considered.  Patient has benefited from physical therapy in the past and both patient and family report that she is very motivated.    Recommendations  - MRI not indicated at this time  -A1c and B12 pending  - Orthostatic vitals   - Consider outpatient physical therapy to improve balance    If pt remains in the hospital, Neurology staff will see in the AM.    Thank you for involving Neurology in the care of Isadora Mendez.  Please do not hesitate to call with questions/concerns (consult pager [phone]).      Patient was discussed with Dr. Newby.    Dejon Wiggins MD  Neurology Resident, PGY3

## 2023-05-25 NOTE — CONSULTS
Cardiology Inpatient Consultation  May 25, 2023    Reason for Consult:  A cardiology consult was requested by Gianfranco Giordano  from the ED service to provide clinical guidance regarding dyspnea on exertion.    Assessment:  Isadora Mendez is a 89 year old female with history of paroxysmal AF, sick sinus syndrome s/p pacemaker, non-sustained VT, hypertension, and cholelithiasis. She presented with dyspnea on exertion and generalized weakness. Found to have lab evidence of thyrotoxicosis and decompensated HFpEF.     #Acute decompensated HFpEF  #Pulmonary hypertension, group 2 etiology   #Hyperthyroidism  #Hx of Sick Sinus Syndrome s/p pacemaker  #Hx of paroxysmal atrial fibrillation  #Hx of non-sustained VT   #Hypertension    The etiology of her decompensation is likely due to thyrotoxicosis and natural history of worsening diastolic dysfunction over time. This has also led to likely moderate pulmonary hypertension. While a right heart cath could help delineate what her wedge and PVR is, the fact that she has demonstrable diastolic dysfunction on her echo makes a group 2 etiology the most likely. Suspicion for her having a positive vasoreactivity test and/or group 1 PH is very low and as such will defer further workup with RHC at this point. She would benefit from diuresis given her evidence of volume overload on exam.     With regards to her atrial fibrillation, this will be exacerbated by thyrotoxicosis. She has had a long relationship with her outpatient EP and has declined anticoagulation in the past. Would aim for rate control strategy and continue her home metoprolol succinate 100 mg in am and 50 mg in pm. Propranolol is ideal for thyroid storm but she doesn't seem to have evidence of catacholamine excess at the moment, from our standpoint metoprolol should suffice.     Recommendations:  -start diuresis, lasix 20 mg IV x1 ordered this afternoon  -continue home metoprolol  -thyroid disease workup per  primary/endocrine, starting methimazole   -would try to get her on empagliflozin 10 mg daily and spironolactone 25 mg daily prior to discharge for her HFpEF     Plan of care discussed with Dr. Brian Macias , who agrees with above plan.    Thank you for consulting the cardiovascular services at the [facility]. Please do not hesitate to call us with any questions.     Agustín Alonzo  Medical Student    I saw this patient together with medical student, Agustín Alonzo, and performed an independent exam at the same time which confirmed findings. I have edited this note as appropriate to reflect our joint assessment/plan. Patient was also seen and discussed with attending physician, Dr. Macias, who is in agreement with above.   Rene Ortega MD  Cardiology Fellow  Pager: [phone]    HPI:   Isadora Mendez is a 89 year old female with history of A-fib not on anticoagulation, sick sinus syndrome s/p pacemaker, hypertension,hyperlipidemia, hypothyroidism, and gallstone pancreatitis who presents to the [facility] emergency department for evaluation of  dyspnea on exertion, generalized weakness, nausea, vomiting, and diarrhea, decreased appetite.Occasional palpitations with a faster pace. Also complaints of a chest pain 6 months ago. She was evaluated for dizziness and found out to have orthostatic hypertension last day.No episodes of syncope.    . Review of Systems:    Complete review of systems was performed and negative except per HPI.    PMH:  Past Medical History:   Diagnosis Date     Atrial fibrillation (H) 2011     Facial fracture (H) 2006     Hypertension      NSVT (nonsustained ventricular tachycardia) (H) 2009    during exercise echo, neg EP study     Pacemaker 7-1-2010     S/P cardiac catheterization 2009    30-40% non obstructive lesion     Ventricular tachycardia, nonsustained (H) 2009    during stress echo     Active Problems:  Patient Active Problem List    Diagnosis Date Noted      Chronic cholecystitis 2023     Priority: Medium     Hyperthyroidism 2023     Priority: Medium     Dyspnea on exertion 2023     Priority: Medium     Pleural effusion on right 2023     Priority: Medium     Elevated brain natriuretic peptide (BNP) level 2023     Priority: Medium     Dysequilibrium 2023     Priority: Medium     Nausea and vomiting, unspecified vomiting type 2023     Priority: Medium     Fuchs' corneal dystrophy 2018     Priority: Medium     Glaucoma suspect of right eye 2017     Priority: Medium     Atrioventricular block, incomplete 2016     Priority: Medium     Chronotropic incompetence 2015     Priority: Medium     Cardiac pacemaker, Medtronic, Dual Chamber, NOT dependent 2012     Priority: Medium     Paroxysmal A-fib (H) 05/15/2012     Priority: Medium     Hypertension 2011     Priority: Medium     Hyperlipidemia LDL goal <100 2011     Priority: Medium     Presbyopia 2007     Priority: Medium     Myopia 2007     Priority: Medium     Formatting of this note might be different from the original.  [facility]       Social History:  Social History     Tobacco Use     Smoking status: Never     Smokeless tobacco: Never   Substance Use Topics     Alcohol use: No     Drug use: No     Family History:  Family History   Problem Relation Age of Onset     Cardiovascular Father 76         age 80, MI 76 and CHF 80's     Cardiovascular Paternal Grandfather          age 76 of CVD     Myocardial Infarction Mother 88        lived to 100     Arrhythmia Sister         PPM     Neuropathy Sister      Colon Cancer Sister      Arrhythmia Brother         pacemaker     Arrhythmia Brother         pacemaker, hx rheumatic fever, heart failure     Blood Disease Son         hemochromatosis?     Atrial fibrillation Son      Cancer Daughter         Breast, uterine, 2nd breast cancer, HTN       Medications:    famotidine  20 mg Oral Q24H      lisinopril-hydrochlorothiazide  2 tablet Oral Daily     metoprolol succinate ER  100 mg Oral Daily     metoprolol succinate ER  50 mg Oral QPM     sodium chloride (PF)  3 mL Intracatheter Q8H     Vitamin D3  25 mcg Oral Daily           Physical Exam:  Temp:  [97.7  F (36.5  C)-98.4  F (36.9  C)] 97.9  F (36.6  C)  Pulse:  [65-89] 68  Resp:  [18] 18  BP: (136-148)/(46-60) 139/46  SpO2:  [94 %-97 %] 96 %    Intake/Output Summary (Last 24 hours) at 5/25/2023 0743  Last data filed at 5/25/2023 0541  Gross per 24 hour   Intake 240 ml   Output 200 ml   Net 40 ml     GEN: pleasant, no acute distress  HEENT: No discharge  EYES: no icterus  CV: RRR, normal s1/s2, no murmurs/rubs/s3/s4, no heave. JVD to angle of mandible at 60 degrees with +Kussmaul sign   CHEST: clear to ausculation bilaterally, no rales or wheezing  ABD: soft, non-tender, normal active bowel sounds  : no flank/suprapubic tenderness  EXTR: pulses regular, B/L pitting pedal edema + No clubbing, cyanosis   NEURO: alert oriented, speech fluent/appropriate, motor grossly nonfocal  PSYCH: cooperative, affect appropriate, pleasant      Diagnostics:  All labs and imaging were reviewed, of note:    CMP  Recent Labs   Lab 05/25/23  0635 05/24/23 1852 05/23/23  1547    136 138   POTASSIUM 4.0 4.2 4.2   CHLORIDE 103 103 103   CO2 20* 22 25   ANIONGAP 14 11 10   * 122* 137*   BUN 33.9* 36.0* 34.8*   CR 0.80 0.89 0.85   GFRESTIMATED 69 60* 64   INO 9.8* 9.9* 10.4*   MAG  --  2.3  --    PROTTOTAL 5.9* 6.8 7.3   ALBUMIN 3.0* 3.5 3.8   BILITOTAL 0.7 0.7 0.7   ALKPHOS 83 103 116*   AST 42* 51* 48*   ALT 27 38* 35     CBC  Recent Labs   Lab 05/25/23  0635 05/24/23 1852 05/23/23  1547   WBC 6.2 7.0 7.0   RBC 3.32* 3.72* 3.88   HGB 9.5* 10.7* 11.5*   HCT 31.5* 35.1 35.1   MCV 95 94 91   MCH 28.6 28.8 29.6   MCHC 30.2* 30.5* 32.8   RDW 13.5 13.5 13.7   * 146* 171     INR  Recent Labs   Lab 05/24/23  1852   INR 1.26*     Arterial Blood GasNo lab  results found in last 7 days.    Lab Results   Component Value Date    TROPI <0.015 11/13/2017    TROPI <0.012 09/21/2009       EKG: Sinus, LAD,LVH    Transthoracic echocardiogram:  Pending    Additional imaging:     Catheterization(s):

## 2023-05-25 NOTE — PROGRESS NOTES
"   05/25/23 1100   Appointment Info   Signing Clinician's Name / Credentials (PT) Rachel Reilly, PT, DPT   Living Environment   People in Home spouse   Current Living Arrangements house   Home Accessibility no concerns   Transportation Anticipated family or friend will provide   Living Environment Comments Has many family members close by willing and able to help as needed.   Self-Care   Usual Activity Tolerance excellent   Current Activity Tolerance moderate   Regular Exercise No   Equipment Currently Used at Home shower chair   Fall history within last six months no   Activity/Exercise/Self-Care Comment Ind at baseline with all ADLs and iADLs.   General Information   Onset of Illness/Injury or Date of Surgery 05/24/23   Referring Physician Bola Esteban MD   Patient/Family Therapy Goals Statement (PT) to go home.   Pertinent History of Current Problem (include personal factors and/or comorbidities that impact the POC) Isadora Mendez is a 89 year old female with PMH significant for pancreatitis, sick sinus syndrome s/p pacemaker implantation (Medtronic), exercise-induced VT, paroxysmal atrial fibrillation (not anticoagulated), and HTN admitted on 5/24/2023 with generalized weakness, dyspnea on exertion, and diarrhea.   Existing Precautions/Restrictions fall   Cognition   Affect/Mental Status (Cognition) WFL   Pain Assessment   Patient Currently in Pain No   Integumentary/Edema   Integumentary/Edema no deficits were identifed   Posture    Posture Forward head position;Protracted shoulders   Range of Motion (ROM)   ROM Comment WFL; reports her left knee is feeling \"stiff\" and not really painful.   Strength (Manual Muscle Testing)   Strength Comments Grossly deconditioned; is able to lift all four extremities anti-gravity but notes her muscles \"get tired quicly\" with lower activity tolerance than her normal.   Bed Mobility   Comment, (Bed Mobility) Ind   Transfers   Comment, (Transfers) CGA; slightly " impulsive with mobility   Gait/Stairs (Locomotion)   Comment, (Gait/Stairs) Slow gait with slight outgoing toe due to previous hip issues. Slightly uneven step/stride length throughout and per daughter gait speed is slower than her normal.   Balance   Balance Comments Unsteady; needs HHA or walker for support. Immediately reaches out for support when she stands.   Clinical Impression   Criteria for Skilled Therapeutic Intervention Yes, treatment indicated   PT Diagnosis (PT) impaired mobility   Influenced by the following impairments weakness   Functional limitations due to impairments transfers and gait   Clinical Presentation (PT Evaluation Complexity) Stable/Uncomplicated   Clinical Presentation Rationale clinical judgement   Clinical Decision Making (Complexity) low complexity   Planned Therapy Interventions (PT) balance training;bed mobility training;gait training;home exercise program;neuromuscular re-education;stair training;strengthening;transfer training   Anticipated Equipment Needs at Discharge (PT) walker, rolling   Risk & Benefits of therapy have been explained evaluation/treatment results reviewed;care plan/treatment goals reviewed;risks/benefits reviewed   PT Total Evaluation Time   PT Eval, Low Complexity Minutes (35988) 10   Physical Therapy Goals   PT Frequency 4x/week   PT Predicted Duration/Target Date for Goal Attainment 06/08/23   PT Goals Bed Mobility;Transfers;Gait   PT: Bed Mobility Independent   PT: Transfers Supervision/stand-by assist   PT: Gait Supervision/stand-by assist;Rolling walker;150 feet   Interventions   Interventions Quick Adds Therapeutic Activity   Therapeutic Activity   Therapeutic Activities: dynamic activities to improve functional performance Minutes (33104) 25   Symptoms Noted During/After Treatment Fatigue   Treatment Detail/Skilled Intervention Patient needs cues for safety and to slow down with transfers and mobility; she is slightly impulsive and  moves quickly  between positions when she is ready to go. Ambulated once with  HHA about 40 feet; slow gait speed and cues needs for safety and to keep her head up. Ambulated again about 100 feet with FWW; cues needed to keep the walker close, especially with turning.   PT Discharge Planning   PT Plan progress activity as able   PT Discharge Recommendation (DC Rec) home with home care physical therapy   PT Rationale for DC Rec Patient mobilizing well enough with CGA to discharge home; she has great family support. She is not at her baseline level of support and therefore needs follow up PT at discharge to improved functional and safe independence.   PT Brief overview of current status A x 1 with FWW   Total Session Time   Timed Code Treatment Minutes 25   Total Session Time (sum of timed and untimed services) 35

## 2023-05-25 NOTE — H&P
[facility]    History and Physical - Medicine Service, MAROON TEAM        Date of Admission:  5/24/2023    Assessment & Plan      Isadora Mendez is a 89 year old female with PMH significant for pancreatitis, sick sinus syndrome s/p pacemaker implantation (Medtronic), exercise-induced VT, paroxysmal atrial fibrillation (not anticoagulated), and HTN admitted on 5/24/2023 with generalized weakness, dyspnea on exertion, and diarrhea.    Hyperthyroidism  Malaise  Nausea  Pt complains of nausea, vomiting, diarrhea, and decreased appetite. VSS, not tachycardic. Labs notable for TSH< 0.01, T4 >7.77. Symptoms could be explained by hyperthyroidism. Of note pt has been taking Pycnogenol (home supplement) which contains Biotin. Also takes Biotin intermittently at home. Biotin use can result in falsely high levels of T4 and T3 and falsely low levels of TSH, leading to a wrong diagnosis of hyperthyroidism. Will continue PTA metoprolol for now given stable vitals and re-evaluate management tomorrow once we consult with endocrinology team. Plan to recheck TSH/T4 levels in am, would expect improvement if due to herbal supplement.  - Endocrinology consulted, recommendations appreciated  - Continue Tele for now  - Advised pt to discontinue PTA Pycnogenol and Biotin      Cholelithiasis   Chronic cholecystitis  Hx of gallstone pancreatitis  Common bile duct dilation  Pt with known hx of cholelithiasis, had an extensive outpatient workup. MR Liver 4/26/23 showed cholelithiasis with hydropic distention of gallbladder and mild wall thickening and enhancement. Also notable for CBD dilation ~9mm. HIDA scan 4/26/23 with no evidence of acute cholecystitis, may represent chronic cholecystitis. Pt denies abdominal pain currently. Lipase 46 on admission, AST 51, ALT 38. US abd today re demonstrated cholelithiasis without evidence for acute cholecystitis.   - Surgery consulted, recommendations  "appreciated   - No acute surgical intervention at this time   - Outpatient surgical follow up for consideration of cholecystectomy  - Consider GI consult for evaluation of bile duct dilation if concern for obstruction.     Dyspnea on exertion  Pt reports worsening dyspnea on exertion for the past 2 months, only able to walk 1/2 block due to worsening SOB. BLE edema noted on exam. BNP 2,414 up from 820 (7 months ago). Last TTE 10/2022 with normal EF 60-65%, no diastolic dysfunction noted. Barbara scan stress test 10/2022 negative for inducible ischemic EKG changes. CXR with small R pleural effusion, no evidence of pulmonary edema. Per literature review, acute decompensated heart failure is mentioned as a possible side effect of Pycnogenol (home supplement).  - Repeat TTE pending   - Discontinue IVF, pt tolerating PO  - Continue pulse ox, Tele  - Cardiology consulted by ED, recommendations appreciated    Cognitive decline  Generalized weakness  Patient reports worsening weakness in the past 2 months. Family also concerned for cognitive decline, states that the pt \"is not as sharp lately\", forgetting words and names occasionally. Pt lives with her  in a house. Denies any recent falls. CT head with no acute intracranial pathology. Neurological exam intact, no focal deficits noted. Less concerned for infectious etiology given unremarkable labs and pt is vitally stable.   - Neurology consulted by ED, recommendations appreciated.  - MRI head ordered by ED, pending  - PT consulted for safe discharge evaluation    Troponinemia  Trops 38-->34 on admission. Pt denies any chest pain. EKG with no ischemic changes per ED interpretation, could not visualize EKG. Likely secondary to demand ischemia given recent uncontrolled HTN.   - Tele    Chronic medical problems:  HTN  Recently seen in outpatient clinic on 5/23 for dizziness, was found to be significantly hypertensive 200/60. Her hydrochlorothiazide/Lisinopril dose was " increased to 25/20.   - Continue PTA Metoprolol 100 mg qam/ 50 mg qpm.   - Continue PTA Lisinopril-hydrochlorothiazide 25/20 mg    Sick sinus syndrome s/p pacemaker implantation (Medtronic)  Exercise-induced VT  Paroxysmal atrial fibrillation (not anticoagulated)  - Continue tele       Diet: Regular Diet Adult  DVT Prophylaxis: Low Risk/Ambulatory with no VTE prophylaxis indicated  Covarrubias Catheter: Not present  Fluids: PO  Lines: None     Cardiac Monitoring: ACTIVE order. Indication: hyperthyroidism  Code Status: Full Code    Clinically Significant Risk Factors Present on Admission           # Hypercalcemia: Highest Ca = 10.4 mg/dL in last 2 days, will monitor as appropriate     # Coagulation Defect: INR = 1.26 (Ref range: 0.85 - 1.15) and/or PTT = N/A, will monitor for bleeding  # Drug Induced Platelet Defect: home medication list includes an antiplatelet medication   # Hypertension: Noted on problem list               Disposition Plan      Expected Discharge Date: 05/26/2023                The patient's care was discussed with the Attending Physician, Bola Hudson.      Dina Oakes MD  Medicine Service, [facility]  Securely message with [employer] (more info)  Text page via [facility] Paging/Directory   See signed in provider for up to date coverage information  ______________________________________________________________________    Chief Complaint   Fatigue, diarrhea, Shortness of breath    History is obtained from the patient    History of Present Illness   Isadora Mendez is a 89 year old female with PMH significant for pancreatitis, sick sinus syndrome s/p pacemaker implantation (Medtronic), exercise-induced VT, paroxysmal atrial fibrillation (not anticoagulated), and HTN who presented to the ED complaining of worsening generalized weakness, shortness of breath nausea, vomiting.  Patient has first noticed the symptoms beginning of March and have  "gradually worsened over time.  She also reports loose stools approximately 1 to 2/day.  Denies any blood in stools.  Patient is complaining of worsening shortness of breath on exertion, can only walk half a block at a time due to the shortness of breath.  She is able to lay flat at night, uses only 1 pillow, does not wake up gasping for air.  Patient is also feeling \"more wobbly\" but denies any recent falls or head injuries.  Patient lives at home with her .  Patient is pretty active at baseline used to work at a  up until 2 years ago prior to COVID, has been feeling more fatigued lately.    Family concerned of cognitive decline but no confusion, states that the patient \"seems less sharp lately\", forgetting words or names at times, but not happening too frequently.  Patient alert and oriented x3 during our conversation, very pleasant.  Patient denies any chest pain, palpitations, abdominal pain, urinary symptoms. Recently seen in outpatient clinic on 5/23 for dizziness, was found to be significantly hypertensive 200/60. Her hydrochlorothiazide/Lisinopril dose was increased to 25/20.     Patient is a retired nurse.  Daughter at bedside is a physical therapist, has another daughter at home which is also a nurse.    ED course:   - Vitals: /60, HR 77, temp 97.7 F,  - Labs: SH< 0.01, T4 >7.77,BNP 2,414 , Lipase 46 on admission, AST 51, ALT 38, Trops 38-->34  - Images: US abd today re demonstrated cholelithiasis without evidence for acute cholecystitis. CT head with no acute intracranial pathology. CXR with small R pleural effusion, no evidence of pulmonary edema.   - Interventions: 1l IVF bolus, CT head, MRI head, consults: surgery, neurology, cardiology.          Past Medical History    Past Medical History:   Diagnosis Date     Atrial fibrillation (H) 2011     Facial fracture (H) 2006     Hypertension      NSVT (nonsustained ventricular tachycardia) (H) 2009    during exercise echo, neg EP study     " Pacemaker 7-1-2010     S/P cardiac catheterization 2009    30-40% non obstructive lesion     Ventricular tachycardia, nonsustained (H) 2009    during stress echo       Past Surgical History   Past Surgical History:   Procedure Laterality Date     IMPLANT PACEMAKER       PPM  6/30/2010    Permanent Pacemaker       Prior to Admission Medications   Prior to Admission Medications   Prescriptions Last Dose Informant Patient Reported? Taking?   Ascorbic Acid (VITAMIN C PO) Unknown at unknown Self, Daughter Yes Yes   Nutritional Supplements (PYCNOGENOL) 300-30 MG CAPS per capsule 5/23/2023 at unknown Self, Daughter Yes Yes   Sig: Take 1 capsule by mouth daily   VITAMIN K PO 5/21/2023 at unknown Self, Daughter Yes Yes   Sig: Take 1 tablet by mouth daily   Vitamin D, Cholecalciferol, 1000 units TABS Past Week at unknown Self, Daughter Yes Yes   Sig: Take 1 tablet by mouth daily   aspirin 81 MG tablet 5/23/2023 at am Self, Daughter Yes Yes   Sig: Take 1 tablet by mouth daily.   co-enzyme Q-10 100 MG CAPS capsule Past Week at unknown Self, Daughter Yes Yes   Sig: Take 100 mg by mouth   famotidine (PEPCID) 20 MG tablet 5/23/2023 at unknown Self, Daughter No Yes   Sig: Take 1 tablet (20 mg) by mouth 2 times daily   lisinopril-hydrochlorothiazide (ZESTORETIC) 10-12.5 MG tablet 5/24/2023 at am Self, Daughter No Yes   Sig: Take 2 tablets by mouth daily for 30 days   metoprolol succinate ER (TOPROL XL) 100 MG 24 hr tablet 5/24/2023 at 4:30pm Self, Daughter No Yes   Sig: Take 1 tablet (100 mg) by mouth daily Use with 50 mg tab in the pm   metoprolol succinate ER (TOPROL XL) 50 MG 24 hr tablet 5/23/2023 at evening Self, Daughter No Yes   Sig: Take 1 tablet (50 mg) by mouth every evening Use with 100mg tab in the am   ondansetron (ZOFRAN) 4 MG tablet 5/23/2023 at unknown Self, Daughter No Yes   Sig: Take 1 tablet (4 mg) by mouth every 6 hours as needed for nausea or vomiting      Facility-Administered Medications: None        Physical  Exam   Vital Signs: Temp: 97.7  F (36.5  C) Temp src: Oral BP: (!) 148/51 Pulse: 74   Resp: 18 SpO2: 97 % O2 Device: None (Room air)    Weight: 0 lbs 0 oz    Gen: NAD, alert, cooperative, non-toxic, pleasant  HEENT: EOMI, no conjunctival icterus, tracking appropriately  Resp: CTAB, no crackles or wheezes, no increased WOB  Cardiac: RRR, no S3/S4, no M/R/G appreciated  GI: soft, non-tender, non-distended  Ext: +2 BLE pitting edema, WWP,no erythema  Neuro: AOx3, sensation intact b/l, no focal deficits, strength 5/5 in all extremities. CN II-XII intact.   Psych: appropriate mood & affect      Medical Decision Making       Please see A&P for additional details of medical decision making.      Data     I have personally reviewed the following data over the past 24 hrs:    7.0  \   10.7 (L)   / 146 (L)     136 103 36.0 (H) /  122 (H)   4.2 22 0.89 \       ALT: 38 (H) AST: 51 (H) AP: 103 TBILI: 0.7   ALB: 3.5 TOT PROTEIN: 6.8 LIPASE: 46       Trop: 34 (H) BNP: 2,414 (H)       TSH: <0.01 (L) T4: >7.77 (H) A1C: N/A       Procal: N/A CRP: N/A Lactic Acid: 1.4       INR:  1.26 (H) PTT:  N/A   D-dimer:  N/A Fibrinogen:  N/A

## 2023-05-25 NOTE — UTILIZATION REVIEW
[facility] Utilization Review  Admission Status; Secondary Review Determination     Admission Date: 5/24/2023  5:47 PM      Under the authority of the Utilization Management Committee, the utilization review process indicated a secondary review on the above patient.  The review outcome is based on review of the medical records, discussions with staff, and applying clinical experience noted on the date of the review.        (X)      Inpatient Status Appropriate - This patient's medical care is consistent with medical management for inpatient care and reasonable inpatient medical practice.          RATIONALE FOR DETERMINATION   89-year-old female with history of pancreatitis, sick sinus syndrome status post pacemaker, exercise-induced VT, paroxysmal atrial fibrillation, hypertension admitted with generalized weakness, dyspnea on exertion and diarrhea, found to have thyrotoxicosis.  TSH less than 0.01, T47.77, unable to obtain thyroid scan, endocrine team started on methimazole loading dose and then continuous.  Patient also found to have acute heart failure exacerbation, started on IV Lasix, needs close monitoring of intake and output, daily weights, following creatinine.  Device check ordered.  Echocardiogram shows EF of 60 to 65%, left atrial enlargement, worsening pulmonary hypertension.  Complex patient with thyrotoxicosis, acute decompensated heart failure, needs close monitoring in the hospital with ongoing interventions and is at risk of acute decompensation, with anticipated length of stay more than 2 midnight, recommend continue inpatient status      The severity of illness, intensity of service provided, expected LOS and risk for adverse outcome make the care complex, high risk and appropriate for hospital admission.The patient requires hospital based medical care which is anticipated to require a stay of 2 or more midnights.        The information on this document is developed by the utilization  review team in order for the business office to ensure compliance.  This only denotes the appropriateness of proper admission status and does not reflect the quality of care rendered.              Sincerely,       Ephraim Christine MD  Physician Advisor  Utilization Review-[place]    Phone: [phone]

## 2023-05-25 NOTE — PLAN OF CARE
Goal Outcome Evaluation:      Plan of Care Reviewed With: patient, child    Overall Patient Progress: improvingOverall Patient Progress: improving     A&Ox4, VSS on RA. Intermiteent HTN. Tele reading Chronic Afib with BBB team aware. Low fat diet, Pt gets nauseous after eating. no vomiting. Zofran admisntered with some relief. BA to bathroom. 2 loose BM reported by daughter this shift. PIV SL. Daughter present and very involved in cares. Eco completed. Worked with PT. Lasix adminstered. Bed alarm on for safety. No other changes today. Plan of care continued.

## 2023-05-25 NOTE — PROGRESS NOTES
SPIRITUAL HEALTH SERVICES Progress Note  [facility] EB, 5A  On-Call    Reason for Visit: Admission request     Plan of Care: I have no plan for follow up. I will inform the unit  of care provided.    -------------------------------------------------------------------------------------------------    Patient/Family Understanding of Illness and Goals of Care - Isadora spoke with hopefulness about the possibility of leaving the hospital tomorrow believing that her primary treatment team have a determinative sense of what led to her admission.    Distress and Loss - She did express being in any kind of distress.     Strengths, Coping, and Resources - Her , six children, and fifteen grandchildren were named to be sources of support and coping. She also reflected on her priority of remaining active. 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    Meaning, Beliefs, and Spirituality - In the context of life review, Isadora indicated the significance of her Jew shadia for positive coping. She remains active at [facility] in [facility]. Prayer was shared.       Victor Manuel Mueller, MPH, MDiv, [facility]  Staff   Pager: [phone]

## 2023-05-25 NOTE — DISCHARGE INSTRUCTIONS
MESSAGE FROM DR FRANKS    You have been taking biotin previously as directed by your provider. The FDA has recently given warning to all patients and providers that biotin can interfere with many common blood tests, including tests for heart attack (i.e. a patient who is on biotin and having a heart attack might not have it show up in their blood work). Biotin is almost always used a supplement to a well balanced diet and it is almost never a critically important or life saving medication. Because of this, I strongly recommend that you stop taking biotin immediately and consider eating more foods that contain high levels of natural biotin (Table 2, below). I also strongly encourage you to talk to your doctor about stopping biotin and consider an alternative treatment plan.     For more information about biotin, you can visit the FDA link here (https://[employer]/content/FDA/71-33-3838M82:39/https://www.fda.gov/medical-devices/safety-communications/update-fda-warns-biotin-may-interfere-lab-tests-fda-safety-communication)

## 2023-05-25 NOTE — PROGRESS NOTES
[employer] Express remote pacemaker transmission received and reviewed. Device transmission sent per MD orders from the [facility] ER.    Device: Medtronic [id number] Advisa DR LAWLER  Normal Device Function.  Mode: AAIR-DDDR  bpm  AP: 89.8%  : 0.4%  Presenting EGM: AS/VS @ 75 bpm  Short V-V intervals: 1  Lead Trends Appear Stable.   Estimated battery longevity to RRT = 4 years. Battery voltage = 2.98 V.   Atrial Arrhythmia: 2 AT/AF lasting up to 7 hours  AF [place]: 1.1%  Anticoagulant: None per pt wishes  Ventricular Arrhythmia: 10 episodes recorded as NSVT. EGMs show 1:1 AV conduction lasting 1-8 seconds  Plan: Device follow-up every 3 months.  GENE Nash RN    Remote pacemaker transmission

## 2023-05-25 NOTE — PROGRESS NOTES
Admission/Transfer from: [facility]  2 RN skin assessment completed. Yes, Ira DEL RIO  Significant findings include: Blanchable redness to shira area, L Shin/ankle with darkened spot. Pt stated that this area is being follow by dermatology outpatient.   WOC Nurse Consult Ordered? No

## 2023-05-25 NOTE — PROGRESS NOTES
[facility]    Progress Note - Medicine Service, FRANCISCO TEAM 5       Date of Admission:  5/24/2023    Assessment & Plan   Isadora Mendez is a 89 year old female with PMH significant for pancreatitis, sick sinus syndrome s/p pacemaker implantation (Medtronic), exercise-induced VT, paroxysmal atrial fibrillation (not anticoagulated), and HTN admitted on 5/24/2023 with generalized weakness, dyspnea on exertion, and diarrhea found to have thyrotoxicosis.     Thyrotoxicosis  Goiter  Labs notable for TSH< 0.01, T4 >7.77. Patient's non specific symptoms (nausea, vomiting, fatigue, loss of appetite) could be explained by hyperthyroidism. Of note pt has been taking Pycnogenol (home supplement) which contains Biotin. Also takes Biotin intermittently at home. Biotin use can result in falsely high levels of T4 and T3 and falsely low levels of TSH, leading to a wrong diagnosis of hyperthyroidism. Patient seen by endocrine service, differential includes transient post-viral (painless) thyroiditis v possible iodine induced hyperthyroidism.     - Endocrine following, appreciate recs   - Unable to obtain 123I thyroid scan   - Treat with Methimazole 20 mg loading dose then 10 mg TID (5/25 -*)   - Avoid CT contrast if able   - Avoid use of Pycnogenol and Biotin     HFpEF  SSS s/p PM   pAFib (not on AC)  Troponinemia, resolved   Pt reports worsening dyspnea on exertion for the past 2 months, only able to walk 1/2 block due to worsening SOB. BLE edema noted on exam. BNP 2,414 up from 820 (7 months ago). TTE here shows EF of 60-65%. Barbara scan stress test 10/22 negative for inducible ischemic EKG changes. CXR with small R pleural effusion, no evidence of pulmonary edema. Low concern for ACS.     - Cardiology following, appreciate recs  - Continue Telemetry   - Cardiac diet   - c/w PTA Metoprolol   - c/w PTA Lisinopril-HCTZ    Cholelithiasis   Chronic cholecystitis  Hx of gallstone  "pancreatitis  Common bile duct dilation  Pt with known hx of cholelithiasis, had an extensive outpatient workup. MR Liver 4/26/23 showed cholelithiasis with hydropic distention of gallbladder and mild wall thickening and enhancement. Also notable for CBD dilation ~9mm. HIDA scan 4/26/23 with no evidence of acute cholecystitis, may represent chronic cholecystitis. Pt denies abdominal pain currently. Lipase 46 on admission, AST 51, ALT 38. US abd on admission re demonstrated cholelithiasis without evidence for acute cholecystitis.     - Surgery consulted, recommendations appreciated              - No acute surgical intervention at this time              - Outpatient surgical follow up for consideration of cholecystectomy    Cognitive decline  Generalized weakness  Peripheral neuropathy   Patient reports worsening weakness in the past 2 months. Family also concerned for cognitive decline, states that the pt \"is not as sharp lately\", forgetting words and names occasionally. Pt lives with her  in a house. Denies any recent falls. CT head with no acute intracranial pathology. Neurological exam intact, no focal deficits noted. Less concerned for infectious etiology given unremarkable labs and pt is vitally stable.     - Neurology following, no additional recs  - Falls precautions  - PT/OT consult  - A1c added on     HTN  - c/w PTA Metoprolol   - c/w PTA Lisinopril-HCTZ       Diet: Regular Diet Adult    DVT Prophylaxis: Pneumatic Compression Devices  Covarrubias Catheter: Not present  Fluids: None  Lines: None     Cardiac Monitoring: ACTIVE order. Indication: hyperthyroidism  Code Status: Full Code      Clinically Significant Risk Factors Present on Admission           # Hypercalcemia: Highest Ca = 10.4 mg/dL in last 2 days, will monitor as appropriate    # Hypoalbuminemia: Lowest albumin = 3 g/dL at 5/25/2023  6:35 AM, will monitor as appropriate  # Coagulation Defect: INR = 1.26 (Ref range: 0.85 - 1.15) and/or PTT = N/A, " will monitor for bleeding  # Drug Induced Platelet Defect: home medication list includes an antiplatelet medication   # Hypertension: Noted on problem list               Disposition Plan      Expected Discharge Date: 05/26/2023                The patient's care was discussed with the Attending Physician, Dr. Sanchez.    Dustin Stephens MD  Medicine Service, [facility] TEAM 5  [facility]  Securely message with [employer] (more info)  Text page via [facility] Paging/Directory   See signed in provider for up to date coverage information  ______________________________________________________________________    Interval History   Nursing notes reviewed. NAEO. Daughter at bedside.     Patient doing well. No overt concerns at this time. She states that she feels well.    Physical Exam   Vital Signs: Temp: 98.3  F (36.8  C) Temp src: Oral BP: (!) 144/40 Pulse: 62   Resp: 20 SpO2: 94 % O2 Device: None (Room air)    Weight: 0 lbs 0 oz    Gen: NAD, alert, cooperative, non-toxic, pleasant  HEENT: EOMI, no conjunctival icterus, tracking appropriately  Resp: CTAB, no crackles or wheezes, no increased WOB  Cardiac: RRR, no S3/S4, no M/R/G appreciated  GI: soft, non-tender, non-distended  Ext: 1+ pitting edema, WWP,no erythema  Neuro: AOx3, sensation intact b/l, no focal deficits, strength 5/5 in all extremities. CN II-XII intact.   Psych: appropriate mood & affect      Data   Most Recent 3 CBC's:Recent Labs   Lab Test 05/25/23  0635 05/24/23  1852 05/23/23  1547   WBC 6.2 7.0 7.0   HGB 9.5* 10.7* 11.5*   MCV 95 94 91   * 146* 171     Most Recent 3 BMP's:Recent Labs   Lab Test 05/25/23  0635 05/24/23  1852 05/23/23  1547    136 138   POTASSIUM 4.0 4.2 4.2   CHLORIDE 103 103 103   CO2 20* 22 25   BUN 33.9* 36.0* 34.8*   CR 0.80 0.89 0.85   ANIONGAP 14 11 10   INO 9.8* 9.9* 10.4*   * 122* 137*     Most Recent 2 LFT's:Recent Labs   Lab Test 05/25/23  0635 05/24/23  1852   AST  42* 51*   ALT 27 38*   ALKPHOS 83 103   BILITOTAL 0.7 0.7     Most Recent 3 Hemoglobins:Recent Labs   Lab Test 05/25/23  0635 05/24/23  1852 05/23/23  1547   HGB 9.5* 10.7* 11.5*     Most Recent 3 Troponin's:Recent Labs   Lab Test 11/13/17  1510   TROPI <0.015     Most Recent 6 glucoses:Recent Labs   Lab Test 05/25/23  0635 05/24/23  1852 05/23/23  1547 04/14/23  1222 04/05/23  1610 10/21/22  0550   * 122* 137* 134* 138* 85     Most Recent ESR & CRP:Recent Labs   Lab Test 05/25/23  0635 05/06/21  0655   CRP  --  4.8   CRPI 9.10*  --

## 2023-05-25 NOTE — CONSULTS
EGS Surgery Consult  May 24, 2023    Isadora Mendez  : 3/9/1931    Date of Service: 2023 9:46 PM    Assessment and Plan:  Isadora Mendez is a 89 year old female with a history notable for A-fib not on AC, sick sinus syndrome s/p post pacemaker, exercise-induced VT, hypertension, hypothyroidism, gallstone pancreatitis who presented to the ED with several weeks of generalized weakness, intermittent abdominal pain with nausea, vomiting, and diarrhea along with decreased appetite.  She has had an outpatient work-up to evaluate for cholecystitis which was reviewed.  Here, her abdominal exam is benign. Low suspicion for acute intraabdominal process at this time. While there is likely a component of chronic cholecystitis, it is unclear whether this explains her constellation of symptoms.  Given her age and comorbidities, recommend admission to medicine for further work-up.    - no acute surgical intervention at this time  - do not feel further imaging (ie. CT, HIDA, etc) would be beneficial given extensive outpatient workup  - Recommend admission to medicine for further workup and management  - Recommend outpatient surgical follow up for consideration of outpatient cholecystectomy  - Appreciate surgical risk stratification and optimization  - Consider GI consultation for evaluation of bile duct dilation     Please call with any questions.    Patient and plan discussed with chief resident and staff, Dr. Trenton Martínez MD  General Surgery Resident  --------------------------------------------------------------------  History of Present Illness:    Isadora Mendez is a 89 year old female with a history notable for A-fib not on AC, sick sinus syndrome s/p post pacemaker, exercise-induced VT, hypertension, hypothyroidism, gallstone pancreatitis who presented to the ED for evaluation of a constellation of symptoms including generalized weakness, nausea, vomiting, diarrhea, and decreased appetite.  She and her  "daughter state that beginning in late March, she has had intermittent episodes of vague abdominal discomfort with nausea, vomiting, and diarrhea.  She describes the abdominal pain as extending from her xiphoid to her pubis.  Nothing seems to trigger the pain, including food.  She has never had this discomfort before.  Due to her poor appetite, she has not been eating or drinking much over the past few weeks per daughter.  She also notes that since March, she has become more short of breath with exertion.  She is able to walk about half a block before stopping.  She denies fevers.  She has been working with her PCP in the outpatient setting to work-up presumed chronic cholecystitis.  No prior abdominal surgeries.    When asked if she would consider abdominal surgery, she states \"I am not afraid of surgery\".  She also confirms she is full code.    Pertinent Outpatient Imagin23: CT A/P: Mild gallbladder wall thickening and circumscribed hypodensity along the gallbladder fundus wall may represent pericholecystic loculated fluid or focal thickened gallbladder wall versus adenomyomatosis    23: RUQ US: Borderline hydropic distention of the thickened gallbladder with multiple stones.  Sonographic Polanco sign negative.  Common bile duct measures 8 mm.    23: No evidence of acute cholecystitis.  Bilobed gallbladder.    23: MRI Abdomen: Cholelithiasis with hydropic distention of gallbladder and mild wall thickening and enhancement.  Common bile duct measuring 9 mm.    Past Medical History:  Past Medical History:   Diagnosis Date     Atrial fibrillation (H)      Facial fracture (H)      Hypertension      NSVT (nonsustained ventricular tachycardia) (H)     during exercise echo, neg EP study     Pacemaker 2010     S/P cardiac catheterization     30-40% non obstructive lesion     Ventricular tachycardia, nonsustained (H)     during stress echo       Past Surgical " History  Pacemaker  Tonsillectomy    Family History:  Family History   Problem Relation Age of Onset     Cardiovascular Father 76         age 80, MI 76 and CHF 80's     Cardiovascular Paternal Grandfather          age 76 of CVD     Myocardial Infarction Mother 88        lived to 100     Arrhythmia Sister         PPM     Neuropathy Sister      Colon Cancer Sister      Arrhythmia Brother         pacemaker     Arrhythmia Brother         pacemaker, hx rheumatic fever, heart failure     Blood Disease Son         hemochromatosis?     Atrial fibrillation Son      Cancer Daughter         Breast, uterine, 2nd breast cancer, HTN       Social History:  Lives independently with her .  Ambulates without assistive device    Medications:  Current Facility-Administered Medications   Medication     lactated ringers infusion     ondansetron (ZOFRAN) injection 4 mg     Current Outpatient Medications   Medication     Ascorbic Acid (VITAMIN C PO)     aspirin 81 MG tablet     co-enzyme Q-10 100 MG CAPS capsule     famotidine (PEPCID) 20 MG tablet     lisinopril-hydrochlorothiazide (ZESTORETIC) 10-12.5 MG tablet     metoprolol succinate ER (TOPROL XL) 100 MG 24 hr tablet     metoprolol succinate ER (TOPROL XL) 50 MG 24 hr tablet     Nutritional Supplements (PYCNOGENOL) 300-30 MG CAPS per capsule     ondansetron (ZOFRAN) 4 MG tablet     Vitamin D, Cholecalciferol, 1000 units TABS     VITAMIN K PO       Allergies:   No Known Allergies    Review of Symptoms:  A 10 point review of symptoms has been conducted and is negative except for that mentioned in the above HPI.    Physical Exam:    Blood pressure 136/60, pulse 77, temperature 97.7  F (36.5  C), temperature source Oral, resp. rate 18, SpO2 97 %, not currently breastfeeding.  Gen:    Sitting in bed, pleasant, bright affect, no acute distress  HEENT: Normocephalic and atraumatic  CV:  RRR  Pulm:  Non-labored breathing  Abd:  Soft, nondistended, minimal tenderness to  palpation RUQ and LUQ, no guarding, negative Le Roy sign, remainder of abdomen is nontender, easily reducible soft umbilical hernia  Ext:  Warm and well perfused, no obvious deformities    Labs:  All labs reviewed, notable for:  LFTs unremarkable  T. bili 0.7  Lactate 1.4  Lipase 46  BNP 2414  Troponin 38  WBC 7.0  Hemoglobin 10.7  INR 1.26    Pertinent Imaging:  US Abdomen Complete  Result Date: 5/24/2023  Impression: 1. Cholelithiasis without sonographic evidence for acute cholecystitis. Persistent diffuse thickening of the gallbladder wall is favored reactive. 2. Unchanged ectatic common bile duct and mild intrahepatic biliary dilatation compared to MRI of 4/26/2023. 3. Right-sided pleural effusion appears slightly larger than comparison MRI given differences in modalities. I have personally reviewed the examination and initial interpretation and I agree with the findings. PARVEZ MAYO MD   SYSTEM ID:  [id number]

## 2023-05-26 ENCOUNTER — APPOINTMENT (OUTPATIENT)
Dept: PHYSICAL THERAPY | Facility: CLINIC | Age: 88
DRG: 643 | End: 2023-05-26
Attending: PEDIATRICS
Payer: COMMERCIAL

## 2023-05-26 ENCOUNTER — APPOINTMENT (OUTPATIENT)
Dept: ULTRASOUND IMAGING | Facility: CLINIC | Age: 88
DRG: 643 | End: 2023-05-26
Attending: PEDIATRICS
Payer: COMMERCIAL

## 2023-05-26 LAB
ALBUMIN SERPL BCG-MCNC: 3.3 G/DL (ref 3.5–5.2)
ALP SERPL-CCNC: 86 U/L (ref 35–104)
ALT SERPL W P-5'-P-CCNC: 24 U/L (ref 10–35)
ANION GAP SERPL CALCULATED.3IONS-SCNC: 12 MMOL/L (ref 7–15)
AST SERPL W P-5'-P-CCNC: 36 U/L (ref 10–35)
ATRIAL RATE - MUSE: 72 BPM
BILIRUB SERPL-MCNC: 0.5 MG/DL
BUN SERPL-MCNC: 35.2 MG/DL (ref 8–23)
CALCIUM SERPL-MCNC: 9.6 MG/DL (ref 8.2–9.6)
CHLORIDE SERPL-SCNC: 104 MMOL/L (ref 98–107)
CREAT SERPL-MCNC: 0.86 MG/DL (ref 0.51–0.95)
DEPRECATED HCO3 PLAS-SCNC: 23 MMOL/L (ref 22–29)
DIASTOLIC BLOOD PRESSURE - MUSE: NORMAL MMHG
ERYTHROCYTE [DISTWIDTH] IN BLOOD BY AUTOMATED COUNT: 13.4 % (ref 10–15)
GFR SERPL CREATININE-BSD FRML MDRD: 63 ML/MIN/1.73M2
GLUCOSE SERPL-MCNC: 106 MG/DL (ref 70–99)
HBA1C MFR BLD: 6.4 %
HCT VFR BLD AUTO: 32.4 % (ref 35–47)
HGB BLD-MCNC: 10.6 G/DL (ref 11.7–15.7)
INTERPRETATION ECG - MUSE: NORMAL
MCH RBC QN AUTO: 30.6 PG (ref 26.5–33)
MCHC RBC AUTO-ENTMCNC: 32.7 G/DL (ref 31.5–36.5)
MCV RBC AUTO: 94 FL (ref 78–100)
P AXIS - MUSE: NORMAL DEGREES
PLATELET # BLD AUTO: 132 10E3/UL (ref 150–450)
POTASSIUM SERPL-SCNC: 3.6 MMOL/L (ref 3.4–5.3)
PR INTERVAL - MUSE: 106 MS
PROT SERPL-MCNC: 5.9 G/DL (ref 6.4–8.3)
QRS DURATION - MUSE: 106 MS
QT - MUSE: 414 MS
QTC - MUSE: 453 MS
R AXIS - MUSE: -50 DEGREES
RBC # BLD AUTO: 3.46 10E6/UL (ref 3.8–5.2)
SODIUM SERPL-SCNC: 139 MMOL/L (ref 136–145)
SYSTOLIC BLOOD PRESSURE - MUSE: NORMAL MMHG
T AXIS - MUSE: 86 DEGREES
VENTRICULAR RATE- MUSE: 72 BPM
WBC # BLD AUTO: 6.5 10E3/UL (ref 4–11)

## 2023-05-26 PROCEDURE — 93970 EXTREMITY STUDY: CPT

## 2023-05-26 PROCEDURE — 99232 SBSQ HOSP IP/OBS MODERATE 35: CPT | Performed by: STUDENT IN AN ORGANIZED HEALTH CARE EDUCATION/TRAINING PROGRAM

## 2023-05-26 PROCEDURE — 250N000013 HC RX MED GY IP 250 OP 250 PS 637

## 2023-05-26 PROCEDURE — 999N000111 HC STATISTIC OT IP EVAL DEFER

## 2023-05-26 PROCEDURE — 120N000002 HC R&B MED SURG/OB UMMC

## 2023-05-26 PROCEDURE — 93970 EXTREMITY STUDY: CPT | Mod: 26 | Performed by: RADIOLOGY

## 2023-05-26 PROCEDURE — 85027 COMPLETE CBC AUTOMATED: CPT | Performed by: STUDENT IN AN ORGANIZED HEALTH CARE EDUCATION/TRAINING PROGRAM

## 2023-05-26 PROCEDURE — 80053 COMPREHEN METABOLIC PANEL: CPT | Performed by: STUDENT IN AN ORGANIZED HEALTH CARE EDUCATION/TRAINING PROGRAM

## 2023-05-26 PROCEDURE — 97530 THERAPEUTIC ACTIVITIES: CPT | Mod: GP

## 2023-05-26 PROCEDURE — 83036 HEMOGLOBIN GLYCOSYLATED A1C: CPT | Performed by: STUDENT IN AN ORGANIZED HEALTH CARE EDUCATION/TRAINING PROGRAM

## 2023-05-26 PROCEDURE — 250N000013 HC RX MED GY IP 250 OP 250 PS 637: Performed by: STUDENT IN AN ORGANIZED HEALTH CARE EDUCATION/TRAINING PROGRAM

## 2023-05-26 PROCEDURE — 36415 COLL VENOUS BLD VENIPUNCTURE: CPT | Performed by: STUDENT IN AN ORGANIZED HEALTH CARE EDUCATION/TRAINING PROGRAM

## 2023-05-26 PROCEDURE — 250N000011 HC RX IP 250 OP 636: Performed by: STUDENT IN AN ORGANIZED HEALTH CARE EDUCATION/TRAINING PROGRAM

## 2023-05-26 RX ORDER — SPIRONOLACTONE 25 MG
12.5 TABLET ORAL DAILY
Status: DISCONTINUED | OUTPATIENT
Start: 2023-05-26 | End: 2023-05-27 | Stop reason: HOSPADM

## 2023-05-26 RX ORDER — LISINOPRIL 10 MG/1
20 TABLET ORAL DAILY
Status: DISCONTINUED | OUTPATIENT
Start: 2023-05-27 | End: 2023-05-27 | Stop reason: HOSPADM

## 2023-05-26 RX ORDER — FUROSEMIDE 20 MG
20 TABLET ORAL DAILY
Status: DISCONTINUED | OUTPATIENT
Start: 2023-05-26 | End: 2023-05-27 | Stop reason: HOSPADM

## 2023-05-26 RX ADMIN — LISINOPRIL AND HYDROCHLOROTHIAZIDE 2 TABLET: 12.5; 1 TABLET ORAL at 10:18

## 2023-05-26 RX ADMIN — SPIRONOLACTONE 12.5 MG: 25 TABLET, FILM COATED ORAL at 17:48

## 2023-05-26 RX ADMIN — METOPROLOL SUCCINATE 100 MG: 50 TABLET, EXTENDED RELEASE ORAL at 08:56

## 2023-05-26 RX ADMIN — METHIMAZOLE 10 MG: 10 TABLET ORAL at 08:56

## 2023-05-26 RX ADMIN — METHIMAZOLE 10 MG: 10 TABLET ORAL at 19:57

## 2023-05-26 RX ADMIN — Medication 25 MCG: at 08:56

## 2023-05-26 RX ADMIN — ONDANSETRON 4 MG: 4 TABLET, ORALLY DISINTEGRATING ORAL at 11:47

## 2023-05-26 RX ADMIN — FUROSEMIDE 20 MG: 20 TABLET ORAL at 08:56

## 2023-05-26 RX ADMIN — METOPROLOL SUCCINATE 50 MG: 50 TABLET, EXTENDED RELEASE ORAL at 19:57

## 2023-05-26 RX ADMIN — METHIMAZOLE 10 MG: 10 TABLET ORAL at 14:21

## 2023-05-26 RX ADMIN — ONDANSETRON 4 MG: 4 TABLET, ORALLY DISINTEGRATING ORAL at 19:53

## 2023-05-26 ASSESSMENT — ACTIVITIES OF DAILY LIVING (ADL)
ADLS_ACUITY_SCORE: 30

## 2023-05-26 NOTE — PLAN OF CARE
Goal Outcome Evaluation:      Plan of Care Reviewed With: patient    Overall Patient Progress: improving    Outcome Evaluation: Alert and oriented but forgetful. Int. disoriented to time. Vss on room air except htn. Denies pain. Went for walk this evening. Son at bedside visiting at beginning of shift. Good appetite. Patient up to bathroom multiple times overnight. Not using call light. Bed alarm in place for safety. SBA w/ walker +GB. Pending possible discharge.

## 2023-05-26 NOTE — PLAN OF CARE
Goal Outcome Evaluation: [id number]      Plan of Care Reviewed With: patient    Overall Patient Progress: improving    Outcome Evaluation: A&O x 4.  VSS ex HTN.  Denies pain.  Tolerates diet with no n/v.  PIV was leaking so it was removed and a new one placed by vascular.  Tele in place.  Up to bathroom with SBA- no BM this shift.  Continue to monitor and follow POC.

## 2023-05-26 NOTE — PLAN OF CARE
Goal Outcome Evaluation:      Plan of Care Reviewed With: patient, child    Overall Patient Progress: improvingOverall Patient Progress: improving    Outcome Evaluation: Pt is alert and oriented but forgetful. Reported some hallucinations early this am. Intermittent nausea, zofran given. Pt has pruritis, cold packs helping. Washing and lotion did not make a significant difference. Pt voids in BR and had 1 BM today. Moderate appetite. C/o BLE calf pain, US negative for DVT. Cardiology adjusting diuretics and BP meds. Continue Methimazole.

## 2023-05-26 NOTE — PROGRESS NOTES
[facility]    Progress Note - Medicine Service, FRANCISCO TEAM 5       Date of Admission:  5/24/2023    Assessment & Plan   Isadora Mendez is a 89 year old female with PMH significant for pancreatitis, sick sinus syndrome s/p pacemaker implantation ([employer]), exercise-induced VT, paroxysmal atrial fibrillation (not anticoagulated), and HTN admitted on 5/24/2023 with generalized weakness, dyspnea on exertion, and diarrhea found to have thyrotoxicosis.     Changes today:   - Continue methimazole   - Endo continues to follow   - 20mg PO lasix today for gentle diuresis, will reassess daily   - Will work on getting her on GDMT for HFpEF   - discontinue tele     Thyrotoxicosis  Goiter  Labs notable for TSH< 0.01, T4 >7.77. Patient's non specific symptoms (nausea, vomiting, fatigue, loss of appetite) could be explained by hyperthyroidism. Of note pt has been taking Pycnogenol (home supplement) which contains Biotin. Also takes Biotin intermittently at home. Biotin use can result in falsely high levels of T4 and T3 and falsely low levels of TSH, leading to a wrong diagnosis of hyperthyroidism. Patient seen by endocrine service, differential includes transient post-viral (painless) thyroiditis v possible iodine induced hyperthyroidism.   - Endocrine following, appreciate recs   - Unable to obtain 123I thyroid scan   - Treat with Methimazole 10 mg TID (5/25 -*)   - Avoid CT contrast if able   - Avoid use of Pycnogenol and Biotin     HFpEF  SSS s/p PM   pAFib (not on AC)  Troponinemia, resolved   Pt reports worsening dyspnea on exertion for the past 2 months, only able to walk 1/2 block due to worsening SOB. BLE edema noted on exam. BNP 2,414 up from 820 (7 months ago). TTE here shows EF of 60-65%. Barbara scan stress test 10/22 negative for inducible ischemic EKG changes. CXR with small R pleural effusion, no evidence of pulmonary edema. Low concern for ACS.   - Cardiology  "following, appreciate recs  - Cardiac diet   - c/w PTA Metoprolol   - c/w PTA Lisinopril-HCTZ    Cholelithiasis   Chronic cholecystitis  Hx of gallstone pancreatitis  Common bile duct dilation  Pt with known hx of cholelithiasis, had an extensive outpatient workup. MR Liver 4/26/23 showed cholelithiasis with hydropic distention of gallbladder and mild wall thickening and enhancement. Also notable for CBD dilation ~9mm. HIDA scan 4/26/23 with no evidence of acute cholecystitis, may represent chronic cholecystitis. Pt denies abdominal pain currently. Lipase 46 on admission, AST 51, ALT 38. US abd on admission re demonstrated cholelithiasis without evidence for acute cholecystitis.   - Surgery consulted, recommendations appreciated              - No acute surgical intervention at this time              - Outpatient surgical follow up for consideration of cholecystectomy    Cognitive decline  Generalized weakness  Peripheral neuropathy   Patient reports worsening weakness in the past 2 months. Family also concerned for cognitive decline, states that the pt \"is not as sharp lately\", forgetting words and names occasionally. Pt lives with her  in a house. Denies any recent falls. CT head with no acute intracranial pathology. Neurological exam intact, no focal deficits noted. Less concerned for infectious etiology given unremarkable labs and pt is vitally stable.   - Neurology following, no additional recs  - Falls precautions  - PT/OT consult  - A1c added on     HTN  - c/w PTA Metoprolol   - c/w PTA Lisinopril-HCTZ       Diet: Low Saturated Fat Na <2400 mg    DVT Prophylaxis: Pneumatic Compression Devices  Covarrubias Catheter: Not present  Fluids: None  Lines: None     Cardiac Monitoring: ACTIVE order. Indication: hyperthyroidism  Code Status: Full Code      Clinically Significant Risk Factors           # Hypercalcemia: corrected calcium is >10.1, will monitor as appropriate    # Hypoalbuminemia: Lowest albumin = 3 g/dL " at 5/25/2023  6:35 AM, will monitor as appropriate  # Coagulation Defect: INR = 1.26 (Ref range: 0.85 - 1.15) and/or PTT = N/A, will monitor for bleeding    # Hypertension: Noted on problem list                 Disposition Plan     Expected Discharge Date: 05/26/2023                The patient's care was discussed with the Bedside Nurse, Care Coordinator/ and Patient.    Janet Sanchez MD  Medicine Service, MAROON TEAM 5  [facility]  Securely message with HidInImage (more info)  Text page via [facility] Paging/Directory   See signed in provider for up to date coverage information  ______________________________________________________________________    Interval History   Nursing notes reviewed. PABLITO. Daughter at bedside. Was sleeping comfortably - awakens easily. No chest pain or flutters. Nausea intermittently but feels it is improved.       Physical Exam   Vital Signs: Temp: 98.4  F (36.9  C) Temp src: Oral BP: (!) 160/48 Pulse: 72   Resp: 16 SpO2: 90 % O2 Device: None (Room air)    Weight: 0 lbs 0 oz    Gen: NAD, alert, cooperative, non-toxic, pleasant  HEENT: EOMI, no conjunctival icterus, tracking appropriately  Resp: CTAB, no crackles or wheezes, no increased WOB  Cardiac: RRR, no S3/S4, no M/R/G appreciated  GI: soft, non-tender, non-distended  Ext: trace pitting edema, WWP,no erythema  Neuro: AOx3, sensation intact b/l, no focal deficits, strength 5/5 in all extremities. CN II-XII intact.   Psych: appropriate mood & affect      Data   Most Recent 3 CBC's:  Recent Labs   Lab Test 05/25/23  0635 05/24/23  1852 05/23/23  1547   WBC 6.2 7.0 7.0   HGB 9.5* 10.7* 11.5*   MCV 95 94 91   * 146* 171     Most Recent 3 BMP's:  Recent Labs   Lab Test 05/25/23  0635 05/24/23  1852 05/23/23  1547    136 138   POTASSIUM 4.0 4.2 4.2   CHLORIDE 103 103 103   CO2 20* 22 25   BUN 33.9* 36.0* 34.8*   CR 0.80 0.89 0.85   ANIONGAP 14 11 10   INO 9.8* 9.9* 10.4*    * 122* 137*     Most Recent 2 LFT's:  Recent Labs   Lab Test 05/25/23  0635 05/24/23  1852   AST 42* 51*   ALT 27 38*   ALKPHOS 83 103   BILITOTAL 0.7 0.7     Most Recent 3 Hemoglobins:  Recent Labs   Lab Test 05/25/23  0635 05/24/23  1852 05/23/23  1547   HGB 9.5* 10.7* 11.5*     Most Recent 3 Troponin's:  Recent Labs   Lab Test 11/13/17  1510   TROPI <0.015     Most Recent 6 glucoses:  Recent Labs   Lab Test 05/25/23  0635 05/24/23  1852 05/23/23  1547 04/14/23  1222 04/05/23  1610 10/21/22  0550   * 122* 137* 134* 138* 85     Most Recent ESR & CRP:  Recent Labs   Lab Test 05/25/23  0635 05/06/21  0655   CRP  --  4.8   CRPI 9.10*  --

## 2023-05-26 NOTE — PLAN OF CARE
Occupational Therapy:    Occupational Therapy: Orders received. Chart reviewed and discussed with care team.? Occupational Therapy not indicated at this time. Per chart review and discussion with team, pt has excellent family support from two daughters who both work in healthcare (RN and PT). Pt also with plans to discharge on this date.?Due to impulsivity mentioned in prior notes, pt may benefit from HHOT/PT services to progress home safety and higher level balance, however will defer discharge recommendations to physical therapy at this time.? Will complete orders.      If pt has change in status, remains inpatient, or new IP OT needs become apparent, please feel free to re-consult.

## 2023-05-26 NOTE — PROGRESS NOTES
Cardiology Consult Progress Note     ASSESSMENT:   Isadora Mendez is a 89 year old female with history of paroxysmal AF, sick sinus syndrome s/p pacemaker, non-sustained VT, hypertension, and cholelithiasis. She presented with dyspnea on exertion and generalized weakness. Found to have lab evidence of thyrotoxicosis and decompensated HFpEF.      #Acute decompensated HFpEF  #Pulmonary hypertension, group 2 etiology   #Hyperthyroidism  #Hx of Sick Sinus Syndrome s/p pacemaker  #Hx of paroxysmal atrial fibrillation  #Hx of non-sustained VT   #Hypertension    The etiology of her decompensation is likely due to thyrotoxicosis and natural history of worsening diastolic dysfunction over time. This has also led to likely moderate pulmonary hypertension. While a right heart cath could help delineate what her wedge and PVR is, the fact that she has demonstrable diastolic dysfunction on her echo makes a group 2 etiology the most likely. Suspicion for her having a positive vasoreactivity test and/or group 1 PH is very low and as such will defer further workup with RHC at this point. She would benefit from diuresis given her evidence of volume overload on exam.      With regards to her atrial fibrillation, this will be exacerbated by thyrotoxicosis. She has had a long relationship with her outpatient EP and has declined anticoagulation in the past. Would aim for rate control strategy and continue her home metoprolol succinate 100 mg in am and 50 mg in pm. Propranolol is ideal for thyroid storm but she doesn't seem to have evidence of catacholamine excess at the moment, from our standpoint metoprolol should suffice.     Overall she appears to be approaching euvolemic state. Agree with switching to po lasix. In order to avoid combination loop/thiazide diuretic in her, would recommend the following changes:      RECOMMENDATIONS:  - recommend starting spironolactone 12.5 mg daily   - continue lasix 20 mg daily   - stop  hydrochlorothiazide 25 mg daily   - continue lisinopril 20 mg daily   - continue metoprolol succinate 100 mg in am and 50 mg in pm for rate control in AF in setting of thyrotoxicosis. When her thyroid disease is better managed, would consider switching her to diltiazem in the outpatient setting given her HFpEF physiology.   - SGLT2i and ARNI are prohibitively expensive. Would defer these for now and prioritize diuresis and thyroid disease management.     Patient seen and discussed with Dr.Jeremy Macias, who agrees with above plan.      Thank you for consulting the cardiovascular services at the [facility]. Please do not hesitate to call us with any questions. We will sign off at this time.     Agustín Alonzo  Medical Student for Cardiology service    I saw this patient together with medical student, Agustín Alonzo, and performed an independent exam at the same time which confirmed findings. I have edited this note as appropriate to reflect our joint assessment/plan. Patient was also seen and discussed with attending physician, Dr. Macias, who is in agreement with above.     Rene Ortega MD  Cardiology Fellow  [phone]    REASON FOR CONSULT: HFpEF     Subjective:   Feeling better this am       CURRENT MEDICATIONS:  No current outpatient medications on file.         Physical Exam   Temp: 98.1  F (36.7  C) Temp src: Oral BP: (!) 153/55 Pulse: 74   Resp: 16 SpO2: 93 % O2 Device: None (Room air)    Vital Signs with Ranges  Temp:  [97.6  F (36.4  C)-98.5  F (36.9  C)] 98.1  F (36.7  C)  Pulse:  [61-80] 74  Resp:  [15-24] 16  BP: (136-160)/(38-55) 153/55  SpO2:  [90 %-98 %] 93 %  122 lbs 8 oz    GEN: NAD, pleasant  HEENT: no icterus  CV: RRR, normal s1/s2, no murmurs/rubs/s3/s4, no heave. JVP ~8 cm H2O. 1+ BLE   CHEST: CTAB  ABD: soft, NT/ND, NABS  NEURO: AA&Ox3, fluent/appropriate, motor grossly nonfocal  PSYCH: cooperative, affect appropriate    Data   Recent Labs   Lab 05/26/23  0717  05/25/23  0635 05/24/23  1852 05/23/23  1547   WBC 6.5 6.2 7.0 7.0   HGB 10.6* 9.5* 10.7* 11.5*   MCV 94 95 94 91   * 144* 146* 171   INR  --   --  1.26*  --     137 136 138   POTASSIUM 3.6 4.0 4.2 4.2   CHLORIDE 104 103 103 103   CO2 23 20* 22 25   BUN 35.2* 33.9* 36.0* 34.8*   CR 0.86 0.80 0.89 0.85   ANIONGAP 12 14 11 10   INO 9.6 9.8* 9.9* 10.4*   * 111* 122* 137*   ALBUMIN 3.3* 3.0* 3.5 3.8   PROTTOTAL 5.9* 5.9* 6.8 7.3   BILITOTAL 0.5 0.7 0.7 0.7   ALKPHOS 86 83 103 116*   ALT 24 27 38* 35   AST 36* 42* 51* 48*   LIPASE  --   --  46 40       No results found for this or any previous visit (from the past 24 hour(s)).

## 2023-05-26 NOTE — CONSULTS
Discharge Pharmacy Test Claim    Patient has $100 per month copays for entresto, farxiga, or jardiance through her [employer] Medicare advantage plan.    Test Claim Copay   entresto 100.00   farxiga 100.00   jardiance 100.00     Romi Murphy  [facility] Liaison  Ph: [phone] Pager: [phone]   Securely message with the Vocera Web Console (learn more here)

## 2023-05-27 VITALS
RESPIRATION RATE: 18 BRPM | WEIGHT: 122.5 LBS | DIASTOLIC BLOOD PRESSURE: 49 MMHG | HEART RATE: 65 BPM | TEMPERATURE: 98.5 F | SYSTOLIC BLOOD PRESSURE: 162 MMHG | OXYGEN SATURATION: 96 % | BODY MASS INDEX: 22.12 KG/M2

## 2023-05-27 DIAGNOSIS — E04.9 GOITER: ICD-10-CM

## 2023-05-27 DIAGNOSIS — E83.52 HYPERCALCEMIA: ICD-10-CM

## 2023-05-27 DIAGNOSIS — E05.90 HYPERTHYROIDISM: Primary | ICD-10-CM

## 2023-05-27 DIAGNOSIS — L29.9 PRURITIC DISORDER: ICD-10-CM

## 2023-05-27 LAB
ANION GAP SERPL CALCULATED.3IONS-SCNC: 12 MMOL/L (ref 7–15)
BUN SERPL-MCNC: 34.9 MG/DL (ref 8–23)
CALCIUM SERPL-MCNC: 9.8 MG/DL (ref 8.2–9.6)
CHLORIDE SERPL-SCNC: 102 MMOL/L (ref 98–107)
CREAT SERPL-MCNC: 0.95 MG/DL (ref 0.51–0.95)
DEPRECATED HCO3 PLAS-SCNC: 26 MMOL/L (ref 22–29)
ERYTHROCYTE [DISTWIDTH] IN BLOOD BY AUTOMATED COUNT: 13.2 % (ref 10–15)
GFR SERPL CREATININE-BSD FRML MDRD: 56 ML/MIN/1.73M2
GLUCOSE SERPL-MCNC: 122 MG/DL (ref 70–99)
HCT VFR BLD AUTO: 33.3 % (ref 35–47)
HGB BLD-MCNC: 10.4 G/DL (ref 11.7–15.7)
MAGNESIUM SERPL-MCNC: 1.5 MG/DL (ref 1.7–2.3)
MCH RBC QN AUTO: 29 PG (ref 26.5–33)
MCHC RBC AUTO-ENTMCNC: 31.2 G/DL (ref 31.5–36.5)
MCV RBC AUTO: 93 FL (ref 78–100)
PLATELET # BLD AUTO: 140 10E3/UL (ref 150–450)
POTASSIUM SERPL-SCNC: 3.9 MMOL/L (ref 3.4–5.3)
RBC # BLD AUTO: 3.59 10E6/UL (ref 3.8–5.2)
SODIUM SERPL-SCNC: 140 MMOL/L (ref 136–145)
WBC # BLD AUTO: 6.7 10E3/UL (ref 4–11)

## 2023-05-27 PROCEDURE — 80048 BASIC METABOLIC PNL TOTAL CA: CPT | Performed by: STUDENT IN AN ORGANIZED HEALTH CARE EDUCATION/TRAINING PROGRAM

## 2023-05-27 PROCEDURE — 99239 HOSP IP/OBS DSCHRG MGMT >30: CPT | Mod: GC | Performed by: STUDENT IN AN ORGANIZED HEALTH CARE EDUCATION/TRAINING PROGRAM

## 2023-05-27 PROCEDURE — 250N000013 HC RX MED GY IP 250 OP 250 PS 637: Performed by: STUDENT IN AN ORGANIZED HEALTH CARE EDUCATION/TRAINING PROGRAM

## 2023-05-27 PROCEDURE — 36415 COLL VENOUS BLD VENIPUNCTURE: CPT | Performed by: STUDENT IN AN ORGANIZED HEALTH CARE EDUCATION/TRAINING PROGRAM

## 2023-05-27 PROCEDURE — 83735 ASSAY OF MAGNESIUM: CPT | Performed by: STUDENT IN AN ORGANIZED HEALTH CARE EDUCATION/TRAINING PROGRAM

## 2023-05-27 PROCEDURE — 250N000013 HC RX MED GY IP 250 OP 250 PS 637

## 2023-05-27 PROCEDURE — 250N000011 HC RX IP 250 OP 636: Performed by: STUDENT IN AN ORGANIZED HEALTH CARE EDUCATION/TRAINING PROGRAM

## 2023-05-27 PROCEDURE — 85027 COMPLETE CBC AUTOMATED: CPT | Performed by: STUDENT IN AN ORGANIZED HEALTH CARE EDUCATION/TRAINING PROGRAM

## 2023-05-27 PROCEDURE — 99232 SBSQ HOSP IP/OBS MODERATE 35: CPT

## 2023-05-27 RX ORDER — SPIRONOLACTONE 25 MG/1
12.5 TABLET ORAL DAILY
Qty: 30 TABLET | Refills: 0 | Status: SHIPPED | OUTPATIENT
Start: 2023-05-28 | End: 2023-06-02

## 2023-05-27 RX ORDER — LISINOPRIL 20 MG/1
20 TABLET ORAL DAILY
Qty: 60 TABLET | Refills: 0 | Status: SHIPPED | OUTPATIENT
Start: 2023-05-28 | End: 2023-06-02

## 2023-05-27 RX ORDER — METHIMAZOLE 10 MG/1
10 TABLET ORAL 2 TIMES DAILY
Qty: 120 TABLET | Refills: 0 | Status: SHIPPED | OUTPATIENT
Start: 2023-05-27 | End: 2023-06-30

## 2023-05-27 RX ORDER — FUROSEMIDE 20 MG
20 TABLET ORAL DAILY
Qty: 60 TABLET | Refills: 0 | Status: SHIPPED | OUTPATIENT
Start: 2023-05-28 | End: 2023-06-23

## 2023-05-27 RX ADMIN — SPIRONOLACTONE 12.5 MG: 25 TABLET, FILM COATED ORAL at 10:00

## 2023-05-27 RX ADMIN — Medication 25 MCG: at 10:00

## 2023-05-27 RX ADMIN — FUROSEMIDE 20 MG: 20 TABLET ORAL at 10:00

## 2023-05-27 RX ADMIN — ONDANSETRON 4 MG: 4 TABLET, ORALLY DISINTEGRATING ORAL at 11:07

## 2023-05-27 RX ADMIN — METHIMAZOLE 10 MG: 10 TABLET ORAL at 14:13

## 2023-05-27 RX ADMIN — ACETAMINOPHEN 650 MG: 325 TABLET ORAL at 14:13

## 2023-05-27 RX ADMIN — DICLOFENAC SODIUM 2 G: 10 GEL TOPICAL at 14:13

## 2023-05-27 RX ADMIN — METHIMAZOLE 10 MG: 10 TABLET ORAL at 10:00

## 2023-05-27 RX ADMIN — LISINOPRIL 20 MG: 10 TABLET ORAL at 10:00

## 2023-05-27 RX ADMIN — METOPROLOL SUCCINATE 100 MG: 50 TABLET, EXTENDED RELEASE ORAL at 09:59

## 2023-05-27 ASSESSMENT — ACTIVITIES OF DAILY LIVING (ADL)
ADLS_ACUITY_SCORE: 30

## 2023-05-27 NOTE — PLAN OF CARE
Goal Outcome Evaluation:      Plan of Care Reviewed With: patient, family    Overall Patient Progress: improvingOverall Patient Progress: improving    Outcome Evaluation: Pt has orders to discharge and is willing to go. Pt feeling lethargic today and pt's daughter had some concerns about discharging with this lethargy and weakness. Dr aSnchez returned to speak to them and they were satisfied with the discharge plan. Reviewed discharge parework including medications, stopping supplements and follow plan. Pt and daughter have no questions. Walker delivered and fitted to pt. PIV removed. Discharge medications picked up by daughter. Pt left at 1545.

## 2023-05-27 NOTE — PLAN OF CARE
Goal Outcome Evaluation:      Plan of Care Reviewed With: patient    Outcome Evaluation: A&Ox4 but forgetful. VSS ex HTN and on RA. Denies pain. Pt has pruritis and cold packs helps. No acute changes overnight. Continue with POC.

## 2023-05-27 NOTE — DISCHARGE SUMMARY
[facility]  Discharge Summary - Medicine & Pediatrics       Date of Admission:  5/24/2023  Date of Discharge:  5/27/2023  Discharging Provider: Janet Sanchez  Discharge Service: Medicine ServiceFRANCISCO TEAM 5    Discharge Diagnoses   Thyrotoxicosis  Goiter  HFpEF  SSS s/p PM   pAFib (not on AC)  Cognitive decline  Generalized weakness  Peripheral neuropathy   HTN    Clinically Significant Risk Factors          Follow-ups Needed After Discharge   Follow-up Appointments     Add follow up information to the AVS prior to printing          Unresulted Labs Ordered in the Past 30 Days of this Admission     Date and Time Order Name Status Description    5/25/2023  8:18 AM Thyroid stimulating immunoglobulin In process       These results will be followed up by N/A    Discharge Disposition   Discharged to home  Condition at discharge: Stable    Hospital Course   Isadora Mendez is a 89 year old female with PMH significant for pancreatitis, sick sinus syndrome s/p pacemaker implantation ([employer]), exercise-induced VT, paroxysmal atrial fibrillation (not anticoagulated), and HTN admitted on 5/24/2023 with generalized weakness, dyspnea on exertion, and diarrhea found to have thyrotoxicosis.      Thyrotoxicosis  Goiter  Labs notable for TSH< 0.01, T4 >7.77. Patient's non specific symptoms (nausea, vomiting, fatigue, loss of appetite) could be explained by hyperthyroidism. Of note pt has been taking Pycnogenol (home supplement) which contains Biotin. Also takes Biotin intermittently at home. Biotin use can result in falsely high levels of T4 and T3 and falsely low levels of TSH, leading to a wrong diagnosis of hyperthyroidism. Patient seen by endocrine service, differential includes transient post-viral (painless) thyroiditis v possible iodine induced hyperthyroidism. Attempted to obtain 123I thyroid scan but radionuclear tracer was not available. Started on Methimazole on 5/25/23.  "    - Methimazole 10 mg BID  - Endocrine referral for follow up in 2 weeks   - Avoid use of Pycnogenol and Biotin      HFpEF  SSS s/p PM   pAFib (not on AC)  Troponinemia, resolved   Pt reports worsening dyspnea on exertion for the past 2 months, only able to walk 1/2 block due to worsening SOB. BLE edema noted on exam. BNP 2,414 up from 820 (7 months ago). TTE here shows EF of 60-65%. Barbara scan stress test 10/22 negative for inducible ischemic EKG changes. CXR with small R pleural effusion, no evidence of pulmonary edema. Low concern for ACS.     - Started on Spironolactone   - Started on Lisinopril  - Discontinued Lisinopril-hydrochlorothiazide  - Started Lasix  - Follow up with Dr. Dixon as scheduled      Cholelithiasis   Chronic cholecystitis  Hx of gallstone pancreatitis  Common bile duct dilation  Pt with known hx of cholelithiasis, had an extensive outpatient workup. MR Liver 4/26/23 showed cholelithiasis with hydropic distention of gallbladder and mild wall thickening and enhancement. Also notable for CBD dilation ~9mm. HIDA scan 4/26/23 with no evidence of acute cholecystitis, may represent chronic cholecystitis. Pt denies abdominal pain currently. Lipase 46 on admission, AST 51, ALT 38. US abd on admission re demonstrated cholelithiasis without evidence for acute cholecystitis.   - Surgery consulted, recommendations appreciated              - No acute surgical intervention at this time              - Outpatient surgical follow up for consideration of cholecystectomy     Cognitive decline  Generalized weakness  Peripheral neuropathy   Patient reports worsening weakness in the past 2 months. Family also concerned for cognitive decline, states that the pt \"is not as sharp lately\", forgetting words and names occasionally. Pt lives with her  in a house. Denied any recent falls. CT head with no acute intracranial pathology. Neurological exam intact, no focal deficits noted. Less concerned for infectious " etiology given unremarkable labs and pt was vitally stable throughout hospital stay.     - Home PT referral  - Walker prescription placed      HTN  - c/w PTA Metoprolol   - Started Lasix, Lisinopril, Spironolactone     Consultations This Hospital Stay   NEUROLOGY STROKE ADULT IP CONSULT  CARDIOLOGY GENERAL ADULT IP CONSULT  SURGERY GENERAL ADULT IP CONSULT  ENDOCRINE NON-DIABETES ADULT IP CONSULT  PHYSICAL THERAPY ADULT IP CONSULT  ADVANCE DIRECTIVE IP CONSULT  OCCUPATIONAL THERAPY ADULT IP CONSULT  NURSING TO CONSULT FOR VASCULAR ACCESS CARE IP CONSULT  PHARMACY LIAISON FOR MEDICATION COVERAGE CONSULT    Code Status   Full Code       The patient was discussed with MD Bonnie Burgos 5 [facility] [address] [place]  [address] [facility] [address]  Phone: [phone]  ______________________________________________________________________    Physical Exam   Vital Signs: Temp: 98.8  F (37.1  C) Temp src: Oral BP: 125/42 Pulse: 62   Resp: 20 SpO2: 92 % O2 Device: None (Room air)    Weight: 122 lbs 8 oz  Gen: NAD, alert, cooperative, non-toxic, pleasant  HEENT: EOMI, no conjunctival icterus, tracking appropriately  Resp: CTAB, no crackles or wheezes, no increased WOB  Cardiac: RRR, no S3/S4, no M/R/G appreciated  GI: soft, non-tender, non-distended  Ext: trace pitting edema, WWP,no erythema  Neuro: AOx3, sensation intact b/l, no focal deficits, strength 5/5 in all extremities. CN II-XII intact.   Psych: appropriate mood & affect      Primary Care Physician   Jovana Macias    Discharge Orders      Adult General Surg Referral      Adult Endocrinology  Referral      Physical Therapy Referral      Home Care Referral      Reason for your hospital stay    You were seen in the hospital for management of your abnormal thyroid tests in setting of your exertional shortness of breath.     Activity    Your activity upon discharge: activity as tolerated     Monitor  and record    Weigh yourself every morning     When to contact your care team    Contact your care team If symptoms get worse, If increased shortness of breath, If waking up at night with difficulty breathing, If unable to lie down for sleep due to symptoms, If weight gain of 2 pounds a day for 2 days in a row OR 5 pounds in 1 week., Increased swelling in your ankles or legs, and Dizziness or lightheadedness     Discharge Follow Up - with Primary Care clinic within 3-5 days (RN to schedule prior to d/c for [facility] primary care     Add follow up information to the AVS prior to printing     Full Code     Walker Order for DME - ONLY FOR DME    I, the undersigned, certify that the above prescribed supplies are medically necessary for this patient and is both reasonable and necessary in reference to accepted standards of medical and necessary in reference to accepted standards of medical practice in the treatment of this patient's condition and is not prescribed as a convenience.      Mundo GLASER, the undersigned, certify that the above prescribed supplies are medically necessary for this patient and is both reasonable and necessary in reference to accepted standards of medical and necessary in reference to accepted standards of medical practice in the treatment of this patient's condition and is not prescribed as a convenience.      Diet    Follow this diet upon discharge: Orders Placed This Encounter      Low Saturated Fat Na <2400 mg       Significant Results and Procedures   Most Recent 3 CBC's:Recent Labs   Lab Test 05/27/23  0801 05/26/23  0717 05/25/23  0635   WBC 6.7 6.5 6.2   HGB 10.4* 10.6* 9.5*   MCV 93 94 95   * 132* 144*     Most Recent 3 BMP's:Recent Labs   Lab Test 05/27/23  0801 05/26/23  0717 05/25/23  0635    139 137   POTASSIUM 3.9 3.6 4.0   CHLORIDE 102 104 103   CO2 26 23 20*   BUN 34.9* 35.2* 33.9*   CR 0.95 0.86 0.80   ANIONGAP 12 12 14   INO 9.8* 9.6 9.8*   * 106* 111*      Most Recent 2 LFT's:Recent Labs   Lab Test 05/26/23  0717 05/25/23  0635   AST 36* 42*   ALT 24 27   ALKPHOS 86 83   BILITOTAL 0.5 0.7     Most Recent 3 INR's:Recent Labs   Lab Test 05/24/23  1852 09/14/16  0824   INR 1.26* 1.13     Most Recent 3 Creatinines:Recent Labs   Lab Test 05/27/23  0801 05/26/23  0717 05/25/23  0635   CR 0.95 0.86 0.80     Most Recent 3 Hemoglobins:Recent Labs   Lab Test 05/27/23  0801 05/26/23  0717 05/25/23  0635   HGB 10.4* 10.6* 9.5*     Most Recent Urinalysis:Recent Labs   Lab Test 05/25/23  0256   COLOR Light Yellow   APPEARANCE Clear   URINEGLC Negative   URINEBILI Negative   URINEKETONE Trace*   SG 1.013   UBLD Negative   URINEPH 5.0   PROTEIN Negative   NITRITE Negative   LEUKEST Trace*   RBCU <1   WBCU 2     Most Recent ESR & CRP:Recent Labs   Lab Test 05/25/23  0635 05/06/21  0655   CRP  --  4.8   CRPI 9.10*  --    ,   Results for orders placed or performed during the hospital encounter of 05/24/23   Head CT w/o contrast    Narrative    CT HEAD W/O CONTRAST 5/24/2023 7:19 PM    History: Dizziness, gait instability, fatigue   ICD-10:    Comparison: None    Technique: Using multidetector thin collimation helical acquisition  technique, axial, coronal and sagittal CT images from the skull base  to the vertex were obtained without intravenous contrast.   (topogram) image(s) also obtained and reviewed.    Findings: There is no intracranial hemorrhage, mass effect, or midline  shift. Gray/white matter differentiation in both cerebral hemispheres  is preserved. Ventricles are proportionate to the cerebral sulci. The  basal cisterns are clear. Mild diffuse cerebral volume loss.  Subcortical and periventricular white matter hypodensities, likely  chronic small vessel ischemic disease. 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    The bony calvaria and the bones of the skull base are normal. Mild  mucosal thickening in the  left sphenoid sinus. The remainder of the  visualized portions of the paranasal sinuses and mastoid air cells are  clear.      Impression    Impression:  1. No acute intracranial pathology.   2. Diffuse cerebral volume loss and leukoaraiosis  3. Chronic infarctions as described above.    I have personally reviewed the examination and initial interpretation  and I agree with the findings.    CARRILLO HANNA MD         SYSTEM ID:  [id number]   Chest XR,  PA & LAT    Narrative    EXAM: XR CHEST 2 VIEWS  5/24/2023 6:31 PM     HISTORY:  Dyspnea on exertion, hx pacemaker       COMPARISON:  10/20/2022    FINDINGS:   PA and lateral views of the chest. Stable left chest wall pacing  device with leads in the right atrium and ventricle.    Trachea is midline. Cardiomediastinal silhouette and pulmonary  vasculature are within normal limits. No focal airspace opacity or  appreciable pneumothorax. Small right pleural effusion. No significant  left pleural effusion.    No acute osseous abnormality. Visualized upper abdomen is  unremarkable.        Impression    IMPRESSION: Small right pleural effusion. Otherwise no definitive  evidence of pulmonary edema. No focal air space opacities.    I have personally reviewed the examination and initial interpretation  and I agree with the findings.    PARVEZ MAYO MD         SYSTEM ID:  [id number]   US Abdomen Complete    Narrative    EXAMINATION: US ABDOMEN COMPLETE, 5/24/2023 7:16 PM    COMPARISON: Ultrasound 4/7/2023. Abdominal MRI 4/26/2023.    HISTORY: Nausea, vomiting, pain. History of gallstones.    TECHNIQUE: The abdomen was scanned in standard fashion with  specialized ultrasound transducer(s) using both grey scale and limited  color Doppler techniques.    Findings:    Liver: Hepatic parenchyma is of normal echogenicity without evidence  for focal mass.     Gallbladder: Numerous echogenic shadowing calculi obscuring majority  of the gallbladder. Gallbladder wall thickening of 4.6 mm,  unchanged  from comparison MRI and ultrasound. No pericholecystic fluid or wall  hyperemia. Negative sonographic Polanco's sign.    Bile Ducts: Ectatic right and left common bile duct measuring 3 mm on  the right and 4 mm on the left, overall similar to comparison MRI.  Common duct measuring 9.5 mm in diameter, unchanged from comparison  MRI.    Pancreas: Visualized portions of the head and body of the pancreas are  unremarkable. No peripancreatic fluid is visualized.    Kidneys: Both kidneys are of normal echotexture, without mass nor  hydronephrosis. The craniocaudal dimensions are: right- 9.7 cm, left-  9.2 cm.    Spleen: The spleen is unremarkable and measures 9.6 cm in sagittal  dimension.    Aorta and IVC: The visualized portions of the aorta and IVC are  unremarkable.    Fluid: Right-sided pleural effusion.      Impression    Impression:     1. Cholelithiasis without sonographic evidence for acute  cholecystitis. Persistent diffuse thickening of the gallbladder wall  is favored reactive.    2. Unchanged ectatic common bile duct and mild intrahepatic biliary  dilatation compared to MRI of 4/26/2023.    3. Right-sided pleural effusion appears slightly larger than  comparison MRI given differences in modalities.    I have personally reviewed the examination and initial interpretation  and I agree with the findings.    PARVEZ MAYO MD         SYSTEM ID:  [id number]   US Lower Extremity Venous Bilateral Port    Narrative    EXAM: Bilateral lower extremity venous duplex ultrasound, 5/26/2023  3:53 PM     HISTORY: Bilateral calf pain; though R calf > L calf in size.    COMPARISON: None.    TECHNIQUE: Gray-scale evaluation with compression, spectral flow, and  color Doppler assessment of the deep venous system of both lower  extremities from groin to knee, and then at the ankles was performed.    FINDINGS: In both lower extremities, the common femoral, femoral,  popliteal, and posterior tibial veins demonstrate  normal  compressibility and blood flow.    In the right popliteal fossa there is an anechoic nonvascular fluid  collection with posterior enhancement measuring 7.9 x 2.8 x 1.1 cm. In  the left popliteal fossa there is an anechoic nonvascular collection  with echogenic internal debris measuring 6.0 x 3.1 x 1.1 cm. The  debris may indicate proteinaceous or hemorrhagic component.      Impression    IMPRESSION:   1. No deep venous thrombosis in either lower extremity.  2. Right greater than left large popliteal/Baker cysts as above.    I have personally reviewed the examination and initial interpretation  and I agree with the findings.    MANOLO JOE MD         SYSTEM ID:  [id number]   Echocardiogram Complete     Value    LVEF  60-65%    Narrative    [id number]  [id number]  [id number]  [id number]^TINY^MERISSA^TOÑITO     [facility]  Ech[facility]  [address] [place] [id number]     Name: WOLF MACIAS  MRN: [id number]  : 1931  Study Date: 2023 11:58 AM  Age: 89 yrs  Gender: Female  Patient Location: [facility]  Reason For Study: CHF  Ordering Physician: MERISSA FRANKS  Performed By: Inna Rabago     BSA: 1.6 m2  Height: 62 in  Weight: 126 lb  HR: 66  BP: 136/60 mmHg  ______________________________________________________________________________  Procedure  Echocardiogram with two-dimensional, color and spectral Doppler performed.  ______________________________________________________________________________  Interpretation Summary  Left ventricular function is normal.The ejection fraction is 60-65%.  The right ventricle is normal size. Global right ventricular function is  normal.  RVSP estimated at 68 mmHg. The IVC was not visualized. This is suggestive of  elevated pulmonary artery pressures.  Moderate left atrial enlargement is present.  No pericardial effusion is present.     This study was compared with the study from 10/21/22 .  The RVSP has  increased.  ______________________________________________________________________________  Left Ventricle  Left ventricular function is normal.The ejection fraction is 60-65%. Left  ventricular size is normal. Mild concentric wall thickening consistent with  left ventricular hypertrophy is present. Left ventricular diastolic function  is abnormal. Grade II or moderate diastolic dysfunction. No regional wall  motion abnormalities are seen.     Right Ventricle  The right ventricle is normal size. Global right ventricular function is  normal. A pacemaker lead is noted in the right ventricle.     Atria  Moderate left atrial enlargement is present. Mild right atrial enlargement is  present.     Mitral Valve  Mild mitral annular calcification is present. Mild mitral insufficiency is  present.     Aortic Valve  Aortic valve is normal in structure and function. The aortic valve is  tricuspid.     Tricuspid Valve  The tricuspid valve is normal. Moderate tricuspid insufficiency is present.  The right ventricular systolic pressure is approximated at 67.9 mmHg plus the  right atrial pressure.     Pulmonic Valve  Mild to moderate pulmonic insufficiency is present.     Vessels  The aorta root is normal. The thoracic aorta is normal. The inferior vena cava  cannot be assessed.     Pericardium  No pericardial effusion is present.     Miscellaneous  A right pleural effusion is present.     Compared to Previous Study  This study was compared with the study from 10/21/22 . The RVSP has increased.     Attestation  I have personally viewed the imaging and agree with the interpretation and  report as documented by the fellow, Arie Jameson, and/or edited by me.  ______________________________________________________________________________  MMode/2D Measurements & Calculations  IVSd: 1.3 cm  LVIDd: 4.3 cm  LVIDs: 2.9 cm  LVPWd: 0.97 cm  FS: 33.6 %  LV mass(C)d: 164.2 grams  LV mass(C)dI: 104.6 grams/m2  Ao root diam: 2.7 cm  LA  dimension: 3.8 cm  asc Aorta Diam: 3.0 cm  LA/Ao: 1.4  LVOT diam: 1.6 cm  LVOT area: 2.0 cm2  RWT: 0.45     Doppler Measurements & Calculations  MV E max noe: 107.0 cm/sec  MV A max noe: 110.0 cm/sec  MV E/A: 0.97  MV dec slope: 674.0 cm/sec2  MV dec time: 0.16 sec  Ao V2 max: 168.0 cm/sec  Ao max P.3 mmHg  Ao V2 mean: 117.0 cm/sec  Ao mean P.0 mmHg  Ao V2 VTI: 31.7 cm  AMRIK(I,D): 1.4 cm2  AMRIK(V,D): 1.7 cm2  AI P1/2t: 297.0 msec  LV V1 max P.0 mmHg  LV V1 max: 141.0 cm/sec  LV V1 VTI: 22.8 cm  SV(LVOT): 45.8 ml  SI(LVOT): 29.2 ml/m2  TR max noe: 412.0 cm/sec  TR max P.9 mmHg  RVSP(TR): 70.9 mmHg  AV Noe Ratio (DI): 0.84  AMRIK Index (cm2/m2): 0.92  E/E' av.2  Lateral E/e': 14.0  Medial E/e': 14.4  RV S Noe: 13.2 cm/sec     ______________________________________________________________________________  Report approved by: MD Chris Montez 2023 01:13 PM               Discharge Medications   Current Discharge Medication List      START taking these medications    Details   diclofenac (VOLTAREN) 1 % topical gel Apply 2 g topically 4 times daily as needed for other (Joint and muscle pain as needed)  Qty: 50 g, Refills: 0    Associated Diagnoses: Other chronic pain      furosemide (LASIX) 20 MG tablet Take 1 tablet (20 mg) by mouth daily  Qty: 60 tablet, Refills: 0    Associated Diagnoses: Elevated brain natriuretic peptide (BNP) level; Diastolic heart failure, unspecified HF chronicity (H)      lisinopril (ZESTRIL) 20 MG tablet Take 1 tablet (20 mg) by mouth daily  Qty: 60 tablet, Refills: 0    Associated Diagnoses: Diastolic heart failure, unspecified HF chronicity (H)      methimazole (TAPAZOLE) 10 MG tablet Take 1 tablet (10 mg) by mouth 2 times daily  Qty: 120 tablet, Refills: 0    Associated Diagnoses: Hyperthyroidism      spironolactone (ALDACTONE) 25 MG tablet Take 0.5 tablets (12.5 mg) by mouth daily  Qty: 30 tablet, Refills: 0    Associated Diagnoses: Diastolic  heart failure, unspecified HF chronicity (H)         CONTINUE these medications which have NOT CHANGED    Details   Ascorbic Acid (VITAMIN C PO)       aspirin 81 MG tablet Take 1 tablet by mouth daily.      co-enzyme Q-10 100 MG CAPS capsule Take 100 mg by mouth      famotidine (PEPCID) 20 MG tablet Take 1 tablet (20 mg) by mouth 2 times daily  Qty: 180 tablet, Refills: 3    Associated Diagnoses: Gastroesophageal reflux disease with esophagitis without hemorrhage      !! metoprolol succinate ER (TOPROL XL) 100 MG 24 hr tablet Take 1 tablet (100 mg) by mouth daily Use with 50 mg tab in the pm  Qty: 90 tablet, Refills: 3    Associated Diagnoses: Essential hypertension      !! metoprolol succinate ER (TOPROL XL) 50 MG 24 hr tablet Take 1 tablet (50 mg) by mouth every evening Use with 100mg tab in the am  Qty: 90 tablet, Refills: 3    Associated Diagnoses: Essential hypertension; Paroxysmal A-fib (H)      Nutritional Supplements (PYCNOGENOL) 300-30 MG CAPS per capsule Take 1 capsule by mouth daily      ondansetron (ZOFRAN) 4 MG tablet Take 1 tablet (4 mg) by mouth every 6 hours as needed for nausea or vomiting  Qty: 30 tablet, Refills: 0    Associated Diagnoses: Nausea and vomiting, unspecified vomiting type      Vitamin D, Cholecalciferol, 1000 units TABS Take 1 tablet by mouth daily      VITAMIN K PO Take 1 tablet by mouth daily       !! - Potential duplicate medications found. Please discuss with provider.      STOP taking these medications       lisinopril-hydrochlorothiazide (ZESTORETIC) 10-12.5 MG tablet Comments:   Reason for Stopping:             Allergies   No Known Allergies

## 2023-05-27 NOTE — PROGRESS NOTES
5/27-Updated that current recommendations from therapies is for home PT/OT.     Initial referrals sent to:   [facility]: declined  [place]: no update  [facility]: no update  [facility]: declined    [facility]: declined  [facility]: no update    [facility]: no update    Asked to be paged to weekend pager with update if able to take referral.     1445-Spoke with [facility] attending and provided update that home therapy had not been confirmed. She was ok with that, and it would not be a barrier to discharge.     OP PT referral put in place.     Sherin Bocanegra RN   Weekend Care Coordinator

## 2023-05-27 NOTE — PROGRESS NOTES
Endocrine consult service     Assessment/plan  1.  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   TSI is pending  She has been started on methimazole at high dose.  OK to reduce to 10 mg twice/day for discharge.  She should follow up with endocrinology within 2 weeks of discharge.  Labs on Tuesday next week -  I will place order so they come to me.  TSH, free T4, total T3, LFTs, CBC with diff    June Acuña MD      Chief complaint:/ HPI  89 year old woman admitted for , found to have extreme thyrotoxicosis and goiter.  She also has prediabetes/DM2.     During this hospitalization she has had the following treatments relevant to the thyrotoxicosis:   She got 20 mg methimazole followed 10 mg tid starting on 5/25.      Ordered relevant regimen  Methimazole 10 mg tid -   Metoprolol     Active Diet Order  Orders Placed This Encounter      Low Saturated Fat Na <2400 mg    Recent Labs   Lab 05/27/23  0801 05/26/23  0717 05/25/23  0635 05/24/23  1852 05/23/23  1547   * 106* 111* 122* 137*     ROS  Itching intrfered with sleep last night; has had itching all winter, has itch cream at home  Poor sleep  Seems mentally not as sharp to daughters - worried she will have delirium, haven't seen it.     Exam  BP (!) 155/49 (BP Location: Left arm)   Pulse 71   Temp 98.6  F (37  C) (Axillary)   Resp 22   Wt 55.6 kg (122 lb 8 oz)   SpO2 95%   BMI 22.12 kg/m     Sitting on side of bed: no distress; daughter is in the room and 2nd daughter is on the phone with us  SKIN: Visible skin clear.  No icterus or rash  EYES: Eyes grossly normal to inspection. No scleral icterus  No discharge or erythema, or obvious scleral/conjunctival abnormalities.  RESP: No audible wheeze, cough, or  increased work of breathing.    NEURO:  Awake, alert, responds appropriately to questions.  Mentation and speech fluent.  PSYCH:affect normal      DATA       Latest Ref Rng 5/15/2012  9:14 AM 8/21/2012  7:44 AM 5/24/2023  6:52 PM   ENDO THYROID LABS-[facility]       TSH 0.4 - 5.0 mU/L 0.39 (L)  0.95     TSH 0.30 - 4.20 uIU/mL   <0.01 (L)    Triiodothyronine (T3) 85 - 202 ng/dL      T4 4.5 - 11.7 ug/dL      FREE T4 0.90 - 1.70 ng/dL   >7.77 (H)       Latest Ref Rng 5/25/2023  6:35 AM   ENDO THYROID LABS-[facility]     TSH 0.4 - 5.0 mU/L    TSH 0.30 - 4.20 uIU/mL <0.01 (L)    Triiodothyronine (T3) 85 - 202 ng/dL 420 (H)    T4 4.5 - 11.7 ug/dL 16.9 (H)    FREE T4 0.90 - 1.70 ng/dL 7.71 (H)       Legend:  (L) Low  (H) High     Latest Reference Range & Units 05/27/23 08:01   WBC 4.0 - 11.0 10e3/uL 6.7   Hemoglobin 11.7 - 15.7 g/dL 10.4 (L)   Hematocrit 35.0 - 47.0 % 33.3 (L)   Platelet Count 150 - 450 10e3/uL 140 (L)   RBC Count 3.80 - 5.20 10e6/uL 3.59 (L)   MCV 78 - 100 fL 93   MCH 26.5 - 33.0 pg 29.0   MCHC 31.5 - 36.5 g/dL 31.2 (L)   RDW 10.0 - 15.0 % 13.2   (L): Data is abnormally low

## 2023-05-30 ENCOUNTER — PATIENT OUTREACH (OUTPATIENT)
Dept: CARE COORDINATION | Facility: CLINIC | Age: 88
End: 2023-05-30

## 2023-05-30 ENCOUNTER — VIRTUAL VISIT (OUTPATIENT)
Dept: ENDOCRINOLOGY | Facility: CLINIC | Age: 88
End: 2023-05-30
Payer: COMMERCIAL

## 2023-05-30 ENCOUNTER — LAB (OUTPATIENT)
Dept: LAB | Facility: CLINIC | Age: 88
End: 2023-05-30
Payer: COMMERCIAL

## 2023-05-30 ENCOUNTER — TELEPHONE (OUTPATIENT)
Dept: ENDOCRINOLOGY | Facility: CLINIC | Age: 88
End: 2023-05-30

## 2023-05-30 DIAGNOSIS — L29.9 PRURITIC DISORDER: ICD-10-CM

## 2023-05-30 DIAGNOSIS — E05.90 HYPERTHYROIDISM: ICD-10-CM

## 2023-05-30 DIAGNOSIS — E83.52 HYPERCALCEMIA: ICD-10-CM

## 2023-05-30 DIAGNOSIS — L29.9 ITCHING: ICD-10-CM

## 2023-05-30 DIAGNOSIS — E04.9 GOITER: ICD-10-CM

## 2023-05-30 DIAGNOSIS — R49.9 CHANGE IN VOICE: ICD-10-CM

## 2023-05-30 DIAGNOSIS — L29.9 ITCHING: Primary | ICD-10-CM

## 2023-05-30 LAB
BASOPHILS # BLD AUTO: 0 10E3/UL (ref 0–0.2)
BASOPHILS NFR BLD AUTO: 0 %
EOSINOPHIL # BLD AUTO: 0.1 10E3/UL (ref 0–0.7)
EOSINOPHIL NFR BLD AUTO: 1 %
ERYTHROCYTE [DISTWIDTH] IN BLOOD BY AUTOMATED COUNT: 12.8 % (ref 10–15)
HCT VFR BLD AUTO: 32.9 % (ref 35–47)
HGB BLD-MCNC: 10.8 G/DL (ref 11.7–15.7)
IMM GRANULOCYTES # BLD: 0 10E3/UL
IMM GRANULOCYTES NFR BLD: 0 %
LYMPHOCYTES # BLD AUTO: 1.7 10E3/UL (ref 0.8–5.3)
LYMPHOCYTES NFR BLD AUTO: 25 %
MCH RBC QN AUTO: 29.3 PG (ref 26.5–33)
MCHC RBC AUTO-ENTMCNC: 32.8 G/DL (ref 31.5–36.5)
MCV RBC AUTO: 89 FL (ref 78–100)
MONOCYTES # BLD AUTO: 0.9 10E3/UL (ref 0–1.3)
MONOCYTES NFR BLD AUTO: 14 %
NEUTROPHILS # BLD AUTO: 4 10E3/UL (ref 1.6–8.3)
NEUTROPHILS NFR BLD AUTO: 60 %
PLATELET # BLD AUTO: 162 10E3/UL (ref 150–450)
RBC # BLD AUTO: 3.69 10E6/UL (ref 3.8–5.2)
WBC # BLD AUTO: 6.8 10E3/UL (ref 4–11)

## 2023-05-30 PROCEDURE — 81001 URINALYSIS AUTO W/SCOPE: CPT

## 2023-05-30 PROCEDURE — 82310 ASSAY OF CALCIUM: CPT

## 2023-05-30 PROCEDURE — 84439 ASSAY OF FREE THYROXINE: CPT

## 2023-05-30 PROCEDURE — 83970 ASSAY OF PARATHORMONE: CPT

## 2023-05-30 PROCEDURE — 85025 COMPLETE CBC W/AUTO DIFF WBC: CPT

## 2023-05-30 PROCEDURE — 84443 ASSAY THYROID STIM HORMONE: CPT

## 2023-05-30 PROCEDURE — 84100 ASSAY OF PHOSPHORUS: CPT

## 2023-05-30 PROCEDURE — 36415 COLL VENOUS BLD VENIPUNCTURE: CPT

## 2023-05-30 PROCEDURE — 99496 TRANSJ CARE MGMT HIGH F2F 7D: CPT | Mod: VID

## 2023-05-30 PROCEDURE — 80076 HEPATIC FUNCTION PANEL: CPT

## 2023-05-30 PROCEDURE — 82306 VITAMIN D 25 HYDROXY: CPT

## 2023-05-30 PROCEDURE — 84480 ASSAY TRIIODOTHYRONINE (T3): CPT

## 2023-05-30 ASSESSMENT — ENCOUNTER SYMPTOMS
POLYDIPSIA: 0
ARTHRALGIAS: 0
VOMITING: 0
BLOOD IN STOOL: 0
CONSTIPATION: 0
SLEEP DISTURBANCES DUE TO BREATHING: 0
PALPITATIONS: 0
POOR WOUND HEALING: 0
WEIGHT GAIN: 0
DIFFICULTY URINATING: 0
WHEEZING: 0
BACK PAIN: 0
EXERCISE INTOLERANCE: 0
SPUTUM PRODUCTION: 0
SKIN CHANGES: 0
TASTE DISTURBANCE: 0
HALLUCINATIONS: 0
HYPOTENSION: 0
MYALGIAS: 0
WEIGHT LOSS: 0
JOINT SWELLING: 0
POLYPHAGIA: 0
FLANK PAIN: 0
NAIL CHANGES: 0
FATIGUE: 1
MUSCLE CRAMPS: 0
SINUS CONGESTION: 0
NAUSEA: 1
SNORES LOUDLY: 0
SORE THROAT: 0
DIARRHEA: 0
SMELL DISTURBANCE: 0
JAUNDICE: 0
COUGH DISTURBING SLEEP: 0
SHORTNESS OF BREATH: 1
ABDOMINAL PAIN: 0
DYSPNEA ON EXERTION: 1
LEG PAIN: 0
TROUBLE SWALLOWING: 1
DYSURIA: 0
NIGHT SWEATS: 0
DECREASED APPETITE: 1
SYNCOPE: 0
POSTURAL DYSPNEA: 0
STIFFNESS: 1
FEVER: 0
ALTERED TEMPERATURE REGULATION: 0
HYPERTENSION: 0
ORTHOPNEA: 0
BOWEL INCONTINENCE: 1
MUSCLE WEAKNESS: 1
INCREASED ENERGY: 1
CHILLS: 0
SINUS PAIN: 0
HEMATURIA: 0
NECK PAIN: 0
HEARTBURN: 1
RECTAL PAIN: 0
BLOATING: 0
COUGH: 0
LIGHT-HEADEDNESS: 1
HOARSE VOICE: 1
HEMOPTYSIS: 0
NECK MASS: 0

## 2023-05-30 NOTE — TELEPHONE ENCOUNTER
Communication Summary: Spoke to pt's daughter over the phone    Appointment type: New Endocrine  Provider: Dr. Acuña  Return date: 5/30/23  Speciality phone number: [phone]  Additional appointment(s) needed: N/A  Additional notes: appt scheduled per Dr. Acuña. Labs also scheduled.     Alexandr Nguyen on 5/30/2023 at 7:43 AM

## 2023-05-30 NOTE — LETTER
5/30/2023       RE: Isadora Mendez  [address] [facility] [id number]     Dear Colleague,    Thank you for referring your patient, Isadora Mendez, to the [facility] [place] at [facility]. Please see a copy of my visit note below.    Endocrine Consult Video visit note-     Attending Assessment/Plan :     Thyrotoxicosis with goiter.  Unstable.  We are treating for hyperthyroidism.  Labs from today are pending   Thyroid US  TFTS are pending from today    Prediabetes     Voice concern -- her voice is the same as since I met her but today they are reporting it isn't normal for her x weeks     Pruritis is ? Improved slightly -     ? For UTI request for UA - it was collected already, they need the order which I will place.      Due to the COVID 19 pandemic this visit was a telephone/video visit in order to help prevent spread of infection in this patient and the general population. The patient gave verbal consent for the visit today. I have independently reviewed and interpreted labs, imaging as indicated.     Distant Location (provider location):  Off-site  Mode of Communication:  Video Conference via [employer]  Chart review/prep time 1 [id number];  [id number]  Visit Start time 1656   Visit Stop time  1715   45__ minutes spent on the date of the encounter doing chart review, history and exam, documentation and further activities as noted above.    June Acuña MD    Chief complaint:  HISTORY OF PRESENT ILLNESS    89 year old seen in follow up for recent hospitalization.  She presents today along with 2 daughters, Aida (who is a PT tech)  and Florina (who is a nurse) I also saw her during the [facility] hospitalization which was from 5/24-5/27/23.   .  Isadora was admitted 5/24/23 after being seen in clinic with complaints of dizziness, nausea and vomiting.  Symptoms have been present at some level since February, starting with her  vomiting at a doctors appt for her .  She had had some nausea since then. She has been found to have gall stones.   TFTS were found to very high.  CRP inflammation was also high.  Nontender goiter was noted on exam.  She was started on methimazole initially 10 mg tid. :She was discharged on 10 mg bid .  Today they confirm she has been taking this.      Possibly relevant history:   She had COVID in Oct or Nov 2022, took Paxlovid.  She doesn't recall other recent viral illnesses.  She cited travel about 1-2 weeks prior to the admission .      Endocrine relevant labs are as follows:  8/6/07 TSH 0.73  8/18/10 TSH 1.13  8/18/11 TSH 0.464  5/15/12 TSh 0.39  8/21/12 TSH 0.95  1/15/16 TSH 0.448  4/6/23 CT contrast  5/24/23 TSH < 0.01, free T4 > 7.77, BNP 2414, troponin 38, glucose 122, Ca 9.9, creatinine 0.89, WBC 7000, platelets 146K  5/25/23 TSH < 0.01, T3 420, free t4 7.71, T4 16.9, CRP 9.1, Ca 9.8    Relevant imaging is as follows: (as read by me as it pertains to endocrine relevant organs)  There is no thyroid imaging  4/6/2023 CT abdomen and pelvis: adrenals appear normal  5/21/2023 CXR: small pleural effusion; no obvious evidence of large goiter      5/25/23 EKg NSR HR 72/minute     REVIEW OF SYSTEMS  Doing OK  Hard time eating - due to nausea; no appetite   A lot of nausea ; no vomiting; getting zofran x 3 yesterday and they are also giving it today; BM  ,very small , soft; some fecal incontinence  A lot of itching better on the body overall; itching vagina  Weak  Not feeling the best - - ? Better than before, gradually getting better  Doing better in the morning now, less confusion than in the hospital  Quite tired   Voice is different from her normal - x 1-2 weeks   Swallow is sometimes off - might cough on water a little -     Answers for HPI/ROS submitted by the patient on 5/30/2023  General Symptoms: Yes  Skin Symptoms: Yes  HENT Symptoms: Yes  EYE SYMPTOMS: No  HEART SYMPTOMS: Yes  LUNG SYMPTOMS:  Yes  INTESTINAL SYMPTOMS: Yes  URINARY SYMPTOMS: Yes  GYNECOLOGIC SYMPTOMS: No  BREAST SYMPTOMS: No  SKELETAL SYMPTOMS: Yes  BLOOD SYMPTOMS: No  NERVOUS SYSTEM SYMPTOMS: Yes  MENTAL HEALTH SYMPTOMS: No  Ear pain: No  Ear discharge: No  Hearing loss: No  Tinnitus: No  Nosebleeds: No  Congestion: No  Sinus pain: No  Trouble swallowing: Yes   Voice hoarseness: Yes  Mouth sores: No  Sore throat: No  Tooth pain: No  Gum tenderness: No  Bleeding gums: No  Change in taste: No  Change in sense of smell: No  Dry mouth: Yes  Hearing aid used: No  Neck lump: No  Fever: No  Loss of appetite: Yes  Weight loss: No  Weight gain: No  Fatigue: Yes  Night sweats: No  Chills: No  Increased stress: No  Excessive hunger: No  Excessive thirst: No  Feeling hot or cold when others believe the temperature is normal: No  Loss of height: No  Post-operative complications: No  Surgical site pain: No  Hallucinations: No  Change in or Loss of Energy: Yes  Hyperactivity: No  Confusion: Yes  Changes in hair: Yes  Changes in moles/birth marks: No  Itching: Yes  Rashes: No  Changes in nails: No  Acne: No  Hair in places you don't want it: No  Change in facial hair: No  Warts: No  Non-healing sores: No  Scarring: No  Flaking of skin: No  Color changes of hands/feet in cold : No  Sun sensitivity: No  Skin thickening: No  Chest pain or pressure: No  Fast or irregular heartbeat: No  Pain in legs with walking: No  Trouble breathing while lying down: No  Fingers or toes appear blue: No  High blood pressure: No  Low blood pressure: No  Fainting: No  Murmurs: No  Pacemaker: Yes  Varicose veins: No  Edema or swelling: No  Wake up at night with shortness of breath: No  Light-headedness: Yes  Exercise intolerance: No  Cough: No  Sputum or phlegm: No  Coughing up blood: No  Difficulty breating or shortness of breath: Yes  Snoring: No  Wheezing: No  Difficulty breathing on exertion: Yes  Nighttime Cough: No  Difficulty breathing when lying flat: No  Heart burn or  indigestion: Yes  Nausea: Yes  Vomiting: No  Abdominal pain: No  Bloating: No  Constipation: No  Diarrhea: No  Blood in stool: No  Black stools: No  Rectal or Anal pain: No  Fecal incontinence: Yes  Yellowing of skin or eyes: No  Vomit with blood: No  Change in stools: No  Trouble holding urine or incontinence: No  Pain or burning: No  Trouble starting or stopping: No  Increased frequency of urination: No  Blood in urine: No  Decreased frequency of urination: No  Frequent nighttime urination: No  Flank pain: No  Difficulty emptying bladder: No  Back pain: No  Muscle aches: No  Neck pain: No  Swollen joints: No  Joint pain: No  Bone pain: No  Muscle cramps: No  Muscle weakness: Yes  Joint stiffness: Yes  Bone fracture: No    10 system ROS otherwise as per the HPI or negative    Past Medical History  Past Medical History:   Diagnosis Date    Atrial fibrillation (H) 01/01/2011    Cataract     Closed nondisplaced fracture of styloid process of radius     Dry eyes     Facial fracture (H) 01/01/2006    Hypertension     Infection due to 2019 novel coronavirus 10/2022    or 11/22- took paxolovid    Low bone density     NSVT (nonsustained ventricular tachycardia) (H) 01/01/2009    during exercise echo, neg EP study    Pacemaker 07/01/2010    Postmenopausal status     S/P cardiac catheterization 01/01/2009    30-40% non obstructive lesion    SSS (sick sinus syndrome) (H) 10/2022    Ventricular tachycardia, nonsustained (H) 01/01/2009    during stress echo     Past Surgical History:   Procedure Laterality Date    CATARACT EXTRACTION W/ INTRAOCULAR LENS IMPLANT Right 12/04/2018    IMPLANT PACEMAKER      PPM  06/30/2010    Permanent Pacemaker       Medications  Current Outpatient Medications   Medication Sig Dispense Refill    aspirin 81 MG tablet Take 1 tablet by mouth daily.      diclofenac (VOLTAREN) 1 % topical gel Apply 2 g topically 4 times daily as needed for other (Joint and muscle pain as needed) 50 g 0    famotidine  (PEPCID) 20 MG tablet Take 1 tablet (20 mg) by mouth 2 times daily 180 tablet 3    furosemide (LASIX) 20 MG tablet Take 1 tablet (20 mg) by mouth daily 60 tablet 0    lisinopril (ZESTRIL) 20 MG tablet Take 1 tablet (20 mg) by mouth daily 60 tablet 0    methimazole (TAPAZOLE) 10 MG tablet Take 1 tablet (10 mg) by mouth 2 times daily 120 tablet 0    metoprolol succinate ER (TOPROL XL) 100 MG 24 hr tablet Take 1 tablet (100 mg) by mouth daily Use with 50 mg tab in the pm 90 tablet 3    metoprolol succinate ER (TOPROL XL) 50 MG 24 hr tablet Take 1 tablet (50 mg) by mouth every evening Use with 100mg tab in the am 90 tablet 3    ondansetron (ZOFRAN) 4 MG tablet Take 1 tablet (4 mg) by mouth every 6 hours as needed for nausea or vomiting 30 tablet 0    spironolactone (ALDACTONE) 25 MG tablet Take 0.5 tablets (12.5 mg) by mouth daily 30 tablet 0    co-enzyme Q-10 100 MG CAPS capsule Take 100 mg by mouth      Nutritional Supplements (PYCNOGENOL) 300-30 MG CAPS per capsule Take 1 capsule by mouth daily (Patient not taking: Reported on 5/30/2023)      Vitamin D, Cholecalciferol, 1000 units TABS Take 1 tablet by mouth daily (Patient not taking: Reported on 5/30/2023)         Allergies  No Known Allergies    Family History  family history includes Arrhythmia in her brother, brother, and sister; Atrial fibrillation in her son; Blood Disease in her son; Breast Cancer (age of onset: 50) in her daughter; Cardiovascular in her paternal grandfather; Cardiovascular (age of onset: 76) in her father; Colon Cancer in her sister; Coronary Artery Disease in her father and paternal grandfather; Diabetes Type 2  in her maternal grandfather; Hypertension in her mother; Leukemia in her maternal grandfather; Macular Degeneration in her mother; Myocardial Infarction (age of onset: 70) in her father; Myocardial Infarction (age of onset: 88) in her mother; Neuropathy in her sister; Thyroid Disease in her daughter; Uterine Cancer in her  "daughter.    Social History  ; lives with ;     Physical Exam  There is no height or weight on file to calculate BMI.   BP Readings from Last 1 Encounters:   05/27/23 (!) 162/49      Pulse Readings from Last 1 Encounters:   05/27/23 65      Resp Readings from Last 1 Encounters:   05/27/23 18      Temp Readings from Last 1 Encounters:   05/27/23 98.5  F (36.9  C) (Oral)      SpO2 Readings from Last 1 Encounters:   05/27/23 96%      Wt Readings from Last 1 Encounters:   05/26/23 55.6 kg (122 lb 8 oz)      Ht Readings from Last 1 Encounters:   05/23/23 1.585 m (5' 2.4\")     GENERAL: no distress; I can see from upper chest up;  Her voice is the same as when I saw her in the hospital.  I wouldn't have called it abnormal then or now, not knowing her baseline   SKIN: Visible skin clear. No icterus ; No significant rash, abnormal pigmentation or lesions.  EYES: glasses Eyes grossly normal to inspection.  No discharge or erythema, or obvious scleral/conjunctival abnormalities.  NECK: visible goiter is not present; no visible neck masses  RESP: No audible wheeze, cough, or  increased work of breathing.    NEURO: Awake, alert, responds appropriately to questions.  Mentation and speech fluent.  PSYCH:affect normal and appearance well-groomed.    DATA REVIEW     Latest Ref Rn 5/15/2012  9:14 AM 8/21/2012  7:44 AM 5/24/2023  6:52 PM   ENDO THYROID LABS-[facility]       TSH 0.4 - 5.0 mU/L 0.39 (L)  0.95     TSH 0.30 - 4.20 uIU/mL   <0.01 (L)    Triiodothyronine (T3) 85 - 202 ng/dL      T4 4.5 - 11.7 ug/dL      FREE T4 0.90 - 1.70 ng/dL   >7.77 (H)       Latest Ref Rn 5/25/2023  6:35 AM   ENDO THYROID LABS-[facility]     TSH 0.4 - 5.0 mU/L    TSH 0.30 - 4.20 uIU/mL <0.01 (L)    Triiodothyronine (T3) 85 - 202 ng/dL 420 (H)    T4 4.5 - 11.7 ug/dL 16.9 (H)    FREE T4 0.90 - 1.70 ng/dL 7.71 (H)       Legend:  (L) Low  (H) High     Latest Reference Range & Units 05/25/23 06:35 05/26/23 07:17 05/27/23 08:01   Sodium 136 - 145 mmol/L " 137 139 140   Potassium 3.4 - 5.3 mmol/L 4.0 3.6 3.9   Chloride 98 - 107 mmol/L 103 104 102   Carbon Dioxide (CO2) 22 - 29 mmol/L 20 (L) 23 26   Urea Nitrogen 8.0 - 23.0 mg/dL 33.9 (H) 35.2 (H) 34.9 (H)   Creatinine 0.51 - 0.95 mg/dL 0.80 0.86 0.95   GFR Estimate >60 mL/min/1.73m2 69 63 56 (L)   Calcium 8.2 - 9.6 mg/dL 9.8 (H) 9.6 9.8 (H)   Anion Gap 7 - 15 mmol/L 14 12 12   Magnesium 1.7 - 2.3 mg/dL   1.5 (L)   Albumin 3.5 - 5.2 g/dL 3.0 (L) 3.3 (L)    Protein Total 6.4 - 8.3 g/dL 5.9 (L) 5.9 (L)    Alkaline Phosphatase 35 - 104 U/L 83 86    ALT 10 - 35 U/L 27 24    AST 10 - 35 U/L 42 (H) 36 (H)    Bilirubin Total <=1.2 mg/dL 0.7 0.5    CRP Inflammation <5.00 mg/L 9.10 (H)     Ferritin 11 - 328 ng/mL 119     (L): Data is abnormally low  (H): Data is abnormally high

## 2023-05-30 NOTE — PROGRESS NOTES
Endocrine Consult Video visit note-     Attending Assessment/Plan :     Thyrotoxicosis with goiter.  Unstable.  We are treating for hyperthyroidism.  Labs from today are pending   Thyroid US  TFTS are pending from today    Addendum  5/24/23 TSI 2.5 - consistent with Graves'   5/30/23 TSH < 0.01, free T4 5.3, T3 264, Ca 10, phos 4.2, albumin 3.1, 25OHD 69, PTH 33, WBC 6800, ANC 4000, Hgb 10.8, platelets 162K-- next labs should also include SPEP - on MMI 10 mg bid which should continue.  Repeat labs one month .  Follow up appt with me requested for 2 months is not scheduled. As of 6/7/23 .  6/29/23 TSH < 0.01, free T4 1.69, T3 134, Ca 9.8, albumin 3.3, alk phos 117, ALT 15, AST 25, creatinine 0.73, bili 0.4, on MMI 10 mg bid. ; reduce to 10 /5   7/21/23 TSH 0.39, free T4 0.77, T3 91 ; Reduce MMI to 10 mg/day    6/15/23 thyroid US:   Right mid 2 1.6 x 1.3 x  1.4 cm   Right lower 3  0.8 x 0.7  X 0.8   Right lower 4 posterior  1.1 x 1 x 1.6 cm     Prediabetes     Voice concern -- her voice is the same as since I met her but today they are reporting it isn't normal for her x weeks     Pruritis is ? Improved slightly -     ? For UTI request for UA - it was collected already, they need the order which I will place.      Due to the COVID 19 pandemic this visit was a telephone/video visit in order to help prevent spread of infection in this patient and the general population. The patient gave verbal consent for the visit today. I have independently reviewed and interpreted labs, imaging as indicated.     Distant Location (provider location):  Off-site  Mode of Communication:  Video Conference via Admitly  Chart review/prep time 1 5201-8028;  [id number]  Visit Start time 1656   Visit Stop time  8379   45__ minutes spent on the date of the encounter doing chart review, history and exam, documentation and further activities as noted above.    June Acuña MD    Chief complaint:  HISTORY OF PRESENT ILLNESS    89 year  old seen in follow up for recent hospitalization.  She presents today along with 2 daughters, Aida (who is a PT tech)  and Florina (who is a nurse) I also saw her during the [facility] hospitalization which was from 5/24-5/27/23.   .  Isadora was admitted 5/24/23 after being seen in clinic with complaints of dizziness, nausea and vomiting.  Symptoms have been present at some level since February, starting with her vomiting at a doctors appt for her .  She had had some nausea since then. She has been found to have gall stones.   TFTS were found to very high.  CRP inflammation was also high.  Nontender goiter was noted on exam.  She was started on methimazole initially 10 mg tid. :She was discharged on 10 mg bid .  Today they confirm she has been taking this.      Possibly relevant history:   She had COVID in Oct or Nov 2022, took Paxlovid.  She doesn't recall other recent viral illnesses.  She cited travel about 1-2 weeks prior to the admission .      Endocrine relevant labs are as follows:  8/6/07 TSH 0.73  8/18/10 TSH 1.13  8/18/11 TSH 0.464  5/15/12 TSh 0.39  8/21/12 TSH 0.95  1/15/16 TSH 0.448  4/6/23 CT contrast  5/24/23 TSH < 0.01, free T4 > 7.77, BNP 2414, troponin 38, glucose 122, Ca 9.9, creatinine 0.89, WBC 7000, platelets 146K  5/25/23 TSH < 0.01, T3 420, free t4 7.71, T4 16.9, CRP 9.1, Ca 9.8    Relevant imaging is as follows: (as read by me as it pertains to endocrine relevant organs)  There is no thyroid imaging  4/6/2023 CT abdomen and pelvis: adrenals appear normal  5/21/2023 CXR: small pleural effusion; no obvious evidence of large goiter      5/25/23 EKg NSR HR 72/minute     REVIEW OF SYSTEMS  Doing OK  Hard time eating - due to nausea; no appetite   A lot of nausea ; no vomiting; getting zofran x 3 yesterday and they are also giving it today; BM  ,very small , soft; some fecal incontinence  A lot of itching better on the body overall; itching vagina  Weak  Not feeling the best - - ? Better than  before, gradually getting better  Doing better in the morning now, less confusion than in the hospital  Quite tired   Voice is different from her normal - x 1-2 weeks   Swallow is sometimes off - might cough on water a little -     Answers for HPI/ROS submitted by the patient on 5/30/2023  General Symptoms: Yes  Skin Symptoms: Yes  HENT Symptoms: Yes  EYE SYMPTOMS: No  HEART SYMPTOMS: Yes  LUNG SYMPTOMS: Yes  INTESTINAL SYMPTOMS: Yes  URINARY SYMPTOMS: Yes  GYNECOLOGIC SYMPTOMS: No  BREAST SYMPTOMS: No  SKELETAL SYMPTOMS: Yes  BLOOD SYMPTOMS: No  NERVOUS SYSTEM SYMPTOMS: Yes  MENTAL HEALTH SYMPTOMS: No  Ear pain: No  Ear discharge: No  Hearing loss: No  Tinnitus: No  Nosebleeds: No  Congestion: No  Sinus pain: No  Trouble swallowing: Yes   Voice hoarseness: Yes  Mouth sores: No  Sore throat: No  Tooth pain: No  Gum tenderness: No  Bleeding gums: No  Change in taste: No  Change in sense of smell: No  Dry mouth: Yes  Hearing aid used: No  Neck lump: No  Fever: No  Loss of appetite: Yes  Weight loss: No  Weight gain: No  Fatigue: Yes  Night sweats: No  Chills: No  Increased stress: No  Excessive hunger: No  Excessive thirst: No  Feeling hot or cold when others believe the temperature is normal: No  Loss of height: No  Post-operative complications: No  Surgical site pain: No  Hallucinations: No  Change in or Loss of Energy: Yes  Hyperactivity: No  Confusion: Yes  Changes in hair: Yes  Changes in moles/birth marks: No  Itching: Yes  Rashes: No  Changes in nails: No  Acne: No  Hair in places you don't want it: No  Change in facial hair: No  Warts: No  Non-healing sores: No  Scarring: No  Flaking of skin: No  Color changes of hands/feet in cold : No  Sun sensitivity: No  Skin thickening: No  Chest pain or pressure: No  Fast or irregular heartbeat: No  Pain in legs with walking: No  Trouble breathing while lying down: No  Fingers or toes appear blue: No  High blood pressure: No  Low blood pressure: No  Fainting:  No  Murmurs: No  Pacemaker: Yes  Varicose veins: No  Edema or swelling: No  Wake up at night with shortness of breath: No  Light-headedness: Yes  Exercise intolerance: No  Cough: No  Sputum or phlegm: No  Coughing up blood: No  Difficulty breating or shortness of breath: Yes  Snoring: No  Wheezing: No  Difficulty breathing on exertion: Yes  Nighttime Cough: No  Difficulty breathing when lying flat: No  Heart burn or indigestion: Yes  Nausea: Yes  Vomiting: No  Abdominal pain: No  Bloating: No  Constipation: No  Diarrhea: No  Blood in stool: No  Black stools: No  Rectal or Anal pain: No  Fecal incontinence: Yes  Yellowing of skin or eyes: No  Vomit with blood: No  Change in stools: No  Trouble holding urine or incontinence: No  Pain or burning: No  Trouble starting or stopping: No  Increased frequency of urination: No  Blood in urine: No  Decreased frequency of urination: No  Frequent nighttime urination: No  Flank pain: No  Difficulty emptying bladder: No  Back pain: No  Muscle aches: No  Neck pain: No  Swollen joints: No  Joint pain: No  Bone pain: No  Muscle cramps: No  Muscle weakness: Yes  Joint stiffness: Yes  Bone fracture: No    10 system ROS otherwise as per the HPI or negative    Past Medical History  Past Medical History:   Diagnosis Date     Atrial fibrillation (H) 01/01/2011     Cataract      Closed nondisplaced fracture of styloid process of radius      Dry eyes      Facial fracture (H) 01/01/2006     Hypertension      Infection due to 2019 novel coronavirus 10/2022    or 11/22- took paxolovid     Low bone density      NSVT (nonsustained ventricular tachycardia) (H) 01/01/2009    during exercise echo, neg EP study     Pacemaker 07/01/2010     Postmenopausal status      S/P cardiac catheterization 01/01/2009    30-40% non obstructive lesion     SSS (sick sinus syndrome) (H) 10/2022     Ventricular tachycardia, nonsustained (H) 01/01/2009    during stress echo     Past Surgical History:   Procedure  Laterality Date     CATARACT EXTRACTION W/ INTRAOCULAR LENS IMPLANT Right 12/04/2018     IMPLANT PACEMAKER       PPM  06/30/2010    Permanent Pacemaker       Medications  Current Outpatient Medications   Medication Sig Dispense Refill     aspirin 81 MG tablet Take 1 tablet by mouth daily.       diclofenac (VOLTAREN) 1 % topical gel Apply 2 g topically 4 times daily as needed for other (Joint and muscle pain as needed) 50 g 0     famotidine (PEPCID) 20 MG tablet Take 1 tablet (20 mg) by mouth 2 times daily 180 tablet 3     furosemide (LASIX) 20 MG tablet Take 1 tablet (20 mg) by mouth daily 60 tablet 0     lisinopril (ZESTRIL) 20 MG tablet Take 1 tablet (20 mg) by mouth daily 60 tablet 0     methimazole (TAPAZOLE) 10 MG tablet Take 1 tablet (10 mg) by mouth 2 times daily 120 tablet 0     metoprolol succinate ER (TOPROL XL) 100 MG 24 hr tablet Take 1 tablet (100 mg) by mouth daily Use with 50 mg tab in the pm 90 tablet 3     metoprolol succinate ER (TOPROL XL) 50 MG 24 hr tablet Take 1 tablet (50 mg) by mouth every evening Use with 100mg tab in the am 90 tablet 3     ondansetron (ZOFRAN) 4 MG tablet Take 1 tablet (4 mg) by mouth every 6 hours as needed for nausea or vomiting 30 tablet 0     spironolactone (ALDACTONE) 25 MG tablet Take 0.5 tablets (12.5 mg) by mouth daily 30 tablet 0     co-enzyme Q-10 100 MG CAPS capsule Take 100 mg by mouth       Nutritional Supplements (PYCNOGENOL) 300-30 MG CAPS per capsule Take 1 capsule by mouth daily (Patient not taking: Reported on 5/30/2023)       Vitamin D, Cholecalciferol, 1000 units TABS Take 1 tablet by mouth daily (Patient not taking: Reported on 5/30/2023)         Allergies  No Known Allergies    Family History  family history includes Arrhythmia in her brother, brother, and sister; Atrial fibrillation in her son; Blood Disease in her son; Breast Cancer (age of onset: 50) in her daughter; Cardiovascular in her paternal grandfather; Cardiovascular (age of onset: 76) in  "her father; Colon Cancer in her sister; Coronary Artery Disease in her father and paternal grandfather; Diabetes Type 2  in her maternal grandfather; Hypertension in her mother; Leukemia in her maternal grandfather; Macular Degeneration in her mother; Myocardial Infarction (age of onset: 70) in her father; Myocardial Infarction (age of onset: 88) in her mother; Neuropathy in her sister; Thyroid Disease in her daughter; Uterine Cancer in her daughter.    Social History  ; lives with ;     Physical Exam  There is no height or weight on file to calculate BMI.   BP Readings from Last 1 Encounters:   05/27/23 (!) 162/49      Pulse Readings from Last 1 Encounters:   05/27/23 65      Resp Readings from Last 1 Encounters:   05/27/23 18      Temp Readings from Last 1 Encounters:   05/27/23 98.5  F (36.9  C) (Oral)      SpO2 Readings from Last 1 Encounters:   05/27/23 96%      Wt Readings from Last 1 Encounters:   05/26/23 55.6 kg (122 lb 8 oz)      Ht Readings from Last 1 Encounters:   05/23/23 1.585 m (5' 2.4\")     GENERAL: no distress; I can see from upper chest up;  Her voice is the same as when I saw her in the hospital.  I wouldn't have called it abnormal then or now, not knowing her baseline   SKIN: Visible skin clear. No icterus ; No significant rash, abnormal pigmentation or lesions.  EYES: glasses Eyes grossly normal to inspection.  No discharge or erythema, or obvious scleral/conjunctival abnormalities.  NECK: visible goiter is not present; no visible neck masses  RESP: No audible wheeze, cough, or  increased work of breathing.    NEURO: Awake, alert, responds appropriately to questions.  Mentation and speech fluent.  PSYCH:affect normal and appearance well-groomed.    DATA REVIEW     Latest Ref Rng 5/15/2012  9:14 AM 8/21/2012  7:44 AM 5/24/2023  6:52 PM   ENDO THYROID LABS-[facility]       TSH 0.4 - 5.0 mU/L 0.39 (L)  0.95     TSH 0.30 - 4.20 uIU/mL   <0.01 (L)    Triiodothyronine (T3) 85 - 202 ng/dL    "   T4 4.5 - 11.7 ug/dL      FREE T4 0.90 - 1.70 ng/dL   >7.77 (H)       Latest Ref Rng 5/25/2023  6:35 AM   ENDO THYROID LABS[facility]     TSH 0.4 - 5.0 mU/L    TSH 0.30 - 4.20 uIU/mL <0.01 (L)    Triiodothyronine (T3) 85 - 202 ng/dL 420 (H)    T4 4.5 - 11.7 ug/dL 16.9 (H)    FREE T4 0.90 - 1.70 ng/dL 7.71 (H)       Legend:  (L) Low  (H) High     Latest Reference Range & Units 05/25/23 06:35 05/26/23 07:17 05/27/23 08:01   Sodium 136 - 145 mmol/L 137 139 140   Potassium 3.4 - 5.3 mmol/L 4.0 3.6 3.9   Chloride 98 - 107 mmol/L 103 104 102   Carbon Dioxide (CO2) 22 - 29 mmol/L 20 (L) 23 26   Urea Nitrogen 8.0 - 23.0 mg/dL 33.9 (H) 35.2 (H) 34.9 (H)   Creatinine 0.51 - 0.95 mg/dL 0.80 0.86 0.95   GFR Estimate >60 mL/min/1.73m2 69 63 56 (L)   Calcium 8.2 - 9.6 mg/dL 9.8 (H) 9.6 9.8 (H)   Anion Gap 7 - 15 mmol/L 14 12 12   Magnesium 1.7 - 2.3 mg/dL   1.5 (L)   Albumin 3.5 - 5.2 g/dL 3.0 (L) 3.3 (L)    Protein Total 6.4 - 8.3 g/dL 5.9 (L) 5.9 (L)    Alkaline Phosphatase 35 - 104 U/L 83 86    ALT 10 - 35 U/L 27 24    AST 10 - 35 U/L 42 (H) 36 (H)    Bilirubin Total <=1.2 mg/dL 0.7 0.5    CRP Inflammation <5.00 mg/L 9.10 (H)     Ferritin 11 - 328 ng/mL 119     (L): Data is abnormally low  (H): Data is abnormally high      US THYROID 6/15/2023 12:59 PMCOMPARISON: NoneHISTORY: Goiter  FINDINGS:   Thyroid parenchyma: heterogenous  The right lobe of the thyroid measures: 5.2 x 2.5 x 1.7 cm  The left lobe of the thyroid measures: 4.2 x 1.8 x 1.4 cm  The thyroid isthmus measures: 0.5 cm     Nodule 1:  Lobe: Right  Location: Superior  Size: 0.6 x 0.7 x 0.4 cm  Composition: Solid or almost completely solid (2 points)  Echogenicity: Hyperechoic or isoechoic (1 point)  Shape: Wider than tall (0 points)  Margin: Smooth (0 points)  Echogenic Foci: None or large comet tail artifact (0 points)  Stability: Not previously imaged  TIRADS: TR3 (3 points)      Nodule 2:  Lobe: Right  Location: Mid  Size: 1.4 x 1.6 x 1.3 cm  Composition: Solid or  almost completely solid (2 points)  Echogenicity: Hyperechoic or isoechoic (1 point)  Shape: Wider than tall (0 points)  Margin: Ill-defined (0 points)  Echogenic Foci: None or large comet tail artifact (0 points)  Stability: Not previously imaged  TIRADS: TR3 (3 points)      Nodule 3:  Lobe: Right  Location: Inferior  Size: 0.8 x 0.8 x 0.7 cm  Composition: Solid or almost completely solid (2 points)  Echogenicity: Hyperechoic or isoechoic (1 point)  Shape: Wider than tall (0 points)  Margin: Ill-defined (0 points)  Echogenic Foci: None or large comet tail artifact (0 points)  Stability: Not previously imaged  TIRADS: TR3 (3 points)      Nodule 4:  Lobe: Right  Location: Inferior. Appears to originate from the thyroid gland oncine images.  Size: 1.6 x 1.1 x 1.1 cm  Composition: Solid or almost completely solid (2 points)  Echogenicity: Hyperechoic or isoechoic (1 point)  Shape: Wider than tall (0 points)  Margin: Smooth (0 points)  Echogenic Foci: None or large comet tail artifact (0 points)  Stability: Not previously imaged  TIRADS: TR4 (4-6 points)                                                              Impression:  Multinodular thyroid gland with the most suspicious nodule arisingfrom the posterior inferior right lobe measuring 1.6 x 1.1 x 1.1 cm(less than 1.5 cm in mean diameter), TR4: Recommend follow up ultrasound in 1 year per TI-RADS criteria.  ACR TI-RADS recommendations  TR2 (2 points) & TR1 (0 points) -No FNA or follow-up  TR3 (3 points) - FNA if ? 2.5cm, follow-up if 1.5 -2.4 cm in 1, 3 and5 years  TR4 (4-6 points) - FNA if ? 1.5cm, follow-up if 1 -1.4 cm in 1, 2, 3and 5 years  TR5 (?7 points) - FNA if ? 1cm, follow-up if 0.5 -0.9 cm every yearfor 5 years   I have personally reviewed the examination and initial interpretationand I agree with the findings.  MANOLO JOE MD

## 2023-05-30 NOTE — NURSING NOTE
Is the patient currently in the state of [place]? YES    Visit mode:VIDEO    If the visit is dropped, the patient can be reconnected by: VIDEO VISIT: Text to cell phone: [phone]    Will anyone else be joining the visit? Florina Parra, and patient.       How would you like to obtain your AVS? MyChart    Are changes needed to the allergy or medication list? NO    Reason for visit: Consult (New patient- follow up from hospital )

## 2023-05-30 NOTE — TELEPHONE ENCOUNTER
Can you please help follow-up regarding home care?  I see a note from Sherin Bocanegra (weekend care coordinator) stating the followin/27-Updated that current recommendations from therapies is for home PT/OT.      Initial referrals sent to:   [facility]: declined  [facility]: no update  [facility]: no update  [facility]: declined    [facility]: declined  [facility]: no update    [facility]: no update     Asked to be paged to weekend pager with update if able to take referral.      1445-Spoke with [facility] attending and provided update that home therapy had not been confirmed. She was ok with that, and it would not be a barrier to discharge.      OP PT referral put in place.      Sherin Bocanegra, RN   Weekend Care Coordinator    Clindamycin Pregnancy And Lactation Text: This medication can be used in pregnancy if certain situations. Clindamycin is also present in breast milk.

## 2023-05-30 NOTE — TELEPHONE ENCOUNTER
Please call Isadora and her family .    Tell her I can see her irtually at 5 PM on 5/30/2023  today . Please confirm if you want the appt.   Ideally she would get the blood tests done earlier in the day so hopefully we would have results for the appt.  Thanks  June Acuña MD

## 2023-05-30 NOTE — PROGRESS NOTES
"Clinic Care Coordination Contact  [facility]: Post-Discharge Note  SITUATION                                                      Admission:    Admission Date: 05/24/23   Reason for Admission: Thyrotoxicosis  Goiter  HFpEF  SSS s/p PM   pAFib (not on AC)  Cognitive decline  Generalized weakness  Peripheral neuropathy   HTN  Discharge:   Discharge Date: 05/27/23  Discharge Diagnosis: Thyrotoxicosis  Goiter  HFpEF  SSS s/p PM   pAFib (not on AC)  Cognitive decline  Generalized weakness  Peripheral neuropathy   HTN    BACKGROUND                                                      Per hospital discharge summary and inpatient provider notes:    Isadora Mendez is a 89 year old female with PMH significant for pancreatitis, sick sinus syndrome s/p pacemaker implantation ([employer]), exercise-induced VT, paroxysmal atrial fibrillation (not anticoagulated), and HTN who presented to the ED complaining of worsening generalized weakness, shortness of breath nausea, vomiting.  Patient has first noticed the symptoms beginning of March and have gradually worsened over time.  She also reports loose stools approximately 1 to 2/day.  Denies any blood in stools.  Patient is complaining of worsening shortness of breath on exertion, can only walk half a block at a time due to the shortness of breath.  She is able to lay flat at night, uses only 1 pillow, does not wake up gasping for air.  Patient is also feeling \"more wobbly\" but denies any recent falls or head injuries.  Patient lives at home with her .  Patient is pretty active at baseline used to work at a  up until 2 years ago prior to COVID, has been feeling more fatigued lately.     Family concerned of cognitive decline but no confusion, states that the patient \"seems less sharp lately\", forgetting words or names at times, but not happening too frequently.  Patient alert and oriented x3 during our conversation, very pleasant.  Patient denies any chest pain, " "palpitations, abdominal pain, urinary symptoms. Recently seen in outpatient clinic on 5/23 for dizziness, was found to be significantly hypertensive 200/60. Her hydrochlorothiazide/Lisinopril dose was increased to 25/20.     ASSESSMENT      Discharge Assessment  How are you doing now that you are home?: \"Doing okay\"  How are your symptoms? (Red Flag symptoms escalate to triage hotline per guidelines): Improved  Do you feel your condition is stable enough to be safe at home until your provider visit?: Yes  Does the patient have their discharge instructions? : Yes  Does the patient have questions regarding their discharge instructions? : No  Were you started on any new medications or were there changes to any of your previous medications? : Yes  Does the patient have all of their medications?: Yes  Do you have questions regarding any of your medications? : No  Do you have all of your needed medical supplies or equipment (DME)?  (i.e. oxygen tank, CPAP, cane, etc.): Yes  Discharge follow-up appointment scheduled within 14 calendar days? : No  Is patient agreeable to assistance with scheduling? : No    Post-op ([facility] Only)  If the patient had a surgery or procedure, do they have any questions for a nurse?: No    PLAN                                                      Outpatient Plan:    Follow up as needed    Future Appointments   Date Time Provider Department Center   5/30/2023  3:45 PM UP LAB UPLABR UP   5/30/2023  5:00 PM June Acuña MD [facility]   6/2/2023 11:30 AM Jovana Macias MD [facility]   6/22/2023  3:00 PM UC CV DEVICE 1 [facility]   6/22/2023  4:00 PM Mary Dixon MD [facility]   10/31/2023 12:30 AM UC ICD REMOTE [facility]   2/5/2024 12:00 AM UC ICD REMOTE [facility]         For any urgent concerns, please contact our 24 hour nurse triage line: [phone])         STEFANY Garcia  [phone]  [facility]    "

## 2023-05-30 NOTE — TELEPHONE ENCOUNTER
[facility] Call Center    Phone Message    May a detailed message be left on voicemail: yes     Reason for Call: Order(s): Other: Per caller they want to know if an order can be added to test for UTI and Yeast infection due to symptoms or itching .      Reason for requested: Test for UTI and Yeast Infection    Date needed: today has lab appointment at 3:45pm     Provider name: Dr. Acuña       Action Taken: Other: ENDO    Travel Screening: Not Applicable

## 2023-05-30 NOTE — PLAN OF CARE
Physical Therapy Discharge Summary    Reason for therapy discharge:    Discharged to home with outpatient therapy. Family to assess as needed.     Progress towards therapy goal(s). See goals on Care Plan in [facility] electronic health record for goal details.  Goals partially met.  Barriers to achieving goals:   discharge from facility.    Therapy recommendation(s):    Continued therapy is recommended.  Rationale/Recommendations:  patient will benefit from continued skilled OP PT to address weakness, deconditioning & gait instability.

## 2023-05-31 ENCOUNTER — TELEPHONE (OUTPATIENT)
Dept: CARE COORDINATION | Facility: CLINIC | Age: 88
End: 2023-05-31
Payer: COMMERCIAL

## 2023-05-31 ENCOUNTER — TELEPHONE (OUTPATIENT)
Dept: ENDOCRINOLOGY | Facility: CLINIC | Age: 88
End: 2023-05-31
Payer: COMMERCIAL

## 2023-05-31 ENCOUNTER — PATIENT OUTREACH (OUTPATIENT)
Dept: CARE COORDINATION | Facility: CLINIC | Age: 88
End: 2023-05-31
Payer: COMMERCIAL

## 2023-05-31 LAB
ALBUMIN SERPL BCG-MCNC: 3.1 G/DL (ref 3.5–5.2)
ALBUMIN UR-MCNC: NEGATIVE MG/DL
ALP SERPL-CCNC: 89 U/L (ref 35–104)
ALT SERPL W P-5'-P-CCNC: 26 U/L (ref 10–35)
APPEARANCE UR: CLEAR
AST SERPL W P-5'-P-CCNC: 40 U/L (ref 10–35)
BACTERIA #/AREA URNS HPF: ABNORMAL /HPF
BILIRUB DIRECT SERPL-MCNC: <0.2 MG/DL (ref 0–0.3)
BILIRUB SERPL-MCNC: 0.6 MG/DL
BILIRUB UR QL STRIP: NEGATIVE
CALCIUM SERPL-MCNC: 10 MG/DL (ref 8.2–9.6)
COLOR UR AUTO: YELLOW
DEPRECATED CALCIDIOL+CALCIFEROL SERPL-MC: 69 UG/L (ref 20–75)
GLUCOSE UR STRIP-MCNC: NEGATIVE MG/DL
HGB UR QL STRIP: ABNORMAL
KETONES UR STRIP-MCNC: NEGATIVE MG/DL
LEUKOCYTE ESTERASE UR QL STRIP: NEGATIVE
NITRATE UR QL: NEGATIVE
PH UR STRIP: 5.5 [PH] (ref 5–7)
PHOSPHATE SERPL-MCNC: 4.2 MG/DL (ref 2.5–4.5)
PROT SERPL-MCNC: 6.5 G/DL (ref 6.4–8.3)
PTH-INTACT SERPL-MCNC: 33 PG/ML (ref 15–65)
RBC #/AREA URNS AUTO: ABNORMAL /HPF
SP GR UR STRIP: 1.01 (ref 1–1.03)
T3 SERPL-MCNC: 264 NG/DL (ref 85–202)
T4 FREE SERPL-MCNC: 5.3 NG/DL (ref 0.9–1.7)
TSH SERPL DL<=0.005 MIU/L-ACNC: <0.01 UIU/ML (ref 0.3–4.2)
UROBILINOGEN UR STRIP-ACNC: 0.2 E.U./DL
WBC #/AREA URNS AUTO: ABNORMAL /HPF

## 2023-05-31 NOTE — TELEPHONE ENCOUNTER
Provider: June Acuña MD Department: [facility] ENDO DIABETES     Visit Disposition    Dispositions Check-out Note   0 Return in about 2 months (around 7/30/2023). Thyroid US   Labs one month   RTC 2 months - OK to use return WILFREDO      Phone call went straight to voicemail and writer was unable to leave a message. If patient calls back, please assist in scheduling follow up below per check out notes above.  Left Voicemail (1st Attempt) for the patient to call back and schedule the following:    Appointment type: Return Endocrine  Provider: Dr. Acuña  Return date: Around 7/30/23  Specialty phone number: [phone]  Additional appointment(s) needed: Labs, Imaging  Additonal Notes: Ok to use WILFREDO slot per note above from Dr. Arianne DUKE, Virtual Visit Facilitator 10:40 AM May 31, 2023

## 2023-05-31 NOTE — PROGRESS NOTES
Clinic Care Coordination Contact  Care Team Conversations    Writer reached and spoke with patient's daughter, Aida, introduced self and explained reason for call. Madhuri shares that she did speak with someone from Dr Crowley's office this morning, but still not really sure where things stand as far as getting home care for patient.    Madhuri shares that she works for [facility], although she is new, would like an order sent there if possible. Writer let's her know they can do this.    Home Care Referral sent to Kimi @ [phone]  [facility] home care: states they never recvd--will resend *Declined 6/1  [facility]-Declined--at capacity  [facility]-Accepts--6/1-Will call patient   [facility]-    TAMARA Birch   Social Work Clinic Care Coordinator   [facility]  [facility]  PH: [phone]  [email or web address]

## 2023-05-31 NOTE — TELEPHONE ENCOUNTER
RN called pt's phone number- unable to reach patient or leave voicemail. RN called patient's cellphone number and reached patient's daughter, Vidal. Pt's daughter vidal and other siblings (patient's children) are staying with patient 24-7 as pt is essentially homebound. They are helping to oversee all of her medications and all other adjustments to being back home. Community paramedics was offered, though pt's daughter states that they primarily need PT and OT as pt is weak. Daughter is a HC PT and states that she does not believe that outpatient PT is feasible for the patient, and she needs HC. Daughter has tried doing some exercises and walks with her, but pt is resistant, and believes that someone else coming in could be more beneficial for the patient.     When they left the hospital, they were under the impression someone from  would be calling to help them schedule a visit, and were not aware that HC had not accepted the patient from any agency. They did receive a call yesterday informing them that she did not qualify at another agency due to staffing.     RN shared with patient's daughter the places that HC information had been faxed to and the places that were denied. RN shared with pt's daughter that if they have any other leads for HC services, that they should let us know so we can fax information.     Routed to Sherin Gonzalez RN Weekend Care Coordinator, and Dr. Macias to update and see what more can be done for patient at this time.     JAMEL LYLES RN on 5/31/2023 at 11:18 AM

## 2023-05-31 NOTE — PATIENT INSTRUCTIONS
I will send the results on [employer] once they come in    Labs in one month   We will decide if we need sooner labs than one months after we see the results from today    Thyroid ultrasound ordered ([id number] to schedule)

## 2023-05-31 NOTE — LETTER
Please call Zandra @ [phone]. Thank you!          Patient Information        Patient Name   Isadora Mendez ([id number]) Legal Sex   Female    3/9/1931         Patient Demographics        Address      [facility] [address] Phone   [phone] (Home) *Preferred*   [phone] (Mobile) E-mail Address   [email or web address]         Basic Information        Date Of Birth   3/9/1931 Legal Sex   Female Race   White Ethnic Group   Not  or  Preferred Language   English       PCP and Center    Primary Care Provider  Phone [place]     Jovana Macias [phone] [facility] [address], [facility] [address]         Patient Contacts        Name Relation Home Work Mobile        Florina Doyle Daughter [phone]         Aida Dinh Daughter   [phone]         Nicol Rahman Daughter   [phone]            Documents on File         Status Date Received Description        Documents for the Patient       Privacy Notice - [place] Received 05/10/11          Insurance Card   3/9/1931         External Medication Information Consent            Patient ID Scan Refused 23          Consent for Services - Hospital/Clinic Received () 05/15/12          HIM MILES Authorization - File Only  12 AUTHORIZATION FOR RELEASE OF PROTECTED HEALTH INFORMATION         Consent for EHR Access  13 Copied from existing Consent for services - IP/ED collected on 2012         [facility] Specified Other            Consent for Services - Hospital/Clinic Received () 13          Consent for Services - Hospital/Clinic  () 13 CONSENT FOR SERVICE         Research  14 RESEARCH DOCUMENT         Consent for Services - Hospital/Clinic Received () 10/27/14          Consent for Services - Hospital/Clinic  () 10/27/14 CONSENT FOR SERVICE         HIM MILES Authorization - File Only  01/08/15 [facility]          Consent for Services/Privacy Notice - Hospital/Clinic Received () 16          Consent for Services/Privacy Notice - Hospital/Clinic  () 16 CONSENT FOR SERVICES/PRIVACY NOTICE - HOSPITAL/CLI         Consent for Services - [facility] Received () 20          Consent for Services/Privacy Notice - Hospital/Clinic-Esign            Consent for Services/Privacy Notice - Hospital/Clinic  () 17 CONSENT FOR SERVICE         Care Everywhere Prospective Auth Received 17          Consent for Services/Privacy Notice - Hospital/Clinic Received () 17          Consent for Services - Hospital and Clinic Received 18          HIE Auth Received 18          General Consent Received () 20          General Consent Virtual            HIM MILES Authorization Received 21 MILES AUTH FOR RELEASEID [id number]         General Consent Received 22          HIM MLIES Authorization Received 22 MILES AUTH FOR RELEASEID [id number]         HIM MILES Authorization  23 WOLF MACIAS         General Consent Received 23          HIM Release of Information Output  (Deleted) 21 Requested records         External After Visit Summary   Recommendations for the Outpatient Provider         External After Visit Summary   AVS Required Info         External After Visit Summary   After Visit Summary         External H&P Note   Hospital Encounter         External Lab   SCAN CORRESP-LABORATORY RESULTS         External Cardiology Imaging   SCAN CORRESP-EKG RESULTS         External H&P Note   Hospital Encounter         External Misc Clinical   Hospital Encounter         External Operative Note   18         HIM Release of Information Output  22 Requested records         HIM Release of Information Output  (Deleted) 23 Requested records         Patient Photo   Photo of Patient            Admission Information          Current Information         Attending Provider Admitting Provider Admission Type Admission Status           Unknown Status                     Admission Date/Time Discharge Date Hospital Service Auth/Cert Status                                  Hospital Area Unit Room/Bed                                             Admission        Complaint       None           Hospital Account      Not on file                                              Documentation of Face to Face and Certification for Home Health Services     I attest that I saw or will see Isadora Mendez on this date:  5/27/2023     This encounter with the patient was in whole, or in part, for medical condition, which is the primary reason for Home Health Care:       Patient Active Problem List   Diagnosis    Hypertension    Hyperlipidemia LDL goal <100    Paroxysmal A-fib (H)    Cardiac pacemaker, Medtronic, Dual Chamber, NOT dependent    Chronotropic incompetence    Atrioventricular block, incomplete    Presbyopia    Myopia    Glaucoma suspect of right eye    Fuchs' corneal dystrophy    Chronic cholecystitis    Hyperthyroidism    Dyspnea on exertion    Pleural effusion on right    Elevated brain natriuretic peptide (BNP) level    Dysequilibrium    Nausea and vomiting, unspecified vomiting type    Goiter    Itching    Change in voice      I certify that, based on my findings, the following services are medically necessary Home Health Services: Physical Therapy   Therapeutic Exercise  Range of Motion     Additional services needed:              Physician/Provider to provide follow up care: Provider to follow patient: DEAN GREENE [[id number]]        Please be aware that coverage of these services is subject to the terms and limitations of your health insurance plan.  Call member services at your health plan with any benefit or coverage questions.  _______________________________________________________________________  Authorized by:  Janet Sanchez MD       PLEASE EVALUATE AND  TREAT (Evaluation timeline is 24 - 48 hrs. Please call if there is need for a variance to this timeline).     Medications:         Current Outpatient Medications   Medication Sig Dispense Refill    aspirin 81 MG tablet Take 1 tablet by mouth daily.        co-enzyme Q-10 100 MG CAPS capsule Take 100 mg by mouth        diclofenac (VOLTAREN) 1 % topical gel Apply 2 g topically 4 times daily as needed for other (Joint and muscle pain as needed) 50 g 0    famotidine (PEPCID) 20 MG tablet Take 1 tablet (20 mg) by mouth 2 times daily 180 tablet 3    furosemide (LASIX) 20 MG tablet Take 1 tablet (20 mg) by mouth daily 60 tablet 0    lisinopril (ZESTRIL) 20 MG tablet Take 1 tablet (20 mg) by mouth daily 60 tablet 0    methimazole (TAPAZOLE) 10 MG tablet Take 1 tablet (10 mg) by mouth 2 times daily 120 tablet 0    metoprolol succinate ER (TOPROL XL) 100 MG 24 hr tablet Take 1 tablet (100 mg) by mouth daily Use with 50 mg tab in the pm 90 tablet 3    metoprolol succinate ER (TOPROL XL) 50 MG 24 hr tablet Take 1 tablet (50 mg) by mouth every evening Use with 100mg tab in the am 90 tablet 3    Nutritional Supplements (PYCNOGENOL) 300-30 MG CAPS per capsule Take 1 capsule by mouth daily (Patient not taking: Reported on 5/30/2023)        ondansetron (ZOFRAN) 4 MG tablet Take 1 tablet (4 mg) by mouth every 6 hours as needed for nausea or vomiting 30 tablet 0    spironolactone (ALDACTONE) 25 MG tablet Take 0.5 tablets (12.5 mg) by mouth daily 30 tablet 0    Vitamin D, Cholecalciferol, 1000 units TABS Take 1 tablet by mouth daily (Patient not taking: Reported on 5/30/2023)          Problems:      Patient Active Problem List   Diagnosis    Hypertension    Hyperlipidemia LDL goal <100    Paroxysmal A-fib (H)    Cardiac pacemaker, Medtronic, Dual Chamber, NOT dependent    Chronotropic incompetence    Atrioventricular block, incomplete    Presbyopia    Myopia    Glaucoma suspect of right eye    Fuchs' corneal dystrophy    Chronic  cholecystitis    Hyperthyroidism    Dyspnea on exertion    Pleural effusion on right    Elevated brain natriuretic peptide (BNP) level    Dysequilibrium    Nausea and vomiting, unspecified vomiting type    Goiter    Itching    Change in voice      Diet:  Orders Placed This Encounter      Diet        Code Status:    Code Status: Full Code     Allergies:  Patient has no known allergies.             Order  Home Care Referral [9046.001] (Order [id number])  Referral Details    Referred By  Referred To   Nathaly Rapp MD   [facility]   [place] [address]   Phone: [phone]   Fax: [phone]    Diagnoses: Chronic cholecystitis   Order: Adult General Surg Referral    [facility]   [address]   4th Floor   [facility] [address]   Phone: [phone]   Fax: [phone]      Comment: Please be aware that coverage of these services is subject to the terms and limitations of your health insurance plan.  Call member services at your health plan with any benefit or coverage questions.   Please call to schedule your appointment           Janet Sanchez MD   [address] [place] [address]   Phone: [phone]   Fax: [phone]    Diagnoses: Hyperthyroidism   Order: Adult Endocrinology  Referral       Comment: Please be aware that coverage of these services is subject to the terms and limitations of your health insurance plan.  Call member services at your health plan with any benefit or coverage questions.   [facility] will call you to coordinate your care as prescribed by the provider.  If you don t hear from a representative within 2 business days, please call [phone].        Janet Sanchez MD   [address] [place] [address]   Phone: [phone]   Fax: [phone]    Diagnoses: Diastolic heart failure, unspecified HF chronicity (H)   Order: Physical Therapy Referral       Comment: Please be aware that  coverage of these services is subject to the terms and limitations of your health insurance plan.  Call member services at your health plan with any benefit or coverage questions.   If you have not heard from the scheduling office within 2 business days, please call [phone] for  [place], [phone] for [place] and [phone] for [place].        Janet Sanchez MD   [address] [facility] [address]   [facility] [address]   Phone: [phone]   Fax: [phone]    Diagnoses: Diastolic heart failure, unspecified HF chronicity (H)   Order: Home Care Referral       Comment: Your provider has ordered home health services. If you have not been contacted within 2 days of your discharge please call the selected Home Care agency listed on your Discharge document.  If a Home Care agency is NOT listed, please call [phone].     Patient Name   Isadora Mendez MRN   [id number] Legal Sex   Female    3/9/1931     Patient Demographics    Address   [address] [place] [address] Phone   [phone] (Home) *Preferred*   [phone] (Mobile) E-mail Address   [email or web address]     Base CPT Code (Reference Only)    Code CPT Chargeables   9046.001 9046      Existing Charges    Charge Line Charge Code Status Charge Trigger Charge Type   None          Order Information    Order Date/Time Release Date/Time Start Date/Time End Date/Time   23 10:53 AM None 2023 None     Order Details    Frequency Duration Priority Order Class   None None Routine: Next available opening  Home Care     Order Providers    Authorizing Provider Encounter Provider   Janet Sanchez MD None     ABN Associated with this Order    There is no ABN associated with this order.                    Ordering Provider's NPI: [id number]  Janet Sanchez    Home Care Referral [[id number]]    Electronically signed by: Dustin Stephens MD on 23 2576 Status: Active   Ordering user: Dustin Stephens MD 23 [id number]  Ordering provider: Dustin Stephens MD     Order History  Outpatient  Date/Time Action Taken User Additional Information   05/27/23 1053 Sign Dustin Stephens MD      Comments    Your provider has ordered home health services. If you have not been contacted within 2 days of your discharge please call the selected Home Care agency listed on your Discharge document.  If a Home Care agency is NOT listed, please call [phone].            Order Questions    Question Answer   Reason for Referral: Physical Therapy   Note: Must select at least one of Skilled Nursing, Physical Therapy, and/or Speech Therapy in addition to any other services.   Physical Therapy Eval and Treat for: Therapeutic Exercise    Range of Motion   Is the patient homebound? No   I attest that I saw or will see the patient on this date: 5/27/2023   Provider to follow patient DEAN GREENE BEN              Reference Links                     Associated Diagnoses    Diastolic heart failure, unspecified HF chronicity (H) [I50.30]         Source Order Set    Order Set Name Order ID   Discharge to Home, SNF or Other Facility [id number]         Collection Information              Encounter    View Encounter              Order Report    View Order Information         Lab and Collection    Home Care Referral (Order: [id number]) - 5/27/2023      External System: External ID:    [id number]         Order Requisition    Home Care Referral (Order #[id number]) on 5/27/23       Reprint Order Requisition    Home Care Referral (Order #[id number]) on 5/27/23       Tracking Links    Cosign Tracking Order Transmittal Tracking

## 2023-05-31 NOTE — LETTER
Patient Information        Patient Name   Isadora Mendez ([id number]) Legal Sex   Female    3/9/1931         Patient Demographics        Address    [address] [address] Phone   [phone] (Home) *Preferred*   [phone] (Mobile) E-mail Address   [email or web address]         Basic Information        Date Of Birth   3/9/1931 Legal Sex   Female Race   White Ethnic Group   Not  or  Preferred Language   English       PCP and Center    Primary Care Provider  Phone [place]     Jovana Macias [phone] [facility] FLOOR 4, [facility] [address]         Patient Contacts        Name Relation Home Work Mobile        Florina Doyle Daughter [phone]         Aida Dinh Daughter   [phone]         Nicol Rahman Daughter   [phone]            Documents on File         Status Date Received Description        Documents for the Patient       Privacy Notice - [place] Received 05/10/11          Insurance Card   3/9/1931         External Medication Information Consent            Patient ID Scan Refused 23          Consent for Services - Hospital/Clinic Received () 05/15/12          HIM MILES Authorization - File Only  12 AUTHORIZATION FOR RELEASE OF PROTECTED HEALTH INFORMATION         Consent for EHR Access  13 Copied from existing Consent for services - IP/ED collected on 2012         [facility] Specified Other            Consent for Services - Hospital/Clinic Received () 13          Consent for Services - Hospital/Clinic  () 13 CONSENT FOR SERVICE         Research  14 RESEARCH DOCUMENT         Consent for Services - Hospital/Clinic Received () 10/27/14          Consent for Services - Hospital/Clinic  () 10/27/14 CONSENT FOR SERVICE         HIM MILES Authorization - File Only  01/08/15 [facility]         Consent for Services/Privacy Notice -  Hospital/Clinic Received () 16          Consent for Services/Privacy Notice - Hospital/Clinic  () 16 CONSENT FOR SERVICES/PRIVACY NOTICE - HOSPITAL/CLI         Consent for Services - [facility] Received () 20          Consent for Services/Privacy Notice - Hospital/Clinic-Esign            Consent for Services/Privacy Notice - Hospital/Clinic  () 17 CONSENT FOR SERVICE         Care Everywhere Prospective Auth Received 17          Consent for Services/Privacy Notice - Hospital/Clinic Received () 17          Consent for Services - Hospital and Clinic Received 18          HIE Auth Received 18          General Consent Received () 20          General Consent Virtual            HIM MILES Authorization Received 21 MILES AUTH FOR RELEASEID [id number]         General Consent Received 22          HIM MILES Authorization Received 22 MILES AUTH FOR RELEASEID [id number]         HIM MILES Authorization  23 WOLF MACIAS         General Consent Received 23          HIM Release of Information Output  (Deleted) 21 Requested records         External After Visit Summary   Recommendations for the Outpatient Provider         External After Visit Summary   AVS Required Info         External After Visit Summary   After Visit Summary         External H&P Note   Hospital Encounter         External Lab   SCAN CORRESP-LABORATORY RESULTS         External Cardiology Imaging   SCAN CORRESP-EKG RESULTS         External H&P Note   Hospital Encounter         External Misc Clinical   Hospital Encounter         External Operative Note   18         HIM Release of Information Output  22 Requested records         HIM Release of Information Output  (Deleted) 23 Requested records         Patient Photo   Photo of Patient            Admission Information          Current Information        Attending Provider Admitting Provider  Admission Type Admission Status           Unknown Status                     Admission Date/Time Discharge Date Hospital Service Auth/Cert Status                                  Hospital Area Unit Room/Bed                                             Admission        Complaint       None           Hospital Account      Not on file                          Documentation of Face to Face and Certification for Home Health Services     I attest that I saw or will see Isadora Mendez on this date:  5/27/2023     This encounter with the patient was in whole, or in part, for medical condition, which is the primary reason for Home Health Care:       Patient Active Problem List   Diagnosis    Hypertension    Hyperlipidemia LDL goal <100    Paroxysmal A-fib (H)    Cardiac pacemaker, Medtronic, Dual Chamber, NOT dependent    Chronotropic incompetence    Atrioventricular block, incomplete    Presbyopia    Myopia    Glaucoma suspect of right eye    Fuchs' corneal dystrophy    Chronic cholecystitis    Hyperthyroidism    Dyspnea on exertion    Pleural effusion on right    Elevated brain natriuretic peptide (BNP) level    Dysequilibrium    Nausea and vomiting, unspecified vomiting type    Goiter    Itching    Change in voice      I certify that, based on my findings, the following services are medically necessary Home Health Services: Physical Therapy   Therapeutic Exercise  Range of Motion     Additional services needed:              Physician/Provider to provide follow up care: Provider to follow patient: DEAN GREENE [[id number]]        Please be aware that coverage of these services is subject to the terms and limitations of your health insurance plan.  Call member services at your health plan with any benefit or coverage questions.  _______________________________________________________________________  Authorized by:  Janet Sanchez MD       PLEASE EVALUATE AND TREAT (Evaluation timeline is 24 - 48 hrs. Please call if  there is need for a variance to this timeline).     Medications:         Current Outpatient Medications   Medication Sig Dispense Refill    aspirin 81 MG tablet Take 1 tablet by mouth daily.        co-enzyme Q-10 100 MG CAPS capsule Take 100 mg by mouth        diclofenac (VOLTAREN) 1 % topical gel Apply 2 g topically 4 times daily as needed for other (Joint and muscle pain as needed) 50 g 0    famotidine (PEPCID) 20 MG tablet Take 1 tablet (20 mg) by mouth 2 times daily 180 tablet 3    furosemide (LASIX) 20 MG tablet Take 1 tablet (20 mg) by mouth daily 60 tablet 0    lisinopril (ZESTRIL) 20 MG tablet Take 1 tablet (20 mg) by mouth daily 60 tablet 0    methimazole (TAPAZOLE) 10 MG tablet Take 1 tablet (10 mg) by mouth 2 times daily 120 tablet 0    metoprolol succinate ER (TOPROL XL) 100 MG 24 hr tablet Take 1 tablet (100 mg) by mouth daily Use with 50 mg tab in the pm 90 tablet 3    metoprolol succinate ER (TOPROL XL) 50 MG 24 hr tablet Take 1 tablet (50 mg) by mouth every evening Use with 100mg tab in the am 90 tablet 3    Nutritional Supplements (PYCNOGENOL) 300-30 MG CAPS per capsule Take 1 capsule by mouth daily (Patient not taking: Reported on 5/30/2023)        ondansetron (ZOFRAN) 4 MG tablet Take 1 tablet (4 mg) by mouth every 6 hours as needed for nausea or vomiting 30 tablet 0    spironolactone (ALDACTONE) 25 MG tablet Take 0.5 tablets (12.5 mg) by mouth daily 30 tablet 0    Vitamin D, Cholecalciferol, 1000 units TABS Take 1 tablet by mouth daily (Patient not taking: Reported on 5/30/2023)          Problems:      Patient Active Problem List   Diagnosis    Hypertension    Hyperlipidemia LDL goal <100    Paroxysmal A-fib (H)    Cardiac pacemaker, Medtronic, Dual Chamber, NOT dependent    Chronotropic incompetence    Atrioventricular block, incomplete    Presbyopia    Myopia    Glaucoma suspect of right eye    Fuchs' corneal dystrophy    Chronic cholecystitis    Hyperthyroidism    Dyspnea on exertion     Pleural effusion on right    Elevated brain natriuretic peptide (BNP) level    Dysequilibrium    Nausea and vomiting, unspecified vomiting type    Goiter    Itching    Change in voice      Diet:  Orders Placed This Encounter      Diet        Code Status:    Code Status: Full Code     Allergies:  Patient has no known allergies.             Order  Home Care Referral [9046.001] (Order [id number])  Referral Details    Referred By  Referred To   Nathaly Rapp MD   [facility]   [address]   [facility] [address]   Phone: [phone]   Fax: [phone]    Diagnoses: Chronic cholecystitis   Order: Adult General Surg Referral    [facility]   [address]   [address]   [facility] [address]   Phone: [phone]   Fax: [phone]      Comment: Please be aware that coverage of these services is subject to the terms and limitations of your health insurance plan.  Call member services at your health plan with any benefit or coverage questions.   Please call to schedule your appointment           Janet Sanchez MD   [address] [place] [address]   Phone: [phone]   Fax: [phone]    Diagnoses: Hyperthyroidism   Order: Adult Endocrinology  Referral       Comment: Please be aware that coverage of these services is subject to the terms and limitations of your health insurance plan.  Call member services at your health plan with any benefit or coverage questions.   [employer] will call you to coordinate your care as prescribed by the provider.  If you don t hear from a representative within 2 business days, please call [phone].        Janet Sanchez MD   [address] [place] [address]   Phone: [phone]   Fax: [phone]    Diagnoses: Diastolic heart failure, unspecified HF chronicity (H)   Order: Physical Therapy Referral       Comment: Please be aware that coverage of these services is subject to the terms and  limitations of your health insurance plan.  Call member services at your health plan with any benefit or coverage questions.   If you have not heard from the scheduling office within 2 business days, please call [phone] for  Aziza Deleon, [phone] for Emeli and [phone] for [place].        Janet Sanchez MD   [address]   [facility] [address]   Phone: [phone]   Fax: [phone]    Diagnoses: Diastolic heart failure, unspecified HF chronicity (H)   Order: Home Care Referral       Comment: Your provider has ordered home health services. If you have not been contacted within 2 days of your discharge please call the selected Home Care agency listed on your Discharge document.  If a Home Care agency is NOT listed, please call [phone].   Patient Name   Isadora Mendez MRN   [id number] Legal Sex   Female    3/9/1931       Patient Demographics    Address    [address] [address] Phone   [phone] (Home) *Preferred*   [phone] (Mobile) E-mail Address   [email or web address]     Base CPT Code (Reference Only)    Code CPT Chargeables   9046.001 9046      Existing Charges    Charge Line Charge Code Status Charge Trigger Charge Type   None          Order Information    Order Date/Time Release Date/Time Start Date/Time End Date/Time   23 10:53 AM None 2023 None     Order Details    Frequency Duration Priority Order Class   None None Routine: Next available opening  Home Care     Order Providers    Authorizing Provider Encounter Provider   Janet Sanchez MD None     ABN Associated with this Order    There is no ABN associated with this order.                  Ordering Provider's NPI: [id number]  Janet Sanchez      Home Care Referral [[id number]]    Electronically signed by: Dustin Stephens MD on 23 Status: Active   Ordering user: Dustin Stephens MD 23 Ordering provider: Dustin Stephens MD     Order  History  Outpatient  Date/Time Action Taken User Additional Information   05/27/23 1052 Sign Dustin Stephens MD      Comments    Your provider has ordered home health services. If you have not been contacted within 2 days of your discharge please call the selected Home Care agency listed on your Discharge document.  If a Home Care agency is NOT listed, please call [phone].            Order Questions    Question Answer   Reason for Referral: Physical Therapy   Note: Must select at least one of Skilled Nursing, Physical Therapy, and/or Speech Therapy in addition to any other services.   Physical Therapy Eval and Treat for: Therapeutic Exercise    Range of Motion   Is the patient homebound? No   I attest that I saw or will see the patient on this date: 5/27/2023   Provider to follow patient DEAN GREENE            Reference Links               Associated Diagnoses    Diastolic heart failure, unspecified HF chronicity (H) [I50.30]         Source Order Set    Order Set Name Order ID   Discharge to Home, SNF or Other Facility [id number]     Collection Information        Encounter    View Encounter            Order Report    View Order Information     Lab and Collection    Home Care Referral (Order: [id number]) - 5/27/2023  External System: External ID:   HE [id number] [id number]       Order Requisition    Home Care Referral (Order #[id number]) on 5/27/23     Reprint Order Requisition    Home Care Referral (Order #[id number]) on 5/27/23     Tracking Links    [employer]ign Tracking Order Transmittal Tracking

## 2023-05-31 NOTE — TELEPHONE ENCOUNTER
I tried to add on [facility] . I am not sure if I succeeded  In this connecting with the laboratory.   Can you check if they got it and can do it?  IF not, she will need order from her PCP and recollection.    June Acuña MD

## 2023-05-31 NOTE — LETTER
Patient Information        Patient Name   Isadora Mendez ([id number]) Legal Sex   Female    3/9/1931         Patient Demographics        Address     [address] [facility] [address] Phone   [phone] (Home) *Preferred*   [phone] (Mobile) E-mail Address   [email or web address]         Basic Information        Date Of Birth   3/9/1931 Legal Sex   Female Race   White Ethnic Group   Not  or  Preferred Language   English       PCP and Center    Primary Care Provider  Phone Datil     Jovana Macias [phone] [address] [facility] [address], [facility] [address]         Patient Contacts        Name Relation Home Work Mobile        Florina Doyle Daughter [phone]         Aida Dinh Daughter   [phone]         Nicol Rahman Daughter   [phone]            Documents on File         Status Date Received Description        Documents for the Patient       Privacy Notice - [place] Received 05/10/11          Insurance Card   3/9/1931         External Medication Information Consent            Patient ID Scan Refused 23          Consent for Services - Hospital/Clinic Received () 05/15/12          HIM MILES Authorization - File Only  12 AUTHORIZATION FOR RELEASE OF PROTECTED HEALTH INFORMATION         Consent for EHR Access  13 Copied from existing Consent for services - IP/ED collected on 2012         [facility] Specified Other            Consent for Services - Hospital/Clinic Received () 13          Consent for Services - Hospital/Clinic  () 13 CONSENT FOR SERVICE         Research  14 RESEARCH DOCUMENT         Consent for Services - Hospital/Clinic Received () 10/27/14          Consent for Services - Hospital/Clinic  () 10/27/14 CONSENT FOR SERVICE         HIM MILES Authorization - File Only  01/08/15 [facility]         Consent for Services/Privacy Notice - Hospital/Clinic Received  () 16          Consent for Services/Privacy Notice - Hospital/Clinic  () 16 CONSENT FOR SERVICES/PRIVACY NOTICE - HOSPITAL/CLI         Consent for Services - [facility] Received () 20          Consent for Services/Privacy Notice - Hospital/Clinic-Esign            Consent for Services/Privacy Notice - Hospital/Clinic  () 17 CONSENT FOR SERVICE         Care Everywhere Prospective Auth Received 17          Consent for Services/Privacy Notice - Hospital/Clinic Received () 17          Consent for Services - Hospital and Clinic Received 18          HIE Auth Received 18          General Consent Received () 20          General Consent Virtual            HIM MILES Authorization Received 21 MILES AUTH FOR RELEASEID [id number]         General Consent Received 22          HIM MILES Authorization Received 22 MILES AUTH FOR RELEASEID [id number]         HIM MILES Authorization  23 WOLF MACIAS         General Consent Received 23          HIM Release of Information Output  (Deleted) 21 Requested records         External After Visit Summary   Recommendations for the Outpatient Provider         External After Visit Summary   AVS Required Info         External After Visit Summary   After Visit Summary         External H&P Note   Hospital Encounter         External Lab   SCAN CORRESP-LABORATORY RESULTS         External Cardiology Imaging   SCAN CORRESP-EKG RESULTS         External H&P Note   Hospital Encounter         External Misc Clinical   Hospital Encounter         External Operative Note   18         HIM Release of Information Output  22 Requested records         HIM Release of Information Output  (Deleted) 23 Requested records         Patient Photo   Photo of Patient            Admission Information          Current Information        Attending Provider Admitting Provider Admission Type Admission Status            Unknown Status                     Admission Date/Time Discharge Date Hospital Service Auth/Cert Status                                  Hospital Area Unit Room/Bed                                             Admission        Complaint       None           Hospital Account      Not on file        Printed on 5/31/23 11:42 AM Page                                  Documentation of Face to Face and Certification for Home Health Services     I attest that I saw or will see Isadora Mendez on this date:  5/27/2023     This encounter with the patient was in whole, or in part, for medical condition, which is the primary reason for Home Health Care:       Patient Active Problem List   Diagnosis    Hypertension    Hyperlipidemia LDL goal <100    Paroxysmal A-fib (H)    Cardiac pacemaker, Medtronic, Dual Chamber, NOT dependent    Chronotropic incompetence    Atrioventricular block, incomplete    Presbyopia    Myopia    Glaucoma suspect of right eye    Fuchs' corneal dystrophy    Chronic cholecystitis    Hyperthyroidism    Dyspnea on exertion    Pleural effusion on right    Elevated brain natriuretic peptide (BNP) level    Dysequilibrium    Nausea and vomiting, unspecified vomiting type    Goiter    Itching    Change in voice      I certify that, based on my findings, the following services are medically necessary Home Health Services: Physical Therapy   Therapeutic Exercise  Range of Motion     Additional services needed:              Physician/Provider to provide follow up care: Provider to follow patient: DEAN GREENE [[id number]]        Please be aware that coverage of these services is subject to the terms and limitations of your health insurance plan.  Call member services at your health plan with any benefit or coverage questions.  _______________________________________________________________________  Authorized by:  Janet Sanchez MD       PLEASE EVALUATE AND TREAT (Evaluation timeline is 24 - 48 hrs.  Please call if there is need for a variance to this timeline).     Medications:         Current Outpatient Medications   Medication Sig Dispense Refill    aspirin 81 MG tablet Take 1 tablet by mouth daily.        co-enzyme Q-10 100 MG CAPS capsule Take 100 mg by mouth        diclofenac (VOLTAREN) 1 % topical gel Apply 2 g topically 4 times daily as needed for other (Joint and muscle pain as needed) 50 g 0    famotidine (PEPCID) 20 MG tablet Take 1 tablet (20 mg) by mouth 2 times daily 180 tablet 3    furosemide (LASIX) 20 MG tablet Take 1 tablet (20 mg) by mouth daily 60 tablet 0    lisinopril (ZESTRIL) 20 MG tablet Take 1 tablet (20 mg) by mouth daily 60 tablet 0    methimazole (TAPAZOLE) 10 MG tablet Take 1 tablet (10 mg) by mouth 2 times daily 120 tablet 0    metoprolol succinate ER (TOPROL XL) 100 MG 24 hr tablet Take 1 tablet (100 mg) by mouth daily Use with 50 mg tab in the pm 90 tablet 3    metoprolol succinate ER (TOPROL XL) 50 MG 24 hr tablet Take 1 tablet (50 mg) by mouth every evening Use with 100mg tab in the am 90 tablet 3    Nutritional Supplements (PYCNOGENOL) 300-30 MG CAPS per capsule Take 1 capsule by mouth daily (Patient not taking: Reported on 5/30/2023)        ondansetron (ZOFRAN) 4 MG tablet Take 1 tablet (4 mg) by mouth every 6 hours as needed for nausea or vomiting 30 tablet 0    spironolactone (ALDACTONE) 25 MG tablet Take 0.5 tablets (12.5 mg) by mouth daily 30 tablet 0    Vitamin D, Cholecalciferol, 1000 units TABS Take 1 tablet by mouth daily (Patient not taking: Reported on 5/30/2023)          Problems:      Patient Active Problem List   Diagnosis    Hypertension    Hyperlipidemia LDL goal <100    Paroxysmal A-fib (H)    Cardiac pacemaker, Medtronic, Dual Chamber, NOT dependent    Chronotropic incompetence    Atrioventricular block, incomplete    Presbyopia    Myopia    Glaucoma suspect of right eye    Fuchs' corneal dystrophy    Chronic cholecystitis    Hyperthyroidism    Dyspnea on  exertion    Pleural effusion on right    Elevated brain natriuretic peptide (BNP) level    Dysequilibrium    Nausea and vomiting, unspecified vomiting type    Goiter    Itching    Change in voice      Diet:  Orders Placed This Encounter      Diet        Code Status:    Code Status: Full Code     Allergies:  Patient has no known allergies.             Order  Home Care Referral [9046.001] (Order [id number])  Referral Details    Referred By  Referred To   Nathaly Rapp MD   [facility]   [address] [place] [address] [address]   [facility] [address]   Phone: [phone]   Fax: [phone]    Diagnoses: Chronic cholecystitis   Order: Adult General Surg Referral    [facility]   [address]   4th Floor   [facility] [address]   Phone: [phone]   Fax: [phone]      Comment: Please be aware that coverage of these services is subject to the terms and limitations of your health insurance plan.  Call member services at your health plan with any benefit or coverage questions.   Please call to schedule your appointment           Janet Sanchez MD   [address] [place] [address]   Phone: [phone]   Fax: [phone]    Diagnoses: Hyperthyroidism   Order: Adult Endocrinology  Referral       Comment: Please be aware that coverage of these services is subject to the terms and limitations of your health insurance plan.  Call member services at your health plan with any benefit or coverage questions.   [facility] will call you to coordinate your care as prescribed by the provider.  If you don t hear from a representative within 2 business days, please call [phone].        Janet Sanchez MD   [address] [place] [address]   Phone: [phone]   Fax: [phone]    Diagnoses: Diastolic heart failure, unspecified HF chronicity (H)   Order: Physical Therapy Referral       Comment: Please be aware that coverage of these services is subject to the  terms and limitations of your health insurance plan.  Call member services at your health plan with any benefit or coverage questions.   If you have not heard from the scheduling office within 2 business days, please call [phone] for  Aziza Deleon, [phone] for [place] and [phone] for [place].        Janet Sanchez MD   [address] [facility] [address]   [facility] [address]   Phone: [phone]   Fax: [phone]    Diagnoses: Diastolic heart failure, unspecified HF chronicity (H)   Order: Home Care Referral       Comment: Your provider has ordered home health services. If you have not been contacted within 2 days of your discharge please call the selected Home Care agency listed on your Discharge document.  If a Home Care agency is NOT listed, please call [phone].     Patient Name   Isadora Mendez MRN   [id number] Legal Sex   Female    3/9/1931     Patient Demographics    Address    [address] [address] Phone   [phone] (Home) *Preferred*   [phone] (Mobile) E-mail Address   [email or web address]uling     Base CPT Code (Reference Only)    Code CPT Chargeables   9046.001 9046      Existing Charges    Charge Line Charge Code Status Charge Trigger Charge Type   None          Order Information    Order Date/Time Release Date/Time Start Date/Time End Date/Time   23 10:53 AM None 2023 None     Order Details    Frequency Duration Priority Order Class   None None Routine: Next available opening  Home Care     Order Providers    Authorizing Provider Encounter Provider   Janet Sanchez MD None     ABN Associated with this Order    There is no ABN associated with this order.                    Ordering Provider's NPI: [id number]  Janet Sanchez    Home Care Referral [[id number]]    Electronically signed by: Dustin Stephens MD on 23 Status: Active   Ordering user: Dustin Stephens MD 23 Ordering provider: Dustin Stephens MD      Order History  Outpatient  Date/Time Action Taken User Additional Information   05/27/23 1051 Sign Dustin Stephens MD      Comments    Your provider has ordered home health services. If you have not been contacted within 2 days of your discharge please call the selected Home Care agency listed on your Discharge document.  If a Home Care agency is NOT listed, please call [phone].            Order Questions    Question Answer   Reason for Referral: Physical Therapy   Note: Must select at least one of Skilled Nursing, Physical Therapy, and/or Speech Therapy in addition to any other services.   Physical Therapy Eval and Treat for: Therapeutic Exercise    Range of Motion   Is the patient homebound? No   I attest that I saw or will see the patient on this date: 5/27/2023   Provider to follow patient DEAN GREENE              Reference Links                     Associated Diagnoses    Diastolic heart failure, unspecified HF chronicity (H) [I50.30]         Source Order Set    Order Set Name Order ID   Discharge to Home, SNF or Other Facility [id number]         Collection Information              Encounter    View Encounter              Order Report    View Order Information         Lab and Collection    Home Care Referral (Order: [id number]) - 5/27/2023      External System: External ID:   HE EAP [id number]         Order Requisition    Home Care Referral (Order #[id number]) on 5/27/23       Reprint Order Requisition    Home Care Referral (Order #[id number]) on 5/27/23       Tracking Links    [employer] Tracking Order Transmittal Tracking

## 2023-06-01 ENCOUNTER — MYC MEDICAL ADVICE (OUTPATIENT)
Dept: INTERNAL MEDICINE | Facility: CLINIC | Age: 88
End: 2023-06-01
Payer: COMMERCIAL

## 2023-06-01 LAB — TSI SER-ACNC: 2.5 TSI INDEX

## 2023-06-01 NOTE — PROGRESS NOTES
Clinic Care Coordination Contact  Care Team Conversations    Spoke with patient's daughter, Aida, informing [facility] was able to accept patient today for home care and will be reaching out soon.   Aida was grateful for this news.No other questions/concerns at this time.    Zandra Rahman HAN   Social Work Clinic Care Coordinator   [facility]  PH: [phone]  [email or web address]

## 2023-06-02 ENCOUNTER — OFFICE VISIT (OUTPATIENT)
Dept: INTERNAL MEDICINE | Facility: CLINIC | Age: 88
End: 2023-06-02
Payer: COMMERCIAL

## 2023-06-02 VITALS
HEART RATE: 69 BPM | WEIGHT: 118.4 LBS | OXYGEN SATURATION: 96 % | HEIGHT: 62 IN | DIASTOLIC BLOOD PRESSURE: 55 MMHG | BODY MASS INDEX: 21.79 KG/M2 | SYSTOLIC BLOOD PRESSURE: 155 MMHG

## 2023-06-02 DIAGNOSIS — L30.9 DERMATITIS: ICD-10-CM

## 2023-06-02 DIAGNOSIS — R11.2 NAUSEA AND VOMITING, UNSPECIFIED VOMITING TYPE: ICD-10-CM

## 2023-06-02 DIAGNOSIS — K82.8 THICKENING OF WALL OF GALLBLADDER: ICD-10-CM

## 2023-06-02 DIAGNOSIS — I50.30 DIASTOLIC HEART FAILURE, UNSPECIFIED HF CHRONICITY (H): ICD-10-CM

## 2023-06-02 DIAGNOSIS — B37.0 THRUSH: Primary | ICD-10-CM

## 2023-06-02 DIAGNOSIS — I48.0 PAROXYSMAL A-FIB (H): ICD-10-CM

## 2023-06-02 DIAGNOSIS — E05.90 HYPERTHYROIDISM: ICD-10-CM

## 2023-06-02 PROCEDURE — 99215 OFFICE O/P EST HI 40 MIN: CPT | Performed by: INTERNAL MEDICINE

## 2023-06-02 RX ORDER — SPIRONOLACTONE 25 MG/1
12.5 TABLET ORAL DAILY
Qty: 45 TABLET | Refills: 3 | Status: SHIPPED | OUTPATIENT
Start: 2023-06-02 | End: 2024-03-15

## 2023-06-02 RX ORDER — LISINOPRIL 20 MG/1
20 TABLET ORAL DAILY
Qty: 90 TABLET | Refills: 3 | Status: SHIPPED | OUTPATIENT
Start: 2023-06-02 | End: 2023-10-03

## 2023-06-02 RX ORDER — NYSTATIN 100000 U/G
CREAM TOPICAL 2 TIMES DAILY
Qty: 90 G | Refills: 3 | Status: SHIPPED | OUTPATIENT
Start: 2023-06-02 | End: 2023-09-22

## 2023-06-02 RX ORDER — NYSTATIN 100000/ML
500000 SUSPENSION, ORAL (FINAL DOSE FORM) ORAL 4 TIMES DAILY
Qty: 473 ML | Refills: 0 | Status: SHIPPED | OUTPATIENT
Start: 2023-06-02 | End: 2023-09-22

## 2023-06-02 RX ORDER — ONDANSETRON 4 MG/1
4 TABLET, FILM COATED ORAL EVERY 6 HOURS PRN
Qty: 60 TABLET | Refills: 3 | Status: SHIPPED | OUTPATIENT
Start: 2023-06-02 | End: 2023-09-22

## 2023-06-02 NOTE — PATIENT INSTRUCTIONS
- Consider COVID vaccine once you're feeling stronger  - Try metamucil or citracel for bowel movements

## 2023-06-02 NOTE — PROGRESS NOTES
Assessment & Plan   Hyperthyroidism  Following with Dr. Clemens  She is markedly more deconditioned and malnourished since hospital discharge  Strongly encouraged working on nutrition and continuing to build strength with home PT    Concern for aspiration  New issue with coughing after drinking thin liquids.  Concern for possible aspiration.  Also voice is weaker than previous  Will try to set up home SLP    Diastolic heart failure, unspecified HF chronicity (H)  Paroxysmal A-fib (H)  Upcoming appointment with Dr. Dixon  Currently euvolemic to hypovolemic  Will start monitoring weights at home and monitor for symptoms of dizziness  Check BMP in about 1 week with home care  - lisinopril (ZESTRIL) 20 MG tablet  Dispense: 90 tablet; Refill: 3  - spironolactone (ALDACTONE) 25 MG tablet  Dispense: 45 tablet; Refill: 3    Nausea and vomiting, unspecified vomiting type  Thickening of wall of gallbladder  Pushing nutrition  Follow-up with surgery for consideration of cholecystectomy in the future once more optimized meedically  - ondansetron (ZOFRAN) 4 MG tablet  Dispense: 60 tablet; Refill: 3    Thrush  - nystatin (MYCOSTATIN) 186467 UNIT/ML suspension  Dispense: 473 mL; Refill: 0    Dermatitis  - nystatin (MYCOSTATIN) 190964 UNIT/GM external cream  Dispense: 90 g; Refill: 3      HM:  Recommended COVID vaccine    RTC: Return in 3 weeks (on 6/23/2023) for 10:30am.  I spent a total of 48 minutes on the day of the visit.  Time was spent doing chart review, history and exam, documentation and further activities as noted above.    Jovana Macias MD      Chief Complaint   Isadora Mendez is a 89 year old female presents for the following health issues    History of Present Illness   Home care finally set up.  Supervision 24/7  Some delerium in the hospital  Still feeling weak and fatigued  Poor appetite  Has ensure  Soup from nearby co-op  Ongoing nausea with zofran 2-3 times per day  Trying to anticipate nausea  Soft  "stool incontinence.  Some dermatitis on buttocks  cavalon bar        Tobacco Use      Smoking status: Never      Smokeless tobacco: Never    Vaping Use      Vaping status: None    PHQ-2 Score:       5/30/2023     4:56 PM 3/16/2023    10:29 AM   PHQ-2 ( 1999 Pf[employer])   Q1: Little interest or pleasure in doing things  0   Q2: Feeling down, depressed or hopeless 0 0   PHQ-2 Score  0         5/30/2023    11:48 AM   Post Discharge Outreach   Admission Date 5/24/2023   Reason for Admission Thyrotoxicosis  Goiter  HFpEF  SSS s/p PM   pAFib (not on AC)  Cognitive decline  Generalized weakness  Peripheral neuropathy   HTN   Discharge Date 5/27/2023   Discharge Diagnosis Thyrotoxicosis  Goiter  HFpEF  SSS s/p PM   pAFib (not on AC)  Cognitive decline  Generalized weakness  Peripheral neuropathy   HTN   How are you doing now that you are home? \"Doing okay\"   How are your symptoms? (Red Flag symptoms escalate to triage hotline per guidelines) Improved   Do you feel your condition is stable enough to be safe at home until your provider visit? Yes   Does the patient have their discharge instructions?  Yes   Does the patient have questions regarding their discharge instructions?  No   Were you started on any new medications or were there changes to any of your previous medications?  Yes   Does the patient have all of their medications? Yes   Do you have questions regarding any of your medications?  No   Do you have all of your needed medical supplies or equipment (DME)?  (i.e. oxygen tank, CPAP, cane, etc.) Yes   Discharge follow-up appointment scheduled within 14 calendar days?  No     Hospital Follow-up Visit:    Hospital/Nursing Home/IP Rehab Facility: [facility]  Date of Admission: 5/24  Date of Discharge: 5/27  Reason(s) for Admission: hyperthyroidism    Was your hospitalization related to COVID-19? No   Problems taking medications regularly:  None  Medication changes since " "discharge: methimazole  Problems adhering to non-medication therapy:  None    Summary of hospitalization:  [facility] discharge summary reviewed  Diagnostic Tests/Treatments reviewed.  Follow up needed: endocrine and cardiology  Other Healthcare Providers Involved in Patient s Care:         Homecare  Update since discharge: improved.       Plan of care communicated with patient and family             ROS    Physical Exam   BP (!) 155/55 (BP Location: Right arm, Patient Position: Sitting, Cuff Size: Adult Small)   Pulse 69   Ht 1.585 m (5' 2.4\")   Wt 53.7 kg (118 lb 6.4 oz)   SpO2 96%   BMI 21.38 kg/m    Gen: no distress, thin appearing  Eyes: anicteric, normal extra-ocular movements   Cardiovascular: regular rate and rhythm, normal S1 and S2, no murmurs, rubs or gallops, peripheral pulses full and symmetric   Respiratory: clear to auscultation, no wheezes or crackles, normal breath sounds   Skin: no concerning lesions, no jaundice   Psychological: appropriate mood     Items reviewed:   Allergies  Meds            "

## 2023-06-02 NOTE — Clinical Note
She has home PT set up, but I think she would benefit from home speech therapy (new symptoms of aspiration) and home nursing (medication management and blood draws).  Can you contact her home care agency and see if these can be added?  Would also like to get a BMP drawn in 1-2 weeks with home care.  Thanks!

## 2023-06-02 NOTE — NURSING NOTE
"Isadora Mendez is a 89 year old female patient that presents today in clinic for the following:    Chief Complaint   Patient presents with     Follow Up     Pt here for 2 month follow up and would like to discuss hospital visit last week.     The patient's allergies and medications were reviewed as noted. A set of vitals were recorded as noted without incident: BP (!) 155/55 (BP Location: Right arm, Patient Position: Sitting, Cuff Size: Adult Small)   Pulse 69   Ht 1.585 m (5' 2.4\")   Wt 53.7 kg (118 lb 6.4 oz)   SpO2 96%   BMI 21.38 kg/m  . The patient does not have any other questions for the provider.    Kareen Silva, EMT at 11:39 AM on 6/2/2023  "

## 2023-06-04 ENCOUNTER — MEDICAL CORRESPONDENCE (OUTPATIENT)
Dept: HEALTH INFORMATION MANAGEMENT | Facility: CLINIC | Age: 88
End: 2023-06-04
Payer: COMMERCIAL

## 2023-06-04 LAB
MDC_IDC_LEAD_IMPLANT_DT: NORMAL
MDC_IDC_LEAD_IMPLANT_DT: NORMAL
MDC_IDC_LEAD_LOCATION: NORMAL
MDC_IDC_LEAD_LOCATION: NORMAL
MDC_IDC_LEAD_LOCATION_DETAIL_1: NORMAL
MDC_IDC_LEAD_LOCATION_DETAIL_1: NORMAL
MDC_IDC_LEAD_MFG: NORMAL
MDC_IDC_LEAD_MFG: NORMAL
MDC_IDC_LEAD_MODEL: NORMAL
MDC_IDC_LEAD_MODEL: NORMAL
MDC_IDC_LEAD_POLARITY_TYPE: NORMAL
MDC_IDC_LEAD_POLARITY_TYPE: NORMAL
MDC_IDC_LEAD_SERIAL: NORMAL
MDC_IDC_LEAD_SERIAL: NORMAL
MDC_IDC_LEAD_SPECIAL_FUNCTION: NORMAL
MDC_IDC_LEAD_SPECIAL_FUNCTION: NORMAL
MDC_IDC_MSMT_BATTERY_DTM: NORMAL
MDC_IDC_MSMT_BATTERY_REMAINING_LONGEVITY: 48 MO
MDC_IDC_MSMT_BATTERY_RRT_TRIGGER: 2.83
MDC_IDC_MSMT_BATTERY_STATUS: NORMAL
MDC_IDC_MSMT_BATTERY_VOLTAGE: 2.98 V
MDC_IDC_MSMT_LEADCHNL_RA_IMPEDANCE_VALUE: 247 OHM
MDC_IDC_MSMT_LEADCHNL_RA_IMPEDANCE_VALUE: 380 OHM
MDC_IDC_MSMT_LEADCHNL_RA_PACING_THRESHOLD_AMPLITUDE: 0.75 V
MDC_IDC_MSMT_LEADCHNL_RA_PACING_THRESHOLD_PULSEWIDTH: 0.4 MS
MDC_IDC_MSMT_LEADCHNL_RA_SENSING_INTR_AMPL: 0.75 MV
MDC_IDC_MSMT_LEADCHNL_RA_SENSING_INTR_AMPL: 0.75 MV
MDC_IDC_MSMT_LEADCHNL_RV_IMPEDANCE_VALUE: 399 OHM
MDC_IDC_MSMT_LEADCHNL_RV_IMPEDANCE_VALUE: 437 OHM
MDC_IDC_MSMT_LEADCHNL_RV_PACING_THRESHOLD_AMPLITUDE: 0.62 V
MDC_IDC_MSMT_LEADCHNL_RV_PACING_THRESHOLD_PULSEWIDTH: 0.4 MS
MDC_IDC_MSMT_LEADCHNL_RV_SENSING_INTR_AMPL: 9.62 MV
MDC_IDC_MSMT_LEADCHNL_RV_SENSING_INTR_AMPL: 9.62 MV
MDC_IDC_PG_IMPLANT_DTM: NORMAL
MDC_IDC_PG_MFG: NORMAL
MDC_IDC_PG_MODEL: NORMAL
MDC_IDC_PG_SERIAL: NORMAL
MDC_IDC_PG_TYPE: NORMAL
MDC_IDC_SESS_CLINIC_NAME: NORMAL
MDC_IDC_SESS_DTM: NORMAL
MDC_IDC_SESS_TYPE: NORMAL
MDC_IDC_SET_BRADY_AT_MODE_SWITCH_RATE: 171 {BEATS}/MIN
MDC_IDC_SET_BRADY_HYSTRATE: NORMAL
MDC_IDC_SET_BRADY_LOWRATE: 60 {BEATS}/MIN
MDC_IDC_SET_BRADY_MAX_SENSOR_RATE: 130 {BEATS}/MIN
MDC_IDC_SET_BRADY_MAX_TRACKING_RATE: 130 {BEATS}/MIN
MDC_IDC_SET_BRADY_MODE: NORMAL
MDC_IDC_SET_BRADY_PAV_DELAY_LOW: 180 MS
MDC_IDC_SET_BRADY_SAV_DELAY_LOW: 150 MS
MDC_IDC_SET_LEADCHNL_RA_PACING_AMPLITUDE: 1.5 V
MDC_IDC_SET_LEADCHNL_RA_PACING_ANODE_ELECTRODE_1: NORMAL
MDC_IDC_SET_LEADCHNL_RA_PACING_ANODE_LOCATION_1: NORMAL
MDC_IDC_SET_LEADCHNL_RA_PACING_CAPTURE_MODE: NORMAL
MDC_IDC_SET_LEADCHNL_RA_PACING_CATHODE_ELECTRODE_1: NORMAL
MDC_IDC_SET_LEADCHNL_RA_PACING_CATHODE_LOCATION_1: NORMAL
MDC_IDC_SET_LEADCHNL_RA_PACING_POLARITY: NORMAL
MDC_IDC_SET_LEADCHNL_RA_PACING_PULSEWIDTH: 0.4 MS
MDC_IDC_SET_LEADCHNL_RA_SENSING_ANODE_ELECTRODE_1: NORMAL
MDC_IDC_SET_LEADCHNL_RA_SENSING_ANODE_LOCATION_1: NORMAL
MDC_IDC_SET_LEADCHNL_RA_SENSING_CATHODE_ELECTRODE_1: NORMAL
MDC_IDC_SET_LEADCHNL_RA_SENSING_CATHODE_LOCATION_1: NORMAL
MDC_IDC_SET_LEADCHNL_RA_SENSING_POLARITY: NORMAL
MDC_IDC_SET_LEADCHNL_RA_SENSING_SENSITIVITY: 0.3 MV
MDC_IDC_SET_LEADCHNL_RV_PACING_AMPLITUDE: 2 V
MDC_IDC_SET_LEADCHNL_RV_PACING_ANODE_ELECTRODE_1: NORMAL
MDC_IDC_SET_LEADCHNL_RV_PACING_ANODE_LOCATION_1: NORMAL
MDC_IDC_SET_LEADCHNL_RV_PACING_CAPTURE_MODE: NORMAL
MDC_IDC_SET_LEADCHNL_RV_PACING_CATHODE_ELECTRODE_1: NORMAL
MDC_IDC_SET_LEADCHNL_RV_PACING_CATHODE_LOCATION_1: NORMAL
MDC_IDC_SET_LEADCHNL_RV_PACING_POLARITY: NORMAL
MDC_IDC_SET_LEADCHNL_RV_PACING_PULSEWIDTH: 0.4 MS
MDC_IDC_SET_LEADCHNL_RV_SENSING_ANODE_ELECTRODE_1: NORMAL
MDC_IDC_SET_LEADCHNL_RV_SENSING_ANODE_LOCATION_1: NORMAL
MDC_IDC_SET_LEADCHNL_RV_SENSING_CATHODE_ELECTRODE_1: NORMAL
MDC_IDC_SET_LEADCHNL_RV_SENSING_CATHODE_LOCATION_1: NORMAL
MDC_IDC_SET_LEADCHNL_RV_SENSING_POLARITY: NORMAL
MDC_IDC_SET_LEADCHNL_RV_SENSING_SENSITIVITY: 0.9 MV
MDC_IDC_SET_ZONE_DETECTION_INTERVAL: 350 MS
MDC_IDC_SET_ZONE_DETECTION_INTERVAL: 400 MS
MDC_IDC_SET_ZONE_TYPE: NORMAL
MDC_IDC_STAT_AT_BURDEN_PERCENT: 1.1 %
MDC_IDC_STAT_AT_DTM_END: NORMAL
MDC_IDC_STAT_AT_DTM_START: NORMAL
MDC_IDC_STAT_BRADY_AP_VP_PERCENT: 0.36 %
MDC_IDC_STAT_BRADY_AP_VS_PERCENT: 89.45 %
MDC_IDC_STAT_BRADY_AS_VP_PERCENT: 0.02 %
MDC_IDC_STAT_BRADY_AS_VS_PERCENT: 10.17 %
MDC_IDC_STAT_BRADY_DTM_END: NORMAL
MDC_IDC_STAT_BRADY_DTM_START: NORMAL
MDC_IDC_STAT_BRADY_RA_PERCENT_PACED: 89.01 %
MDC_IDC_STAT_BRADY_RV_PERCENT_PACED: 0.39 %
MDC_IDC_STAT_EPISODE_RECENT_COUNT: 0
MDC_IDC_STAT_EPISODE_RECENT_COUNT: 0
MDC_IDC_STAT_EPISODE_RECENT_COUNT: 10
MDC_IDC_STAT_EPISODE_RECENT_COUNT: 2
MDC_IDC_STAT_EPISODE_RECENT_COUNT_DTM_END: NORMAL
MDC_IDC_STAT_EPISODE_RECENT_COUNT_DTM_START: NORMAL
MDC_IDC_STAT_EPISODE_TOTAL_COUNT: 0
MDC_IDC_STAT_EPISODE_TOTAL_COUNT: 0
MDC_IDC_STAT_EPISODE_TOTAL_COUNT: 366
MDC_IDC_STAT_EPISODE_TOTAL_COUNT: 71
MDC_IDC_STAT_EPISODE_TOTAL_COUNT_DTM_END: NORMAL
MDC_IDC_STAT_EPISODE_TOTAL_COUNT_DTM_START: NORMAL
MDC_IDC_STAT_EPISODE_TYPE: NORMAL

## 2023-06-05 ENCOUNTER — TELEPHONE (OUTPATIENT)
Dept: INTERNAL MEDICINE | Facility: CLINIC | Age: 88
End: 2023-06-05
Payer: COMMERCIAL

## 2023-06-05 DIAGNOSIS — I48.0 PAROXYSMAL A-FIB (H): ICD-10-CM

## 2023-06-05 DIAGNOSIS — I50.30 DIASTOLIC HEART FAILURE, UNSPECIFIED HF CHRONICITY (H): Primary | ICD-10-CM

## 2023-06-05 NOTE — TELEPHONE ENCOUNTER
Called nurse.  Reviewed provider visit notes. Clarified that Dr. Macias want a BMP blood drawn in 1 week around this Friday.;  Nurse can do blood draw this Friday.  Our fax number given to nurse.      No BMP lab order in patient chart.  Future BMP lab order placed.        James Dukes CMA ([facility]) at 4:18 PM on 6/5/2023

## 2023-06-05 NOTE — TELEPHONE ENCOUNTER
[facility] Call Center    Phone Message    May a detailed message be left on voicemail: yes     Reason for Call: Order(s): Home Care Orders: Other: Verbal OK for additional vist for nursing this week, then 2 visits per week for 1 week, then 1 visit per week for rest    Action Taken: Message routed to:  [facility] pcp    Travel Screening: Not Applicable

## 2023-06-05 NOTE — TELEPHONE ENCOUNTER
[facility] Call Center    Phone Message    May a detailed message be left on voicemail: yes     Reason for Call: Other: Nursing calling wanting clarification on a lab that was ordered. PLease call back asap     Action Taken: Message routed to:  [facility] pcp    Travel Screening: Not Applicable

## 2023-06-05 NOTE — TELEPHONE ENCOUNTER
Gave verbal orders per Dr. Macias for Order(s): Home Care Orders: Other: Verbal OK for additional vist for nursing this week, then 2 visits per week for 1 week, then 1 visit per week for rest        James Dukes CMA ([facility]) at 4:20 PM on 6/5/2023

## 2023-06-06 ENCOUNTER — DOCUMENTATION ONLY (OUTPATIENT)
Dept: INTERNAL MEDICINE | Facility: CLINIC | Age: 88
End: 2023-06-06
Payer: COMMERCIAL

## 2023-06-06 ENCOUNTER — TELEPHONE (OUTPATIENT)
Dept: INTERNAL MEDICINE | Facility: CLINIC | Age: 88
End: 2023-06-06
Payer: COMMERCIAL

## 2023-06-06 ENCOUNTER — MEDICAL CORRESPONDENCE (OUTPATIENT)
Dept: HEALTH INFORMATION MANAGEMENT | Facility: CLINIC | Age: 88
End: 2023-06-06
Payer: COMMERCIAL

## 2023-06-06 NOTE — PROGRESS NOTES
Type of Form Received:     Form Received (Date) 6/6/23   Form Filled out No   Placed in provider folder Yes

## 2023-06-06 NOTE — TELEPHONE ENCOUNTER
[facility] Call Center    Phone Message    May a detailed message be left on voicemail: yes     Reason for Call: Order(s): Other:   Reason for requested:   PT: 2x a week for 3 weeks, 1x a week for 2 weeks, OT: Eval for ADL s, SN: Medication Management  Speech Therapy for Cognition, please call with verbal orders thank you.  Date needed: asap  Provider name: PCP:  Jovana Macias MD      Action Taken: Message routed to:  [facility] pcc    Travel Screening: Not Applicable

## 2023-06-07 ENCOUNTER — TELEPHONE (OUTPATIENT)
Dept: ENDOCRINOLOGY | Facility: CLINIC | Age: 88
End: 2023-06-07

## 2023-06-07 ENCOUNTER — DOCUMENTATION ONLY (OUTPATIENT)
Dept: INTERNAL MEDICINE | Facility: CLINIC | Age: 88
End: 2023-06-07
Payer: COMMERCIAL

## 2023-06-07 ENCOUNTER — TELEPHONE (OUTPATIENT)
Dept: ENDOCRINOLOGY | Facility: CLINIC | Age: 88
End: 2023-06-07
Payer: COMMERCIAL

## 2023-06-07 NOTE — TELEPHONE ENCOUNTER
Communication Summary: Spoke to patient and patient's daughter to schedule follow up appointment   Appointment type: Return Endpcrine  Provider: Dr. Acuña  Return date: Multiple  Speciality phone number: [phone]  Additional appointment(s) needed: N/A  Additional notes: 06/15/2023 ultrasound; 06/29/2023 lab; 07/24/2023 return appt per Dr. Arianne Brown on 6/7/2023 at 10:38 AM

## 2023-06-07 NOTE — PROGRESS NOTES
Type of Form Received:     Form Received (Date) 6/7/23   Form Filled out Yes 6/14/23   Placed in provider folder Yes

## 2023-06-07 NOTE — TELEPHONE ENCOUNTER
CCs notified to help schedule.   Mary Dawson, RN on 6/7/2023 at 8:58 AM       RE    Please call Isadora and review check out orders again - help her schedule lab appt for late June 2023.  Help her schedule the US.   OK to schedule follow up with me for 7/24/2023 at 1740 - virtual.  Thanks  June Acuña MD

## 2023-06-09 ENCOUNTER — OFFICE VISIT (OUTPATIENT)
Dept: SURGERY | Facility: CLINIC | Age: 88
End: 2023-06-09
Attending: SURGERY
Payer: COMMERCIAL

## 2023-06-09 VITALS
SYSTOLIC BLOOD PRESSURE: 124 MMHG | DIASTOLIC BLOOD PRESSURE: 71 MMHG | HEIGHT: 62 IN | WEIGHT: 122.7 LBS | OXYGEN SATURATION: 94 % | BODY MASS INDEX: 22.58 KG/M2 | HEART RATE: 79 BPM

## 2023-06-09 DIAGNOSIS — K81.1 CHRONIC CHOLECYSTITIS: ICD-10-CM

## 2023-06-09 PROCEDURE — 99203 OFFICE O/P NEW LOW 30 MIN: CPT | Performed by: SURGERY

## 2023-06-09 ASSESSMENT — PAIN SCALES - GENERAL: PAINLEVEL: NO PAIN (0)

## 2023-06-09 NOTE — LETTER
6/9/2023       RE: Isadora Mendez  [address] [facility] [id number]     Dear Colleague,    Thank you for referring your patient, Isadora Mendez, to the [facility] [place] at [facility]. Please see a copy of my visit note below.    New General Surgery Consultation Note        Isadora Mendez  [id number]  3/9/1931  June 9, 2023     Requesting Provider: Nathaly Rapp     Dear Dr Macias, Jovana Watkins,     I had the pleasure of seeing your patient, Isadora Mendez is a 89 year old female who presents to clinic today for the following health issues        CHIEF COMPLAINT:       View : No data to display.            decreased appetitie    Assessment & Plan   Problem List Items Addressed This Visit          Digestive    Chronic cholecystitis    Pt has an indication for gallbladder removal. We extensively reviewed the risks and benefits of surgery, including prevention of acute cholecystitis, improvement of apetite, risk of worsening deconditioning and surgical complications. Ms Mendez is not ready to make a decision at this point- she will followup in ~2 months, when her strength is hopefully improved.    Cortez Arriaza MD        Lab Results   Component Value Date    WBC 6.8 05/30/2023    WBC 7.2 01/16/2020     Lab Results   Component Value Date    RBC 3.69 05/30/2023    RBC 3.82 01/16/2020     Lab Results   Component Value Date    HGB 10.8 05/30/2023    HGB 11.3 01/16/2020     Lab Results   Component Value Date    HCT 32.9 05/30/2023    HCT 35.5 01/16/2020     No components found for: MCT  Lab Results   Component Value Date    MCV 89 05/30/2023    MCV 93 01/16/2020     Lab Results   Component Value Date    MCH 29.3 05/30/2023    MCH 29.6 01/16/2020     Lab Results   Component Value Date    MCHC 32.8 05/30/2023    MCHC 31.8 01/16/2020     Lab Results   Component Value Date    RDW 12.8 05/30/2023    RDW 13.5 01/16/2020     Lab Results    Component Value Date     05/30/2023     01/16/2020     Last Comprehensive Metabolic Panel:  Lab Results   Component Value Date     05/27/2023    POTASSIUM 3.9 05/27/2023    CHLORIDE 102 05/27/2023    CO2 26 05/27/2023    ANIONGAP 12 05/27/2023     (H) 05/27/2023    BUN 34.9 (H) 05/27/2023    CR 0.95 05/27/2023    GFRESTIMATED 56 (L) 05/27/2023    INO 10.0 (H) 05/30/2023         INR/Prothrombin Time  Liver Function Studies -   Recent Labs   Lab Test 05/30/23  1608   PROTTOTAL 6.5   ALBUMIN 3.1*   BILITOTAL 0.6   ALKPHOS 89   AST 40*   ALT 26         30 minutes spent by me on the date of the encounter doing chart review, history and exam, documentation and further activities per the note      HISTORY OF PRESENT ILLNESS:    89F presents for consideration of cholecystectomy. She had RUQ pain in March, and has had gallbladderp athology noted on CT, US, MRI. HIDA was OK. Since then, occasional diffuse abdominal pain, but did have worsening of appetite since then,.    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, currently regaining strengthn and using a walker    ROS    PAST MEDICAL HISTORY:  Past Medical History:   Diagnosis Date    Atrial fibrillation (H) 01/01/2011    Cataract     Closed nondisplaced fracture of styloid process of radius     Dry eyes     Facial fracture (H) 01/01/2006    Hypertension     Infection due to 2019 novel coronavirus 10/2022    or 11/22- took paxolovid    Low bone density     NSVT (nonsustained ventricular tachycardia) (H) 01/01/2009    during exercise echo, neg EP study    Pacemaker 07/01/2010    Postmenopausal status     S/P cardiac catheterization 01/01/2009    30-40% non obstructive lesion    SSS (sick sinus syndrome) (H) 10/2022    Ventricular tachycardia, nonsustained (H) 01/01/2009    during stress echo        PAST SURGICAL HISTORY:  Past Surgical History:   Procedure Laterality Date    CATARACT EXTRACTION W/ INTRAOCULAR LENS IMPLANT Right 12/04/2018     IMPLANT PACEMAKER      PPM  2010    Permanent Pacemaker        MEDICATIONS:  Current Outpatient Medications   Medication    aspirin 81 MG tablet    diclofenac (VOLTAREN) 1 % topical gel    famotidine (PEPCID) 20 MG tablet    furosemide (LASIX) 20 MG tablet    lisinopril (ZESTRIL) 20 MG tablet    methimazole (TAPAZOLE) 10 MG tablet    metoprolol succinate ER (TOPROL XL) 100 MG 24 hr tablet    metoprolol succinate ER (TOPROL XL) 50 MG 24 hr tablet    Nutritional Supplements (PYCNOGENOL) 300-30 MG CAPS per capsule    nystatin (MYCOSTATIN) 037395 UNIT/GM external cream    nystatin (MYCOSTATIN) 667244 UNIT/ML suspension    ondansetron (ZOFRAN) 4 MG tablet    spironolactone (ALDACTONE) 25 MG tablet    Vitamin D, Cholecalciferol, 1000 units TABS     No current facility-administered medications for this visit.        ALLERGIES:  No Known Allergies     SOCIAL HISTORY:  Social History     Socioeconomic History    Marital status:      Spouse name: None    Number of children: None    Years of education: None    Highest education level: None   Occupational History    Occupation: retired     Comment: nurse   Tobacco Use    Smoking status: Never    Smokeless tobacco: Never   Substance and Sexual Activity    Alcohol use: No    Drug use: No       FAMILY HISTORY:  Family History   Problem Relation Age of Onset    Myocardial Infarction Mother 88        lived to 100    Hypertension Mother     Macular Degeneration Mother     Cardiovascular Father 76         age 80, MI 76 and CHF 80's    Coronary Artery Disease Father     Myocardial Infarction Father 70    Arrhythmia Sister         PPM    Neuropathy Sister     Colon Cancer Sister     Arrhythmia Brother         pacemaker    Arrhythmia Brother         pacemaker, hx rheumatic fever, heart failure    Leukemia Maternal Grandfather     Diabetes Type 2  Maternal Grandfather     Cardiovascular Paternal Grandfather          age 76 of CVD    Coronary Artery Disease Paternal  "Grandfather     Breast Cancer Daughter 50        Breast, uterine, 2nd breast cancer, HTN    Uterine Cancer Daughter     Thyroid Disease Daughter         bout of thyroiditis with thyrotoxicosis followed by hypothyroidism    Blood Disease Son         hemochromatosis?    Atrial fibrillation Son         US reviewed, CT reviewed, MRI reviewed, HIDA reviewed    PHYSICAL EXAM:  Objective    /71 (BP Location: Left arm, Patient Position: Sitting, Cuff Size: Adult Regular)   Pulse 79   Ht 1.585 m (5' 2.4\")   Wt 55.7 kg (122 lb 11.2 oz)   SpO2 94%   BMI 22.16 kg/m    /71 (BP Location: Left arm, Patient Position: Sitting, Cuff Size: Adult Regular)   Pulse 79   Ht 1.585 m (5' 2.4\")   Wt 55.7 kg (122 lb 11.2 oz)   SpO2 94%   BMI 22.16 kg/m    Body mass index is 22.16 kg/m .  Physical Exam   Ab s/nt/nd     DISCUSSION OF RISKS:  Prior to surgery, I reviewed the risks of gallbladder removal with this patient.    The risk discussion included, but was not limited to, death, myocardial infarction, pneumonia, urinary tract infection, deep venous thrombosis with or without pulmonary embolus, abdominal infection from bowel injury or abscess, bowel obstruction, wound infection, and bleeding.    Specific risks related to cholecystectomy include bile duct injury (3-4/1000), bile leak (10/1000), retained common bile duct stone (10/1000), postcholecystectomy diarrhea (1-2%) and these complications may require additional treatment.    The potential that gallbladder removal will NOT be of benefit was also reviewed.    Furthermore, alternative therapies and the option for no therapy were also reviewed.    Postoperative treatment and expected follow-up were also reviewed.    Additional risk specifically related to this patient's preoperative condition were also emphasized due to pt age/[id number]    Sincerely,     Cortez Arriaza MD    "

## 2023-06-09 NOTE — PROGRESS NOTES
New General Surgery Consultation Note        Isadora Mendez  [id number]  3/9/1931  June 9, 2023     Requesting Provider: Nathaly Rapp     Dear Dr Macias, Jovana Watkins,     I had the pleasure of seeing your patient, Isadora Mendez is a 89 year old female who presents to clinic today for the following health issues        CHIEF COMPLAINT:       View : No data to display.            decreased appetitie    Assessment & Plan   Problem List Items Addressed This Visit        Digestive    Chronic cholecystitis    Pt has an indication for gallbladder removal. We extensively reviewed the risks and benefits of surgery, including prevention of acute cholecystitis, improvement of apetite, risk of worsening deconditioning and surgical complications. Ms Mendez is not ready to make a decision at this point- she will followup in ~2 months, when her strength is hopefully improved.    Cortez Arriaza MD        Lab Results   Component Value Date    WBC 6.8 05/30/2023    WBC 7.2 01/16/2020     Lab Results   Component Value Date    RBC 3.69 05/30/2023    RBC 3.82 01/16/2020     Lab Results   Component Value Date    HGB 10.8 05/30/2023    HGB 11.3 01/16/2020     Lab Results   Component Value Date    HCT 32.9 05/30/2023    HCT 35.5 01/16/2020     No components found for: MCT  Lab Results   Component Value Date    MCV 89 05/30/2023    MCV 93 01/16/2020     Lab Results   Component Value Date    MCH 29.3 05/30/2023    MCH 29.6 01/16/2020     Lab Results   Component Value Date    MCHC 32.8 05/30/2023    MCHC 31.8 01/16/2020     Lab Results   Component Value Date    RDW 12.8 05/30/2023    RDW 13.5 01/16/2020     Lab Results   Component Value Date     05/30/2023     01/16/2020     Last Comprehensive Metabolic Panel:  Lab Results   Component Value Date     05/27/2023    POTASSIUM 3.9 05/27/2023    CHLORIDE 102 05/27/2023    CO2 26 05/27/2023    ANIONGAP 12 05/27/2023     (H) 05/27/2023    BUN 34.9 (H) 05/27/2023     CR 0.95 05/27/2023    GFRESTIMATED 56 (L) 05/27/2023    INO 10.0 (H) 05/30/2023         INR/Prothrombin Time  Liver Function Studies -   Recent Labs   Lab Test 05/30/23  1608   PROTTOTAL 6.5   ALBUMIN 3.1*   BILITOTAL 0.6   ALKPHOS 89   AST 40*   ALT 26         30 minutes spent by me on the date of the encounter doing chart review, history and exam, documentation and further activities per the note      HISTORY OF PRESENT ILLNESS:    89F presents for consideration of cholecystectomy. She had RUQ pain in March, and has had gallbladderp athology noted on CT, US, MRI. HIDA was OK. Since then, occasional diffuse abdominal pain, but did have worsening of appetite since then,.    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, currently regaining strengthn and using a walker    ROS    PAST MEDICAL HISTORY:  Past Medical History:   Diagnosis Date     Atrial fibrillation (H) 01/01/2011     Cataract      Closed nondisplaced fracture of styloid process of radius      Dry eyes      Facial fracture (H) 01/01/2006     Hypertension      Infection due to 2019 novel coronavirus 10/2022    or 11/22- took paxolovid     Low bone density      NSVT (nonsustained ventricular tachycardia) (H) 01/01/2009    during exercise echo, neg EP study     Pacemaker 07/01/2010     Postmenopausal status      S/P cardiac catheterization 01/01/2009    30-40% non obstructive lesion     SSS (sick sinus syndrome) (H) 10/2022     Ventricular tachycardia, nonsustained (H) 01/01/2009    during stress echo        PAST SURGICAL HISTORY:  Past Surgical History:   Procedure Laterality Date     CATARACT EXTRACTION W/ INTRAOCULAR LENS IMPLANT Right 12/04/2018     IMPLANT PACEMAKER       PPM  06/30/2010    Permanent Pacemaker        MEDICATIONS:  Current Outpatient Medications   Medication     aspirin 81 MG tablet     diclofenac (VOLTAREN) 1 % topical gel     famotidine (PEPCID) 20 MG tablet     furosemide (LASIX) 20 MG tablet     lisinopril (ZESTRIL) 20 MG tablet      methimazole (TAPAZOLE) 10 MG tablet     metoprolol succinate ER (TOPROL XL) 100 MG 24 hr tablet     metoprolol succinate ER (TOPROL XL) 50 MG 24 hr tablet     Nutritional Supplements (PYCNOGENOL) 300-30 MG CAPS per capsule     nystatin (MYCOSTATIN) 999877 UNIT/GM external cream     nystatin (MYCOSTATIN) 121407 UNIT/ML suspension     ondansetron (ZOFRAN) 4 MG tablet     spironolactone (ALDACTONE) 25 MG tablet     Vitamin D, Cholecalciferol, 1000 units TABS     No current facility-administered medications for this visit.        ALLERGIES:  No Known Allergies     SOCIAL HISTORY:  Social History     Socioeconomic History     Marital status:      Spouse name: None     Number of children: None     Years of education: None     Highest education level: None   Occupational History     Occupation: retired     Comment: nurse   Tobacco Use     Smoking status: Never     Smokeless tobacco: Never   Substance and Sexual Activity     Alcohol use: No     Drug use: No       FAMILY HISTORY:  Family History   Problem Relation Age of Onset     Myocardial Infarction Mother 88        lived to 100     Hypertension Mother      Macular Degeneration Mother      Cardiovascular Father 76         age 80, MI 76 and CHF 80's     Coronary Artery Disease Father      Myocardial Infarction Father 70     Arrhythmia Sister         PPM     Neuropathy Sister      Colon Cancer Sister      Arrhythmia Brother         pacemaker     Arrhythmia Brother         pacemaker, hx rheumatic fever, heart failure     Leukemia Maternal Grandfather      Diabetes Type 2  Maternal Grandfather      Cardiovascular Paternal Grandfather          age 76 of CVD     Coronary Artery Disease Paternal Grandfather      Breast Cancer Daughter 50        Breast, uterine, 2nd breast cancer, HTN     Uterine Cancer Daughter      Thyroid Disease Daughter         bout of thyroiditis with thyrotoxicosis followed by hypothyroidism     Blood Disease Son         hemochromatosis?      "Atrial fibrillation Son         US reviewed, CT reviewed, MRI reviewed, HIDA reviewed    PHYSICAL EXAM:  Objective    /71 (BP Location: Left arm, Patient Position: Sitting, Cuff Size: Adult Regular)   Pulse 79   Ht 1.585 m (5' 2.4\")   Wt 55.7 kg (122 lb 11.2 oz)   SpO2 94%   BMI 22.16 kg/m    /71 (BP Location: Left arm, Patient Position: Sitting, Cuff Size: Adult Regular)   Pulse 79   Ht 1.585 m (5' 2.4\")   Wt 55.7 kg (122 lb 11.2 oz)   SpO2 94%   BMI 22.16 kg/m    Body mass index is 22.16 kg/m .  Physical Exam   Ab s/nt/nd     DISCUSSION OF RISKS:  Prior to surgery, I reviewed the risks of gallbladder removal with this patient.    The risk discussion included, but was not limited to, death, myocardial infarction, pneumonia, urinary tract infection, deep venous thrombosis with or without pulmonary embolus, abdominal infection from bowel injury or abscess, bowel obstruction, wound infection, and bleeding.    Specific risks related to cholecystectomy include bile duct injury (3-4/1000), bile leak (10/1000), retained common bile duct stone (10/1000), postcholecystectomy diarrhea (1-2%) and these complications may require additional treatment.    The potential that gallbladder removal will NOT be of benefit was also reviewed.    Furthermore, alternative therapies and the option for no therapy were also reviewed.    Postoperative treatment and expected follow-up were also reviewed.    Additional risk specifically related to this patient's preoperative condition were also emphasized due to pt age/[id number]    Sincerely,     Cortez Arriaza MD    "

## 2023-06-09 NOTE — NURSING NOTE
"Chief Complaint   Patient presents with     New Patient     Gallbladder       Vitals:    06/09/23 1157   BP: 124/71   BP Location: Left arm   Patient Position: Sitting   Cuff Size: Adult Regular   Pulse: 79   SpO2: 94%   Weight: 55.7 kg (122 lb 11.2 oz)   Height: 1.585 m (5' 2.4\")       Body mass index is 22.16 kg/m .                          Anderson Webster, EMT    "

## 2023-06-10 ENCOUNTER — OFFICE VISIT (OUTPATIENT)
Dept: URGENT CARE | Facility: URGENT CARE | Age: 88
End: 2023-06-10
Payer: COMMERCIAL

## 2023-06-10 VITALS
TEMPERATURE: 98.1 F | SYSTOLIC BLOOD PRESSURE: 95 MMHG | DIASTOLIC BLOOD PRESSURE: 53 MMHG | OXYGEN SATURATION: 94 % | HEART RATE: 63 BPM

## 2023-06-10 DIAGNOSIS — S05.01XA ABRASION OF RIGHT CONJUNCTIVA, INITIAL ENCOUNTER: Primary | ICD-10-CM

## 2023-06-10 PROCEDURE — 99213 OFFICE O/P EST LOW 20 MIN: CPT | Performed by: PHYSICIAN ASSISTANT

## 2023-06-10 RX ORDER — POLYMYXIN B SULFATE AND TRIMETHOPRIM 1; 10000 MG/ML; [USP'U]/ML
1-2 SOLUTION OPHTHALMIC EVERY 4 HOURS
Qty: 5 ML | Refills: 0 | Status: SHIPPED | OUTPATIENT
Start: 2023-06-10 | End: 2023-06-17

## 2023-06-10 ASSESSMENT — ENCOUNTER SYMPTOMS: EYE PAIN: 1

## 2023-06-10 NOTE — PROGRESS NOTES
SUBJECTIVE:   Isadora Mendez is a 89 year old female presenting with a chief complaint of   Chief Complaint   Patient presents with     Eye Problem Right Eye     Right side not feeling well, had cataract surgery in the past and feels like something is inside of it since yesterday morning         She is an established patient of [facility].  Patient presents with complaints of right eye feeling of FB since last night.  No known trauma.  Hx of cataract surgery and states has not felt right since surgery - 4 years ago.          Review of Systems   Eyes: Positive for pain.   All other systems reviewed and are negative.      Past Medical History:   Diagnosis Date     Atrial fibrillation (H) 2011     Cataract      Closed nondisplaced fracture of styloid process of radius      Dry eyes      Facial fracture (H) 2006     Hypertension      Infection due to 2019 novel coronavirus 10/2022    or - took paxolovid     Low bone density      NSVT (nonsustained ventricular tachycardia) (H) 2009    during exercise echo, neg EP study     Pacemaker 2010     Postmenopausal status      S/P cardiac catheterization 2009    30-40% non obstructive lesion     SSS (sick sinus syndrome) (H) 10/2022     Ventricular tachycardia, nonsustained (H) 2009    during stress echo     Family History   Problem Relation Age of Onset     Myocardial Infarction Mother 88        lived to 100     Hypertension Mother      Macular Degeneration Mother      Cardiovascular Father 76         age 80, MI 76 and CHF 80's     Coronary Artery Disease Father      Myocardial Infarction Father 70     Arrhythmia Sister         PPM     Neuropathy Sister      Colon Cancer Sister      Arrhythmia Brother         pacemaker     Arrhythmia Brother         pacemaker, hx rheumatic fever, heart failure     Leukemia Maternal Grandfather      Diabetes Type 2  Maternal Grandfather      Cardiovascular Paternal Grandfather          age 76 of CVD      Coronary Artery Disease Paternal Grandfather      Breast Cancer Daughter 50        Breast, uterine, 2nd breast cancer, HTN     Uterine Cancer Daughter      Thyroid Disease Daughter         bout of thyroiditis with thyrotoxicosis followed by hypothyroidism     Blood Disease Son         hemochromatosis?     Atrial fibrillation Son      Current Outpatient Medications   Medication Sig Dispense Refill     trimethoprim-polymyxin b (POLYTRIM) 46773-0.1 UNIT/ML-% ophthalmic solution Place 1-2 drops into the right eye every 4 hours for 7 days 5 mL 0     aspirin 81 MG tablet Take 1 tablet by mouth daily.       diclofenac (VOLTAREN) 1 % topical gel Apply 2 g topically 4 times daily as needed for other (Joint and muscle pain as needed) 50 g 0     famotidine (PEPCID) 20 MG tablet Take 1 tablet (20 mg) by mouth 2 times daily 180 tablet 3     furosemide (LASIX) 20 MG tablet Take 1 tablet (20 mg) by mouth daily 60 tablet 0     lisinopril (ZESTRIL) 20 MG tablet Take 1 tablet (20 mg) by mouth daily 90 tablet 3     methimazole (TAPAZOLE) 10 MG tablet Take 1 tablet (10 mg) by mouth 2 times daily 120 tablet 0     metoprolol succinate ER (TOPROL XL) 100 MG 24 hr tablet Take 1 tablet (100 mg) by mouth daily Use with 50 mg tab in the pm 90 tablet 3     metoprolol succinate ER (TOPROL XL) 50 MG 24 hr tablet Take 1 tablet (50 mg) by mouth every evening Use with 100mg tab in the am 90 tablet 3     Nutritional Supplements (PYCNOGENOL) 300-30 MG CAPS per capsule Take 1 capsule by mouth daily       nystatin (MYCOSTATIN) 830002 UNIT/GM external cream Apply topically 2 times daily 90 g 3     nystatin (MYCOSTATIN) 846929 UNIT/ML suspension Take 5 mLs (500,000 Units) by mouth 4 times daily swish and spit 473 mL 0     ondansetron (ZOFRAN) 4 MG tablet Take 1 tablet (4 mg) by mouth every 6 hours as needed for nausea or vomiting 60 tablet 3     spironolactone (ALDACTONE) 25 MG tablet Take 0.5 tablets (12.5 mg) by mouth daily 45 tablet 3     Vitamin  D, Cholecalciferol, 1000 units TABS Take 1 tablet by mouth daily       Social History     Tobacco Use     Smoking status: Never     Smokeless tobacco: Never   Vaping Use     Vaping status: Not on file   Substance Use Topics     Alcohol use: No       OBJECTIVE  BP 95/53 (BP Location: Left arm, Patient Position: Sitting, Cuff Size: Adult Regular)   Pulse 63   Temp 98.1  F (36.7  C) (Oral)   SpO2 94%     Physical Exam  Vitals reviewed.   Constitutional:       Appearance: Normal appearance.   Eyes:      Extraocular Movements: Extraocular movements intact.      Pupils: Pupils are equal, round, and reactive to light.      Comments: Right eye  With floresceine uptake to lateral aspect, moderate size.    No other edema, erythema, cornea normal.  Eye lid eversion without FB noted.   Cardiovascular:      Rate and Rhythm: Normal rate.   Neurological:      Mental Status: She is alert.         Labs:  No results found for this or any previous visit (from the past 24 hour(s)).    X-Ray was not done.    ASSESSMENT:      ICD-10-CM    1. Abrasion of right conjunctiva, initial encounter  S05.01XA trimethoprim-polymyxin b (POLYTRIM) 01799-8.1 UNIT/ML-% ophthalmic solution           Medical Decision Making:    Differential Diagnosis:  Eye Problem: Corneal abrasion  Foreign body    Serious Comorbid Conditions:  Adult:  reviewed    PLAN:    Rx for polytrim  Patient education.   Discussed reasons to seek immediate medical attention.  Additionally if no improvement or worsening in one week, may follow up with PCP and/or UC.        Followup:    If not improving or if condition worsens, follow up with your Primary Care Provider, If not improving or if conditions worsens over the next 12-24 hours, go to the Emergency Department    There are no Patient Instructions on file for this visit.

## 2023-06-12 ENCOUNTER — DOCUMENTATION ONLY (OUTPATIENT)
Dept: INTERNAL MEDICINE | Facility: CLINIC | Age: 88
End: 2023-06-12
Payer: COMMERCIAL

## 2023-06-12 ENCOUNTER — MEDICAL CORRESPONDENCE (OUTPATIENT)
Dept: HEALTH INFORMATION MANAGEMENT | Facility: CLINIC | Age: 88
End: 2023-06-12
Payer: COMMERCIAL

## 2023-06-12 NOTE — PROGRESS NOTES
Type of Form Received:     Form Received (Date) 6/12/23   Form Filled out Yes, 06/16/23   Placed in provider folder Yes

## 2023-06-13 ENCOUNTER — DOCUMENTATION ONLY (OUTPATIENT)
Dept: INTERNAL MEDICINE | Facility: CLINIC | Age: 88
End: 2023-06-13
Payer: COMMERCIAL

## 2023-06-13 NOTE — PROGRESS NOTES
Type of Form Received:     Form Received (Date) 6/13/23   Form Filled out Yes, 06/16/23   Placed in provider folder Yes

## 2023-06-14 ENCOUNTER — DOCUMENTATION ONLY (OUTPATIENT)
Dept: INTERNAL MEDICINE | Facility: CLINIC | Age: 88
End: 2023-06-14
Payer: COMMERCIAL

## 2023-06-14 NOTE — PROGRESS NOTES
Type of Form Received:     Form Received (Date) 6/14/23   Form Filled out Yes, 06/16/23   Placed in provider folder Yes

## 2023-06-15 ENCOUNTER — DOCUMENTATION ONLY (OUTPATIENT)
Dept: INTERNAL MEDICINE | Facility: CLINIC | Age: 88
End: 2023-06-15

## 2023-06-15 ENCOUNTER — ANCILLARY PROCEDURE (OUTPATIENT)
Dept: ULTRASOUND IMAGING | Facility: CLINIC | Age: 88
End: 2023-06-15
Payer: COMMERCIAL

## 2023-06-15 DIAGNOSIS — E04.9 GOITER: ICD-10-CM

## 2023-06-15 PROCEDURE — 76536 US EXAM OF HEAD AND NECK: CPT | Mod: GC | Performed by: RADIOLOGY

## 2023-06-15 NOTE — PROGRESS NOTES
Type of Form Received:     Form Received (Date) 6/15/23   Form Filled out Yes, 06/16/23   Placed in provider folder Yes

## 2023-06-16 ENCOUNTER — MEDICAL CORRESPONDENCE (OUTPATIENT)
Dept: HEALTH INFORMATION MANAGEMENT | Facility: CLINIC | Age: 88
End: 2023-06-16
Payer: COMMERCIAL

## 2023-06-20 ENCOUNTER — DOCUMENTATION ONLY (OUTPATIENT)
Dept: INTERNAL MEDICINE | Facility: CLINIC | Age: 88
End: 2023-06-20
Payer: COMMERCIAL

## 2023-06-20 NOTE — PROGRESS NOTES
Type of Form Received:     Form Received (Date) 6/19/23   Form Filled out No   Placed in provider folder Yes

## 2023-06-21 ENCOUNTER — TELEPHONE (OUTPATIENT)
Dept: INTERNAL MEDICINE | Facility: CLINIC | Age: 88
End: 2023-06-21
Payer: COMMERCIAL

## 2023-06-21 ENCOUNTER — DOCUMENTATION ONLY (OUTPATIENT)
Dept: INTERNAL MEDICINE | Facility: CLINIC | Age: 88
End: 2023-06-21
Payer: COMMERCIAL

## 2023-06-21 NOTE — PROGRESS NOTES
Type of Form Received:     Form Received (Date) 6/21/23   Form Filled out No   Placed in provider folder Yes

## 2023-06-21 NOTE — TELEPHONE ENCOUNTER
[facility] Call Center    Phone Message    May a detailed message be left on voicemail: yes     Reason for Call: Order(s): Home Care Orders: Occupational Therapy (OT): OT orders: 1xwk for 4wks, adl, strength, activity tolerance.    Action Taken: Message routed to:  [facility]    Travel Screening: Not Applicable

## 2023-06-22 ENCOUNTER — DOCUMENTATION ONLY (OUTPATIENT)
Dept: INTERNAL MEDICINE | Facility: CLINIC | Age: 88
End: 2023-06-22

## 2023-06-22 ENCOUNTER — TELEPHONE (OUTPATIENT)
Dept: INTERNAL MEDICINE | Facility: CLINIC | Age: 88
End: 2023-06-22

## 2023-06-22 DIAGNOSIS — K21.00 GASTROESOPHAGEAL REFLUX DISEASE WITH ESOPHAGITIS WITHOUT HEMORRHAGE: ICD-10-CM

## 2023-06-22 RX ORDER — FAMOTIDINE 20 MG/1
20 TABLET, FILM COATED ORAL 2 TIMES DAILY
Qty: 180 TABLET | Refills: 3 | OUTPATIENT
Start: 2023-06-22

## 2023-06-22 NOTE — TELEPHONE ENCOUNTER
[facility] Call Center    Phone Message    May a detailed message be left on voicemail: yes     Reason for Call: Medication Refill Request    Has the patient contacted the pharmacy for the refill? Yes   Name of medication being requested: famotidine (PEPCID) 20 MG tablet    Provider who prescribed the medication: PCP  Pharmacy: [id number] [address] [place] [address]  Date medication is needed: asap         Action Taken: Message routed to:  [facility]    Travel Screening: Not Applicable

## 2023-06-22 NOTE — PROGRESS NOTES
Type of Form Received:     Form Received (Date) 6/22/23   Form Filled out No   Placed in provider folder Yes

## 2023-06-22 NOTE — TELEPHONE ENCOUNTER
Called Alejandra and left a secure VM. Gave verbal orders per Dr. Macias for Order(s): Home Care Orders: Occupational Therapy (OT): OT orders: 1xwk for 4wks, adl, strength, activity tolerance.      James Dukes CMA ([facility]) at 9:06 AM on 6/22/2023

## 2023-06-22 NOTE — PROGRESS NOTES
Type of Form Received:     Form Received (Date) 6/22/23   Form Filled out Yes 6/27/23   Placed in provider folder Yes

## 2023-06-23 ENCOUNTER — OFFICE VISIT (OUTPATIENT)
Dept: INTERNAL MEDICINE | Facility: CLINIC | Age: 88
End: 2023-06-23
Payer: COMMERCIAL

## 2023-06-23 VITALS
OXYGEN SATURATION: 98 % | DIASTOLIC BLOOD PRESSURE: 66 MMHG | HEIGHT: 62 IN | SYSTOLIC BLOOD PRESSURE: 121 MMHG | WEIGHT: 116.4 LBS | HEART RATE: 71 BPM | BODY MASS INDEX: 21.42 KG/M2

## 2023-06-23 DIAGNOSIS — I50.30 DIASTOLIC HEART FAILURE, UNSPECIFIED HF CHRONICITY (H): ICD-10-CM

## 2023-06-23 DIAGNOSIS — E05.90 HYPERTHYROIDISM: ICD-10-CM

## 2023-06-23 DIAGNOSIS — E46 PROTEIN-CALORIE MALNUTRITION, UNSPECIFIED SEVERITY (H): ICD-10-CM

## 2023-06-23 DIAGNOSIS — R63.4 WEIGHT LOSS: Primary | ICD-10-CM

## 2023-06-23 PROCEDURE — 99214 OFFICE O/P EST MOD 30 MIN: CPT | Performed by: INTERNAL MEDICINE

## 2023-06-23 NOTE — NURSING NOTE
Isadora Mendez is a 89 year old female patient that presents today in clinic for the following:    Chief Complaint   Patient presents with     Follow Up     The patient's allergies and medications were reviewed as noted. A set of vitals were recorded as noted without incident. The patient does not have any other questions for the provider.    Floresita Loaiza, EMT at 10:31 AM on 6/23/2023

## 2023-06-23 NOTE — PROGRESS NOTES
"Assessment & Plan   Weight loss  Malnutrition  Overall she is doing much better!  Strength continues to improve.    Remaining concern is ongoing weight loss.  She continues to push the nutrition.  Unclear how much gallbladder may be contributing??  - Prealbumin    Hyperthyroidism  Has follow-up with Dr. Acuña    Diastolic heart failure, unspecified HF chronicity (H)  Euvolemic/hypovolemic today.  GIven recent increase in creatinine, will stop lasix and check BMP with next blood work  Has follow-up with cardiology scheduled  - Comprehensive metabolic panel (BMP + Alb, Alk Phos, ALT, AST, Total. Bili, TP)      HM:  Will get COVID booster at pharmacy    RTC: Return in about 3 months (around 9/23/2023).  I spent a total of 32 minutes on the day of the visit.  Time was spent doing chart review, history and exam, documentation and further activities as noted above.    Jovana Macias MD      Chief Complaint   Isadora Mendez is a 89 year old female presents for the following health issues    History of Present Illness   She was able to walk into the clinic today.    Doing home PT and SLP  No longer needing home RN    Eating has improved in the past week  Still struggles with finding foods that taste good  Ate some shrimp, kefer, buttered noodles, decaf coffee  Weight continues to trend down.  Baseline had been around 130.  She is down to 116 lbs today  Also trying to drink a protein drink (ensure) daily        Tobacco Use      Smoking status: Never      Smokeless tobacco: Never    PHQ-2 Score:       5/30/2023     4:56 PM 3/16/2023    10:29 AM   PHQ-2 ( 1999 [employer])   Q1: Little interest or pleasure in doing things  0   Q2: Feeling down, depressed or hopeless 0 0   PHQ-2 Score  0       ROS    Physical Exam   /66 (BP Location: Right arm, Patient Position: Sitting, Cuff Size: Adult Small)   Pulse 71   Ht 1.585 m (5' 2.4\")   Wt 52.8 kg (116 lb 6.4 oz)   SpO2 98%   BMI 21.02 kg/m    Gen: no distress, " comfortable, pleasant   Eyes: anicteric, normal extra-ocular movements   Cardiovascular: regular rate and rhythm, normal S1 and S2, no murmurs, rubs or gallops, peripheral pulses full and symmetric   Skin: no concerning lesions, no jaundice   Psychological: appropriate mood     Items reviewed:  Tobacco  Allergies  Meds

## 2023-06-28 NOTE — PROGRESS NOTES
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       SSS s/p PPM implant 6/30/2010, s/p generator change 9/14/2016  Normal device function, stable lead trends, AP 87.1%, AF burden 0.3% occasional AF with RVR episodes avg ventricular rates 112-155 bpm. A fib management as below. Continue routine device follow up.    Paroxysmal Atrial Fibrillation   Initially detected on device check. Episodes intermittently symptomatic with palpiations. She had previously been on metoprolol succinate 100 mg in am, 50 mg in pm. At last visit with Dr TOÑITO Dixon disussed initiation amiodarone for futher rate control due to episodes becoming more frequent and of longer duration. At this time, she did not prefer to switch medications. With recent thyrotoxicosis prompting admission, and HFpEF physiology, discussed switching to diltiazem for further rate control. She is agreeable to this. Will stop Metoprolol and start diltiazem  mg daily. BP today 133/74.    [id number] score is 4, anticoagulation has been discussed, she again declines today. She understands increased stroke/clot risk.     Nonsustained VT   Known for past several years. She remains asymptomatic. Normal LVEF.       Referral to pulmonary HTN clinic.     Follow up in 3-4 months to ensure adequate rate control on diltiazem.      History of Present Illness/Subjective    Ms. Isadora Mendez is a 89 year old female who comes in today for EP follow-up of SSS s/p PPM and atrial fibrillation.     Patient with history of sick sinus syndrome, underwent PPM implant 6/30/2010 and subsequent generator change 9/14/2016. When she was last seen by Dr TOÑITO Dixon on 3/16/23 device interrogation demonstrated AF episodes up to 8 hours with ventricular rates 100-150s. Given  patient had been symptomatic, with longer episodes, already on metoprolol 100 mg in am, 50 mg in pm she discussed amiodarone initiation for further symptomatic relief. She had also discussed anticoagulation wither her. She did not wish to start any new medication and declined further anticoagulation.     She was recently hospitalized 5/24-5/27 after developing weakness, shortness of breath, hair loss and lass of appetite. Found to be in thyrotoxicosis and decompensated HFpEF. BNP 2,414 (previously 820) with BLE edema. TTE with LVEF 60-65%, RVSP estimated at 68 mmHg. During admission received diuresis, she was started on aldactone 12.5 mg daily and lasix 20 mg daily. She was started on methimazole 5/23, following with endocrinology.     She reports she has been feeling much better the past week. Daughter reports she has been eating more and acting more like herself. Pt reports she feels she has gained some of her strength back and is less short of breath.  She denies chest discomfort, palpitations, peripheral edema, orthopnea, lightheadedness, dizziness or syncope. Device interrogation shows normal device function, stable lead trends, AP 87.1%, AF burden 0.3% occasional AF with RVR episodes avg ventricular rates 112-155 bpm.     I have reviewed and updated the patient's Past Medical History, Social History, Family History and Medication List.     Cardiographics (Personally Reviewed) :   Echo: 5/25/23  Interpretation Summary  Left ventricular function is normal.The ejection fraction is 60-65%.  The right ventricle is normal size. Global right ventricular function is  normal.  RVSP estimated at 68 mmHg. The IVC was not visualized. This is suggestive of  elevated pulmonary artery pressures.  Moderate left atrial enlargement is present.  No pericardial effusion is present.  This study was compared with the study from 10/21/22.  The RVSP has increased.    NM Stress Test: 10/21/22     The nuclear stress test is negative  for inducible myocardial ischemia or infarction.     Left ventricular function is hyperdynamic.     There is no prior study for comparison.         Physical Examination   There were no vitals taken for this visit.  Wt Readings from Last 3 Encounters:   06/23/23 52.8 kg (116 lb 6.4 oz)   06/09/23 55.7 kg (122 lb 11.2 oz)   06/02/23 53.7 kg (118 lb 6.4 oz)   /74 (BP Location: Right arm, Patient Position: Chair, Cuff Size: Adult Small)   Pulse 65   Wt 53.3 kg (117 lb 8 oz)   SpO2 98%   BMI 21.22 kg/m      General Appearance:   Alert, well-appearing and in no acute distress.   HEENT: Atraumatic, normocephalic. MMM.   Chest/Lungs:   Respirations unlabored.  Lungs are clear to auscultation.   Cardiovascular:   Regular rate and rhythm.  S1/S2. No murmur.    Abdomen:  Soft, nontender, nondistended.   Extremities: No cyanosis or clubbing. No edema.    Musculoskeletal: Moves all extremities.     Skin: Warm, dry, intact.    Neurologic: Mood and affect are appropriate.  Alert and oriented to person, place, time, and situation.          Medications  Allergies   ASA 81 mg daily   Diltiazem  mg daily   Lisinopril 20 mg daily   Aldactone 12.5 mg daily   Lasix (held by PCP d/t kidney function)    Methimazole   Pepcid    No Known Allergies      Lab Results (Personally Reviewed)    Chemistry/lipid CBC Cardiac Enzymes/BNP/TSH/INR   Lab Results   Component Value Date    BUN 34.9 (H) 05/27/2023     05/27/2023    CO2 26 05/27/2023     Creatinine   Date Value Ref Range Status   05/27/2023 0.95 0.51 - 0.95 mg/dL Final   01/16/2020 0.79 0.52 - 1.04 mg/dL Final       Lab Results   Component Value Date    CHOL 161 05/06/2021    HDL 73 05/06/2021    LDL 76 05/06/2021      Lab Results   Component Value Date    WBC 6.8 05/30/2023    HGB 10.8 (L) 05/30/2023    HCT 32.9 (L) 05/30/2023    MCV 89 05/30/2023     05/30/2023    Lab Results   Component Value Date    TSH <0.01 (L) 05/30/2023    INR 1.26 (H) 05/24/2023         The patient states understanding and is agreeable with the plan.     Nika Haddad PA-C  [facility]  Electrophysiology Consult Service  Pager: [phone]    I spent a total of 30 minutes face to face with  Isadora Mendez during today's office visit. I have spent an additional 30 minutes today on chart review and documentation.

## 2023-06-29 ENCOUNTER — ANCILLARY PROCEDURE (OUTPATIENT)
Dept: CARDIOLOGY | Facility: CLINIC | Age: 88
End: 2023-06-29
Attending: INTERNAL MEDICINE
Payer: COMMERCIAL

## 2023-06-29 ENCOUNTER — LAB (OUTPATIENT)
Dept: LAB | Facility: CLINIC | Age: 88
End: 2023-06-29
Payer: COMMERCIAL

## 2023-06-29 ENCOUNTER — TELEPHONE (OUTPATIENT)
Dept: ENDOCRINOLOGY | Facility: CLINIC | Age: 88
End: 2023-06-29

## 2023-06-29 ENCOUNTER — DOCUMENTATION ONLY (OUTPATIENT)
Dept: INTERNAL MEDICINE | Facility: CLINIC | Age: 88
End: 2023-06-29

## 2023-06-29 ENCOUNTER — OFFICE VISIT (OUTPATIENT)
Dept: CARDIOLOGY | Facility: CLINIC | Age: 88
End: 2023-06-29
Payer: COMMERCIAL

## 2023-06-29 VITALS
WEIGHT: 117.5 LBS | SYSTOLIC BLOOD PRESSURE: 133 MMHG | BODY MASS INDEX: 21.22 KG/M2 | OXYGEN SATURATION: 98 % | HEART RATE: 65 BPM | DIASTOLIC BLOOD PRESSURE: 74 MMHG

## 2023-06-29 DIAGNOSIS — I50.30 DIASTOLIC HEART FAILURE, UNSPECIFIED HF CHRONICITY (H): ICD-10-CM

## 2023-06-29 DIAGNOSIS — I27.20 PULMONARY HYPERTENSION (H): ICD-10-CM

## 2023-06-29 DIAGNOSIS — I49.5 SICK SINUS SYNDROME (H): ICD-10-CM

## 2023-06-29 DIAGNOSIS — R63.4 WEIGHT LOSS: ICD-10-CM

## 2023-06-29 DIAGNOSIS — I48.0 PAROXYSMAL A-FIB (H): ICD-10-CM

## 2023-06-29 DIAGNOSIS — E05.90 HYPERTHYROIDISM: ICD-10-CM

## 2023-06-29 DIAGNOSIS — I48.0 PAROXYSMAL A-FIB (H): Primary | ICD-10-CM

## 2023-06-29 LAB
ALBUMIN SERPL BCG-MCNC: 3.3 G/DL (ref 3.5–5.2)
ALP SERPL-CCNC: 117 U/L (ref 35–104)
ALT SERPL W P-5'-P-CCNC: 15 U/L (ref 0–50)
ANION GAP SERPL CALCULATED.3IONS-SCNC: 7 MMOL/L (ref 7–15)
AST SERPL W P-5'-P-CCNC: 25 U/L (ref 0–45)
BILIRUB SERPL-MCNC: 0.4 MG/DL
BUN SERPL-MCNC: 18.6 MG/DL (ref 8–23)
CALCIUM SERPL-MCNC: 9.8 MG/DL (ref 8.2–9.6)
CHLORIDE SERPL-SCNC: 103 MMOL/L (ref 98–107)
CREAT SERPL-MCNC: 0.73 MG/DL (ref 0.51–0.95)
DEPRECATED HCO3 PLAS-SCNC: 26 MMOL/L (ref 22–29)
GFR SERPL CREATININE-BSD FRML MDRD: 77 ML/MIN/1.73M2
GLUCOSE SERPL-MCNC: 115 MG/DL (ref 70–99)
POTASSIUM SERPL-SCNC: 4.6 MMOL/L (ref 3.4–5.3)
PROT SERPL-MCNC: 6.7 G/DL (ref 6.4–8.3)
SODIUM SERPL-SCNC: 136 MMOL/L (ref 136–145)
T3 SERPL-MCNC: 134 NG/DL (ref 85–202)
T4 FREE SERPL-MCNC: 1.69 NG/DL (ref 0.9–1.7)
T4 SERPL-MCNC: 8.2 UG/DL (ref 4.5–11.7)
TSH SERPL DL<=0.005 MIU/L-ACNC: <0.01 UIU/ML (ref 0.3–4.2)

## 2023-06-29 PROCEDURE — 84134 ASSAY OF PREALBUMIN: CPT | Performed by: INTERNAL MEDICINE

## 2023-06-29 PROCEDURE — G0463 HOSPITAL OUTPT CLINIC VISIT: HCPCS

## 2023-06-29 PROCEDURE — 80053 COMPREHEN METABOLIC PANEL: CPT | Performed by: PATHOLOGY

## 2023-06-29 PROCEDURE — 84480 ASSAY TRIIODOTHYRONINE (T3): CPT

## 2023-06-29 PROCEDURE — 84439 ASSAY OF FREE THYROXINE: CPT | Performed by: PATHOLOGY

## 2023-06-29 PROCEDURE — 84443 ASSAY THYROID STIM HORMONE: CPT | Performed by: PATHOLOGY

## 2023-06-29 PROCEDURE — 99215 OFFICE O/P EST HI 40 MIN: CPT | Mod: 25

## 2023-06-29 PROCEDURE — 36415 COLL VENOUS BLD VENIPUNCTURE: CPT | Performed by: PATHOLOGY

## 2023-06-29 PROCEDURE — 84436 ASSAY OF TOTAL THYROXINE: CPT

## 2023-06-29 PROCEDURE — 99000 SPECIMEN HANDLING OFFICE-LAB: CPT | Performed by: PATHOLOGY

## 2023-06-29 PROCEDURE — 93280 PM DEVICE PROGR EVAL DUAL: CPT | Performed by: INTERNAL MEDICINE

## 2023-06-29 RX ORDER — DILTIAZEM HYDROCHLORIDE EXTENDED-RELEASE TABLETS 360 MG/1
360 TABLET, EXTENDED RELEASE ORAL DAILY
Qty: 90 TABLET | Refills: 1 | Status: SHIPPED | OUTPATIENT
Start: 2023-06-29 | End: 2023-07-05 | Stop reason: SINTOL

## 2023-06-29 ASSESSMENT — PAIN SCALES - GENERAL: PAINLEVEL: NO PAIN (0)

## 2023-06-29 NOTE — NURSING NOTE
Chief Complaint   Patient presents with     Follow Up     hospital folow up, see 5/24 hospital dc      Vitals were taken and medications reconciled.    Hari Robledo, EMT  1:37 PM

## 2023-06-29 NOTE — PATIENT INSTRUCTIONS
You were seen in the Electrophysiology Clinic today by: Nika GALAVIZ    Plan:       Follow up Visit: 3 months with Nika, device check prior  Referral to Pulmonary Hypertension Clinic      If you have further questions, please utilize [facility] to contact us.     Your Care Team:    EP Cardiology   Telephone Number     Nurse Line  Sera Saldivar, RN   Lyndsey Bradshaw, ELIANE   [phone]     For scheduling appointments:   [place]   [phone]   For procedure scheduling:    Freya Ivory     [phone]   For the Device Clinic (Pacemakers, ICDs, Loop Recorders)    During business hours: [phone]  After business hours:   [phone]- select option 4 and ask for job code [id number].       On-call cardiologist for after hours or on weekends:   [phone], option #4, and ask to speak to the on-call cardiologist.     Cardiovascular Clinic:   [address] [place] [address]. [place] [address]      As always, Thank you for trusting us with your health care needs!

## 2023-06-29 NOTE — PATIENT INSTRUCTIONS
It was a pleasure to see you in clinic today. Please do not hesitate to call with any questions or concerns.    EUGENIA Jordan, RN  Electrophysiology Nurse Clinician  [facility]  During business hours call:  [phone]  Urgent needs after hours- please call: [phone]- select option #4 and ask for job code [id number].

## 2023-06-29 NOTE — PROGRESS NOTES
Type of Form Received:     Form Received (Date) 6/29/23   Form Filled out Yes 7/3/23   Placed in provider folder Yes

## 2023-06-29 NOTE — LETTER
6/29/2023      RE: Isadora Mendez  [address] [facility] [address]       Dear Colleague,    Thank you for the opportunity to participate in the care of your patient, Isadora Mendez, at the [facility] [place] at [facility]. Please see a copy of my visit note below.        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       SSS s/p PPM implant 6/30/2010, s/p generator change 9/14/2016  Normal device function, stable lead trends, AP 87.1%, AF burden 0.3% occasional AF with RVR episodes avg ventricular rates 112-155 bpm. A fib management as below. Continue routine device follow up.    Paroxysmal Atrial Fibrillation   Initially detected on device check. Episodes intermittently symptomatic with palpiations. She had previously been on metoprolol succinate 100 mg in am, 50 mg in pm. At last visit with Dr TOÑITO Dixon disussed initiation amiodarone for futher rate control due to episodes becoming more frequent and of longer duration. At this time, she did not prefer to switch medications. With recent thyrotoxicosis prompting admission, and HFpEF physiology, discussed switching to diltiazem for further rate control. She is agreeable to this. Will stop Metoprolol and start diltiazem  mg daily. BP today 133/74.    [id number] score is 4, anticoagulation has been discussed, she again declines today. She understands increased stroke/clot risk.     Nonsustained VT   Known for past several years. She remains asymptomatic. Normal LVEF.       Referral to pulmonary HTN clinic.     Follow up in 3-4 months to ensure adequate rate control on diltiazem.      History of Present Illness/Subjective    Ms. Isadora Mendez is a 89 year old female who  comes in today for EP follow-up of SSS s/p PPM and atrial fibrillation.     Patient with history of sick sinus syndrome, underwent PPM implant 6/30/2010 and subsequent generator change 9/14/2016. When she was last seen by Dr TOÑITO Dixon on 3/16/23 device interrogation demonstrated AF episodes up to 8 hours with ventricular rates 100-150s. Given patient had been symptomatic, with longer episodes, already on metoprolol 100 mg in am, 50 mg in pm she discussed amiodarone initiation for further symptomatic relief. She had also discussed anticoagulation wither her. She did not wish to start any new medication and declined further anticoagulation.     She was recently hospitalized 5/24-5/27 after developing weakness, shortness of breath, hair loss and lass of appetite. Found to be in thyrotoxicosis and decompensated HFpEF. BNP 2,414 (previously 820) with BLE edema. TTE with LVEF 60-65%, RVSP estimated at 68 mmHg. During admission received diuresis, she was started on aldactone 12.5 mg daily and lasix 20 mg daily. She was started on methimazole 5/23, following with endocrinology.     She reports she has been feeling much better the past week. Daughter reports she has been eating more and acting more like herself. Pt reports she feels she has gained some of her strength back and is less short of breath.  She denies chest discomfort, palpitations, peripheral edema, orthopnea, lightheadedness, dizziness or syncope. Device interrogation shows normal device function, stable lead trends, AP 87.1%, AF burden 0.3% occasional AF with RVR episodes avg ventricular rates 112-155 bpm.     I have reviewed and updated the patient's Past Medical History, Social History, Family History and Medication List.     Cardiographics (Personally Reviewed) :   Echo: 5/25/23  Interpretation Summary  Left ventricular function is normal.The ejection fraction is 60-65%.  The right ventricle is normal size. Global right ventricular function is  normal.  RVSP  estimated at 68 mmHg. The IVC was not visualized. This is suggestive of  elevated pulmonary artery pressures.  Moderate left atrial enlargement is present.  No pericardial effusion is present.  This study was compared with the study from 10/21/22.  The RVSP has increased.    NM Stress Test: 10/21/22    The nuclear stress test is negative for inducible myocardial ischemia or infarction.    Left ventricular function is hyperdynamic.    There is no prior study for comparison.         Physical Examination   There were no vitals taken for this visit.  Wt Readings from Last 3 Encounters:   06/23/23 52.8 kg (116 lb 6.4 oz)   06/09/23 55.7 kg (122 lb 11.2 oz)   06/02/23 53.7 kg (118 lb 6.4 oz)   /74 (BP Location: Right arm, Patient Position: Chair, Cuff Size: Adult Small)   Pulse 65   Wt 53.3 kg (117 lb 8 oz)   SpO2 98%   BMI 21.22 kg/m      General Appearance:   Alert, well-appearing and in no acute distress.   HEENT: Atraumatic, normocephalic. MMM.   Chest/Lungs:   Respirations unlabored.  Lungs are clear to auscultation.   Cardiovascular:   Regular rate and rhythm.  S1/S2. No murmur.    Abdomen:  Soft, nontender, nondistended.   Extremities: No cyanosis or clubbing. No edema.    Musculoskeletal: Moves all extremities.     Skin: Warm, dry, intact.    Neurologic: Mood and affect are appropriate.  Alert and oriented to person, place, time, and situation.          Medications  Allergies   ASA 81 mg daily   Diltiazem  mg daily   Lisinopril 20 mg daily   Aldactone 12.5 mg daily   Lasix (held by PCP d/t kidney function)    Methimazole   Pepcid    No Known Allergies      Lab Results (Personally Reviewed)    Chemistry/lipid CBC Cardiac Enzymes/BNP/TSH/INR   Lab Results   Component Value Date    BUN 34.9 (H) 05/27/2023     05/27/2023    CO2 26 05/27/2023     Creatinine   Date Value Ref Range Status   05/27/2023 0.95 0.51 - 0.95 mg/dL Final   01/16/2020 0.79 0.52 - 1.04 mg/dL Final       Lab Results    Component Value Date    CHOL 161 05/06/2021    HDL 73 05/06/2021    LDL 76 05/06/2021      Lab Results   Component Value Date    WBC 6.8 05/30/2023    HGB 10.8 (L) 05/30/2023    HCT 32.9 (L) 05/30/2023    MCV 89 05/30/2023     05/30/2023    Lab Results   Component Value Date    TSH <0.01 (L) 05/30/2023    INR 1.26 (H) 05/24/2023        The patient states understanding and is agreeable with the plan.     Nika Haddad PA-C  [facility]  Electrophysiology Consult Service  Pager: [phone]    I spent a total of 30 minutes face to face with  Isadora Mendez during today's office visit. I have spent an additional 30 minutes today on chart review and documentation.

## 2023-06-29 NOTE — TELEPHONE ENCOUNTER
Rajat from lab calls asking if they can draw monthly labs today as pt is at lab for Dr Macias.   Labs drawn 05/30/2023. Approved lab draw for today.   Mary Dawson RN on 6/29/2023 at 12:36 PM

## 2023-06-30 ENCOUNTER — TELEPHONE (OUTPATIENT)
Dept: CARDIOLOGY | Facility: CLINIC | Age: 88
End: 2023-06-30
Payer: COMMERCIAL

## 2023-06-30 ENCOUNTER — MEDICAL CORRESPONDENCE (OUTPATIENT)
Dept: HEALTH INFORMATION MANAGEMENT | Facility: CLINIC | Age: 88
End: 2023-06-30
Payer: COMMERCIAL

## 2023-06-30 ENCOUNTER — PRE VISIT (OUTPATIENT)
Dept: CARDIOLOGY | Facility: CLINIC | Age: 88
End: 2023-06-30
Payer: COMMERCIAL

## 2023-06-30 DIAGNOSIS — E05.90 HYPERTHYROIDISM: ICD-10-CM

## 2023-06-30 LAB — PREALB SERPL IA-MCNC: 10 MG/DL (ref 15–45)

## 2023-06-30 RX ORDER — METHIMAZOLE 10 MG/1
TABLET ORAL
Qty: 90 TABLET | Refills: 1 | Status: SHIPPED | OUTPATIENT
Start: 2023-06-30 | End: 2023-07-21

## 2023-06-30 NOTE — TELEPHONE ENCOUNTER
RECORDS RECEIVED FROM: Yessi Mendezmonty on 6/30/2023     DATE RECEIVED:    GENERAL RECORDS STATUS DETAILS   OFFICE NOTE from cardiologists Internal 6-29-23 Geib   EKG (STRIPS & REPORTS) Internal 5-24-23   ECHOS (IMAGES AND REPORTS) Internal 5-24-23   PULMONARY HYPERTENSION      6 MINUTE WALK TEST N/A    PULMONARY FUNCTION TESTS N/A    RIGHT HEART CATH (IMAGES) N/A    SLEEP STUDY / OVERNIGHT OXIMETRY N/A    XR CHEST   (IMAGES AND REPORTS) Internal 5-24-23   CHEST CT  (IMAGES AND REPORTS) N/A    V/Q SCAN (IMAGES) N/A    LIVER US  (IMAGES AND REPORTS) Internal 5-24-23   ANGIOGRAMS (IMAGES) N/A    STRESS TEST   (IMAGES AND REPORTS) Received- [facility] 10-21-22

## 2023-06-30 NOTE — TELEPHONE ENCOUNTER
[facility] Call Center    Phone Message    May a detailed message be left on voicemail: yes     Reason for Call: Appointment Intake    Referring Provider Name: Nika Haddad PA-C  Diagnosis and/or Symptoms: NEW PH. Please assist patient with scheduling.     Action Taken: Message routed to:  [facility] Cardio    Travel Screening: Not Applicable

## 2023-07-03 ENCOUNTER — TELEPHONE (OUTPATIENT)
Dept: CARDIOLOGY | Facility: CLINIC | Age: 88
End: 2023-07-03
Payer: COMMERCIAL

## 2023-07-03 NOTE — TELEPHONE ENCOUNTER
[facility] Call Center    Phone Message    May a detailed message be left on voicemail: NO    Reason for Call: Medication Question or concern regarding medication   Prescription Clarification  Name of Medication: diltiazem ER (CARDIAZEM LA) 360 MG 24 hr tablet  Prescribing Provider:    Pharmacy:    What on the order needs clarification?   Patient has been experiencing shakes with taking this medications. Please reach out to patient or call Nicol  [phone]       Action Taken: Other: Cardiology     Travel Screening: Not Applicable     Thank you!  [facility]

## 2023-07-05 ENCOUNTER — DOCUMENTATION ONLY (OUTPATIENT)
Dept: INTERNAL MEDICINE | Facility: CLINIC | Age: 88
End: 2023-07-05
Payer: COMMERCIAL

## 2023-07-05 DIAGNOSIS — R79.1 ABNORMAL COAGULATION PROFILE: ICD-10-CM

## 2023-07-05 DIAGNOSIS — I27.20 PULMONARY HTN (H): Primary | ICD-10-CM

## 2023-07-05 DIAGNOSIS — Z11.59 ENCOUNTER FOR SCREENING FOR OTHER VIRAL DISEASES: ICD-10-CM

## 2023-07-05 DIAGNOSIS — R06.09 DOE (DYSPNEA ON EXERTION): ICD-10-CM

## 2023-07-05 DIAGNOSIS — R79.9 ABNORMAL FINDING OF BLOOD CHEMISTRY, UNSPECIFIED: ICD-10-CM

## 2023-07-05 DIAGNOSIS — R94.5 ABNORMAL RESULTS OF LIVER FUNCTION STUDIES: ICD-10-CM

## 2023-07-05 NOTE — PROGRESS NOTES
Type of Form Received:     Form Received (Date) 7/5/23   Form Filled out Yes, 7/7/23   Placed in provider folder Yes

## 2023-07-06 ENCOUNTER — DOCUMENTATION ONLY (OUTPATIENT)
Dept: INTERNAL MEDICINE | Facility: CLINIC | Age: 88
End: 2023-07-06
Payer: COMMERCIAL

## 2023-07-07 ENCOUNTER — MEDICAL CORRESPONDENCE (OUTPATIENT)
Dept: HEALTH INFORMATION MANAGEMENT | Facility: CLINIC | Age: 88
End: 2023-07-07
Payer: COMMERCIAL

## 2023-07-12 ENCOUNTER — TELEPHONE (OUTPATIENT)
Dept: INTERNAL MEDICINE | Facility: CLINIC | Age: 88
End: 2023-07-12
Payer: COMMERCIAL

## 2023-07-12 NOTE — TELEPHONE ENCOUNTER
[facility] Call Center    Phone Message    May a detailed message be left on voicemail: yes     Reason for Call: Order(s): Home Care Orders: Occupational Therapy (OT): OT orders: 1xwk for 3wks, ADL, energy conservation, activity tolerance.     Action Taken: Message routed to:  [facility] pcc    Travel Screening: Not Applicable

## 2023-07-13 ENCOUNTER — DOCUMENTATION ONLY (OUTPATIENT)
Dept: INTERNAL MEDICINE | Facility: CLINIC | Age: 88
End: 2023-07-13
Payer: COMMERCIAL

## 2023-07-13 NOTE — PROGRESS NOTES
Type of Form Received:     Form Received (Date) 7/13/23   Form Filled out Yes, 7/28/23   Placed in provider folder Yes

## 2023-07-13 NOTE — TELEPHONE ENCOUNTER
Called Alejandra and gave verbal orders per Dr. Macias for Order(s): Home Care Orders: Occupational Therapy (OT): OT orders: 1xwk for 3wks, ADL, energy conservation, activity tolerance.         James Dukes CMA ([facility]) at 9:37 AM on 7/13/2023

## 2023-07-17 LAB
MDC_IDC_EPISODE_DTM: NORMAL
MDC_IDC_EPISODE_DURATION: 0 S
MDC_IDC_EPISODE_DURATION: 1 S
MDC_IDC_EPISODE_DURATION: 1939 S
MDC_IDC_EPISODE_DURATION: 2 S
MDC_IDC_EPISODE_DURATION: 228 S
MDC_IDC_EPISODE_DURATION: 31 S
MDC_IDC_EPISODE_DURATION: 421 S
MDC_IDC_EPISODE_DURATION: 429 S
MDC_IDC_EPISODE_DURATION: 56 S
MDC_IDC_EPISODE_DURATION: NORMAL S
MDC_IDC_EPISODE_DURATION: NORMAL S
MDC_IDC_EPISODE_ID: 583
MDC_IDC_EPISODE_ID: 585
MDC_IDC_EPISODE_ID: 586
MDC_IDC_EPISODE_ID: 588
MDC_IDC_EPISODE_ID: 589
MDC_IDC_EPISODE_ID: 590
MDC_IDC_EPISODE_ID: 591
MDC_IDC_EPISODE_ID: 592
MDC_IDC_EPISODE_ID: 593
MDC_IDC_EPISODE_ID: 594
MDC_IDC_EPISODE_ID: 595
MDC_IDC_EPISODE_ID: 596
MDC_IDC_EPISODE_ID: 597
MDC_IDC_EPISODE_ID: 598
MDC_IDC_EPISODE_ID: 599
MDC_IDC_EPISODE_ID: 600
MDC_IDC_EPISODE_ID: 601
MDC_IDC_EPISODE_ID: 602
MDC_IDC_EPISODE_ID: 603
MDC_IDC_EPISODE_ID: 604
MDC_IDC_EPISODE_ID: 605
MDC_IDC_EPISODE_ID: 606
MDC_IDC_EPISODE_ID: 607
MDC_IDC_EPISODE_TYPE: NORMAL
MDC_IDC_LEAD_IMPLANT_DT: NORMAL
MDC_IDC_LEAD_IMPLANT_DT: NORMAL
MDC_IDC_LEAD_LOCATION: NORMAL
MDC_IDC_LEAD_LOCATION: NORMAL
MDC_IDC_LEAD_LOCATION_DETAIL_1: NORMAL
MDC_IDC_LEAD_LOCATION_DETAIL_1: NORMAL
MDC_IDC_LEAD_MFG: NORMAL
MDC_IDC_LEAD_MFG: NORMAL
MDC_IDC_LEAD_MODEL: NORMAL
MDC_IDC_LEAD_MODEL: NORMAL
MDC_IDC_LEAD_POLARITY_TYPE: NORMAL
MDC_IDC_LEAD_POLARITY_TYPE: NORMAL
MDC_IDC_LEAD_SERIAL: NORMAL
MDC_IDC_LEAD_SERIAL: NORMAL
MDC_IDC_LEAD_SPECIAL_FUNCTION: NORMAL
MDC_IDC_LEAD_SPECIAL_FUNCTION: NORMAL
MDC_IDC_MSMT_BATTERY_DTM: NORMAL
MDC_IDC_MSMT_BATTERY_REMAINING_LONGEVITY: 49 MO
MDC_IDC_MSMT_BATTERY_RRT_TRIGGER: 2.83
MDC_IDC_MSMT_BATTERY_STATUS: NORMAL
MDC_IDC_MSMT_BATTERY_VOLTAGE: 2.97 V
MDC_IDC_MSMT_LEADCHNL_RA_IMPEDANCE_VALUE: 418 OHM
MDC_IDC_MSMT_LEADCHNL_RA_PACING_THRESHOLD_AMPLITUDE: 1 V
MDC_IDC_MSMT_LEADCHNL_RA_PACING_THRESHOLD_PULSEWIDTH: 0.4 MS
MDC_IDC_MSMT_LEADCHNL_RA_SENSING_INTR_AMPL: 0.6 MV
MDC_IDC_MSMT_LEADCHNL_RV_IMPEDANCE_VALUE: 475 OHM
MDC_IDC_MSMT_LEADCHNL_RV_PACING_THRESHOLD_AMPLITUDE: 0.75 V
MDC_IDC_MSMT_LEADCHNL_RV_PACING_THRESHOLD_PULSEWIDTH: 0.4 MS
MDC_IDC_MSMT_LEADCHNL_RV_SENSING_INTR_AMPL: 11.1 MV
MDC_IDC_PG_IMPLANT_DTM: NORMAL
MDC_IDC_PG_MFG: NORMAL
MDC_IDC_PG_MODEL: NORMAL
MDC_IDC_PG_SERIAL: NORMAL
MDC_IDC_PG_TYPE: NORMAL
MDC_IDC_SESS_CLINIC_NAME: NORMAL
MDC_IDC_SESS_DTM: NORMAL
MDC_IDC_SESS_TYPE: NORMAL
MDC_IDC_SET_BRADY_AT_MODE_SWITCH_RATE: 171 {BEATS}/MIN
MDC_IDC_SET_BRADY_HYSTRATE: NORMAL
MDC_IDC_SET_BRADY_LOWRATE: 60 {BEATS}/MIN
MDC_IDC_SET_BRADY_MAX_SENSOR_RATE: 130 {BEATS}/MIN
MDC_IDC_SET_BRADY_MAX_TRACKING_RATE: 130 {BEATS}/MIN
MDC_IDC_SET_BRADY_MODE: NORMAL
MDC_IDC_SET_BRADY_PAV_DELAY_LOW: 180 MS
MDC_IDC_SET_BRADY_SAV_DELAY_LOW: 150 MS
MDC_IDC_SET_LEADCHNL_RA_PACING_AMPLITUDE: 2 V
MDC_IDC_SET_LEADCHNL_RA_PACING_ANODE_ELECTRODE_1: NORMAL
MDC_IDC_SET_LEADCHNL_RA_PACING_ANODE_LOCATION_1: NORMAL
MDC_IDC_SET_LEADCHNL_RA_PACING_CAPTURE_MODE: NORMAL
MDC_IDC_SET_LEADCHNL_RA_PACING_CATHODE_ELECTRODE_1: NORMAL
MDC_IDC_SET_LEADCHNL_RA_PACING_CATHODE_LOCATION_1: NORMAL
MDC_IDC_SET_LEADCHNL_RA_PACING_POLARITY: NORMAL
MDC_IDC_SET_LEADCHNL_RA_PACING_PULSEWIDTH: 0.4 MS
MDC_IDC_SET_LEADCHNL_RA_SENSING_ANODE_ELECTRODE_1: NORMAL
MDC_IDC_SET_LEADCHNL_RA_SENSING_ANODE_LOCATION_1: NORMAL
MDC_IDC_SET_LEADCHNL_RA_SENSING_CATHODE_ELECTRODE_1: NORMAL
MDC_IDC_SET_LEADCHNL_RA_SENSING_CATHODE_LOCATION_1: NORMAL
MDC_IDC_SET_LEADCHNL_RA_SENSING_POLARITY: NORMAL
MDC_IDC_SET_LEADCHNL_RA_SENSING_SENSITIVITY: 0.3 MV
MDC_IDC_SET_LEADCHNL_RV_PACING_AMPLITUDE: 2 V
MDC_IDC_SET_LEADCHNL_RV_PACING_ANODE_ELECTRODE_1: NORMAL
MDC_IDC_SET_LEADCHNL_RV_PACING_ANODE_LOCATION_1: NORMAL
MDC_IDC_SET_LEADCHNL_RV_PACING_CAPTURE_MODE: NORMAL
MDC_IDC_SET_LEADCHNL_RV_PACING_CATHODE_ELECTRODE_1: NORMAL
MDC_IDC_SET_LEADCHNL_RV_PACING_CATHODE_LOCATION_1: NORMAL
MDC_IDC_SET_LEADCHNL_RV_PACING_POLARITY: NORMAL
MDC_IDC_SET_LEADCHNL_RV_PACING_PULSEWIDTH: 0.4 MS
MDC_IDC_SET_LEADCHNL_RV_SENSING_ANODE_ELECTRODE_1: NORMAL
MDC_IDC_SET_LEADCHNL_RV_SENSING_ANODE_LOCATION_1: NORMAL
MDC_IDC_SET_LEADCHNL_RV_SENSING_CATHODE_ELECTRODE_1: NORMAL
MDC_IDC_SET_LEADCHNL_RV_SENSING_CATHODE_LOCATION_1: NORMAL
MDC_IDC_SET_LEADCHNL_RV_SENSING_POLARITY: NORMAL
MDC_IDC_SET_LEADCHNL_RV_SENSING_SENSITIVITY: 0.9 MV
MDC_IDC_SET_ZONE_DETECTION_INTERVAL: 350 MS
MDC_IDC_SET_ZONE_DETECTION_INTERVAL: 400 MS
MDC_IDC_SET_ZONE_TYPE: NORMAL
MDC_IDC_STAT_AT_BURDEN_PERCENT: 0.8 %
MDC_IDC_STAT_AT_DTM_END: NORMAL
MDC_IDC_STAT_AT_DTM_START: NORMAL
MDC_IDC_STAT_BRADY_AP_VP_PERCENT: 0.88 %
MDC_IDC_STAT_BRADY_AP_VS_PERCENT: 86.23 %
MDC_IDC_STAT_BRADY_AS_VP_PERCENT: 0 %
MDC_IDC_STAT_BRADY_AS_VS_PERCENT: 12.89 %
MDC_IDC_STAT_BRADY_DTM_END: NORMAL
MDC_IDC_STAT_BRADY_DTM_START: NORMAL
MDC_IDC_STAT_BRADY_RA_PERCENT_PACED: 85.78 %
MDC_IDC_STAT_BRADY_RV_PERCENT_PACED: 0.9 %
MDC_IDC_STAT_EPISODE_RECENT_COUNT: 0
MDC_IDC_STAT_EPISODE_RECENT_COUNT: 1
MDC_IDC_STAT_EPISODE_RECENT_COUNT: 158
MDC_IDC_STAT_EPISODE_RECENT_COUNT: 7
MDC_IDC_STAT_EPISODE_RECENT_COUNT_DTM_END: NORMAL
MDC_IDC_STAT_EPISODE_RECENT_COUNT_DTM_START: NORMAL
MDC_IDC_STAT_EPISODE_TOTAL_COUNT: 0
MDC_IDC_STAT_EPISODE_TOTAL_COUNT: 1
MDC_IDC_STAT_EPISODE_TOTAL_COUNT: 229
MDC_IDC_STAT_EPISODE_TOTAL_COUNT: 373
MDC_IDC_STAT_EPISODE_TOTAL_COUNT_DTM_END: NORMAL
MDC_IDC_STAT_EPISODE_TOTAL_COUNT_DTM_START: NORMAL
MDC_IDC_STAT_EPISODE_TYPE: NORMAL

## 2023-07-21 ENCOUNTER — LAB (OUTPATIENT)
Dept: LAB | Facility: CLINIC | Age: 88
End: 2023-07-21
Payer: COMMERCIAL

## 2023-07-21 DIAGNOSIS — E05.90 HYPERTHYROIDISM: ICD-10-CM

## 2023-07-21 DIAGNOSIS — Z11.59 ENCOUNTER FOR SCREENING FOR OTHER VIRAL DISEASES: ICD-10-CM

## 2023-07-21 DIAGNOSIS — R94.5 ABNORMAL RESULTS OF LIVER FUNCTION STUDIES: ICD-10-CM

## 2023-07-21 DIAGNOSIS — R79.1 ABNORMAL COAGULATION PROFILE: ICD-10-CM

## 2023-07-21 DIAGNOSIS — I27.20 PULMONARY HTN (H): ICD-10-CM

## 2023-07-21 DIAGNOSIS — R06.09 DOE (DYSPNEA ON EXERTION): ICD-10-CM

## 2023-07-21 DIAGNOSIS — R79.9 ABNORMAL FINDING OF BLOOD CHEMISTRY, UNSPECIFIED: ICD-10-CM

## 2023-07-21 LAB
ALBUMIN SERPL BCG-MCNC: 3.6 G/DL (ref 3.5–5.2)
ALP SERPL-CCNC: 116 U/L (ref 35–104)
ALT SERPL W P-5'-P-CCNC: 10 U/L (ref 0–50)
ANION GAP SERPL CALCULATED.3IONS-SCNC: 11 MMOL/L (ref 7–15)
AST SERPL W P-5'-P-CCNC: 22 U/L (ref 0–45)
BILIRUB DIRECT SERPL-MCNC: <0.2 MG/DL (ref 0–0.3)
BILIRUB SERPL-MCNC: 0.4 MG/DL
BUN SERPL-MCNC: 19.1 MG/DL (ref 8–23)
CALCIUM SERPL-MCNC: 9.6 MG/DL (ref 8.2–9.6)
CHLORIDE SERPL-SCNC: 104 MMOL/L (ref 98–107)
CHOLEST SERPL-MCNC: 158 MG/DL
CREAT SERPL-MCNC: 0.78 MG/DL (ref 0.51–0.95)
CRP SERPL-MCNC: 28.2 MG/L
DEPRECATED HCO3 PLAS-SCNC: 22 MMOL/L (ref 22–29)
ERYTHROCYTE [DISTWIDTH] IN BLOOD BY AUTOMATED COUNT: 14.9 % (ref 10–15)
GFR SERPL CREATININE-BSD FRML MDRD: 71 ML/MIN/1.73M2
GLUCOSE SERPL-MCNC: 97 MG/DL (ref 70–99)
HBV CORE AB SERPL QL IA: NONREACTIVE
HBV SURFACE AB SERPL IA-ACNC: 0 M[IU]/ML
HBV SURFACE AB SERPL IA-ACNC: NONREACTIVE M[IU]/ML
HBV SURFACE AG SERPL QL IA: NONREACTIVE
HCT VFR BLD AUTO: 35.3 % (ref 35–47)
HCV AB SERPL QL IA: NONREACTIVE
HDLC SERPL-MCNC: 54 MG/DL
HGB BLD-MCNC: 11.3 G/DL (ref 11.7–15.7)
HIV 1+2 AB+HIV1 P24 AG SERPL QL IA: NONREACTIVE
INR PPP: 1.08 (ref 0.85–1.15)
IRON BINDING CAPACITY (ROCHE): 215 UG/DL (ref 240–430)
IRON SATN MFR SERPL: 18 % (ref 15–46)
IRON SERPL-MCNC: 39 UG/DL (ref 37–145)
LDLC SERPL CALC-MCNC: 86 MG/DL
MCH RBC QN AUTO: 27.8 PG (ref 26.5–33)
MCHC RBC AUTO-ENTMCNC: 32 G/DL (ref 31.5–36.5)
MCV RBC AUTO: 87 FL (ref 78–100)
NONHDLC SERPL-MCNC: 104 MG/DL
NT-PROBNP SERPL-MCNC: 704 PG/ML (ref 0–1800)
PLATELET # BLD AUTO: 242 10E3/UL (ref 150–450)
POTASSIUM SERPL-SCNC: 4.2 MMOL/L (ref 3.4–5.3)
PROT SERPL-MCNC: 7.2 G/DL (ref 6.4–8.3)
RBC # BLD AUTO: 4.06 10E6/UL (ref 3.8–5.2)
SODIUM SERPL-SCNC: 137 MMOL/L (ref 136–145)
T3 SERPL-MCNC: 63 NG/DL (ref 85–202)
T4 FREE SERPL-MCNC: 0.77 NG/DL (ref 0.9–1.7)
T4 SERPL-MCNC: 4.5 UG/DL (ref 4.5–11.7)
TRIGL SERPL-MCNC: 91 MG/DL
TSH SERPL DL<=0.005 MIU/L-ACNC: 0.39 UIU/ML (ref 0.3–4.2)
WBC # BLD AUTO: 7.2 10E3/UL (ref 4–11)

## 2023-07-21 PROCEDURE — 80061 LIPID PANEL: CPT

## 2023-07-21 PROCEDURE — 86140 C-REACTIVE PROTEIN: CPT

## 2023-07-21 PROCEDURE — 80053 COMPREHEN METABOLIC PANEL: CPT

## 2023-07-21 PROCEDURE — 86706 HEP B SURFACE ANTIBODY: CPT

## 2023-07-21 PROCEDURE — 86803 HEPATITIS C AB TEST: CPT

## 2023-07-21 PROCEDURE — 84439 ASSAY OF FREE THYROXINE: CPT

## 2023-07-21 PROCEDURE — 85613 RUSSELL VIPER VENOM DILUTED: CPT

## 2023-07-21 PROCEDURE — 84238 ASSAY NONENDOCRINE RECEPTOR: CPT | Mod: 90

## 2023-07-21 PROCEDURE — 86039 ANTINUCLEAR ANTIBODIES (ANA): CPT

## 2023-07-21 PROCEDURE — 86704 HEP B CORE ANTIBODY TOTAL: CPT

## 2023-07-21 PROCEDURE — 86431 RHEUMATOID FACTOR QUANT: CPT

## 2023-07-21 PROCEDURE — 83529 ASAY OF INTERLEUKIN-6 (IL-6): CPT

## 2023-07-21 PROCEDURE — 85610 PROTHROMBIN TIME: CPT

## 2023-07-21 PROCEDURE — 83880 ASSAY OF NATRIURETIC PEPTIDE: CPT

## 2023-07-21 PROCEDURE — 84480 ASSAY TRIIODOTHYRONINE (T3): CPT

## 2023-07-21 PROCEDURE — 82248 BILIRUBIN DIRECT: CPT

## 2023-07-21 PROCEDURE — 83540 ASSAY OF IRON: CPT

## 2023-07-21 PROCEDURE — 86038 ANTINUCLEAR ANTIBODIES: CPT

## 2023-07-21 PROCEDURE — 87389 HIV-1 AG W/HIV-1&-2 AB AG IA: CPT

## 2023-07-21 PROCEDURE — 84443 ASSAY THYROID STIM HORMONE: CPT

## 2023-07-21 PROCEDURE — 85730 THROMBOPLASTIN TIME PARTIAL: CPT

## 2023-07-21 PROCEDURE — 36415 COLL VENOUS BLD VENIPUNCTURE: CPT

## 2023-07-21 PROCEDURE — 87340 HEPATITIS B SURFACE AG IA: CPT

## 2023-07-21 PROCEDURE — 83550 IRON BINDING TEST: CPT

## 2023-07-21 PROCEDURE — 85390 FIBRINOLYSINS SCREEN I&R: CPT | Performed by: PATHOLOGY

## 2023-07-21 PROCEDURE — 99000 SPECIMEN HANDLING OFFICE-LAB: CPT

## 2023-07-21 PROCEDURE — 85027 COMPLETE CBC AUTOMATED: CPT

## 2023-07-21 RX ORDER — METHIMAZOLE 10 MG/1
10 TABLET ORAL DAILY
Qty: 30 TABLET | Refills: 3 | Status: SHIPPED | OUTPATIENT
Start: 2023-07-21 | End: 2023-09-13 | Stop reason: DRUGHIGH

## 2023-07-24 ENCOUNTER — TRANSFERRED RECORDS (OUTPATIENT)
Dept: HEALTH INFORMATION MANAGEMENT | Facility: CLINIC | Age: 88
End: 2023-07-24

## 2023-07-24 ENCOUNTER — VIRTUAL VISIT (OUTPATIENT)
Dept: ENDOCRINOLOGY | Facility: CLINIC | Age: 88
End: 2023-07-24
Payer: COMMERCIAL

## 2023-07-24 ENCOUNTER — TELEPHONE (OUTPATIENT)
Dept: CARDIOLOGY | Facility: CLINIC | Age: 88
End: 2023-07-24

## 2023-07-24 VITALS — BODY MASS INDEX: 20.76 KG/M2 | WEIGHT: 115 LBS

## 2023-07-24 DIAGNOSIS — E04.1 THYROID NODULE: ICD-10-CM

## 2023-07-24 DIAGNOSIS — E05.00 GRAVES DISEASE: Primary | ICD-10-CM

## 2023-07-24 LAB
DRVVT SCREEN RATIO: 1.04
LA PPP-IMP: NEGATIVE
LUPUS INTERPRETATION: ABNORMAL
PLATELET NEUTRALIZATION: -1 SECONDS
PTT 1:2 MIX: 41 SECONDS (ref 31–45)
PTT RATIO: 1.32
RHEUMATOID FACT SER NEPH-ACNC: <6 IU/ML
THROMBIN TIME: 17.8 SECONDS (ref 13–19)

## 2023-07-24 PROCEDURE — 99214 OFFICE O/P EST MOD 30 MIN: CPT | Mod: VID

## 2023-07-24 ASSESSMENT — PAIN SCALES - GENERAL: PAINLEVEL: NO PAIN (0)

## 2023-07-24 NOTE — LETTER
7/24/2023       RE: Isadora Mendez  [address]  [facility] [address]     Dear Colleague,    Thank you for referring your patient, Isadora Mendez, to the [facility] ENDOCRINOLOGY [facility]INIC [place] at [facility]. Please see a copy of my visit note below.    Endocrine  Video visit-     Attending Assessment/Plan :     Graves hyperthroidism  with goiter.  Unstable.  Methimazole 10 mg/day since 7/21 after the last lat.   Repeat labs 1 month      Thyroid nodules seen on recent US . For now I do not recommend focus on this .     Prediabetes     Hypercalcemia (mild, intermittently noted)  with inappropriately normal PTH .  Unclear etiology at this point.  Continue to monitor Ca periodically    Voice concern -- resolved.     Pruritis resolved     Due to the continued risks of COVID 19 transmission and to improve ease of access this visit was a video visit. The patient gave verbal consent for the visit today.    I have independently reviewed and interpreted labs, imaging as indicated.     Distant Location (provider location):  Off-site  Mode of Communication:  Video Conference via US[employer]  Chart review/prep time 1    Visit Start time  1729  Visit Stop time   1741   35__ minutes spent on the date of the encounter doing chart review, history and exam, documentation and further activities as noted above.  .  June Acuña MD    Chief complaint:  HISTORY OF PRESENT ILLNESS  Isadora returns for follow up of Graves' hyperthyroidism with goiter.    She presents today along with her daughter Aida. I initially met Isadora during the hospitalization [facility] 5/24-5/27/23 when she was admitted for complaints of dizziness, nausea and vomiting.   TFTS were very high.  CRP inflammation was also high.  Nontender goiter was noted on exam.  She was started on methimazole   She was last seen by me 5/30/2023.  Since then, she has continued methimazole ad monthly labs.  The most  recent labs were 7/21/23 in anticipation of this appt.  We have progressively reduced the mMI dose from 10 bid to 10/5 and now to 10 mg/day since the 7/21/23 labs.      There has been a weight loss in the last months, from 126 on 5/23 to 117 now . Weight is going up - up to 115 from 114 vicente ; couldn't eat for so long but eating good now.      Hypercalcemia has been noted intermittently noted since 9/222. PTH was low with normal iCa 9/13/22 . PTH was inappropriately normal 5/30/23.     She has also seen Dr Hill in internal medicine.     Endocrine relevant labs are as follows:  8/6/07 TSH 0.73  8/18/10 TSH 1.13  8/18/11 TSH 0.464  5/15/12 TSh 0.39  8/21/12 TSH 0.95  1/15/16 TSH 0.448  9/2/22  Ca 9.8, creatinine 0.87  9/13/22 iCa 5, PTH 14, 25OHD 66,   4/6/23 CT contrast  5/23/23 weight 126#   5/24/23 TSH < 0.01, free T4 > 7.77, BNP 2414, troponin 38, glucose 122, Ca 9.9, creatinine 0.89, WBC 7000, platelets 146K  5/25/23 TSH < 0.01, T3 420, free t4 7.71, T4 16.9, CRP 9.1, Ca 9.8  5/24/23 TSI 2.5 - consistent with Graves'   5/30/23 TSH < 0.01, free T4 5.3, T3 264, Ca 10, phos 4.2, albumin 3.1, 25OHD 69, PTH 33, WBC 6800, ANC 4000, Hgb 10.8, platelets 162K- - on MMI 10 mg bid.  6/2/23 weight 118#   6/29/23 weight 117# TSH < 0.01, free T4 1.69, T3 134, Ca 9.8, albumin 3.3, alk phos 117, ALT 15, AST 25, creatinine 0.73, bili 0.4, on MMI 10 mg bid. ; reduce to 10 /5   7/21/23 TSH 0.39, free T4 0.77, T3 91, Ca 9.6, alk phos 116, ALT 10, AST 22, bili 0.4, CRP 28.2, ; Reduce MMI to 10 mg/day    Relevant imaging is as follows: (as read by me as it pertains to endocrine relevant organs)  There is no thyroid imaging  4/6/2023 CT abdomen and pelvis: adrenals appear normal  5/21/2023 CXR: small pleural effusion; no obvious evidence of large goiter   6/15/23 thyroid US:   Right mid 2 1.6 x 1.3 x  1.4 cm   Right lower 3  0.8 x 0.7  X 0.8   Right lower 4 posterior  1.1 x 1 x 1.6 cm     5/25/23 EKg NSR HR 72/minute     REVIEW OF  SYSTEMS  Doing good  Almost back to normal  Sleep at night variable.   Voice got better , has strength back  Pruritis resolved - !   Eyes: really dry; needs to have cataract removed; just saw eye doctor today - ; no diplopia  Cardiac: negative  Respiratory: a little breathless here and there   GB full of stones  Need more endurance and balance  No falls   ? Tiny bit of shakiness yesterday and today.   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      Past Medical History  Past Medical History:   Diagnosis Date    Atrial fibrillation (H) 01/01/2011    Cataract     Closed nondisplaced fracture of styloid process of radius     Dry eyes     Facial fracture (H) 01/01/2006    Hypertension     Infection due to 2019 novel coronavirus 10/2022    or 11/22- took paxolovid    Low bone density     NSVT (nonsustained ventricular tachycardia) (H) 01/01/2009    during exercise echo, neg EP study    Pacemaker 07/01/2010    Postmenopausal status     S/P cardiac catheterization 01/01/2009    30-40% non obstructive lesion    SSS (sick sinus syndrome) (H) 10/2022    Ventricular tachycardia, nonsustained (H) 01/01/2009    during stress echo     Past Surgical History:   Procedure Laterality Date    CATARACT EXTRACTION W/ INTRAOCULAR LENS IMPLANT Right 12/04/2018    IMPLANT PACEMAKER      PPM  06/30/2010    Permanent Pacemaker       Medications  Current Outpatient Medications   Medication Sig Dispense Refill    aspirin 81 MG tablet Take 1 tablet by mouth daily.      diclofenac (VOLTAREN) 1 % topical gel Apply 2 g topically 4 times daily as needed for other (Joint and muscle pain as needed) 50 g 0    famotidine (PEPCID) 20 MG tablet Take 1 tablet (20 mg) by mouth 2 times daily 180 tablet 3    lisinopril (ZESTRIL) 20 MG  tablet Take 1 tablet (20 mg) by mouth daily 90 tablet 3    melatonin 1 MG/ML LIQD liquid Take 1 mg by mouth nightly as needed for sleep      methimazole (TAPAZOLE) 10 MG tablet Take 1 tablet (10 mg) by mouth daily 30 tablet 3    metoprolol succinate ER (TOPROL XL) 50 MG 24 hr tablet Take 2 tablets (100mg) in the morning, and 1 tablet (50mg) in the evening.      Nutritional Supplements (PYCNOGENOL) 300-30 MG CAPS per capsule Take 1 capsule by mouth daily (Patient not taking: Reported on 6/23/2023)      nystatin (MYCOSTATIN) 483308 UNIT/GM external cream Apply topically 2 times daily 90 g 3    nystatin (MYCOSTATIN) 699503 UNIT/ML suspension Take 5 mLs (500,000 Units) by mouth 4 times daily swish and spit 473 mL 0    ondansetron (ZOFRAN) 4 MG tablet Take 1 tablet (4 mg) by mouth every 6 hours as needed for nausea or vomiting 60 tablet 3    spironolactone (ALDACTONE) 25 MG tablet Take 0.5 tablets (12.5 mg) by mouth daily 45 tablet 3    Vitamin D, Cholecalciferol, 1000 units TABS Take 1 tablet by mouth daily (Patient not taking: Reported on 6/23/2023)       She has been off supplements, including calcium, since the hospital    Allergies  No Known Allergies    Family History  family history includes Arrhythmia in her brother, brother, and sister; Atrial fibrillation in her son; Blood Disease in her son; Breast Cancer (age of onset: 50) in her daughter; Cardiovascular in her paternal grandfather; Cardiovascular (age of onset: 76) in her father; Colon Cancer in her sister; Coronary Artery Disease in her father and paternal grandfather; Diabetes Type 2  in her maternal grandfather; Hypertension in her mother; Leukemia in her maternal grandfather; Macular Degeneration in her mother; Myocardial Infarction (age of onset: 70) in her father; Myocardial Infarction (age of onset: 88) in her mother; Neuropathy in her sister; Thyroid Disease in her daughter; Uterine Cancer in her daughter.    Social History  ; lives with  ";     Physical Exam  There is no height or weight on file to calculate BMI.   BP Readings from Last 1 Encounters:   06/29/23 133/74      Pulse Readings from Last 1 Encounters:   06/29/23 65      Resp Readings from Last 1 Encounters:   05/27/23 18      Temp Readings from Last 1 Encounters:   06/10/23 98.1  F (36.7  C) (Oral)      SpO2 Readings from Last 1 Encounters:   06/29/23 98%      Wt Readings from Last 1 Encounters:   06/29/23 53.3 kg (117 lb 8 oz)      Ht Readings from Last 1 Encounters:   06/23/23 1.585 m (5' 2.4\")     GENERAL: no distress; I can see from neck up up;  Her voice is the same as I remember from the past, unremarkable    SKIN: Visible skin clear. No icterus ;   EYES: glasses Eyes grossly normal to inspection.   NECK: visible goiter is not present; no visible neck masses  RESP: No audible wheeze, cough, or  increased work of breathing.    NEURO: Awake, alert, responds appropriately to questions.  Mentation and speech fluent.  PSYCH:affect normal and appearance well-groomed.    DATA REVIEW     Latest Ref Rng 5/15/2012  9:14 AM 8/21/2012  7:44 AM 5/24/2023  6:52 PM   ENDO THYROID LABS-UMP       Prealbumin 15 - 45 mg/dL      TSH 0.4 - 5.0 mU/L 0.39 (L)  0.95     TSH 0.30 - 4.20 uIU/mL   <0.01 (L)    Triiodothyronine (T3) 85 - 202 ng/dL      T4 4.5 - 11.7 ug/dL      FREE T4 0.90 - 1.70 ng/dL   >7.77 (H)    Thyroid Stim Immunog <=1.3 TSI index   2.5 (H)       Latest Ref Rng 5/25/2023  6:35 AM 5/30/2023  4:08 PM 6/29/2023  12:41 PM   ENDO THYROID LABS-UMP       Prealbumin 15 - 45 mg/dL   10 (L)    TSH 0.4 - 5.0 mU/L      TSH 0.30 - 4.20 uIU/mL <0.01 (L)  <0.01 (L)  <0.01 (L)    Triiodothyronine (T3) 85 - 202 ng/dL 420 (H)  264 (H)  134    T4 4.5 - 11.7 ug/dL 16.9 (H)   8.2    FREE T4 0.90 - 1.70 ng/dL 7.71 (H)  5.30 (H)  1.69    Thyroid Stim Immunog <=1.3 TSI index         Latest Ref Rng 7/21/2023  10:44 AM   ENDO THYROID LABS-UMP     Prealbumin 15 - 45 mg/dL    TSH 0.4 - 5.0 mU/L    TSH 0.30 - " 4.20 uIU/mL 0.39    Triiodothyronine (T3) 85 - 202 ng/dL 63 (L)    T4 4.5 - 11.7 ug/dL 4.5    FREE T4 0.90 - 1.70 ng/dL 0.77 (L)    Thyroid Stim Immunog <=1.3 TSI index       Legend:  (L) Low  (H) High     Latest Ref Rng 5/27/2023  8:01 AM 5/30/2023  4:08 PM   ENDO CALCIUM LABS-UMP      Vitamin D Deficiency screening 20 - 75 ug/L  69    Albumin 3.4 - 5.0 g/dL     Albumin 3.5 - 5.2 g/dL  3.1 (L)    Alkaline Phosphatase 35 - 104 U/L  89    Amylase 28 - 100 U/L     CALCIUM IONIZED WB 4.4 - 5.2 mg/dL     Calcium 8.2 - 9.6 mg/dL 9.8 (H)  10.0 (H)    Urea Nitrogen 7 - 30 mg/dL     Urea Nitrogen 8.0 - 23.0 mg/dL 34.9 (H)     Creatinine 0.51 - 0.95 mg/dL 0.95     Lipase 13 - 60 U/L     Magnesium 1.7 - 2.3 mg/dL 1.5 (L)     Parathyroid Hormone Intact 15 - 65 pg/mL  33       Latest Ref Rng 6/29/2023  12:41 PM 7/21/2023  10:44 AM   ENDO CALCIUM LABS-UMP      Vitamin D Deficiency screening 20 - 75 ug/L     Albumin 3.4 - 5.0 g/dL     Albumin 3.5 - 5.2 g/dL 3.3 (L)  3.6    Alkaline Phosphatase 35 - 104 U/L 117 (H)  116 (H)    Amylase 28 - 100 U/L     CALCIUM IONIZED WB 4.4 - 5.2 mg/dL     Calcium 8.2 - 9.6 mg/dL 9.8 (H)  9.6    Urea Nitrogen 7 - 30 mg/dL     Urea Nitrogen 8.0 - 23.0 mg/dL 18.6  19.1    Creatinine 0.51 - 0.95 mg/dL 0.73  0.78    Lipase 13 - 60 U/L     Magnesium 1.7 - 2.3 mg/dL     Parathyroid Hormone Intact 15 - 65 pg/mL        Legend:  (H) High  (L) Low        Again, thank you for allowing me to participate in the care of your patient.      Sincerely,    June Acuña MD

## 2023-07-24 NOTE — PROGRESS NOTES
Endocrine  Video visit-     Attending Assessment/Plan :     Graves hyperthroidism  with goiter.  Unstable.  Methimazole 10 mg/day since 7/21 after the last lat.   Repeat labs 1 month      Addednum  8/25/23 TSH 41.2, free T4 0.37 - stop MMI and repeat labs one  week.   9/8/23 tsh 0.51, free T4 1.56, T3 200    change to MMI 5 mg/day  9/28/23 TSh 0.03, free T4 1.78, T3 123  -- change to MMI 7.5 mg/day  10/12/23 Tsh 0.39, free T4 1.15, T3 90- on MMI 7.5 mg/day  11/21/23 TSh 10.3, free T4 0.83-- change to 5 mg/day    Thyroid nodules seen on recent US . For now I do not recommend focus on this .     Prediabetes     Hypercalcemia (mild, intermittently noted)  with inappropriately normal PTH .  Unclear etiology at this point.  Continue to monitor Ca periodically    Voice concern -- resolved.     Pruritis resolved     Due to the continued risks of COVID 19 transmission and to improve ease of access this visit was a video visit. The patient gave verbal consent for the visit today.    I have independently reviewed and interpreted labs, imaging as indicated.     Distant Location (provider location):  Off-site  Mode of Communication:  Video Conference via [employer]  Chart review/prep time 1    Visit Start time  1729  Visit Stop time   1741   35__ minutes spent on the date of the encounter doing chart review, history and exam, documentation and further activities as noted above.  .  June Acuña MD    Chief complaint:  HISTORY OF PRESENT ILLNESS  Isadora returns for follow up of Graves' hyperthyroidism with goiter.    She presents today along with her daughter Aida. I initially met Isadora during the hospitalization [facility] 5/24-5/27/23 when she was admitted for complaints of dizziness, nausea and vomiting.   TFTS were very high.  CRP inflammation was also high.  Nontender goiter was noted on exam.  She was started on methimazole   She was last seen by me 5/30/2023.  Since then, she has continued methimazole ad monthly labs.   The most recent labs were 7/21/23 in anticipation of this appt.  We have progressively reduced the mMI dose from 10 bid to 10/5 and now to 10 mg/day since the 7/21/23 labs.      There has been a weight loss in the last months, from 126 on 5/23 to 117 now . Weight is going up - up to 115 from 114 vicente ; couldn't eat for so long but eating good now.      Hypercalcemia has been noted intermittently noted since 9/222. PTH was low with normal iCa 9/13/22 . PTH was inappropriately normal 5/30/23.     She has also seen Dr Hill in internal medicine.     Endocrine relevant labs are as follows:  8/6/07 TSH 0.73  8/18/10 TSH 1.13  8/18/11 TSH 0.464  5/15/12 TSh 0.39  8/21/12 TSH 0.95  1/15/16 TSH 0.448  9/2/22  Ca 9.8, creatinine 0.87  9/13/22 iCa 5, PTH 14, 25OHD 66,   4/6/23 CT contrast  5/23/23 weight 126#   5/24/23 TSH < 0.01, free T4 > 7.77, BNP 2414, troponin 38, glucose 122, Ca 9.9, creatinine 0.89, WBC 7000, platelets 146K  5/25/23 TSH < 0.01, T3 420, free t4 7.71, T4 16.9, CRP 9.1, Ca 9.8  5/24/23 TSI 2.5 - consistent with Graves'   5/30/23 TSH < 0.01, free T4 5.3, T3 264, Ca 10, phos 4.2, albumin 3.1, 25OHD 69, PTH 33, WBC 6800, ANC 4000, Hgb 10.8, platelets 162K- - on MMI 10 mg bid.  6/2/23 weight 118#   6/29/23 weight 117# TSH < 0.01, free T4 1.69, T3 134, Ca 9.8, albumin 3.3, alk phos 117, ALT 15, AST 25, creatinine 0.73, bili 0.4, on MMI 10 mg bid. ; reduce to 10 /5 7/21/23 TSH 0.39, free T4 0.77, T3 91, Ca 9.6, alk phos 116, ALT 10, AST 22, bili 0.4, CRP 28.2, ; Reduce MMI to 10 mg/day    Relevant imaging is as follows: (as read by me as it pertains to endocrine relevant organs)  There is no thyroid imaging  4/6/2023 CT abdomen and pelvis: adrenals appear normal  5/21/2023 CXR: small pleural effusion; no obvious evidence of large goiter   6/15/23 thyroid US:   Right mid 2 1.6 x 1.3 x  1.4 cm   Right lower 3  0.8 x 0.7  X 0.8   Right lower 4 posterior  1.1 x 1 x 1.6 cm     5/25/23 EKg NSR HR 72/minute      REVIEW OF SYSTEMS  Doing good  Almost back to normal  Sleep at night variable.   Voice got better , has strength back  Pruritis resolved - !   Eyes: really dry; needs to have cataract removed; just saw eye doctor today - ; no diplopia  Cardiac: negative  Respiratory: a little breathless here and there   GB full of stones  Need more endurance and balance  No falls   ? Tiny bit of shakiness yesterday and today.   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      Past Medical History  Past Medical History:   Diagnosis Date    Atrial fibrillation (H) 01/01/2011    Cataract     Closed nondisplaced fracture of styloid process of radius     Dry eyes     Facial fracture (H) 01/01/2006    Hypertension     Infection due to 2019 novel coronavirus 10/2022    or 11/22- took paxolovid    Low bone density     NSVT (nonsustained ventricular tachycardia) (H) 01/01/2009    during exercise echo, neg EP study    Pacemaker 07/01/2010    Postmenopausal status     S/P cardiac catheterization 01/01/2009    30-40% non obstructive lesion    SSS (sick sinus syndrome) (H) 10/2022    Ventricular tachycardia, nonsustained (H) 01/01/2009    during stress echo     Past Surgical History:   Procedure Laterality Date    CATARACT EXTRACTION W/ INTRAOCULAR LENS IMPLANT Right 12/04/2018    IMPLANT PACEMAKER      PPM  06/30/2010    Permanent Pacemaker       Medications  Current Outpatient Medications   Medication Sig Dispense Refill    aspirin 81 MG tablet Take 1 tablet by mouth daily.      diclofenac (VOLTAREN) 1 % topical gel Apply 2 g topically 4 times daily as needed for other (Joint and muscle pain as needed) 50 g 0    famotidine (PEPCID) 20 MG tablet Take 1 tablet (20 mg) by mouth 2 times daily 180 tablet 3    lisinopril  (ZESTRIL) 20 MG tablet Take 1 tablet (20 mg) by mouth daily 90 tablet 3    melatonin 1 MG/ML LIQD liquid Take 1 mg by mouth nightly as needed for sleep      methimazole (TAPAZOLE) 10 MG tablet Take 1 tablet (10 mg) by mouth daily 30 tablet 3    metoprolol succinate ER (TOPROL XL) 50 MG 24 hr tablet Take 2 tablets (100mg) in the morning, and 1 tablet (50mg) in the evening.      Nutritional Supplements (PYCNOGENOL) 300-30 MG CAPS per capsule Take 1 capsule by mouth daily (Patient not taking: Reported on 6/23/2023)      nystatin (MYCOSTATIN) 830370 UNIT/GM external cream Apply topically 2 times daily 90 g 3    nystatin (MYCOSTATIN) 107736 UNIT/ML suspension Take 5 mLs (500,000 Units) by mouth 4 times daily swish and spit 473 mL 0    ondansetron (ZOFRAN) 4 MG tablet Take 1 tablet (4 mg) by mouth every 6 hours as needed for nausea or vomiting 60 tablet 3    spironolactone (ALDACTONE) 25 MG tablet Take 0.5 tablets (12.5 mg) by mouth daily 45 tablet 3    Vitamin D, Cholecalciferol, 1000 units TABS Take 1 tablet by mouth daily (Patient not taking: Reported on 6/23/2023)       She has been off supplements, including calcium, since the hospital    Allergies  No Known Allergies    Family History  family history includes Arrhythmia in her brother, brother, and sister; Atrial fibrillation in her son; Blood Disease in her son; Breast Cancer (age of onset: 50) in her daughter; Cardiovascular in her paternal grandfather; Cardiovascular (age of onset: 76) in her father; Colon Cancer in her sister; Coronary Artery Disease in her father and paternal grandfather; Diabetes Type 2  in her maternal grandfather; Hypertension in her mother; Leukemia in her maternal grandfather; Macular Degeneration in her mother; Myocardial Infarction (age of onset: 70) in her father; Myocardial Infarction (age of onset: 88) in her mother; Neuropathy in her sister; Thyroid Disease in her daughter; Uterine Cancer in her daughter.    Social History  ;  "lives with ;     Physical Exam  There is no height or weight on file to calculate BMI.   BP Readings from Last 1 Encounters:   06/29/23 133/74      Pulse Readings from Last 1 Encounters:   06/29/23 65      Resp Readings from Last 1 Encounters:   05/27/23 18      Temp Readings from Last 1 Encounters:   06/10/23 98.1  F (36.7  C) (Oral)      SpO2 Readings from Last 1 Encounters:   06/29/23 98%      Wt Readings from Last 1 Encounters:   06/29/23 53.3 kg (117 lb 8 oz)      Ht Readings from Last 1 Encounters:   06/23/23 1.585 m (5' 2.4\")     GENERAL: no distress; I can see from neck up up;  Her voice is the same as I remember from the past, unremarkable    SKIN: Visible skin clear. No icterus ;   EYES: glasses Eyes grossly normal to inspection.   NECK: visible goiter is not present; no visible neck masses  RESP: No audible wheeze, cough, or  increased work of breathing.    NEURO: Awake, alert, responds appropriately to questions.  Mentation and speech fluent.  PSYCH:affect normal and appearance well-groomed.    DATA REVIEW     Latest Ref Rng 5/15/2012  9:14 AM 8/21/2012  7:44 AM 5/24/2023  6:52 PM   ENDO THYROID LABS-UMP       Prealbumin 15 - 45 mg/dL      TSH 0.4 - 5.0 mU/L 0.39 (L)  0.95     TSH 0.30 - 4.20 uIU/mL   <0.01 (L)    Triiodothyronine (T3) 85 - 202 ng/dL      T4 4.5 - 11.7 ug/dL      FREE T4 0.90 - 1.70 ng/dL   >7.77 (H)    Thyroid Stim Immunog <=1.3 TSI index   2.5 (H)       Latest Ref Rng 5/25/2023  6:35 AM 5/30/2023  4:08 PM 6/29/2023  12:41 PM   ENDO THYROID LABS-UMP       Prealbumin 15 - 45 mg/dL   10 (L)    TSH 0.4 - 5.0 mU/L      TSH 0.30 - 4.20 uIU/mL <0.01 (L)  <0.01 (L)  <0.01 (L)    Triiodothyronine (T3) 85 - 202 ng/dL 420 (H)  264 (H)  134    T4 4.5 - 11.7 ug/dL 16.9 (H)   8.2    FREE T4 0.90 - 1.70 ng/dL 7.71 (H)  5.30 (H)  1.69    Thyroid Stim Immunog <=1.3 TSI index         Latest Ref Rng 7/21/2023  10:44 AM   ENDO THYROID LABS-UMP     Prealbumin 15 - 45 mg/dL    TSH 0.4 - 5.0 mU/L  "   TSH 0.30 - 4.20 uIU/mL 0.39    Triiodothyronine (T3) 85 - 202 ng/dL 63 (L)    T4 4.5 - 11.7 ug/dL 4.5    FREE T4 0.90 - 1.70 ng/dL 0.77 (L)    Thyroid Stim Immunog <=1.3 TSI index       Legend:  (L) Low  (H) High     Latest Ref Rng 5/27/2023  8:01 AM 5/30/2023  4:08 PM   ENDO CALCIUM LABS-UMP      Vitamin D Deficiency screening 20 - 75 ug/L  69    Albumin 3.4 - 5.0 g/dL     Albumin 3.5 - 5.2 g/dL  3.1 (L)    Alkaline Phosphatase 35 - 104 U/L  89    Amylase 28 - 100 U/L     CALCIUM IONIZED WB 4.4 - 5.2 mg/dL     Calcium 8.2 - 9.6 mg/dL 9.8 (H)  10.0 (H)    Urea Nitrogen 7 - 30 mg/dL     Urea Nitrogen 8.0 - 23.0 mg/dL 34.9 (H)     Creatinine 0.51 - 0.95 mg/dL 0.95     Lipase 13 - 60 U/L     Magnesium 1.7 - 2.3 mg/dL 1.5 (L)     Parathyroid Hormone Intact 15 - 65 pg/mL  33       Latest Ref Rng 6/29/2023  12:41 PM 7/21/2023  10:44 AM   ENDO CALCIUM LABS-UMP      Vitamin D Deficiency screening 20 - 75 ug/L     Albumin 3.4 - 5.0 g/dL     Albumin 3.5 - 5.2 g/dL 3.3 (L)  3.6    Alkaline Phosphatase 35 - 104 U/L 117 (H)  116 (H)    Amylase 28 - 100 U/L     CALCIUM IONIZED WB 4.4 - 5.2 mg/dL     Calcium 8.2 - 9.6 mg/dL 9.8 (H)  9.6    Urea Nitrogen 7 - 30 mg/dL     Urea Nitrogen 8.0 - 23.0 mg/dL 18.6  19.1    Creatinine 0.51 - 0.95 mg/dL 0.73  0.78    Lipase 13 - 60 U/L     Magnesium 1.7 - 2.3 mg/dL     Parathyroid Hormone Intact 15 - 65 pg/mL        Legend:  (H) High  (L) Low

## 2023-07-24 NOTE — TELEPHONE ENCOUNTER
Patient Name: Isadora Mendez Age: 89 year old, female, 3/9/1931 MRN: [id number]   Referring Provider:  Nika Leung Appt:  7/25/23       PH Medications:  None      Patient History: Pt has a history of MCKNIGHT, AVB s/p pacer, paroxysmal afib,HTN, HLD, Chronotropic incompetence, goiter, hyperthyroidism, plerual effusion, chronic cholecystitis, glaucoma, dysequilibrium, myopia, presbyopia,      Recent Testing:       Date Test Epic Media/Scan Care Everywhere   na  RHC             RA             PA:    PVR:  RV             PAW:         COI: []  []   []     na Angio/Stress []  []   []     5/24/23 Echo           RVSP     68mmHg                                          LVEF   60-65%                      RV       Normal size and fxn                       []  []   []     5/24/23 EKG        Ectopic atrial rhythm with short AK   Incomplete right bundle branch block   Left anterior fascicular block   Minimal voltage criteria for LVH, may be normal variant ( [place] product )   Possible Anterior infarct  Abnormal ECG  []   []   []     na 6MWT       Meters/Lowest Sa02                      Max HR []   []   []     na PFT             Fev1/FVC                      FEV1                      FVC                      DLCO []   []   []     na Sleep Study  []  []   []     na Chest CT     []  []   []     na V/Q Scan []   []   []     5/24/23 Abd/Liver US  No findings of acute cholecystitis. []   []   []      Chest XR -  Small right pleural effusion. Otherwise no definitive  evidence of pulmonary edema. No focal air space opacities.  []   []   []         []   []   []      Jose Miguel Fabian RN on 7/24/2023 at 4:44 PM

## 2023-07-24 NOTE — NURSING NOTE
Is the patient currently in the state of [place]? YES    Visit mode:VIDEO    If the visit is dropped, the patient can be reconnected by: VIDEO VISIT: Text to cell phone: [phone]    Will anyone else be joining the visit? NO      How would you like to obtain your AVS? MyChart    Are changes needed to the allergy or medication list? NO    Reason for visit: RECHECK

## 2023-07-25 ENCOUNTER — OFFICE VISIT (OUTPATIENT)
Dept: CARDIOLOGY | Facility: CLINIC | Age: 88
End: 2023-07-25
Attending: INTERNAL MEDICINE
Payer: COMMERCIAL

## 2023-07-25 VITALS
OXYGEN SATURATION: 97 % | BODY MASS INDEX: 21.52 KG/M2 | SYSTOLIC BLOOD PRESSURE: 161 MMHG | HEART RATE: 78 BPM | DIASTOLIC BLOOD PRESSURE: 78 MMHG | WEIGHT: 119.2 LBS

## 2023-07-25 DIAGNOSIS — R06.09 DOE (DYSPNEA ON EXERTION): ICD-10-CM

## 2023-07-25 DIAGNOSIS — I27.20 PULMONARY HTN (H): ICD-10-CM

## 2023-07-25 DIAGNOSIS — I27.20 PULMONARY HYPERTENSION (H): ICD-10-CM

## 2023-07-25 DIAGNOSIS — R06.09 DYSPNEA ON EXERTION: Primary | ICD-10-CM

## 2023-07-25 LAB
6 MIN WALK (FT): 850 FT
6 MIN WALK (M): 259 M
ANA PAT SER IF-IMP: ABNORMAL
ANA SER QL IF: POSITIVE
ANA TITR SER IF: ABNORMAL {TITER}
IL6 SERPL-MCNC: 33.8 PG/ML
STFR SERPL-MCNC: 3.9 MG/L

## 2023-07-25 PROCEDURE — 99215 OFFICE O/P EST HI 40 MIN: CPT | Performed by: INTERNAL MEDICINE

## 2023-07-25 PROCEDURE — 94729 DIFFUSING CAPACITY: CPT | Performed by: INTERNAL MEDICINE

## 2023-07-25 PROCEDURE — 94375 RESPIRATORY FLOW VOLUME LOOP: CPT | Performed by: INTERNAL MEDICINE

## 2023-07-25 PROCEDURE — 94618 PULMONARY STRESS TESTING: CPT | Performed by: INTERNAL MEDICINE

## 2023-07-25 PROCEDURE — 94726 PLETHYSMOGRAPHY LUNG VOLUMES: CPT | Performed by: INTERNAL MEDICINE

## 2023-07-25 PROCEDURE — G0463 HOSPITAL OUTPT CLINIC VISIT: HCPCS | Performed by: INTERNAL MEDICINE

## 2023-07-25 ASSESSMENT — PAIN SCALES - GENERAL: PAINLEVEL: NO PAIN (0)

## 2023-07-25 NOTE — LETTER
7/25/2023      RE: Isadora Mendez  [address] [facility] [id number]       Dear Colleague,    Thank you for the opportunity to participate in the care of your patient, Isadora Mendez, at the [facility] [place] [facility]. Please see a copy of my visit note below.    July 25, 2023    Dear Colleagues,    I had the pleasure of seeing your patient Isadora Mendez at the [facility] Pulmonary Hypertension clinic.  As you know, Isadora is a 89 year old female with history of atrial fibrillation and SSS s/p pacemaker who presents for evaluation of pulmonary hypertension after an echocardiogram showed elevated PA pressures. The time her echocardiogram was collected at the time of a  Hospitalization for hyperthyroidism. Thankfully, she has minimal symptoms of exertional dyspnea. She does get dyspneic at at the top of one flight of stairs. She has no orthopnea or PND. She does not have presyncope or syncope.     She now has her hyperthyroidism under control. She is doing a lot of rehab and not having much issues with dyspnea. She has been practicing for her 6MWT today without any troubles. Overall, I would classify her as WHO FC 2.    PAST MEDICAL HISTORY:  Past Medical History:   Diagnosis Date    Atrial fibrillation (H) 01/01/2011    Cataract     Closed nondisplaced fracture of styloid process of radius     Dry eyes     Facial fracture (H) 01/01/2006    Hypertension     Infection due to 2019 novel coronavirus 10/2022    or 11/22- took paxolovid    Low bone density     NSVT (nonsustained ventricular tachycardia) (H) 01/01/2009    during exercise echo, neg EP study    Pacemaker 07/01/2010    Postmenopausal status     S/P cardiac catheterization 01/01/2009    30-40% non obstructive lesion    SSS (sick sinus syndrome) (H) 10/2022    Ventricular tachycardia, nonsustained (H) 01/01/2009    during stress echo       FAMILY HISTORY:  Family History    Problem Relation Age of Onset    Myocardial Infarction Mother 88        lived to 100    Hypertension Mother     Macular Degeneration Mother     Cardiovascular Father 76         age 80, MI 76 and CHF 80's    Coronary Artery Disease Father     Myocardial Infarction Father 70    Arrhythmia Sister         PPM    Neuropathy Sister     Colon Cancer Sister     Arrhythmia Brother         pacemaker    Arrhythmia Brother         pacemaker, hx rheumatic fever, heart failure    Leukemia Maternal Grandfather     Diabetes Type 2  Maternal Grandfather     Cardiovascular Paternal Grandfather          age 76 of CVD    Coronary Artery Disease Paternal Grandfather     Breast Cancer Daughter 50        Breast, uterine, 2nd breast cancer, HTN    Uterine Cancer Daughter     Thyroid Disease Daughter         bout of thyroiditis with thyrotoxicosis followed by hypothyroidism    Blood Disease Son         hemochromatosis?    Atrial fibrillation Son        SOCIAL HISTORY:  Social History     Socioeconomic History    Marital status:    Occupational History    Occupation: retired     Comment: nurse   Tobacco Use    Smoking status: Never    Smokeless tobacco: Never   Substance and Sexual Activity    Alcohol use: No    Drug use: No       CURRENT MEDICATIONS:  Current Outpatient Medications   Medication Sig Dispense Refill    aspirin 81 MG tablet Take 1 tablet by mouth daily.      diclofenac (VOLTAREN) 1 % topical gel Apply 2 g topically 4 times daily as needed for other (Joint and muscle pain as needed) 50 g 0    famotidine (PEPCID) 20 MG tablet Take 1 tablet (20 mg) by mouth 2 times daily 180 tablet 3    lisinopril (ZESTRIL) 20 MG tablet Take 1 tablet (20 mg) by mouth daily 90 tablet 3    melatonin 1 MG/ML LIQD liquid Take 1 mg by mouth nightly as needed for sleep      methimazole (TAPAZOLE) 10 MG tablet Take 1 tablet (10 mg) by mouth daily 30 tablet 3    metoprolol succinate ER (TOPROL XL) 50 MG 24 hr tablet Take 2 tablets  (100mg) in the morning, and 1 tablet (50mg) in the evening.      nystatin (MYCOSTATIN) 708609 UNIT/GM external cream Apply topically 2 times daily (Patient not taking: Reported on 7/24/2023) 90 g 3    nystatin (MYCOSTATIN) 682399 UNIT/ML suspension Take 5 mLs (500,000 Units) by mouth 4 times daily swish and spit (Patient not taking: Reported on 7/24/2023) 473 mL 0    ondansetron (ZOFRAN) 4 MG tablet Take 1 tablet (4 mg) by mouth every 6 hours as needed for nausea or vomiting (Patient not taking: Reported on 7/24/2023) 60 tablet 3    spironolactone (ALDACTONE) 25 MG tablet Take 0.5 tablets (12.5 mg) by mouth daily 45 tablet 3       ROS:   10 point ROS negative except HPI    EXAM:  BP (!) 161/78   Pulse 78   Wt 54.1 kg (119 lb 3.2 oz)   SpO2 97%   BMI 21.52 kg/m    General: appears comfortable, alert and articulate  Head: normocephalic, atraumatic  Eyes: anicteric sclera, EOMI  Neck: no adenopathy  Orophyarynx: moist mucosa, no lesions, dentition intact  Heart: regular, S1/S2, no murmur, gallop, rub, estimated JVP 5 cm  Lungs: clear, no rales or wheezing  Abdomen: soft, non-tender,  Extremities: no clubbing, cyanosis or edema  Neurological: normal speech and affect, no gross motor deficits    Labs:  CBC RESULTS:  Lab Results   Component Value Date    WBC 7.2 07/21/2023    WBC 7.2 01/16/2020    RBC 4.06 07/21/2023    RBC 3.82 01/16/2020    HGB 11.3 (L) 07/21/2023    HGB 11.3 (L) 01/16/2020    HCT 35.3 07/21/2023    HCT 35.5 01/16/2020    MCV 87 07/21/2023    MCV 93 01/16/2020    MCH 27.8 07/21/2023    MCH 29.6 01/16/2020    MCHC 32.0 07/21/2023    MCHC 31.8 01/16/2020    RDW 14.9 07/21/2023    RDW 13.5 01/16/2020     07/21/2023     01/16/2020       CMP RESULTS:  Lab Results   Component Value Date     07/21/2023     01/16/2020    POTASSIUM 4.2 07/21/2023    POTASSIUM 4.1 01/16/2020    CHLORIDE 104 07/21/2023    CHLORIDE 108 01/16/2020    CO2 22 07/21/2023    CO2 26 01/16/2020    ANIONGAP 11  07/21/2023    ANIONGAP 5 01/16/2020    GLC 97 07/21/2023    GLC 97 01/16/2020    BUN 19.1 07/21/2023    BUN 22 01/16/2020    CR 0.78 07/21/2023    CR 0.79 01/16/2020    GFRESTIMATED 71 07/21/2023    GFRESTIMATED 67 01/16/2020    GFRESTBLACK 77 01/16/2020    INO 9.6 07/21/2023    INO 9.1 01/16/2020    BILITOTAL 0.4 07/21/2023    BILITOTAL 0.4 12/14/2018    ALBUMIN 3.6 07/21/2023    ALBUMIN 3.4 12/14/2018    ALKPHOS 116 (H) 07/21/2023    ALKPHOS 100 12/14/2018    ALT 10 07/21/2023    ALT 25 12/14/2018    AST 22 07/21/2023    AST 27 12/14/2018        Echocardiogram 5/23:  Left ventricular function is normal.The ejection fraction is 60-65%.  The right ventricle is normal size. Global right ventricular function is normal.  RVSP estimated at 68 mmHg. The IVC was not visualized. This is suggestive of elevated pulmonary artery pressures.  Moderate left atrial enlargement is present.  No pericardial effusion is present.  This study was compared with the study from 10/21/22 .  The RVSP has increased    Assessment and Plan: Isadora Mendez is a 89 year old female with history of hyperthyroidism who presents for evaluation of pulmonary hypertension.    1. Pulmonary hypertension by echocardiogram: Etiology is undetermined but I am concerned it is due to both HFpEF and a high output state. She is not that limited so we will repeat an echocardiogram now that her hyperthyroidism is under control.    2. Systemic hypertension: BP is elevated but she follows very closely with Dr. Macias. I will defer any further anti-hypertensive titration to her.    It was a pleasure seeing your patient Isadora Mendez at the [facility] Pulmonary Hypertension clinic.  Please contact us with any questions or concerns that you may have.    Sincerely,    Renato Grimes MD, PhD  Associate Professor of Medicine  Director, Chronic Thromboembolic Pulmonary Hypertension Program    I spent a total of 60 minutes on the date of service evaluating this  patient which included face to face discussion, performing a physical exam, reviewing of the chart to gain information from other providers to obtain further history, personally reviewing echocardiograms showing elevated PA pressures with normal RV size and function, ordering tests and/or medications, and documenting clinical information in the electronic health record.

## 2023-07-25 NOTE — PROGRESS NOTES
2023    Dear Colleagues,    I had the pleasure of seeing your patient Isadora Mendez at the [facility] Pulmonary Hypertension clinic.  As you know, Isadora is a 89 year old female with history of atrial fibrillation and SSS s/p pacemaker who presents for evaluation of pulmonary hypertension after an echocardiogram showed elevated PA pressures. The time her echocardiogram was collected at the time of a  Hospitalization for hyperthyroidism. Thankfully, she has minimal symptoms of exertional dyspnea. She does get dyspneic at at the top of one flight of stairs. She has no orthopnea or PND. She does not have presyncope or syncope.     She now has her hyperthyroidism under control. She is doing a lot of rehab and not having much issues with dyspnea. She has been practicing for her 6MWT today without any troubles. Overall, I would classify her as WHO FC 2.    PAST MEDICAL HISTORY:  Past Medical History:   Diagnosis Date     Atrial fibrillation (H) 2011     Cataract      Closed nondisplaced fracture of styloid process of radius      Dry eyes      Facial fracture (H) 2006     Hypertension      Infection due to 2019 novel coronavirus 10/2022    or - took paxolovid     Low bone density      NSVT (nonsustained ventricular tachycardia) (H) 2009    during exercise echo, neg EP study     Pacemaker 2010     Postmenopausal status      S/P cardiac catheterization 2009    30-40% non obstructive lesion     SSS (sick sinus syndrome) (H) 10/2022     Ventricular tachycardia, nonsustained (H) 2009    during stress echo       FAMILY HISTORY:  Family History   Problem Relation Age of Onset     Myocardial Infarction Mother 88        lived to 100     Hypertension Mother      Macular Degeneration Mother      Cardiovascular Father 76         age 80, MI 76 and CHF 80's     Coronary Artery Disease Father      Myocardial Infarction Father 70     Arrhythmia Sister         PPM      Neuropathy Sister      Colon Cancer Sister      Arrhythmia Brother         pacemaker     Arrhythmia Brother         pacemaker, hx rheumatic fever, heart failure     Leukemia Maternal Grandfather      Diabetes Type 2  Maternal Grandfather      Cardiovascular Paternal Grandfather          age 76 of CVD     Coronary Artery Disease Paternal Grandfather      Breast Cancer Daughter 50        Breast, uterine, 2nd breast cancer, HTN     Uterine Cancer Daughter      Thyroid Disease Daughter         bout of thyroiditis with thyrotoxicosis followed by hypothyroidism     Blood Disease Son         hemochromatosis?     Atrial fibrillation Son        SOCIAL HISTORY:  Social History     Socioeconomic History     Marital status:    Occupational History     Occupation: retired     Comment: nurse   Tobacco Use     Smoking status: Never     Smokeless tobacco: Never   Substance and Sexual Activity     Alcohol use: No     Drug use: No       CURRENT MEDICATIONS:  Current Outpatient Medications   Medication Sig Dispense Refill     aspirin 81 MG tablet Take 1 tablet by mouth daily.       diclofenac (VOLTAREN) 1 % topical gel Apply 2 g topically 4 times daily as needed for other (Joint and muscle pain as needed) 50 g 0     famotidine (PEPCID) 20 MG tablet Take 1 tablet (20 mg) by mouth 2 times daily 180 tablet 3     lisinopril (ZESTRIL) 20 MG tablet Take 1 tablet (20 mg) by mouth daily 90 tablet 3     melatonin 1 MG/ML LIQD liquid Take 1 mg by mouth nightly as needed for sleep       methimazole (TAPAZOLE) 10 MG tablet Take 1 tablet (10 mg) by mouth daily 30 tablet 3     metoprolol succinate ER (TOPROL XL) 50 MG 24 hr tablet Take 2 tablets (100mg) in the morning, and 1 tablet (50mg) in the evening.       nystatin (MYCOSTATIN) 955448 UNIT/GM external cream Apply topically 2 times daily (Patient not taking: Reported on 2023) 90 g 3     nystatin (MYCOSTATIN) 691940 UNIT/ML suspension Take 5 mLs (500,000 Units) by mouth 4 times  daily swish and spit (Patient not taking: Reported on 7/24/2023) 473 mL 0     ondansetron (ZOFRAN) 4 MG tablet Take 1 tablet (4 mg) by mouth every 6 hours as needed for nausea or vomiting (Patient not taking: Reported on 7/24/2023) 60 tablet 3     spironolactone (ALDACTONE) 25 MG tablet Take 0.5 tablets (12.5 mg) by mouth daily 45 tablet 3       ROS:   10 point ROS negative except HPI    EXAM:  BP (!) 161/78   Pulse 78   Wt 54.1 kg (119 lb 3.2 oz)   SpO2 97%   BMI 21.52 kg/m    General: appears comfortable, alert and articulate  Head: normocephalic, atraumatic  Eyes: anicteric sclera, EOMI  Neck: no adenopathy  Orophyarynx: moist mucosa, no lesions, dentition intact  Heart: regular, S1/S2, no murmur, gallop, rub, estimated JVP 5 cm  Lungs: clear, no rales or wheezing  Abdomen: soft, non-tender,  Extremities: no clubbing, cyanosis or edema  Neurological: normal speech and affect, no gross motor deficits    Labs:  CBC RESULTS:  Lab Results   Component Value Date    WBC 7.2 07/21/2023    WBC 7.2 01/16/2020    RBC 4.06 07/21/2023    RBC 3.82 01/16/2020    HGB 11.3 (L) 07/21/2023    HGB 11.3 (L) 01/16/2020    HCT 35.3 07/21/2023    HCT 35.5 01/16/2020    MCV 87 07/21/2023    MCV 93 01/16/2020    MCH 27.8 07/21/2023    MCH 29.6 01/16/2020    MCHC 32.0 07/21/2023    MCHC 31.8 01/16/2020    RDW 14.9 07/21/2023    RDW 13.5 01/16/2020     07/21/2023     01/16/2020       CMP RESULTS:  Lab Results   Component Value Date     07/21/2023     01/16/2020    POTASSIUM 4.2 07/21/2023    POTASSIUM 4.1 01/16/2020    CHLORIDE 104 07/21/2023    CHLORIDE 108 01/16/2020    CO2 22 07/21/2023    CO2 26 01/16/2020    ANIONGAP 11 07/21/2023    ANIONGAP 5 01/16/2020    GLC 97 07/21/2023    GLC 97 01/16/2020    BUN 19.1 07/21/2023    BUN 22 01/16/2020    CR 0.78 07/21/2023    CR 0.79 01/16/2020    GFRESTIMATED 71 07/21/2023    GFRESTIMATED 67 01/16/2020    GFRESTBLACK 77 01/16/2020    INO 9.6 07/21/2023    INO 9.1  01/16/2020    BILITOTAL 0.4 07/21/2023    BILITOTAL 0.4 12/14/2018    ALBUMIN 3.6 07/21/2023    ALBUMIN 3.4 12/14/2018    ALKPHOS 116 (H) 07/21/2023    ALKPHOS 100 12/14/2018    ALT 10 07/21/2023    ALT 25 12/14/2018    AST 22 07/21/2023    AST 27 12/14/2018        Echocardiogram 5/23:  Left ventricular function is normal.The ejection fraction is 60-65%.  The right ventricle is normal size. Global right ventricular function is normal.  RVSP estimated at 68 mmHg. The IVC was not visualized. This is suggestive of elevated pulmonary artery pressures.  Moderate left atrial enlargement is present.  No pericardial effusion is present.  This study was compared with the study from 10/21/22 .  The RVSP has increased    Assessment and Plan: Isadora Mendez is a 89 year old female with history of hyperthyroidism who presents for evaluation of pulmonary hypertension.    1. Pulmonary hypertension by echocardiogram: Etiology is undetermined but I am concerned it is due to both HFpEF and a high output state. She is not that limited so we will repeat an echocardiogram now that her hyperthyroidism is under control.    2. Systemic hypertension: BP is elevated but she follows very closely with Dr. Macias. I will defer any further anti-hypertensive titration to her.    It was a pleasure seeing your patient Isadora Mendez at the [facility] Pulmonary Hypertension clinic.  Please contact us with any questions or concerns that you may have.    Sincerely,    Renato Grimes MD, PhD  Associate Professor of Medicine  Director, Chronic Thromboembolic Pulmonary Hypertension Program    I spent a total of 60 minutes on the date of service evaluating this patient which included face to face discussion, performing a physical exam, reviewing of the chart to gain information from other providers to obtain further history, personally reviewing echocardiograms showing elevated PA pressures with normal RV size and function, ordering tests  and/or medications, and documenting clinical information in the electronic health record.

## 2023-07-25 NOTE — PATIENT INSTRUCTIONS
Medication Changes:  NONE    Patient Instructions:  1. Continue staying active and eat a heart healthy diet.    2. Please keep current list of medications with you at all times.    3. Remember to weigh yourself daily after voiding and before you consume any food or beverages and log the numbers.  If you have gained 2 pounds overnight or 5 pounds in a week contact us immediately for medication adjustments or further instructions.    4. **Please call us immediately if you have any syncope (fainting or passing out), chest pain, edema (swelling or weight gain), or decline in your functional status (general decline in how you are feeling).    5. Patients on Remodulin (treprostinil) or Veletri (epoprostenol): Please make sure that you have your backup pump and supplies with you at all times, your mixing instructions, and contact information for your specialty pharmacy.    Follow up Appointment Information:  Repeat echocardiogram in 3 months.  Based on that test, we will call you for a follow-up appointment with Dr. Grimes.    Results:  Component      Latest Ref Rn 7/21/2023  10:44 AM   Sodium      136 - 145 mmol/L 137    Potassium      3.4 - 5.3 mmol/L 4.2    Chloride      98 - 107 mmol/L 104    Carbon Dioxide (CO2)      22 - 29 mmol/L 22    Anion Gap      7 - 15 mmol/L 11    Urea Nitrogen      8.0 - 23.0 mg/dL 19.1    Creatinine      0.51 - 0.95 mg/dL 0.78    Calcium      8.2 - 9.6 mg/dL 9.6    Glucose      70 - 99 mg/dL 97    GFR Estimate      >60 mL/min/1.73m2 71    WBC      4.0 - 11.0 10e3/uL 7.2    RBC Count      3.80 - 5.20 10e6/uL 4.06    Hemoglobin      11.7 - 15.7 g/dL 11.3 (L)    Hematocrit      35.0 - 47.0 % 35.3    MCV      78 - 100 fL 87    MCH      26.5 - 33.0 pg 27.8    MCHC      31.5 - 36.5 g/dL 32.0    RDW      10.0 - 15.0 % 14.9    Platelet Count      150 - 450 10e3/uL 242    Protein Total      6.4 - 8.3 g/dL 7.2    Albumin      3.5 - 5.2 g/dL 3.6    Bilirubin Total      <=1.2 mg/dL 0.4    Alkaline  Phosphatase      35 - 104 U/L 116 (H)    AST      0 - 45 U/L 22    ALT      0 - 50 U/L 10    Bilirubin Direct      0.00 - 0.30 mg/dL <0.20    Thrombin Time      13.0 - 19.0 Seconds 17.8    PTT Ratio      <1.21  1.32 (H)    Platelet Neutralization      <=0 Seconds -1    PTT 1:2 MIX      31 - 45 Seconds 41    DRVVT Screen Ratio      <1.08  1.04    Lupus Result      Negative  Negative    Lupus Interpretation INR is normal.     Cholesterol      <200 mg/dL 158    Triglycerides      <150 mg/dL 91    HDL Cholesterol      >=50 mg/dL 54    LDL Cholesterol Calculated      <=100 mg/dL 86    Non HDL Cholesterol      <130 mg/dL 104    MAGDA interpretation      Negative  Positive !    MAGDA pattern 1 Homogeneous    MAGDA titer 1 1:160    Iron      37 - 145 ug/dL 39    Iron Binding Capacity      240 - 430 ug/dL 215 (L)    Iron Sat Index      15 - 46 % 18    Hepatitis B Surface Antibody Instrument Value      <8.00 m[IU]/mL 0.00    Hepatitis B Surface Antibody Nonreactive    INR      0.85 - 1.15  1.08    N-Terminal Pro Bnp      0 - 1,800 pg/mL 704    Rheumatoid Factor      <12 IU/mL <6    TSH      0.30 - 4.20 uIU/mL 0.39    Hepatitis B Core Kamilla      Nonreactive  Nonreactive    Hep B Surface Agn      Nonreactive  Nonreactive    Hepatitis C Antibody      Nonreactive  Nonreactive    HIV Antigen Antibody Combo      Nonreactive  Nonreactive    CRP Inflammation      <5.00 mg/L 28.20 (H)    T4 Total      4.5 - 11.7 ug/dL 4.5    Triiodothyronine (T3)      85 - 202 ng/dL 63 (L)    T4 Free      0.90 - 1.70 ng/dL 0.77 (L)       Legend:  (L) Low  (H) High  ! Abnormal    We are located on the third floor of [facility].  Our address is     [address] [place] [address] [place] [address] [place] [address]    Thank you for allowing us to be a part of your care here at the [facility]    If you have questions or concerns please contact us at:    Jose Miguel Fabian  RN    Nurse Coordinator       Pulmonary Hypertension     [facility]    (Phone)[phone]       Lila Padilla, ELIZABET Kaur   (Prior Authorizations)    ()  Clinic   Clinic   Pulmonary Hypertension   Pulmonary Hypertension  [facility]  [facility]  (P)[phone]    (P) [phone]  (F) [phone]              ** Please note that you will NOT receive a reminder call regarding your scheduled testing, reminder calls are for provider appointments only.  If you are scheduled for testing within the [facility] system you may receive a call regarding pre-registration for billing purposes only.**     Remember to weigh yourself daily after voiding and before you consume any food or beverages and log the numbers.  If you have gained/lost 2 pounds overnight or 5 pounds in a week contact us immediately for medication adjustments or further instructions.   **Please call us immediately if you have any syncope, chest pain, edema, or decline in your functional status.    Support Group:  Pulmonary Hypertension Association  Https://www.phassociation.org/  **Look at the Events Tab** They even have Support Groups that you can call into    [facility] PH Support Group  Second Saturday of the Month from 1-3 PM   Location: [address] [place] [address]  Leader: Leilani Hughes   Phone: [phone]  Email: [email or web address]     Great Videos about Pulmonary Hypertension!!  Scan ME!    Website: [email or web address]

## 2023-07-25 NOTE — NURSING NOTE
Chief Complaint   Patient presents with    New Patient     New PH referral from Nika GALAVIZ     Vitals were taken and medications reconciled.    Hari Robledo, EMT  8:15 AM

## 2023-07-26 LAB
DLCOCOR-PRE: 14.03 ML/MIN/MMHG
DLCOUNC-PRE: 13.03 ML/MIN/MMHG
ERV-%PRED-PRE: 127 %
ERV-PRE: 0.39 L
ERV-PRED: 0.3 L
EXPTIME-PRE: 6.69 SEC
FEF2575-PRE: 1.19 L/SEC
FEFMAX-%PRED-PRE: 166 %
FEFMAX-PRE: 5.41 L/SEC
FEFMAX-PRED: 3.25 L/SEC
FEV1-%PRED-PRE: 104 %
FEV1-PRE: 1.54 L
FEV1FEV6-PRE: 77 %
FEV1FEV6-PRED: 76 %
FEV1FVC-PRE: 76 %
FEV1FVC-PRED: 77 %
FEV1SVC-PRE: 73 %
FEV1SVC-PRED: 63 %
FIFMAX-PRE: 3.55 L/SEC
FRCPLETH-%PRED-PRE: 111 %
FRCPLETH-PRE: 2.94 L
FRCPLETH-PRED: 2.64 L
FVC-%PRED-PRE: 103 %
FVC-PRE: 2.02 L
FVC-PRED: 1.95 L
IC-%PRED-PRE: 84 %
IC-PRE: 1.72 L
IC-PRED: 2.03 L
RVPLETH-%PRED-PRE: 108 %
RVPLETH-PRE: 2.55 L
RVPLETH-PRED: 2.34 L
TLCPLETH-%PRED-PRE: 99 %
TLCPLETH-PRE: 4.66 L
TLCPLETH-PRED: 4.67 L
VA-PRE: 3.29 L
VC-%PRED-PRE: 90 %
VC-PRE: 2.11 L
VC-PRED: 2.33 L

## 2023-07-28 ENCOUNTER — DOCUMENTATION ONLY (OUTPATIENT)
Dept: INTERNAL MEDICINE | Facility: CLINIC | Age: 88
End: 2023-07-28
Payer: COMMERCIAL

## 2023-07-28 NOTE — PROGRESS NOTES
Type of Form Received:     Form Received (Date) 7/28/23   Form Filled out Yes, 8/4/23   Placed in provider folder Yes

## 2023-08-03 ENCOUNTER — DOCUMENTATION ONLY (OUTPATIENT)
Dept: INTERNAL MEDICINE | Facility: CLINIC | Age: 88
End: 2023-08-03
Payer: COMMERCIAL

## 2023-08-03 NOTE — PROGRESS NOTES
Type of Form Received:     Form Received (Date) 8/3/23   Form Filled out Yes, 8/4/23   Placed in provider folder Yes

## 2023-08-04 ENCOUNTER — MEDICAL CORRESPONDENCE (OUTPATIENT)
Dept: HEALTH INFORMATION MANAGEMENT | Facility: CLINIC | Age: 88
End: 2023-08-04
Payer: COMMERCIAL

## 2023-08-25 ENCOUNTER — LAB (OUTPATIENT)
Dept: LAB | Facility: CLINIC | Age: 88
End: 2023-08-25
Payer: COMMERCIAL

## 2023-08-25 DIAGNOSIS — E05.90 HYPERTHYROIDISM: ICD-10-CM

## 2023-08-25 LAB
T4 FREE SERPL-MCNC: 0.37 NG/DL (ref 0.9–1.7)
TSH SERPL DL<=0.005 MIU/L-ACNC: 41.2 UIU/ML (ref 0.3–4.2)

## 2023-08-25 PROCEDURE — 84443 ASSAY THYROID STIM HORMONE: CPT

## 2023-08-25 PROCEDURE — 36415 COLL VENOUS BLD VENIPUNCTURE: CPT

## 2023-08-25 PROCEDURE — 84439 ASSAY OF FREE THYROXINE: CPT

## 2023-08-26 DIAGNOSIS — E03.2 HYPOTHYROIDISM DUE TO MEDICATION: Primary | ICD-10-CM

## 2023-09-08 ENCOUNTER — LAB (OUTPATIENT)
Dept: LAB | Facility: CLINIC | Age: 88
End: 2023-09-08
Payer: COMMERCIAL

## 2023-09-08 DIAGNOSIS — E03.2 HYPOTHYROIDISM DUE TO MEDICATION: ICD-10-CM

## 2023-09-08 PROCEDURE — 84480 ASSAY TRIIODOTHYRONINE (T3): CPT

## 2023-09-08 PROCEDURE — 84439 ASSAY OF FREE THYROXINE: CPT

## 2023-09-08 PROCEDURE — 84443 ASSAY THYROID STIM HORMONE: CPT

## 2023-09-08 PROCEDURE — 36415 COLL VENOUS BLD VENIPUNCTURE: CPT

## 2023-09-11 LAB
T3 SERPL-MCNC: 200 NG/DL (ref 85–202)
T4 FREE SERPL-MCNC: 1.56 NG/DL (ref 0.9–1.7)
TSH SERPL DL<=0.005 MIU/L-ACNC: 0.51 UIU/ML (ref 0.3–4.2)

## 2023-09-13 DIAGNOSIS — E05.00 GRAVES DISEASE: Primary | ICD-10-CM

## 2023-09-13 RX ORDER — METHIMAZOLE 5 MG/1
5 TABLET ORAL DAILY
Qty: 30 TABLET | Refills: 4 | Status: SHIPPED | OUTPATIENT
Start: 2023-09-13 | End: 2023-09-29

## 2023-09-22 ENCOUNTER — OFFICE VISIT (OUTPATIENT)
Dept: INTERNAL MEDICINE | Facility: CLINIC | Age: 88
End: 2023-09-22
Payer: COMMERCIAL

## 2023-09-22 VITALS
OXYGEN SATURATION: 98 % | WEIGHT: 120.7 LBS | HEART RATE: 65 BPM | BODY MASS INDEX: 22.21 KG/M2 | HEIGHT: 62 IN | DIASTOLIC BLOOD PRESSURE: 73 MMHG | SYSTOLIC BLOOD PRESSURE: 175 MMHG

## 2023-09-22 DIAGNOSIS — I48.0 PAROXYSMAL A-FIB (H): ICD-10-CM

## 2023-09-22 DIAGNOSIS — Z01.818 PREOP GENERAL PHYSICAL EXAM: Primary | ICD-10-CM

## 2023-09-22 DIAGNOSIS — L29.9 ITCHING: ICD-10-CM

## 2023-09-22 PROBLEM — E46 PROTEIN-CALORIE MALNUTRITION (H): Status: RESOLVED | Noted: 2023-06-23 | Resolved: 2023-09-22

## 2023-09-22 PROBLEM — R11.2 NAUSEA AND VOMITING, UNSPECIFIED VOMITING TYPE: Status: RESOLVED | Noted: 2023-05-24 | Resolved: 2023-09-22

## 2023-09-22 PROCEDURE — 99214 OFFICE O/P EST MOD 30 MIN: CPT | Performed by: INTERNAL MEDICINE

## 2023-09-22 RX ORDER — TRIAMCINOLONE ACETONIDE 1 MG/G
CREAM TOPICAL 2 TIMES DAILY
Qty: 453.6 G | Refills: 0 | Status: SHIPPED | OUTPATIENT
Start: 2023-09-22 | End: 2024-03-15

## 2023-09-22 RX ORDER — METOPROLOL SUCCINATE 50 MG/1
TABLET, EXTENDED RELEASE ORAL
Qty: 270 TABLET | Refills: 3 | Status: SHIPPED | OUTPATIENT
Start: 2023-09-22

## 2023-09-22 NOTE — PROGRESS NOTES
Surgeon: ***  Fax number for Preop Evaluation: ***  Location of Surgery: ***  Date of Surgery: ***  Procedure: ***  History of reaction to anesthesia? ***    Kareen Silva, EMT at 11:20 AM on 9/22/2023

## 2023-09-22 NOTE — PATIENT INSTRUCTIONS
Vaccines:  - Please consider getting a flu shot and the new COVID vaccine  Preparing for Your Surgery  Getting started  A nurse will call you to review your health history and instructions. They will give you an arrival time based on your scheduled surgery time. Please be ready to share:  Your doctor's clinic name and phone number  Your medical, surgical, and anesthesia history  A list of allergies and sensitivities  A list of medicines, including herbal treatments and over-the-counter drugs  Whether the patient has a legal guardian (ask how to send us the papers in advance)  Please tell us if you're pregnant--or if there's any chance you might be pregnant. Some surgeries may injure a fetus (unborn baby), so they require a pregnancy test. Surgeries that are safe for a fetus don't always need a test, and you can choose whether to have one.   If you have a child who's having surgery, please ask for a copy of Preparing for Your Child's Surgery.    Preparing for surgery  Within 10 to 30 days of surgery: Have a pre-op exam (sometimes called an H&P, or History and Physical). This can be done at a clinic or pre-operative center.  If you're having a , you may not need this exam. Talk to your care team.  At your pre-op exam, talk to your care team about all medicines you take. If you need to stop any medicines before surgery, ask when to start taking them again.  We do this for your safety. Many medicines can make you bleed too much during surgery. Some change how well surgery (anesthesia) drugs work.  Call your insurance company to let them know you're having surgery. (If you don't have insurance, call [phone].)  Call your clinic if there's any change in your health. This includes signs of a cold or flu (sore throat, runny nose, cough, rash, fever). It also includes a scrape or scratch near the surgery site.  If you have questions on the day of surgery, call your hospital or surgery center.  Eating and  drinking guidelines  For your safety: Unless your surgeon tells you otherwise, follow the guidelines below.  Eat and drink as usual until 8 hours before you arrive for surgery. After that, no food or milk.  Drink clear liquids until 2 hours before you arrive. These are liquids you can see through, like water, Gatorade, and Propel Water. They also include plain black coffee and tea (no cream or milk), candy, and breath mints. You can spit out gum when you arrive.  If you drink alcohol: Stop drinking it the night before surgery.  If your care team tells you to take medicine on the morning of surgery, it's okay to take it with a sip of water.  Preventing infection  Shower or bathe the night before and morning of your surgery. Follow the instructions your clinic gave you. (If no instructions, use regular soap.)  Don't shave or clip hair near your surgery site. We'll remove the hair if needed.  Don't smoke or vape the morning of surgery. You may chew nicotine gum up to 2 hours before surgery. A nicotine patch is okay.  Note: Some surgeries require you to completely quit smoking and nicotine. Check with your surgeon.  Your care team will make every effort to keep you safe from infection. We will:  Clean our hands often with soap and water (or an alcohol-based hand rub).  Clean the skin at your surgery site with a special soap that kills germs.  Give you a special gown to keep you warm. (Cold raises the risk of infection.)  Wear special hair covers, masks, gowns and gloves during surgery.  Give antibiotic medicine, if prescribed. Not all surgeries need antibiotics.  What to bring on the day of surgery  Photo ID and insurance card  Copy of your health care directive, if you have one  Glasses and hearing aids (bring cases)  You can't wear contacts during surgery  Inhaler and eye drops, if you use them (tell us about these when you arrive)  CPAP machine or breathing device, if you use them  A few personal items, if spending  the night  If you have . . .  A pacemaker, ICD (cardiac defibrillator) or other implant: Bring the ID card.  An implanted stimulator: Bring the remote control.  A legal guardian: Bring a copy of the certified (court-stamped) guardianship papers.  Please remove any jewelry, including body piercings. Leave jewelry and other valuables at home.  If you're going home the day of surgery  You must have a responsible adult drive you home. They should stay with you overnight as well.  If you don't have someone to stay with you, and you aren't safe to go home alone, we may keep you overnight. Insurance often won't pay for this.  After surgery  If it's hard to control your pain or you need more pain medicine, please call your surgeon's office.  Questions?   If you have any questions for your care team, list them here: _________________________________________________________________________________________________________________________________________________________________________ ____________________________________ ____________________________________ ____________________________________  For informational purposes only. Not to replace the advice of your health care provider. Copyright   2003, 2019 [employer]. All rights reserved. Clinically reviewed by Neli Pressley MD. [employer] 864663 - REV 12/22.    How to Take Your Medication Before Surgery  - Take all of your medications before surgery as usual

## 2023-09-22 NOTE — NURSING NOTE
"Isadora Mendez is a 89 year old female patient that presents today in clinic for the following:    Chief Complaint   Patient presents with    Follow Up     Pt here for 3 month follow up and reports more unsteadiness and shortness of breath. Pt would like to discuss thyroid and CRP concerns as well.     The patient's allergies and medications were reviewed as noted. A set of vitals were recorded as noted without incident: BP (!) 175/73 (BP Location: Right arm, Patient Position: Sitting, Cuff Size: Adult Small)   Pulse 65   Ht 1.585 m (5' 2.4\")   Wt 54.7 kg (120 lb 11.2 oz)   SpO2 98%   BMI 21.79 kg/m  . The patient does not have any other questions for the provider.    Kareen Silva, EMT at 10:46 AM on 9/22/2023  "

## 2023-09-22 NOTE — PROGRESS NOTES
[facility] INTERNAL MEDICINE [address] [address]  Phone: [phone]  Fax: [phone]  Primary Provider: Dean Greene  Pre-op Performing Provider: DEAN GREENE      PREOPERATIVE EVALUATION:  Today's date: 9/22/2023    Isadora is a 89 year old female who presents for a preoperative evaluation.    Surgical Information:  Surgery/Procedure: cataract  Surgery Location: sending via [facility]  Surgeon: sending via [facility]  Surgery Date: sending via [facility]  Time of Surgery: sending via [employer]  Where patient plans to recover: At home with family  Fax number for surgical facility: patient sending via [employer]    Assessment & Plan     The proposed surgical procedure is considered LOW risk.    Paroxysmal A-fib (H)  Controlled, she is on ASA 81 alone.  Follows with EP  - metoprolol succinate ER (TOPROL XL) 50 MG 24 hr tablet  Dispense: 270 tablet; Refill: 3    Itching  Keep well moisturized  - triamcinolone (KENALOG) 0.1 % external cream  Dispense: 453.6 g; Refill: 0    HTN:  Above goal today  She will check at home and we will get an update in 1 week (prior to surgery)  If above 140/90, can increase lisinopril to 40mg daily and recheck BMP after 1 week      Preop general physical exam   - No identified additional risk factors other than previously addressed    Antiplatelet or Anticoagulation Medication Instructions:   - Bleeding risk is low for this procedure (e.g. dental, skin, cataract).    Additional Medication Instructions:  Patient is to take all scheduled medications on the day of surgery    RECOMMENDATION:  APPROVAL GIVEN to proceed with proposed procedure, without further diagnostic evaluation.      I spent a total of 30 minutes on the day of the visit.   Time spent by me doing chart review, history and exam, documentation and further activities per the note      Subjective       HPI related to upcoming procedure:   Has upcoming  cataract procedure  No hx of complications with surgery (only prior surgery is cataract surgery)  BP is above goal today, but reports she is normotensive at home  She is physically active without any CP or SOB    Health Care Directive:  Patient does not have a Health Care Directive or Living Will: Discussed advance care planning with patient; however, patient declined at this time.    Preoperative Review of :   reviewed - no record of controlled substances prescribed.          Review of Systems  CONSTITUTIONAL: NEGATIVE for fever, chills, change in weight  ENT/MOUTH: NEGATIVE for ear, mouth and throat problems  RESP: NEGATIVE for significant cough or SOB  CV: NEGATIVE for chest pain, palpitations or peripheral edema    Patient Active Problem List    Diagnosis Date Noted    Graves disease 07/24/2023     Priority: Medium    Thyroid nodule 07/24/2023     Priority: Medium    Goiter 05/30/2023     Priority: Medium    Itching 05/30/2023     Priority: Medium    Change in voice 05/30/2023     Priority: Medium    Chronic cholecystitis 05/24/2023     Priority: Medium    Hyperthyroidism 05/24/2023     Priority: Medium    Dyspnea on exertion 05/24/2023     Priority: Medium    Pleural effusion on right 05/24/2023     Priority: Medium    Elevated brain natriuretic peptide (BNP) level 05/24/2023     Priority: Medium    Dysequilibrium 05/24/2023     Priority: Medium    Fuchs' corneal dystrophy 05/16/2018     Priority: Medium    Glaucoma suspect of right eye 09/14/2017     Priority: Medium    Atrioventricular block, 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  Epic        Past Medical History:   Diagnosis Date    Atrial fibrillation (H) 01/01/2011    Cataract     Closed nondisplaced fracture of styloid process of radius     Dry eyes     Facial fracture (H) 01/01/2006    Hypertension     Infection due to 2019 novel coronavirus 10/2022    or 11/22- took paxolovid    Low bone density     NSVT (nonsustained ventricular tachycardia) (H) 01/01/2009    during exercise echo, neg EP study    Pacemaker 07/01/2010    Postmenopausal status     S/P cardiac catheterization 01/01/2009    30-40% non obstructive lesion    SSS (sick sinus syndrome) (H) 10/2022    Ventricular tachycardia, nonsustained (H) 01/01/2009    during stress echo     Past Surgical History:   Procedure Laterality Date    CATARACT EXTRACTION W/ INTRAOCULAR LENS IMPLANT Right 12/04/2018    IMPLANT PACEMAKER      PPM  06/30/2010    Permanent Pacemaker     Current Outpatient Medications   Medication Sig Dispense Refill    aspirin 81 MG tablet Take 1 tablet by mouth daily.      famotidine (PEPCID) 20 MG tablet Take 1 tablet (20 mg) by mouth 2 times daily 180 tablet 3    lisinopril (ZESTRIL) 20 MG tablet Take 1 tablet (20 mg) by mouth daily 90 tablet 3    melatonin 1 MG/ML LIQD liquid Take 1 mg by mouth nightly as needed for sleep      methimazole (TAPAZOLE) 5 MG tablet Take 1 tablet (5 mg) by mouth daily 30 tablet 4    metoprolol succinate ER (TOPROL XL) 50 MG 24 hr tablet Take 2 tablets (100mg) in the morning, and 1 tablet (50mg) in the evening. 270 tablet 3    spironolactone (ALDACTONE) 25 MG tablet Take 0.5 tablets (12.5 mg) by mouth daily 45 tablet 3    triamcinolone (KENALOG) 0.1 % external cream Apply topically 2 times daily 453.6 g 0       No Known Allergies     Social History     Tobacco Use    Smoking status: Never    Smokeless tobacco: Never   Substance Use Topics    Alcohol use: No       History   Drug Use  "No         Objective     BP (!) 175/73 (BP Location: Right arm, Patient Position: Sitting, Cuff Size: Adult Small)   Pulse 65   Ht 1.585 m (5' 2.4\")   Wt 54.7 kg (120 lb 11.2 oz)   SpO2 98%   BMI 21.79 kg/m      Physical Exam  GENERAL APPEARANCE: healthy, alert and no distress  HENT: ear canals and TM's normal and nose and mouth without ulcers or lesions  RESP: lungs clear to auscultation - no rales, rhonchi or wheezes  CV: regular rate and rhythm, normal S1 S2, no S3 or S4 and no murmur, click or rub   ABDOMEN: soft, nontender, no HSM or masses and bowel sounds normal  NEURO: Normal strength and tone, sensory exam grossly normal, mentation intact and speech normal    Recent Labs   Lab Test 07/21/23  1044 06/29/23  1241 05/30/23  1608 05/27/23  0801 05/26/23  0717 05/25/23  0635 05/24/23  1852   HGB 11.3*  --  10.8*   < > 10.6*   < > 10.7*     --  162   < > 132*   < > 146*   INR 1.08  --   --   --   --   --  1.26*    136  --    < > 139   < > 136   POTASSIUM 4.2 4.6  --    < > 3.6   < > 4.2   CR 0.78 0.73  --    < > 0.86   < > 0.89   A1C  --   --   --   --  6.4*  --   --     < > = values in this interval not displayed.        Diagnostics:  No labs were ordered during this visit.   No EKG required for low risk surgery (cataract, skin procedure, breast biopsy, etc).    Revised Cardiac Risk Index (RCRI):  The patient has the following serious cardiovascular risks for perioperative complications:   - No serious cardiac risks = 0 points     RCRI Interpretation: 0 points: Class I (very low risk - 0.4% complication rate)         Signed Electronically by: Jovana Macias MD  Copy of this evaluation report is provided to requesting physician.      "

## 2023-09-28 ENCOUNTER — HOSPITAL ENCOUNTER (EMERGENCY)
Facility: CLINIC | Age: 88
Discharge: HOME | End: 2023-09-28
Attending: EMERGENCY MEDICINE | Admitting: EMERGENCY MEDICINE
Payer: COMMERCIAL

## 2023-09-28 ENCOUNTER — APPOINTMENT (OUTPATIENT)
Dept: CT IMAGING | Facility: CLINIC | Age: 88
End: 2023-09-28
Attending: EMERGENCY MEDICINE
Payer: COMMERCIAL

## 2023-09-28 VITALS
TEMPERATURE: 97.6 F | SYSTOLIC BLOOD PRESSURE: 166 MMHG | DIASTOLIC BLOOD PRESSURE: 75 MMHG | HEART RATE: 75 BPM | OXYGEN SATURATION: 98 % | RESPIRATION RATE: 16 BRPM

## 2023-09-28 DIAGNOSIS — S09.90XA INJURY OF HEAD, INITIAL ENCOUNTER: ICD-10-CM

## 2023-09-28 LAB
HOLD SPECIMEN: NORMAL
T3 SERPL-MCNC: 123 NG/DL (ref 85–202)
T4 FREE SERPL-MCNC: 1.78 NG/DL (ref 0.9–1.7)
TSH SERPL DL<=0.005 MIU/L-ACNC: 0.03 UIU/ML (ref 0.3–4.2)

## 2023-09-28 PROCEDURE — 99284 EMERGENCY DEPT VISIT MOD MDM: CPT | Mod: 25 | Performed by: EMERGENCY MEDICINE

## 2023-09-28 PROCEDURE — 36415 COLL VENOUS BLD VENIPUNCTURE: CPT | Performed by: EMERGENCY MEDICINE

## 2023-09-28 PROCEDURE — 84439 ASSAY OF FREE THYROXINE: CPT | Performed by: EMERGENCY MEDICINE

## 2023-09-28 PROCEDURE — 99284 EMERGENCY DEPT VISIT MOD MDM: CPT | Performed by: EMERGENCY MEDICINE

## 2023-09-28 PROCEDURE — 84480 ASSAY TRIIODOTHYRONINE (T3): CPT | Performed by: EMERGENCY MEDICINE

## 2023-09-28 PROCEDURE — 70450 CT HEAD/BRAIN W/O DYE: CPT | Mod: 26 | Performed by: RADIOLOGY

## 2023-09-28 PROCEDURE — 84443 ASSAY THYROID STIM HORMONE: CPT | Performed by: EMERGENCY MEDICINE

## 2023-09-28 PROCEDURE — 70450 CT HEAD/BRAIN W/O DYE: CPT

## 2023-09-28 ASSESSMENT — ACTIVITIES OF DAILY LIVING (ADL): ADLS_ACUITY_SCORE: 35

## 2023-09-28 NOTE — ED TRIAGE NOTES
Pt ambulatory with daughter for some mild confusion. Pt's daughter came to pick her up for lab work, pt then fell, hitting face and bruising R ring finger. Pt denying UTI symptoms. BE FAST negative.      Triage Assessment       Row Name 09/28/23 1132       Triage Assessment (Adult)    Airway WDL WDL       Respiratory WDL    Respiratory WDL WDL       Skin Circulation/Temperature WDL    Skin Circulation/Temperature WDL WDL       Cardiac WDL    Cardiac WDL WDL       Peripheral/Neurovascular WDL    Peripheral Neurovascular WDL WDL       Cognitive/Neuro/Behavioral WDL    Cognitive/Neuro/Behavioral WDL WDL

## 2023-09-29 DIAGNOSIS — E05.00 GRAVES DISEASE: Primary | ICD-10-CM

## 2023-09-29 DIAGNOSIS — E05.00 GRAVES DISEASE: ICD-10-CM

## 2023-09-29 RX ORDER — METHIMAZOLE 5 MG/1
7.5 TABLET ORAL DAILY
Qty: 45 TABLET | Refills: 4 | Status: SHIPPED | OUTPATIENT
Start: 2023-09-29 | End: 2023-11-22

## 2023-10-02 ENCOUNTER — TELEPHONE (OUTPATIENT)
Dept: CARE COORDINATION | Facility: CLINIC | Age: 88
End: 2023-10-02
Payer: COMMERCIAL

## 2023-10-02 DIAGNOSIS — I10 BENIGN ESSENTIAL HYPERTENSION: Primary | ICD-10-CM

## 2023-10-02 DIAGNOSIS — I50.30 DIASTOLIC HEART FAILURE, UNSPECIFIED HF CHRONICITY (H): ICD-10-CM

## 2023-10-02 NOTE — TELEPHONE ENCOUNTER
RN called patient and asked about blood pressure readings taken recently, and patient relayed the following readings:      9/29/23        150/85  9/30/23        152/79  9/30/23         168/75  10/1/23         143/71  10/1/23         152/83  10/2/23         136/94      Lila Aguilar RN on 10/2/2023 at 11:25 AM

## 2023-10-03 RX ORDER — LISINOPRIL 30 MG/1
30 TABLET ORAL DAILY
Qty: 90 TABLET | Refills: 3 | Status: SHIPPED | OUTPATIENT
Start: 2023-10-03 | End: 2023-11-28

## 2023-10-03 NOTE — CONFIDENTIAL NOTE
Blood pressure has been pretty consistently high on multiple readings  I see she had a recent ED visit for a fall, but it looks like it was due to tripping (not lightheadedness/dizziness).  Can you please confirm that she is not having any lightheadedness/dizziness and is back to her baseline mental status (daughter noted some confusion in the ED).  Assuming that is all ok, then let's increase the lisinopril from 20mg -> 30mg daily (new script sent) and recheck a BMP in about 1 week.  Please also ask to continue monitoring her BP and get updated readings in about 1 week.  Thanks!

## 2023-10-03 NOTE — PROGRESS NOTES
I am delighted to see Isadora Mendez for follow up of sick sinus syndrome and atrial fibrillation. She is here today with her daughter.     The patient is a 89 year old  female with SSS s/p pacemaker implant initially 2010, AF found on pacemaker interrogation, declined anticoagulation, NSVT with normal LV function. She was most recently seen by EP KAILEE in June 2023 after hospitalization for thyrotoxicosis and acute HFpEF exacerbation. Her AF burden was 0.3% with rates up to 150s. Metoprolol was switched to Diltiazem. She called a week later reporting lower extremity swelling without any changes in breathing, diltiazem was stopped and she is back on metoprolol. Hospitalization also showed PA pressure of 68 mmHg and she saw Dr. Grimes in July who thought it was likely to be due to high output state, recommended repeat echo when thyroxicosis is treated.    She looks great today. She is conversant, in a good mood, no palpitations, no shortness of breath or orthopnea, no dizziness, no LE swelling.    Past Medical History:  1. Pacemaker 6/30/2010. Leads are Medtronic 5076. Generator change 9/14/2016.  2. Nonsustained VT  3. Hypertension  4. Atrial fibrillation, detected on device lasting > 1 hour, declined anticoagulation  5. Thyrotoxicosis May 2023    Allergies:  No Known Allergies    Medications:   Metoprolol succinate 100 mg qam/50 mg qpm (25 mg prn palpitations)  Spironolactone 12.5 every day  Aspirin 81 every day  Lisinopril 30 mg qd    Methimazole 7.5 mg qd    Physical examination  Vitals: 135/75, HR 79  BMI= 21 (53 kg)    Constitutional: In general, the patient is a pleasant female in no acute distress.    Targeted cardiovascular exam:  Regular rate and rhythm. Normal S1, S2. No murmur, rub, click, or gallop.   Extremities:Pulses are normal bilaterally throughout. No peripheral edema.  Respiratory: Clear to asculation.    Chest left side ppm: clean and well healed      I have personally and independently reviewed the  following:  Labs:  2023: chol 158, HDL 54, LDL 86, TG 91, cr 0.78, jgb 11, plt 242K  2023: TSH 0.03, free T4 1.78    Echo   2023: EF 60-65%, mild concentric LVH, normal RV, RVSP 68, moderate LAE  10/21/2022: EF 60-65%, LVH, PASP 20    EK2023: APVS    Device interrogation today 10/5/2023:  Medtronic AAIR+  bpm; longevity 3 years  AP 99% (she is atrial-paced dep);  <1%  AF burden <0.1% - episodes were short, longest 5 minutes with V rate 106 bpm      Assessment :  SSS s/p pacemaker, in good working order.  PAF, brief episodes < 6 min, ventricular rates stable, asymptomatic. She has declined anticoagulation.  Pulmonary hypertension - noted on echo during thyrotoxicosis and HFpEF exacerbation. She is not symptomatic and exam is euvolemic today. Repeat echo at some point.  Thyrotoxicosis, following with Dr. Acuña  Hypertension, stable.    Plan:  Remote monitor every 3 months  Follow up device clinic 1 year        I spent a total of 20 minutes face to face with  Isadora Mendez during today's office visit. I have spent an additional 20 minutes today on chart review and documentation.      The patient is to return  as above. The patient understood the treatment plan as outlined above.  There were no barriers to learning.      Mary Dixon MD

## 2023-10-03 NOTE — TELEPHONE ENCOUNTER
RN left voice message with Dr. Macias message, and asked for a call back with any questions.  RN relayed in message that a new Rx was sent to [facility], and that BMP lab is needed next week, as well as updated B/P readings next week.  RN also asked for a call back to verify that patient is not having any lightheadedness/dizziness and is back to her baseline mental status.    Lila Aguilar RN on 10/3/2023 at 3:46 PM

## 2023-10-05 ENCOUNTER — OFFICE VISIT (OUTPATIENT)
Dept: CARDIOLOGY | Facility: CLINIC | Age: 88
End: 2023-10-05
Attending: INTERNAL MEDICINE
Payer: COMMERCIAL

## 2023-10-05 VITALS
HEIGHT: 62 IN | OXYGEN SATURATION: 98 % | WEIGHT: 118.7 LBS | HEART RATE: 79 BPM | DIASTOLIC BLOOD PRESSURE: 75 MMHG | BODY MASS INDEX: 21.84 KG/M2 | SYSTOLIC BLOOD PRESSURE: 135 MMHG

## 2023-10-05 DIAGNOSIS — I48.0 PAROXYSMAL A-FIB (H): Primary | ICD-10-CM

## 2023-10-05 DIAGNOSIS — I49.5 SICK SINUS SYNDROME (H): ICD-10-CM

## 2023-10-05 DIAGNOSIS — Z95.0 CARDIAC PACEMAKER IN SITU: ICD-10-CM

## 2023-10-05 PROCEDURE — G0463 HOSPITAL OUTPT CLINIC VISIT: HCPCS | Performed by: INTERNAL MEDICINE

## 2023-10-05 PROCEDURE — 93280 PM DEVICE PROGR EVAL DUAL: CPT | Performed by: INTERNAL MEDICINE

## 2023-10-05 PROCEDURE — 99215 OFFICE O/P EST HI 40 MIN: CPT | Mod: 25 | Performed by: INTERNAL MEDICINE

## 2023-10-05 ASSESSMENT — PAIN SCALES - GENERAL: PAINLEVEL: NO PAIN (0)

## 2023-10-05 NOTE — PATIENT INSTRUCTIONS
You were seen in the Electrophysiology Clinic today by: Dr Mary Dixon    Plan:   Medication Changes:  none    Labs/Tests Needed: none    Follow up Visit: remote pacemaker check every 3 months; device follow up with EP KAILEE 1 year      If you have further questions, please utilize [employer] to contact us.     Your Care Team:    EP Cardiology   Telephone Number     Nurse Line  Sera Saldivar, ELIANE Bradshaw, RN  Edouard Rosas RN   [phone]     For scheduling appointments:   [place]   [phone]   For procedure scheduling:    Freya Ivory     [phone]   For the Device Clinic (Pacemakers, ICDs, Loop Recorders)    During business hours: [phone]  After business hours:   [phone]- select option 4 and ask for job code [id number].       On-call cardiologist for after hours or on weekends:   [phone], option #4, and ask to speak to the on-call cardiologist.     Cardiovascular Clinic:   [address] [place] [address]. [place] [address]      As always, Thank you for trusting us with your health care needs!

## 2023-10-05 NOTE — LETTER
10/5/2023      RE: Isadora Mendez  [address] [facility] [id number]       Dear Colleague,    Thank you for the opportunity to participate in the care of your patient, Isadora Mendez, at the [facility] [place] [facility]. Please see a copy of my visit note below.    I am delighted to see Isadora Mendez for follow up of sick sinus syndrome and atrial fibrillation. She is here today with her daughter.     The patient is a 89 year old  female with SSS s/p pacemaker implant initially 2010, AF found on pacemaker interrogation, declined anticoagulation, NSVT with normal LV function. She was most recently seen by EP KAILEE in June 2023 after hospitalization for thyrotoxicosis and acute HFpEF exacerbation. Her AF burden was 0.3% with rates up to 150s. Metoprolol was switched to Diltiazem. She called a week later reporting lower extremity swelling without any changes in breathing, diltiazem was stopped and she is back on metoprolol. Hospitalization also showed PA pressure of 68 mmHg and she saw Dr. Grimes in July who thought it was likely to be due to high output state, recommended repeat echo when thyroxicosis is treated.    She looks great today. She is conversant, in a good mood, no palpitations, no shortness of breath or orthopnea, no dizziness, no LE swelling.    Past Medical History:  1. Pacemaker 6/30/2010. Leads are Medtronic 5076. Generator change 9/14/2016.  2. Nonsustained VT  3. Hypertension  4. Atrial fibrillation, detected on device lasting > 1 hour, declined anticoagulation  5. Thyrotoxicosis May 2023    Allergies:  No Known Allergies    Medications:   Metoprolol succinate 100 mg qam/50 mg qpm (25 mg prn palpitations)  Spironolactone 12.5 every day  Aspirin 81 every day  Lisinopril 30 mg qd    Methimazole 7.5 mg qd    Physical examination  Vitals: 135/75, HR 79  BMI= 21 (53 kg)    Constitutional: In general, the patient is a pleasant female  in no acute distress.    Targeted cardiovascular exam:  Regular rate and rhythm. Normal S1, S2. No murmur, rub, click, or gallop.   Extremities:Pulses are normal bilaterally throughout. No peripheral edema.  Respiratory: Clear to asculation.    Chest left side ppm: clean and well healed      I have personally and independently reviewed the following:  Labs:  2023: chol 158, HDL 54, LDL 86, TG 91, cr 0.78, jgb 11, plt 242K  2023: TSH 0.03, free T4 1.78    Echo   2023: EF 60-65%, mild concentric LVH, normal RV, RVSP 68, moderate LAE  10/21/2022: EF 60-65%, LVH, PASP 20    EK2023: APVS    Device interrogation today 10/5/2023:  Medtronic AAIR+  bpm; longevity 3 years  AP 99% (she is atrial-paced dep);  <1%  AF burden <0.1% - episodes were short, longest 5 minutes with V rate 106 bpm      Assessment :  SSS s/p pacemaker, in good working order.  PAF, brief episodes < 6 min, ventricular rates stable, asymptomatic. She has declined anticoagulation.  Pulmonary hypertension - noted on echo during thyrotoxicosis and HFpEF exacerbation. She is not symptomatic and exam is euvolemic today. Repeat echo at some point.  Thyrotoxicosis, following with Dr. Acuña  Hypertension, stable.    Plan:  Remote monitor every 3 months  Follow up device clinic 1 year        I spent a total of 20 minutes face to face with  Isadora Mendez during today's office visit. I have spent an additional 20 minutes today on chart review and documentation.      The patient is to return  as above. The patient understood the treatment plan as outlined above.  There were no barriers to learning.         Please do not hesitate to contact me if you have any questions/concerns.     Sincerely,     Mary Dixon MD

## 2023-10-05 NOTE — NURSING NOTE
Chief Complaint   Patient presents with    Follow Up     4 mo follow up for PAF/SSS PPM       Vitals were taken, medications reconciled.    Gabbi Almeida, EMT   11:44 AM

## 2023-10-10 NOTE — TELEPHONE ENCOUNTER
RN called patient's daughter, Aida, and she relayed that patient has not had any dizziness or lightheadedness, and is back to her baseline mental status.  Aida was aware of the new Lisinopril dose of 30 mg daily, and also relayed that a lab appt is scheduled for 10/12/23, and patient will have the BMP drawn then.    Lila Aguilar RN on 10/10/2023 at 2:10 PM

## 2023-10-11 LAB
MDC_IDC_EPISODE_DTM: NORMAL
MDC_IDC_EPISODE_DURATION: 0 S
MDC_IDC_EPISODE_DURATION: 102 S
MDC_IDC_EPISODE_DURATION: 102 S
MDC_IDC_EPISODE_DURATION: 31 S
MDC_IDC_EPISODE_DURATION: 331 S
MDC_IDC_EPISODE_DURATION: 60 S
MDC_IDC_EPISODE_ID: 608
MDC_IDC_EPISODE_ID: 609
MDC_IDC_EPISODE_ID: 610
MDC_IDC_EPISODE_ID: 611
MDC_IDC_EPISODE_ID: 612
MDC_IDC_EPISODE_ID: 613
MDC_IDC_EPISODE_TYPE: NORMAL
MDC_IDC_EPISODE_TYPE_INDUCED: NO
MDC_IDC_LEAD_CONNECTION_STATUS: NORMAL
MDC_IDC_LEAD_CONNECTION_STATUS: NORMAL
MDC_IDC_LEAD_IMPLANT_DT: NORMAL
MDC_IDC_LEAD_IMPLANT_DT: NORMAL
MDC_IDC_LEAD_LOCATION: NORMAL
MDC_IDC_LEAD_LOCATION: NORMAL
MDC_IDC_LEAD_LOCATION_DETAIL_1: NORMAL
MDC_IDC_LEAD_LOCATION_DETAIL_1: NORMAL
MDC_IDC_LEAD_MFG: NORMAL
MDC_IDC_LEAD_MFG: NORMAL
MDC_IDC_LEAD_MODEL: NORMAL
MDC_IDC_LEAD_MODEL: NORMAL
MDC_IDC_LEAD_POLARITY_TYPE: NORMAL
MDC_IDC_LEAD_POLARITY_TYPE: NORMAL
MDC_IDC_LEAD_SERIAL: NORMAL
MDC_IDC_LEAD_SERIAL: NORMAL
MDC_IDC_LEAD_SPECIAL_FUNCTION: NORMAL
MDC_IDC_LEAD_SPECIAL_FUNCTION: NORMAL
MDC_IDC_MSMT_BATTERY_DTM: NORMAL
MDC_IDC_MSMT_BATTERY_REMAINING_LONGEVITY: 41 MO
MDC_IDC_MSMT_BATTERY_RRT_TRIGGER: 2.83
MDC_IDC_MSMT_BATTERY_STATUS: NORMAL
MDC_IDC_MSMT_BATTERY_VOLTAGE: 2.97 V
MDC_IDC_MSMT_LEADCHNL_RA_IMPEDANCE_VALUE: 285 OHM
MDC_IDC_MSMT_LEADCHNL_RA_IMPEDANCE_VALUE: 437 OHM
MDC_IDC_MSMT_LEADCHNL_RA_PACING_THRESHOLD_AMPLITUDE: 1 V
MDC_IDC_MSMT_LEADCHNL_RA_PACING_THRESHOLD_PULSEWIDTH: 0.4 MS
MDC_IDC_MSMT_LEADCHNL_RV_IMPEDANCE_VALUE: 437 OHM
MDC_IDC_MSMT_LEADCHNL_RV_IMPEDANCE_VALUE: 475 OHM
MDC_IDC_MSMT_LEADCHNL_RV_PACING_THRESHOLD_AMPLITUDE: 0.75 V
MDC_IDC_MSMT_LEADCHNL_RV_PACING_THRESHOLD_PULSEWIDTH: 0.4 MS
MDC_IDC_MSMT_LEADCHNL_RV_SENSING_INTR_AMPL: 11.12 MV
MDC_IDC_PG_IMPLANT_DTM: NORMAL
MDC_IDC_PG_MFG: NORMAL
MDC_IDC_PG_MODEL: NORMAL
MDC_IDC_PG_SERIAL: NORMAL
MDC_IDC_PG_TYPE: NORMAL
MDC_IDC_SESS_CLINIC_NAME: NORMAL
MDC_IDC_SESS_DTM: NORMAL
MDC_IDC_SESS_TYPE: NORMAL
MDC_IDC_SET_BRADY_AT_MODE_SWITCH_RATE: 171 {BEATS}/MIN
MDC_IDC_SET_BRADY_HYSTRATE: NORMAL
MDC_IDC_SET_BRADY_LOWRATE: 60 {BEATS}/MIN
MDC_IDC_SET_BRADY_MAX_SENSOR_RATE: 130 {BEATS}/MIN
MDC_IDC_SET_BRADY_MAX_TRACKING_RATE: 130 {BEATS}/MIN
MDC_IDC_SET_BRADY_MODE: NORMAL
MDC_IDC_SET_BRADY_PAV_DELAY_LOW: 180 MS
MDC_IDC_SET_BRADY_SAV_DELAY_LOW: 150 MS
MDC_IDC_SET_LEADCHNL_RA_PACING_AMPLITUDE: 1.75 V
MDC_IDC_SET_LEADCHNL_RA_PACING_ANODE_ELECTRODE_1: NORMAL
MDC_IDC_SET_LEADCHNL_RA_PACING_ANODE_LOCATION_1: NORMAL
MDC_IDC_SET_LEADCHNL_RA_PACING_CAPTURE_MODE: NORMAL
MDC_IDC_SET_LEADCHNL_RA_PACING_CATHODE_ELECTRODE_1: NORMAL
MDC_IDC_SET_LEADCHNL_RA_PACING_CATHODE_LOCATION_1: NORMAL
MDC_IDC_SET_LEADCHNL_RA_PACING_POLARITY: NORMAL
MDC_IDC_SET_LEADCHNL_RA_PACING_PULSEWIDTH: 0.4 MS
MDC_IDC_SET_LEADCHNL_RA_SENSING_ANODE_ELECTRODE_1: NORMAL
MDC_IDC_SET_LEADCHNL_RA_SENSING_ANODE_LOCATION_1: NORMAL
MDC_IDC_SET_LEADCHNL_RA_SENSING_CATHODE_ELECTRODE_1: NORMAL
MDC_IDC_SET_LEADCHNL_RA_SENSING_CATHODE_LOCATION_1: NORMAL
MDC_IDC_SET_LEADCHNL_RA_SENSING_POLARITY: NORMAL
MDC_IDC_SET_LEADCHNL_RA_SENSING_SENSITIVITY: 0.3 MV
MDC_IDC_SET_LEADCHNL_RV_PACING_AMPLITUDE: 2 V
MDC_IDC_SET_LEADCHNL_RV_PACING_ANODE_ELECTRODE_1: NORMAL
MDC_IDC_SET_LEADCHNL_RV_PACING_ANODE_LOCATION_1: NORMAL
MDC_IDC_SET_LEADCHNL_RV_PACING_CAPTURE_MODE: NORMAL
MDC_IDC_SET_LEADCHNL_RV_PACING_CATHODE_ELECTRODE_1: NORMAL
MDC_IDC_SET_LEADCHNL_RV_PACING_CATHODE_LOCATION_1: NORMAL
MDC_IDC_SET_LEADCHNL_RV_PACING_POLARITY: NORMAL
MDC_IDC_SET_LEADCHNL_RV_PACING_PULSEWIDTH: 0.4 MS
MDC_IDC_SET_LEADCHNL_RV_SENSING_ANODE_ELECTRODE_1: NORMAL
MDC_IDC_SET_LEADCHNL_RV_SENSING_ANODE_LOCATION_1: NORMAL
MDC_IDC_SET_LEADCHNL_RV_SENSING_CATHODE_ELECTRODE_1: NORMAL
MDC_IDC_SET_LEADCHNL_RV_SENSING_CATHODE_LOCATION_1: NORMAL
MDC_IDC_SET_LEADCHNL_RV_SENSING_POLARITY: NORMAL
MDC_IDC_SET_LEADCHNL_RV_SENSING_SENSITIVITY: 0.9 MV
MDC_IDC_SET_ZONE_DETECTION_INTERVAL: 350 MS
MDC_IDC_SET_ZONE_DETECTION_INTERVAL: 400 MS
MDC_IDC_SET_ZONE_STATUS: NORMAL
MDC_IDC_SET_ZONE_STATUS: NORMAL
MDC_IDC_SET_ZONE_TYPE: NORMAL
MDC_IDC_SET_ZONE_VENDOR_TYPE: NORMAL
MDC_IDC_STAT_AT_BURDEN_PERCENT: 0 %
MDC_IDC_STAT_AT_DTM_END: NORMAL
MDC_IDC_STAT_AT_DTM_START: NORMAL
MDC_IDC_STAT_BRADY_AP_VP_PERCENT: 0.08 %
MDC_IDC_STAT_BRADY_AP_VS_PERCENT: 99.41 %
MDC_IDC_STAT_BRADY_AS_VP_PERCENT: 0 %
MDC_IDC_STAT_BRADY_AS_VS_PERCENT: 0.52 %
MDC_IDC_STAT_BRADY_DTM_END: NORMAL
MDC_IDC_STAT_BRADY_DTM_START: NORMAL
MDC_IDC_STAT_BRADY_RA_PERCENT_PACED: 99.4 %
MDC_IDC_STAT_BRADY_RV_PERCENT_PACED: 0.08 %
MDC_IDC_STAT_EPISODE_RECENT_COUNT: 0
MDC_IDC_STAT_EPISODE_RECENT_COUNT: 0
MDC_IDC_STAT_EPISODE_RECENT_COUNT: 1
MDC_IDC_STAT_EPISODE_RECENT_COUNT: 5
MDC_IDC_STAT_EPISODE_RECENT_COUNT_DTM_END: NORMAL
MDC_IDC_STAT_EPISODE_RECENT_COUNT_DTM_START: NORMAL
MDC_IDC_STAT_EPISODE_TOTAL_COUNT: 0
MDC_IDC_STAT_EPISODE_TOTAL_COUNT: 1
MDC_IDC_STAT_EPISODE_TOTAL_COUNT: 230
MDC_IDC_STAT_EPISODE_TOTAL_COUNT: 378
MDC_IDC_STAT_EPISODE_TOTAL_COUNT_DTM_END: NORMAL
MDC_IDC_STAT_EPISODE_TOTAL_COUNT_DTM_START: NORMAL
MDC_IDC_STAT_EPISODE_TYPE: NORMAL
MDC_IDC_STAT_TACHYTHERAPY_RECENT_DTM_END: NORMAL
MDC_IDC_STAT_TACHYTHERAPY_RECENT_DTM_START: NORMAL
MDC_IDC_STAT_TACHYTHERAPY_TOTAL_DTM_END: NORMAL
MDC_IDC_STAT_TACHYTHERAPY_TOTAL_DTM_START: NORMAL

## 2023-10-12 ENCOUNTER — LAB (OUTPATIENT)
Dept: LAB | Facility: CLINIC | Age: 88
End: 2023-10-12
Payer: COMMERCIAL

## 2023-10-12 DIAGNOSIS — I10 BENIGN ESSENTIAL HYPERTENSION: ICD-10-CM

## 2023-10-12 DIAGNOSIS — E05.00 GRAVES DISEASE: ICD-10-CM

## 2023-10-12 LAB
ANION GAP SERPL CALCULATED.3IONS-SCNC: 9 MMOL/L (ref 7–15)
BUN SERPL-MCNC: 23.7 MG/DL (ref 8–23)
CALCIUM SERPL-MCNC: 9.8 MG/DL (ref 8.2–9.6)
CHLORIDE SERPL-SCNC: 104 MMOL/L (ref 98–107)
CREAT SERPL-MCNC: 0.86 MG/DL (ref 0.51–0.95)
DEPRECATED HCO3 PLAS-SCNC: 24 MMOL/L (ref 22–29)
EGFRCR SERPLBLD CKD-EPI 2021: 63 ML/MIN/1.73M2
GLUCOSE SERPL-MCNC: 85 MG/DL (ref 70–99)
POTASSIUM SERPL-SCNC: 5.1 MMOL/L (ref 3.4–5.3)
SODIUM SERPL-SCNC: 137 MMOL/L (ref 135–145)
T3 SERPL-MCNC: 90 NG/DL (ref 85–202)
T4 FREE SERPL-MCNC: 1.15 NG/DL (ref 0.9–1.7)
TSH SERPL DL<=0.005 MIU/L-ACNC: 0.39 UIU/ML (ref 0.3–4.2)

## 2023-10-12 PROCEDURE — 84443 ASSAY THYROID STIM HORMONE: CPT

## 2023-10-12 PROCEDURE — 84480 ASSAY TRIIODOTHYRONINE (T3): CPT

## 2023-10-12 PROCEDURE — 36415 COLL VENOUS BLD VENIPUNCTURE: CPT

## 2023-10-12 PROCEDURE — 80048 BASIC METABOLIC PNL TOTAL CA: CPT

## 2023-10-12 PROCEDURE — 84439 ASSAY OF FREE THYROXINE: CPT

## 2023-10-24 ENCOUNTER — TELEPHONE (OUTPATIENT)
Dept: INTERNAL MEDICINE | Facility: CLINIC | Age: 88
End: 2023-10-24
Payer: COMMERCIAL

## 2023-11-02 ENCOUNTER — TELEPHONE (OUTPATIENT)
Dept: CARDIOLOGY | Facility: CLINIC | Age: 88
End: 2023-11-02
Payer: COMMERCIAL

## 2023-11-02 NOTE — TELEPHONE ENCOUNTER
----- Message from Jose Miguel Fabian RN sent at 11/2/2023  9:49 AM CDT -----  Regarding: Schedule Echocardiogram  Hey Team,    Could you please reach the pt and schedule her for 1st available echocardiogram?  Looks like it was ordered, but never got scheduled for some reason.  No follow up needed until we see the results.    Thank you!  BT

## 2023-11-03 ENCOUNTER — ANCILLARY PROCEDURE (OUTPATIENT)
Dept: CARDIOLOGY | Facility: CLINIC | Age: 88
End: 2023-11-03
Attending: INTERNAL MEDICINE
Payer: COMMERCIAL

## 2023-11-03 DIAGNOSIS — R06.09 DOE (DYSPNEA ON EXERTION): ICD-10-CM

## 2023-11-03 DIAGNOSIS — I27.20 PULMONARY HYPERTENSION (H): ICD-10-CM

## 2023-11-03 LAB — LVEF ECHO: NORMAL

## 2023-11-03 PROCEDURE — 93306 TTE W/DOPPLER COMPLETE: CPT | Mod: 26 | Performed by: INTERNAL MEDICINE

## 2023-11-06 ENCOUNTER — ANCILLARY PROCEDURE (OUTPATIENT)
Dept: CARDIOLOGY | Facility: CLINIC | Age: 88
End: 2023-11-06
Attending: INTERNAL MEDICINE
Payer: COMMERCIAL

## 2023-11-06 DIAGNOSIS — I48.0 PAROXYSMAL A-FIB (H): ICD-10-CM

## 2023-11-06 LAB
MDC_IDC_EPISODE_DTM: NORMAL
MDC_IDC_EPISODE_DTM: NORMAL
MDC_IDC_EPISODE_DURATION: 2634 S
MDC_IDC_EPISODE_DURATION: 346 S
MDC_IDC_EPISODE_ID: 614
MDC_IDC_EPISODE_ID: 615
MDC_IDC_EPISODE_TYPE: NORMAL
MDC_IDC_EPISODE_TYPE: NORMAL
MDC_IDC_LEAD_CONNECTION_STATUS: NORMAL
MDC_IDC_LEAD_CONNECTION_STATUS: NORMAL
MDC_IDC_LEAD_IMPLANT_DT: NORMAL
MDC_IDC_LEAD_IMPLANT_DT: NORMAL
MDC_IDC_LEAD_LOCATION: NORMAL
MDC_IDC_LEAD_LOCATION: NORMAL
MDC_IDC_LEAD_LOCATION_DETAIL_1: NORMAL
MDC_IDC_LEAD_LOCATION_DETAIL_1: NORMAL
MDC_IDC_LEAD_MFG: NORMAL
MDC_IDC_LEAD_MFG: NORMAL
MDC_IDC_LEAD_MODEL: NORMAL
MDC_IDC_LEAD_MODEL: NORMAL
MDC_IDC_LEAD_POLARITY_TYPE: NORMAL
MDC_IDC_LEAD_POLARITY_TYPE: NORMAL
MDC_IDC_LEAD_SERIAL: NORMAL
MDC_IDC_LEAD_SERIAL: NORMAL
MDC_IDC_LEAD_SPECIAL_FUNCTION: NORMAL
MDC_IDC_LEAD_SPECIAL_FUNCTION: NORMAL
MDC_IDC_MSMT_BATTERY_DTM: NORMAL
MDC_IDC_MSMT_BATTERY_REMAINING_LONGEVITY: 42 MO
MDC_IDC_MSMT_BATTERY_RRT_TRIGGER: 2.83
MDC_IDC_MSMT_BATTERY_STATUS: NORMAL
MDC_IDC_MSMT_BATTERY_VOLTAGE: 2.97 V
MDC_IDC_MSMT_LEADCHNL_RA_IMPEDANCE_VALUE: 266 OHM
MDC_IDC_MSMT_LEADCHNL_RA_IMPEDANCE_VALUE: 418 OHM
MDC_IDC_MSMT_LEADCHNL_RA_PACING_THRESHOLD_AMPLITUDE: 0.75 V
MDC_IDC_MSMT_LEADCHNL_RA_PACING_THRESHOLD_PULSEWIDTH: 0.4 MS
MDC_IDC_MSMT_LEADCHNL_RA_SENSING_INTR_AMPL: 0.62 MV
MDC_IDC_MSMT_LEADCHNL_RA_SENSING_INTR_AMPL: 0.62 MV
MDC_IDC_MSMT_LEADCHNL_RV_IMPEDANCE_VALUE: 418 OHM
MDC_IDC_MSMT_LEADCHNL_RV_IMPEDANCE_VALUE: 456 OHM
MDC_IDC_MSMT_LEADCHNL_RV_PACING_THRESHOLD_AMPLITUDE: 0.62 V
MDC_IDC_MSMT_LEADCHNL_RV_PACING_THRESHOLD_PULSEWIDTH: 0.4 MS
MDC_IDC_MSMT_LEADCHNL_RV_SENSING_INTR_AMPL: 11.5 MV
MDC_IDC_MSMT_LEADCHNL_RV_SENSING_INTR_AMPL: 11.5 MV
MDC_IDC_PG_IMPLANT_DTM: NORMAL
MDC_IDC_PG_MFG: NORMAL
MDC_IDC_PG_MODEL: NORMAL
MDC_IDC_PG_SERIAL: NORMAL
MDC_IDC_PG_TYPE: NORMAL
MDC_IDC_SESS_CLINIC_NAME: NORMAL
MDC_IDC_SESS_DTM: NORMAL
MDC_IDC_SESS_TYPE: NORMAL
MDC_IDC_SET_BRADY_AT_MODE_SWITCH_RATE: 171 {BEATS}/MIN
MDC_IDC_SET_BRADY_HYSTRATE: NORMAL
MDC_IDC_SET_BRADY_LOWRATE: 60 {BEATS}/MIN
MDC_IDC_SET_BRADY_MAX_SENSOR_RATE: 130 {BEATS}/MIN
MDC_IDC_SET_BRADY_MAX_TRACKING_RATE: 130 {BEATS}/MIN
MDC_IDC_SET_BRADY_MODE: NORMAL
MDC_IDC_SET_BRADY_PAV_DELAY_LOW: 180 MS
MDC_IDC_SET_BRADY_SAV_DELAY_LOW: 150 MS
MDC_IDC_SET_LEADCHNL_RA_PACING_AMPLITUDE: 1.5 V
MDC_IDC_SET_LEADCHNL_RA_PACING_ANODE_ELECTRODE_1: NORMAL
MDC_IDC_SET_LEADCHNL_RA_PACING_ANODE_LOCATION_1: NORMAL
MDC_IDC_SET_LEADCHNL_RA_PACING_CAPTURE_MODE: NORMAL
MDC_IDC_SET_LEADCHNL_RA_PACING_CATHODE_ELECTRODE_1: NORMAL
MDC_IDC_SET_LEADCHNL_RA_PACING_CATHODE_LOCATION_1: NORMAL
MDC_IDC_SET_LEADCHNL_RA_PACING_POLARITY: NORMAL
MDC_IDC_SET_LEADCHNL_RA_PACING_PULSEWIDTH: 0.4 MS
MDC_IDC_SET_LEADCHNL_RA_SENSING_ANODE_ELECTRODE_1: NORMAL
MDC_IDC_SET_LEADCHNL_RA_SENSING_ANODE_LOCATION_1: NORMAL
MDC_IDC_SET_LEADCHNL_RA_SENSING_CATHODE_ELECTRODE_1: NORMAL
MDC_IDC_SET_LEADCHNL_RA_SENSING_CATHODE_LOCATION_1: NORMAL
MDC_IDC_SET_LEADCHNL_RA_SENSING_POLARITY: NORMAL
MDC_IDC_SET_LEADCHNL_RA_SENSING_SENSITIVITY: 0.3 MV
MDC_IDC_SET_LEADCHNL_RV_PACING_AMPLITUDE: 2 V
MDC_IDC_SET_LEADCHNL_RV_PACING_ANODE_ELECTRODE_1: NORMAL
MDC_IDC_SET_LEADCHNL_RV_PACING_ANODE_LOCATION_1: NORMAL
MDC_IDC_SET_LEADCHNL_RV_PACING_CAPTURE_MODE: NORMAL
MDC_IDC_SET_LEADCHNL_RV_PACING_CATHODE_ELECTRODE_1: NORMAL
MDC_IDC_SET_LEADCHNL_RV_PACING_CATHODE_LOCATION_1: NORMAL
MDC_IDC_SET_LEADCHNL_RV_PACING_POLARITY: NORMAL
MDC_IDC_SET_LEADCHNL_RV_PACING_PULSEWIDTH: 0.4 MS
MDC_IDC_SET_LEADCHNL_RV_SENSING_ANODE_ELECTRODE_1: NORMAL
MDC_IDC_SET_LEADCHNL_RV_SENSING_ANODE_LOCATION_1: NORMAL
MDC_IDC_SET_LEADCHNL_RV_SENSING_CATHODE_ELECTRODE_1: NORMAL
MDC_IDC_SET_LEADCHNL_RV_SENSING_CATHODE_LOCATION_1: NORMAL
MDC_IDC_SET_LEADCHNL_RV_SENSING_POLARITY: NORMAL
MDC_IDC_SET_LEADCHNL_RV_SENSING_SENSITIVITY: 0.9 MV
MDC_IDC_SET_ZONE_DETECTION_INTERVAL: 350 MS
MDC_IDC_SET_ZONE_DETECTION_INTERVAL: 400 MS
MDC_IDC_SET_ZONE_STATUS: NORMAL
MDC_IDC_SET_ZONE_STATUS: NORMAL
MDC_IDC_SET_ZONE_TYPE: NORMAL
MDC_IDC_SET_ZONE_VENDOR_TYPE: NORMAL
MDC_IDC_STAT_AT_BURDEN_PERCENT: 0.1 %
MDC_IDC_STAT_AT_DTM_END: NORMAL
MDC_IDC_STAT_AT_DTM_START: NORMAL
MDC_IDC_STAT_BRADY_AP_VP_PERCENT: 0.06 %
MDC_IDC_STAT_BRADY_AP_VS_PERCENT: 99.4 %
MDC_IDC_STAT_BRADY_AS_VP_PERCENT: 0.03 %
MDC_IDC_STAT_BRADY_AS_VS_PERCENT: 0.52 %
MDC_IDC_STAT_BRADY_DTM_END: NORMAL
MDC_IDC_STAT_BRADY_DTM_START: NORMAL
MDC_IDC_STAT_BRADY_RA_PERCENT_PACED: 99.36 %
MDC_IDC_STAT_BRADY_RV_PERCENT_PACED: 0.09 %
MDC_IDC_STAT_EPISODE_RECENT_COUNT: 0
MDC_IDC_STAT_EPISODE_RECENT_COUNT: 2
MDC_IDC_STAT_EPISODE_RECENT_COUNT_DTM_END: NORMAL
MDC_IDC_STAT_EPISODE_RECENT_COUNT_DTM_START: NORMAL
MDC_IDC_STAT_EPISODE_TOTAL_COUNT: 0
MDC_IDC_STAT_EPISODE_TOTAL_COUNT: 1
MDC_IDC_STAT_EPISODE_TOTAL_COUNT: 230
MDC_IDC_STAT_EPISODE_TOTAL_COUNT: 380
MDC_IDC_STAT_EPISODE_TOTAL_COUNT_DTM_END: NORMAL
MDC_IDC_STAT_EPISODE_TOTAL_COUNT_DTM_START: NORMAL
MDC_IDC_STAT_EPISODE_TYPE: NORMAL
MDC_IDC_STAT_TACHYTHERAPY_RECENT_DTM_END: NORMAL
MDC_IDC_STAT_TACHYTHERAPY_RECENT_DTM_START: NORMAL
MDC_IDC_STAT_TACHYTHERAPY_TOTAL_DTM_END: NORMAL
MDC_IDC_STAT_TACHYTHERAPY_TOTAL_DTM_START: NORMAL

## 2023-11-08 ENCOUNTER — TELEPHONE (OUTPATIENT)
Dept: CARDIOLOGY | Facility: CLINIC | Age: 88
End: 2023-11-08
Payer: COMMERCIAL

## 2023-11-08 NOTE — PLAN OF CARE
END OF SHIFT SUMMARY:    No new changes noted throughout the night. Pt had a BMBX yesterday and results are pending. A unit of platelets were ordered for this morning.  at bedside all night. Bed low and locked, rails up x2, and call light within reach. Pt was rounded on hourly by myself and pt assistant. Bedside shift report given to on coming RN.     Danica Samuel RN Goal Outcome Evaluation:      Plan of Care Reviewed With: patient    Overall Patient Progress: improving    Outcome Evaluation: AOx4. VSS ex HTN--improving. L arm PIV SL, dressing changed this shift. Spot in painful for paitientbut perfers to keep this IV for now vs having new one placed. Denies pain. Int nausea with no vomiting and relieved with repositioning and decreased stimuli. Stable on RA. Tele in place reading chronic a fib with BBB. MD paged as unknown if BBB is new for pt. Continent of bowel and bladder up to bathroom to void this shift. No BM but endorses diarrhea for 1 week. Will monitor. Reg diet-tolerating with int nausea. Plan for echo, repeast labs, endocrine, and PT consult. Will continue to monitor and follow POC.

## 2023-11-08 NOTE — TELEPHONE ENCOUNTER
Given the pt's newest echocardiogram results, Dr Grimes would like to discharge pt from the PH clinic.      Jose Miguel Fabian RN on 11/8/2023 at 11:29 AM    
room air

## 2023-11-17 ENCOUNTER — TELEPHONE (OUTPATIENT)
Dept: INTERNAL MEDICINE | Facility: CLINIC | Age: 88
End: 2023-11-17
Payer: COMMERCIAL

## 2023-11-17 DIAGNOSIS — R79.82 CRP ELEVATED: Primary | ICD-10-CM

## 2023-11-17 DIAGNOSIS — I50.30 DIASTOLIC HEART FAILURE, UNSPECIFIED HF CHRONICITY (H): ICD-10-CM

## 2023-11-17 NOTE — TELEPHONE ENCOUNTER
[facility] Call Center    Phone Message    May a detailed message be left on voicemail: yes     Reason for Call: Symptoms or Concerns     If patient has red-flag symptoms, warm transfer to triage line    Current symptom or concern: high blood pressures at night    Symptoms have been present for:  3 month(s)    Has patient previously been seen for this? No    Are there any new or worsening symptoms? Yes: After taking magnesium her blood pressure seems to go down, please call                Blood pressure seems to be getting high at night:  11/16/23 170/91  taken at pm  11/11/13 163/85  taken at night  11/10/23 183/94  taken at pm       I did try calling Red Flag Triage but it wad 5:03pm and they were gone.                       Action Taken: Message routed to:  [facility]    Travel Screening: Not Applicable

## 2023-11-20 NOTE — TELEPHONE ENCOUNTER
I called Isadora to review BP, though I reached voicemail and the mailbox is full. I was unable to leave a message.    I called the mobile line (appears it is for her daughter Aida, consent to communicate on file). Message left requesting patient or daughter call the clinic to provide BP updates.    Nadja Cardenas) ELIANE Strange

## 2023-11-21 ENCOUNTER — LAB (OUTPATIENT)
Dept: LAB | Facility: CLINIC | Age: 88
End: 2023-11-21
Payer: COMMERCIAL

## 2023-11-21 DIAGNOSIS — E05.90 HYPERTHYROIDISM: ICD-10-CM

## 2023-11-21 DIAGNOSIS — R79.82 CRP ELEVATED: ICD-10-CM

## 2023-11-21 PROCEDURE — 86140 C-REACTIVE PROTEIN: CPT

## 2023-11-21 PROCEDURE — 36415 COLL VENOUS BLD VENIPUNCTURE: CPT

## 2023-11-21 PROCEDURE — 84443 ASSAY THYROID STIM HORMONE: CPT

## 2023-11-21 PROCEDURE — 84439 ASSAY OF FREE THYROXINE: CPT

## 2023-11-21 NOTE — TELEPHONE ENCOUNTER
[facility] Call Center    Phone Message    May a detailed message be left on voicemail: yes     Reason for Call: Other: Per Daughter and pt would like a call back. Per daughter and pt was able to give a few BP #'s:    168/90   155/91   149/77   168/89   105/61   154/82   124/98   148/72    Per daughter and pt will give more specific info and dates when they speak with a nurse. Also  pt would like to know if  can send a lab order for CRP? Per pt gave a extra blood to have it tested for that test if  Sends in the lab order for CRP.  Please and thank you!        Action Taken: Message routed to:  [facility]    Travel Screening: Not Applicable

## 2023-11-22 DIAGNOSIS — E05.00 GRAVES DISEASE: ICD-10-CM

## 2023-11-22 LAB
CRP SERPL-MCNC: 5.29 MG/L
T4 FREE SERPL-MCNC: 0.83 NG/DL (ref 0.9–1.7)
TSH SERPL DL<=0.005 MIU/L-ACNC: 10.3 UIU/ML (ref 0.3–4.2)

## 2023-11-22 RX ORDER — METHIMAZOLE 5 MG/1
5 TABLET ORAL DAILY
Qty: 30 TABLET | Refills: 4 | Status: SHIPPED | OUTPATIENT
Start: 2023-11-22 | End: 2024-03-08

## 2023-11-22 NOTE — TELEPHONE ENCOUNTER
I was able to speak with Isadora today. She denies any lightheadedness with low BPs. Reports when she had the BP of 105/61, BP then went right back up to . Reports she feels well overall. Notes she has been able to walk farther distances before feeling fatigued. She has no concerns, just wanted PCP to be aware of blood pressures.    Nadja (Jaylin) ELIANE Strange

## 2023-11-22 NOTE — TELEPHONE ENCOUNTER
Thanks for reaching out to her.  I'm ok with repeating a crp to follow the trend. Can you place an order?    Can you find out if she was symptomatic with the sbp of 104?  Given her age, I would rather have her run a little hypertensive then drop her BP too low and make her dizzy and fall.

## 2023-11-28 RX ORDER — LISINOPRIL 40 MG/1
40 TABLET ORAL DAILY
Qty: 90 TABLET | Refills: 3 | Status: SHIPPED | OUTPATIENT
Start: 2023-11-28 | End: 2024-03-15

## 2023-11-28 NOTE — TELEPHONE ENCOUNTER
Isadora updated via phone. She voiced understanding to start the increased dose, monitor BP, and update the clinic if low BPs or for symptoms such as lightheadedness/dizziness.    Nadja (Jaylin) ELIANE Strange

## 2023-11-28 NOTE — TELEPHONE ENCOUNTER
Thank you for the update. Let's increase the lisinopril to 40mg daily and continue to check regularly.  She should let us know if she has any low BPs or lightheadedness/dizziness.    I sent a new prescription to her pharmacy.

## 2023-12-27 DIAGNOSIS — K21.00 GASTROESOPHAGEAL REFLUX DISEASE WITH ESOPHAGITIS WITHOUT HEMORRHAGE: ICD-10-CM

## 2024-01-03 RX ORDER — FAMOTIDINE 20 MG/1
20 TABLET, FILM COATED ORAL 2 TIMES DAILY
Qty: 180 TABLET | Refills: 3 | Status: SHIPPED | OUTPATIENT
Start: 2024-01-03 | End: 2024-01-17

## 2024-01-03 NOTE — TELEPHONE ENCOUNTER
famotidine (PEPCID) 20 MG tablet 180 tablet 3 12/9/2022  Last Office Visit : 9/22/2023  [facility] [place]    Future Office visit:  3/15/2024

## 2024-01-04 ENCOUNTER — TRANSFERRED RECORDS (OUTPATIENT)
Dept: HEALTH INFORMATION MANAGEMENT | Facility: CLINIC | Age: 89
End: 2024-01-04
Payer: COMMERCIAL

## 2024-01-08 ENCOUNTER — LAB (OUTPATIENT)
Dept: LAB | Facility: CLINIC | Age: 89
End: 2024-01-08
Payer: COMMERCIAL

## 2024-01-08 DIAGNOSIS — E05.90 HYPERTHYROIDISM: ICD-10-CM

## 2024-01-08 LAB
T4 FREE SERPL-MCNC: 1.14 NG/DL (ref 0.9–1.7)
TSH SERPL DL<=0.005 MIU/L-ACNC: 2.76 UIU/ML (ref 0.3–4.2)

## 2024-01-08 PROCEDURE — 36415 COLL VENOUS BLD VENIPUNCTURE: CPT

## 2024-01-08 PROCEDURE — 84439 ASSAY OF FREE THYROXINE: CPT

## 2024-01-08 PROCEDURE — 84443 ASSAY THYROID STIM HORMONE: CPT

## 2024-01-13 ENCOUNTER — HEALTH MAINTENANCE LETTER (OUTPATIENT)
Age: 89
End: 2024-01-13

## 2024-01-17 ENCOUNTER — OFFICE VISIT (OUTPATIENT)
Dept: FAMILY MEDICINE | Facility: CLINIC | Age: 89
End: 2024-01-17
Payer: COMMERCIAL

## 2024-01-17 VITALS
OXYGEN SATURATION: 99 % | BODY MASS INDEX: 23.55 KG/M2 | DIASTOLIC BLOOD PRESSURE: 80 MMHG | TEMPERATURE: 97.6 F | RESPIRATION RATE: 16 BRPM | WEIGHT: 128 LBS | HEIGHT: 62 IN | HEART RATE: 60 BPM | SYSTOLIC BLOOD PRESSURE: 173 MMHG

## 2024-01-17 DIAGNOSIS — I48.0 PAROXYSMAL A-FIB (H): ICD-10-CM

## 2024-01-17 DIAGNOSIS — I27.20 PULMONARY HTN (H): ICD-10-CM

## 2024-01-17 DIAGNOSIS — Z01.818 PREOP GENERAL PHYSICAL EXAM: Primary | ICD-10-CM

## 2024-01-17 DIAGNOSIS — E05.00 GRAVES DISEASE: ICD-10-CM

## 2024-01-17 DIAGNOSIS — I10 HYPERTENSION, UNSPECIFIED TYPE: ICD-10-CM

## 2024-01-17 DIAGNOSIS — Z95.0 CARDIAC PACEMAKER IN SITU: ICD-10-CM

## 2024-01-17 DIAGNOSIS — H25.89 OTHER AGE-RELATED CATARACT OF BOTH EYES: ICD-10-CM

## 2024-01-17 DIAGNOSIS — K21.00 GASTROESOPHAGEAL REFLUX DISEASE WITH ESOPHAGITIS WITHOUT HEMORRHAGE: ICD-10-CM

## 2024-01-17 DIAGNOSIS — E78.5 HYPERLIPIDEMIA LDL GOAL <100: ICD-10-CM

## 2024-01-17 PROCEDURE — 80048 BASIC METABOLIC PNL TOTAL CA: CPT | Performed by: PHYSICIAN ASSISTANT

## 2024-01-17 PROCEDURE — 36415 COLL VENOUS BLD VENIPUNCTURE: CPT | Performed by: PHYSICIAN ASSISTANT

## 2024-01-17 PROCEDURE — 99214 OFFICE O/P EST MOD 30 MIN: CPT | Performed by: PHYSICIAN ASSISTANT

## 2024-01-17 RX ORDER — FAMOTIDINE 20 MG/1
20 TABLET, FILM COATED ORAL 2 TIMES DAILY
Qty: 180 TABLET | Refills: 3 | Status: SHIPPED | OUTPATIENT
Start: 2024-01-17

## 2024-01-17 RX ORDER — UBIDECARENONE 100 MG
100 CAPSULE ORAL
COMMUNITY

## 2024-01-17 RX ORDER — RESPIRATORY SYNCYTIAL VIRUS VACCINE 120MCG/0.5
0.5 KIT INTRAMUSCULAR ONCE
Qty: 1 EACH | Refills: 0 | Status: CANCELLED | OUTPATIENT
Start: 2024-01-17 | End: 2024-01-17

## 2024-01-17 ASSESSMENT — PAIN SCALES - GENERAL: PAINLEVEL: NO PAIN (0)

## 2024-01-17 NOTE — PATIENT INSTRUCTIONS
Preparing for Your Surgery  Getting started  A nurse will call you to review your health history and instructions. They will give you an arrival time based on your scheduled surgery time. Please be ready to share:  Your doctor's clinic name and phone number  Your medical, surgical, and anesthesia history  A list of allergies and sensitivities  A list of medicines, including herbal treatments and over-the-counter drugs  Whether the patient has a legal guardian (ask how to send us the papers in advance)  Please tell us if you're pregnant--or if there's any chance you might be pregnant. Some surgeries may injure a fetus (unborn baby), so they require a pregnancy test. Surgeries that are safe for a fetus don't always need a test, and you can choose whether to have one.   If you have a child who's having surgery, please ask for a copy of Preparing for Your Child's Surgery.    Preparing for surgery  Within 10 to 30 days of surgery: Have a pre-op exam (sometimes called an H&P, or History and Physical). This can be done at a clinic or pre-operative center.  If you're having a , you may not need this exam. Talk to your care team.  At your pre-op exam, talk to your care team about all medicines you take. If you need to stop any medicines before surgery, ask when to start taking them again.  We do this for your safety. Many medicines can make you bleed too much during surgery. Some change how well surgery (anesthesia) drugs work.  Call your insurance company to let them know you're having surgery. (If you don't have insurance, call [phone].)  Call your clinic if there's any change in your health. This includes signs of a cold or flu (sore throat, runny nose, cough, rash, fever). It also includes a scrape or scratch near the surgery site.  If you have questions on the day of surgery, call your hospital or surgery center.  Eating and drinking guidelines  For your safety: Unless your surgeon tells you otherwise,  follow the guidelines below.  Eat and drink as usual until 8 hours before you arrive for surgery. After that, no food or milk.  Drink clear liquids until 2 hours before you arrive. These are liquids you can see through, like water, Gatorade, and Propel Water. They also include plain black coffee and tea (no cream or milk), candy, and breath mints. You can spit out gum when you arrive.  If you drink alcohol: Stop drinking it the night before surgery.  If your care team tells you to take medicine on the morning of surgery, it's okay to take it with a sip of water.  Preventing infection  Shower or bathe the night before and morning of your surgery. Follow the instructions your clinic gave you. (If no instructions, use regular soap.)  Don't shave or clip hair near your surgery site. We'll remove the hair if needed.  Don't smoke or vape the morning of surgery. You may chew nicotine gum up to 2 hours before surgery. A nicotine patch is okay.  Note: Some surgeries require you to completely quit smoking and nicotine. Check with your surgeon.  Your care team will make every effort to keep you safe from infection. We will:  Clean our hands often with soap and water (or an alcohol-based hand rub).  Clean the skin at your surgery site with a special soap that kills germs.  Give you a special gown to keep you warm. (Cold raises the risk of infection.)  Wear special hair covers, masks, gowns and gloves during surgery.  Give antibiotic medicine, if prescribed. Not all surgeries need antibiotics.  What to bring on the day of surgery  Photo ID and insurance card  Copy of your health care directive, if you have one  Glasses and hearing aids (bring cases)  You can't wear contacts during surgery  Inhaler and eye drops, if you use them (tell us about these when you arrive)  CPAP machine or breathing device, if you use them  A few personal items, if spending the night  If you have . . .  A pacemaker, ICD (cardiac defibrillator) or other  implant: Bring the ID card.  An implanted stimulator: Bring the remote control.  A legal guardian: Bring a copy of the certified (court-stamped) guardianship papers.  Please remove any jewelry, including body piercings. Leave jewelry and other valuables at home.  If you're going home the day of surgery  You must have a responsible adult drive you home. They should stay with you overnight as well.  If you don't have someone to stay with you, and you aren't safe to go home alone, we may keep you overnight. Insurance often won't pay for this.  After surgery  If it's hard to control your pain or you need more pain medicine, please call your surgeon's office.  Questions?   If you have any questions for your care team, list them here: _________________________________________________________________________________________________________________________________________________________________________ ____________________________________ ____________________________________ ____________________________________  For informational purposes only. Not to replace the advice of your health care provider. Copyright   2003, 2019 [employer]. All rights reserved. Clinically reviewed by Neli Pressley MD. [employer] 802495 - REV 12/22.    How to Take Your Medication Before Surgery  - Take all other medication as prescribed

## 2024-01-17 NOTE — PROGRESS NOTES
Preoperative Evaluation  [address] [address]  Phone: [phone]  Primary Provider: Jovana Macias  Pre-op Performing Provider: LEOBARDO LIPSCOMB  Jan 17, 2024       Isadora is a 89 year old, presenting for the following:  Pre-Op Exam (/)        1/17/2024     9:53 AM   Additional Questions       Accompanied by Daughter     Surgical Information  Surgery/Procedure: Cataract Left Eye   Surgery Location: [facility] [place]   Surgeon: Todd LOPEZ   Surgery Date: 1/24/2024  Time of Surgery: N/A   Where patient plans to recover: At home with family  Fax number for surgical facility: [phone]    Assessment & Plan     The proposed surgical procedure is considered LOW risk.    Preop general physical exam  Cleared for procedure    Other age-related cataract of both eyes  Surgery schedule for repair on 1/24/24    Hypertension, unspecified type  Chronic, uncontrolled blood pressure. Working with cardiology and IM provider. Patient does get 120's/80's at home but can also get numbers of 190's/90's.Warning signs to go to ER educated to patient today.    - Basic metabolic panel  (Ca, Cl, CO2, Creat, Gluc, K, Na, BUN); Future  - Basic metabolic panel  (Ca, Cl, CO2, Creat, Gluc, K, Na, BUN)    Pulmonary HTN (H)  Working closely with cardiology, last echo in 2023 was unremarkable.   - Basic metabolic panel  (Ca, Cl, CO2, Creat, Gluc, K, Na, BUN); Future  - Basic metabolic panel  (Ca, Cl, CO2, Creat, Gluc, K, Na, BUN)    Paroxysmal A-fib (H)  Currently controlled on aspirin alone. Following with Cardiology on management. Echo in 2023  was normal    Graves disease  Chronic, stable, continue with current treatment regimen, following with Endocrinology      Gastroesophageal reflux disease with esophagitis without hemorrhage  Chronic, stable, continue with current treatment regimen  - famotidine (PEPCID) 20 MG tablet; Take 1 tablet (20 mg) by  mouth 2 times daily    Cardiac pacemaker, Medtronic, Dual Chamber, NOT dependent  Following with cardiology    Hyperlipidemia LDL goal <100  Chronic, stable with diet alone        Implanted Device   - Type of device: Medtronic [id number] [address] DR LAWLER- Patient advised to bring device information on day of surgery.   - For full details on implanted device, refer to device management note in Epic from date: 11/6/23      Risks and Recommendations  The patient has the following additional risks and recommendations for perioperative complications:  Cardiovascular:   - IM/PCP consulted okay to proceed with procedure, as surgery is low risk    Antiplatelet or Anticoagulation Medication Instructions   - Patient is on no antiplatelet or anticoagulation medications.   - aspirin: Bleeding risk is low for this procedure and daily aspirin may be continued without modification.     Additional Medication Instructions  Patient is to take all scheduled medications on the day of surgery    Recommendation  APPROVAL GIVEN to proceed with proposed procedure, without further diagnostic evaluation.      30 minutes spent by me on the date of the encounter doing chart review, history and exam, documentation and further activities per the note    Subjective       HPI related to upcoming procedure: Complicated history of eye diseases. Had her right eye already done but complicated by her diagnosis of presbyopia. She is going to see a specialist for this. Her left eye schedule for repair on 1/24/24 1/16/2024     9:17 PM   Preop Questions   1. Have you ever had a heart attack or stroke? YES    2. Have you ever had surgery on your heart or blood vessels, such as a stent placement, a coronary artery bypass, or surgery on an artery in your head, neck, heart, or legs? No   3. Do you have chest pain with activity? No   4. Do you have a history of  heart failure? YES    5. Do you currently have a cold, bronchitis or symptoms of other infection? No    6. Do you have a cough, shortness of breath, or wheezing? No   7. Do you or anyone in your family have previous history of blood clots? No   8. Do you or does anyone in your family have a serious bleeding problem such as prolonged bleeding following surgeries or cuts? No   9. Have you ever had problems with anemia or been told to take iron pills? No   10. Have you had any abnormal blood loss such as black, tarry or bloody stools, or abnormal vaginal bleeding? No   11. Have you ever had a blood transfusion? No   12. Are you willing to have a blood transfusion if it is medically needed before, during, or after your surgery? Yes   13. Have you or any of your relatives ever had problems with anesthesia? No   14. Do you have sleep apnea, excessive snoring or daytime drowsiness? No   15. Do you have any artifical heart valves or other implanted medical devices like a pacemaker, defibrillator, or continuous glucose monitor? YES    15a. What type of device do you have? Pacemaker   15b. Name of the clinic that manages your device:  [facility]   16. Do you have artificial joints? No   17. Are you allergic to latex? No       Health Care Directive  Patient does not have a Health Care Directive or Living Will: Patient states has Advance Directive and will bring in a copy to clinic.    Preoperative Review of    reviewed - no record of controlled substances prescribed.      Status of Chronic Conditions:  See Assessment and plan for details     Patient Active Problem List    Diagnosis Date Noted    Pulmonary HTN (H) 01/17/2024     Priority: Medium    Other age-related cataract 01/17/2024     Priority: Medium    Graves disease 07/24/2023     Priority: Medium    Thyroid nodule 07/24/2023     Priority: Medium    Goiter 05/30/2023     Priority: Medium    Itching 05/30/2023     Priority: Medium    Change in voice 05/30/2023     Priority: Medium    Chronic cholecystitis 05/24/2023     Priority: Medium     Hyperthyroidism 05/24/2023     Priority: Medium    Dyspnea on exertion 05/24/2023     Priority: Medium    Pleural effusion on right 05/24/2023     Priority: Medium    Elevated brain natriuretic peptide (BNP) level 05/24/2023     Priority: Medium    Dysequilibrium 05/24/2023     Priority: Medium    Fuchs' corneal dystrophy 05/16/2018     Priority: Medium    Glaucoma suspect of right eye 09/14/2017     Priority: Medium    Atrioventricular block, 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  Epic        Past Medical History:   Diagnosis Date    Atrial fibrillation (H) 01/01/2011    Cataract     Closed nondisplaced fracture of styloid process of radius     Dry eyes     Facial fracture (H) 01/01/2006    Hypertension     Infection due to 2019 novel coronavirus 10/2022    or 11/22- took paxolovid    Low bone density     NSVT (nonsustained ventricular tachycardia) (H) 01/01/2009    during exercise echo, neg EP study    Pacemaker 07/01/2010    Postmenopausal status     S/P cardiac catheterization 01/01/2009    30-40% non obstructive lesion    SSS (sick sinus syndrome) (H) 10/2022    Ventricular tachycardia, nonsustained (H) 01/01/2009    during stress echo     Past Surgical History:   Procedure Laterality Date    CATARACT EXTRACTION W/ INTRAOCULAR LENS IMPLANT Right 12/04/2018    IMPLANT PACEMAKER      PPM  06/30/2010    Permanent Pacemaker     Current Outpatient Medications   Medication Sig Dispense Refill    aspirin 81 MG tablet Take 1 tablet by mouth daily.  "     famotidine (PEPCID) 20 MG tablet Take 1 tablet (20 mg) by mouth 2 times daily 180 tablet 3    lisinopril (ZESTRIL) 40 MG tablet Take 1 tablet (40 mg) by mouth daily 90 tablet 3    melatonin 1 MG/ML LIQD liquid Take 1 mg by mouth nightly as needed for sleep      methimazole (TAPAZOLE) 5 MG tablet Take 1 tablet (5 mg) by mouth daily 30 tablet 4    metoprolol succinate ER (TOPROL XL) 50 MG 24 hr tablet Take 2 tablets (100mg) in the morning, and 1 tablet (50mg) in the evening. 270 tablet 3    spironolactone (ALDACTONE) 25 MG tablet Take 0.5 tablets (12.5 mg) by mouth daily 45 tablet 3    triamcinolone (KENALOG) 0.1 % external cream Apply topically 2 times daily 453.6 g 0    TURMERIC PO       co-enzyme Q-10 100 MG CAPS capsule Take 100 mg by mouth         No Known Allergies     Social History     Tobacco Use    Smoking status: Never     Passive exposure: Never    Smokeless tobacco: Never   Substance Use Topics    Alcohol use: No       History   Drug Use No         Objective    BP (!) 173/80   Pulse 60   Temp 97.6  F (36.4  C) (Oral)   Resp 16   Ht 1.585 m (5' 2.4\")   Wt 58.1 kg (128 lb)   SpO2 99%   BMI 23.11 kg/m     Estimated body mass index is 23.11 kg/m  as calculated from the following:    Height as of this encounter: 1.585 m (5' 2.4\").    Weight as of this encounter: 58.1 kg (128 lb).  Review of Systems  CONSTITUTIONAL: NEGATIVE for fever, chills, change in weight  ENT/MOUTH: NEGATIVE for ear, mouth and throat problems  RESP: NEGATIVE for significant cough or SOB  CV: NEGATIVE for chest pain, palpitations or peripheral edema  Physical Exam  GENERAL: alert and no distress  NECK: no adenopathy, no asymmetry, masses, or scars  RESP: lungs clear to auscultation - no rales, rhonchi or wheezes  CV: regular rate and rhythm, normal S1 S2, no S3 or S4, no murmur, click or rub, no peripheral edema  ABDOMEN: soft, nontender, no hepatosplenomegaly, no masses and bowel sounds normal  MS: no gross musculoskeletal " defects noted, 1+ edema bilateral    Recent Labs   Lab Test 10/12/23  1426 07/21/23  1044 06/29/23  1241 05/30/23  1608 05/27/23  0801 05/26/23  0717 05/25/23  0635 05/24/23  1852   HGB  --  11.3*  --  10.8*   < > 10.6*   < > 10.7*   PLT  --  242  --  162   < > 132*   < > 146*   INR  --  1.08  --   --   --   --   --  1.26*    137   < >  --    < > 139   < > 136   POTASSIUM 5.1 4.2   < >  --    < > 3.6   < > 4.2   CR 0.86 0.78   < >  --    < > 0.86   < > 0.89   A1C  --   --   --   --   --  6.4*  --   --     < > = values in this interval not displayed.        Diagnostics  Labs pending at this time.  Results will be reviewed when available.   No EKG required for low risk surgery (cataract, skin procedure, breast biopsy, etc).    Revised Cardiac Risk Index (RCRI)  The patient has the following serious cardiovascular risks for perioperative complications:   - No serious cardiac risks = 0 points - does have atrial fibrillation and pulmonary hypertension    RCRI Interpretation: 3 points: Class IV (high risk - >11% complication rate)    Estimated Functional Capacity: CANNOT perform 4 METS without symptoms         Signed Electronically by: GUILLAUME Lott  Copy of this evaluation report is provided to requesting physician.

## 2024-01-18 ENCOUNTER — TELEPHONE (OUTPATIENT)
Dept: CARDIOLOGY | Facility: CLINIC | Age: 89
End: 2024-01-18
Payer: COMMERCIAL

## 2024-01-18 LAB
ANION GAP SERPL CALCULATED.3IONS-SCNC: 9 MMOL/L (ref 7–15)
BUN SERPL-MCNC: 21.1 MG/DL (ref 8–23)
CALCIUM SERPL-MCNC: 9.7 MG/DL (ref 8.2–9.6)
CHLORIDE SERPL-SCNC: 102 MMOL/L (ref 98–107)
CREAT SERPL-MCNC: 1.23 MG/DL (ref 0.51–0.95)
DEPRECATED HCO3 PLAS-SCNC: 25 MMOL/L (ref 22–29)
EGFRCR SERPLBLD CKD-EPI 2021: 41 ML/MIN/1.73M2
GLUCOSE SERPL-MCNC: 98 MG/DL (ref 70–99)
POTASSIUM SERPL-SCNC: 5.1 MMOL/L (ref 3.4–5.3)
SODIUM SERPL-SCNC: 136 MMOL/L (ref 135–145)

## 2024-01-19 ENCOUNTER — TELEPHONE (OUTPATIENT)
Dept: INTERNAL MEDICINE | Facility: CLINIC | Age: 89
End: 2024-01-19
Payer: COMMERCIAL

## 2024-01-19 DIAGNOSIS — I10 BENIGN ESSENTIAL HYPERTENSION: Primary | ICD-10-CM

## 2024-01-19 RX ORDER — AMLODIPINE BESYLATE 5 MG/1
5 TABLET ORAL DAILY
Qty: 90 TABLET | Refills: 1 | Status: SHIPPED | OUTPATIENT
Start: 2024-01-19 | End: 2024-07-14

## 2024-01-19 NOTE — TELEPHONE ENCOUNTER
"RN called patient and relayed Dr. Macias' message:    \"start amlodipine 5mg daily and have her continue to check her BP. Can we follow-up on her blood pressure next week? Also, her last creatinine was a little above her baseline, can you make sure she is drinking plenty of fluids and I will order a repeat \"    Patient will try to drink more fluids and will start taking the Amlodipine today after she picks that up from the pharmacy.  She will get the repeat lab done on Mon 1/22, and RN plans to call her back on Mon 1/22 @ 11:30 AM for an update about B/P readings.    Lila Aguilar RN on 1/19/2024 at 11:41 AM        "

## 2024-01-19 NOTE — TELEPHONE ENCOUNTER
RN called patient and asked for updated B/P readings, as requested by Dr. Macias.  Patient relayed the following readings:    1/16/24     193/74. 169/86  1/17/24     153/79, 126/61, 172/81  1/18/24     155/71, 187/92, 197/82      Lila Aguilar RN on 1/19/2024 at 9:40 AM

## 2024-01-22 ENCOUNTER — LAB (OUTPATIENT)
Dept: LAB | Facility: CLINIC | Age: 89
End: 2024-01-22
Payer: COMMERCIAL

## 2024-01-22 DIAGNOSIS — I10 BENIGN ESSENTIAL HYPERTENSION: ICD-10-CM

## 2024-01-22 PROCEDURE — 80048 BASIC METABOLIC PNL TOTAL CA: CPT

## 2024-01-22 PROCEDURE — 36415 COLL VENOUS BLD VENIPUNCTURE: CPT

## 2024-01-22 NOTE — TELEPHONE ENCOUNTER
RN called and spoke with patient's daughter.  She relayed that patient is not at home presently, but she will send updated B/P readings in a My Chart message so Dr. Macias can review those.    Lila Aguilar RN on 1/22/2024 at 2:17 PM

## 2024-01-23 ENCOUNTER — TELEPHONE (OUTPATIENT)
Dept: CARDIOLOGY | Facility: CLINIC | Age: 89
End: 2024-01-23
Payer: COMMERCIAL

## 2024-01-23 LAB
ANION GAP SERPL CALCULATED.3IONS-SCNC: 8 MMOL/L (ref 7–15)
BUN SERPL-MCNC: 17.9 MG/DL (ref 8–23)
CALCIUM SERPL-MCNC: 9.6 MG/DL (ref 8.2–9.6)
CHLORIDE SERPL-SCNC: 102 MMOL/L (ref 98–107)
CREAT SERPL-MCNC: 0.88 MG/DL (ref 0.51–0.95)
DEPRECATED HCO3 PLAS-SCNC: 27 MMOL/L (ref 22–29)
EGFRCR SERPLBLD CKD-EPI 2021: 61 ML/MIN/1.73M2
GLUCOSE SERPL-MCNC: 106 MG/DL (ref 70–99)
POTASSIUM SERPL-SCNC: 5.1 MMOL/L (ref 3.4–5.3)
SODIUM SERPL-SCNC: 137 MMOL/L (ref 135–145)

## 2024-01-30 ENCOUNTER — VIRTUAL VISIT (OUTPATIENT)
Dept: ENDOCRINOLOGY | Facility: CLINIC | Age: 89
End: 2024-01-30
Payer: COMMERCIAL

## 2024-01-30 DIAGNOSIS — R73.03 PREDIABETES: Primary | ICD-10-CM

## 2024-01-30 PROCEDURE — 99214 OFFICE O/P EST MOD 30 MIN: CPT | Mod: 95

## 2024-01-30 PROCEDURE — G2211 COMPLEX E/M VISIT ADD ON: HCPCS | Mod: 95

## 2024-01-30 NOTE — PROGRESS NOTES
Endocrine  Video visit-     Attending Assessment/Plan :     Graves hyperthroidism  with goiter.  On Methimazole 5 mg/day ; Unstable  Next labs March 2024     Addendum  3/7/24 THS 0.25, free T4 1.52 ;  Increase MMI from 5 to 7.5 mg/day.   5/21/24 TSH 6.04, free T4 1.05-- reduced MMI to 5 mg/day  6/26/24 TSH 1.01  8/29/24 TSH 6.06, free T4 1.2    Thyroid nodules seen on recent US . For now I do not recommend focus on this .     Prediabetes -- check A1c with next blood draw.     Hypercalcemia (mild, intermittently noted)  with inappropriately normal PTH .  Unclear etiology at this point.  Continue to monitor Ca periodically     The Longitudinal plan of care for graves  condition was addressed during this visit. Due to added complexity of care, we will continue to support Isadora , and the subsequent management of this condition(s) and with the ongoing continuity of care of this condition.    Due to the continued risks of COVID 19 transmission and to improve ease of access this visit was a video visit. The patient gave verbal consent for the visit today.    I have independently reviewed and interpreted labs, imaging as indicated.     Distant Location (provider location):  Off-site  Mode of Communication:  Video Conference via "[employer]  Chart review/prep time 1  [id number]  Visit Start time  1606   Visit Stop time  1617   _39_ minutes spent on the date of the encounter doing chart review, history and exam, documentation and further activities as noted above.  .  June Acuña MD    Chief complaint:  HISTORY OF PRESENT ILLNESS  Isadora returns for follow up of Graves' hyperthyroidism with goiter.    She presents today along with her daughter.   I initially met Isadora during the hospitalization [facility] 5/24-5/27/23 when she was admitted for complaints of dizziness, nausea and vomiting.   TFTS were very high.  CRP inflammation was also high.  Nontender goiter was noted on exam.  She was started on methimazole   She was last  seen by me 7/24/2023.  Since then, she has continued methimazole and monthly labs.  The most recent labs were normal on 1/8/24  in anticipation of this appt.  We have progressively reduced the mMI dose.  She briefly was hypothyroid on MMI 11/21/23.  She is now on MMI 5 mg/day since then.    There had been a weight loss  from 126 on 5/23 to 117 but her weight is now back to baseline    Hypercalcemia has been noted intermittently noted since 9/222. PTH was low with normal iCa 9/13/22 . PTH was inappropriately normal 5/30/23.     Endocrine relevant labs are as follows:  8/6/07 TSH 0.73  8/18/10 TSH 1.13  8/18/11 TSH 0.464  5/15/12 TSh 0.39  8/21/12 TSH 0.95  1/15/16 TSH 0.448  9/2/22  Ca 9.8, creatinine 0.87  9/13/22 iCa 5, PTH 14, 25OHD 66,   4/6/23 CT contrast  5/23/23 weight 126#   5/24/23 TSH < 0.01, free T4 > 7.77, BNP 2414, troponin 38, glucose 122, Ca 9.9, creatinine 0.89, WBC 7000, platelets 146K  5/25/23 TSH < 0.01, T3 420, free t4 7.71, T4 16.9, CRP 9.1, Ca 9.8  5/24/23 TSI 2.5 - consistent with Graves'   5/30/23 TSH < 0.01, free T4 5.3, T3 264, Ca 10, phos 4.2, albumin 3.1, 25OHD 69, PTH 33,  on MMI 10 mg bid.  6/2/23 weight 118#   6/29/23 weight 117# TSH < 0.01, free T4 1.69, T3 134, Ca 9.8, albumin 3.3, alk phos 117, ALT 15, AST 25, creatinine 0.73, bili 0.4, on MMI 10 mg bid. ; reduce to 10 /5   7/21/23 TSH 0.39, free T4 0.77, T3 91, Ca 9.6, alk phos 116, ALT 10, AST 22, bili 0.4, CRP 28.2, ; Reduce MMI to 10 mg/day  8/25/23 TSH 41.2, free T4 0.37 - stop MMI and repeat labs one  week.   9/8/23 tsh 0.51, free T4 1.56, T3 200    change to MMI 5 mg/day  9/28/23 TSh 0.03, free T4 1.78, T3 123  -- change to MMI 7.5 mg/day  10/5/23 weight 118  10/12/23 Tsh 0.39, free T4 1.15, T3 90- on MMI 7.5 mg/day  11/21/23 TSh 10.3, free T4 0.83-- change to 5 mg/day  1/8/24 TSH 2.76. free T4 1/14 1/17/24 weight 128 Ca 9.7, creatinine 1.23  1/22/24 Ca 9.6, creatinine 0.88    Relevant imaging is as follows: (as read by me as  it pertains to endocrine relevant organs)  There is no thyroid imaging  4/6/2023 CT abdomen and pelvis: adrenals appear normal  5/21/2023 CXR: small pleural effusion; no obvious evidence of large goiter   6/15/23 thyroid US:   Right mid 2 1.6 x 1.3 x  1.4 cm   Right lower 3  0.8 x 0.7  X 0.8   Right lower 4 posterior  1.1 x 1 x 1.6 cm       REVIEW OF SYSTEMS  Regained weight  Back to normal   Lazy and tired  Sleep at night OK lately - sometimes trouble.   Eyes: severe dry eye-- using a lot of eye drops; Had cataract surgery last wed and can't see as well  Cardiac: negative; BP issues ; has been getting higher doses/# of BP meds; didn't take BP med this AM because BP was normal.    Respiratory: a little MCKNIGHT sometimes  GI: negative   No shakiness  Skin: a little itchy;  its started in again now -not a serious problem, I can live with it - (its much better than it used to be)     Past Medical History  Past Medical History:   Diagnosis Date    Atrial fibrillation (H) 01/01/2011    Cataract     Closed nondisplaced fracture of styloid process of radius     Dry eyes     Facial fracture (H) 01/01/2006    Hypertension     Infection due to 2019 novel coronavirus 10/2022    or 11/22- took paxolovid    Low bone density     NSVT (nonsustained ventricular tachycardia) (H) 01/01/2009    during exercise echo, neg EP study    Pacemaker 07/01/2010    Postmenopausal status     S/P cardiac catheterization 01/01/2009    30-40% non obstructive lesion    SSS (sick sinus syndrome) (H) 10/2022    Ventricular tachycardia, nonsustained (H) 01/01/2009    during stress echo     Past Surgical History:   Procedure Laterality Date    CATARACT EXTRACTION W/ INTRAOCULAR LENS IMPLANT Right 12/04/2018    IMPLANT PACEMAKER      PPM  06/30/2010    Permanent Pacemaker       Medications  Current Outpatient Medications   Medication Sig Dispense Refill    amLODIPine (NORVASC) 5 MG tablet Take 1 tablet (5 mg) by mouth daily 90 tablet 1    aspirin 81 MG  tablet Take 1 tablet by mouth daily.      co-enzyme Q-10 100 MG CAPS capsule Take 100 mg by mouth      famotidine (PEPCID) 20 MG tablet Take 1 tablet (20 mg) by mouth 2 times daily 180 tablet 3    lisinopril (ZESTRIL) 40 MG tablet Take 1 tablet (40 mg) by mouth daily 90 tablet 3    melatonin 1 MG/ML LIQD liquid Take 1 mg by mouth nightly as needed for sleep      methimazole (TAPAZOLE) 5 MG tablet Take 1 tablet (5 mg) by mouth daily 30 tablet 4    metoprolol succinate ER (TOPROL XL) 50 MG 24 hr tablet Take 2 tablets (100mg) in the morning, and 1 tablet (50mg) in the evening. 270 tablet 3    spironolactone (ALDACTONE) 25 MG tablet Take 0.5 tablets (12.5 mg) by mouth daily 45 tablet 3    triamcinolone (KENALOG) 0.1 % external cream Apply topically 2 times daily 453.6 g 0    TURMERIC PO          Allergies  No Known Allergies    Family History  family history includes Arrhythmia in her brother, brother, and sister; Atrial fibrillation in her son; Blood Disease in her son; Breast Cancer (age of onset: 50) in her daughter; Cardiovascular in her paternal grandfather; Cardiovascular (age of onset: 76) in her father; Colon Cancer in her sister; Coronary Artery Disease in her father and paternal grandfather; Diabetes Type 2  in her maternal grandfather; Hypertension in her mother; Leukemia in her maternal grandfather; Macular Degeneration in her mother; Myocardial Infarction (age of onset: 70) in her father; Myocardial Infarction (age of onset: 88) in her mother; Neuropathy in her sister; Thyroid Disease in her daughter; Uterine Cancer in her daughter.    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     Physical Exam  There is no height or weight on file to calculate BMI.   BP Readings from Last 1 Encounters:   01/17/24 (!) 173/80      Pulse Readings from Last 1 Encounters:   01/17/24 60      Resp Readings from Last 1 Encounters:   01/17/24 16      Temp Readings from Last 1  "Encounters:   01/17/24 97.6  F (36.4  C) (Oral)      SpO2 Readings from Last 1 Encounters:   01/17/24 99%      Wt Readings from Last 1 Encounters:   01/17/24 58.1 kg (128 lb)      Ht Readings from Last 1 Encounters:   01/17/24 1.585 m (5' 2.4\")     GENERAL: no distress; I can see from lower chest  up;    SKIN: Visible skin clear. No icterus ;   EYES: grossly normal to inspection.   NECK: visible goiter is not present; no visible neck masses  RESP: No audible wheeze, cough, or  increased work of breathing.    NEURO: Awake, alert, responds appropriately to questions.  Mentation and speech fluent.  PSYCH:affect normal and appearance well-groomed.    DATA REVIEW     Latest Ref Rng 5/24/2023  6:52 PM 5/25/2023  6:35 AM 5/30/2023  4:08 PM 6/29/2023  12:41 PM 7/21/2023  10:44 AM   ENDO THYROID LABS-UMP         TSH 0.30 - 4.20 uIU/mL <0.01 (L)  <0.01 (L)  <0.01 (L)  <0.01 (L)  0.39    Triiodothyronine (T3) 85 - 202 ng/dL  420 (H)  264 (H)  134  63 (L)    T4 4.5 - 11.7 ug/dL  16.9 (H)   8.2  4.5    FREE T4 0.90 - 1.70 ng/dL >7.77 (H)  7.71 (H)  5.30 (H)  1.69  0.77 (L)    Thyroid Stim Immunog <=1.3 TSI index 2.5 (H)           Latest Ref Rng 8/25/2023  11:36 AM 9/8/2023  3:52 PM 9/28/2023  12:15 PM 10/12/2023  2:26 PM 11/21/2023  3:58 PM   ENDO THYROID LABS-UMP         TSH 0.30 - 4.20 uIU/mL 41.20 (H)  0.51  0.03 (L)  0.39  10.30 (H)    Triiodothyronine (T3) 85 - 202 ng/dL  200  123  90     T4 4.5 - 11.7 ug/dL        FREE T4 0.90 - 1.70 ng/dL 0.37 (L)  1.56  1.78 (H)  1.15  0.83 (L)    Thyroid Stim Immunog <=1.3 TSI index           Latest Ref Rng 1/8/2024  1:18 PM   ENDO THYROID LABS-UMP     TSH 0.30 - 4.20 uIU/mL 2.76    Triiodothyronine (T3) 85 - 202 ng/dL    T4 4.5 - 11.7 ug/dL    FREE T4 0.90 - 1.70 ng/dL 1.14    Thyroid Stim Immunog <=1.3 TSI index       Legend:  (L) Low  (H) High    "

## 2024-01-30 NOTE — LETTER
1/30/2024       RE: Isadora Mendez  [address]  [facility] [address]     Dear Colleague,    Thank you for referring your patient, Isadora Mendez, to the [facility] ENDOCRINOLOGY [facility]INIC [place] at [facility]. Please see a copy of my visit note below.    1/12/2024  PATIENT LAB/IMAGING STATUS : Orders completed / No further action needed       Endocrine  Video visit-     Attending Assessment/Plan :     Graves hyperthroidism  with goiter.  On Methimazole 5 mg/day ; Unstable  Next labs March 2024     Thyroid nodules seen on recent US . For now I do not recommend focus on this .     Prediabetes -- check A1c with next blood draw.     Hypercalcemia (mild, intermittently noted)  with inappropriately normal PTH .  Unclear etiology at this point.  Continue to monitor Ca periodically     The Longitudinal plan of care for graves  condition was addressed during this visit. Due to added complexity of care, we will continue to support Isadora , and the subsequent management of this condition(s) and with the ongoing continuity of care of this condition.    Due to the continued risks of COVID 19 transmission and to improve ease of access this visit was a video visit. The patient gave verbal consent for the visit today.    I have independently reviewed and interpreted labs, imaging as indicated.     Distant Location (provider location):  Off-site  Mode of Communication:  Video Conference via [employer]  Chart review/prep time 1  [id number]  Visit Start time  1606   Visit Stop time  1617   _39_ minutes spent on the date of the encounter doing chart review, history and exam, documentation and further activities as noted above.  .  June Acuña MD    Chief complaint:  HISTORY OF PRESENT ILLNESS  Isadora returns for follow up of Graves' hyperthyroidism with goiter.    She presents today along with her daughter.   I initially met Isadora during the hospitalization [facility]  5/24-5/27/23 when she was admitted for complaints of dizziness, nausea and vomiting.   TFTS were very high.  CRP inflammation was also high.  Nontender goiter was noted on exam.  She was started on methimazole   She was last seen by me 7/24/2023.  Since then, she has continued methimazole and monthly labs.  The most recent labs were normal on 1/8/24  in anticipation of this appt.  We have progressively reduced the mMI dose.  She briefly was hypothyroid on MMI 11/21/23.  She is now on MMI 5 mg/day since then.    There had been a weight loss  from 126 on 5/23 to 117 but her weight is now back to baseline    Hypercalcemia has been noted intermittently noted since 9/222. PTH was low with normal iCa 9/13/22 . PTH was inappropriately normal 5/30/23.     Endocrine relevant labs are as follows:  8/6/07 TSH 0.73  8/18/10 TSH 1.13  8/18/11 TSH 0.464  5/15/12 TSh 0.39  8/21/12 TSH 0.95  1/15/16 TSH 0.448  9/2/22  Ca 9.8, creatinine 0.87  9/13/22 iCa 5, PTH 14, 25OHD 66,   4/6/23 CT contrast  5/23/23 weight 126#   5/24/23 TSH < 0.01, free T4 > 7.77, BNP 2414, troponin 38, glucose 122, Ca 9.9, creatinine 0.89, WBC 7000, platelets 146K  5/25/23 TSH < 0.01, T3 420, free t4 7.71, T4 16.9, CRP 9.1, Ca 9.8  5/24/23 TSI 2.5 - consistent with Graves'   5/30/23 TSH < 0.01, free T4 5.3, T3 264, Ca 10, phos 4.2, albumin 3.1, 25OHD 69, PTH 33,  on MMI 10 mg bid.  6/2/23 weight 118#   6/29/23 weight 117# TSH < 0.01, free T4 1.69, T3 134, Ca 9.8, albumin 3.3, alk phos 117, ALT 15, AST 25, creatinine 0.73, bili 0.4, on MMI 10 mg bid. ; reduce to 10 /5   7/21/23 TSH 0.39, free T4 0.77, T3 91, Ca 9.6, alk phos 116, ALT 10, AST 22, bili 0.4, CRP 28.2, ; Reduce MMI to 10 mg/day  8/25/23 TSH 41.2, free T4 0.37 - stop MMI and repeat labs one  week.   9/8/23 tsh 0.51, free T4 1.56, T3 200    change to MMI 5 mg/day  9/28/23 TSh 0.03, free T4 1.78, T3 123  -- change to MMI 7.5 mg/day  10/5/23 weight 118  10/12/23 Tsh 0.39, free T4 1.15, T3 90- on MMI  7.5 mg/day  11/21/23 TSh 10.3, free T4 0.83-- change to 5 mg/day  1/8/24 TSH 2.76. free T4 1/14 1/17/24 weight 128 Ca 9.7, creatinine 1.23  1/22/24 Ca 9.6, creatinine 0.88    Relevant imaging is as follows: (as read by me as it pertains to endocrine relevant organs)  There is no thyroid imaging  4/6/2023 CT abdomen and pelvis: adrenals appear normal  5/21/2023 CXR: small pleural effusion; no obvious evidence of large goiter   6/15/23 thyroid US:   Right mid 2 1.6 x 1.3 x  1.4 cm   Right lower 3  0.8 x 0.7  X 0.8   Right lower 4 posterior  1.1 x 1 x 1.6 cm       REVIEW OF SYSTEMS  Regained weight  Back to normal   Lazy and tired  Sleep at night OK lately - sometimes trouble.   Eyes: severe dry eye-- using a lot of eye drops; Had cataract surgery last wed and can't see as well  Cardiac: negative; BP issues ; has been getting higher doses/# of BP meds; didn't take BP med this AM because BP was normal.    Respiratory: a little MCKNIGHT sometimes  GI: negative   No shakiness  Skin: a little itchy;  its started in again now -not a serious problem, I can live with it - (its much better than it used to be)     Past Medical History  Past Medical History:   Diagnosis Date    Atrial fibrillation (H) 01/01/2011    Cataract     Closed nondisplaced fracture of styloid process of radius     Dry eyes     Facial fracture (H) 01/01/2006    Hypertension     Infection due to 2019 novel coronavirus 10/2022    or 11/22- took paxolovid    Low bone density     NSVT (nonsustained ventricular tachycardia) (H) 01/01/2009    during exercise echo, neg EP study    Pacemaker 07/01/2010    Postmenopausal status     S/P cardiac catheterization 01/01/2009    30-40% non obstructive lesion    SSS (sick sinus syndrome) (H) 10/2022    Ventricular tachycardia, nonsustained (H) 01/01/2009    during stress echo     Past Surgical History:   Procedure Laterality Date    CATARACT EXTRACTION W/ INTRAOCULAR LENS IMPLANT Right 12/04/2018    IMPLANT PACEMAKER       PPM  06/30/2010    Permanent Pacemaker       Medications  Current Outpatient Medications   Medication Sig Dispense Refill    amLODIPine (NORVASC) 5 MG tablet Take 1 tablet (5 mg) by mouth daily 90 tablet 1    aspirin 81 MG tablet Take 1 tablet by mouth daily.      co-enzyme Q-10 100 MG CAPS capsule Take 100 mg by mouth      famotidine (PEPCID) 20 MG tablet Take 1 tablet (20 mg) by mouth 2 times daily 180 tablet 3    lisinopril (ZESTRIL) 40 MG tablet Take 1 tablet (40 mg) by mouth daily 90 tablet 3    melatonin 1 MG/ML LIQD liquid Take 1 mg by mouth nightly as needed for sleep      methimazole (TAPAZOLE) 5 MG tablet Take 1 tablet (5 mg) by mouth daily 30 tablet 4    metoprolol succinate ER (TOPROL XL) 50 MG 24 hr tablet Take 2 tablets (100mg) in the morning, and 1 tablet (50mg) in the evening. 270 tablet 3    spironolactone (ALDACTONE) 25 MG tablet Take 0.5 tablets (12.5 mg) by mouth daily 45 tablet 3    triamcinolone (KENALOG) 0.1 % external cream Apply topically 2 times daily 453.6 g 0    TURMERIC PO          Allergies  No Known Allergies    Family History  family history includes Arrhythmia in her brother, brother, and sister; Atrial fibrillation in her son; Blood Disease in her son; Breast Cancer (age of onset: 50) in her daughter; Cardiovascular in her paternal grandfather; Cardiovascular (age of onset: 76) in her father; Colon Cancer in her sister; Coronary Artery Disease in her father and paternal grandfather; Diabetes Type 2  in her maternal grandfather; Hypertension in her mother; Leukemia in her maternal grandfather; Macular Degeneration in her mother; Myocardial Infarction (age of onset: 70) in her father; Myocardial Infarction (age of onset: 88) in her mother; Neuropathy in her sister; Thyroid Disease in her daughter; Uterine Cancer in her daughter.    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     Physical Exam  There is no height or weight on file to  "calculate BMI.   BP Readings from Last 1 Encounters:   01/17/24 (!) 173/80      Pulse Readings from Last 1 Encounters:   01/17/24 60      Resp Readings from Last 1 Encounters:   01/17/24 16      Temp Readings from Last 1 Encounters:   01/17/24 97.6  F (36.4  C) (Oral)      SpO2 Readings from Last 1 Encounters:   01/17/24 99%      Wt Readings from Last 1 Encounters:   01/17/24 58.1 kg (128 lb)      Ht Readings from Last 1 Encounters:   01/17/24 1.585 m (5' 2.4\")     GENERAL: no distress; I can see from lower chest  up;    SKIN: Visible skin clear. No icterus ;   EYES: grossly normal to inspection.   NECK: visible goiter is not present; no visible neck masses  RESP: No audible wheeze, cough, or  increased work of breathing.    NEURO: Awake, alert, responds appropriately to questions.  Mentation and speech fluent.  PSYCH:affect normal and appearance well-groomed.    DATA REVIEW     Latest Ref Rng 5/24/2023  6:52 PM 5/25/2023  6:35 AM 5/30/2023  4:08 PM 6/29/2023  12:41 PM 7/21/2023  10:44 AM   ENDO THYROID LABS-UMP         TSH 0.30 - 4.20 uIU/mL <0.01 (L)  <0.01 (L)  <0.01 (L)  <0.01 (L)  0.39    Triiodothyronine (T3) 85 - 202 ng/dL  420 (H)  264 (H)  134  63 (L)    T4 4.5 - 11.7 ug/dL  16.9 (H)   8.2  4.5    FREE T4 0.90 - 1.70 ng/dL >7.77 (H)  7.71 (H)  5.30 (H)  1.69  0.77 (L)    Thyroid Stim Immunog <=1.3 TSI index 2.5 (H)           Latest Ref Rng 8/25/2023  11:36 AM 9/8/2023  3:52 PM 9/28/2023  12:15 PM 10/12/2023  2:26 PM 11/21/2023  3:58 PM   ENDO THYROID LABS-UMP         TSH 0.30 - 4.20 uIU/mL 41.20 (H)  0.51  0.03 (L)  0.39  10.30 (H)    Triiodothyronine (T3) 85 - 202 ng/dL  200  123  90     T4 4.5 - 11.7 ug/dL        FREE T4 0.90 - 1.70 ng/dL 0.37 (L)  1.56  1.78 (H)  1.15  0.83 (L)    Thyroid Stim Immunog <=1.3 TSI index           Latest Ref Rng 1/8/2024  1:18 PM   ENDO THYROID LABS-UMP     TSH 0.30 - 4.20 uIU/mL 2.76    Triiodothyronine (T3) 85 - 202 ng/dL    T4 4.5 - 11.7 ug/dL    FREE T4 0.90 - 1.70 ng/dL " 1.14    Thyroid Stim Immunog <=1.3 TSI index       Legend:  (L) Low  (H) High

## 2024-01-31 ENCOUNTER — TELEPHONE (OUTPATIENT)
Dept: ENDOCRINOLOGY | Facility: CLINIC | Age: 89
End: 2024-01-31
Payer: COMMERCIAL

## 2024-01-31 NOTE — TELEPHONE ENCOUNTER
1st attempt- VM full unable to LVM, sent mychart    Schedule 1/30 checkout order:  - 1 year follow up appointment with Dr. Acuña (around Jan 2025). Return Endocrine   - Lab appointment around March 2024

## 2024-02-07 NOTE — TELEPHONE ENCOUNTER
Spoke with patient and scheduled lab appointment for 3/4/24 and 1 year follow-up appointment with Dr. Acuña for 3/5/25- as patient preferred in person visit.

## 2024-02-27 ENCOUNTER — ANCILLARY PROCEDURE (OUTPATIENT)
Dept: CARDIOLOGY | Facility: CLINIC | Age: 89
End: 2024-02-27
Attending: INTERNAL MEDICINE
Payer: COMMERCIAL

## 2024-02-27 DIAGNOSIS — I48.0 PAROXYSMAL A-FIB (H): ICD-10-CM

## 2024-02-27 PROCEDURE — 93296 REM INTERROG EVL PM/IDS: CPT

## 2024-02-27 PROCEDURE — 93294 REM INTERROG EVL PM/LDLS PM: CPT | Performed by: INTERNAL MEDICINE

## 2024-03-07 ENCOUNTER — LAB (OUTPATIENT)
Dept: LAB | Facility: CLINIC | Age: 89
End: 2024-03-07
Payer: COMMERCIAL

## 2024-03-07 DIAGNOSIS — E05.90 HYPERTHYROIDISM: ICD-10-CM

## 2024-03-07 DIAGNOSIS — R73.03 PREDIABETES: ICD-10-CM

## 2024-03-07 LAB — HBA1C MFR BLD: 5.9 % (ref 0–5.6)

## 2024-03-07 PROCEDURE — 84443 ASSAY THYROID STIM HORMONE: CPT

## 2024-03-07 PROCEDURE — 84439 ASSAY OF FREE THYROXINE: CPT

## 2024-03-07 PROCEDURE — 36415 COLL VENOUS BLD VENIPUNCTURE: CPT

## 2024-03-07 PROCEDURE — 83036 HEMOGLOBIN GLYCOSYLATED A1C: CPT

## 2024-03-08 DIAGNOSIS — E05.00 GRAVES DISEASE: ICD-10-CM

## 2024-03-08 LAB
T4 FREE SERPL-MCNC: 1.52 NG/DL (ref 0.9–1.7)
TSH SERPL DL<=0.005 MIU/L-ACNC: 0.25 UIU/ML (ref 0.3–4.2)

## 2024-03-08 RX ORDER — METHIMAZOLE 5 MG/1
7.5 TABLET ORAL DAILY
Qty: 45 TABLET | Refills: 4 | Status: SHIPPED | OUTPATIENT
Start: 2024-03-08 | End: 2024-05-22

## 2024-03-13 LAB
MDC_IDC_EPISODE_DTM: NORMAL
MDC_IDC_EPISODE_DURATION: 0 S
MDC_IDC_EPISODE_DURATION: 10 S
MDC_IDC_EPISODE_DURATION: 2 S
MDC_IDC_EPISODE_ID: 616
MDC_IDC_EPISODE_ID: 617
MDC_IDC_EPISODE_ID: 618
MDC_IDC_EPISODE_TYPE: NORMAL
MDC_IDC_LEAD_CONNECTION_STATUS: NORMAL
MDC_IDC_LEAD_CONNECTION_STATUS: NORMAL
MDC_IDC_LEAD_IMPLANT_DT: NORMAL
MDC_IDC_LEAD_IMPLANT_DT: NORMAL
MDC_IDC_LEAD_LOCATION: NORMAL
MDC_IDC_LEAD_LOCATION: NORMAL
MDC_IDC_LEAD_LOCATION_DETAIL_1: NORMAL
MDC_IDC_LEAD_LOCATION_DETAIL_1: NORMAL
MDC_IDC_LEAD_MFG: NORMAL
MDC_IDC_LEAD_MFG: NORMAL
MDC_IDC_LEAD_MODEL: NORMAL
MDC_IDC_LEAD_MODEL: NORMAL
MDC_IDC_LEAD_POLARITY_TYPE: NORMAL
MDC_IDC_LEAD_POLARITY_TYPE: NORMAL
MDC_IDC_LEAD_SERIAL: NORMAL
MDC_IDC_LEAD_SERIAL: NORMAL
MDC_IDC_LEAD_SPECIAL_FUNCTION: NORMAL
MDC_IDC_LEAD_SPECIAL_FUNCTION: NORMAL
MDC_IDC_MSMT_BATTERY_DTM: NORMAL
MDC_IDC_MSMT_BATTERY_REMAINING_LONGEVITY: 38 MO
MDC_IDC_MSMT_BATTERY_RRT_TRIGGER: 2.83
MDC_IDC_MSMT_BATTERY_STATUS: NORMAL
MDC_IDC_MSMT_BATTERY_VOLTAGE: 2.96 V
MDC_IDC_MSMT_LEADCHNL_RA_IMPEDANCE_VALUE: 266 OHM
MDC_IDC_MSMT_LEADCHNL_RA_IMPEDANCE_VALUE: 399 OHM
MDC_IDC_MSMT_LEADCHNL_RA_PACING_THRESHOLD_AMPLITUDE: 0.75 V
MDC_IDC_MSMT_LEADCHNL_RA_PACING_THRESHOLD_PULSEWIDTH: 0.4 MS
MDC_IDC_MSMT_LEADCHNL_RA_SENSING_INTR_AMPL: 0.75 MV
MDC_IDC_MSMT_LEADCHNL_RV_IMPEDANCE_VALUE: 418 OHM
MDC_IDC_MSMT_LEADCHNL_RV_IMPEDANCE_VALUE: 456 OHM
MDC_IDC_MSMT_LEADCHNL_RV_PACING_THRESHOLD_AMPLITUDE: 0.5 V
MDC_IDC_MSMT_LEADCHNL_RV_PACING_THRESHOLD_PULSEWIDTH: 0.4 MS
MDC_IDC_MSMT_LEADCHNL_RV_SENSING_INTR_AMPL: 9.62 MV
MDC_IDC_PG_IMPLANT_DTM: NORMAL
MDC_IDC_PG_MFG: NORMAL
MDC_IDC_PG_MODEL: NORMAL
MDC_IDC_PG_SERIAL: NORMAL
MDC_IDC_PG_TYPE: NORMAL
MDC_IDC_SESS_CLINIC_NAME: NORMAL
MDC_IDC_SESS_DTM: NORMAL
MDC_IDC_SESS_TYPE: NORMAL
MDC_IDC_SET_BRADY_AT_MODE_SWITCH_RATE: 171 {BEATS}/MIN
MDC_IDC_SET_BRADY_HYSTRATE: NORMAL
MDC_IDC_SET_BRADY_LOWRATE: 60 {BEATS}/MIN
MDC_IDC_SET_BRADY_MAX_SENSOR_RATE: 130 {BEATS}/MIN
MDC_IDC_SET_BRADY_MAX_TRACKING_RATE: 130 {BEATS}/MIN
MDC_IDC_SET_BRADY_MODE: NORMAL
MDC_IDC_SET_BRADY_PAV_DELAY_LOW: 180 MS
MDC_IDC_SET_BRADY_SAV_DELAY_LOW: 150 MS
MDC_IDC_SET_LEADCHNL_RA_PACING_AMPLITUDE: 1.75 V
MDC_IDC_SET_LEADCHNL_RA_PACING_ANODE_ELECTRODE_1: NORMAL
MDC_IDC_SET_LEADCHNL_RA_PACING_ANODE_LOCATION_1: NORMAL
MDC_IDC_SET_LEADCHNL_RA_PACING_CAPTURE_MODE: NORMAL
MDC_IDC_SET_LEADCHNL_RA_PACING_CATHODE_ELECTRODE_1: NORMAL
MDC_IDC_SET_LEADCHNL_RA_PACING_CATHODE_LOCATION_1: NORMAL
MDC_IDC_SET_LEADCHNL_RA_PACING_POLARITY: NORMAL
MDC_IDC_SET_LEADCHNL_RA_PACING_PULSEWIDTH: 0.4 MS
MDC_IDC_SET_LEADCHNL_RA_SENSING_ANODE_ELECTRODE_1: NORMAL
MDC_IDC_SET_LEADCHNL_RA_SENSING_ANODE_LOCATION_1: NORMAL
MDC_IDC_SET_LEADCHNL_RA_SENSING_CATHODE_ELECTRODE_1: NORMAL
MDC_IDC_SET_LEADCHNL_RA_SENSING_CATHODE_LOCATION_1: NORMAL
MDC_IDC_SET_LEADCHNL_RA_SENSING_POLARITY: NORMAL
MDC_IDC_SET_LEADCHNL_RA_SENSING_SENSITIVITY: 0.3 MV
MDC_IDC_SET_LEADCHNL_RV_PACING_AMPLITUDE: 2 V
MDC_IDC_SET_LEADCHNL_RV_PACING_ANODE_ELECTRODE_1: NORMAL
MDC_IDC_SET_LEADCHNL_RV_PACING_ANODE_LOCATION_1: NORMAL
MDC_IDC_SET_LEADCHNL_RV_PACING_CAPTURE_MODE: NORMAL
MDC_IDC_SET_LEADCHNL_RV_PACING_CATHODE_ELECTRODE_1: NORMAL
MDC_IDC_SET_LEADCHNL_RV_PACING_CATHODE_LOCATION_1: NORMAL
MDC_IDC_SET_LEADCHNL_RV_PACING_POLARITY: NORMAL
MDC_IDC_SET_LEADCHNL_RV_PACING_PULSEWIDTH: 0.4 MS
MDC_IDC_SET_LEADCHNL_RV_SENSING_ANODE_ELECTRODE_1: NORMAL
MDC_IDC_SET_LEADCHNL_RV_SENSING_ANODE_LOCATION_1: NORMAL
MDC_IDC_SET_LEADCHNL_RV_SENSING_CATHODE_ELECTRODE_1: NORMAL
MDC_IDC_SET_LEADCHNL_RV_SENSING_CATHODE_LOCATION_1: NORMAL
MDC_IDC_SET_LEADCHNL_RV_SENSING_POLARITY: NORMAL
MDC_IDC_SET_LEADCHNL_RV_SENSING_SENSITIVITY: 0.9 MV
MDC_IDC_SET_ZONE_DETECTION_INTERVAL: 350 MS
MDC_IDC_SET_ZONE_DETECTION_INTERVAL: 400 MS
MDC_IDC_SET_ZONE_STATUS: NORMAL
MDC_IDC_SET_ZONE_STATUS: NORMAL
MDC_IDC_SET_ZONE_TYPE: NORMAL
MDC_IDC_SET_ZONE_VENDOR_TYPE: NORMAL
MDC_IDC_STAT_AT_BURDEN_PERCENT: 0.1 %
MDC_IDC_STAT_AT_DTM_END: NORMAL
MDC_IDC_STAT_AT_DTM_START: NORMAL
MDC_IDC_STAT_BRADY_AP_VP_PERCENT: 0.05 %
MDC_IDC_STAT_BRADY_AP_VS_PERCENT: 99.63 %
MDC_IDC_STAT_BRADY_AS_VP_PERCENT: 0 %
MDC_IDC_STAT_BRADY_AS_VS_PERCENT: 0.31 %
MDC_IDC_STAT_BRADY_DTM_END: NORMAL
MDC_IDC_STAT_BRADY_DTM_START: NORMAL
MDC_IDC_STAT_BRADY_RA_PERCENT_PACED: 99.62 %
MDC_IDC_STAT_BRADY_RV_PERCENT_PACED: 0.05 %
MDC_IDC_STAT_EPISODE_RECENT_COUNT: 0
MDC_IDC_STAT_EPISODE_RECENT_COUNT: 0
MDC_IDC_STAT_EPISODE_RECENT_COUNT: 1
MDC_IDC_STAT_EPISODE_RECENT_COUNT: 2
MDC_IDC_STAT_EPISODE_RECENT_COUNT_DTM_END: NORMAL
MDC_IDC_STAT_EPISODE_RECENT_COUNT_DTM_START: NORMAL
MDC_IDC_STAT_EPISODE_TOTAL_COUNT: 0
MDC_IDC_STAT_EPISODE_TOTAL_COUNT: 1
MDC_IDC_STAT_EPISODE_TOTAL_COUNT: 232
MDC_IDC_STAT_EPISODE_TOTAL_COUNT: 381
MDC_IDC_STAT_EPISODE_TOTAL_COUNT_DTM_END: NORMAL
MDC_IDC_STAT_EPISODE_TOTAL_COUNT_DTM_START: NORMAL
MDC_IDC_STAT_EPISODE_TYPE: NORMAL
MDC_IDC_STAT_TACHYTHERAPY_RECENT_DTM_END: NORMAL
MDC_IDC_STAT_TACHYTHERAPY_RECENT_DTM_START: NORMAL
MDC_IDC_STAT_TACHYTHERAPY_TOTAL_DTM_END: NORMAL
MDC_IDC_STAT_TACHYTHERAPY_TOTAL_DTM_START: NORMAL

## 2024-03-15 ENCOUNTER — OFFICE VISIT (OUTPATIENT)
Dept: INTERNAL MEDICINE | Facility: CLINIC | Age: 89
End: 2024-03-15
Payer: COMMERCIAL

## 2024-03-15 VITALS
WEIGHT: 126.5 LBS | HEART RATE: 70 BPM | BODY MASS INDEX: 22.84 KG/M2 | SYSTOLIC BLOOD PRESSURE: 103 MMHG | OXYGEN SATURATION: 96 % | DIASTOLIC BLOOD PRESSURE: 65 MMHG

## 2024-03-15 DIAGNOSIS — E05.00 GRAVES DISEASE: ICD-10-CM

## 2024-03-15 DIAGNOSIS — I10 BENIGN ESSENTIAL HYPERTENSION: ICD-10-CM

## 2024-03-15 DIAGNOSIS — Z29.11 NEED FOR VACCINATION AGAINST RESPIRATORY SYNCYTIAL VIRUS: ICD-10-CM

## 2024-03-15 DIAGNOSIS — K81.1 CHRONIC CHOLECYSTITIS: ICD-10-CM

## 2024-03-15 DIAGNOSIS — I48.0 PAROXYSMAL A-FIB (H): Primary | ICD-10-CM

## 2024-03-15 DIAGNOSIS — L29.9 ITCHING: ICD-10-CM

## 2024-03-15 DIAGNOSIS — I50.30 DIASTOLIC HEART FAILURE, UNSPECIFIED HF CHRONICITY (H): ICD-10-CM

## 2024-03-15 PROCEDURE — 99215 OFFICE O/P EST HI 40 MIN: CPT | Performed by: INTERNAL MEDICINE

## 2024-03-15 RX ORDER — RESPIRATORY SYNCYTIAL VIRUS VACCINE 120MCG/0.5
0.5 KIT INTRAMUSCULAR ONCE
Qty: 1 EACH | Refills: 0 | Status: SHIPPED | OUTPATIENT
Start: 2024-03-15 | End: 2024-03-15

## 2024-03-15 RX ORDER — SPIRONOLACTONE 25 MG/1
12.5 TABLET ORAL DAILY
Qty: 45 TABLET | Refills: 4 | Status: SHIPPED | OUTPATIENT
Start: 2024-03-15

## 2024-03-15 RX ORDER — LISINOPRIL 40 MG/1
40 TABLET ORAL DAILY
Qty: 90 TABLET | Refills: 4 | Status: SHIPPED | OUTPATIENT
Start: 2024-03-15

## 2024-03-15 RX ORDER — TRIAMCINOLONE ACETONIDE 1 MG/G
CREAM TOPICAL 2 TIMES DAILY
Qty: 453.6 G | Refills: 0 | Status: SHIPPED | OUTPATIENT
Start: 2024-03-15

## 2024-03-15 NOTE — PROGRESS NOTES
Assessment & Plan     Paroxysmal A-fib (H)  She reports an episode of afib at home (has had ~2 episodes since discharge about 1 year prior)  Calculated a CHADS VASC, which is 6.  Discussed risks/benefits of anticoagulation  She is hesitant, but agrees to learning more about the medication.  Will send prescription and she will discuss further with her family  Would plan to hold ASA if on eliquis  - apixaban ANTICOAGULANT (ELIQUIS) 2.5 MG tablet  Dispense: 90 tablet; Refill: 3    Diastolic heart failure, unspecified HF chronicity (H)  Stable, no acute symptoms  - lisinopril (ZESTRIL) 40 MG tablet  Dispense: 90 tablet; Refill: 4  - spironolactone (ALDACTONE) 25 MG tablet  Dispense: 45 tablet; Refill: 4    Benign essential hypertension  Reviewed BP.  She is at goal    Graves disease  Recently increased methimazole    Chronic cholecystitis  Occasional symptoms  However, her weight has been improving and her appetite is good  Will defer surgical management given her age; however, re-consider if worsening symptoms or weight loss    Itching  - triamcinolone (KENALOG) 0.1 % external cream  Dispense: 453.6 g; Refill: 0    Need for vaccination against respiratory syncytial virus  - respiratory syncytial virus vaccine, bivalent (ABRYSVO) injection  Dispense: 1 each; Refill: 0      I spent a total of 41 minutes on the day of the visit.  Time spent by me doing chart review, history and exam, documentation and further activities per the note    The longitudinal plan of care for the diagnosis(es)/condition(s) as documented were addressed during this visit. Due to the added complexity in care, I will continue to support Isadora in the subsequent management and with ongoing continuity of care.    Return in about 4 months (around 7/15/2024) for Please get labs prior to your follow-up appointment.    Carmen Muir is a 89 year old, presenting for the following health issues:  Follow Up        3/15/2024     9:34 AM   Additional  Questions   Roomed by IFEOMA   Accompanied by Nicol yovanny     History of Present Illness       Heart Failure:  She presents for follow up of heart failure. She is experiencing shortness of breath with activity only, which is same as usual. She is not experiencing any lower extremity edema.   She denies orthopenea and is not coughing at night. Patient is not checking weight daily. She has recently had a None.  She has side effects from medications including cough and other.  She has had no other medical visits for heart failure since the last visit.    Hypertension: She presents for follow up of hypertension.  She does check blood pressure  regularly outside of the clinic. Outside blood pressures have been over 140/90. She follows a low salt diet.     Symptoms include:  Hyperthyrodism (ithchy in the back of her head); Heart Failure; Hypertension    She eats 2-3 servings of fruits and vegetables daily.She consumes 0 sweetened beverage(s) daily.She exercises with enough effort to increase her heart rate 20 to 29 minutes per day.  She exercises with enough effort to increase her heart rate 3 or less days per week.   She is taking medications regularly.     SHe is concerned about her BP.    She lost her lisinopril bottle, but has some old tabs    Weight has gone back up, which is great.  She has had some occasional abdominal symptoms          Objective    /65 (BP Location: Right arm, Patient Position: Sitting, Cuff Size: Adult Regular)   Pulse 70   Wt 57.4 kg (126 lb 8 oz)   SpO2 96%   BMI 22.84 kg/m    Body mass index is 22.84 kg/m .  Physical Exam               Signed Electronically by: Jovana Macias MD

## 2024-03-15 NOTE — PATIENT INSTRUCTIONS
AFib:  Please consider start a blood thinner to help prevent a stroke from afib  I calculated a CHADS-VASC risk, which give a score of 6.  This translates to a 7-12% yearly risk of having a stroke.  Being on a blood thinner would help decrease this risk.    For you, I would recommend a blood thinner like Eliquis (Apixaban).  It does not require frequent monitoring and does not require you to change your diet.  Also, evidence shows that the risk of serious bleeding is lower with medications like eliquis compared to Coumadin (warfarin).    If you start the Eliquis (apixaban), then please stop the aspirin    I have sent the prescription to your pharmacy.  Please don't hesitate to reach out if you have questions.    Blood Pressure:  Your readings overall look pretty good!  I have sent a refill of the lisinopril    Gallbladder/Weight:  Please check your weight a couple of times per month  If you start to lose weight, I would be worried that the gallbladder is acting up and causing symptoms

## 2024-03-22 ENCOUNTER — TRANSFERRED RECORDS (OUTPATIENT)
Dept: HEALTH INFORMATION MANAGEMENT | Facility: CLINIC | Age: 89
End: 2024-03-22

## 2024-04-25 ENCOUNTER — OFFICE VISIT (OUTPATIENT)
Dept: AUDIOLOGY | Facility: CLINIC | Age: 89
End: 2024-04-25
Payer: COMMERCIAL

## 2024-04-25 DIAGNOSIS — H91.93 BILATERAL HEARING LOSS: ICD-10-CM

## 2024-04-25 DIAGNOSIS — H90.3 SENSORINEURAL HEARING LOSS, BILATERAL: Primary | ICD-10-CM

## 2024-04-25 PROCEDURE — 92557 COMPREHENSIVE HEARING TEST: CPT | Performed by: AUDIOLOGIST

## 2024-04-25 PROCEDURE — 92567 TYMPANOMETRY: CPT | Performed by: AUDIOLOGIST

## 2024-04-25 NOTE — PROGRESS NOTES
AUDIOLOGY REPORT    SUBJECTIVE:  Isadora Mendez is a 89 year old female who was seen in the Audiology Clinic at the [facility] for audiologic evaluation, referred by Jovana Macias M.D. The patient reports a gradual decrease in hearing status bilaterally, she feels that her left ear may be slightly better than the right ear as she prefers that ear for phone use. The patient denies  bilateral tinnitus, bilateral otalgia, bilateral drainage, bilateral aural fullness, and dizziness.  The patient notes difficulty with communication in a variety of listening situations.  She notes difficulty hearing her  and feels that some people sound like they are mumbling. They were accompanied today by their daughter, Florina.    OBJECTIVE:  \Fall Risk Screen:  1. Have you fallen two or more times in the past year? No  2. Have you fallen and had an injury in the past year? No    Timed Up and Go Score (in seconds): not tested  Is patient a fall risk? No  Referral initiated: No  Fall Risk Assessment Completed by Audiology    Otoscopic exam indicates ears are clear of cerumen bilaterally     Pure Tone Thresholds assessed using conventional audiometry with good  reliability from 250-8000 Hz bilaterally using insert earphones and circumaural headphones     RIGHT:  borderline-normal sloping to severe sensorineural hearing loss    LEFT:    borderline-normal sloping to severe sensorineural hearing loss    Tympanogram:    RIGHT: normal eardrum mobility    LEFT:   normal eardrum mobility    Speech Reception Threshold:    RIGHT: 35 dB HL    LEFT:   35 dB HL    Word Recognition Score:     RIGHT: 88% at 75 dB HL using NU-6 recorded word list.    LEFT:   92% at 75 dB HL using NU-6 recorded word list.      ASSESSMENT:     ICD-10-CM    1. Bilateral hearing loss  H91.93 Adult Audiology  Referral           Today's results revealed a bilateral sensorineural hearing loss.  Today s results were discussed with the patient in detail.     The patient was initially scheduled for a hearing aid consultation as well. An insurance benefit check indicated that she may have a benefit through [employer], her daughter reported she was told that as well. The options of moving forward here without any insurance coverage for hearing aids was discussed. Isadora decided she wanted to explore the possible benefit before pursuing devices here. She was provided a copy of her hearing test today.    PLAN:  Patient was counseled regarding hearing loss and impact on communication.  Patient is a good candidate for amplification at this time. It is recommended that the patient repeat the hearing evaluation in 1-2 years, sooner if concerns arise.  She is welcome to return for a hearing aid consultation/series of hearing aid appointments should she desire.  Please call this clinic with questions regarding these results or recommendations.        Lloyd Bingham  Audiologist  [place] License  #[id number]

## 2024-05-21 ENCOUNTER — LAB (OUTPATIENT)
Dept: LAB | Facility: CLINIC | Age: 89
End: 2024-05-21
Payer: COMMERCIAL

## 2024-05-21 DIAGNOSIS — E05.00 GRAVES DISEASE: ICD-10-CM

## 2024-05-21 DIAGNOSIS — E03.2 HYPOTHYROIDISM DUE TO MEDICATION: Primary | ICD-10-CM

## 2024-05-21 PROCEDURE — 84439 ASSAY OF FREE THYROXINE: CPT

## 2024-05-21 PROCEDURE — 36415 COLL VENOUS BLD VENIPUNCTURE: CPT

## 2024-05-21 PROCEDURE — 84443 ASSAY THYROID STIM HORMONE: CPT

## 2024-05-22 DIAGNOSIS — E05.00 GRAVES DISEASE: ICD-10-CM

## 2024-05-22 LAB
T4 FREE SERPL-MCNC: 1.05 NG/DL (ref 0.9–1.7)
TSH SERPL DL<=0.005 MIU/L-ACNC: 6.04 UIU/ML (ref 0.3–4.2)

## 2024-05-22 RX ORDER — METHIMAZOLE 5 MG/1
5 TABLET ORAL DAILY
Qty: 30 TABLET | Refills: 4 | Status: SHIPPED | OUTPATIENT
Start: 2024-05-22

## 2024-05-24 DIAGNOSIS — R11.0 NAUSEA: Primary | ICD-10-CM

## 2024-05-24 RX ORDER — ONDANSETRON 4 MG/1
4 TABLET, FILM COATED ORAL EVERY 6 HOURS PRN
Qty: 90 TABLET | Refills: 1 | Status: SHIPPED | OUTPATIENT
Start: 2024-05-24 | End: 2024-06-06

## 2024-06-04 ENCOUNTER — ANCILLARY PROCEDURE (OUTPATIENT)
Dept: CARDIOLOGY | Facility: CLINIC | Age: 89
End: 2024-06-04
Attending: INTERNAL MEDICINE
Payer: COMMERCIAL

## 2024-06-04 DIAGNOSIS — I49.5 SICK SINUS SYNDROME (H): ICD-10-CM

## 2024-06-04 PROCEDURE — 93296 REM INTERROG EVL PM/IDS: CPT

## 2024-06-04 PROCEDURE — 93294 REM INTERROG EVL PM/LDLS PM: CPT | Performed by: INTERNAL MEDICINE

## 2024-06-19 LAB
MDC_IDC_EPISODE_DTM: NORMAL
MDC_IDC_EPISODE_DURATION: 101 S
MDC_IDC_EPISODE_DURATION: 105 S
MDC_IDC_EPISODE_DURATION: 116 S
MDC_IDC_EPISODE_DURATION: 124 S
MDC_IDC_EPISODE_DURATION: 1318 S
MDC_IDC_EPISODE_DURATION: 134 S
MDC_IDC_EPISODE_DURATION: 1383 S
MDC_IDC_EPISODE_DURATION: 139 S
MDC_IDC_EPISODE_DURATION: 1441 S
MDC_IDC_EPISODE_DURATION: 150 S
MDC_IDC_EPISODE_DURATION: 169 S
MDC_IDC_EPISODE_DURATION: 195 S
MDC_IDC_EPISODE_DURATION: 21 S
MDC_IDC_EPISODE_DURATION: 229 S
MDC_IDC_EPISODE_DURATION: 24 S
MDC_IDC_EPISODE_DURATION: 243 S
MDC_IDC_EPISODE_DURATION: 246 S
MDC_IDC_EPISODE_DURATION: 249 S
MDC_IDC_EPISODE_DURATION: 25 S
MDC_IDC_EPISODE_DURATION: 261 S
MDC_IDC_EPISODE_DURATION: 282 S
MDC_IDC_EPISODE_DURATION: 295 S
MDC_IDC_EPISODE_DURATION: 336 S
MDC_IDC_EPISODE_DURATION: 347 S
MDC_IDC_EPISODE_DURATION: 35 S
MDC_IDC_EPISODE_DURATION: 388 S
MDC_IDC_EPISODE_DURATION: 418 S
MDC_IDC_EPISODE_DURATION: 451 S
MDC_IDC_EPISODE_DURATION: 473 S
MDC_IDC_EPISODE_DURATION: 5611 S
MDC_IDC_EPISODE_DURATION: 57 S
MDC_IDC_EPISODE_DURATION: 571 S
MDC_IDC_EPISODE_DURATION: 5844 S
MDC_IDC_EPISODE_DURATION: 59 S
MDC_IDC_EPISODE_DURATION: 59 S
MDC_IDC_EPISODE_DURATION: 603 S
MDC_IDC_EPISODE_DURATION: 6064 S
MDC_IDC_EPISODE_DURATION: 65 S
MDC_IDC_EPISODE_DURATION: 6837 S
MDC_IDC_EPISODE_DURATION: 71 S
MDC_IDC_EPISODE_DURATION: 7881 S
MDC_IDC_EPISODE_DURATION: 8250 S
MDC_IDC_EPISODE_DURATION: 92 S
MDC_IDC_EPISODE_DURATION: 99 S
MDC_IDC_EPISODE_ID: 619
MDC_IDC_EPISODE_ID: 620
MDC_IDC_EPISODE_ID: 621
MDC_IDC_EPISODE_ID: 622
MDC_IDC_EPISODE_ID: 623
MDC_IDC_EPISODE_ID: 624
MDC_IDC_EPISODE_ID: 625
MDC_IDC_EPISODE_ID: 626
MDC_IDC_EPISODE_ID: 627
MDC_IDC_EPISODE_ID: 628
MDC_IDC_EPISODE_ID: 629
MDC_IDC_EPISODE_ID: 630
MDC_IDC_EPISODE_ID: 631
MDC_IDC_EPISODE_ID: 632
MDC_IDC_EPISODE_ID: 633
MDC_IDC_EPISODE_ID: 634
MDC_IDC_EPISODE_ID: 635
MDC_IDC_EPISODE_ID: 636
MDC_IDC_EPISODE_ID: 637
MDC_IDC_EPISODE_ID: 638
MDC_IDC_EPISODE_ID: 639
MDC_IDC_EPISODE_ID: 640
MDC_IDC_EPISODE_ID: 641
MDC_IDC_EPISODE_ID: 642
MDC_IDC_EPISODE_ID: 643
MDC_IDC_EPISODE_ID: 644
MDC_IDC_EPISODE_ID: 645
MDC_IDC_EPISODE_ID: 646
MDC_IDC_EPISODE_ID: 647
MDC_IDC_EPISODE_ID: 648
MDC_IDC_EPISODE_ID: 649
MDC_IDC_EPISODE_ID: 650
MDC_IDC_EPISODE_ID: 651
MDC_IDC_EPISODE_ID: 652
MDC_IDC_EPISODE_ID: 653
MDC_IDC_EPISODE_ID: 654
MDC_IDC_EPISODE_ID: 655
MDC_IDC_EPISODE_ID: 656
MDC_IDC_EPISODE_ID: 657
MDC_IDC_EPISODE_ID: 658
MDC_IDC_EPISODE_ID: 659
MDC_IDC_EPISODE_ID: 660
MDC_IDC_EPISODE_ID: 661
MDC_IDC_EPISODE_ID: 662
MDC_IDC_EPISODE_TYPE: NORMAL
MDC_IDC_LEAD_CONNECTION_STATUS: NORMAL
MDC_IDC_LEAD_CONNECTION_STATUS: NORMAL
MDC_IDC_LEAD_IMPLANT_DT: NORMAL
MDC_IDC_LEAD_IMPLANT_DT: NORMAL
MDC_IDC_LEAD_LOCATION: NORMAL
MDC_IDC_LEAD_LOCATION: NORMAL
MDC_IDC_LEAD_LOCATION_DETAIL_1: NORMAL
MDC_IDC_LEAD_LOCATION_DETAIL_1: NORMAL
MDC_IDC_LEAD_MFG: NORMAL
MDC_IDC_LEAD_MFG: NORMAL
MDC_IDC_LEAD_MODEL: NORMAL
MDC_IDC_LEAD_MODEL: NORMAL
MDC_IDC_LEAD_POLARITY_TYPE: NORMAL
MDC_IDC_LEAD_POLARITY_TYPE: NORMAL
MDC_IDC_LEAD_SERIAL: NORMAL
MDC_IDC_LEAD_SERIAL: NORMAL
MDC_IDC_LEAD_SPECIAL_FUNCTION: NORMAL
MDC_IDC_LEAD_SPECIAL_FUNCTION: NORMAL
MDC_IDC_MSMT_BATTERY_DTM: NORMAL
MDC_IDC_MSMT_BATTERY_REMAINING_LONGEVITY: 35 MO
MDC_IDC_MSMT_BATTERY_RRT_TRIGGER: 2.83
MDC_IDC_MSMT_BATTERY_STATUS: NORMAL
MDC_IDC_MSMT_BATTERY_VOLTAGE: 2.95 V
MDC_IDC_MSMT_LEADCHNL_RA_IMPEDANCE_VALUE: 285 OHM
MDC_IDC_MSMT_LEADCHNL_RA_IMPEDANCE_VALUE: 418 OHM
MDC_IDC_MSMT_LEADCHNL_RA_PACING_THRESHOLD_AMPLITUDE: 0.75 V
MDC_IDC_MSMT_LEADCHNL_RA_PACING_THRESHOLD_PULSEWIDTH: 0.4 MS
MDC_IDC_MSMT_LEADCHNL_RA_SENSING_INTR_AMPL: 1.12 MV
MDC_IDC_MSMT_LEADCHNL_RV_IMPEDANCE_VALUE: 456 OHM
MDC_IDC_MSMT_LEADCHNL_RV_IMPEDANCE_VALUE: 475 OHM
MDC_IDC_MSMT_LEADCHNL_RV_PACING_THRESHOLD_AMPLITUDE: 0.5 V
MDC_IDC_MSMT_LEADCHNL_RV_PACING_THRESHOLD_PULSEWIDTH: 0.4 MS
MDC_IDC_MSMT_LEADCHNL_RV_SENSING_INTR_AMPL: 9.88 MV
MDC_IDC_PG_IMPLANT_DTM: NORMAL
MDC_IDC_PG_MFG: NORMAL
MDC_IDC_PG_MODEL: NORMAL
MDC_IDC_PG_SERIAL: NORMAL
MDC_IDC_PG_TYPE: NORMAL
MDC_IDC_SESS_CLINIC_NAME: NORMAL
MDC_IDC_SESS_DTM: NORMAL
MDC_IDC_SESS_TYPE: NORMAL
MDC_IDC_SET_BRADY_AT_MODE_SWITCH_RATE: 171 {BEATS}/MIN
MDC_IDC_SET_BRADY_HYSTRATE: NORMAL
MDC_IDC_SET_BRADY_LOWRATE: 60 {BEATS}/MIN
MDC_IDC_SET_BRADY_MAX_SENSOR_RATE: 130 {BEATS}/MIN
MDC_IDC_SET_BRADY_MAX_TRACKING_RATE: 130 {BEATS}/MIN
MDC_IDC_SET_BRADY_MODE: NORMAL
MDC_IDC_SET_BRADY_PAV_DELAY_LOW: 180 MS
MDC_IDC_SET_BRADY_SAV_DELAY_LOW: 150 MS
MDC_IDC_SET_LEADCHNL_RA_PACING_AMPLITUDE: 1.5 V
MDC_IDC_SET_LEADCHNL_RA_PACING_ANODE_ELECTRODE_1: NORMAL
MDC_IDC_SET_LEADCHNL_RA_PACING_ANODE_LOCATION_1: NORMAL
MDC_IDC_SET_LEADCHNL_RA_PACING_CAPTURE_MODE: NORMAL
MDC_IDC_SET_LEADCHNL_RA_PACING_CATHODE_ELECTRODE_1: NORMAL
MDC_IDC_SET_LEADCHNL_RA_PACING_CATHODE_LOCATION_1: NORMAL
MDC_IDC_SET_LEADCHNL_RA_PACING_POLARITY: NORMAL
MDC_IDC_SET_LEADCHNL_RA_PACING_PULSEWIDTH: 0.4 MS
MDC_IDC_SET_LEADCHNL_RA_SENSING_ANODE_ELECTRODE_1: NORMAL
MDC_IDC_SET_LEADCHNL_RA_SENSING_ANODE_LOCATION_1: NORMAL
MDC_IDC_SET_LEADCHNL_RA_SENSING_CATHODE_ELECTRODE_1: NORMAL
MDC_IDC_SET_LEADCHNL_RA_SENSING_CATHODE_LOCATION_1: NORMAL
MDC_IDC_SET_LEADCHNL_RA_SENSING_POLARITY: NORMAL
MDC_IDC_SET_LEADCHNL_RA_SENSING_SENSITIVITY: 0.3 MV
MDC_IDC_SET_LEADCHNL_RV_PACING_AMPLITUDE: 2 V
MDC_IDC_SET_LEADCHNL_RV_PACING_ANODE_ELECTRODE_1: NORMAL
MDC_IDC_SET_LEADCHNL_RV_PACING_ANODE_LOCATION_1: NORMAL
MDC_IDC_SET_LEADCHNL_RV_PACING_CAPTURE_MODE: NORMAL
MDC_IDC_SET_LEADCHNL_RV_PACING_CATHODE_ELECTRODE_1: NORMAL
MDC_IDC_SET_LEADCHNL_RV_PACING_CATHODE_LOCATION_1: NORMAL
MDC_IDC_SET_LEADCHNL_RV_PACING_POLARITY: NORMAL
MDC_IDC_SET_LEADCHNL_RV_PACING_PULSEWIDTH: 0.4 MS
MDC_IDC_SET_LEADCHNL_RV_SENSING_ANODE_ELECTRODE_1: NORMAL
MDC_IDC_SET_LEADCHNL_RV_SENSING_ANODE_LOCATION_1: NORMAL
MDC_IDC_SET_LEADCHNL_RV_SENSING_CATHODE_ELECTRODE_1: NORMAL
MDC_IDC_SET_LEADCHNL_RV_SENSING_CATHODE_LOCATION_1: NORMAL
MDC_IDC_SET_LEADCHNL_RV_SENSING_POLARITY: NORMAL
MDC_IDC_SET_LEADCHNL_RV_SENSING_SENSITIVITY: 0.9 MV
MDC_IDC_SET_ZONE_DETECTION_INTERVAL: 350 MS
MDC_IDC_SET_ZONE_DETECTION_INTERVAL: 400 MS
MDC_IDC_SET_ZONE_STATUS: NORMAL
MDC_IDC_SET_ZONE_STATUS: NORMAL
MDC_IDC_SET_ZONE_TYPE: NORMAL
MDC_IDC_SET_ZONE_VENDOR_TYPE: NORMAL
MDC_IDC_STAT_AT_BURDEN_PERCENT: 0.6 %
MDC_IDC_STAT_AT_DTM_END: NORMAL
MDC_IDC_STAT_AT_DTM_START: NORMAL
MDC_IDC_STAT_BRADY_AP_VP_PERCENT: 0.19 %
MDC_IDC_STAT_BRADY_AP_VS_PERCENT: 99.08 %
MDC_IDC_STAT_BRADY_AS_VP_PERCENT: 0.13 %
MDC_IDC_STAT_BRADY_AS_VS_PERCENT: 0.6 %
MDC_IDC_STAT_BRADY_DTM_END: NORMAL
MDC_IDC_STAT_BRADY_DTM_START: NORMAL
MDC_IDC_STAT_BRADY_RA_PERCENT_PACED: 98.95 %
MDC_IDC_STAT_BRADY_RV_PERCENT_PACED: 0.3 %
MDC_IDC_STAT_EPISODE_RECENT_COUNT: 0
MDC_IDC_STAT_EPISODE_RECENT_COUNT: 44
MDC_IDC_STAT_EPISODE_RECENT_COUNT_DTM_END: NORMAL
MDC_IDC_STAT_EPISODE_RECENT_COUNT_DTM_START: NORMAL
MDC_IDC_STAT_EPISODE_TOTAL_COUNT: 0
MDC_IDC_STAT_EPISODE_TOTAL_COUNT: 1
MDC_IDC_STAT_EPISODE_TOTAL_COUNT: 232
MDC_IDC_STAT_EPISODE_TOTAL_COUNT: 425
MDC_IDC_STAT_EPISODE_TOTAL_COUNT_DTM_END: NORMAL
MDC_IDC_STAT_EPISODE_TOTAL_COUNT_DTM_START: NORMAL
MDC_IDC_STAT_EPISODE_TYPE: NORMAL
MDC_IDC_STAT_TACHYTHERAPY_RECENT_DTM_END: NORMAL
MDC_IDC_STAT_TACHYTHERAPY_RECENT_DTM_START: NORMAL
MDC_IDC_STAT_TACHYTHERAPY_TOTAL_DTM_END: NORMAL
MDC_IDC_STAT_TACHYTHERAPY_TOTAL_DTM_START: NORMAL

## 2024-06-26 ENCOUNTER — LAB (OUTPATIENT)
Dept: LAB | Facility: CLINIC | Age: 89
End: 2024-06-26
Payer: COMMERCIAL

## 2024-06-26 DIAGNOSIS — E05.00 GRAVES DISEASE: ICD-10-CM

## 2024-06-26 DIAGNOSIS — Z13.220 SCREENING FOR HYPERLIPIDEMIA: ICD-10-CM

## 2024-06-26 DIAGNOSIS — I10 HYPERTENSION: ICD-10-CM

## 2024-06-26 DIAGNOSIS — E78.5 HYPERLIPIDEMIA LDL GOAL <100: Primary | ICD-10-CM

## 2024-06-26 LAB
ALT SERPL W P-5'-P-CCNC: 12 U/L (ref 0–50)
ANION GAP SERPL CALCULATED.3IONS-SCNC: 9 MMOL/L (ref 7–15)
BUN SERPL-MCNC: 27.1 MG/DL (ref 8–23)
CALCIUM SERPL-MCNC: 9.6 MG/DL (ref 8.2–9.6)
CHLORIDE SERPL-SCNC: 103 MMOL/L (ref 98–107)
CHOLEST SERPL-MCNC: 155 MG/DL
CREAT SERPL-MCNC: 1.1 MG/DL (ref 0.51–0.95)
DEPRECATED HCO3 PLAS-SCNC: 24 MMOL/L (ref 22–29)
EGFRCR SERPLBLD CKD-EPI 2021: 47 ML/MIN/1.73M2
ERYTHROCYTE [DISTWIDTH] IN BLOOD BY AUTOMATED COUNT: 13.6 % (ref 10–15)
FASTING STATUS PATIENT QL REPORTED: ABNORMAL
FASTING STATUS PATIENT QL REPORTED: NORMAL
GLUCOSE SERPL-MCNC: 94 MG/DL (ref 70–99)
HCT VFR BLD AUTO: 36.1 % (ref 35–47)
HDLC SERPL-MCNC: 63 MG/DL
HGB BLD-MCNC: 11.6 G/DL (ref 11.7–15.7)
LDLC SERPL CALC-MCNC: 74 MG/DL
MCH RBC QN AUTO: 30.1 PG (ref 26.5–33)
MCHC RBC AUTO-ENTMCNC: 32.1 G/DL (ref 31.5–36.5)
MCV RBC AUTO: 94 FL (ref 78–100)
NONHDLC SERPL-MCNC: 92 MG/DL
PLATELET # BLD AUTO: 203 10E3/UL (ref 150–450)
POTASSIUM SERPL-SCNC: 5.3 MMOL/L (ref 3.4–5.3)
RBC # BLD AUTO: 3.85 10E6/UL (ref 3.8–5.2)
SODIUM SERPL-SCNC: 136 MMOL/L (ref 135–145)
TRIGL SERPL-MCNC: 89 MG/DL
TSH SERPL DL<=0.005 MIU/L-ACNC: 1.01 UIU/ML (ref 0.3–4.2)
WBC # BLD AUTO: 7.8 10E3/UL (ref 4–11)

## 2024-06-26 PROCEDURE — 85027 COMPLETE CBC AUTOMATED: CPT

## 2024-06-26 PROCEDURE — 84460 ALANINE AMINO (ALT) (SGPT): CPT

## 2024-06-26 PROCEDURE — 80061 LIPID PANEL: CPT

## 2024-06-26 PROCEDURE — 80048 BASIC METABOLIC PNL TOTAL CA: CPT

## 2024-06-26 PROCEDURE — 84443 ASSAY THYROID STIM HORMONE: CPT

## 2024-06-26 PROCEDURE — 36415 COLL VENOUS BLD VENIPUNCTURE: CPT

## 2024-07-09 DIAGNOSIS — I10 BENIGN ESSENTIAL HYPERTENSION: ICD-10-CM

## 2024-07-09 NOTE — TELEPHONE ENCOUNTER
Disp Refills Start End MADAN   amLODIPine (NORVASC) 5 MG tablet 90 tablet 1 1/19/2024 -- No   Sig - Route: Take 1 tablet (5 mg) by mouth daily - Oral   Sent to pharmacy as: amLODIPine Besylate 5 MG Oral Ta     ----------------------  Last Office Visit : 3/15/2024  [facility] [place]  Future Office visit:  Selected Appointment   7/12/2024 10:00 AM (30 min)  [place]   Arrive by:  9:45 AM   [facility] RETURN   [facility] ([facility])   Jovana Macias MD     ----------------------    Routing refill request to provider for review/approval because:  GFR 47; 6/26/24        Pass/Fail Protocol Criteria:  Calcium Channel Blockers Protocol  [id number]/09/2024 03:38 PM   Protocol Details GFR is on file in the past 12 months and most recent GFR is normal    Blood pressure under 140/90 in past 12 months    Medication is active on med list    Recent (12 mo) or future (90 days) visit within the authorizing provider's specialty    Patient is age 18 or older    No active pregnancy on record    No positive pregnancy test in past 12 months   Last Comprehensive Metabolic Panel:  Lab Results   Component Value Date     06/26/2024    POTASSIUM 5.3 06/26/2024    CHLORIDE 103 06/26/2024    CO2 24 06/26/2024    ANIONGAP 9 06/26/2024    GLC 94 06/26/2024    BUN 27.1 (H) 06/26/2024    CR 1.10 (H) 06/26/2024    GFRESTIMATED 47 (L) 06/26/2024    INO 9.6 06/26/2024

## 2024-07-12 ENCOUNTER — OFFICE VISIT (OUTPATIENT)
Dept: INTERNAL MEDICINE | Facility: CLINIC | Age: 89
End: 2024-07-12
Payer: COMMERCIAL

## 2024-07-12 VITALS
SYSTOLIC BLOOD PRESSURE: 102 MMHG | WEIGHT: 125.8 LBS | BODY MASS INDEX: 22.72 KG/M2 | OXYGEN SATURATION: 96 % | HEART RATE: 81 BPM | DIASTOLIC BLOOD PRESSURE: 65 MMHG

## 2024-07-12 DIAGNOSIS — Z29.11 NEED FOR VACCINATION AGAINST RESPIRATORY SYNCYTIAL VIRUS: ICD-10-CM

## 2024-07-12 DIAGNOSIS — R11.0 NAUSEA: ICD-10-CM

## 2024-07-12 DIAGNOSIS — D49.0 IPMN (INTRADUCTAL PAPILLARY MUCINOUS NEOPLASM): Primary | ICD-10-CM

## 2024-07-12 DIAGNOSIS — R53.83 OTHER FATIGUE: ICD-10-CM

## 2024-07-12 DIAGNOSIS — I48.0 PAROXYSMAL A-FIB (H): ICD-10-CM

## 2024-07-12 DIAGNOSIS — L29.9 ITCHING: ICD-10-CM

## 2024-07-12 DIAGNOSIS — K81.1 CHRONIC CHOLECYSTITIS: ICD-10-CM

## 2024-07-12 PROCEDURE — 99214 OFFICE O/P EST MOD 30 MIN: CPT | Performed by: INTERNAL MEDICINE

## 2024-07-12 PROCEDURE — G2211 COMPLEX E/M VISIT ADD ON: HCPCS | Performed by: INTERNAL MEDICINE

## 2024-07-12 RX ORDER — RESPIRATORY SYNCYTIAL VIRUS VACCINE 120MCG/0.5
0.5 KIT INTRAMUSCULAR ONCE
Qty: 1 EACH | Refills: 0 | Status: SHIPPED | OUTPATIENT
Start: 2024-07-12 | End: 2024-07-12

## 2024-07-12 NOTE — PROGRESS NOTES
Assessment & Plan     Assessment & Plan     Fatigue  - Possible relation to low blood pressure and fluctuating thyroid levels.  - Stop Amlodipine and monitor blood pressure at home. Follow up with blood pressure readings in a couple of weeks via mychart    Itching and rash  - Possible allergic reaction to detergent or other products.  - Try hypoallergenic, fragrance-free detergent. Continue using Triamcinolone cream for flare-ups.    Occasional nausea  - Possibly related to known chronic cholecystitis  - symptoms currently managed with zofran  - Continue monitoring symptoms. No plan for surgery at this time.    Paroxysmal A-fib  - Have recommended eliquis in the past bus she currently declines  - Consider getting a watch that can monitor heart rate. Continue discussion about blood thinner (Eliquis).    Suspected IPMN  - will f/u with MRI per patient an family preference  - given age, would be reasonable to stop screening if stable.    Sleep apnea (suspected)  - Dentist's suggestion for a sleep test due to possible link between sleep apnea and AFib.  - referral placed           ICD-10-CM    1. IPMN (intraductal papillary mucinous neoplasm)  D49.0 MR Pancreas wo & w Contrast      2. Itching  L29.9       3. Nausea  R11.0       4. Chronic cholecystitis  K81.1       5. Other fatigue  R53.83 Adult Sleep Eval & Management  Referral      6. Paroxysmal A-fib (H)  I48.0 Adult Sleep Eval & Management  Referral      7. Need for vaccination against respiratory syncytial virus  Z29.11 respiratory syncytial virus vaccine, bivalent (ABRYSVO) injection          I spent a total of 37 minutes on the day of the visit.  Time spent by me doing chart review, history and exam, documentation and further activities per the note    The longitudinal plan of care for the diagnosis(es)/condition(s) as documented were addressed during this visit. Due to the added complexity in care, I will continue to support Isadora in the  subsequent management and with ongoing continuity of care.    Return in about 6 months (around 1/12/2025).    Carmen Muir is a 89 year old, presenting for the following health issues:  Follow Up (Nausea, fatigue, body itching and rash)      7/12/2024    10:07 AM   Additional Questions   Roomed by IFEOMA, EMT   Accompanied by Daughter, Florina     History of Present Illness       Reason for visit:  Nausea, fatigue, whole body itching and rash    She eats 2-3 servings of fruits and vegetables daily.She consumes 0 sweetened beverage(s) daily.She exercises with enough effort to increase her heart rate 10 to 19 minutes per day.  She exercises with enough effort to increase her heart rate 4 days per week.   She is taking medications regularly.       History of Present Illness    - Isadora Mendez, female, 89 years old, reports feeling tired, especially in the morning  - Reports shortness of breath that improves with some exertion  - Difficulty waking up in the morning, feeling unmotivated  - Reports feeling better overall compared to a few months ago  - Reports occasional insomnia, waking up at 4 am, sometimes having a snack and then going back to sleep  - Reports occasional nausea, less frequent than a month ago  - Reports itching and rash on the skin, especially on the back and scalp  - Reports fluctuating thyroid levels in the past few months  - Reports episodes of AFib, usually occurring between 4:00 and 5:00 AM  - Reports no current signs of heart failure, such as puffy feet  - Reports dentist's suggestion for a sleep test                   Objective    /65 (BP Location: Right arm, Patient Position: Sitting, Cuff Size: Adult Regular)   Pulse 81   Wt 57.1 kg (125 lb 12.8 oz)   SpO2 96%   BMI 22.72 kg/m    Body mass index is 22.72 kg/m .  Physical Exam               Signed Electronically by: Jovana Macias MD

## 2024-07-14 RX ORDER — AMLODIPINE BESYLATE 5 MG/1
5 TABLET ORAL DAILY
Qty: 90 TABLET | Refills: 1 | Status: SHIPPED | OUTPATIENT
Start: 2024-07-14 | End: 2024-09-26

## 2024-07-15 ENCOUNTER — TELEPHONE (OUTPATIENT)
Dept: INTERNAL MEDICINE | Facility: CLINIC | Age: 89
End: 2024-07-15
Payer: COMMERCIAL

## 2024-07-15 NOTE — TELEPHONE ENCOUNTER
----- Message from Ezra SHORT sent at 7/12/2024 11:01 AM CDT -----  Regarding: ABIGAIL HAYES 7/12- check out note did not mention MRI order- patient did mention it either. Call to schedule.

## 2024-07-31 ENCOUNTER — TELEPHONE (OUTPATIENT)
Dept: CARDIOLOGY | Facility: CLINIC | Age: 89
End: 2024-07-31
Payer: COMMERCIAL

## 2024-08-02 ENCOUNTER — TELEPHONE (OUTPATIENT)
Dept: CARDIOLOGY | Facility: CLINIC | Age: 89
End: 2024-08-02
Payer: COMMERCIAL

## 2024-08-22 ENCOUNTER — TELEPHONE (OUTPATIENT)
Dept: INTERNAL MEDICINE | Facility: CLINIC | Age: 89
End: 2024-08-22
Payer: COMMERCIAL

## 2024-08-26 NOTE — TELEPHONE ENCOUNTER
Reason for call: MR Device Safety Clearance    Please create a MR Device Safety order  Other dtr Florina [phone], is reporting patient has Pacemaker  Type of MR exam: Mri Pancareas    Do you get your Device checked at [facility]?: Yes - [facility]

## 2024-08-29 ENCOUNTER — LAB (OUTPATIENT)
Dept: LAB | Facility: CLINIC | Age: 89
End: 2024-08-29
Payer: COMMERCIAL

## 2024-08-29 DIAGNOSIS — E05.00 GRAVES DISEASE: ICD-10-CM

## 2024-08-29 LAB
T4 FREE SERPL-MCNC: 1.2 NG/DL (ref 0.9–1.7)
TSH SERPL DL<=0.005 MIU/L-ACNC: 6.06 UIU/ML (ref 0.3–4.2)

## 2024-08-29 PROCEDURE — 84439 ASSAY OF FREE THYROXINE: CPT

## 2024-08-29 PROCEDURE — 84443 ASSAY THYROID STIM HORMONE: CPT

## 2024-08-29 PROCEDURE — 36415 COLL VENOUS BLD VENIPUNCTURE: CPT

## 2024-09-09 ENCOUNTER — ANCILLARY PROCEDURE (OUTPATIENT)
Dept: CARDIOLOGY | Facility: CLINIC | Age: 89
End: 2024-09-09
Attending: INTERNAL MEDICINE
Payer: COMMERCIAL

## 2024-09-09 ENCOUNTER — TELEPHONE (OUTPATIENT)
Dept: INTERNAL MEDICINE | Facility: CLINIC | Age: 89
End: 2024-09-09
Payer: COMMERCIAL

## 2024-09-09 DIAGNOSIS — I49.5 SICK SINUS SYNDROME (H): ICD-10-CM

## 2024-09-09 PROCEDURE — 93296 REM INTERROG EVL PM/IDS: CPT

## 2024-09-09 PROCEDURE — 93294 REM INTERROG EVL PM/LDLS PM: CPT | Performed by: INTERNAL MEDICINE

## 2024-09-09 NOTE — CONFIDENTIAL NOTE
Following up on request 8/22    Reason for call: MR Device Safety Clearance   Please create a MR Device Safety order  Other dtr Florina [phone], is reporting patient has Pacemaker  Type of MR exam: Mri Pancareas

## 2024-09-09 NOTE — TELEPHONE ENCOUNTER
Following up on request 8/22    Reason for call: MR Device Safety Clearance   Please create a MR Device Safety order  Other dtr Florina [phone], is reporting patient has Pacemaker  Type of MR exam: Mri Pancareas

## 2024-09-10 NOTE — TELEPHONE ENCOUNTER
Is the implanted device MRI conditional: Yes    Please schedule the MRI: Yes    Does the patient need a CXR prior to MRI: No    Device: Medtronic [id number] [address] DR LAWLER

## 2024-09-20 ENCOUNTER — TRANSFERRED RECORDS (OUTPATIENT)
Dept: HEALTH INFORMATION MANAGEMENT | Facility: CLINIC | Age: 89
End: 2024-09-20
Payer: COMMERCIAL

## 2024-09-25 LAB
MDC_IDC_EPISODE_DTM: NORMAL
MDC_IDC_EPISODE_DURATION: 106 S
MDC_IDC_EPISODE_DURATION: 1164 S
MDC_IDC_EPISODE_DURATION: 143 S
MDC_IDC_EPISODE_DURATION: 1513 S
MDC_IDC_EPISODE_DURATION: 172 S
MDC_IDC_EPISODE_DURATION: 190 S
MDC_IDC_EPISODE_DURATION: 194 S
MDC_IDC_EPISODE_DURATION: 1998 S
MDC_IDC_EPISODE_DURATION: 2004 S
MDC_IDC_EPISODE_DURATION: 202 S
MDC_IDC_EPISODE_DURATION: 205 S
MDC_IDC_EPISODE_DURATION: 2104 S
MDC_IDC_EPISODE_DURATION: 218 S
MDC_IDC_EPISODE_DURATION: 232 S
MDC_IDC_EPISODE_DURATION: 242 S
MDC_IDC_EPISODE_DURATION: 253 S
MDC_IDC_EPISODE_DURATION: 27 S
MDC_IDC_EPISODE_DURATION: 275 S
MDC_IDC_EPISODE_DURATION: 28 S
MDC_IDC_EPISODE_DURATION: 295 S
MDC_IDC_EPISODE_DURATION: 32 S
MDC_IDC_EPISODE_DURATION: 339 S
MDC_IDC_EPISODE_DURATION: 3399 S
MDC_IDC_EPISODE_DURATION: 363 S
MDC_IDC_EPISODE_DURATION: 381 S
MDC_IDC_EPISODE_DURATION: 405 S
MDC_IDC_EPISODE_DURATION: 418 S
MDC_IDC_EPISODE_DURATION: 455 S
MDC_IDC_EPISODE_DURATION: 459 S
MDC_IDC_EPISODE_DURATION: 491 S
MDC_IDC_EPISODE_DURATION: 504 S
MDC_IDC_EPISODE_DURATION: 572 S
MDC_IDC_EPISODE_DURATION: 596 S
MDC_IDC_EPISODE_DURATION: 614 S
MDC_IDC_EPISODE_DURATION: 7903 S
MDC_IDC_EPISODE_DURATION: 846 S
MDC_IDC_EPISODE_DURATION: 85 S
MDC_IDC_EPISODE_DURATION: 91 S
MDC_IDC_EPISODE_DURATION: 92 S
MDC_IDC_EPISODE_DURATION: 922 S
MDC_IDC_EPISODE_ID: 663
MDC_IDC_EPISODE_ID: 664
MDC_IDC_EPISODE_ID: 665
MDC_IDC_EPISODE_ID: 666
MDC_IDC_EPISODE_ID: 667
MDC_IDC_EPISODE_ID: 668
MDC_IDC_EPISODE_ID: 669
MDC_IDC_EPISODE_ID: 670
MDC_IDC_EPISODE_ID: 671
MDC_IDC_EPISODE_ID: 672
MDC_IDC_EPISODE_ID: 673
MDC_IDC_EPISODE_ID: 674
MDC_IDC_EPISODE_ID: 675
MDC_IDC_EPISODE_ID: 676
MDC_IDC_EPISODE_ID: 677
MDC_IDC_EPISODE_ID: 678
MDC_IDC_EPISODE_ID: 679
MDC_IDC_EPISODE_ID: 680
MDC_IDC_EPISODE_ID: 681
MDC_IDC_EPISODE_ID: 682
MDC_IDC_EPISODE_ID: 683
MDC_IDC_EPISODE_ID: 684
MDC_IDC_EPISODE_ID: 685
MDC_IDC_EPISODE_ID: 686
MDC_IDC_EPISODE_ID: 687
MDC_IDC_EPISODE_ID: 688
MDC_IDC_EPISODE_ID: 689
MDC_IDC_EPISODE_ID: 690
MDC_IDC_EPISODE_ID: 691
MDC_IDC_EPISODE_ID: 692
MDC_IDC_EPISODE_ID: 693
MDC_IDC_EPISODE_ID: 694
MDC_IDC_EPISODE_ID: 695
MDC_IDC_EPISODE_ID: 696
MDC_IDC_EPISODE_ID: 697
MDC_IDC_EPISODE_ID: 698
MDC_IDC_EPISODE_ID: 699
MDC_IDC_EPISODE_ID: 700
MDC_IDC_EPISODE_ID: 701
MDC_IDC_EPISODE_ID: 702
MDC_IDC_EPISODE_TYPE: NORMAL
MDC_IDC_LEAD_CONNECTION_STATUS: NORMAL
MDC_IDC_LEAD_CONNECTION_STATUS: NORMAL
MDC_IDC_LEAD_IMPLANT_DT: NORMAL
MDC_IDC_LEAD_IMPLANT_DT: NORMAL
MDC_IDC_LEAD_LOCATION: NORMAL
MDC_IDC_LEAD_LOCATION: NORMAL
MDC_IDC_LEAD_LOCATION_DETAIL_1: NORMAL
MDC_IDC_LEAD_LOCATION_DETAIL_1: NORMAL
MDC_IDC_LEAD_MFG: NORMAL
MDC_IDC_LEAD_MFG: NORMAL
MDC_IDC_LEAD_MODEL: NORMAL
MDC_IDC_LEAD_MODEL: NORMAL
MDC_IDC_LEAD_POLARITY_TYPE: NORMAL
MDC_IDC_LEAD_POLARITY_TYPE: NORMAL
MDC_IDC_LEAD_SERIAL: NORMAL
MDC_IDC_LEAD_SERIAL: NORMAL
MDC_IDC_LEAD_SPECIAL_FUNCTION: NORMAL
MDC_IDC_LEAD_SPECIAL_FUNCTION: NORMAL
MDC_IDC_MSMT_BATTERY_DTM: NORMAL
MDC_IDC_MSMT_BATTERY_REMAINING_LONGEVITY: 32 MO
MDC_IDC_MSMT_BATTERY_RRT_TRIGGER: 2.83
MDC_IDC_MSMT_BATTERY_STATUS: NORMAL
MDC_IDC_MSMT_BATTERY_VOLTAGE: 2.95 V
MDC_IDC_MSMT_LEADCHNL_RA_IMPEDANCE_VALUE: 285 OHM
MDC_IDC_MSMT_LEADCHNL_RA_IMPEDANCE_VALUE: 437 OHM
MDC_IDC_MSMT_LEADCHNL_RA_PACING_THRESHOLD_AMPLITUDE: 0.75 V
MDC_IDC_MSMT_LEADCHNL_RA_PACING_THRESHOLD_PULSEWIDTH: 0.4 MS
MDC_IDC_MSMT_LEADCHNL_RA_SENSING_INTR_AMPL: 1.12 MV
MDC_IDC_MSMT_LEADCHNL_RA_SENSING_INTR_AMPL: 1.12 MV
MDC_IDC_MSMT_LEADCHNL_RV_IMPEDANCE_VALUE: 437 OHM
MDC_IDC_MSMT_LEADCHNL_RV_IMPEDANCE_VALUE: 475 OHM
MDC_IDC_MSMT_LEADCHNL_RV_PACING_THRESHOLD_AMPLITUDE: 0.5 V
MDC_IDC_MSMT_LEADCHNL_RV_PACING_THRESHOLD_PULSEWIDTH: 0.4 MS
MDC_IDC_MSMT_LEADCHNL_RV_SENSING_INTR_AMPL: 10.12 MV
MDC_IDC_MSMT_LEADCHNL_RV_SENSING_INTR_AMPL: 10.12 MV
MDC_IDC_PG_IMPLANT_DTM: NORMAL
MDC_IDC_PG_MFG: NORMAL
MDC_IDC_PG_MODEL: NORMAL
MDC_IDC_PG_SERIAL: NORMAL
MDC_IDC_PG_TYPE: NORMAL
MDC_IDC_SESS_CLINIC_NAME: NORMAL
MDC_IDC_SESS_DTM: NORMAL
MDC_IDC_SESS_TYPE: NORMAL
MDC_IDC_SET_BRADY_AT_MODE_SWITCH_RATE: 171 {BEATS}/MIN
MDC_IDC_SET_BRADY_HYSTRATE: NORMAL
MDC_IDC_SET_BRADY_LOWRATE: 60 {BEATS}/MIN
MDC_IDC_SET_BRADY_MAX_SENSOR_RATE: 130 {BEATS}/MIN
MDC_IDC_SET_BRADY_MAX_TRACKING_RATE: 130 {BEATS}/MIN
MDC_IDC_SET_BRADY_MODE: NORMAL
MDC_IDC_SET_BRADY_PAV_DELAY_LOW: 180 MS
MDC_IDC_SET_BRADY_SAV_DELAY_LOW: 150 MS
MDC_IDC_SET_LEADCHNL_RA_PACING_AMPLITUDE: 1.5 V
MDC_IDC_SET_LEADCHNL_RA_PACING_ANODE_ELECTRODE_1: NORMAL
MDC_IDC_SET_LEADCHNL_RA_PACING_ANODE_LOCATION_1: NORMAL
MDC_IDC_SET_LEADCHNL_RA_PACING_CAPTURE_MODE: NORMAL
MDC_IDC_SET_LEADCHNL_RA_PACING_CATHODE_ELECTRODE_1: NORMAL
MDC_IDC_SET_LEADCHNL_RA_PACING_CATHODE_LOCATION_1: NORMAL
MDC_IDC_SET_LEADCHNL_RA_PACING_POLARITY: NORMAL
MDC_IDC_SET_LEADCHNL_RA_PACING_PULSEWIDTH: 0.4 MS
MDC_IDC_SET_LEADCHNL_RA_SENSING_ANODE_ELECTRODE_1: NORMAL
MDC_IDC_SET_LEADCHNL_RA_SENSING_ANODE_LOCATION_1: NORMAL
MDC_IDC_SET_LEADCHNL_RA_SENSING_CATHODE_ELECTRODE_1: NORMAL
MDC_IDC_SET_LEADCHNL_RA_SENSING_CATHODE_LOCATION_1: NORMAL
MDC_IDC_SET_LEADCHNL_RA_SENSING_POLARITY: NORMAL
MDC_IDC_SET_LEADCHNL_RA_SENSING_SENSITIVITY: 0.3 MV
MDC_IDC_SET_LEADCHNL_RV_PACING_AMPLITUDE: 2 V
MDC_IDC_SET_LEADCHNL_RV_PACING_ANODE_ELECTRODE_1: NORMAL
MDC_IDC_SET_LEADCHNL_RV_PACING_ANODE_LOCATION_1: NORMAL
MDC_IDC_SET_LEADCHNL_RV_PACING_CAPTURE_MODE: NORMAL
MDC_IDC_SET_LEADCHNL_RV_PACING_CATHODE_ELECTRODE_1: NORMAL
MDC_IDC_SET_LEADCHNL_RV_PACING_CATHODE_LOCATION_1: NORMAL
MDC_IDC_SET_LEADCHNL_RV_PACING_POLARITY: NORMAL
MDC_IDC_SET_LEADCHNL_RV_PACING_PULSEWIDTH: 0.4 MS
MDC_IDC_SET_LEADCHNL_RV_SENSING_ANODE_ELECTRODE_1: NORMAL
MDC_IDC_SET_LEADCHNL_RV_SENSING_ANODE_LOCATION_1: NORMAL
MDC_IDC_SET_LEADCHNL_RV_SENSING_CATHODE_ELECTRODE_1: NORMAL
MDC_IDC_SET_LEADCHNL_RV_SENSING_CATHODE_LOCATION_1: NORMAL
MDC_IDC_SET_LEADCHNL_RV_SENSING_POLARITY: NORMAL
MDC_IDC_SET_LEADCHNL_RV_SENSING_SENSITIVITY: 0.9 MV
MDC_IDC_SET_ZONE_DETECTION_INTERVAL: 350 MS
MDC_IDC_SET_ZONE_DETECTION_INTERVAL: 400 MS
MDC_IDC_SET_ZONE_STATUS: NORMAL
MDC_IDC_SET_ZONE_STATUS: NORMAL
MDC_IDC_SET_ZONE_TYPE: NORMAL
MDC_IDC_SET_ZONE_VENDOR_TYPE: NORMAL
MDC_IDC_STAT_AT_BURDEN_PERCENT: 0.4 %
MDC_IDC_STAT_AT_DTM_END: NORMAL
MDC_IDC_STAT_AT_DTM_START: NORMAL
MDC_IDC_STAT_BRADY_AP_VP_PERCENT: 0.09 %
MDC_IDC_STAT_BRADY_AP_VS_PERCENT: 99.29 %
MDC_IDC_STAT_BRADY_AS_VP_PERCENT: 0.03 %
MDC_IDC_STAT_BRADY_AS_VS_PERCENT: 0.58 %
MDC_IDC_STAT_BRADY_DTM_END: NORMAL
MDC_IDC_STAT_BRADY_DTM_START: NORMAL
MDC_IDC_STAT_BRADY_RA_PERCENT_PACED: 99.05 %
MDC_IDC_STAT_BRADY_RV_PERCENT_PACED: 0.12 %
MDC_IDC_STAT_EPISODE_RECENT_COUNT: 0
MDC_IDC_STAT_EPISODE_RECENT_COUNT: 40
MDC_IDC_STAT_EPISODE_RECENT_COUNT_DTM_END: NORMAL
MDC_IDC_STAT_EPISODE_RECENT_COUNT_DTM_START: NORMAL
MDC_IDC_STAT_EPISODE_TOTAL_COUNT: 0
MDC_IDC_STAT_EPISODE_TOTAL_COUNT: 1
MDC_IDC_STAT_EPISODE_TOTAL_COUNT: 232
MDC_IDC_STAT_EPISODE_TOTAL_COUNT: 465
MDC_IDC_STAT_EPISODE_TOTAL_COUNT_DTM_END: NORMAL
MDC_IDC_STAT_EPISODE_TOTAL_COUNT_DTM_START: NORMAL
MDC_IDC_STAT_EPISODE_TYPE: NORMAL
MDC_IDC_STAT_TACHYTHERAPY_RECENT_DTM_END: NORMAL
MDC_IDC_STAT_TACHYTHERAPY_RECENT_DTM_START: NORMAL
MDC_IDC_STAT_TACHYTHERAPY_TOTAL_DTM_END: NORMAL
MDC_IDC_STAT_TACHYTHERAPY_TOTAL_DTM_START: NORMAL

## 2024-09-26 ENCOUNTER — APPOINTMENT (OUTPATIENT)
Dept: GENERAL RADIOLOGY | Facility: CLINIC | Age: 89
End: 2024-09-26
Attending: EMERGENCY MEDICINE
Payer: COMMERCIAL

## 2024-09-26 ENCOUNTER — APPOINTMENT (OUTPATIENT)
Dept: ULTRASOUND IMAGING | Facility: CLINIC | Age: 89
End: 2024-09-26
Attending: EMERGENCY MEDICINE
Payer: COMMERCIAL

## 2024-09-26 ENCOUNTER — HOSPITAL ENCOUNTER (OUTPATIENT)
Facility: CLINIC | Age: 89
Setting detail: OBSERVATION
Discharge: HOME | End: 2024-09-27
Attending: EMERGENCY MEDICINE | Admitting: INTERNAL MEDICINE
Payer: COMMERCIAL

## 2024-09-26 DIAGNOSIS — I49.5 SICK SINUS SYNDROME (H): Primary | ICD-10-CM

## 2024-09-26 DIAGNOSIS — M25.562 ACUTE PAIN OF LEFT KNEE: ICD-10-CM

## 2024-09-26 DIAGNOSIS — M71.22 BAKER'S CYST OF KNEE, LEFT: ICD-10-CM

## 2024-09-26 LAB
ANION GAP SERPL CALCULATED.3IONS-SCNC: 7 MMOL/L (ref 7–15)
BUN SERPL-MCNC: 22.2 MG/DL (ref 8–23)
CALCIUM SERPL-MCNC: 9.7 MG/DL (ref 8.8–10.4)
CHLORIDE SERPL-SCNC: 105 MMOL/L (ref 98–107)
CREAT SERPL-MCNC: 1.04 MG/DL (ref 0.51–0.95)
EGFRCR SERPLBLD CKD-EPI 2021: 50 ML/MIN/1.73M2
GLUCOSE SERPL-MCNC: 96 MG/DL (ref 70–99)
HCO3 SERPL-SCNC: 24 MMOL/L (ref 22–29)
POTASSIUM SERPL-SCNC: 5.2 MMOL/L (ref 3.4–5.3)
SODIUM SERPL-SCNC: 136 MMOL/L (ref 135–145)

## 2024-09-26 PROCEDURE — 93971 EXTREMITY STUDY: CPT | Mod: 26 | Performed by: RADIOLOGY

## 2024-09-26 PROCEDURE — 99285 EMERGENCY DEPT VISIT HI MDM: CPT | Performed by: EMERGENCY MEDICINE

## 2024-09-26 PROCEDURE — 93971 EXTREMITY STUDY: CPT | Mod: LT

## 2024-09-26 PROCEDURE — 36415 COLL VENOUS BLD VENIPUNCTURE: CPT | Performed by: PHYSICIAN ASSISTANT

## 2024-09-26 PROCEDURE — G0378 HOSPITAL OBSERVATION PER HR: HCPCS

## 2024-09-26 PROCEDURE — 80048 BASIC METABOLIC PNL TOTAL CA: CPT | Performed by: PHYSICIAN ASSISTANT

## 2024-09-26 PROCEDURE — 73590 X-RAY EXAM OF LOWER LEG: CPT | Mod: 26 | Performed by: RADIOLOGY

## 2024-09-26 PROCEDURE — 99223 1ST HOSP IP/OBS HIGH 75: CPT | Mod: FS | Performed by: PHYSICIAN ASSISTANT

## 2024-09-26 PROCEDURE — 73562 X-RAY EXAM OF KNEE 3: CPT | Mod: LT

## 2024-09-26 PROCEDURE — 250N000013 HC RX MED GY IP 250 OP 250 PS 637: Performed by: PHYSICIAN ASSISTANT

## 2024-09-26 PROCEDURE — 99207 PR APP CREDIT; MD BILLING SHARED VISIT: CPT | Mod: FS | Performed by: INTERNAL MEDICINE

## 2024-09-26 PROCEDURE — 73590 X-RAY EXAM OF LOWER LEG: CPT | Mod: LT

## 2024-09-26 PROCEDURE — 73562 X-RAY EXAM OF KNEE 3: CPT | Mod: 26 | Performed by: RADIOLOGY

## 2024-09-26 RX ORDER — LISINOPRIL 20 MG/1
40 TABLET ORAL DAILY
Status: DISCONTINUED | OUTPATIENT
Start: 2024-09-27 | End: 2024-09-27 | Stop reason: HOSPADM

## 2024-09-26 RX ORDER — IBUPROFEN 200 MG
600 TABLET ORAL EVERY 6 HOURS PRN
Status: DISCONTINUED | OUTPATIENT
Start: 2024-09-26 | End: 2024-09-27 | Stop reason: HOSPADM

## 2024-09-26 RX ORDER — ASPIRIN 81 MG/1
81 TABLET, CHEWABLE ORAL DAILY
Status: DISCONTINUED | OUTPATIENT
Start: 2024-09-26 | End: 2024-09-27 | Stop reason: HOSPADM

## 2024-09-26 RX ORDER — METHIMAZOLE 5 MG/1
5 TABLET ORAL DAILY
Status: DISCONTINUED | OUTPATIENT
Start: 2024-09-27 | End: 2024-09-27 | Stop reason: HOSPADM

## 2024-09-26 RX ORDER — SPIRONOLACTONE 25 MG
12.5 TABLET ORAL DAILY
Status: DISCONTINUED | OUTPATIENT
Start: 2024-09-27 | End: 2024-09-27 | Stop reason: HOSPADM

## 2024-09-26 RX ORDER — METOPROLOL SUCCINATE 50 MG/1
50 TABLET, EXTENDED RELEASE ORAL
Status: DISCONTINUED | OUTPATIENT
Start: 2024-09-26 | End: 2024-09-27 | Stop reason: HOSPADM

## 2024-09-26 RX ORDER — METOPROLOL SUCCINATE 50 MG/1
100 TABLET, EXTENDED RELEASE ORAL DAILY
Status: DISCONTINUED | OUTPATIENT
Start: 2024-09-27 | End: 2024-09-27 | Stop reason: HOSPADM

## 2024-09-26 RX ORDER — ACETAMINOPHEN 325 MG/1
975 TABLET ORAL 3 TIMES DAILY
Status: DISCONTINUED | OUTPATIENT
Start: 2024-09-26 | End: 2024-09-27 | Stop reason: HOSPADM

## 2024-09-26 RX ORDER — AMOXICILLIN 250 MG
1 CAPSULE ORAL 2 TIMES DAILY PRN
Status: DISCONTINUED | OUTPATIENT
Start: 2024-09-26 | End: 2024-09-27 | Stop reason: HOSPADM

## 2024-09-26 RX ORDER — ONDANSETRON 4 MG/1
4 TABLET, ORALLY DISINTEGRATING ORAL EVERY 6 HOURS PRN
Status: DISCONTINUED | OUTPATIENT
Start: 2024-09-26 | End: 2024-09-27 | Stop reason: HOSPADM

## 2024-09-26 RX ORDER — ONDANSETRON 2 MG/ML
4 INJECTION INTRAMUSCULAR; INTRAVENOUS EVERY 6 HOURS PRN
Status: DISCONTINUED | OUTPATIENT
Start: 2024-09-26 | End: 2024-09-27 | Stop reason: HOSPADM

## 2024-09-26 RX ORDER — FAMOTIDINE 20 MG/1
20 TABLET, FILM COATED ORAL DAILY
Status: DISCONTINUED | OUTPATIENT
Start: 2024-09-27 | End: 2024-09-27 | Stop reason: HOSPADM

## 2024-09-26 RX ORDER — AMOXICILLIN 250 MG
2 CAPSULE ORAL 2 TIMES DAILY PRN
Status: DISCONTINUED | OUTPATIENT
Start: 2024-09-26 | End: 2024-09-27 | Stop reason: HOSPADM

## 2024-09-26 RX ADMIN — METOPROLOL SUCCINATE 50 MG: 50 TABLET, EXTENDED RELEASE ORAL at 21:58

## 2024-09-26 RX ADMIN — ACETAMINOPHEN 975 MG: 325 TABLET ORAL at 20:35

## 2024-09-26 ASSESSMENT — ACTIVITIES OF DAILY LIVING (ADL)
ADLS_ACUITY_SCORE: 36
ADLS_ACUITY_SCORE: 32
ADLS_ACUITY_SCORE: 34
ADLS_ACUITY_SCORE: 32
ADLS_ACUITY_SCORE: 36
ADLS_ACUITY_SCORE: 36

## 2024-09-26 ASSESSMENT — COLUMBIA-SUICIDE SEVERITY RATING SCALE - C-SSRS
6. HAVE YOU EVER DONE ANYTHING, STARTED TO DO ANYTHING, OR PREPARED TO DO ANYTHING TO END YOUR LIFE?: NO
1. IN THE PAST MONTH, HAVE YOU WISHED YOU WERE DEAD OR WISHED YOU COULD GO TO SLEEP AND NOT WAKE UP?: NO
2. HAVE YOU ACTUALLY HAD ANY THOUGHTS OF KILLING YOURSELF IN THE PAST MONTH?: NO

## 2024-09-26 NOTE — H&P
[facility]    History and Physical - Hospitalist Service, GOLD TEAM        Date of Admission:  9/26/2024    Assessment & Plan    Isadora Mendez is a 89 year old female with history of HTN, HLD, PAF, SSS s/p PPM (2010), CKD, and hyperparathyroidism who presents with acute left knee pain and inability to bear weight. Admitted to medicine observation for further evaluation, pain management, and therapy evaluation.     # Acute left knee pain:  Hx mechanical fall ~2 weeks ago, although no pain immediately after, able to bear weight and ambulate. Now presents with episodes of acute L knee pain. Unable to bear weight. Pain localized to popliteal fossa and radiates down leg. Venous doppler US negative for DVT but did show Baker's cyst. XR negative for fractures or effusions. Mild popliteal tenderness on exam with swelling c/w Baker's cyst. No erythema or warmth. Pain worse with knee extension. No effusion present. No joint line tenderness. No pain with meniscal stress. No varus or valgus instability. I suspect acute pain secondary to Baker's cyst, likely irritated from recent fall.   Case discussed with Orthopedics - agree with above assessment. No further imaging studies recommended. Occult fracture unlikely given lack of joint effusion. Recommend outpatient follow up in 1-2 weeks. OK to use knee brace to help with pain and stability.  Admit to observation  Knee immobilizer with weight bearing (should not be worn in bed to encourage ROM activities)  WBAT  PT consult  Tylenol 975mg TID  Voltaren 1% gel QID  Ibuprofen 600mg q6h PRN  Orthopedic referral ordered    # PAF:  Regular on exam. No acute concerns. Previously prescribed Apixaban however patient currently not taking due to concerns for side effects.   OK to hold apixaban  Continue ASA 81mg daily  Continue metoprolol XL   Follow up with primary care for ongoing discussion re: AC    # HTN:  Stable. No longer taking  amlodipine.   Lisionpril 40mg daily  Metoprolol XL 100mg AM 50mg PM  Spironolactone 12.5mg daily     # Hyperthyroidism:  Stable.   Methimazole 5mg daily    # CKD stage 3a:  Baseline Cr 1.0-1.1. Current Cr 1.0.             Observation Goals: -diagnostic tests and consults completed and resulted, -vital signs normal or at patient baseline, -adequate pain control on oral analgesics, -returns to baseline functional status, -safe disposition plan has been identified, Nurse to notify provider when observation goals have been met and patient is ready for discharge.  Diet: Regular Diet Adult    DVT Prophylaxis: Low Risk/Ambulatory with no VTE prophylaxis indicated  Covarrubias Catheter: Not present  Lines: None     Cardiac Monitoring: None  Code Status: Full Code      Clinically Significant Risk Factors Present on Admission               # Drug Induced Coagulation Defect: home medication list includes an anticoagulant medication  # Drug Induced Platelet Defect: home medication list includes an antiplatelet medication   # Hypertension: Noted on problem list              # Pacemaker present       Disposition Plan     Medically Ready for Discharge: Anticipated Today         The patient's care was discussed with the Attending Physician, Dr. Arroyo .    Jamil Leon PA-C  Hospitalist Service, [facility]  Securely message with [employer] (more info)  Text page via [facility] Paging/Directory   See signed in provider for up to date coverage information    ______________________________________________________________________    Chief Complaint   Left knee pain.    History is obtained from the patient    History of Present Illness   Isadora Mendez is a 89 year old female who presents with acute left knee pain and inability to bear weight. Patient reports falling on her knee ~2 weeks ago. She denied any pain immediately after and was able to ambulate. Since then she has had 3 separate  episodes of acute left knee pain. The pain comes on with standing/weight bearing. Pain is localized to posterior left knee and radiates down the back of the leg. The pain is so severe it causes her leg to buckle. Luckily she has not had any additional falls. She denies any other mechanism of injury or precipitating factors. She denies any fevers, chills, swelling, numbness or tingling. No similar symptoms in the past. Today she was unable to stand to get out of bed. She called her daughter, who is a PT, who subsequently brought her to the ED for further evaluation.     Xrays in the ED were negative for any acute fractures but moderate medial compartment degenerative changes. Venous doppler US negative for DVT but showed a Baker's cyst.     During our visit the patient noted that he leg was actually feeling a little better and wanted to go home, however upon standing she developed sharp pain in the posterior left knee and leg buckled.  She was safely assisted to a wheelchair. She will be admitted to observation.       Past Medical History    Past Medical History:   Diagnosis Date    Atrial fibrillation (H) 01/01/2011    Cataract     Closed nondisplaced fracture of styloid process of radius     Dry eyes     Facial fracture (H) 01/01/2006    Hypertension     Infection due to 2019 novel coronavirus 10/2022    or 11/22- took paxolovid    Low bone density     NSVT (nonsustained ventricular tachycardia) (H) 01/01/2009    during exercise echo, neg EP study    Pacemaker 07/01/2010    Postmenopausal status     S/P cardiac catheterization 01/01/2009    30-40% non obstructive lesion    SSS (sick sinus syndrome) (H) 10/2022    Ventricular tachycardia, nonsustained (H) 01/01/2009    during stress echo       Past Surgical History   Past Surgical History:   Procedure Laterality Date    CATARACT EXTRACTION W/ INTRAOCULAR LENS IMPLANT Right 12/04/2018    IMPLANT PACEMAKER      PPM  06/30/2010    Permanent Pacemaker       Prior to  Admission Medications   Prior to Admission Medications   Prescriptions Last Dose Informant Patient Reported? Taking?   TURMERIC PO   Yes No   apixaban ANTICOAGULANT (ELIQUIS) 2.5 MG tablet   No No   Sig: Take 1 tablet (2.5 mg) by mouth 2 times daily   aspirin 81 MG tablet  Self, Daughter Yes No   Sig: Take 1 tablet by mouth daily.   co-enzyme Q-10 100 MG CAPS capsule   Yes No   Sig: Take 100 mg by mouth   famotidine (PEPCID) 20 MG tablet   No No   Sig: Take 1 tablet (20 mg) by mouth 2 times daily   lisinopril (ZESTRIL) 40 MG tablet   No No   Sig: Take 1 tablet (40 mg) by mouth daily   melatonin 1 MG/ML LIQD liquid   Yes No   Sig: Take 1 mg by mouth nightly as needed for sleep   methimazole (TAPAZOLE) 5 MG tablet   No No   Sig: Take 1 tablet (5 mg) by mouth daily   metoprolol succinate ER (TOPROL XL) 50 MG 24 hr tablet   No No   Sig: Take 2 tablets (100mg) in the morning, and 1 tablet (50mg) in the evening.   ondansetron (ZOFRAN) 4 MG tablet   No No   Sig: Take 1 tablet (4 mg) by mouth every 6 hours as needed for nausea (give prior to meals for nausea)   spironolactone (ALDACTONE) 25 MG tablet   No No   Sig: Take 0.5 tablets (12.5 mg) by mouth daily   triamcinolone (KENALOG) 0.1 % external cream   No No   Sig: Apply topically 2 times daily      Facility-Administered Medications: None      ------------------------------------------------------------------------     Physical Exam   Vital Signs: Temp: 97.9  F (36.6  C) Temp src: Oral BP: 135/74 Pulse: 63   Resp: 15 SpO2: 99 % O2 Device: None (Room air)    Weight: 0 lbs 0 oz    General Appearance:  Well developed. Appears stated age. Awake. Alert. Oriented x3. NAD.   HEENT:  Non-traumatic.   Respiratory:  Normal effort. CTAB. No wheezing, rhonchi, rales.   Cardiovascular:  RRR. S1/S2. No murmurs. Pacemaker present.  GI:  Soft, non-distended, non-tender, +BS.   Extremity:  No pitting edema.   MSK:  Left knee grossly normal appearance. No effusion. No patellar tenderness.  "No joint line tenderness. Negative Meghna. No laxity with varus/valgus stress. Popliteal fossa cyst noted with slight tenderness. No erythema or warmth. Pain with passive extension of the left knee.   Skin:  No visible rash.   Neuro:  Grossly non-focal.      Medical Decision Making       60 MINUTES SPENT BY ME on the date of service doing chart review, history, exam, documentation & further activities per the note.      Data     I have personally reviewed the following data over the past 24 hrs:    N/A  \   N/A   / N/A     136 105 22.2 /  96   5.2 24 1.04 (H) \       Imaging results reviewed over the past 24 hrs:   Recent Results (from the past 24 hour(s))   US Lower Extremity Venous Duplex Left    Narrative    ULTRASOUND LOWER EXTREMITY VENOUS DUPLEX LEFT 9/26/2024 2:44 PM    CLINICAL HISTORY: Posterior popliteal pain with weight bearing and  knee flexion.     COMPARISONS: Ultrasound lower extremity venous duplex bilateral  5/26/2023.    REFERRING PROVIDER: JENNIFER FONSECA    TECHNIQUE: Grayscale, color Doppler, Doppler waveform ultrasound  evaluation was performed through the left common femoral, femoral, and  popliteal veins. Left posterior tibial veins were evaluated with  grayscale imaging and compression.     Left peroneal veins were not evaluated.    FINDINGS: Left common femoral, femoral, and popliteal veins are fully  compressible with phasic Doppler waveforms and/or augment normally.    Left posterior tibial veins are fully compressible to the ankle.    Predominately hypoechoic collection in the left popliteal fossa  measures 1.6 x 3.3 x 6.7 cm and demonstrates no internal vascular flow  by color Doppler.      Impression    IMPRESSION:   1. No deep venous thrombosis demonstrated in the LEFT leg.    2. Baker's cyst.    Reference: \"Duplex Ultrasound in the Diagnosis of Lower-Extremity Deep  Venous Thrombosis\"- Citlaly Marie MD, S; Edward Huggins MD  (Circulation. 2014;129:917-921. " http://circ.ahajournals.org)    KAYLIE VAUGHN MD         SYSTEM ID:  [id number]   XR Tibia and Fibula Left 2 Views    Narrative    2 views left tibia/fibula, 3 views left knee radiographs 9/26/2024  4:00 PM    History: fell couple weeks ago, tender to left fibular head    Comparison: None available.    Findings:    AP, lateral and patellofemoral views of the left knee were obtained.    No acute osseous abnormality.    Moderate medial compartment joint space loss. Relative expansile  lucency of the medial femoral condyle peripherally, maybe degenerative  but nonspecific. No substantial knee joint effusion. No patellar tilt  or lateral subluxation.    Extensive chondrocalcinosis. Mineralization along the suprapatellar  recess, may be intra-articular body versus along the tendon.    AP and lateral views of the left tibia/fibula were obtained.     No acute osseous abnormality.      Ankle joint is incompletely assessed but grossly congruent.  Mineralization adjacent to the tip of distal fibula. Small  mineralization along the distal Achilles tendon silhouette..    Multiple foci of soft tissue calcification/ossifications along the  mid/distal leg.      Impression    Impression:  1. No acute osseous abnormality.  2. Moderate medial compartment predominant degenerative changes of the  knee.  3. Relative expansile lucency of the medial femoral condyle  peripherally, maybe degenerative but nonspecific. Consider non-urgent  MRI of knee for further assessment if clinically indicated.    ADAM ZAK         SYSTEM ID:  [id number]   XR Knee Left 3 Views    Narrative    2 views left tibia/fibula, 3 views left knee radiographs 9/26/2024  4:00 PM    History: fell couple weeks ago, tender to left fibular head    Comparison: None available.    Findings:    AP, lateral and patellofemoral views of the left knee were obtained.    No acute osseous abnormality.    Moderate medial compartment joint space loss. Relative expansile  lucency of  the medial femoral condyle peripherally, maybe degenerative  but nonspecific. No substantial knee joint effusion. No patellar tilt  or lateral subluxation.    Extensive chondrocalcinosis. Mineralization along the suprapatellar  recess, may be intra-articular body versus along the tendon.    AP and lateral views of the left tibia/fibula were obtained.     No acute osseous abnormality.      Ankle joint is incompletely assessed but grossly congruent.  Mineralization adjacent to the tip of distal fibula. Small  mineralization along the distal Achilles tendon silhouette..    Multiple foci of soft tissue calcification/ossifications along the  mid/distal leg.      Impression    Impression:  1. No acute osseous abnormality.  2. Moderate medial compartment predominant degenerative changes of the  knee.  3. Relative expansile lucency of the medial femoral condyle  peripherally, maybe degenerative but nonspecific. Consider non-urgent  MRI of knee for further assessment if clinically indicated.    VetteryAHASHI         SYSTEM ID:  [id number]

## 2024-09-26 NOTE — DISCHARGE INSTRUCTIONS
"    Your x-rays showed a questionable \"lucency\" of the medial femur, which can be further evaluated by nonemergent MRI  "

## 2024-09-27 ENCOUNTER — APPOINTMENT (OUTPATIENT)
Dept: PHYSICAL THERAPY | Facility: CLINIC | Age: 89
End: 2024-09-27
Attending: PHYSICIAN ASSISTANT
Payer: COMMERCIAL

## 2024-09-27 VITALS
HEART RATE: 64 BPM | SYSTOLIC BLOOD PRESSURE: 147 MMHG | OXYGEN SATURATION: 99 % | TEMPERATURE: 97.4 F | DIASTOLIC BLOOD PRESSURE: 66 MMHG | RESPIRATION RATE: 16 BRPM

## 2024-09-27 PROCEDURE — 99239 HOSP IP/OBS DSCHRG MGMT >30: CPT | Performed by: PHYSICIAN ASSISTANT

## 2024-09-27 PROCEDURE — 97116 GAIT TRAINING THERAPY: CPT | Mod: GP

## 2024-09-27 PROCEDURE — 250N000013 HC RX MED GY IP 250 OP 250 PS 637: Performed by: PHYSICIAN ASSISTANT

## 2024-09-27 PROCEDURE — 97530 THERAPEUTIC ACTIVITIES: CPT | Mod: GP

## 2024-09-27 PROCEDURE — 97161 PT EVAL LOW COMPLEX 20 MIN: CPT | Mod: GP

## 2024-09-27 PROCEDURE — G0378 HOSPITAL OBSERVATION PER HR: HCPCS

## 2024-09-27 RX ADMIN — LISINOPRIL 40 MG: 20 TABLET ORAL at 09:43

## 2024-09-27 RX ADMIN — ACETAMINOPHEN 975 MG: 325 TABLET ORAL at 09:43

## 2024-09-27 RX ADMIN — SPIRONOLACTONE 12.5 MG: 25 TABLET, FILM COATED ORAL at 09:43

## 2024-09-27 RX ADMIN — DICLOFENAC SODIUM 4 G: 10 GEL TOPICAL at 09:44

## 2024-09-27 RX ADMIN — METOPROLOL SUCCINATE 100 MG: 50 TABLET, EXTENDED RELEASE ORAL at 09:44

## 2024-09-27 RX ADMIN — ASPIRIN 81 MG CHEWABLE TABLET 81 MG: 81 TABLET CHEWABLE at 09:44

## 2024-09-27 RX ADMIN — FAMOTIDINE 20 MG: 20 TABLET ORAL at 09:44

## 2024-09-27 RX ADMIN — METHIMAZOLE 5 MG: 5 TABLET ORAL at 09:44

## 2024-09-27 ASSESSMENT — ACTIVITIES OF DAILY LIVING (ADL)
ADLS_ACUITY_SCORE: 34
ADLS_ACUITY_SCORE: 34
ADLS_ACUITY_SCORE: 32
ADLS_ACUITY_SCORE: 32
ADLS_ACUITY_SCORE: 34
ADLS_ACUITY_SCORE: 32
ADLS_ACUITY_SCORE: 34

## 2024-09-27 NOTE — PLAN OF CARE
Physical Therapy Discharge Summary    Reason for therapy discharge:    Discharged to home with outpatient therapy.    Progress towards therapy goal(s). See goals on Care Plan in [facility] electronic health record for goal details.  Goals partially met.  Barriers to achieving goals:   discharge from facility.    Therapy recommendation(s):    Continued therapy is recommended.  Rationale/Recommendations:  OP PT.

## 2024-09-27 NOTE — PROGRESS NOTES
Goal evaluation outcome:  PRIMARY DIAGNOSIS: ACUTE PAIN  OUTPATIENT/OBSERVATION GOALS TO BE MET BEFORE DISCHARGE:  1. Pain Status: Improved-controlled with oral pain medications.    2. Return to near baseline physical activity: No    3. Cleared for discharge by consultants (if involved): No    Discharge Planner Nurse   Safe discharge environment identified: Yes  Barriers to discharge: No       Entered by: Beatriz Davis RN 09/27/2024 5:06 AM     Please review provider order for any additional goals.   Nurse to notify provider when observation goals have been met and patient is ready for discharge.   -diagnostic tests and consults completed and resulted Meeting with PT 9/27  -vital signs normal or at patient baseline met  -adequate pain control on oral analgesics met controled with tylenol   -returns to baseline functional status not met still needs assist of 1 to bedside commode painful to bear weight on left leg  -safe disposition plan has been identified NA

## 2024-09-27 NOTE — PLAN OF CARE
PRIMARY DIAGNOSIS: GENERALIZED WEAKNESS    OUTPATIENT/OBSERVATION GOALS TO BE MET BEFORE DISCHARGE  1. Orthostatic performed: N/A    2. Tolerating PO medications: Yes    3. Return to near baseline physical activity: Yes    4. Cleared for discharge by consultants (if involved): Yes    Discharge Planner Nurse   Safe discharge environment identified: Yes  Barriers to discharge: No       Entered by: Gabbi Tai RN 09/27/2024 11:49 AM     Please review provider order for any additional goals.   BP (!) 147/66 (BP Location: Right arm)   Pulse 64   Temp 97.4  F (36.3  C) (Oral)   Resp 16   SpO2 99%      Nurse to notify provider when observation goals have been met and patient is ready for discharge.

## 2024-09-27 NOTE — PROGRESS NOTES
Shift [id number]    /85 (BP Location: Right arm, Patient Position: Other (comments), Cuff Size: Adult Small)   Pulse 63   Temp 97.5  F (36.4  C) (Oral)   Resp 16   SpO2 100%      NEURO: A+0 x 4  RESPIRATORY: WDL denies SOB and chest pain   CARDIAC: WDL regular   GI/:  WDL 0 x BM bedside commode hasn't had to get up and go all night  DIET: Reg  PAIN/NAUSEA: Denies pain but 3/10 pain on left knee only when she moves it or stands up it is a sharp nerve pain denies n/v  INCISION/DRAINS: NA  ACTIVITY: Assist x1 or wheel chair  LAB: BMP  PLAN:  Discharge today once PT sees her and evaluates her progress about further plans. No acute events during this shift. Makes needs known with call light

## 2024-09-27 NOTE — PROGRESS NOTES
Patient's After Visit Summary was reviewed with patient and daughter.    Patient verbalized understanding of After Visit Summary, recommended follow up and was given an opportunity to ask questions.     Discharge medications sent home with patient/family: Not applicable     Discharged with daughter. All belongings sent with patient. Patient stable at time of discharge.

## 2024-09-27 NOTE — PLAN OF CARE
Goal Outcome Evaluation:  Alert and oriented x4, able to make needs known. Left knee pain. Commode at bedside. Admission questions completed. On regular diet, dinner ordered.

## 2024-09-27 NOTE — DISCHARGE SUMMARY
[facility]  Hospitalist Discharge Summary      Date of Admission:  9/26/2024  Date of Discharge:  9/27/2024  Discharging Provider: GUILLAUME Iglesias  Discharge Service: Hospitalist Service    Discharge Diagnoses   # Knee strain after a mechanical fall  # Baker's cyst of left knee joint  # Paroxysmal atrial fibrillation  # Hypertension  # Hyperthyroidism    Clinically Significant Risk Factors          Follow-ups Needed After Discharge   Follow-up Appointments     Follow Up ([facility])      Please follow up with Orthopedics within 2 weeks of discharge.     Appointments on [place] and/or [facility] (with [facility] or [facility]   provider or service). Call [phone] if you haven't heard regarding   these appointments within 7 days of discharge.            Unresulted Labs Ordered in the Past 30 Days of this Admission       No orders found for last 31 day(s).            Discharge Disposition   Discharged to home  Condition at discharge: Stable    Hospital Course   Ms. 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  Doppler ultrasound of the left lower extremity was negative for DVT but did display a Baker's cyst.  X-ray of her left knee was negative for any fractures or effusion.  She had no erythema or warmth of the joint and her pain was markedly improved even in the ER.  She was admitted to the observation unit overnight and met with physical therapy who recommends going home with outpatient physical therapy and use of ice/heat and anti-inflammatory medications.  She will follow-up with orthopedics as an  outpatient.    Consultations This Hospital Stay   PHYSICAL THERAPY ADULT IP CONSULT    Code Status   Full Code    Time Spent on this Encounter   I, GUILLAUME Iglesias, personally saw the patient today and spent greater than 30 minutes discharging this patient.       GUILLAUME Iglesias  [facility] [address] OBSERVATION  [address] [address]  Phone: [phone]  Fax: [phone]  ______________________________________________________________________    Physical Exam   Vital Signs: Temp: 97.4  F (36.3  C) Temp src: Oral BP: (!) 147/66 Pulse: 64   Resp: 16 SpO2: 99 % O2 Device: None (Room air)    Weight: 0 lbs 0 oz  General Appearance: 89 year old female resting at the side of her bed, no acute distress, reports feeling much better, denies pain  Respiratory:  breathing comfortably on room air, no adventitious sounds to bilateral auscultation  Cardiovascular: regular rate and rhythm, no appreciable murmurs, rubs or gallops  GI: bowel sounds present, soft, non-tender to palpation throughout, no rebound or guarding  Musculoskeletal: left knee minimally swollen, non-tender to palpation, able to bear weight with use of a gait aid  Skin: warm, dry, no open sores, lesions or ulcerations       Primary Care Physician   Jovana Macias    Discharge Orders      Orthopedic  Referral      Physical Therapy  Referral      Reason for your hospital stay    You were hospitalized with knee pain.     Activity    Your activity upon discharge: activity as tolerated     Follow Up ([facility])    Please follow up with Orthopedics within 2 weeks of discharge.     Appointments on [place] and/or [facility] (with [facility] or [facility] provider or service). Call [phone] if you haven't heard regarding these appointments within 7 days of discharge.     Diet    Follow this diet upon discharge: Current Diet:Orders Placed This Encounter      Regular Diet Adult       Significant  Results and Procedures   Most Recent 3 CBC's:  Recent Labs   Lab Test 06/26/24  1113 07/21/23  1044 05/30/23  1608   WBC 7.8 7.2 6.8   HGB 11.6* 11.3* 10.8*   MCV 94 87 89    242 162     Most Recent 3 BMP's:  Recent Labs   Lab Test 09/26/24  1827 06/26/24  1113 01/22/24  1317    136 137   POTASSIUM 5.2 5.3 5.1   CHLORIDE 105 103 102   CO2 24 24 27   BUN 22.2 27.1* 17.9   CR 1.04* 1.10* 0.88   ANIONGAP 7 9 8   INO 9.7 9.6 9.6   GLC 96 94 106*     Most Recent 2 LFT's:  Recent Labs   Lab Test 06/26/24  1113 07/21/23  1044 06/29/23  1241   AST  --  22 25   ALT 12 10 15   ALKPHOS  --  116* 117*   BILITOTAL  --  0.4 0.4     Most Recent 3 INR's:  Recent Labs   Lab Test 07/21/23  1044 05/24/23  1852 09/14/16  0824   INR 1.08 1.26* 1.13       Discharge Medications   Current Discharge Medication List        CONTINUE these medications which have NOT CHANGED    Details   apixaban ANTICOAGULANT (ELIQUIS) 2.5 MG tablet Take 1 tablet (2.5 mg) by mouth 2 times daily  Qty: 90 tablet, Refills: 3    Associated Diagnoses: Paroxysmal A-fib (H)      aspirin 81 MG tablet Take 1 tablet by mouth daily.      co-enzyme Q-10 100 MG CAPS capsule Take 100 mg by mouth      famotidine (PEPCID) 20 MG tablet Take 1 tablet (20 mg) by mouth 2 times daily  Qty: 180 tablet, Refills: 3    Associated Diagnoses: Gastroesophageal reflux disease with esophagitis without hemorrhage      lisinopril (ZESTRIL) 40 MG tablet Take 1 tablet (40 mg) by mouth daily  Qty: 90 tablet, Refills: 4    Associated Diagnoses: Diastolic heart failure, unspecified HF chronicity (H)      melatonin 1 MG/ML LIQD liquid Take 1 mg by mouth nightly as needed for sleep      methimazole (TAPAZOLE) 5 MG tablet Take 1 tablet (5 mg) by mouth daily  Qty: 30 tablet, Refills: 4    Associated Diagnoses: Graves disease      metoprolol succinate ER (TOPROL XL) 50 MG 24 hr tablet Take 2 tablets (100mg) in the morning, and 1 tablet (50mg) in the evening.  Qty: 270 tablet, Refills: 3     Associated Diagnoses: Paroxysmal A-fib (H)      ondansetron (ZOFRAN) 4 MG tablet Take 1 tablet (4 mg) by mouth every 6 hours as needed for nausea (give prior to meals for nausea)  Qty: 90 tablet, Refills: 1    Associated Diagnoses: Nausea; Chronic cholecystitis; Acute nausea with nonbilious vomiting      spironolactone (ALDACTONE) 25 MG tablet Take 0.5 tablets (12.5 mg) by mouth daily  Qty: 45 tablet, Refills: 4    Associated Diagnoses: Diastolic heart failure, unspecified HF chronicity (H)      triamcinolone (KENALOG) 0.1 % external cream Apply topically 2 times daily  Qty: 453.6 g, Refills: 0    Associated Diagnoses: Itching      TURMERIC PO            Allergies   No Known Allergies

## 2024-09-27 NOTE — PROGRESS NOTES
"OBS PT Eval     09/27/24 1100   Appointment Info   Signing Clinician's Name / Credentials (PT) Rome Plata, PT, DPT   Rehab Comments (PT) WBAT L LE, can brace for comfort as needed   Quick Adds   Quick Adds Certification   Living Environment   People in Home spouse   Current Living Arrangements house   Home Accessibility stairs to enter home;stairs within home   Number of Stairs, Main Entrance 3   Number of Stairs, Within Home, Primary other (see comments)  (flight down to bathroom)   Transportation Anticipated family or friend will provide   Living Environment Comments Lives in home with spouse and supportive family nearby. Patient endorses flight of stairs to bathroom but states that she also has a commode if needed on main level. Further, patient's daughter was a PT.   Self-Care   Usual Activity Tolerance moderate   Current Activity Tolerance fair   Regular Exercise Yes   Activity/Exercise Type other (see comments)  (seated exercise)   Equipment Currently Used at Home walker, rolling   Fall history within last six months yes   Number of times patient has fallen within last six months 1   Activity/Exercise/Self-Care Comment Patient typically IND but states that she has a walker from previous injuries.   General Information   Onset of Illness/Injury or Date of Surgery 09/26/24   Referring Physician Jamil Leon PA-C   Patient/Family Therapy Goals Statement (PT) To return home   Pertinent History of Current Problem (include personal factors and/or comorbidities that impact the POC) Per EMR \"Isadora Mendez is a 89 year old female with history of HTN, HLD, PAF, SSS s/p PPM (2010), CKD, and hyperparathyroidism who presents with acute left knee pain and inability to bear weight. Admitted to medicine observation for further evaluation, pain management, and therapy evaluation.\"   Existing Precautions/Restrictions fall   Cognition   Affect/Mental Status (Cognition) WFL   Range of Motion (ROM)   Range of Motion ROM is " "WFL   ROM Comment L knee flexion/extension intact, some pain reproduced with OP flexion, negative Ray, negative SLUMP, pain with open chain knee extension   Strength (Manual Muscle Testing)   Strength (Manual Muscle Testing) strength is WFL   Strength Comments some L knee buckling with pain   Bed Mobility   Bed Mobility supine-sit;sit-supine   Supine-Sit S[place] (Bed Mobility) independent   Sit-Supine [place] (Bed Mobility) independent   Transfers   Transfers sit-stand transfer   Sit-Stand Transfer   Sit-Stand [place] (Transfers) independent   Gait/Stairs (Locomotion)   [place]ag Level (Gait) modified independence   Assistive Device (Gait) walker, front-wheeled   Comment, (Gait/Stairs) antalgic gait, improved with repition, \"nerve\" pain in L LE   Balance   Balance Comments IND sitting, IND stance   Sensory Examination   Sensory Perception Comments radicula   Clinical Impression   Criteria for Skilled Therapeutic Intervention Yes, treatment indicated   PT Diagnosis (PT) impaired functional mobility   Influenced by the following impairments pain, intermittent buckling   Functional limitations due to impairments transfers, gait, stairs   Clinical Presentation (PT Evaluation Complexity) stable   Clinical Presentation Rationale clinical judgement   Clinical Decision Making (Complexity) low complexity   Planned Therapy Interventions (PT) balance training;bed mobility training;gait training;ROM (range of motion);stair training;strengthening;transfer training;manual therapy techniques   Risk & Benefits of therapy have been explained evaluation/treatment results reviewed;care plan/treatment goals reviewed;risks/benefits reviewed;current/potential barriers reviewed;participants voiced agreement with care plan;participants included;patient   PT Total Evaluation Time   PT Eval, Low Complexity Minutes (36296) 15   Therapy Certification   Start of care date 09/27/24   Certification date from 09/27/24 "   Certification date to 10/11/24   Medical Diagnosis baker's cyst of knee, Left   Physical Therapy Goals   PT Frequency 5x/week   PT Predicted Duration/Target Date for Goal Attainment 10/11/24   PT Goals Bed Mobility;Transfers;Gait;Stairs   PT: Bed Mobility Modified independent;Supine to/from sit   PT: Transfers Modified independent;Sit to/from stand;Assistive device   PT: Gait Modified independent;Greater than 200 feet;Assistive device   PT: Stairs 3 stairs;Supervision/stand-by assist   Interventions   Interventions Quick Adds Gait Training;Therapeutic Activity   PT Discharge Planning   PT Plan gait, progress to stairs   PT Discharge Recommendation (DC Rec) home with outpatient physical therapy   PT Rationale for DC Rec Patient limited by pain but compensated well with use of walker (has one at home) and endorses feeling better than she did prior to admission at which point she was able to funciton at home. Recommend patient follow-up with OP PT after d/c.   PT Brief overview of current status mod I with FWW   Total Session Time   Total Session Time (sum of timed and untimed services) 15       Rome Plata, PT, DPT    [facility]  OUTPATIENT PHYSICAL THERAPY EVALUATION  PLAN OF TREATMENT FOR OUTPATIENT REHABILITATION  (COMPLETE FOR INITIAL CLAIMS ONLY)  Patient's Last Name, First Name, M.I.  YOB: 1931  Isadora Mendez                        Provider's Name  [facility] Medical Record No.  [id number]                             Onset Date:  09/26/24   Start of Care Date:  09/27/24   Type:     _X_PT   ___OT   ___SLP Medical Diagnosis:  baker's cyst of knee, Left              PT Diagnosis:  impaired functional mobility Visits from SOC:  1     See note for plan of treatment, functional goals and certification details    I CERTIFY THE NEED FOR THESE SERVICES FURNISHED UNDER        THIS PLAN OF TREATMENT AND WHILE UNDER MY CARE     (Physician  co-signature of this document indicates review and certification of the therapy plan).

## 2024-09-27 NOTE — PROGRESS NOTES
PRIMARY DIAGNOSIS: ACUTE PAIN  OUTPATIENT/OBSERVATION GOALS TO BE MET BEFORE DISCHARGE:  1. Pain Status: Improved-controlled with oral pain medications.     2. Return to near baseline physical activity: No    3. Cleared for discharge by consultants (if involved): No    Discharge Planner Nurse   Safe discharge environment identified: Yes  Barriers to discharge: No       Entered by: Beatriz Davis RN 09/27/2024 12:44 AM   Patient states that her knee hurts only when she moves it and when she does it is more a like a nerve pain that shoots down her shin. Took Tylenol for management  Please review provider order for any additional goals.   Nurse to notify provider when observation goals have been met and patient is ready for discharge.

## 2024-10-02 ENCOUNTER — LAB (OUTPATIENT)
Dept: LAB | Facility: CLINIC | Age: 89
End: 2024-10-02
Payer: COMMERCIAL

## 2024-10-02 ENCOUNTER — PATIENT OUTREACH (OUTPATIENT)
Dept: CARE COORDINATION | Facility: CLINIC | Age: 89
End: 2024-10-02

## 2024-10-02 DIAGNOSIS — E05.00 GRAVES DISEASE: ICD-10-CM

## 2024-10-02 LAB — TSH SERPL DL<=0.005 MIU/L-ACNC: 4.17 UIU/ML (ref 0.3–4.2)

## 2024-10-02 PROCEDURE — 84443 ASSAY THYROID STIM HORMONE: CPT

## 2024-10-02 PROCEDURE — 36415 COLL VENOUS BLD VENIPUNCTURE: CPT

## 2024-10-02 NOTE — PROGRESS NOTES
Clinic Care Coordination Contact  Transitions of Care Outreach    Chief Complaint   Patient presents with    Clinic Care Coordination - Initial    Clinic Care Coordination - Post Hospital       Most Recent Admission Date: 9/26/2024   Most Recent Admission Diagnosis: Baker's cyst of knee, left - M71.22  Acute pain of left knee - M25.562     Most Recent Discharge Date: 9/27/2024   Most Recent Discharge Diagnosis: Baker's cyst of knee, left - M71.22  Acute pain of left knee - M25.562  Sick sinus syndrome (H) - I49.5     Transitions of Care Assessment    Discharge Assessment  How are you doing now that you are home?: RN called and spoke with patient. Per patient, doing well, currently getting some dental work done. Patient has no concerns, declines further care coordination- met goals of post-admission follow up.  How are your symptoms? (Red Flag symptoms escalate to triage hotline per guidelines): Improved  Do you know how to contact your clinic care team if you have future questions or changes to your health status? : Yes  Does the patient have their discharge instructions? : Yes  Does the patient have questions regarding their discharge instructions? : No  Were you started on any new medications or were there changes to any of your previous medications? : No  Does the patient have all of their medications?: Yes  Do you have questions regarding any of your medications? : No  Do you have all of your needed medical supplies or equipment (DME)?  (i.e. oxygen tank, CPAP, cane, etc.): Yes                Follow up Plan     Discharge Follow-Up  Discharge follow up appointment scheduled in alignment with recommended follow up timeframe or Transitions of Risk Category? (Low = within 30 days; Moderate= within 14 days; High= within 7 days): No  Discharge Follow Up Appointment Date: 01/17/25  Discharge Follow Up Appointment Scheduled with?: Primary Care Provider  Patient's follow up appointment not scheduled: Patient declined  scheduling support. Education on the importance of transitions of care follow up. Provided scheduling phone number.    Future Appointments   Date Time Provider Department Center   10/2/2024 11:00 AM UP LAB [facility] UP   11/1/2024  1:45 PM [facility]   11/7/2024 11:40 AM Natalia Garay, PT [facility]   12/16/2024 12:00 AM  ICD REMOTE [facility]   12/23/2024  2:00 PM Troy Santana MD [facility]   1/17/2025 11:00 AM Jovana Macias MD [facility]   3/5/2025  1:00 PM June Acuña MD [facility]   3/24/2025 12:00 AM  ICD REMOTE [facility]       Outpatient Plan as outlined on AVS reviewed with patient.      For any urgent concerns, please contact our 24 hour nurse triage line: [phone]       JAMEL LYLES RN

## 2024-10-10 ENCOUNTER — OFFICE VISIT (OUTPATIENT)
Dept: ORTHOPEDICS | Facility: CLINIC | Age: 89
End: 2024-10-10
Payer: COMMERCIAL

## 2024-10-10 ENCOUNTER — TELEPHONE (OUTPATIENT)
Dept: FAMILY MEDICINE | Facility: CLINIC | Age: 89
End: 2024-10-10

## 2024-10-10 ENCOUNTER — PRE VISIT (OUTPATIENT)
Dept: ORTHOPEDICS | Facility: CLINIC | Age: 89
End: 2024-10-10

## 2024-10-10 DIAGNOSIS — R22.42 MASS OF LEFT KNEE: ICD-10-CM

## 2024-10-10 DIAGNOSIS — M17.12 PRIMARY OSTEOARTHRITIS OF LEFT KNEE: Primary | ICD-10-CM

## 2024-10-10 PROCEDURE — 99203 OFFICE O/P NEW LOW 30 MIN: CPT | Mod: GC | Performed by: FAMILY MEDICINE

## 2024-10-10 NOTE — TELEPHONE ENCOUNTER
Reason for call: MR Device Safety Clearance    Please create a MR Device Safety order  Patient is reporting patient has Pacemaker  Type of MR exam: MR Knee Left w/o Contrast     Do you get your Device checked at [facility]?: Yes - [facility]

## 2024-10-10 NOTE — LETTER
10/10/2024      RE: Isadora Mendez  [address] [facility] [address]     Dear Colleague,    Thank you for referring your patient, Isadora Mendez, to the [facility] [place]. Please see a copy of my visit note below.    ASSESSMENT/PLAN:    (M17.12) Primary osteoarthritis of left knee  (primary encounter diagnosis)  Comment: exam, imaging reviewed at length; will tx medial OA w/ topical nsaid, PT; f/up prn for this; consider injection if no better  Plan: diclofenac (VOLTAREN) 1 % topical gel, Physical Therapy  Referral          (R22.42) Mass of left knee  Comment: will order MRI; pt does have a pacemaker so radiology will need to determine if MRI safe; if not, can order a CT; telephone follow-up after scan  Plan: MR Knee Left w/o Contrast          Attestation:  This patient has been seen and evaluated by me, Nba Mcgarry MD with the resident, Dr Son and the care team. I agree with the findings and plan of care as documented in this note.    Nba Mcgarry MD  October 10, 2024  2:54 PM        Pt is a 89 year old female here today for:   A few times since the injury  Notices sharp shooting pain down to the foot  HPI:   Left Knee pain :   Duration? 1.5 months    Injury/ Inciting activity? Patient had a fall onto her left knee    Pop? No    Swelling? No    Limited motion? Yes; stiffness    Locking/ Catching? No    Giving way/ instability? Yes    Imaging? XR on 9/26/24   Treatment? No      X-ray 9/26/24:  Findings:     AP, lateral and patellofemoral views of the left knee were obtained.     No acute osseous abnormality.     Moderate medial compartment joint space loss. Relative expansile  lucency of the medial femoral condyle peripherally, maybe degenerative  but nonspecific. No substantial knee joint effusion. No patellar tilt  or lateral subluxation.     Extensive chondrocalcinosis. Mineralization along the suprapatellar  recess, may be intra-articular body versus along  the tendon.     AP and lateral views of the left tibia/fibula were obtained.      No acute osseous abnormality.       Ankle joint is incompletely assessed but grossly congruent.  Mineralization adjacent to the tip of distal fibula. Small  mineralization along the distal Achilles tendon silhouette..     Multiple foci of soft tissue calcification/ossifications along the  mid/distal leg.                                                                      Impression:  1. No acute osseous abnormality.  2. Moderate medial compartment predominant degenerative changes of the knee.  3. Relative expansile lucency of the medial femoral condyle  peripherally, maybe degenerative but nonspecific. Consider non-urgent  MRI of knee for further assessment if clinically indicated.      Past Medical History:   Diagnosis Date     Atrial fibrillation (H) 01/01/2011     Cataract      Closed nondisplaced fracture of styloid process of radius      Dry eyes      Facial fracture (H) 01/01/2006     Hypertension      Infection due to 2019 novel coronavirus 10/2022    or 11/22- took paxolovid     Low bone density      NSVT (nonsustained ventricular tachycardia) (H) 01/01/2009    during exercise echo, neg EP study     Pacemaker 07/01/2010     Postmenopausal status      S/P cardiac catheterization 01/01/2009    30-40% non obstructive lesion     SSS (sick sinus syndrome) (H) 10/2022     Ventricular tachycardia, nonsustained (H) 01/01/2009    during stress echo      Past Surgical History:   Procedure Laterality Date     CATARACT EXTRACTION W/ INTRAOCULAR LENS IMPLANT Right 12/04/2018     IMPLANT PACEMAKER       PPM  06/30/2010    Permanent Pacemaker      Current Outpatient Medications   Medication Sig Dispense Refill     apixaban ANTICOAGULANT (ELIQUIS) 2.5 MG tablet Take 1 tablet (2.5 mg) by mouth 2 times daily 90 tablet 3     aspirin 81 MG tablet Take 1 tablet by mouth daily.       co-enzyme Q-10 100 MG CAPS capsule Take 100 mg by mouth        famotidine (PEPCID) 20 MG tablet Take 1 tablet (20 mg) by mouth 2 times daily 180 tablet 3     lisinopril (ZESTRIL) 40 MG tablet Take 1 tablet (40 mg) by mouth daily 90 tablet 4     melatonin 1 MG/ML LIQD liquid Take 1 mg by mouth nightly as needed for sleep       methimazole (TAPAZOLE) 5 MG tablet Take 1 tablet (5 mg) by mouth daily 30 tablet 4     metoprolol succinate ER (TOPROL XL) 50 MG 24 hr tablet Take 2 tablets (100mg) in the morning, and 1 tablet (50mg) in the evening. 270 tablet 3     ondansetron (ZOFRAN) 4 MG tablet Take 1 tablet (4 mg) by mouth every 6 hours as needed for nausea (give prior to meals for nausea) 90 tablet 1     spironolactone (ALDACTONE) 25 MG tablet Take 0.5 tablets (12.5 mg) by mouth daily 45 tablet 4     triamcinolone (KENALOG) 0.1 % external cream Apply topically 2 times daily 453.6 g 0     TURMERIC PO         No Known Allergies   ROS:   Gen- no fevers/chills   Rheum - no morning stiffness   Derm - no rash/ redness   Neuro - no numbness, no tingling   Remainder of ROS negative.     Exam:   There were no vitals taken for this visit.       Left Knee:   ROM: 0-130; Crepitus: no   Effusion: No ; Swelling: no   Strength: Full in flexion/ extension   Tenderness: Patella - no Medial joint line - YES; Lateral joint line - no; Quad tendon - no; Patellar tendon- no; Hamstring - no. Pes anserine tenderness  Collaterals: varus -neg/valgus -neg.   Patella: patellar compression - neg  Meniscus: Ray - neg; Thessaly - neg           Again, thank you for allowing me to participate in the care of your patient.      Sincerely,    Nba Mcgarry MD

## 2024-10-10 NOTE — PATIENT INSTRUCTIONS
[facility] Outpatient Physical Therapy Locations    To schedule an appointment please call our scheduling department at [phone]    To fax a referral to be scheduled fax to [phone]    [place]: [address], [facility]: [address] [facility]: [address], [facility]: [address], [facility]: [address], [facility]: [address] [facility]: [address] [place]  [place]: [address], [facility]: [address], [facility]: [address] [facility]: [address], [facility]: [address], [facility]: [address], [facility]: [address], [facility]: [address] [facility] [address], [facility]: [facility]: [address], [facility]: [address], [facility]: [address], [place], [place]  [place]: [address] [facility]: [address] [place] [address]: [address], [facility]: [address] [facility] [place], [place]  [facility]: [address], [facility]: [address], [facility]: [address] [facility] [place], [place]  [facility]: [address] [place], [place]  [facility]: [address] [place] [address]  [place]: [address] [facility] [facility], [address], [facility] [address] [facility],  [facility]: [address] [facility] [place], [place]

## 2024-10-17 ENCOUNTER — TELEPHONE (OUTPATIENT)
Dept: ORTHOPEDICS | Facility: CLINIC | Age: 89
End: 2024-10-17
Payer: COMMERCIAL

## 2024-10-17 NOTE — TELEPHONE ENCOUNTER
Unable to LVM, Sent Mychart (1st Attempt) for the patient to call back and schedule the following:    Appointment type: MRI and Return Knee  Provider: Dr. Mcgarry  Return date: MRI next available and follow-up after

## 2024-10-21 NOTE — TELEPHONE ENCOUNTER
Phone was answered and writer was told that patient is on vacation this week. Writer will call on 10/28/24 to schedule MRI and Return Knee appt with Dr. Mcgarry after.

## 2024-10-28 NOTE — TELEPHONE ENCOUNTER
Phone was answered and writer was told that patient is still on vacation until night of 10/29/24. Will call on 10/30/ 24 to schedule MRI and return knee appt with Dr. Malgorzata rosario.

## 2024-10-31 ENCOUNTER — TELEPHONE (OUTPATIENT)
Dept: ORTHOPEDICS | Facility: CLINIC | Age: 89
End: 2024-10-31
Payer: COMMERCIAL

## 2024-10-31 ENCOUNTER — MEDICAL CORRESPONDENCE (OUTPATIENT)
Dept: HEALTH INFORMATION MANAGEMENT | Facility: CLINIC | Age: 89
End: 2024-10-31
Payer: COMMERCIAL

## 2024-10-31 NOTE — TELEPHONE ENCOUNTER
Patient Contacted for the patient to call back and schedule the following:    Provider: Malgorzata  Additonal Notes: per patient the knee is feeling better - will call if she wants to schedule in the future Sb

## 2024-11-01 ENCOUNTER — HOSPITAL ENCOUNTER (OUTPATIENT)
Dept: MRI IMAGING | Facility: CLINIC | Age: 89
Discharge: HOME | End: 2024-11-01
Attending: INTERNAL MEDICINE
Payer: COMMERCIAL

## 2024-11-01 ENCOUNTER — ANCILLARY PROCEDURE (OUTPATIENT)
Dept: CARDIOLOGY | Facility: CLINIC | Age: 89
End: 2024-11-01
Attending: INTERNAL MEDICINE
Payer: COMMERCIAL

## 2024-11-01 ENCOUNTER — TELEPHONE (OUTPATIENT)
Dept: FAMILY MEDICINE | Facility: CLINIC | Age: 89
End: 2024-11-01

## 2024-11-01 ENCOUNTER — DOCUMENTATION ONLY (OUTPATIENT)
Dept: ORTHOPEDICS | Facility: CLINIC | Age: 89
End: 2024-11-01
Payer: COMMERCIAL

## 2024-11-01 VITALS — OXYGEN SATURATION: 97 % | HEART RATE: 68 BPM

## 2024-11-01 DIAGNOSIS — D49.0 IPMN (INTRADUCTAL PAPILLARY MUCINOUS NEOPLASM): ICD-10-CM

## 2024-11-01 DIAGNOSIS — Z45.02 ENCOUNTER FOR ADJUSTMENT AND MANAGEMENT OF AUTOMATIC IMPLANTABLE CARDIAC DEFIBRILLATOR: ICD-10-CM

## 2024-11-01 DIAGNOSIS — I49.5 SICK SINUS SYNDROME (H): Primary | ICD-10-CM

## 2024-11-01 DIAGNOSIS — I49.5 SICK SINUS SYNDROME (H): ICD-10-CM

## 2024-11-01 LAB
MDC_IDC_EPISODE_DTM: NORMAL
MDC_IDC_EPISODE_DURATION: 102 S
MDC_IDC_EPISODE_DURATION: 110 S
MDC_IDC_EPISODE_DURATION: 1942 S
MDC_IDC_EPISODE_DURATION: 233 S
MDC_IDC_EPISODE_DURATION: 290 S
MDC_IDC_EPISODE_DURATION: 3024 S
MDC_IDC_EPISODE_DURATION: 32 S
MDC_IDC_EPISODE_DURATION: 327 S
MDC_IDC_EPISODE_DURATION: 4126 S
MDC_IDC_EPISODE_DURATION: 44 S
MDC_IDC_EPISODE_DURATION: 894 S
MDC_IDC_EPISODE_DURATION: 9723 S
MDC_IDC_EPISODE_ID: 703
MDC_IDC_EPISODE_ID: 704
MDC_IDC_EPISODE_ID: 705
MDC_IDC_EPISODE_ID: 706
MDC_IDC_EPISODE_ID: 707
MDC_IDC_EPISODE_ID: 708
MDC_IDC_EPISODE_ID: 709
MDC_IDC_EPISODE_ID: 710
MDC_IDC_EPISODE_ID: 711
MDC_IDC_EPISODE_ID: 712
MDC_IDC_EPISODE_ID: 713
MDC_IDC_EPISODE_ID: 714
MDC_IDC_EPISODE_TYPE: NORMAL
MDC_IDC_EPISODE_TYPE_INDUCED: NO
MDC_IDC_LEAD_CONNECTION_STATUS: NORMAL
MDC_IDC_LEAD_CONNECTION_STATUS: NORMAL
MDC_IDC_LEAD_IMPLANT_DT: NORMAL
MDC_IDC_LEAD_IMPLANT_DT: NORMAL
MDC_IDC_LEAD_LOCATION: NORMAL
MDC_IDC_LEAD_LOCATION: NORMAL
MDC_IDC_LEAD_LOCATION_DETAIL_1: NORMAL
MDC_IDC_LEAD_LOCATION_DETAIL_1: NORMAL
MDC_IDC_LEAD_MFG: NORMAL
MDC_IDC_LEAD_MFG: NORMAL
MDC_IDC_LEAD_MODEL: NORMAL
MDC_IDC_LEAD_MODEL: NORMAL
MDC_IDC_LEAD_POLARITY_TYPE: NORMAL
MDC_IDC_LEAD_POLARITY_TYPE: NORMAL
MDC_IDC_LEAD_SERIAL: NORMAL
MDC_IDC_LEAD_SERIAL: NORMAL
MDC_IDC_LEAD_SPECIAL_FUNCTION: NORMAL
MDC_IDC_LEAD_SPECIAL_FUNCTION: NORMAL
MDC_IDC_MSMT_BATTERY_DTM: NORMAL
MDC_IDC_MSMT_BATTERY_REMAINING_LONGEVITY: 33 MO
MDC_IDC_MSMT_BATTERY_RRT_TRIGGER: 2.83
MDC_IDC_MSMT_BATTERY_STATUS: NORMAL
MDC_IDC_MSMT_BATTERY_VOLTAGE: 2.95 V
MDC_IDC_MSMT_LEADCHNL_RA_IMPEDANCE_VALUE: 285 OHM
MDC_IDC_MSMT_LEADCHNL_RA_IMPEDANCE_VALUE: 437 OHM
MDC_IDC_MSMT_LEADCHNL_RA_PACING_THRESHOLD_AMPLITUDE: 1 V
MDC_IDC_MSMT_LEADCHNL_RA_PACING_THRESHOLD_PULSEWIDTH: 0.4 MS
MDC_IDC_MSMT_LEADCHNL_RA_SENSING_INTR_AMPL: 1.1 MV
MDC_IDC_MSMT_LEADCHNL_RV_IMPEDANCE_VALUE: 475 OHM
MDC_IDC_MSMT_LEADCHNL_RV_IMPEDANCE_VALUE: 494 OHM
MDC_IDC_MSMT_LEADCHNL_RV_PACING_THRESHOLD_AMPLITUDE: 0.75 V
MDC_IDC_MSMT_LEADCHNL_RV_PACING_THRESHOLD_PULSEWIDTH: 0.4 MS
MDC_IDC_MSMT_LEADCHNL_RV_SENSING_INTR_AMPL: 10.4 MV
MDC_IDC_PG_IMPLANT_DTM: NORMAL
MDC_IDC_PG_MFG: NORMAL
MDC_IDC_PG_MODEL: NORMAL
MDC_IDC_PG_SERIAL: NORMAL
MDC_IDC_PG_TYPE: NORMAL
MDC_IDC_SESS_CLINIC_NAME: NORMAL
MDC_IDC_SESS_DTM: NORMAL
MDC_IDC_SESS_TYPE: NORMAL
MDC_IDC_SET_BRADY_AT_MODE_SWITCH_RATE: 171 {BEATS}/MIN
MDC_IDC_SET_BRADY_HYSTRATE: NORMAL
MDC_IDC_SET_BRADY_LOWRATE: 60 {BEATS}/MIN
MDC_IDC_SET_BRADY_MAX_SENSOR_RATE: 130 {BEATS}/MIN
MDC_IDC_SET_BRADY_MAX_TRACKING_RATE: 130 {BEATS}/MIN
MDC_IDC_SET_BRADY_MODE: NORMAL
MDC_IDC_SET_BRADY_PAV_DELAY_LOW: 180 MS
MDC_IDC_SET_BRADY_SAV_DELAY_LOW: 150 MS
MDC_IDC_SET_LEADCHNL_RA_PACING_AMPLITUDE: 1.5 V
MDC_IDC_SET_LEADCHNL_RA_PACING_ANODE_ELECTRODE_1: NORMAL
MDC_IDC_SET_LEADCHNL_RA_PACING_ANODE_LOCATION_1: NORMAL
MDC_IDC_SET_LEADCHNL_RA_PACING_CAPTURE_MODE: NORMAL
MDC_IDC_SET_LEADCHNL_RA_PACING_CATHODE_ELECTRODE_1: NORMAL
MDC_IDC_SET_LEADCHNL_RA_PACING_CATHODE_LOCATION_1: NORMAL
MDC_IDC_SET_LEADCHNL_RA_PACING_POLARITY: NORMAL
MDC_IDC_SET_LEADCHNL_RA_PACING_PULSEWIDTH: 0.4 MS
MDC_IDC_SET_LEADCHNL_RA_SENSING_ANODE_ELECTRODE_1: NORMAL
MDC_IDC_SET_LEADCHNL_RA_SENSING_ANODE_LOCATION_1: NORMAL
MDC_IDC_SET_LEADCHNL_RA_SENSING_CATHODE_ELECTRODE_1: NORMAL
MDC_IDC_SET_LEADCHNL_RA_SENSING_CATHODE_LOCATION_1: NORMAL
MDC_IDC_SET_LEADCHNL_RA_SENSING_POLARITY: NORMAL
MDC_IDC_SET_LEADCHNL_RA_SENSING_SENSITIVITY: 0.3 MV
MDC_IDC_SET_LEADCHNL_RV_PACING_AMPLITUDE: 2 V
MDC_IDC_SET_LEADCHNL_RV_PACING_ANODE_ELECTRODE_1: NORMAL
MDC_IDC_SET_LEADCHNL_RV_PACING_ANODE_LOCATION_1: NORMAL
MDC_IDC_SET_LEADCHNL_RV_PACING_CAPTURE_MODE: NORMAL
MDC_IDC_SET_LEADCHNL_RV_PACING_CATHODE_ELECTRODE_1: NORMAL
MDC_IDC_SET_LEADCHNL_RV_PACING_CATHODE_LOCATION_1: NORMAL
MDC_IDC_SET_LEADCHNL_RV_PACING_POLARITY: NORMAL
MDC_IDC_SET_LEADCHNL_RV_PACING_PULSEWIDTH: 0.4 MS
MDC_IDC_SET_LEADCHNL_RV_SENSING_ANODE_ELECTRODE_1: NORMAL
MDC_IDC_SET_LEADCHNL_RV_SENSING_ANODE_LOCATION_1: NORMAL
MDC_IDC_SET_LEADCHNL_RV_SENSING_CATHODE_ELECTRODE_1: NORMAL
MDC_IDC_SET_LEADCHNL_RV_SENSING_CATHODE_LOCATION_1: NORMAL
MDC_IDC_SET_LEADCHNL_RV_SENSING_POLARITY: NORMAL
MDC_IDC_SET_LEADCHNL_RV_SENSING_SENSITIVITY: 0.9 MV
MDC_IDC_SET_ZONE_DETECTION_INTERVAL: 350 MS
MDC_IDC_SET_ZONE_DETECTION_INTERVAL: 400 MS
MDC_IDC_SET_ZONE_STATUS: NORMAL
MDC_IDC_SET_ZONE_STATUS: NORMAL
MDC_IDC_SET_ZONE_TYPE: NORMAL
MDC_IDC_SET_ZONE_VENDOR_TYPE: NORMAL
MDC_IDC_STAT_AT_BURDEN_PERCENT: 0.3 %
MDC_IDC_STAT_AT_DTM_END: NORMAL
MDC_IDC_STAT_AT_DTM_START: NORMAL
MDC_IDC_STAT_BRADY_AP_VP_PERCENT: 0.11 %
MDC_IDC_STAT_BRADY_AP_VS_PERCENT: 99.28 %
MDC_IDC_STAT_BRADY_AS_VP_PERCENT: 0.05 %
MDC_IDC_STAT_BRADY_AS_VS_PERCENT: 0.56 %
MDC_IDC_STAT_BRADY_DTM_END: NORMAL
MDC_IDC_STAT_BRADY_DTM_START: NORMAL
MDC_IDC_STAT_BRADY_RA_PERCENT_PACED: 99.15 %
MDC_IDC_STAT_BRADY_RV_PERCENT_PACED: 0.15 %
MDC_IDC_STAT_EPISODE_RECENT_COUNT: 0
MDC_IDC_STAT_EPISODE_RECENT_COUNT: 0
MDC_IDC_STAT_EPISODE_RECENT_COUNT: 2
MDC_IDC_STAT_EPISODE_RECENT_COUNT: 99
MDC_IDC_STAT_EPISODE_RECENT_COUNT_DTM_END: NORMAL
MDC_IDC_STAT_EPISODE_RECENT_COUNT_DTM_START: NORMAL
MDC_IDC_STAT_EPISODE_TOTAL_COUNT: 0
MDC_IDC_STAT_EPISODE_TOTAL_COUNT: 1
MDC_IDC_STAT_EPISODE_TOTAL_COUNT: 232
MDC_IDC_STAT_EPISODE_TOTAL_COUNT: 477
MDC_IDC_STAT_EPISODE_TOTAL_COUNT_DTM_END: NORMAL
MDC_IDC_STAT_EPISODE_TOTAL_COUNT_DTM_START: NORMAL
MDC_IDC_STAT_EPISODE_TYPE: NORMAL
MDC_IDC_STAT_TACHYTHERAPY_RECENT_DTM_END: NORMAL
MDC_IDC_STAT_TACHYTHERAPY_RECENT_DTM_START: NORMAL
MDC_IDC_STAT_TACHYTHERAPY_TOTAL_DTM_END: NORMAL
MDC_IDC_STAT_TACHYTHERAPY_TOTAL_DTM_START: NORMAL

## 2024-11-01 PROCEDURE — A9585 GADOBUTROL INJECTION: HCPCS | Performed by: INTERNAL MEDICINE

## 2024-11-01 PROCEDURE — 93286 PERI-PX EVAL PM/LDLS PM IP: CPT

## 2024-11-01 PROCEDURE — 74183 MRI ABD W/O CNTR FLWD CNTR: CPT | Mod: 26 | Performed by: RADIOLOGY

## 2024-11-01 PROCEDURE — 74183 MRI ABD W/O CNTR FLWD CNTR: CPT

## 2024-11-01 PROCEDURE — 255N000002 HC RX 255 OP 636: Performed by: INTERNAL MEDICINE

## 2024-11-01 PROCEDURE — 93286 PERI-PX EVAL PM/LDLS PM IP: CPT | Mod: 26 | Performed by: INTERNAL MEDICINE

## 2024-11-01 RX ORDER — GADOBUTROL 604.72 MG/ML
7.5 INJECTION INTRAVENOUS ONCE
Status: COMPLETED | OUTPATIENT
Start: 2024-11-01 | End: 2024-11-01

## 2024-11-01 RX ADMIN — GADOBUTROL 6 ML: 604.72 INJECTION INTRAVENOUS at 15:01

## 2024-11-01 NOTE — PROGRESS NOTES
Received Completed forms Yes   Faxed Forms Faxed To: [facility] [employer]  Fax Number: [phone]   Sent to HIM (Date) 11/1/24

## 2024-11-01 NOTE — TELEPHONE ENCOUNTER
" Health Call Center    Phone Message    May a detailed message be left on voicemail: yes     Reason for Call: Other: Rep calling as he just recv'd the fax but is now calling to get the latest office notes and it needs to be adended with the following info; dx code M17.12 also stating \"knee brace order to reduce pain of arthritis by restricting movement\". Please make changes and fax to [phone]     Action Taken: Other: csc    Travel Screening: Not Applicable     Date of Service:                                                                     "

## 2024-11-07 ENCOUNTER — THERAPY VISIT (OUTPATIENT)
Dept: PHYSICAL THERAPY | Facility: CLINIC | Age: 89
End: 2024-11-07
Attending: PHYSICIAN ASSISTANT
Payer: COMMERCIAL

## 2024-11-07 DIAGNOSIS — M17.12 PRIMARY OSTEOARTHRITIS OF LEFT KNEE: ICD-10-CM

## 2024-11-07 DIAGNOSIS — M25.562 ACUTE PAIN OF LEFT KNEE: Primary | ICD-10-CM

## 2024-11-07 PROCEDURE — 97161 PT EVAL LOW COMPLEX 20 MIN: CPT | Mod: GP

## 2024-11-07 PROCEDURE — 97110 THERAPEUTIC EXERCISES: CPT | Mod: GP

## 2024-11-07 NOTE — PROGRESS NOTES
PHYSICAL THERAPY EVALUATION  Type of Visit: Evaluation       Fall Risk Screen:  Fall screen completed by: PT  Have you fallen 2 or more times in the past year?: No  Have you fallen and had an injury in the past year?: No  Timed Up and Go score (seconds): 13.11  Is patient a fall risk?: No; Department fall risk interventions implemented  Fall screen comments: Pt able to safely ambulate with AD, able to ambulate on stairs with UE support    Pt reports L knee pain that she went to the ER for in September. It shot from the knee and below knee. Pt reports she had difficulty weightbearing on knee as a result. Pt reports she was stung by a bee a few weeks earlier on this leg, which she thinks may have contributed to the pain. She also fell a few weeks before the ER visit per her daughter and had an abrasion on the left knee.    Pt reports right knee is bothering her today and left knee is okay. She feels that she has lower extremity weakness and some deficits in her balance.      Pt reports she does exercises daily on [employer] including standing up and sitting down, SLR and hip abduction.     Employment:    N/a  Hobbies/Interests:  Activity level:   STEFANIA:   Chronic     Patient goals for therapy:  stand for choir (30 minutes) , stairs safely (2 flights)     Pain assessment:  Location: anterior knees   Quality: achy, weak  Worse with: standing for extended periods of time   Better with:   Pain at rest (0-10): 2  Pain at most painful (0-10): 2    History of falls: one in past year    Subjective      Condition type:  Chronic (continuous duration <3 months)  Cause of current episode:  Unspecified     Nature of treatment:  Rehabilitative  Functional ability:  Moderate functional limitations  Documented POC (choose all that apply):  Measurable short and long term/discharge treatment goals related to physical and functional deficits.;Frequency of treatment visits and treatment activities to address deficit areas.;Patient agrees to  program participation including home program  Briefly describe symptoms:  Pt reports general ache of B knees as well as balance deficits long term.  How did the symptoms start:  L knee pain started in September after fall  Average pain/intensity last 24 hours:  2/10  Average pain/intensity past week:  2/10  Frequency of Symptoms:  Constantly (% of the time)  Symptom impact on ADLs:  Moderately  Condition change since eval:  N/A (first visit)  General health reported by patient:  Good      Presenting condition or subjective complaint:    Date of onset: 11/07/24    Relevant medical history:     Dates & types of surgery:      Prior diagnostic imaging/testing results:         Impression:  1. No acute osseous abnormality.  2. Moderate medial compartment predominant degenerative changes of the knee.  3. Relative expansile lucency of the medial femoral condyle  peripherally, maybe degenerative but nonspecific. Consider non-urgent  MRI of knee for further assessment if clinically indicated.  Prior therapy history for the same diagnosis, illness or injury:        Living Environment  Social support:     Type of home:     Stairs to enter the home:         Ramp:     Stairs inside the home:         Help at home:    Equipment owned:       Employment:      Hobbies/Interests:      Patient goals for therapy:      Pain assessment: see above      Objective     PALPATION: NT    POSTURE:  thoracic kyphosis, excess hip flexion with gait     GAIT:   Weightbearing Status: WBAT  Assistive Device(s): Cane (single end), uses cane in community in R hand per daughter, no cane at home  Gait Deviations: Base of support increased  Resting ER and abduction of R LE    FUNCTIONAL MOBILITY:   Sit to Stand: able to complete with UE support  Stairs: Able to complete B with UE support, limited knee extension due to strength deficits with ascending stairs, R worse than L   Gait: Wide TRUDY    BALANCE: Standing Balance  (static):Fair    STRENGTH:    Pain: - none + mild ++ moderate +++ severe  Strength Scale: 0-5/5 Left Right   Hip Flexion 5 3+   Hip Extension (glute max)     Hip Abduction (glute med) 5 5   Hip Adduction 5 5   Hip Internal Rotation     Hip External Rotation     Knee Flexion 4- 5   Knee Extension 4- 3+       Assessment & Plan   CLINICAL IMPRESSIONS  Medical Diagnosis:      Treatment Diagnosis:     Impression/Assessment: Patient is a 89 year old female with complaints of B knee pain and balance deficits.  The following significant findings have been identified: Pain, Decreased joint mobility, Decreased strength, Impaired balance, Impaired gait, and Impaired muscle performance. These impairments interfere with their ability to perform self care tasks, household chores, household mobility, and community mobility as compared to previous level of function.     Clinical Decision Making (Complexity):  Clinical Presentation: Stable/Uncomplicated  Clinical Presentation Rationale: based on medical and personal factors listed in PT evaluation  Clinical Decision Making (Complexity): Low complexity    PLAN OF CARE  Treatment Interventions:  Interventions: Gait Training, Manual Therapy, Neuromuscular Re-education, Therapeutic Activity, Therapeutic Exercise    Long Term Goals     PT Goal 1  Goal Progress: progressing  PT Goal 2  Goal Progress: progressing      Frequency of Treatment: 1x a week  Duration of Treatment: 90 days    Education Assessment:   Learner/Method: Patient  Education Comments: Pt leilaabe to tx    Risks and benefits of evaluation/treatment have been explained.   Patient/Family/caregiver agrees with Plan of Care.     Evaluation Time:           Signing Clinician: Faiza Garay PT        [facility]                                                                                   OUTPATIENT PHYSICAL THERAPY      PLAN OF TREATMENT FOR OUTPATIENT REHABILITATION   Patient's Last Name,  First Name, Isadora Horne  MEMO YOB: 1931   Provider's Name   [facility]   Medical Record No.  [id number]     Onset Date: 11/07/24  Start of Care Date: 11/07/24     Medical Diagnosis:         PT Treatment Diagnosis:    Plan of Treatment  Frequency/Duration: 1x a week/ 90 days    Certification date from 11/07/24 to 02/04/25         See note for plan of treatment details and functional goals     Faiza Garay, PT                         I CERTIFY THE NEED FOR THESE SERVICES FURNISHED UNDER        THIS PLAN OF TREATMENT AND WHILE UNDER MY CARE     (Physician attestation of this document indicates review and certification of the therapy plan).              Referring Provider:  Suzy Talavera    Initial Assessment  See Epic Evaluation- Start of Care Date: 11/07/24

## 2024-11-15 LAB
MDC_IDC_LEAD_CONNECTION_STATUS: NORMAL
MDC_IDC_LEAD_CONNECTION_STATUS: NORMAL
MDC_IDC_LEAD_IMPLANT_DT: NORMAL
MDC_IDC_LEAD_IMPLANT_DT: NORMAL
MDC_IDC_LEAD_LOCATION: NORMAL
MDC_IDC_LEAD_LOCATION: NORMAL
MDC_IDC_LEAD_LOCATION_DETAIL_1: NORMAL
MDC_IDC_LEAD_LOCATION_DETAIL_1: NORMAL
MDC_IDC_LEAD_MFG: NORMAL
MDC_IDC_LEAD_MFG: NORMAL
MDC_IDC_LEAD_MODEL: NORMAL
MDC_IDC_LEAD_MODEL: NORMAL
MDC_IDC_LEAD_POLARITY_TYPE: NORMAL
MDC_IDC_LEAD_POLARITY_TYPE: NORMAL
MDC_IDC_LEAD_SERIAL: NORMAL
MDC_IDC_LEAD_SERIAL: NORMAL
MDC_IDC_LEAD_SPECIAL_FUNCTION: NORMAL
MDC_IDC_LEAD_SPECIAL_FUNCTION: NORMAL
MDC_IDC_MSMT_BATTERY_DTM: NORMAL
MDC_IDC_MSMT_BATTERY_REMAINING_LONGEVITY: 33 MO
MDC_IDC_MSMT_BATTERY_RRT_TRIGGER: 2.83
MDC_IDC_MSMT_BATTERY_STATUS: NORMAL
MDC_IDC_MSMT_BATTERY_VOLTAGE: 2.95 V
MDC_IDC_MSMT_LEADCHNL_RA_IMPEDANCE_VALUE: 304 OHM
MDC_IDC_MSMT_LEADCHNL_RA_IMPEDANCE_VALUE: 399 OHM
MDC_IDC_MSMT_LEADCHNL_RA_PACING_THRESHOLD_AMPLITUDE: 0.75 V
MDC_IDC_MSMT_LEADCHNL_RA_PACING_THRESHOLD_PULSEWIDTH: 0.4 MS
MDC_IDC_MSMT_LEADCHNL_RA_SENSING_INTR_AMPL: 2 MV
MDC_IDC_MSMT_LEADCHNL_RV_IMPEDANCE_VALUE: 437 OHM
MDC_IDC_MSMT_LEADCHNL_RV_IMPEDANCE_VALUE: 456 OHM
MDC_IDC_MSMT_LEADCHNL_RV_PACING_THRESHOLD_AMPLITUDE: 0.5 V
MDC_IDC_MSMT_LEADCHNL_RV_PACING_THRESHOLD_PULSEWIDTH: 0.4 MS
MDC_IDC_MSMT_LEADCHNL_RV_SENSING_INTR_AMPL: 10.9 MV
MDC_IDC_PG_IMPLANT_DTM: NORMAL
MDC_IDC_PG_MFG: NORMAL
MDC_IDC_PG_MODEL: NORMAL
MDC_IDC_PG_SERIAL: NORMAL
MDC_IDC_PG_TYPE: NORMAL
MDC_IDC_SESS_CLINIC_NAME: NORMAL
MDC_IDC_SESS_DTM: NORMAL
MDC_IDC_SESS_TYPE: NORMAL
MDC_IDC_SET_BRADY_AT_MODE_SWITCH_RATE: 171 {BEATS}/MIN
MDC_IDC_SET_BRADY_HYSTRATE: NORMAL
MDC_IDC_SET_BRADY_LOWRATE: 60 {BEATS}/MIN
MDC_IDC_SET_BRADY_MAX_SENSOR_RATE: 130 {BEATS}/MIN
MDC_IDC_SET_BRADY_MAX_TRACKING_RATE: 130 {BEATS}/MIN
MDC_IDC_SET_BRADY_MODE: NORMAL
MDC_IDC_SET_BRADY_PAV_DELAY_LOW: 180 MS
MDC_IDC_SET_BRADY_SAV_DELAY_LOW: 150 MS
MDC_IDC_SET_LEADCHNL_RA_PACING_AMPLITUDE: 1.5 V
MDC_IDC_SET_LEADCHNL_RA_PACING_ANODE_ELECTRODE_1: NORMAL
MDC_IDC_SET_LEADCHNL_RA_PACING_ANODE_LOCATION_1: NORMAL
MDC_IDC_SET_LEADCHNL_RA_PACING_CAPTURE_MODE: NORMAL
MDC_IDC_SET_LEADCHNL_RA_PACING_CATHODE_ELECTRODE_1: NORMAL
MDC_IDC_SET_LEADCHNL_RA_PACING_CATHODE_LOCATION_1: NORMAL
MDC_IDC_SET_LEADCHNL_RA_PACING_POLARITY: NORMAL
MDC_IDC_SET_LEADCHNL_RA_PACING_PULSEWIDTH: 0.4 MS
MDC_IDC_SET_LEADCHNL_RA_SENSING_ANODE_ELECTRODE_1: NORMAL
MDC_IDC_SET_LEADCHNL_RA_SENSING_ANODE_LOCATION_1: NORMAL
MDC_IDC_SET_LEADCHNL_RA_SENSING_CATHODE_ELECTRODE_1: NORMAL
MDC_IDC_SET_LEADCHNL_RA_SENSING_CATHODE_LOCATION_1: NORMAL
MDC_IDC_SET_LEADCHNL_RA_SENSING_POLARITY: NORMAL
MDC_IDC_SET_LEADCHNL_RA_SENSING_SENSITIVITY: 0.3 MV
MDC_IDC_SET_LEADCHNL_RV_PACING_AMPLITUDE: 2 V
MDC_IDC_SET_LEADCHNL_RV_PACING_ANODE_ELECTRODE_1: NORMAL
MDC_IDC_SET_LEADCHNL_RV_PACING_ANODE_LOCATION_1: NORMAL
MDC_IDC_SET_LEADCHNL_RV_PACING_CAPTURE_MODE: NORMAL
MDC_IDC_SET_LEADCHNL_RV_PACING_CATHODE_ELECTRODE_1: NORMAL
MDC_IDC_SET_LEADCHNL_RV_PACING_CATHODE_LOCATION_1: NORMAL
MDC_IDC_SET_LEADCHNL_RV_PACING_POLARITY: NORMAL
MDC_IDC_SET_LEADCHNL_RV_PACING_PULSEWIDTH: 0.4 MS
MDC_IDC_SET_LEADCHNL_RV_SENSING_ANODE_ELECTRODE_1: NORMAL
MDC_IDC_SET_LEADCHNL_RV_SENSING_ANODE_LOCATION_1: NORMAL
MDC_IDC_SET_LEADCHNL_RV_SENSING_CATHODE_ELECTRODE_1: NORMAL
MDC_IDC_SET_LEADCHNL_RV_SENSING_CATHODE_LOCATION_1: NORMAL
MDC_IDC_SET_LEADCHNL_RV_SENSING_POLARITY: NORMAL
MDC_IDC_SET_LEADCHNL_RV_SENSING_SENSITIVITY: 0.9 MV
MDC_IDC_SET_ZONE_DETECTION_INTERVAL: 350 MS
MDC_IDC_SET_ZONE_DETECTION_INTERVAL: 400 MS
MDC_IDC_SET_ZONE_STATUS: NORMAL
MDC_IDC_SET_ZONE_STATUS: NORMAL
MDC_IDC_SET_ZONE_TYPE: NORMAL
MDC_IDC_SET_ZONE_VENDOR_TYPE: NORMAL
MDC_IDC_STAT_AT_BURDEN_PERCENT: 0.3 %
MDC_IDC_STAT_AT_DTM_END: NORMAL
MDC_IDC_STAT_AT_DTM_START: NORMAL
MDC_IDC_STAT_BRADY_AP_VP_PERCENT: 0.11 %
MDC_IDC_STAT_BRADY_AP_VS_PERCENT: 99.28 %
MDC_IDC_STAT_BRADY_AS_VP_PERCENT: 0.05 %
MDC_IDC_STAT_BRADY_AS_VS_PERCENT: 0.56 %
MDC_IDC_STAT_BRADY_DTM_END: NORMAL
MDC_IDC_STAT_BRADY_DTM_START: NORMAL
MDC_IDC_STAT_BRADY_RA_PERCENT_PACED: 99.15 %
MDC_IDC_STAT_BRADY_RV_PERCENT_PACED: 0.15 %
MDC_IDC_STAT_EPISODE_RECENT_COUNT: 0
MDC_IDC_STAT_EPISODE_RECENT_COUNT: 0
MDC_IDC_STAT_EPISODE_RECENT_COUNT: 2
MDC_IDC_STAT_EPISODE_RECENT_COUNT: 99
MDC_IDC_STAT_EPISODE_RECENT_COUNT_DTM_END: NORMAL
MDC_IDC_STAT_EPISODE_RECENT_COUNT_DTM_START: NORMAL
MDC_IDC_STAT_EPISODE_TOTAL_COUNT: 0
MDC_IDC_STAT_EPISODE_TOTAL_COUNT: 1
MDC_IDC_STAT_EPISODE_TOTAL_COUNT: 232
MDC_IDC_STAT_EPISODE_TOTAL_COUNT: 477
MDC_IDC_STAT_EPISODE_TOTAL_COUNT_DTM_END: NORMAL
MDC_IDC_STAT_EPISODE_TOTAL_COUNT_DTM_START: NORMAL
MDC_IDC_STAT_EPISODE_TYPE: NORMAL
MDC_IDC_STAT_TACHYTHERAPY_RECENT_DTM_END: NORMAL
MDC_IDC_STAT_TACHYTHERAPY_RECENT_DTM_START: NORMAL
MDC_IDC_STAT_TACHYTHERAPY_TOTAL_DTM_END: NORMAL
MDC_IDC_STAT_TACHYTHERAPY_TOTAL_DTM_START: NORMAL

## 2024-11-16 DIAGNOSIS — E05.00 GRAVES DISEASE: ICD-10-CM

## 2024-11-19 RX ORDER — METHIMAZOLE 5 MG/1
5 TABLET ORAL DAILY
Qty: 30 TABLET | Refills: 4 | Status: SHIPPED | OUTPATIENT
Start: 2024-11-19

## 2024-11-27 DIAGNOSIS — I48.0 PAROXYSMAL A-FIB (H): ICD-10-CM

## 2024-12-03 RX ORDER — METOPROLOL SUCCINATE 50 MG/1
TABLET, EXTENDED RELEASE ORAL
Qty: 270 TABLET | Refills: 1 | Status: SHIPPED | OUTPATIENT
Start: 2024-12-03

## 2024-12-03 NOTE — TELEPHONE ENCOUNTER
metoprolol succinate ER (TOPROL XL) 50 MG 24 hr tablet 270 tablet 3 9/22/2023       Last Office Visit: 7/12/24  Future Office visit:  1/17/25      Beta-Blockers Protocol [id number]/27/2024 12:41 PM   Protocol Details Most recent blood pressure under 140/90 in past 12 months     BP Readings from Last 3 Encounters:   09/27/24 (!) 147/66   07/12/24 102/65   03/15/24 103/65     BP REVIEWED ON 9/27/24-ADMIT FOR KNEE STRAIN    Elda Tang RN  P Central Nursing/Red Flag Triage & Med Refill Team

## 2024-12-05 ENCOUNTER — ANCILLARY PROCEDURE (OUTPATIENT)
Dept: CARDIOLOGY | Facility: CLINIC | Age: 89
End: 2024-12-05
Attending: INTERNAL MEDICINE
Payer: COMMERCIAL

## 2024-12-05 ENCOUNTER — LAB (OUTPATIENT)
Dept: LAB | Facility: CLINIC | Age: 89
End: 2024-12-05
Payer: COMMERCIAL

## 2024-12-05 DIAGNOSIS — I49.5 SICK SINUS SYNDROME (H): ICD-10-CM

## 2024-12-05 DIAGNOSIS — E05.00 GRAVES DISEASE: ICD-10-CM

## 2024-12-05 LAB
MDC_IDC_EPISODE_DTM: NORMAL
MDC_IDC_EPISODE_DURATION: 1175 S
MDC_IDC_EPISODE_DURATION: 14 S
MDC_IDC_EPISODE_DURATION: 296 S
MDC_IDC_EPISODE_DURATION: 429 S
MDC_IDC_EPISODE_DURATION: 620 S
MDC_IDC_EPISODE_DURATION: 86 S
MDC_IDC_EPISODE_DURATION: 900 S
MDC_IDC_EPISODE_ID: 715
MDC_IDC_EPISODE_ID: 716
MDC_IDC_EPISODE_ID: 717
MDC_IDC_EPISODE_ID: 718
MDC_IDC_EPISODE_ID: 719
MDC_IDC_EPISODE_ID: 720
MDC_IDC_EPISODE_ID: 721
MDC_IDC_EPISODE_TYPE: NORMAL
MDC_IDC_EPISODE_TYPE_INDUCED: NO
MDC_IDC_LEAD_CONNECTION_STATUS: NORMAL
MDC_IDC_LEAD_CONNECTION_STATUS: NORMAL
MDC_IDC_LEAD_IMPLANT_DT: NORMAL
MDC_IDC_LEAD_IMPLANT_DT: NORMAL
MDC_IDC_LEAD_LOCATION: NORMAL
MDC_IDC_LEAD_LOCATION: NORMAL
MDC_IDC_LEAD_LOCATION_DETAIL_1: NORMAL
MDC_IDC_LEAD_LOCATION_DETAIL_1: NORMAL
MDC_IDC_LEAD_MFG: NORMAL
MDC_IDC_LEAD_MFG: NORMAL
MDC_IDC_LEAD_MODEL: NORMAL
MDC_IDC_LEAD_MODEL: NORMAL
MDC_IDC_LEAD_POLARITY_TYPE: NORMAL
MDC_IDC_LEAD_POLARITY_TYPE: NORMAL
MDC_IDC_LEAD_SERIAL: NORMAL
MDC_IDC_LEAD_SERIAL: NORMAL
MDC_IDC_LEAD_SPECIAL_FUNCTION: NORMAL
MDC_IDC_LEAD_SPECIAL_FUNCTION: NORMAL
MDC_IDC_MSMT_BATTERY_DTM: NORMAL
MDC_IDC_MSMT_BATTERY_REMAINING_LONGEVITY: 35 MO
MDC_IDC_MSMT_BATTERY_RRT_TRIGGER: 2.83
MDC_IDC_MSMT_BATTERY_STATUS: NORMAL
MDC_IDC_MSMT_BATTERY_VOLTAGE: 2.95 V
MDC_IDC_MSMT_LEADCHNL_RA_IMPEDANCE_VALUE: 304 OHM
MDC_IDC_MSMT_LEADCHNL_RA_IMPEDANCE_VALUE: 437 OHM
MDC_IDC_MSMT_LEADCHNL_RA_PACING_THRESHOLD_AMPLITUDE: 0.75 V
MDC_IDC_MSMT_LEADCHNL_RA_PACING_THRESHOLD_PULSEWIDTH: 0.4 MS
MDC_IDC_MSMT_LEADCHNL_RV_IMPEDANCE_VALUE: 456 OHM
MDC_IDC_MSMT_LEADCHNL_RV_IMPEDANCE_VALUE: 494 OHM
MDC_IDC_MSMT_LEADCHNL_RV_PACING_THRESHOLD_AMPLITUDE: 0.75 V
MDC_IDC_MSMT_LEADCHNL_RV_PACING_THRESHOLD_PULSEWIDTH: 0.4 MS
MDC_IDC_PG_IMPLANT_DTM: NORMAL
MDC_IDC_PG_MFG: NORMAL
MDC_IDC_PG_MODEL: NORMAL
MDC_IDC_PG_SERIAL: NORMAL
MDC_IDC_PG_TYPE: NORMAL
MDC_IDC_SESS_CLINIC_NAME: NORMAL
MDC_IDC_SESS_DTM: NORMAL
MDC_IDC_SESS_TYPE: NORMAL
MDC_IDC_SET_BRADY_AT_MODE_SWITCH_RATE: 171 {BEATS}/MIN
MDC_IDC_SET_BRADY_HYSTRATE: NORMAL
MDC_IDC_SET_BRADY_LOWRATE: 60 {BEATS}/MIN
MDC_IDC_SET_BRADY_MAX_SENSOR_RATE: 130 {BEATS}/MIN
MDC_IDC_SET_BRADY_MAX_TRACKING_RATE: 130 {BEATS}/MIN
MDC_IDC_SET_BRADY_MODE: NORMAL
MDC_IDC_SET_BRADY_PAV_DELAY_LOW: 180 MS
MDC_IDC_SET_BRADY_SAV_DELAY_LOW: 150 MS
MDC_IDC_SET_LEADCHNL_RA_PACING_AMPLITUDE: 1.5 V
MDC_IDC_SET_LEADCHNL_RA_PACING_ANODE_ELECTRODE_1: NORMAL
MDC_IDC_SET_LEADCHNL_RA_PACING_ANODE_LOCATION_1: NORMAL
MDC_IDC_SET_LEADCHNL_RA_PACING_CAPTURE_MODE: NORMAL
MDC_IDC_SET_LEADCHNL_RA_PACING_CATHODE_ELECTRODE_1: NORMAL
MDC_IDC_SET_LEADCHNL_RA_PACING_CATHODE_LOCATION_1: NORMAL
MDC_IDC_SET_LEADCHNL_RA_PACING_POLARITY: NORMAL
MDC_IDC_SET_LEADCHNL_RA_PACING_PULSEWIDTH: 0.4 MS
MDC_IDC_SET_LEADCHNL_RA_SENSING_ANODE_ELECTRODE_1: NORMAL
MDC_IDC_SET_LEADCHNL_RA_SENSING_ANODE_LOCATION_1: NORMAL
MDC_IDC_SET_LEADCHNL_RA_SENSING_CATHODE_ELECTRODE_1: NORMAL
MDC_IDC_SET_LEADCHNL_RA_SENSING_CATHODE_LOCATION_1: NORMAL
MDC_IDC_SET_LEADCHNL_RA_SENSING_POLARITY: NORMAL
MDC_IDC_SET_LEADCHNL_RA_SENSING_SENSITIVITY: 0.3 MV
MDC_IDC_SET_LEADCHNL_RV_PACING_AMPLITUDE: 2 V
MDC_IDC_SET_LEADCHNL_RV_PACING_ANODE_ELECTRODE_1: NORMAL
MDC_IDC_SET_LEADCHNL_RV_PACING_ANODE_LOCATION_1: NORMAL
MDC_IDC_SET_LEADCHNL_RV_PACING_CAPTURE_MODE: NORMAL
MDC_IDC_SET_LEADCHNL_RV_PACING_CATHODE_ELECTRODE_1: NORMAL
MDC_IDC_SET_LEADCHNL_RV_PACING_CATHODE_LOCATION_1: NORMAL
MDC_IDC_SET_LEADCHNL_RV_PACING_POLARITY: NORMAL
MDC_IDC_SET_LEADCHNL_RV_PACING_PULSEWIDTH: 0.4 MS
MDC_IDC_SET_LEADCHNL_RV_SENSING_ANODE_ELECTRODE_1: NORMAL
MDC_IDC_SET_LEADCHNL_RV_SENSING_ANODE_LOCATION_1: NORMAL
MDC_IDC_SET_LEADCHNL_RV_SENSING_CATHODE_ELECTRODE_1: NORMAL
MDC_IDC_SET_LEADCHNL_RV_SENSING_CATHODE_LOCATION_1: NORMAL
MDC_IDC_SET_LEADCHNL_RV_SENSING_POLARITY: NORMAL
MDC_IDC_SET_LEADCHNL_RV_SENSING_SENSITIVITY: 0.9 MV
MDC_IDC_SET_ZONE_DETECTION_INTERVAL: 350 MS
MDC_IDC_SET_ZONE_DETECTION_INTERVAL: 400 MS
MDC_IDC_SET_ZONE_STATUS: NORMAL
MDC_IDC_SET_ZONE_STATUS: NORMAL
MDC_IDC_SET_ZONE_TYPE: NORMAL
MDC_IDC_SET_ZONE_VENDOR_TYPE: NORMAL
MDC_IDC_STAT_AT_BURDEN_PERCENT: 0.1 %
MDC_IDC_STAT_AT_DTM_END: NORMAL
MDC_IDC_STAT_AT_DTM_START: NORMAL
MDC_IDC_STAT_BRADY_AP_VP_PERCENT: 0.09 %
MDC_IDC_STAT_BRADY_AP_VS_PERCENT: 98.91 %
MDC_IDC_STAT_BRADY_AS_VP_PERCENT: 0.02 %
MDC_IDC_STAT_BRADY_AS_VS_PERCENT: 0.98 %
MDC_IDC_STAT_BRADY_DTM_END: NORMAL
MDC_IDC_STAT_BRADY_DTM_START: NORMAL
MDC_IDC_STAT_BRADY_RA_PERCENT_PACED: 98.79 %
MDC_IDC_STAT_BRADY_RV_PERCENT_PACED: 0.1 %
MDC_IDC_STAT_EPISODE_RECENT_COUNT: 0
MDC_IDC_STAT_EPISODE_RECENT_COUNT: 7
MDC_IDC_STAT_EPISODE_RECENT_COUNT_DTM_END: NORMAL
MDC_IDC_STAT_EPISODE_RECENT_COUNT_DTM_START: NORMAL
MDC_IDC_STAT_EPISODE_TOTAL_COUNT: 0
MDC_IDC_STAT_EPISODE_TOTAL_COUNT: 1
MDC_IDC_STAT_EPISODE_TOTAL_COUNT: 232
MDC_IDC_STAT_EPISODE_TOTAL_COUNT: 484
MDC_IDC_STAT_EPISODE_TOTAL_COUNT_DTM_END: NORMAL
MDC_IDC_STAT_EPISODE_TOTAL_COUNT_DTM_START: NORMAL
MDC_IDC_STAT_EPISODE_TYPE: NORMAL
MDC_IDC_STAT_TACHYTHERAPY_RECENT_DTM_END: NORMAL
MDC_IDC_STAT_TACHYTHERAPY_RECENT_DTM_START: NORMAL
MDC_IDC_STAT_TACHYTHERAPY_TOTAL_DTM_END: NORMAL
MDC_IDC_STAT_TACHYTHERAPY_TOTAL_DTM_START: NORMAL
T4 FREE SERPL-MCNC: 1.13 NG/DL (ref 0.9–1.7)
TSH SERPL DL<=0.005 MIU/L-ACNC: 5.19 UIU/ML (ref 0.3–4.2)

## 2024-12-05 PROCEDURE — 84443 ASSAY THYROID STIM HORMONE: CPT | Performed by: PATHOLOGY

## 2024-12-05 PROCEDURE — 93280 PM DEVICE PROGR EVAL DUAL: CPT | Performed by: INTERNAL MEDICINE

## 2024-12-05 PROCEDURE — 36415 COLL VENOUS BLD VENIPUNCTURE: CPT | Performed by: PATHOLOGY

## 2024-12-05 PROCEDURE — 84439 ASSAY OF FREE THYROXINE: CPT | Performed by: PATHOLOGY

## 2024-12-18 LAB
MDC_IDC_EPISODE_DTM: NORMAL
MDC_IDC_EPISODE_DURATION: 1175 S
MDC_IDC_EPISODE_DURATION: 14 S
MDC_IDC_EPISODE_DURATION: 296 S
MDC_IDC_EPISODE_DURATION: 429 S
MDC_IDC_EPISODE_DURATION: 620 S
MDC_IDC_EPISODE_DURATION: 86 S
MDC_IDC_EPISODE_DURATION: 900 S
MDC_IDC_EPISODE_ID: 715
MDC_IDC_EPISODE_ID: 716
MDC_IDC_EPISODE_ID: 717
MDC_IDC_EPISODE_ID: 718
MDC_IDC_EPISODE_ID: 719
MDC_IDC_EPISODE_ID: 720
MDC_IDC_EPISODE_ID: 721
MDC_IDC_EPISODE_TYPE: NORMAL
MDC_IDC_EPISODE_TYPE_INDUCED: NO
MDC_IDC_LEAD_CONNECTION_STATUS: NORMAL
MDC_IDC_LEAD_CONNECTION_STATUS: NORMAL
MDC_IDC_LEAD_IMPLANT_DT: NORMAL
MDC_IDC_LEAD_IMPLANT_DT: NORMAL
MDC_IDC_LEAD_LOCATION: NORMAL
MDC_IDC_LEAD_LOCATION: NORMAL
MDC_IDC_LEAD_LOCATION_DETAIL_1: NORMAL
MDC_IDC_LEAD_LOCATION_DETAIL_1: NORMAL
MDC_IDC_LEAD_MFG: NORMAL
MDC_IDC_LEAD_MFG: NORMAL
MDC_IDC_LEAD_MODEL: NORMAL
MDC_IDC_LEAD_MODEL: NORMAL
MDC_IDC_LEAD_POLARITY_TYPE: NORMAL
MDC_IDC_LEAD_POLARITY_TYPE: NORMAL
MDC_IDC_LEAD_SERIAL: NORMAL
MDC_IDC_LEAD_SERIAL: NORMAL
MDC_IDC_LEAD_SPECIAL_FUNCTION: NORMAL
MDC_IDC_LEAD_SPECIAL_FUNCTION: NORMAL
MDC_IDC_MSMT_BATTERY_DTM: NORMAL
MDC_IDC_MSMT_BATTERY_REMAINING_LONGEVITY: 35 MO
MDC_IDC_MSMT_BATTERY_RRT_TRIGGER: 2.83
MDC_IDC_MSMT_BATTERY_STATUS: NORMAL
MDC_IDC_MSMT_BATTERY_VOLTAGE: 2.95 V
MDC_IDC_MSMT_LEADCHNL_RA_IMPEDANCE_VALUE: 304 OHM
MDC_IDC_MSMT_LEADCHNL_RA_IMPEDANCE_VALUE: 437 OHM
MDC_IDC_MSMT_LEADCHNL_RA_PACING_THRESHOLD_AMPLITUDE: 0.75 V
MDC_IDC_MSMT_LEADCHNL_RA_PACING_THRESHOLD_PULSEWIDTH: 0.4 MS
MDC_IDC_MSMT_LEADCHNL_RV_IMPEDANCE_VALUE: 456 OHM
MDC_IDC_MSMT_LEADCHNL_RV_IMPEDANCE_VALUE: 494 OHM
MDC_IDC_MSMT_LEADCHNL_RV_PACING_THRESHOLD_AMPLITUDE: 0.75 V
MDC_IDC_MSMT_LEADCHNL_RV_PACING_THRESHOLD_PULSEWIDTH: 0.4 MS
MDC_IDC_PG_IMPLANT_DTM: NORMAL
MDC_IDC_PG_MFG: NORMAL
MDC_IDC_PG_MODEL: NORMAL
MDC_IDC_PG_SERIAL: NORMAL
MDC_IDC_PG_TYPE: NORMAL
MDC_IDC_SESS_CLINIC_NAME: NORMAL
MDC_IDC_SESS_DTM: NORMAL
MDC_IDC_SESS_TYPE: NORMAL
MDC_IDC_SET_BRADY_AT_MODE_SWITCH_RATE: 171 {BEATS}/MIN
MDC_IDC_SET_BRADY_HYSTRATE: NORMAL
MDC_IDC_SET_BRADY_LOWRATE: 60 {BEATS}/MIN
MDC_IDC_SET_BRADY_MAX_SENSOR_RATE: 130 {BEATS}/MIN
MDC_IDC_SET_BRADY_MAX_TRACKING_RATE: 130 {BEATS}/MIN
MDC_IDC_SET_BRADY_MODE: NORMAL
MDC_IDC_SET_BRADY_PAV_DELAY_LOW: 180 MS
MDC_IDC_SET_BRADY_SAV_DELAY_LOW: 150 MS
MDC_IDC_SET_LEADCHNL_RA_PACING_AMPLITUDE: 1.5 V
MDC_IDC_SET_LEADCHNL_RA_PACING_ANODE_ELECTRODE_1: NORMAL
MDC_IDC_SET_LEADCHNL_RA_PACING_ANODE_LOCATION_1: NORMAL
MDC_IDC_SET_LEADCHNL_RA_PACING_CAPTURE_MODE: NORMAL
MDC_IDC_SET_LEADCHNL_RA_PACING_CATHODE_ELECTRODE_1: NORMAL
MDC_IDC_SET_LEADCHNL_RA_PACING_CATHODE_LOCATION_1: NORMAL
MDC_IDC_SET_LEADCHNL_RA_PACING_POLARITY: NORMAL
MDC_IDC_SET_LEADCHNL_RA_PACING_PULSEWIDTH: 0.4 MS
MDC_IDC_SET_LEADCHNL_RA_SENSING_ANODE_ELECTRODE_1: NORMAL
MDC_IDC_SET_LEADCHNL_RA_SENSING_ANODE_LOCATION_1: NORMAL
MDC_IDC_SET_LEADCHNL_RA_SENSING_CATHODE_ELECTRODE_1: NORMAL
MDC_IDC_SET_LEADCHNL_RA_SENSING_CATHODE_LOCATION_1: NORMAL
MDC_IDC_SET_LEADCHNL_RA_SENSING_POLARITY: NORMAL
MDC_IDC_SET_LEADCHNL_RA_SENSING_SENSITIVITY: 0.3 MV
MDC_IDC_SET_LEADCHNL_RV_PACING_AMPLITUDE: 2 V
MDC_IDC_SET_LEADCHNL_RV_PACING_ANODE_ELECTRODE_1: NORMAL
MDC_IDC_SET_LEADCHNL_RV_PACING_ANODE_LOCATION_1: NORMAL
MDC_IDC_SET_LEADCHNL_RV_PACING_CAPTURE_MODE: NORMAL
MDC_IDC_SET_LEADCHNL_RV_PACING_CATHODE_ELECTRODE_1: NORMAL
MDC_IDC_SET_LEADCHNL_RV_PACING_CATHODE_LOCATION_1: NORMAL
MDC_IDC_SET_LEADCHNL_RV_PACING_POLARITY: NORMAL
MDC_IDC_SET_LEADCHNL_RV_PACING_PULSEWIDTH: 0.4 MS
MDC_IDC_SET_LEADCHNL_RV_SENSING_ANODE_ELECTRODE_1: NORMAL
MDC_IDC_SET_LEADCHNL_RV_SENSING_ANODE_LOCATION_1: NORMAL
MDC_IDC_SET_LEADCHNL_RV_SENSING_CATHODE_ELECTRODE_1: NORMAL
MDC_IDC_SET_LEADCHNL_RV_SENSING_CATHODE_LOCATION_1: NORMAL
MDC_IDC_SET_LEADCHNL_RV_SENSING_POLARITY: NORMAL
MDC_IDC_SET_LEADCHNL_RV_SENSING_SENSITIVITY: 0.9 MV
MDC_IDC_SET_ZONE_DETECTION_INTERVAL: 350 MS
MDC_IDC_SET_ZONE_DETECTION_INTERVAL: 400 MS
MDC_IDC_SET_ZONE_STATUS: NORMAL
MDC_IDC_SET_ZONE_STATUS: NORMAL
MDC_IDC_SET_ZONE_TYPE: NORMAL
MDC_IDC_SET_ZONE_VENDOR_TYPE: NORMAL
MDC_IDC_STAT_AT_BURDEN_PERCENT: 0.1 %
MDC_IDC_STAT_AT_DTM_END: NORMAL
MDC_IDC_STAT_AT_DTM_START: NORMAL
MDC_IDC_STAT_BRADY_AP_VP_PERCENT: 0.09 %
MDC_IDC_STAT_BRADY_AP_VS_PERCENT: 98.91 %
MDC_IDC_STAT_BRADY_AS_VP_PERCENT: 0.02 %
MDC_IDC_STAT_BRADY_AS_VS_PERCENT: 0.98 %
MDC_IDC_STAT_BRADY_DTM_END: NORMAL
MDC_IDC_STAT_BRADY_DTM_START: NORMAL
MDC_IDC_STAT_BRADY_RA_PERCENT_PACED: 98.79 %
MDC_IDC_STAT_BRADY_RV_PERCENT_PACED: 0.1 %
MDC_IDC_STAT_EPISODE_RECENT_COUNT: 0
MDC_IDC_STAT_EPISODE_RECENT_COUNT: 7
MDC_IDC_STAT_EPISODE_RECENT_COUNT_DTM_END: NORMAL
MDC_IDC_STAT_EPISODE_RECENT_COUNT_DTM_START: NORMAL
MDC_IDC_STAT_EPISODE_TOTAL_COUNT: 0
MDC_IDC_STAT_EPISODE_TOTAL_COUNT: 1
MDC_IDC_STAT_EPISODE_TOTAL_COUNT: 232
MDC_IDC_STAT_EPISODE_TOTAL_COUNT: 484
MDC_IDC_STAT_EPISODE_TOTAL_COUNT_DTM_END: NORMAL
MDC_IDC_STAT_EPISODE_TOTAL_COUNT_DTM_START: NORMAL
MDC_IDC_STAT_EPISODE_TYPE: NORMAL
MDC_IDC_STAT_TACHYTHERAPY_RECENT_DTM_END: NORMAL
MDC_IDC_STAT_TACHYTHERAPY_RECENT_DTM_START: NORMAL
MDC_IDC_STAT_TACHYTHERAPY_TOTAL_DTM_END: NORMAL
MDC_IDC_STAT_TACHYTHERAPY_TOTAL_DTM_START: NORMAL

## 2025-01-14 ENCOUNTER — TRANSFERRED RECORDS (OUTPATIENT)
Dept: HEALTH INFORMATION MANAGEMENT | Facility: CLINIC | Age: OVER 89
End: 2025-01-14

## 2025-01-17 ENCOUNTER — OFFICE VISIT (OUTPATIENT)
Dept: INTERNAL MEDICINE | Facility: CLINIC | Age: OVER 89
End: 2025-01-17
Payer: COMMERCIAL

## 2025-01-17 VITALS
SYSTOLIC BLOOD PRESSURE: 125 MMHG | OXYGEN SATURATION: 97 % | DIASTOLIC BLOOD PRESSURE: 81 MMHG | WEIGHT: 127.6 LBS | HEART RATE: 73 BPM | BODY MASS INDEX: 23.04 KG/M2

## 2025-01-17 DIAGNOSIS — N18.31 CHRONIC KIDNEY DISEASE, STAGE 3A (H): ICD-10-CM

## 2025-01-17 DIAGNOSIS — M71.22 BAKER'S CYST OF KNEE, LEFT: ICD-10-CM

## 2025-01-17 DIAGNOSIS — R32 URINARY INCONTINENCE, UNSPECIFIED TYPE: ICD-10-CM

## 2025-01-17 DIAGNOSIS — M25.561 ACUTE PAIN OF RIGHT KNEE: ICD-10-CM

## 2025-01-17 DIAGNOSIS — G89.29 CHRONIC PAIN OF LEFT KNEE: ICD-10-CM

## 2025-01-17 DIAGNOSIS — M25.562 CHRONIC PAIN OF LEFT KNEE: ICD-10-CM

## 2025-01-17 DIAGNOSIS — R42 DYSEQUILIBRIUM: Primary | ICD-10-CM

## 2025-01-17 LAB
ALBUMIN UR-MCNC: NEGATIVE MG/DL
APPEARANCE UR: CLEAR
BILIRUB UR QL STRIP: NEGATIVE
COLOR UR AUTO: YELLOW
GLUCOSE UR STRIP-MCNC: NEGATIVE MG/DL
HGB UR QL STRIP: NEGATIVE
KETONES UR STRIP-MCNC: NEGATIVE MG/DL
LEUKOCYTE ESTERASE UR QL STRIP: ABNORMAL
NITRATE UR QL: NEGATIVE
PH UR STRIP: 5.5 [PH] (ref 5–7)
RBC URINE: <1 /HPF
SP GR UR STRIP: 1.02 (ref 1–1.03)
SQUAMOUS EPITHELIAL: 1 /HPF
UROBILINOGEN UR STRIP-MCNC: NORMAL MG/DL
WBC URINE: 3 /HPF

## 2025-01-17 PROCEDURE — 81001 URINALYSIS AUTO W/SCOPE: CPT | Performed by: PATHOLOGY

## 2025-01-17 NOTE — PROGRESS NOTES
Assessment & Plan     Dysequilibrium  Chronic pain of left knee  Baker's cyst of knee, left  Acute pain of right knee  - Explore home care physical therapy options due to transportation challenges.  - Consider transitioning to outpatient therapy after a few months  - [facility] order for walker    Urinary incontinence  - Will check UA for UTI given recent worsening of symptoms  - She is not interested in meds  - Will discuss pelvic floor exercises with therapist         I spent a total of 30 minutes on the day of the visit.  Time spent by me doing chart review, history and exam, documentation and further activities per the note    The longitudinal plan of care for the diagnosis(es)/condition(s) as documented were addressed during this visit. Due to the added complexity in care, I will continue to support Isadora in the subsequent management and with ongoing continuity of care.    Return in about 6 months (around 7/17/2025).    Subjective   Isadora Mendez is a 89 year old female that presents in clinic today for the following:     Chief Complaint   Patient presents with    Follow Up     R hand numbness, she broke it a while ago, but it's more sensitive now         1/17/2025    10:47 AM   Additional Questions   Roomed by Maicol   Accompanied by Daughter, Aida LUKE     History of Present Illness    Right Hand Numbness  - Experienced right hand numbness  - Previously told it might be carpal tunnel syndrome  - Numbness occurs when rolling over in bed or holding hands on the back of a chair for exercise  - All fingers feel numb, but currently described as stiffness rather than numbness  - Symptoms improve with certain arm movements    Knee Pain  - Left knee pain previously severe enough to require hospitalization  - Received physical therapy but did not complete it due to transportation issues  - Current complaint of right knee pain, possibly due to arthritis and cold weather  - Previous Baker's cyst in left knee resolved  without further issues    Incontinence  - Experiences incontinence triggered by the sound of running water  - Requires changing a couple of times a day  - No burning sensation reported       {SUPERLIST (Optional):432064}  {additonal problems for provider to add (Optional):211427}    {ROS Picklists (Optional):828404}      Objective    /81 (BP Location: Right arm, Patient Position: Sitting, Cuff Size: Adult Regular)   Pulse 73   Wt 57.9 kg (127 lb 9.6 oz)   SpO2 97%   BMI 23.04 kg/m    Body mass index is 23.04 kg/m .  Physical Exam   {Exam List (Optional):483487}    {Diagnostic Test Results (Optional):090284}        Signed Electronically by: Jovana Macias MD  {Email feedback regarding this note to [email or web address]   :[id number]}

## 2025-01-21 ENCOUNTER — PATIENT OUTREACH (OUTPATIENT)
Dept: CARE COORDINATION | Facility: CLINIC | Age: OVER 89
End: 2025-01-21
Payer: COMMERCIAL

## 2025-01-21 NOTE — PROGRESS NOTES
[facility]- able to accept, need office visit note signed ([phone] opt 2- RN returned call to inform that [facility] accepted first and would be taking)  [facility]  [facility]- decline due to insurance 1/21  Interim  [facility]- able to accept for Friday, visit note needs to be signed   [facility]- able to accept, will begin processing this     FYI sent to PCP- task assigned to [facility] to call patient and see if patient has any further care coordination needs, inform that [facility] has accepted for home care.     JAMEL LYLES RN on 1/21/2025 at 2:34 PM    RN called [facility] to confirm that they can accept- they stated that they called and confirmed with patient and will start care on 1/28. RN will call [facility] and let them know. RN will also graduate pt from care coordination due to patient knowing of home care agency. RN called [facility] at [phone] and let them know that we no longer need them for home care services.     JAMEL LYLES RN on 1/23/2025 at 10:50 AM

## 2025-01-21 NOTE — LETTER
PRIMARY CARE CARE COORDINATION   - CL[facility]ICS [facility] SUR[facility]ERY [facility]ENTER  Patient Centered Plan of Care  About Me:        Patient Name:  Isadora Mendez    YOB: 1931  Age:         89 year old   [facility] MRN:    [id number] Telephone Information:  Home Phone [phone]   Mobile [phone]       Address:  [address] [facility] [address] Email address:  [email or web address]      Emergency Contact(s)    Name Relationship Lgl Grd Work Phone Home Phone Mobile Phone   1. ABDOUL WALL Daughter    [phone]   2. FANNY GALLARDO* Daughter   [phone]   3. BELEM GREEN* Daughter    [phone]           Primary language:  English     needed? No   [facility] [employer]:  [phone] op. 1  Other communication barriers:No data recorded  Preferred Method of Communication:  Mail  Current living arrangement: No data recorded  Mobility Status/ Medical Equipment: No data recorded      Health Maintenance  Health Maintenance Reviewed: No data recorded    My Access Plan  Medical Emergency 911   Primary Clinic Line [phone] to speak with clinic staff or schedule appointment   24 Hour Nurse Line [phone] (toll-free)   Preferred Urgent Care [facility] [phone]             My Care Team Members  Patient Care Team         Relationship Specialty Notifications Start End    Jovana Macias MD PCP - General Internal Medicine  5/22/23     Phone: [phone] Pager: Use Vocera Fax: [phone]        [address] [facility] FLOOR 4 [facility] [address]    June Mckinley APRN CNP Nurse Practitioner Nurse Practitioner  8/6/15     Stacia Mendiola RN Registered Nurse Cardiology All results, Admissions 9/14/16     Mary Dixon MD MD Clinical Cardiac Electrophysiology  4/20/20     Phone: [phone] Fax: [phone]         [address] [place] [address]    Lorraine Marina APRN CNP Nurse Practitioner Cardiovascular Disease   4/20/21     Phone: [phone] Fax: [phone]         [address] [facility] [address]    Nicol Rosas RN Specialty Care Coordinator  Admissions 1/10/22     Jovana Macias MD Assigned PCP   9/10/22     Phone: [phone] Pager: Use Vocera Fax: [phone]        [address] [facility] FLO[address] 4 [facility] [address]    Balbina Manzanares NP Nurse Practitioner Cardiovascular Disease  10/19/22     Phone: [phone] Fax: [phone]         [address] [facility] [address] [facility] [address]    June Acuña MD Assigned Endocrinology Provider   6/3/23     Phone: [phone] Fax: [phone]         [address] [facility] [address] [facility] [address]    Mary Dixon MD Assigned Heart and Vascular Provider   10/14/23     Phone: [phone] Fax: [phone]         [address] [facility] [address]    Elizabeth Olvera AuD Audiologist Audiology  1/11/24     Phone: [phone] Fax: [phone]         [address] [facility] [address]    Gabbi Mart, APRN CNP Assigned Surgical Provider   12/23/24     Phone: [phone] Fax: [phone]         [address] [facility] [address]    Madhuri Galvan, RN Lead Care Coordinator Primary Care - [facility] Admissions 1/21/25               My Care Plans  Self Management and Treatment Plan  Care Plan  Care Plan: Help At Home       Problem: Insufficient In-home support       Goal: Establish adequate home support       Start Date: 1/21/2025    This Visit's Progress: On track    Priority: High    Note:     Barriers: patient needs help at home  Strengths: home care order placed by PCP  Patient expressed understanding of goal: yes  Action steps to achieve this goal:  1. I will await for call from home care agency to provide help at home.   2. I will alert my care team if I need any further help resources at home.                                    My Medical and Care Information  Problem List   Patient Active Problem List   Diagnosis    Hypertension     Hyperlipidemia LDL goal <100    Paroxysmal A-fib (H)    Cardiac pacemaker, [employer]tronic, Dual Chamber, NOT dependent    Chronotropic incompetence    Atrioventricular block, incomplete    Presbyopia    Myopia    Glaucoma suspect of right eye    Fuchs' corneal dystrophy    Chronic cholecystitis    Hyperthyroidism    Dyspnea on exertion    Pleural effusion on right    Elevated brain natriuretic peptide (BNP) level    Dysequilibrium    Goiter    Itching    Change in voice    Graves disease    Thyroid nodule    Pulmonary HTN (H)    Other age-related cataract    Baker's cyst of knee, left    Acute pain of left knee    Primary osteoarthritis of left knee    Chronic kidney disease, stage 3a (H)      Current Medications and Allergies:      Current Outpatient Medications:     amLODIPine (NORVASC) 5 MG tablet, Take 2.5 mg by mouth as needed., Disp: , Rfl:     apixaban ANTICOAGULANT (ELIQUIS) 2.5 MG tablet, Take 1 tablet (2.5 mg) by mouth 2 times daily, Disp: 90 tablet, Rfl: 3    aspirin 81 MG tablet, Take 1 tablet by mouth daily., Disp: , Rfl:     co-enzyme Q-10 100 MG CAPS capsule, Take 100 mg by mouth, Disp: , Rfl:     diclofenac (VOLTAREN) 1 % topical gel, Apply 2 g topically 4 times daily. For L knee, Disp: 100 g, Rfl: 5    famotidine (PEPCID) 20 MG tablet, Take 1 tablet (20 mg) by mouth 2 times daily, Disp: 180 tablet, Rfl: 3    lisinopril (ZESTRIL) 40 MG tablet, Take 1 tablet (40 mg) by mouth daily, Disp: 90 tablet, Rfl: 4    melatonin 1 MG/ML LIQD liquid, Take 1 mg by mouth nightly as needed for sleep, Disp: , Rfl:     methimazole (TAPAZOLE) 5 MG tablet, Take 1 tablet (5 mg) by mouth daily., Disp: 30 tablet, Rfl: 4    metoprolol succinate ER (TOPROL XL) 50 MG 24 hr tablet, TAKE 2 TABLETS BY MOUTH EVERY MORNING AND 1 TABLET EVERY EVENING, Disp: 270 tablet, Rfl: 1    ondansetron (ZOFRAN) 4 MG tablet, Take 1 tablet (4 mg) by mouth every 6 hours as needed for nausea (give prior to meals for nausea), Disp: 90 tablet, Rfl: 1     spironolactone (ALDACTONE) 25 MG tablet, Take 0.5 tablets (12.5 mg) by mouth daily, Disp: 45 tablet, Rfl: 4    triamcinolone (KENALOG) 0.1 % external cream, Apply topically 2 times daily, Disp: 453.6 g, Rfl: 0    TURMERIC PO, , Disp: , Rfl:      No Known Allergies      Care Coordination Start Date: No linked episodes   Frequency of Care Coordination: monthly, more frequently as needed     Form Last Updated: 01/21/2025

## 2025-01-21 NOTE — LETTER
PRIMARY CARE CARE COORDINATION   [facility]  [address] [place] [id number]    January 21, 2025    Isadora Mendez  [address]  [facility] [address]      Please feel free to contact me directly at [phone]- you can also email me at [email or web address]. I am also available on both [place] and [facility] teams.       Documentation of Face to Face and Certification for Home Health Services     I attest that I saw or will see Isadora Mendez on this date:  1/17/2025     This encounter with the patient was in whole, or in part, for medical condition, which is the primary reason for Home Health Care:       Patient Active Problem List   Diagnosis    Hypertension    Hyperlipidemia LDL goal <100    Paroxysmal A-fib (H)    Cardiac pacemaker, Medtronic, Dual Chamber, NOT dependent    Chronotropic incompetence    Atrioventricular block, incomplete    Presbyopia    Myopia    Glaucoma suspect of right eye    Fuchs' corneal dystrophy    Chronic cholecystitis    Hyperthyroidism    Dyspnea on exertion    Pleural effusion on right    Elevated brain natriuretic peptide (BNP) level    Dysequilibrium    Goiter    Itching    Change in voice    Graves disease    Thyroid nodule    Pulmonary HTN (H)    Other age-related cataract    Baker's cyst of knee, left    Acute pain of left knee    Primary osteoarthritis of left knee    Chronic kidney disease, stage 3a (H)      I certify that, based on my findings, the following services are medically necessary Home Health Services: Physical Therapy   Gait Training  Therapeutic Exercise     Additional services needed:        Further, I certify that my clinical findings support that this patient is homebound (i.e. absences from home require considerable and taxing effort and are for medical reasons or [employer] services or infrequently or of short duration when for other reasons) related to:Patient has difficulty ambulating >100 ft     Based on the above  findings, I certify that this patient is confined to the home and needs intermittent skilled nursing care, physical therapy and/or speech therapy. The patient is under my care, and I have initiated the establishment of the plan of care. This patient will be followed by a physician who will periodically review the plan of care.        Physician/Provider to provide follow up care: Provider to follow patient: DEAN GREENE [[id number]]        Please be aware that coverage of these services is subject to the terms and limitations of your health insurance plan.  Call member services at your health plan with any benefit or coverage questions.  _______________________________________________________________________  Authorized by:  Dean Greene MD       PLEASE EVALUATE AND TREAT (Evaluation timeline is 24 - 48 hrs. Please call if there is need for a variance to this timeline).     Medications:         Current Outpatient Medications   Medication Sig Dispense Refill    amLODIPine (NORVASC) 5 MG tablet Take 2.5 mg by mouth as needed.        apixaban ANTICOAGULANT (ELIQUIS) 2.5 MG tablet Take 1 tablet (2.5 mg) by mouth 2 times daily 90 tablet 3    aspirin 81 MG tablet Take 1 tablet by mouth daily.        co-enzyme Q-10 100 MG CAPS capsule Take 100 mg by mouth        diclofenac (VOLTAREN) 1 % topical gel Apply 2 g topically 4 times daily. For L knee 100 g 5    famotidine (PEPCID) 20 MG tablet Take 1 tablet (20 mg) by mouth 2 times daily 180 tablet 3    lisinopril (ZESTRIL) 40 MG tablet Take 1 tablet (40 mg) by mouth daily 90 tablet 4    melatonin 1 MG/ML LIQD liquid Take 1 mg by mouth nightly as needed for sleep        methimazole (TAPAZOLE) 5 MG tablet Take 1 tablet (5 mg) by mouth daily. 30 tablet 4    metoprolol succinate ER (TOPROL XL) 50 MG 24 hr tablet TAKE 2 TABLETS BY MOUTH EVERY MORNING AND 1 TABLET EVERY EVENING 270 tablet 1    ondansetron (ZOFRAN) 4 MG tablet Take 1 tablet (4 mg) by mouth every 6  hours as needed for nausea (give prior to meals for nausea) 90 tablet 1    spironolactone (ALDACTONE) 25 MG tablet Take 0.5 tablets (12.5 mg) by mouth daily 45 tablet 4    triamcinolone (KENALOG) 0.1 % external cream Apply topically 2 times daily 453.6 g 0    TURMERIC PO            Problems:      Patient Active Problem List   Diagnosis    Hypertension    Hyperlipidemia LDL goal <100    Paroxysmal A-fib (H)    Cardiac pacemaker, Medtronic, Dual Chamber, NOT dependent    Chronotropic incompetence    Atrioventricular block, incomplete    Presbyopia    Myopia    Glaucoma suspect of right eye    Fuchs' corneal dystrophy    Chronic cholecystitis    Hyperthyroidism    Dyspnea on exertion    Pleural effusion on right    Elevated brain natriuretic peptide (BNP) level    Dysequilibrium    Goiter    Itching    Change in voice    Graves disease    Thyroid nodule    Pulmonary HTN (H)    Other age-related cataract    Baker's cyst of knee, left    Acute pain of left knee    Primary osteoarthritis of left knee    Chronic kidney disease, stage 3a (H)      Diet:  None        Code Status:    Code Status: Prior     Allergies:  Patient has no known allergies.      [facility] [address] [address]  Phone:  [phone]  Fax:  [phone]  Patient:     Isadora Mendez MRN:  [id number]    W  [facility] [address] :  3/9/1931  Sex:  F   Phone: [phone]        INSURANCE PAYOR PLAN GROUP # SUBSCRIBER ID   Primary:    [facility] [id number]      No Known Allergies     Order Date:  2025  Home Care Referral       (Order ID: [id number])     Diagnosis:  Dysequilibrium (R42)  Chronic pain of left knee (M25.562,G89.29)  Baker's cyst of knee, left (M71.22)  Acute pain of right knee (M25.561)   Priority:  Routine: Next available opening Expiration Date:   Interval:   Count:    Reason for Referral: Physical Therapy  Physical  Therapy Eval and Treat for: Gait Training  Physical Therapy Eval and Treat for: Therapeutic Exercise  Is the patient homebound? Yes  Homebound Status (describe the functional limitations that support this patient is confined to his/her home. Medicaid recipients are not required to be homebound.): Patient has difficulty ambulating >100 ft  I attest that I saw or will see the patient on this date: 1/17/2025  Provider to follow patient: DEAN GREENE [[id number]]  Ordered by: Dean Greene MD  Authorized by:  Dean Greene MD   (NPI: [id number])         Primary Visit Coverage    Payer Plan Sponsor Code Group Number Group Name   [employer] UNITED [employer] MEDICARE ADVANTAGE [id number]      Primary Visit Coverage Subscriber    ID Name [facility] Address   [id number] [address] [id number] [place] [address]

## 2025-01-21 NOTE — LETTER
PRIMARY CARE CARE COORDINATION   [facility]  [address] [place] [address]    January 21, 2025    Isadora Mendez  [address] [facility] [address]      Dear Isadora,        I am a clinic care coordinator who works with Jovana Macias MD with the Primary Care clinic at the [facility] I wanted to introduce myself and provide you with my contact information for you to be able to call me with any questions or concerns. Below is a description of clinic care coordination and how I can further assist you.       The clinic care coordination team is made up of a registered nurse, , and community health worker who understand the health care system. The goal of clinic care coordination is to help you manage your health and improve access to the health care system. Our team works alongside your provider to assist you in determining your health and social needs. We can help you obtain health care and community resources, providing you with necessary information and education. We can work with you through any barriers and develop a care plan that helps coordinate and strengthen the communication between you and your care team. Our services are voluntary and are offered without charge to you personally.    Please feel free to contact me with any questions or concerns regarding care coordination and what we can offer.      We are focused on providing you with the highest-quality healthcare experience possible.    Sincerely,     Madhuri TSAI RN Care Manager  [facility]   Clinic Phone: [phone]  Direct Phone: [phone]        Enclosed: I have enclosed a copy of a 24 Hour Access Plan. This has helpful phone numbers for you to call when needed. Please keep this in an easy to access place to use as needed. and I have enclosed a copy of the Patient Centered Plan of Care. This has helpful information and goals that we have talked about. Please keep  this in an easy to access place to use as needed.

## 2025-01-21 NOTE — PROGRESS NOTES
Clinic Care Coordination Contact  Clinic Care Coordination Contact  OUTREACH    Referral Information:  Referral Source: PCP    Referred by PCP for help at home with finding a home care agency on 1/20.    Chief Complaint   Patient presents with    Clinic Care Coordination - Initial        Universal Utilization: 71.6% Admission or ED Risk        Utilization      No Show Count (past year)  2             ED Visits  1             Hospital Admissions  1                    Current as of: 1/20/2025 10:19 PM                Functional Status:  Fallen 2 or more times in the past year?: (!) Yes    Social Drivers of Health     Food Insecurity: Low Risk  (9/26/2024)    Food Insecurity     Within the past 12 months, did you worry that your food would run out before you got money to buy more?: No     Within the past 12 months, did the food you bought just not last and you didn t have money to get more?: No   Depression: Not at risk (1/17/2025)    PHQ-2     PHQ-2 Score: 0   Housing Stability: High Risk (9/26/2024)    Housing Stability     Do you have housing? : No     Are you worried about losing your housing?: No   Tobacco Use: Low Risk  (1/17/2025)    Patient History     Smoking Tobacco Use: Never     Smokeless Tobacco Use: Never     Passive Exposure: Never   Financial Resource Strain: Low Risk  (9/26/2024)    Financial Resource Strain     Within the past 12 months, have you or your family members you live with been unable to get utilities (heat, electricity) when it was really needed?: No   Alcohol Use: Not on file   Transportation Needs: Low Risk  (9/26/2024)    Transportation Needs     Within the past 12 months, has lack of transportation kept you from medical appointments, getting your medicines, non-medical meetings or appointments, work, or from getting things that you need?: No   Physical Activity: Not on file   Interpersonal Safety: Low Risk  (11/7/2024)    Interpersonal Safety     Do you feel physically and emotionally  safe where you currently live?: Yes     Within the past 12 months, have you been hit, slapped, kicked or otherwise physically hurt by someone?: No     Within the past 12 months, have you been humiliated or emotionally abused in other ways by your partner or ex-partner?: No   Stress: Not on file   Social Connections: Not on file   Health Literacy: Not on file              Informal Support system:: Children      Resources and Interventions:  Current Resources:         Supplies Currently Used at Home: None  Equipment Currently Used at Home: walker, rolling  Employment Status: retired       Care Plan:  Care Plan: Help At Home       Problem: Insufficient In-home support       Goal: Establish adequate home support       Start Date: 1/21/2025    This Visit's Progress: On track    Priority: High    Note:     Barriers: patient needs help at home  Strengths: home care order placed by PCP  Patient expressed understanding of goal: yes  Action steps to achieve this goal:  1. I will await for call from home care agency to provide help at home.   2. I will alert my care team if I need any further help resources at home.                                 Patient/Caregiver understanding: Patient states understanding. Patient has no further questions or concerns regarding above assessment. Patient is aware to call the clinic and reach out to the care team with any further questions.     Outreach Frequency: monthly, more frequently as needed  Future Appointments                In 1 week Nas Banda APRN CNP [facility]    In 1 month June Acuña MD [facility]    In 2 months [facility] ICD REMOTE [facility]    In 6 months Jovana Macias MD [facility]            Plan:  RN faxed HC order to the following places:   [facility]  [place]  [facility]  [facility]  [facility]    JAMEL LYLES RN on 1/21/2025 at 9:21 AM

## 2025-01-23 ENCOUNTER — APPOINTMENT (OUTPATIENT)
Dept: GENERAL RADIOLOGY | Facility: CLINIC | Age: OVER 89
End: 2025-01-23
Attending: EMERGENCY MEDICINE
Payer: COMMERCIAL

## 2025-01-23 ENCOUNTER — HOSPITAL ENCOUNTER (INPATIENT)
Facility: CLINIC | Age: OVER 89
End: 2025-01-23
Attending: EMERGENCY MEDICINE
Payer: COMMERCIAL

## 2025-01-23 ENCOUNTER — APPOINTMENT (OUTPATIENT)
Dept: GENERAL RADIOLOGY | Facility: CLINIC | Age: OVER 89
DRG: 522 | End: 2025-01-23
Attending: EMERGENCY MEDICINE
Payer: COMMERCIAL

## 2025-01-23 ENCOUNTER — PATIENT OUTREACH (OUTPATIENT)
Dept: CARE COORDINATION | Facility: CLINIC | Age: OVER 89
End: 2025-01-23
Payer: COMMERCIAL

## 2025-01-23 VITALS
TEMPERATURE: 98 F | OXYGEN SATURATION: 96 % | HEART RATE: 80 BPM | SYSTOLIC BLOOD PRESSURE: 166 MMHG | RESPIRATION RATE: 18 BRPM | DIASTOLIC BLOOD PRESSURE: 67 MMHG | BODY MASS INDEX: 22.08 KG/M2 | WEIGHT: 120 LBS | HEIGHT: 62 IN

## 2025-01-23 DIAGNOSIS — E87.5 HYPERKALEMIA: ICD-10-CM

## 2025-01-23 DIAGNOSIS — S72.001A: ICD-10-CM

## 2025-01-23 DIAGNOSIS — Z96.649 STATUS POST HIP HEMIARTHROPLASTY: Primary | ICD-10-CM

## 2025-01-23 DIAGNOSIS — Z79.01 ANTICOAGULATED: ICD-10-CM

## 2025-01-23 DIAGNOSIS — S72.001A CLOSED FRACTURE OF RIGHT HIP, INITIAL ENCOUNTER (H): ICD-10-CM
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
## 2025-01-23 DIAGNOSIS — I10 HYPERTENSION, UNSPECIFIED TYPE: ICD-10-CM

## 2025-01-23 DIAGNOSIS — M25.511 ACUTE PAIN OF RIGHT SHOULDER: ICD-10-CM

## 2025-01-23 DIAGNOSIS — M25.561 RIGHT KNEE PAIN, UNSPECIFIED CHRONICITY: ICD-10-CM

## 2025-01-23 DIAGNOSIS — N18.31 CHRONIC KIDNEY DISEASE, STAGE 3A (H): ICD-10-CM

## 2025-01-23 DIAGNOSIS — M25.551 RIGHT HIP PAIN: ICD-10-CM

## 2025-01-23 LAB
ABO + RH BLD: NORMAL
ALBUMIN SERPL BCG-MCNC: 4.1 G/DL (ref 3.5–5.2)
ALP SERPL-CCNC: 119 U/L (ref 40–150)
ALT SERPL W P-5'-P-CCNC: 21 U/L (ref 0–50)
ANION GAP SERPL CALCULATED.3IONS-SCNC: 11 MMOL/L (ref 7–15)
APTT PPP: 29 SECONDS (ref 22–38)
AST SERPL W P-5'-P-CCNC: 35 U/L (ref 0–45)
BASOPHILS # BLD AUTO: 0 10E3/UL (ref 0–0.2)
BASOPHILS NFR BLD AUTO: 0 %
BILIRUB DIRECT SERPL-MCNC: <0.2 MG/DL (ref 0–0.3)
BILIRUB SERPL-MCNC: 0.5 MG/DL
BLD GP AB SCN SERPL QL: NEGATIVE
BUN SERPL-MCNC: 31 MG/DL (ref 8–23)
CALCIUM SERPL-MCNC: 10.5 MG/DL (ref 8.8–10.4)
CHLORIDE SERPL-SCNC: 102 MMOL/L (ref 98–107)
CREAT SERPL-MCNC: 1.05 MG/DL (ref 0.51–0.95)
EGFRCR SERPLBLD CKD-EPI 2021: 49 ML/MIN/1.73M2
EOSINOPHIL # BLD AUTO: 0.2 10E3/UL (ref 0–0.7)
EOSINOPHIL NFR BLD AUTO: 2 %
ERYTHROCYTE [DISTWIDTH] IN BLOOD BY AUTOMATED COUNT: 13.4 % (ref 10–15)
GLUCOSE SERPL-MCNC: 120 MG/DL (ref 70–99)
HCO3 SERPL-SCNC: 23 MMOL/L (ref 22–29)
HCT VFR BLD AUTO: 37.5 % (ref 35–47)
HGB BLD-MCNC: 12.3 G/DL (ref 11.7–15.7)
IMM GRANULOCYTES # BLD: 0.1 10E3/UL
IMM GRANULOCYTES NFR BLD: 1 %
INR PPP: 1.05 (ref 0.85–1.15)
LYMPHOCYTES # BLD AUTO: 1.6 10E3/UL (ref 0.8–5.3)
LYMPHOCYTES NFR BLD AUTO: 13 %
MCH RBC QN AUTO: 30.6 PG (ref 26.5–33)
MCHC RBC AUTO-ENTMCNC: 32.8 G/DL (ref 31.5–36.5)
MCV RBC AUTO: 93 FL (ref 78–100)
MONOCYTES # BLD AUTO: 0.6 10E3/UL (ref 0–1.3)
MONOCYTES NFR BLD AUTO: 5 %
NEUTROPHILS # BLD AUTO: 10 10E3/UL (ref 1.6–8.3)
NEUTROPHILS NFR BLD AUTO: 80 %
NRBC # BLD AUTO: 0 10E3/UL
NRBC BLD AUTO-RTO: 0 /100
PLATELET # BLD AUTO: 196 10E3/UL (ref 150–450)
POTASSIUM SERPL-SCNC: 5 MMOL/L (ref 3.4–5.3)
PROT SERPL-MCNC: 7.6 G/DL (ref 6.4–8.3)
RBC # BLD AUTO: 4.02 10E6/UL (ref 3.8–5.2)
SODIUM SERPL-SCNC: 136 MMOL/L (ref 135–145)
SPECIMEN EXP DATE BLD: NORMAL
WBC # BLD AUTO: 12.5 10E3/UL (ref 4–11)

## 2025-01-23 PROCEDURE — 73560 X-RAY EXAM OF KNEE 1 OR 2: CPT | Mod: RT

## 2025-01-23 PROCEDURE — 86900 BLOOD TYPING SEROLOGIC ABO: CPT | Performed by: EMERGENCY MEDICINE

## 2025-01-23 PROCEDURE — 73560 X-RAY EXAM OF KNEE 1 OR 2: CPT | Mod: 26 | Performed by: RADIOLOGY

## 2025-01-23 PROCEDURE — 73030 X-RAY EXAM OF SHOULDER: CPT | Mod: 26 | Performed by: RADIOLOGY

## 2025-01-23 PROCEDURE — 99285 EMERGENCY DEPT VISIT HI MDM: CPT | Performed by: EMERGENCY MEDICINE

## 2025-01-23 PROCEDURE — 120N000002 HC R&B MED SURG/OB UMMC

## 2025-01-23 PROCEDURE — 250N000013 HC RX MED GY IP 250 OP 250 PS 637: Performed by: EMERGENCY MEDICINE

## 2025-01-23 PROCEDURE — 73552 X-RAY EXAM OF FEMUR 2/>: CPT | Mod: 26 | Performed by: RADIOLOGY

## 2025-01-23 PROCEDURE — 72170 X-RAY EXAM OF PELVIS: CPT | Mod: 26 | Performed by: RADIOLOGY

## 2025-01-23 PROCEDURE — 99285 EMERGENCY DEPT VISIT HI MDM: CPT | Mod: 25 | Performed by: EMERGENCY MEDICINE

## 2025-01-23 PROCEDURE — 85610 PROTHROMBIN TIME: CPT | Performed by: EMERGENCY MEDICINE

## 2025-01-23 PROCEDURE — 72170 X-RAY EXAM OF PELVIS: CPT

## 2025-01-23 PROCEDURE — 250N000011 HC RX IP 250 OP 636: Performed by: EMERGENCY MEDICINE

## 2025-01-23 PROCEDURE — 84295 ASSAY OF SERUM SODIUM: CPT | Performed by: EMERGENCY MEDICINE

## 2025-01-23 PROCEDURE — 85004 AUTOMATED DIFF WBC COUNT: CPT | Performed by: EMERGENCY MEDICINE

## 2025-01-23 PROCEDURE — 71046 X-RAY EXAM CHEST 2 VIEWS: CPT | Mod: 26 | Performed by: RADIOLOGY

## 2025-01-23 PROCEDURE — 99222 1ST HOSP IP/OBS MODERATE 55: CPT | Performed by: NURSE PRACTITIONER

## 2025-01-23 PROCEDURE — 73552 X-RAY EXAM OF FEMUR 2/>: CPT | Mod: RT

## 2025-01-23 PROCEDURE — 82248 BILIRUBIN DIRECT: CPT | Performed by: EMERGENCY MEDICINE

## 2025-01-23 PROCEDURE — 99222 1ST HOSP IP/OBS MODERATE 55: CPT | Mod: 57 | Performed by: ORTHOPAEDIC SURGERY

## 2025-01-23 PROCEDURE — 73030 X-RAY EXAM OF SHOULDER: CPT | Mod: RT

## 2025-01-23 PROCEDURE — 36415 COLL VENOUS BLD VENIPUNCTURE: CPT | Performed by: EMERGENCY MEDICINE

## 2025-01-23 PROCEDURE — 85730 THROMBOPLASTIN TIME PARTIAL: CPT | Performed by: EMERGENCY MEDICINE

## 2025-01-23 PROCEDURE — 86850 RBC ANTIBODY SCREEN: CPT | Performed by: EMERGENCY MEDICINE

## 2025-01-23 PROCEDURE — 80048 BASIC METABOLIC PNL TOTAL CA: CPT | Performed by: EMERGENCY MEDICINE

## 2025-01-23 PROCEDURE — 71046 X-RAY EXAM CHEST 2 VIEWS: CPT

## 2025-01-23 PROCEDURE — 85041 AUTOMATED RBC COUNT: CPT | Performed by: EMERGENCY MEDICINE

## 2025-01-23 PROCEDURE — 85018 HEMOGLOBIN: CPT | Performed by: EMERGENCY MEDICINE

## 2025-01-23 RX ORDER — ASPIRIN 81 MG/1
81 TABLET, CHEWABLE ORAL DAILY
Status: DISCONTINUED | OUTPATIENT
Start: 2025-01-24 | End: 2025-01-24

## 2025-01-23 RX ORDER — LISINOPRIL 10 MG/1
40 TABLET ORAL DAILY
Status: DISCONTINUED | OUTPATIENT
Start: 2025-01-24 | End: 2025-01-28

## 2025-01-23 RX ORDER — BISACODYL 5 MG
5 TABLET, DELAYED RELEASE (ENTERIC COATED) ORAL DAILY PRN
Status: DISCONTINUED | OUTPATIENT
Start: 2025-01-23 | End: 2025-01-30 | Stop reason: HOSPADM

## 2025-01-23 RX ORDER — SPIRONOLACTONE 25 MG
12.5 TABLET ORAL DAILY
Status: DISCONTINUED | OUTPATIENT
Start: 2025-01-24 | End: 2025-01-28

## 2025-01-23 RX ORDER — ONDANSETRON 2 MG/ML
4 INJECTION INTRAMUSCULAR; INTRAVENOUS EVERY 6 HOURS PRN
Status: DISCONTINUED | OUTPATIENT
Start: 2025-01-23 | End: 2025-01-24

## 2025-01-23 RX ORDER — HYDROCODONE BITARTRATE AND ACETAMINOPHEN 5; 325 MG/1; MG/1
1 TABLET ORAL ONCE
Status: COMPLETED | OUTPATIENT
Start: 2025-01-23 | End: 2025-01-23

## 2025-01-23 RX ORDER — OXYCODONE HYDROCHLORIDE 5 MG/1
5 TABLET ORAL EVERY 4 HOURS PRN
Status: DISCONTINUED | OUTPATIENT
Start: 2025-01-23 | End: 2025-01-26

## 2025-01-23 RX ORDER — METOPROLOL SUCCINATE 25 MG/1
50 TABLET, EXTENDED RELEASE ORAL AT BEDTIME
Status: DISCONTINUED | OUTPATIENT
Start: 2025-01-23 | End: 2025-01-25

## 2025-01-23 RX ORDER — METHIMAZOLE 5 MG/1
5 TABLET ORAL DAILY
Status: DISCONTINUED | OUTPATIENT
Start: 2025-01-24 | End: 2025-01-30 | Stop reason: HOSPADM

## 2025-01-23 RX ORDER — ACETAMINOPHEN 325 MG/1
650 TABLET ORAL EVERY 4 HOURS PRN
Status: DISCONTINUED | OUTPATIENT
Start: 2025-01-23 | End: 2025-01-26

## 2025-01-23 RX ORDER — ONDANSETRON 4 MG/1
4 TABLET, ORALLY DISINTEGRATING ORAL EVERY 6 HOURS PRN
Status: DISCONTINUED | OUTPATIENT
Start: 2025-01-23 | End: 2025-01-24

## 2025-01-23 RX ORDER — MORPHINE SULFATE 4 MG/ML
4 INJECTION, SOLUTION INTRAMUSCULAR; INTRAVENOUS ONCE
Status: COMPLETED | OUTPATIENT
Start: 2025-01-23 | End: 2025-01-23

## 2025-01-23 RX ORDER — CALCIUM CARBONATE 500 MG/1
1000 TABLET, CHEWABLE ORAL 4 TIMES DAILY PRN
Status: DISCONTINUED | OUTPATIENT
Start: 2025-01-23 | End: 2025-01-30 | Stop reason: HOSPADM

## 2025-01-23 RX ORDER — METOPROLOL SUCCINATE 100 MG/1
100 TABLET, EXTENDED RELEASE ORAL DAILY
Status: DISCONTINUED | OUTPATIENT
Start: 2025-01-24 | End: 2025-01-25

## 2025-01-23 RX ORDER — BISACODYL 5 MG
10 TABLET, DELAYED RELEASE (ENTERIC COATED) ORAL DAILY PRN
Status: DISCONTINUED | OUTPATIENT
Start: 2025-01-23 | End: 2025-01-30 | Stop reason: HOSPADM

## 2025-01-23 RX ORDER — FAMOTIDINE 20 MG/1
20 TABLET, FILM COATED ORAL DAILY
Status: DISCONTINUED | OUTPATIENT
Start: 2025-01-24 | End: 2025-01-30 | Stop reason: HOSPADM

## 2025-01-23 RX ORDER — OXYCODONE HYDROCHLORIDE 10 MG/1
10 TABLET ORAL EVERY 4 HOURS PRN
Status: DISCONTINUED | OUTPATIENT
Start: 2025-01-23 | End: 2025-01-26

## 2025-01-23 RX ORDER — ACETAMINOPHEN 650 MG/1
650 SUPPOSITORY RECTAL EVERY 4 HOURS PRN
Status: DISCONTINUED | OUTPATIENT
Start: 2025-01-23 | End: 2025-01-26

## 2025-01-23 RX ORDER — LIDOCAINE 40 MG/G
CREAM TOPICAL
Status: DISCONTINUED | OUTPATIENT
Start: 2025-01-23 | End: 2025-01-27

## 2025-01-23 RX ORDER — POLYETHYLENE GLYCOL 3350 17 G/17G
17 POWDER, FOR SOLUTION ORAL 2 TIMES DAILY PRN
Status: DISCONTINUED | OUTPATIENT
Start: 2025-01-23 | End: 2025-01-30 | Stop reason: HOSPADM

## 2025-01-23 RX ADMIN — HYDROCODONE BITARTRATE AND ACETAMINOPHEN 1 TABLET: 5; 325 TABLET ORAL at 20:55

## 2025-01-23 RX ADMIN — MORPHINE SULFATE 4 MG: 4 INJECTION INTRAVENOUS at 22:44

## 2025-01-23 ASSESSMENT — COLUMBIA-SUICIDE SEVERITY RATING SCALE - C-SSRS
2. HAVE YOU ACTUALLY HAD ANY THOUGHTS OF KILLING YOURSELF IN THE PAST MONTH?: NO
1. IN THE PAST MONTH, HAVE YOU WISHED YOU WERE DEAD OR WISHED YOU COULD GO TO SLEEP AND NOT WAKE UP?: NO
6. HAVE YOU EVER DONE ANYTHING, STARTED TO DO ANYTHING, OR PREPARED TO DO ANYTHING TO END YOUR LIFE?: NO

## 2025-01-23 ASSESSMENT — ACTIVITIES OF DAILY LIVING (ADL)
ADLS_ACUITY_SCORE: 57

## 2025-01-23 NOTE — PROGRESS NOTES
Clinic Care Coordination Contact  [facility]/Voicemail    Clinical Data: Care Coordinator Outreach    Outreach Documentation Number of Outreach Attempt   1/23/2025   9:19 AM 1       Left message on patient's voicemail with call back information and requested return call.      Plan: Care Coordinator will try to reach patient again in 1-2 business days.    Kelli PARRISH Community Health Worker  Clinic Care Coordination  [facility]   Clinic #: [phone]  Direct #: [phone]

## 2025-01-24 ENCOUNTER — APPOINTMENT (OUTPATIENT)
Dept: GENERAL RADIOLOGY | Facility: CLINIC | Age: OVER 89
DRG: 522 | End: 2025-01-24
Payer: COMMERCIAL

## 2025-01-24 PROCEDURE — C1713 ANCHOR/SCREW BN/BN,TIS/BN: HCPCS | Performed by: ORTHOPAEDIC SURGERY

## 2025-01-24 PROCEDURE — 120N000002 HC R&B MED SURG/OB UMMC

## 2025-01-24 PROCEDURE — 272N000001 HC OR GENERAL SUPPLY STERILE: Performed by: ORTHOPAEDIC SURGERY

## 2025-01-24 PROCEDURE — 710N000010 HC RECOVERY PHASE 1, LEVEL 2, PER MIN: Performed by: ORTHOPAEDIC SURGERY

## 2025-01-24 PROCEDURE — 250N000011 HC RX IP 250 OP 636: Performed by: ORTHOPAEDIC SURGERY

## 2025-01-24 PROCEDURE — 250N000013 HC RX MED GY IP 250 OP 250 PS 637

## 2025-01-24 PROCEDURE — 0SRR019 REPLACEMENT OF RIGHT HIP JOINT, FEMORAL SURFACE WITH METAL SYNTHETIC SUBSTITUTE, CEMENTED, OPEN APPROACH: ICD-10-PCS | Performed by: ORTHOPAEDIC SURGERY

## 2025-01-24 PROCEDURE — 250N000013 HC RX MED GY IP 250 OP 250 PS 637: Performed by: STUDENT IN AN ORGANIZED HEALTH CARE EDUCATION/TRAINING PROGRAM

## 2025-01-24 PROCEDURE — 999N000065 XR PELVIS AND HIP PORTABLE RIGHT 2 VIEWS

## 2025-01-24 PROCEDURE — 27236 TREAT THIGH FRACTURE: CPT | Mod: RT | Performed by: ORTHOPAEDIC SURGERY

## 2025-01-24 PROCEDURE — 250N000011 HC RX IP 250 OP 636

## 2025-01-24 PROCEDURE — 250N000011 HC RX IP 250 OP 636: Performed by: EMERGENCY MEDICINE

## 2025-01-24 PROCEDURE — 250N000025 HC SEVOFLURANE, PER MIN: Performed by: ORTHOPAEDIC SURGERY

## 2025-01-24 PROCEDURE — 258N000003 HC RX IP 258 OP 636: Performed by: ORTHOPAEDIC SURGERY

## 2025-01-24 PROCEDURE — 999N000141 HC STATISTIC PRE-PROCEDURE NURSING ASSESSMENT: Performed by: ORTHOPAEDIC SURGERY

## 2025-01-24 PROCEDURE — 250N000013 HC RX MED GY IP 250 OP 250 PS 637: Performed by: NURSE PRACTITIONER

## 2025-01-24 PROCEDURE — 360N000077 HC SURGERY LEVEL 4, PER MIN: Performed by: ORTHOPAEDIC SURGERY

## 2025-01-24 PROCEDURE — 99232 SBSQ HOSP IP/OBS MODERATE 35: CPT | Performed by: INTERNAL MEDICINE

## 2025-01-24 PROCEDURE — 370N000017 HC ANESTHESIA TECHNICAL FEE, PER MIN: Performed by: ORTHOPAEDIC SURGERY

## 2025-01-24 PROCEDURE — 250N000013 HC RX MED GY IP 250 OP 250 PS 637: Performed by: INTERNAL MEDICINE

## 2025-01-24 PROCEDURE — C1776 JOINT DEVICE (IMPLANTABLE): HCPCS | Performed by: ORTHOPAEDIC SURGERY

## 2025-01-24 DEVICE — BIPOLAR COMPONENT
Type: IMPLANTABLE DEVICE | Site: HIP | Status: FUNCTIONAL
Brand: UHR

## 2025-01-24 DEVICE — BONE CEMENT SIMPLEX FULL DOSE 6191-1-001: Type: IMPLANTABLE DEVICE | Site: HIP | Status: FUNCTIONAL

## 2025-01-24 DEVICE — UNIVERSAL CEMENT RESTRICTOR - DISPOSABLE INSERTER
Type: IMPLANTABLE DEVICE | Site: HIP | Status: FUNCTIONAL
Brand: RESTRICTOR

## 2025-01-24 DEVICE — UNIVERSAL DISTAL CEMENT SPACER
Type: IMPLANTABLE DEVICE | Site: HIP | Status: FUNCTIONAL
Brand: OMNIFIT

## 2025-01-24 DEVICE — 132 DEGREE CEMENTED HIP STEM
Type: IMPLANTABLE DEVICE | Site: HIP | Status: FUNCTIONAL
Brand: OMNIFIT

## 2025-01-24 DEVICE — LOW FRICTION ION TREATMENT
Type: IMPLANTABLE DEVICE | Site: HIP | Status: FUNCTIONAL
Brand: C-TAPER HEAD

## 2025-01-24 RX ORDER — LIDOCAINE 40 MG/G
CREAM TOPICAL
Status: DISCONTINUED | OUTPATIENT
Start: 2025-01-24 | End: 2025-01-24 | Stop reason: HOSPADM

## 2025-01-24 RX ORDER — CEFAZOLIN SODIUM/WATER 2 G/20 ML
2 SYRINGE (ML) INTRAVENOUS
Status: DISCONTINUED | OUTPATIENT
Start: 2025-01-24 | End: 2025-01-24 | Stop reason: HOSPADM

## 2025-01-24 RX ORDER — HYDROMORPHONE HYDROCHLORIDE 1 MG/ML
0.2 INJECTION, SOLUTION INTRAMUSCULAR; INTRAVENOUS; SUBCUTANEOUS EVERY 5 MIN PRN
Status: DISCONTINUED | OUTPATIENT
Start: 2025-01-24 | End: 2025-01-24 | Stop reason: HOSPADM

## 2025-01-24 RX ORDER — AMOXICILLIN 250 MG
1 CAPSULE ORAL 2 TIMES DAILY
Status: DISCONTINUED | OUTPATIENT
Start: 2025-01-24 | End: 2025-01-30 | Stop reason: HOSPADM

## 2025-01-24 RX ORDER — NALOXONE HYDROCHLORIDE 0.4 MG/ML
0.1 INJECTION, SOLUTION INTRAMUSCULAR; INTRAVENOUS; SUBCUTANEOUS
Status: DISCONTINUED | OUTPATIENT
Start: 2025-01-24 | End: 2025-01-24 | Stop reason: HOSPADM

## 2025-01-24 RX ORDER — PROCHLORPERAZINE MALEATE 5 MG/1
5 TABLET ORAL EVERY 6 HOURS PRN
Status: DISCONTINUED | OUTPATIENT
Start: 2025-01-24 | End: 2025-01-30 | Stop reason: HOSPADM

## 2025-01-24 RX ORDER — ASPIRIN 81 MG/1
81 TABLET ORAL 2 TIMES DAILY
Status: DISCONTINUED | OUTPATIENT
Start: 2025-01-24 | End: 2025-01-30 | Stop reason: HOSPADM

## 2025-01-24 RX ORDER — ONDANSETRON 2 MG/ML
4 INJECTION INTRAMUSCULAR; INTRAVENOUS EVERY 30 MIN PRN
Status: DISCONTINUED | OUTPATIENT
Start: 2025-01-24 | End: 2025-01-24 | Stop reason: HOSPADM

## 2025-01-24 RX ORDER — NALOXONE HYDROCHLORIDE 0.4 MG/ML
0.2 INJECTION, SOLUTION INTRAMUSCULAR; INTRAVENOUS; SUBCUTANEOUS
Status: DISCONTINUED | OUTPATIENT
Start: 2025-01-24 | End: 2025-01-30 | Stop reason: HOSPADM

## 2025-01-24 RX ORDER — MORPHINE SULFATE 4 MG/ML
4 INJECTION, SOLUTION INTRAMUSCULAR; INTRAVENOUS
Status: COMPLETED | OUTPATIENT
Start: 2025-01-24 | End: 2025-01-24

## 2025-01-24 RX ORDER — DEXAMETHASONE SODIUM PHOSPHATE 4 MG/ML
4 INJECTION, SOLUTION INTRA-ARTICULAR; INTRALESIONAL; INTRAMUSCULAR; INTRAVENOUS; SOFT TISSUE
Status: DISCONTINUED | OUTPATIENT
Start: 2025-01-24 | End: 2025-01-24 | Stop reason: HOSPADM

## 2025-01-24 RX ORDER — HYDROMORPHONE HYDROCHLORIDE 1 MG/ML
0.4 INJECTION, SOLUTION INTRAMUSCULAR; INTRAVENOUS; SUBCUTANEOUS EVERY 5 MIN PRN
Status: DISCONTINUED | OUTPATIENT
Start: 2025-01-24 | End: 2025-01-24 | Stop reason: HOSPADM

## 2025-01-24 RX ORDER — HYDROMORPHONE HYDROCHLORIDE 1 MG/ML
0.3 INJECTION, SOLUTION INTRAMUSCULAR; INTRAVENOUS; SUBCUTANEOUS
Status: DISCONTINUED | OUTPATIENT
Start: 2025-01-24 | End: 2025-01-28

## 2025-01-24 RX ORDER — NALOXONE HYDROCHLORIDE 0.4 MG/ML
0.4 INJECTION, SOLUTION INTRAMUSCULAR; INTRAVENOUS; SUBCUTANEOUS
Status: DISCONTINUED | OUTPATIENT
Start: 2025-01-24 | End: 2025-01-30 | Stop reason: HOSPADM

## 2025-01-24 RX ORDER — OXYCODONE HYDROCHLORIDE 10 MG/1
10 TABLET ORAL
Status: DISCONTINUED | OUTPATIENT
Start: 2025-01-24 | End: 2025-01-24 | Stop reason: HOSPADM

## 2025-01-24 RX ORDER — POLYETHYLENE GLYCOL 3350 17 G/17G
17 POWDER, FOR SOLUTION ORAL DAILY
Status: DISCONTINUED | OUTPATIENT
Start: 2025-01-25 | End: 2025-01-30 | Stop reason: HOSPADM

## 2025-01-24 RX ORDER — CEFAZOLIN SODIUM 10 G
30 VIAL (EA) INJECTION ONCE
Status: COMPLETED | OUTPATIENT
Start: 2025-01-24 | End: 2025-01-24

## 2025-01-24 RX ORDER — SODIUM CHLORIDE, SODIUM LACTATE, POTASSIUM CHLORIDE, CALCIUM CHLORIDE 600; 310; 30; 20 MG/100ML; MG/100ML; MG/100ML; MG/100ML
INJECTION, SOLUTION INTRAVENOUS CONTINUOUS
Status: DISCONTINUED | OUTPATIENT
Start: 2025-01-24 | End: 2025-01-24 | Stop reason: HOSPADM

## 2025-01-24 RX ORDER — VANCOMYCIN HYDROCHLORIDE 1 G/20ML
INJECTION, POWDER, LYOPHILIZED, FOR SOLUTION INTRAVENOUS PRN
Status: DISCONTINUED | OUTPATIENT
Start: 2025-01-24 | End: 2025-01-24 | Stop reason: HOSPADM

## 2025-01-24 RX ORDER — LIDOCAINE 40 MG/G
CREAM TOPICAL
Status: DISCONTINUED | OUTPATIENT
Start: 2025-01-24 | End: 2025-01-30 | Stop reason: HOSPADM

## 2025-01-24 RX ORDER — AMLODIPINE BESYLATE 2.5 MG/1
2.5 TABLET ORAL DAILY
Status: DISCONTINUED | OUTPATIENT
Start: 2025-01-24 | End: 2025-01-26

## 2025-01-24 RX ORDER — METHOCARBAMOL 500 MG/1
250 TABLET ORAL EVERY 6 HOURS PRN
Status: DISCONTINUED | OUTPATIENT
Start: 2025-01-24 | End: 2025-01-26

## 2025-01-24 RX ORDER — BISACODYL 10 MG
10 SUPPOSITORY, RECTAL RECTAL DAILY PRN
Status: DISCONTINUED | OUTPATIENT
Start: 2025-01-27 | End: 2025-01-30 | Stop reason: HOSPADM

## 2025-01-24 RX ORDER — ONDANSETRON 4 MG/1
4 TABLET, ORALLY DISINTEGRATING ORAL EVERY 30 MIN PRN
Status: DISCONTINUED | OUTPATIENT
Start: 2025-01-24 | End: 2025-01-24 | Stop reason: HOSPADM

## 2025-01-24 RX ORDER — ACETAMINOPHEN 325 MG/1
975 TABLET ORAL ONCE
Status: COMPLETED | OUTPATIENT
Start: 2025-01-24 | End: 2025-01-24

## 2025-01-24 RX ORDER — CEFAZOLIN SODIUM/WATER 2 G/20 ML
2 SYRINGE (ML) INTRAVENOUS SEE ADMIN INSTRUCTIONS
Status: DISCONTINUED | OUTPATIENT
Start: 2025-01-24 | End: 2025-01-24 | Stop reason: HOSPADM

## 2025-01-24 RX ORDER — HYDRALAZINE HYDROCHLORIDE 25 MG/1
25 TABLET, FILM COATED ORAL 4 TIMES DAILY PRN
Status: DISCONTINUED | OUTPATIENT
Start: 2025-01-24 | End: 2025-01-30 | Stop reason: HOSPADM

## 2025-01-24 RX ORDER — OXYCODONE HYDROCHLORIDE 5 MG/1
5 TABLET ORAL
Status: DISCONTINUED | OUTPATIENT
Start: 2025-01-24 | End: 2025-01-24 | Stop reason: HOSPADM

## 2025-01-24 RX ORDER — METOPROLOL SUCCINATE 100 MG/1
100 TABLET, EXTENDED RELEASE ORAL ONCE
Status: COMPLETED | OUTPATIENT
Start: 2025-01-24 | End: 2025-01-24

## 2025-01-24 RX ORDER — CEFAZOLIN SODIUM 1 G/3ML
1 INJECTION, POWDER, FOR SOLUTION INTRAMUSCULAR; INTRAVENOUS EVERY 12 HOURS
Status: COMPLETED | OUTPATIENT
Start: 2025-01-24 | End: 2025-01-25

## 2025-01-24 RX ORDER — ONDANSETRON 4 MG/1
4 TABLET, ORALLY DISINTEGRATING ORAL EVERY 6 HOURS PRN
Status: DISCONTINUED | OUTPATIENT
Start: 2025-01-24 | End: 2025-01-30 | Stop reason: HOSPADM

## 2025-01-24 RX ORDER — ONDANSETRON 2 MG/ML
4 INJECTION INTRAMUSCULAR; INTRAVENOUS EVERY 6 HOURS PRN
Status: DISCONTINUED | OUTPATIENT
Start: 2025-01-24 | End: 2025-01-30 | Stop reason: HOSPADM

## 2025-01-24 RX ORDER — FENTANYL CITRATE 50 UG/ML
50 INJECTION, SOLUTION INTRAMUSCULAR; INTRAVENOUS EVERY 5 MIN PRN
Status: DISCONTINUED | OUTPATIENT
Start: 2025-01-24 | End: 2025-01-24 | Stop reason: HOSPADM

## 2025-01-24 RX ORDER — FENTANYL CITRATE 50 UG/ML
25 INJECTION, SOLUTION INTRAMUSCULAR; INTRAVENOUS EVERY 5 MIN PRN
Status: DISCONTINUED | OUTPATIENT
Start: 2025-01-24 | End: 2025-01-24 | Stop reason: HOSPADM

## 2025-01-24 RX ADMIN — OXYCODONE 5 MG: 5 TABLET ORAL at 04:20

## 2025-01-24 RX ADMIN — ACETAMINOPHEN 650 MG: 325 TABLET, FILM COATED ORAL at 14:31

## 2025-01-24 RX ADMIN — ACETAMINOPHEN 650 MG: 325 TABLET, FILM COATED ORAL at 06:39

## 2025-01-24 RX ADMIN — METOPROLOL SUCCINATE 50 MG: 25 TABLET, EXTENDED RELEASE ORAL at 00:35

## 2025-01-24 RX ADMIN — OXYCODONE 5 MG: 5 TABLET ORAL at 14:34

## 2025-01-24 RX ADMIN — AMLODIPINE BESYLATE 2.5 MG: 2.5 TABLET ORAL at 14:32

## 2025-01-24 RX ADMIN — METOPROLOL SUCCINATE 100 MG: 100 TABLET, EXTENDED RELEASE ORAL at 07:32

## 2025-01-24 RX ADMIN — METOPROLOL SUCCINATE 50 MG: 25 TABLET, EXTENDED RELEASE ORAL at 21:42

## 2025-01-24 RX ADMIN — ACETAMINOPHEN 650 MG: 325 TABLET, FILM COATED ORAL at 21:43

## 2025-01-24 RX ADMIN — MORPHINE SULFATE 4 MG: 4 INJECTION INTRAVENOUS at 01:37

## 2025-01-24 RX ADMIN — Medication 3 MG: at 21:42

## 2025-01-24 RX ADMIN — ONDANSETRON 4 MG: 2 INJECTION INTRAMUSCULAR; INTRAVENOUS at 14:32

## 2025-01-24 RX ADMIN — Medication 12.5 MG: at 21:46

## 2025-01-24 RX ADMIN — OXYCODONE 5 MG: 5 TABLET ORAL at 21:43

## 2025-01-24 RX ADMIN — SENNOSIDES AND DOCUSATE SODIUM 1 TABLET: 50; 8.6 TABLET ORAL at 19:46

## 2025-01-24 RX ADMIN — ASPIRIN 81 MG: 81 TABLET, COATED ORAL at 19:46

## 2025-01-24 RX ADMIN — CEFAZOLIN 1 G: 1 INJECTION, POWDER, FOR SOLUTION INTRAMUSCULAR; INTRAVENOUS at 15:53

## 2025-01-24 ASSESSMENT — ACTIVITIES OF DAILY LIVING (ADL)
ADLS_ACUITY_SCORE: 50
ADLS_ACUITY_SCORE: 57
ADLS_ACUITY_SCORE: 50
ADLS_ACUITY_SCORE: 45
ADLS_ACUITY_SCORE: 50
ADLS_ACUITY_SCORE: 45
ADLS_ACUITY_SCORE: 47
ADLS_ACUITY_SCORE: 50
ADLS_ACUITY_SCORE: 47
ADLS_ACUITY_SCORE: 57
ADLS_ACUITY_SCORE: 50
ADLS_ACUITY_SCORE: 57
ADLS_ACUITY_SCORE: 47
ADLS_ACUITY_SCORE: 50
ADLS_ACUITY_SCORE: 45
ADLS_ACUITY_SCORE: 50
ADLS_ACUITY_SCORE: 57
ADLS_ACUITY_SCORE: 45
ADLS_ACUITY_SCORE: 57
ADLS_ACUITY_SCORE: 50
ADLS_ACUITY_SCORE: 57

## 2025-01-24 NOTE — CONSULTS
[facility]  Orthopedic Surgery Consult    Name: Isadora Mendez  Age: 89 year old  MRN: [id number]  YOB: 1931    Reason for Consult: R Hip Fx    Requesting Provider: Katherin Avila MD    Assessment and Plan:     Assessment:  89F with hx of AF (on ASA), sick sinus syndrome (pacemaker), pulmonary HTN with a displaced subcapital FNF after a GLF today.     Risks and benefits of a right hip hemiarthroplasty were discussed. Risks include mortality, blood loss, periprosthetic fracture, dislocation, infection, wound complications. After careful consideration, she elected to proceed. We discussed that a third of patients with hip fxs experience mortality, a third a decline in mobility, and a third do okay. She was understanding of this. We discussed the need for bone health evaluation promptly after surgery.     Plan:  - Plan for OR: 01/24 R Hip Alexi  - Anticoagulation/DVT prophylaxis: Mechanical only preop  - Antibiotics: Periop Ancef  - Imaging: Complete  - Activity: Bedrest  - Weight bearing: NWB RLE  - Pain control: Multimodal  - Diet: NPO at MN  - Follow-up: Pending  - Disposition: Pending    Staff: MD Keanu Manning MD  Orthopedic Surgery PGY2  [phone]      History of Present Illness:     Patient was seen and examined by me. History, PMH, Meds, SH, complete ROS (10 organ systems) and PE reviewed with patient and prior medical records.      89F with hx of AF (on ASA), sick sinus syndrome (pacemaker), pulmonary HTN with a displaced subcapital FNF after a GLF today.  Patient was walking from her car to her home when she sustained a ground-level fall.  A video of the fall was reviewed where she lost balance and fell onto her right side.  She ambulates independently in her home for short distances but uses a walker when ambulating for more than 15 minutes.  She states that she tries to leave her house at least 3 times a week but does not ambulate long distances  when leaving and usually only leaves for trips to the grocery store with her family.  When at the grocery store, she uses a cane or the shopping cart to support herself when ambulating.  She was recently admitted for thyrotoxicosis where she felt like she was not ambulating much and felt like her joints get stiff as a result.  She is trying to do as much home exercises as possible to maintain her overall ambulation.  She lives at home with her  and has a flight of stairs navigate to the first bathroom but states that there is a room in the first floor likely converted to a bedroom with a commode to use as a bathroom.  She is here with her daughter and has 6 total children.  Denies any anesthesia or bleeding or clotting disorders.     Past Medical History:     Past Medical History:   Diagnosis Date    Atrial fibrillation (H) 01/01/2011    Cataract     Closed nondisplaced fracture of styloid process of radius     Dry eyes     Facial fracture (H) 01/01/2006    Hypertension     Infection due to 2019 novel coronavirus 10/2022    or 11/22- took paxolovid    Low bone density     NSVT (nonsustained ventricular tachycardia) (H) 01/01/2009    during exercise echo, neg EP study    Pacemaker 07/01/2010    Postmenopausal status     S/P cardiac catheterization 01/01/2009    30-40% non obstructive lesion    SSS (sick sinus syndrome) (H) 10/2022    Ventricular tachycardia, nonsustained (H) 01/01/2009    during stress echo       Past Surgical History:     Past Surgical History:   Procedure Laterality Date    CATARACT EXTRACTION W/ INTRAOCULAR LENS IMPLANT Right 12/04/2018    IMPLANT PACEMAKER      PPM  06/30/2010    Permanent Pacemaker       Social History:     Social History     Socioeconomic History    Marital status:      Spouse name: None    Number of children: None    Years of education: None    Highest education level: None   Occupational History    Occupation: retired     Comment: nurse   Tobacco Use    Smoking  status: Never     Passive exposure: Never    Smokeless tobacco: Never   Vaping Use    Vaping status: Never Used   Substance and Sexual Activity    Alcohol use: No    Drug use: No    Sexual activity: Not Currently     Partners: Male     Social Drivers of Health     Financial Resource Strain: Low Risk  (2024)    Financial Resource Strain     Within the past 12 months, have you or your family members you live with been unable to get utilities (heat, electricity) when it was really needed?: No   Food Insecurity: Low Risk  (2024)    Food Insecurity     Within the past 12 months, did you worry that your food would run out before you got money to buy more?: No     Within the past 12 months, did the food you bought just not last and you didn t have money to get more?: No   Transportation Needs: Low Risk  (2024)    Transportation Needs     Within the past 12 months, has lack of transportation kept you from medical appointments, getting your medicines, non-medical meetings or appointments, work, or from getting things that you need?: No   Interpersonal Safety: Low Risk  (2024)    Interpersonal Safety     Do you feel physically and emotionally safe where you currently live?: Yes     Within the past 12 months, have you been hit, slapped, kicked or otherwise physically hurt by someone?: No     Within the past 12 months, have you been humiliated or emotionally abused in other ways by your partner or ex-partner?: No   Housing Stability: High Risk (2024)    Housing Stability     Do you have housing? : No     Are you worried about losing your housing?: No       Family History:     Family History   Problem Relation Age of Onset    Myocardial Infarction Mother 88        lived to 100    Hypertension Mother     Macular Degeneration Mother     Cardiovascular Father 76         age 80, MI 76 and CHF 80's    Coronary Artery Disease Father     Myocardial Infarction Father 70    Arrhythmia Sister         PPM     Neuropathy Sister     Colon Cancer Sister     Arrhythmia Brother         pacemaker    Arrhythmia Brother         pacemaker, hx rheumatic fever, heart failure    Leukemia Maternal Grandfather     Diabetes Type 2  Maternal Grandfather     Cardiovascular Paternal Grandfather          age 76 of CVD    Coronary Artery Disease Paternal Grandfather     Breast Cancer Daughter 50        Breast, uterine, 2nd breast cancer, HTN    Uterine Cancer Daughter     Thyroid Disease Daughter         bout of thyroiditis with thyrotoxicosis followed by hypothyroidism    Blood Disease Son         hemochromatosis?    Atrial fibrillation Son        Medications:     Current Facility-Administered Medications   Medication Dose Route Frequency Provider Last Rate Last Admin    acetaminophen (TYLENOL) tablet 650 mg  650 mg Oral Q4H PRN Robert Anglin APRN CNP        Or    acetaminophen (TYLENOL) Suppository 650 mg  650 mg Rectal Q4H PRN Robert Anglin APRN CNP        [START ON 2025] aspirin (ASA) chewable tablet 81 mg  81 mg Oral Daily Robert Anglin APRN CNP        bisacodyl (DULCOLAX) EC tablet 5 mg  5 mg Oral Daily PRN Robert Anglin APRN CNP        Or    bisacodyl (DULCOLAX) EC tablet 10 mg  10 mg Oral Daily PRN Robert Anglin APRN CNP        calcium carbonate (TUMS) chewable tablet 1,000 mg  1,000 mg Oral 4x Daily PRN Robert Anglin APRN CNP        [START ON 2025] famotidine (PEPCID) tablet 20 mg  20 mg Oral Daily Robert Anglin APRN CNP        lidocaine (LMX4) cream   Topical Q1H PRN Robert Anglin APRN CNP        lidocaine 1 % 0.1-1 mL  0.1-1 mL Other Q1H PRN Robert Anglin APRN CNP        [START ON 2025] lisinopril (ZESTRIL) tablet 40 mg  40 mg Oral Daily Robert Anglin APRN CNP        [START ON 2025] methimazole (TAPAZOLE) tablet 5 mg  5 mg Oral Daily Robert Anglin APRN CNP        [START ON 2025] metoprolol succinate ER  (TOPROL XL) 24 hr tablet 100 mg  100 mg Oral Daily Robert Anglin APRN CNP        metoprolol succinate ER (TOPROL XL) 24 hr tablet 50 mg  50 mg Oral At Bedtime Robert Anglin APRN CNP        ondansetron (ZOFRAN ODT) ODT tab 4 mg  4 mg Oral Q6H PRN Robert Anglin APRN CNP        Or    ondansetron (ZOFRAN) injection 4 mg  4 mg Intravenous Q6H PRN Robert Anglin APRN CNP        oxyCODONE (ROXICODONE) tablet 5 mg  5 mg Oral Q4H PRN Robert Anglin APRN CNP        Or    oxyCODONE IR (ROXICODONE) tablet 10 mg  10 mg Oral Q4H PRN Robert Anglin APRN CNP        polyethylene glycol (MIRALAX) Packet 17 g  17 g Oral BID PRN Robert Anglin APRN CNP        sodium chloride (PF) 0.9% PF flush 3 mL  3 mL Intracatheter Q8H Robert Anglin APRN CNP        sodium chloride (PF) 0.9% PF flush 3 mL  3 mL Intracatheter q1 min prn Robert Anglin APRN CNP        [START ON 1/24/2025] spironolactone (ALDACTONE) half-tab 12.5 mg  12.5 mg Oral Daily Robert Anglin APRN CNP         Current Outpatient Medications   Medication Sig Dispense Refill    amLODIPine (NORVASC) 5 MG tablet Take 2.5 mg by mouth as needed.      aspirin 81 MG tablet Take 1 tablet by mouth daily.      co-enzyme Q-10 100 MG CAPS capsule Take 100 mg by mouth      diclofenac (VOLTAREN) 1 % topical gel Apply 2 g topically 4 times daily. For L knee 100 g 5    famotidine (PEPCID) 20 MG tablet Take 1 tablet (20 mg) by mouth 2 times daily 180 tablet 3    lisinopril (ZESTRIL) 40 MG tablet Take 1 tablet (40 mg) by mouth daily 90 tablet 4    melatonin 1 MG/ML LIQD liquid Take 1 mg by mouth nightly as needed for sleep      methimazole (TAPAZOLE) 5 MG tablet Take 1 tablet (5 mg) by mouth daily. 30 tablet 4    metoprolol succinate ER (TOPROL XL) 50 MG 24 hr tablet TAKE 2 TABLETS BY MOUTH EVERY MORNING AND 1 TABLET EVERY EVENING 270 tablet 1    ondansetron (ZOFRAN) 4 MG tablet Take 1 tablet (4 mg) by mouth every 6  "hours as needed for nausea (give prior to meals for nausea) 90 tablet 1    spironolactone (ALDACTONE) 25 MG tablet Take 0.5 tablets (12.5 mg) by mouth daily 45 tablet 4    triamcinolone (KENALOG) 0.1 % external cream Apply topically 2 times daily 453.6 g 0    TURMERIC PO          Allergies:     No Known Allergies    Review of Systems:     A comprehensive 10 point review of systems (constitutional, ENT, cardiac, peripheral vascular, respiratory, GI, , musculoskeletal, skin, neurological) was performed and found to be negative except as described in this note.      Physical Exam:     Vital Signs: BP (!) 166/67   Pulse 80   Temp 98  F (36.7  C) (Oral)   Resp 18   Ht 1.575 m (5' 2\")   Wt 54.4 kg (120 lb)   SpO2 96%   BMI 21.95 kg/m    General: Resting comfortably in bed, awake, alert, no apparent distress, appears stated age.    Musculoskeletal:  RLE: No gross deformity. Skin intact. Fires TA/Gastroc/EHL/FHL. SILT in sural, saphenous, deep peroneal, superficial peroneal, and tibial nerve distributions.  Has numbness of her foot that she states is from foot falling asleep while in bed but intact sensation in the aforementioned dermatomal distributions.  Dorsalis pedis 2+ and foot warm and well-perfused      Imaging:     Right shoulder and knee films reviewed which show no acute fracture or dislocation.  Right hip and AP pelvis shows a display subcapital femoral neck fracture.    Data:     CBC:  Lab Results   Component Value Date    WBC 12.5 (H) 01/23/2025    HGB 12.3 01/23/2025     01/23/2025     BMP:  Lab Results   Component Value Date     01/23/2025    POTASSIUM 5.0 01/23/2025    CHLORIDE 102 01/23/2025    CO2 23 01/23/2025    BUN 31.0 (H) 01/23/2025    CR 1.05 (H) 01/23/2025    ANIONGAP 11 01/23/2025    INO 10.5 (H) 01/23/2025     (H) 01/23/2025     Inflammatory Markers:  Lab Results   Component Value Date    WBC 12.5 (H) 01/23/2025    WBC 7.8 06/26/2024    WBC 7.2 07/21/2023    CRP 4.8 " 05/06/2021    CRP 1.0 01/16/2020    CRP 3.7 12/14/2018

## 2025-01-24 NOTE — PLAN OF CARE
Goal Outcome Evaluation:     Patient came up from PACU around 12 pm.      Plan of Care Reviewed With: patient      Patient and daughter.    Outcome Evaluation: Patient alert and oriented x4 with intermittent confusion. Patient anxious, but cooperative and pleasent. Patient is [place], per daughter, family will bring hearing aids this evening or tomorrow. Able to make needs known, call light witin reach. Patient denies SOB, and chest pain. C/o nausea, zorfran administered x1, patient state relief. C/o dizziness when up and walking. Patient is on 2L of oxygen via NC, sats>90%. Rating pain 6/10 after ambulation to the bathroom, managed with PRN tylenol x1, oxycodone x1, and ice packs. Patient ambulated to the bathroom with Ax1 with walker and gait belt. Voiding without difficulties, patient has urinary urgency and dribbling, incontinent brief in palce. See flowsheet for post void PVR. Right hip incision-CDI, aquacel dressing in place, abduction pillow in place.Bruising to right inner thigh.Left PIV patent and SL. Patient BP elevated when she came up from PACU around 12 PM, but later trended down. Patient resting comfortably in bed. Patient daughter at bedside. Bed alarm on when daughter/family is not in room. Per patient , she can not remember when her last BM was, but it has been a couple of days. Bowel medication was requested from pharmacy, still waiting for pharmacy to deliver. Will pass on to incoming nurse to administered. Stop light tool review with patient and daughter. Patient prefers warm/hot water for fluids. Please give warm and not hot water when family is not around. Fluids encouraged this shift.    Continue with plan care.

## 2025-01-24 NOTE — ED NOTES
Bed: UUEDH-I  Expected date:   Expected time:   Means of arrival:   Comments:  Hallway Appropriate

## 2025-01-24 NOTE — PROGRESS NOTES
Isadora Mendez seen and examined by me. Chart, laboratory results and imaging studies personally reviewed by me. I agree with the assessment and plan as outlined in Consult by Dr. Colunga.    Patient presents with right displaced subcapital femoral neck fracture after fall from standing.    Indicated for right cemented hemiartrhoplasty. Risks, benefits and alternatives to proposed procedure discussed in detail. Risks of bleeding, infection, nerve damage, limb length inequality, intraoperative and postoperative fracture, dislocation and possible need for revision were discussed. Possibility of post traumatic and post surgical pain and stiffness discussed. Patient elects to proceed. Signed informed consent obtained and placed on chart.     Victor Manuel Kowalski MD, FAAOS    Orthopedic Trauma  Adult Reconstruction  Department Orthopedic Surgery  [facility]

## 2025-01-24 NOTE — OR NURSING
PACU to Inpatient Nursing Handoff    Patient Isadora Mendez is a 89 year old female who speaks English.   Procedure Procedure(s):  Hemiarthroplasty Hip   Surgeon(s) Primary: Victor Manuel Kowalski MD  Resident - Assisting: Rachell Ricks MD; Keanu Rabago MD     No Known Allergies    Isolation  [unfilled]     Past Medical History   has a past medical history of Atrial fibrillation (H) (01/01/2011), Cataract, Closed nondisplaced fracture of styloid process of radius, Dry eyes, Facial fracture (H) (01/01/2006), Hypertension, Infection due to 2019 novel coronavirus (10/2022), Low bone density, NSVT (nonsustained ventricular tachycardia) (H) (01/01/2009), Pacemaker (07/01/2010), Postmenopausal status, S/P cardiac catheterization (01/01/2009), SSS (sick sinus syndrome) (H) (10/2022), and Ventricular tachycardia, nonsustained (H) (01/01/2009).    Anesthesia General   Dermatome Level     Preop Meds acetaminophen (Tylenol) - time given: 0639  Oxycodone 5mg - time given: 0420  Metoprolol 100 mg - time given: 0732   Nerve block Not applicable   Intraop Meds dexamethasone (Decadron)  fentanyl (Sublimaze): 125 mcg total  ondansetron (Zofran): last given at 0948   Local Meds Yes - Local Cocktail (morphine, ropivacaine, epinephrine, Toradol)   Antibiotics cefazolin (Ancef) - last given at 0955     Pain Patient Currently in Pain: denies   PACU meds  Not applicable   PCA / epidural No   Capnography     Telemetry     Inpatient Telemetry Monitor Ordered? No        Labs Glucose Lab Results   Component Value Date     01/23/2025    GLC 97 01/16/2020       Hgb Lab Results   Component Value Date    HGB 12.3 01/23/2025    HGB 11.3 01/16/2020       INR Lab Results   Component Value Date    INR 1.05 01/23/2025    INR 1.13 09/14/2016      PACU Imaging Completed     Wound/Incision Incision/Surgical Site 01/24/25 Right;Lateral Hip (Active)   Incision Assessment UTV 01/24/25 1020   Dressing Other (Comment) 01/24/25 0948   Closure  Approximated;Staples 01/24/25 0948   Incision Care Ice applied 01/24/25 1020   Dressing Intervention Clean, dry, intact 01/24/25 1020   Number of days: 0      CMS        Equipment ice pack   Other LDA       IV Access Peripheral IV 01/23/25 Anterior;Left Upper forearm (Active)   Site Assessment WDL 01/24/25 1020   Line Status Infusing 01/24/25 1020   Dressing Transparent 01/24/25 1020   Dressing Status clean;dry;intact 01/24/25 1020   Line Intervention Flushed 01/24/25 0657   Phlebitis Scale 0-->no symptoms 01/24/25 1020   Infiltration? no 01/24/25 1020   Number of days: 1      Blood Products Not applicable  mL   Intake/Output Date 01/24/25 0700 - 01/25/25 0659   Shift 8572-6166 7999-0690 3714-7997 24 Hour Total   INTAKE   I.V. 600   600   Shift Total(mL/kg) 600(11.02)   600(11.02)   OUTPUT   Blood 250   250   Shift Total(mL/kg) 250(4.59)   250(4.59)   Weight (kg) 54.43 54.43 54.43 54.43      Drains / Covarrubias     Time of void PreOp Time of Void Prior to Procedure: 0630 (01/24/25 0706)    PostOp Voided (mL): 350 mL (01/24/25 0647)    Diapered? No   Bladder Scan     PO    tolerating sips     Vitals    B/P: (!) 148/64  T: 97.7  F (36.5  C)    Temp src: Axillary  P:  Pulse: 61 (01/24/25 1045)          R: 14  O2:  SpO2: (!) 87 %    O2 Device: None (Room air) (01/24/25 1030)    Oxygen Delivery: 6 LPM (01/24/25 1020)         Family/support present daughter   Patient belongings     Patient transported on bed   DC meds/scripts (obs/outpt) Not applicable   Inpatient Pain Meds Released? Yes       Special needs/considerations None   Tasks needing completion None       Celena Ortega, RN  [place]

## 2025-01-24 NOTE — ED PROVIDER NOTES
[place] EMERGENCY DEPARTMENT ([facility])    1/23/25       History     Chief Complaint   Patient presents with    Fall     Pt reports mechanical fall in the parking lot with resulting right hip and right shoulder pain. Pt is unable to support her weight on her right leg. Pt denies blood thinners. Pt denies hitting head. Denies LOC.     HPI  Isadora Mendez is a 89 year old female who with PMH of hypertension, hyperlipidemia, paroxysmal atrial fibrillation on chronic anticoagulation, sinus sick syndrome status post pacemaker placement (2010), chronic kidney disease and hyperparathyroidism presents to the ED due to a fall.       Past Medical History  Past Medical History:   Diagnosis Date    Atrial fibrillation (H) 01/01/2011    Cataract     Closed nondisplaced fracture of styloid process of radius     Dry eyes     Facial fracture (H) 01/01/2006    Hypertension     Infection due to 2019 novel coronavirus 10/2022    or 11/22- took paxolovid    Low bone density     NSVT (nonsustained ventricular tachycardia) (H) 01/01/2009    during exercise echo, neg EP study    Pacemaker 07/01/2010    Postmenopausal status     S/P cardiac catheterization 01/01/2009    30-40% non obstructive lesion    SSS (sick sinus syndrome) (H) 10/2022    Ventricular tachycardia, nonsustained (H) 01/01/2009    during stress echo     Past Surgical History:   Procedure Laterality Date    CATARACT EXTRACTION W/ INTRAOCULAR LENS IMPLANT Right 12/04/2018    IMPLANT PACEMAKER      PPM  06/30/2010    Permanent Pacemaker     amLODIPine (NORVASC) 5 MG tablet  apixaban ANTICOAGULANT (ELIQUIS) 2.5 MG tablet  aspirin 81 MG tablet  co-enzyme Q-10 100 MG CAPS capsule  diclofenac (VOLTAREN) 1 % topical gel  famotidine (PEPCID) 20 MG tablet  lisinopril (ZESTRIL) 40 MG tablet  melatonin 1 MG/ML LIQD liquid  methimazole (TAPAZOLE) 5 MG tablet  metoprolol succinate ER (TOPROL XL) 50 MG 24 hr tablet  ondansetron (ZOFRAN) 4 MG tablet  spironolactone  "(ALDACTONE) 25 MG tablet  triamcinolone (KENALOG) 0.1 % external cream  TURMERIC PO      No Known Allergies  Family History  Family History   Problem Relation Age of Onset    Myocardial Infarction Mother 88        lived to 100    Hypertension Mother     Macular Degeneration Mother     Cardiovascular Father 76         age 80, MI 76 and CHF 80's    Coronary Artery Disease Father     Myocardial Infarction Father 70    Arrhythmia Sister         PPM    Neuropathy Sister     Colon Cancer Sister     Arrhythmia Brother         pacemaker    Arrhythmia Brother         pacemaker, hx rheumatic fever, heart failure    Leukemia Maternal Grandfather     Diabetes Type 2  Maternal Grandfather     Cardiovascular Paternal Grandfather          age 76 of CVD    Coronary Artery Disease Paternal Grandfather     Breast Cancer Daughter 50        Breast, uterine, 2nd breast cancer, HTN    Uterine Cancer Daughter     Thyroid Disease Daughter         bout of thyroiditis with thyrotoxicosis followed by hypothyroidism    Blood Disease Son         hemochromatosis?    Atrial fibrillation Son      Social History   Social History     Tobacco Use    Smoking status: Never     Passive exposure: Never    Smokeless tobacco: Never   Vaping Use    Vaping status: Never Used   Substance Use Topics    Alcohol use: No    Drug use: No      Past medical history, past surgical history, medications, allergies, family history, and social history were reviewed with the patient. No additional pertinent items.   A complete review of systems was performed with pertinent positives and negatives noted in the HPI, and all other systems negative.    Physical Exam   BP: (!) 166/67  Pulse: 80  Temp: 98  F (36.7  C)  Resp: 18  Height: 157.5 cm (5' 2\")  Weight: 54.4 kg (120 lb)  SpO2: 96 %  Physical Exam  ***    ED Course, Procedures, & Data      Procedures       {ED Course Selections (Optional):872200}  {ED Sepsis CMS Documentation (Optional):587867::\" \"}       No " results found for any visits on 01/23/25.  Medications - No data to display  Labs Ordered and Resulted from Time of ED Arrival to Time of ED Departure - No data to display  No orders to display          {Critical Care Performed?:456501}    Assessment & Plan    ***    I have reviewed the nursing notes. I have reviewed the findings, diagnosis, plan and need for follow up with the patient.    New Prescriptions    No medications on file       Final diagnoses:   None       Peri Rodriguez***  [facility] EMERGENCY DEPARTMENT  1/23/2025

## 2025-01-24 NOTE — PROGRESS NOTES
"Orthopedic Surgery Progress Note: 01/24/2025    Subjective:   No acute events overnight. Pain well-controlled. No new concerns or complaints. Denies SOB, chest Pain.     Objective:   /79 (BP Location: Right arm)   Pulse 65   Temp 98  F (36.7  C) (Oral)   Resp 18   Ht 1.575 m (5' 2\")   Wt 54.4 kg (120 lb)   SpO2 93%   BMI 21.95 kg/m    No intake/output data recorded.  General: NAD. Resting comfortably in bed.  Respiratory: Nonlabored breathing  Musculoskeletal:    Focused Exam of Right Lower Extremity  Fires EHL, FHL, TA, GSC  SILT DP, SP, Saph, Sural, Tibial Nerve Distributions  2+ DP pulse, foot WWP      Laboratory Data:  Lab Results   Component Value Date    WBC 12.5 (H) 01/23/2025    HGB 12.3 01/23/2025     01/23/2025    INR 1.05 01/23/2025         Assessment & Plan:   Isadora Mendez is a 89F with hx of AF (on ASA), sick sinus syndrome (pacemaker), pulmonary HTN with a displaced subcapital FNF after a GLF 01/23.       Plan for Today:  OR for R Hip Hemiarthroplasty this AM    Plan:  - Plan for OR: 01/24 R Hip Alexi  - Anticoagulation/DVT prophylaxis: Mechanical only preop  - Antibiotics: Periop Ancef  - Imaging: Complete  - Activity: Bedrest  - Weight bearing: NWB RLE  - Pain control: Multimodal  - Diet: NPO   - Follow-up: Pending  - Disposition: Pending     Staff: MD Keanu Manning MD  Orthopedic Surgery PGY2  [phone]        FOLLOWUP:    Future Appointments   Date Time Provider Department Center   1/24/2025  8:15 AM Sonja Stewart, OTR [facility]OT [place]   1/24/2025 10:15 AM Tricia Healy, PT [facility] [place]   1/29/2025  1:00 PM Nas Banda, APRN CNP URSLEP [place]   3/5/2025  1:00 PM June Acuña MD MDE [facility]   3/24/2025 12:00 AM UC ICD REMOTE UCC[facility]SV [facility]   7/25/2025 11:00 AM Jovana Macias MD [facility]       "

## 2025-01-24 NOTE — PROGRESS NOTES
[facility]    Medicine Progress Note - Hospitalist Service, GOLD TEAM 19    Date of Admission:  1/23/2025    Assessment & Plan      Isadora Mendez is a 89 year old woman admitted on 1/23/2025. She has a history of hypertension, non-obstructive CAD, hyperlipidemia, atrial fibrillation, sick sinus syndrome s/p pacemaker placement, CKD and hyperparathyroidism who is admitted with a hip fracture, after a ground-level fall..    #) R hip pain.   #) Right femoral neck fracture.     After a fall, patient lost her balance and fell on her right side.  Orthopedic surgery consulted.  Now status post right Hemiarthroplasty Hip   By Dr Kowalski.   No significant complications reported.    Postop management per orthopedic surgery.    Per orthopedic surgery:      Activity: Up with assist and assistive devices as needed until independent. Posterior hip precautions to operative side x 3 months:  1) No hip flexion beyond 90 degrees  2) No adduction beyond midline  3) No internal rotation beyond neutral   Weight bearing status: WBAT  Antibiotics: Cefazolin x 24 hours  Diet: Begin with clear fluids and progress diet as tolerated. Bowel regimen. Anti-emetics PRN.    DVT prophylaxis:  Mechanical and home ASA to resume POD1  Elevation: Elevate heels off of bed on pillows   Wound Care: Mepilex x7d  Drains: none  Pain management: Orals PRN, IV for breakthrough only  X-rays: AP Pelvis and Lateral operative hip in PACU.   Physical Therapy: Mobilization, ROM, ADL's, Posterior hip precautions.   Occupational Therapy: ADL's  Labs: Trend Hgb on POD #1, 2  Consults: PT, OT. Hospitalist, appreciate assistance in caring for this patient throughout the perioperative period  Follow Up: 2 weeks with Dr. Kowalski's team      #) History of hypertension  Currently controlled.  -Hold home lisinopril,  spironolactone  -Resume PTA amlodipine, metoprolol  -Monitor blood pressure  = As needed hydralazine for systolic  blood pressure more than 160    #) History of atrial fibrillation  - Only on ASA and metoprolol  -Continue metoprolol.  Resume aspirin when okay with surgical team.    #) History of heart failure with a preserved ejection fraction  -Hold home spironolactone.  -Maintain euvolemia  -Monitor closely    #) History of hyperthyroidism  - Continue home methimazole    #) Post op delirium, mild.    -Patient bit restless, otherwise alert interactive, nonfocal.  Very pleasant.  -Delirium precautions  -Judicious use of narcotics  -Pain control  -Low-dose Seroquel HS and pRN  -Melatonin at bedtime  - bed alarm, 1:1 prn.             Diet: Advance Diet as Tolerated: Regular Diet Adult    DVT Prophylaxis: per ortho  Covarrubias Catheter: Not present  Lines: None     Cardiac Monitoring: None  Code Status: Full Code      Clinically Significant Risk Factors Present on Admission                 # Drug Induced Platelet Defect: home medication list includes an antiplatelet medication   # Hypertension: Noted on problem list                # Pacemaker present       Social Drivers of Health    Housing Stability: High Risk (9/26/2024)    Housing Stability     Do you have housing? : No     Are you worried about losing your housing?: No          Disposition Plan     Medically Ready for Discharge: Anticipated in 2-4 Days  Discussed with family member at bedside           Rojas Acuna MD  Hospitalist Service, GOLD TEAM 19  [facility]  Securely message with [employer] (more info)  Text page via [employer] Paging/Directory   See signed in provider for up to date coverage information  ______________________________________________________________________    Interval History   Seen sp surgery.   Currently doing well.  Family member at bedside.  Patient alert, interactive.  Bit confused per family member, with restless  Pain controlled.   No fever or chills.  No chest pain or shortness of breath.  No cough.  No  vomiting.    Physical Exam   Vital Signs: Temp: 97.7  F (36.5  C) Temp src: Axillary BP: 133/58 Pulse: 61   Resp: 16 SpO2: 95 % O2 Device: Nasal cannula Oxygen Delivery: 2 LPM  Weight: 120 lbs 0 oz    General Appearance: Awake, interactive, NAD, thin built  HEENT: AT/NC, Anicteric, dry mucous membrane  Respiratory: Normal work of breathing.    Cardiovascular: S1 S2 Regular.  GI/Abd: Soft. NT. ND.   Extremities: Distally wwp. No pedal edema.  Neuro: Grossly non focal.   Psychiatry: Stable mood.     Medical Decision Making       48 MINUTES SPENT BY ME on the date of service doing chart review, history, exam, documentation & further activities per the note.      Data     I have personally reviewed the following data over the past 24 hrs:    12.5 (H)  \   12.3   / 196     136 102 31.0 (H) /  120 (H)   5.0 23 1.05 (H) \     ALT: 21 AST: 35 AP: 119 TBILI: 0.5   ALB: 4.1 TOT PROTEIN: 7.6 LIPASE: N/A     INR:  1.05 PTT:  29   D-dimer:  N/A Fibrinogen:  N/A       Imaging results reviewed over the past 24 hrs:   Recent Results (from the past 24 hours)   XR Chest 2 Views    Narrative    EXAM: XR CHEST 2 VIEWS  LOCATION: [facility]  DATE: 1/23/2025    INDICATION: Fall.  COMPARISON: 5/24/2023.      Impression    IMPRESSION: No focal airspace consolidation. No pleural effusion or pneumothorax.    Heart size is mildly enlarged. Atherosclerotic calcifications of the thoracic aorta. Left chest wall cardiac implantable electronic device.   XR Pelvis 1/2 Views    Narrative    EXAM: XR PELVIS 1/2 VIEWS  LOCATION: [facility]  DATE: 1/23/2025    INDICATION: fall  COMPARISON: None.      Impression    IMPRESSION: A mildly displaced transverse fracture of the right femoral neck is noted with mild superior elevation of the right femoral shaft. Right femoral head remains seated in the right acetabulum. Mild degenerative narrowing of bilateral hip  joints.   Moderate osteoarthritis of the right knee joint is suboptimally evaluated.   XR Knee Right 1/2 Views    Narrative    EXAM: XR KNEE RIGHT 1/2 VIEWS, XR FEMUR RIGHT 2 VIEWS  LOCATION: [facility]  DATE: 1/23/2025    INDICATION: fall, pain  COMPARISON: None.      Impression    IMPRESSION: Angulated and displaced femoral neck fracture. No other fracture or dislocation. Moderate osteoarthritis in the knee.   XR Femur Right 2 Views    Narrative    EXAM: XR KNEE RIGHT 1/2 VIEWS, XR FEMUR RIGHT 2 VIEWS  LOCATION: [facility]  DATE: 1/23/2025    INDICATION: fall, pain  COMPARISON: None.      Impression    IMPRESSION: Angulated and displaced femoral neck fracture. No other fracture or dislocation. Moderate osteoarthritis in the knee.   XR Shoulder Right G/E 3 Views    Narrative    EXAM: XR SHOULDER RIGHT G/E 3 VIEWS  LOCATION: [facility]  DATE: 1/23/2025    INDICATION: right shoulder pain; fall  COMPARISON: None.      Impression    IMPRESSION: No acute fracture or dislocation.   XR Pelvis w Hip Port Right G/E 2 Views    Narrative    EXAM: XR PELVIS AND HIP PORTABLE RIGHT 2 VIEWS  LOCATION: [facility]  DATE: 1/24/2025    INDICATION: Status post Hip surgery  COMPARISON: 01/23/2025      Impression    IMPRESSION: There is a new bipolar right hip hemiarthroplasty managing a proximal femoral fracture in expected position. No displaced periprosthetic fracture. Expected soft tissue and intra-articular gas. Josee overlie the right hip. Calcification of   the hamstring origins.     Recent Labs   Lab 01/23/25  2107   WBC 12.5*   HGB 12.3   MCV 93      INR 1.05      POTASSIUM 5.0   CHLORIDE 102   CO2 23   BUN 31.0*   CR 1.05*   ANIONGAP 11   INO 10.5*   *   ALBUMIN 4.1   PROTTOTAL 7.6   BILITOTAL 0.5   ALKPHOS 119   ALT 21    AST 35

## 2025-01-24 NOTE — ED TRIAGE NOTES
Pt reports mechanical fall in the parking lot with resulting right hip and right shoulder pain. Pt is unable to support her weight on her right leg. Pt denies blood thinners. Pt denies hitting head. Denies LOC.     Triage Assessment (Adult)       Row Name 01/23/25 4838          Triage Assessment    Airway WDL WDL        Respiratory WDL    Respiratory WDL WDL        Skin Circulation/Temperature WDL    Skin Circulation/Temperature WDL WDL        Cardiac WDL    Cardiac WDL WDL        Peripheral/Neurovascular WDL    Peripheral Neurovascular WDL WDL        Cognitive/Neuro/Behavioral WDL    Cognitive/Neuro/Behavioral WDL WDL

## 2025-01-24 NOTE — PLAN OF CARE
"[id number]    Goal Outcome Evaluation:    Plan of Care Reviewed With: patient, child  Overall Patient Progress: no change  Outcome Evaluation: Newly admited to the unit. Care plan is ongoing  [facility] ADMISSION NOTE    Patient admitted to room 535 at approximately 230 Hrs via cart from emergency room. Patient was accompanied by daughter.     Verbal SBAR report received from Maryellen PARRISH RN   prior to patient arrival.   Patient trasferred to bed via A3 with bed linen. Patient alert and oriented X 3. With disorientation to time Pain is controlled with current analgesics.  Medication(s) being used: narcotic analgesics including oxycodone (Oxycontin, Oxyir).  . Admission vital signs: Blood pressure 128/63, pulse 60, temperature 97.7  F (36.5  C), temperature source Oral, resp. rate 18, height 1.575 m (5' 2\"), weight 54.4 kg (120 lb), SpO2 93%, not currently breastfeeding. daughter was oriented to plan of care, call light, bed controls, tv, telephone, bathroom, and visiting hours.     Risk Assessment  The following safety risks were identified during admission: fall and skin. Yellow risk band applied: YES.     Skin Initial Assessment  This writer admitted this patient and completed a full skin assessment and Gm score in the Adult PCS flowsheet.   Photo documentation of skin problem and/or wound competed via [email or web address] application (located under Media):  N/A    Appropriate interventions initiated as needed.     Secondary skin check completed by Lexa WALKER RN.    Education  Patient has a [place] to Observation order: Yes  Observation education completed and documented: Yes      CHG bath completed, Vitals stable. Pain managed with PRN Tylenol and Oxycodone and tolerated. Pt transferred out of the unit for pre-op @ 1227 in the company of daughter.     Vivian Guillermo RN      "

## 2025-01-24 NOTE — OP NOTE
[facility]  Orthopedic Operative Note        Isadora Mendez MRN# [id number]   YOB: 1931  Procedure Date:  1/24/2025  Age: 89 year old       PREOPERATIVE DIAGNOSIS:  Femoral neck fracture right hip.    POSTOPERATIVE DIAGNOSIS:  Femoral neck fracture right hip.    PROCEDURE PERFORMED: Posterior approach cemented hemiarthroplasty right hip     SURGEON:  Victor Manuel Kowalski MD    ASSISTANTS:    Keanu Rabago MD PGY2  Keshawn Ricks MD PGY2    ANESTHESIA:  General    INDICATIONS:  Isadora Mendez  is a 89 year old-year-old with femoral neck fracture of right hip.  Discussed both various operative and nonoperative management.  Risks of surgery discussed included but not limited to bleeding, infection, damage to surrounding neurovascular structures, leg length inequality, dislocation, periprosthetic fracture, need for revision surgery, blood clots, pulmonary embolus, stroke, anesthetic complications and even death.  No guarantees given or implied. Patient verbalizes and acknowledges risks and wishes to proceed. This procedure has been fully reviewed with the patient and written informed consent has been obtained.     IMPLANTS:  Implant Name Type Inv. Item Serial No.  Lot No. LRB No. Used Action   CEMENT RESTRICTOR STRK FEMORAL [facility] I293-9104 - [id number] Total Joint Component/Insert CEMENT RESTRICTOR STRK FEMORAL [facility] [id number] [id number] [employer] [id number] Right 1 Implanted   IMP SPACER OSTEONICS DISTAL CEMENT 10MM 6401-0320 - [id number] Metallic Hardware/[place] IMP SPACER OSTEONICS DISTAL CEMENT 10MM 5145-0392  [employer] [id number] Right 1 Implanted   IMP STEM FEMORAL CHRIS LOR OMNIFIT SZ 5 132DEG 3315-0625 - [id number] Total Joint Component/Insert IMP STEM FEMORAL CHRIS LOR OMNIFIT SZ 5 132DEG 4385-4075  [employer] [id number] Right 1 Implanted   BONE CEMENT SIMPLEX FULL DOSE 6191-1-001 - [id number] Cement, Bone BONE CEMENT  SIMPLEX FULL DOSE 6191-1-001  [employer] [id number] Right 1 Implanted   BONE CEMENT SIMPLEX FULL DOSE 6191-1-001 - [id number] Cement, Bone BONE CEMENT SIMPLEX FULL DOSE 6191-1-001  [employer] [id number] Right 1 Implanted   IMP HEAD STRK FEMORAL UHR BIPOLAR [id number] UH1-46-28 - [id number] Total Joint Component/Insert IMP HEAD STRK FEMORAL UHR BIPOLAR [id number] UH1-46-28  [employer] [id number] Right 1 Implanted   IMP HEAD STRK FEMORAL C-TAPER COCR LFIT 28MM +0MM  - [id number] Total Joint Component/Insert IMP HEAD STRK FEMORAL C-TAPER COCR LFIT 28MM +0MM   [employer] [id number] Right 1 Implanted       PROCEDURE:  Patient identified in preoperative holding area and the operative site marked with indelible marker. Brought to operating room and transferred to operating room table. Underwent induction of general anesthesia. Subsequently turned to the right lateral decubitus position with axillary roll and all bony prominences well padded.  Chlorohexidine prescrub performed and the right hip was prepped with Chloroprep and draped in the usual sterile fashion.  A posterolateral incision was made.   Dissection was carried through the IT band and tensor fascia. Gluteus enid split in line with its fibers. Retractor placed deep to gluteus medius. Bursa excised. The piriformis, external rotators and capsule were identified, tagged with #2 Ethibond, divided, and preserved for later repair. Proximal 1/3 of quadratus femoris released.  Medial femoral circumflex artery cauterized. Posterolateral release carried down to the level of the lesser trochanter. The leg was placed in combined adduction, internal rotation and flexion. A neck cut was made maintaining 1 cm of femoral neck proximal to lesser trochanter. The head was removed with a corkscrew and noted to be sized at a 46 mm.  Found a 46 mm trial head to fit most appropriately in the socket.  Copiously irrigated the hip socket, removing all bony  debris.  Box osteotome utilized to access the proximal femoral canal.  Canal finder placed.  Sequentially reamed and broached up to a size 5 cemented.  We subsequently retained the final broach and the trial head was applied and hip reduced. During trialing was it was found to be stable and found to have a appropriate amount of shuck.  Leg lengths felt symmetric. We subsequently gently dislocated and removed all trial components.  We placed a cement restrictor.  We then copiously irrigated the femoral canal and dried it thoroughly under suction.  We subsequently mixed cement and at the correct point in polymerization injected the femoral canal with cement and gently pressurized.  We subsequently inserted of the size 5 stem and held it with axial pressure to prevent backout and maintained appropriate anteversion until cement was polymerized.  We subsequently again trialed.  We subsequently again dislocated and then placed final size 46 mm head.  Impacted the head onto clean and dried taper.  The hip was reduced.  The hip again found to be stable throughout full range of motion including flexion, adduction and internal rotation as well as extension and external rotation.  Performed a Xperience soak copiously irrigated the wound.  A gram of vancomycin was placed deep. The piriformis and short external rotators and capsule were reattached to the trochanter through drill holes.  The wound was then irrigated thoroughly and closed in layers interrupted #2 Ehibond and running #1 StrataFix for fascia, 2-0 Vicryl for subcutaneous layer and  3-0 Monocryl and Dermabond for skin. A sterile dressing was applied with Aquacel.  There were no intraoperative complications and patient tolerated procedure well. Sponge and needle counts were correct and the patient was returned to the recovery room in stable condition.    PLAN:  WBAT with Posterior hip precautions x 6 weeks  Hip abduction pillow when sleeping x 6 weeks  Antibiotics x 24  hours with Ancef  DVT prophylaxis with SCDs and ASA 81 g p.o. twice daily for 4 weeks  Multimodal pain control with narcotics for breakthrough only  Keep Aquacel dressing intact x 2 weeks. OK to shower with dressing in-place.  PACU x-rays AP Pelvis   Follow Up in 2 week wound check with AP pelvis and cross table lateral hip right hip    Victor Manuel Kowalski MD, FAAOS    Orthopedic Trauma  Adult Reconstruction  Department Orthopedic Surgery  [facility]

## 2025-01-24 NOTE — H&P
[facility]    History and Physical - Hospitalist Service, GOLD TEAM        Date of Admission:  1/23/2025    Assessment & Plan      Isadora Mendez is a 89 year old woman admitted on 1/23/2025. She has a history of hypertension, non-obstructive CAD, hyperlipidemia, atrial fibrillation, sick sinus syndrome s/p pacemaker placement, CKD and hyperparathyroidism who is admitted with a hip fracture.    #) Hip fracture - The patient fell today on her right side and imaging shows a R femoral neck fracture per my read, although a full radiology read is pending.  - SCDs for prophylaxis  - Tylenol, oxycodone PRN pain  - Consult orthopedics  - From a pre op standpoint she is fairly inactive, but able to walk a city block unassisted with a few breaks.  She denies any exertional chest pain.  She has never had major surgeries, and doesn't think she's had anaesthesia since she had her wisdom teeth out in her twenties.  I do not see any indication for further work up prior to surgery, or anything that should delay her procedure.  - NPO midnight    #) History of hypertension  - Continue home lisinopril, metoprolol    #) History of atrial fibrillation  - Only on ASA and metoprolol    #) History of heart failure with a preserved ejection fraction  - Continue home spironolactone    #) History of hyperthyroidism  - Continue home methimazole            Diet:  Regular then NPO midnight  DVT Prophylaxis: Pneumatic Compression Devices  Covarrubias Catheter: Not present  Lines: None     Cardiac Monitoring: None  Code Status:  Full    Clinically Significant Risk Factors Present on Admission                # Drug Induced Coagulation Defect: home medication list includes an anticoagulant medication  # Drug Induced Platelet Defect: home medication list includes an antiplatelet medication   # Hypertension: Noted on problem list                # Pacemaker present       Disposition Plan     Medically Ready for  Discharge:          The patient's care was discussed with the Attending Physician, Dr. Avila .    IBRAHIMA [facility]  Hospitalist Service, [facility]  Securely message with [facility] (more info)  Text page via [facility] Paging/Directory   See signed in provider for up to date coverage information    ______________________________________________________________________    Chief Complaint   Femur fracture    History is obtained from the patient    History of Present Illness   Isadora Mendez is a 89 year old woman admitted on 1/23/2025. She has a history of hypertension, non-obstructive CAD, hyperlipidemia, atrial fibrillation, sick sinus syndrome s/p pacemaker placement, CKD and hyperparathyroidism who is admitted with a hip fracture.    The patient was getting out of a car today when she turned, stumbled and fell on her right side.  She now has severe pain in her right hip with movement.     She denies any history of major surgery, and her last exposure to anesthesia was in her twenties when she had her wisdom teeth out.  Her mobility is limited at baseline due to unsteadiness, but she tells me she can walk a city block with a few rests.  She never gets exertional chest pain.      Past Medical History    Past Medical History:   Diagnosis Date    Atrial fibrillation (H) 01/01/2011    Cataract     Closed nondisplaced fracture of styloid process of radius     Dry eyes     Facial fracture (H) 01/01/2006    Hypertension     Infection due to 2019 novel coronavirus 10/2022    or 11/22- took paxolovid    Low bone density     NSVT (nonsustained ventricular tachycardia) (H) 01/01/2009    during exercise echo, neg EP study    Pacemaker 07/01/2010    Postmenopausal status     S/P cardiac catheterization 01/01/2009    30-40% non obstructive lesion    SSS (sick sinus syndrome) (H) 10/2022    Ventricular tachycardia, nonsustained (H) 01/01/2009    during stress echo        Past Surgical History   Past Surgical History:   Procedure Laterality Date    CATARACT EXTRACTION W/ INTRAOCULAR LENS IMPLANT Right 12/04/2018    IMPLANT PACEMAKER      PPM  06/30/2010    Permanent Pacemaker       Prior to Admission Medications   Prior to Admission Medications   Prescriptions Last Dose Informant Patient Reported? Taking?   TURMERIC PO   Yes No   amLODIPine (NORVASC) 5 MG tablet   Yes No   Sig: Take 2.5 mg by mouth as needed.   apixaban ANTICOAGULANT (ELIQUIS) 2.5 MG tablet   No No   Sig: Take 1 tablet (2.5 mg) by mouth 2 times daily   aspirin 81 MG tablet  Self, Daughter Yes No   Sig: Take 1 tablet by mouth daily.   co-enzyme Q-10 100 MG CAPS capsule   Yes No   Sig: Take 100 mg by mouth   diclofenac (VOLTAREN) 1 % topical gel   No No   Sig: Apply 2 g topically 4 times daily. For L knee   famotidine (PEPCID) 20 MG tablet   No No   Sig: Take 1 tablet (20 mg) by mouth 2 times daily   lisinopril (ZESTRIL) 40 MG tablet   No No   Sig: Take 1 tablet (40 mg) by mouth daily   melatonin 1 MG/ML LIQD liquid   Yes No   Sig: Take 1 mg by mouth nightly as needed for sleep   methimazole (TAPAZOLE) 5 MG tablet   No No   Sig: Take 1 tablet (5 mg) by mouth daily.   metoprolol succinate ER (TOPROL XL) 50 MG 24 hr tablet   No No   Sig: TAKE 2 TABLETS BY MOUTH EVERY MORNING AND 1 TABLET EVERY EVENING   ondansetron (ZOFRAN) 4 MG tablet   No No   Sig: Take 1 tablet (4 mg) by mouth every 6 hours as needed for nausea (give prior to meals for nausea)   spironolactone (ALDACTONE) 25 MG tablet   No No   Sig: Take 0.5 tablets (12.5 mg) by mouth daily   triamcinolone (KENALOG) 0.1 % external cream   No No   Sig: Apply topically 2 times daily      Facility-Administered Medications: None         Physical Exam   Vital Signs: Temp: 98  F (36.7  C) Temp src: Oral BP: (!) 166/67 Pulse: 80   Resp: 18 SpO2: 96 % O2 Device: None (Room air)    Weight: 120 lbs 0 oz    Physical Exam   Constitutional:   Well nourished, well developed,  resting comfortably   Cardiovascular: Regular rate and rhythm without murmurs or gallops  Pulmonary/Chest: Clear to auscultation bilaterally, with no wheezes or retractions. No respiratory distress.  GI: Soft with good bowel sounds.  Non-tender, non-distended, with no guarding, no rebound, no peritoneal signs.   Musculoskeletal:  No edema or clubbing   Skin: Skin is warm and dry. No rash noted.   Neurological: Alert and oriented to person, place, and time. Nonfocal exam  Psychiatric:  Normal mood and affect.      Medical Decision Making       30 MINUTES SPENT BY ME on the date of service doing chart review, history, exam, documentation & further activities per the note.      Data   CBC, BMP, Xrays reviewed

## 2025-01-24 NOTE — ED PROVIDER NOTES
ED Provider Note  [facility]      History     Chief Complaint   Patient presents with    Fall     Pt reports mechanical fall in the parking lot with resulting right hip and right shoulder pain. Pt is unable to support her weight on her right leg. Pt denies blood thinners. Pt denies hitting head. Denies LOC.     HPI  Isadora Mendez is a 89 year old female who with PMH of hypertension, hyperlipidemia, paroxysmal atrial fibrillation on chronic anticoagulation, sinus sick syndrome status post pacemaker placement (2010), chronic kidney disease and hyperparathyroidism presents to the ED after a fall. She fell while walking into the grocery store onto her right side, striking her shoulder and hip. She denies hitting her head. She finished shopping using a in-store mobility scooter. She is unable to bare weight on her right leg. She denies preceding chest pain, palpitations, dyspnea, and dizziness. She denies loss of consciousness, urinary incontinence, or memory loss. Currently, she has pain in her right shoulder, right hip, and right knee, and she denies any chest pain dyspnea, or head pain.     Past Medical History  Past Medical History:   Diagnosis Date    Atrial fibrillation (H) 01/01/2011    Cataract     Closed nondisplaced fracture of styloid process of radius     Dry eyes     Facial fracture (H) 01/01/2006    Hypertension     Infection due to 2019 novel coronavirus 10/2022    or 11/22- took paxolovid    Low bone density     NSVT (nonsustained ventricular tachycardia) (H) 01/01/2009    during exercise echo, neg EP study    Pacemaker 07/01/2010    Postmenopausal status     S/P cardiac catheterization 01/01/2009    30-40% non obstructive lesion    SSS (sick sinus syndrome) (H) 10/2022    Ventricular tachycardia, nonsustained (H) 01/01/2009    during stress echo     Past Surgical History:   Procedure Laterality Date    CATARACT EXTRACTION W/ INTRAOCULAR LENS IMPLANT Right 12/04/2018    IMPLANT  PACEMAKER      PPM  2010    Permanent Pacemaker     amLODIPine (NORVASC) 5 MG tablet  apixaban ANTICOAGULANT (ELIQUIS) 2.5 MG tablet  aspirin 81 MG tablet  co-enzyme Q-10 100 MG CAPS capsule  diclofenac (VOLTAREN) 1 % topical gel  famotidine (PEPCID) 20 MG tablet  lisinopril (ZESTRIL) 40 MG tablet  melatonin 1 MG/ML LIQD liquid  methimazole (TAPAZOLE) 5 MG tablet  metoprolol succinate ER (TOPROL XL) 50 MG 24 hr tablet  ondansetron (ZOFRAN) 4 MG tablet  spironolactone (ALDACTONE) 25 MG tablet  triamcinolone (KENALOG) 0.1 % external cream  TURMERIC PO      No Known Allergies  Family History  Family History   Problem Relation Age of Onset    Myocardial Infarction Mother 88        lived to 100    Hypertension Mother     Macular Degeneration Mother     Cardiovascular Father 76         age 80, MI 76 and CHF 80's    Coronary Artery Disease Father     Myocardial Infarction Father 70    Arrhythmia Sister         PPM    Neuropathy Sister     Colon Cancer Sister     Arrhythmia Brother         pacemaker    Arrhythmia Brother         pacemaker, hx rheumatic fever, heart failure    Leukemia Maternal Grandfather     Diabetes Type 2  Maternal Grandfather     Cardiovascular Paternal Grandfather          age 76 of CVD    Coronary Artery Disease Paternal Grandfather     Breast Cancer Daughter 50        Breast, uterine, 2nd breast cancer, HTN    Uterine Cancer Daughter     Thyroid Disease Daughter         bout of thyroiditis with thyrotoxicosis followed by hypothyroidism    Blood Disease Son         hemochromatosis?    Atrial fibrillation Son      Social History   Social History     Tobacco Use    Smoking status: Never     Passive exposure: Never    Smokeless tobacco: Never   Vaping Use    Vaping status: Never Used   Substance Use Topics    Alcohol use: No    Drug use: No      A medically appropriate review of systems was performed with pertinent positives and negatives noted in the HPI, and all other systems  "negative.    Physical Exam   BP: (!) 166/67  Pulse: 80  Temp: 98  F (36.7  C)  Resp: 18  Height: 157.5 cm (5' 2\")  Weight: 54.4 kg (120 lb)  SpO2: 96 %  Physical Exam  General: Calm and in no acute distress  Head: Normocephalic and a traumatic   Cardiovascular: Extremities warm   Respiratory: Clear to auscultation and breathing comfortably on room air  GI: Soft, nontender, and nondistended  Neuro: No focal neurological deficits.   Psych: Appropriate affect  MSK: Tenderness to palpation of lateral and posterior right shoulder. Right shoulder has full range of motion. Clavicles and chest wall stable. No pain in R knee with isolated flexion. Tenderness to palpation of right hip. Pelvis stable. Sensation intact in distal extremities. No vertebral tenderness and spine without stepoffs.     ED Course, Procedures, & Data     ED Course as of 01/23/25 2218   Thu Jan 23, 2025   2208 XR Pelvis and Hip Bilateral 2 Views   2213 XR Hip Right 1 View   2213 XR Hip Right 1 View   2214 XR Pelvis and Hip Bilateral 2 Views   2217 XR Pelvis and Hip Bilateral 2 Views   2218 XR Hip Right 1 View   2218 XR Pelvis and Hip Bilateral 2 Views     Procedures              No results found for any visits on 01/23/25.  Medications - No data to display  Labs Ordered and Resulted from Time of ED Arrival to Time of ED Departure - No data to display  XR Chest 2 Views    (Results Pending)   XR Shoulder Right G/E 3 Views    (Results Pending)   XR Knee Right 3 Views    (Results Pending)   XR Pelvis w Hip Right 1 View    (Results Pending)        Results for orders placed or performed during the hospital encounter of 01/23/25   XR Chest 2 Views     Status: None    Narrative    EXAM: XR CHEST 2 VIEWS  LOCATION: [facility]  DATE: 1/23/2025    INDICATION: Fall.  COMPARISON: 5/24/2023.      Impression    IMPRESSION: No focal airspace consolidation. No pleural effusion or pneumothorax.    Heart size is mildly " enlarged. Atherosclerotic calcifications of the thoracic aorta. Left chest wall cardiac implantable electronic device.   XR Shoulder Right G/E 3 Views     Status: None    Narrative    EXAM: XR SHOULDER RIGHT G/E 3 VIEWS  LOCATION: [facility]  DATE: 1/23/2025    INDICATION: right shoulder pain; fall  COMPARISON: None.      Impression    IMPRESSION: No acute fracture or dislocation.   XR Femur Right 2 Views     Status: None    Narrative    EXAM: XR KNEE RIGHT 1/2 VIEWS, XR FEMUR RIGHT 2 VIEWS  LOCATION: [facility]  DATE: 1/23/2025    INDICATION: fall, pain  COMPARISON: None.      Impression    IMPRESSION: Angulated and displaced femoral neck fracture. No other fracture or dislocation. Moderate osteoarthritis in the knee.   XR Pelvis 1/2 Views     Status: None    Narrative    EXAM: XR PELVIS 1/2 VIEWS  LOCATION: [facility]  DATE: 1/23/2025    INDICATION: fall  COMPARISON: None.      Impression    IMPRESSION: A mildly displaced transverse fracture of the right femoral neck is noted with mild superior elevation of the right femoral shaft. Right femoral head remains seated in the right acetabulum. Mild degenerative narrowing of bilateral hip joints.   Moderate osteoarthritis of the right knee joint is suboptimally evaluated.   XR Knee Right 1/2 Views     Status: None    Narrative    EXAM: XR KNEE RIGHT 1/2 VIEWS, XR FEMUR RIGHT 2 VIEWS  LOCATION: [facility]  DATE: 1/23/2025    INDICATION: fall, pain  COMPARISON: None.      Impression    IMPRESSION: Angulated and displaced femoral neck fracture. No other fracture or dislocation. Moderate osteoarthritis in the knee.   Basic metabolic panel     Status: Abnormal   Result Value Ref Range    Sodium 136 135 - 145 mmol/L    Potassium 5.0 3.4 - 5.3 mmol/L    Chloride 102 98 - 107 mmol/L    Carbon  Dioxide (CO2) 23 22 - 29 mmol/L    Anion Gap 11 7 - 15 mmol/L    Urea Nitrogen 31.0 (H) 8.0 - 23.0 mg/dL    Creatinine 1.05 (H) 0.51 - 0.95 mg/dL    GFR Estimate 49 (L) >60 mL/min/1.73m2    Calcium 10.5 (H) 8.8 - 10.4 mg/dL    Glucose 120 (H) 70 - 99 mg/dL   INR     Status: Normal   Result Value Ref Range    INR 1.05 0.85 - 1.15   CBC with platelets and differential     Status: Abnormal   Result Value Ref Range    WBC Count 12.5 (H) 4.0 - 11.0 10e3/uL    RBC Count 4.02 3.80 - 5.20 10e6/uL    Hemoglobin 12.3 11.7 - 15.7 g/dL    Hematocrit 37.5 35.0 - 47.0 %    MCV 93 78 - 100 fL    MCH 30.6 26.5 - 33.0 pg    MCHC 32.8 31.5 - 36.5 g/dL    RDW 13.4 10.0 - 15.0 %    Platelet Count 196 150 - 450 10e3/uL    % Neutrophils 80 %    % Lymphocytes 13 %    % Monocytes 5 %    % Eosinophils 2 %    % Basophils 0 %    % Immature Granulocytes 1 %    NRBCs per 100 WBC 0 <1 /100    Absolute Neutrophils 10.0 (H) 1.6 - 8.3 10e3/uL    Absolute Lymphocytes 1.6 0.8 - 5.3 10e3/uL    Absolute Monocytes 0.6 0.0 - 1.3 10e3/uL    Absolute Eosinophils 0.2 0.0 - 0.7 10e3/uL    Absolute Basophils 0.0 0.0 - 0.2 10e3/uL    Absolute Immature Granulocytes 0.1 <=0.4 10e3/uL    Absolute NRBCs 0.0 10e3/uL   Partial thromboplastin time     Status: Normal   Result Value Ref Range    aPTT 29 22 - 38 Seconds   Hepatic panel     Status: Normal   Result Value Ref Range    Protein Total 7.6 6.4 - 8.3 g/dL    Albumin 4.1 3.5 - 5.2 g/dL    Bilirubin Total 0.5 <=1.2 mg/dL    Alkaline Phosphatase 119 40 - 150 U/L    AST 35 0 - 45 U/L    ALT 21 0 - 50 U/L    Bilirubin Direct <0.20 0.00 - 0.30 mg/dL   CBC with platelets differential     Status: Abnormal    Narrative    The following orders were created for panel order CBC with platelets differential.  Procedure                               Abnormality         Status                     ---------                               -----------         ------                     CBC with platelets and d...[[id number]]   Abnormal            Final result                 Please view results for these tests on the individual orders.   ABO/Rh type and screen     Status: None (In process)    Narrative    The following orders were created for panel order ABO/Rh type and screen.  Procedure                               Abnormality         Status                     ---------                               -----------         ------                     Adult Type and Screen[[id number]]                            In process                   Please view results for these tests on the individual orders.     Medications   famotidine (PEPCID) tablet 20 mg (has no administration in time range)   lisinopril (ZESTRIL) tablet 40 mg (has no administration in time range)   methimazole (TAPAZOLE) tablet 5 mg (has no administration in time range)   metoprolol succinate ER (TOPROL XL) 24 hr tablet 100 mg (has no administration in time range)   metoprolol succinate ER (TOPROL XL) 24 hr tablet 50 mg (has no administration in time range)   spironolactone (ALDACTONE) half-tab 12.5 mg (has no administration in time range)   lidocaine 1 % 0.1-1 mL (has no administration in time range)   lidocaine (LMX4) cream (has no administration in time range)   sodium chloride (PF) 0.9% PF flush 3 mL (has no administration in time range)   sodium chloride (PF) 0.9% PF flush 3 mL (has no administration in time range)   calcium carbonate (TUMS) chewable tablet 1,000 mg (has no administration in time range)   acetaminophen (TYLENOL) tablet 650 mg (has no administration in time range)     Or   acetaminophen (TYLENOL) Suppository 650 mg (has no administration in time range)   bisacodyl (DULCOLAX) EC tablet 5 mg (has no administration in time range)     Or   bisacodyl (DULCOLAX) EC tablet 10 mg (has no administration in time range)   polyethylene glycol (MIRALAX) Packet 17 g (has no administration in time range)   ondansetron (ZOFRAN ODT) ODT tab 4 mg (has no administration in  time range)     Or   ondansetron (ZOFRAN) injection 4 mg (has no administration in time range)   aspirin (ASA) chewable tablet 81 mg (has no administration in time range)   oxyCODONE (ROXICODONE) tablet 5 mg (has no administration in time range)     Or   oxyCODONE IR (ROXICODONE) tablet 10 mg (has no administration in time range)   HYDROcodone-acetaminophen (NORCO) 5-325 MG per tablet 1 tablet (1 tablet Oral $Given 1/23/25 2055)   morphine (PF) injection 4 mg (4 mg Intravenous $Given 1/23/25 2244)           Assessment & Plan    Isadora Mendez is a 89 year old female with PMH of hypertension, hyperlipidemia, paroxysmal atrial fibrillation on chronic anticoagulation, sinus sick syndrome status post pacemaker placement (2010), chronic kidney disease and hyperparathyroidism who presents to the ED after a fall on her right side. In the ED, she endorses pain in her right shoulder, rip hip, and right knee.    History most consistent with mechanical fall with no evidence of neurological or cardiac causes. She denies preceding chest pain, palpations, dyspnea, and dizziness. She also had no loss of consciousness, urinary incontinence, or memory loss to suggest a seizure. No tongue bite on physical examination. She is on apixaban (5 mg dialy).    Physical examination notable for tenderness to palpation of lateral and posterior right shoulder, but with normal ROM. She had no pain with isolated flexion of the right knee. She had tenderness to palpation of the right hip with a stable pelvis. Sensation, pulses, and movement was intact in distal extremities.The remaining trauma exam was normal.     Provided Norco for pain control while obtaining X-rays. X-rays of the right knee, chest, and right shoulder demonstrated no fractures or dislocation. X-ray of the right pelvis and hip demonstrated a mildly displaced traverse fracture of the right femoral neck.     Consulted orthopedics for the angulated and displaced femoral neck fracture  and admitted patient to medicine at [facility]. Ordered labs in preparation for surgery per orthopedics     I have reviewed the nursing notes. I have reviewed the findings, diagnosis, plan and need for follow up with the patient.    New Prescriptions    No medications on file       Final diagnoses:   Fall, initial encounter   Acute pain of right shoulder     Memo Slater, MS4    --    ED Attending Physician Attestation    I Peri Rodriguez MD, cared for this patient with the Medical Student. I performed, or re-performed, the physical exam and medical decision-making. I have verified the accuracy of all the medical student findings and documentation above, and have edited as necessary.    Summary of HPI, PE, ED Course   Patient is a 89 year old female evaluated in the emergency department for fall.  Patient went to go shopping at a grocery store and ended up falling.  Says that she fell onto her right side.  Did not hit her head.  Did not lose consciousness.  Patient was able to get into a motorized scooter and was able to finish shopping.  Afterwards and patient went home they noticed that she was unable to stand on her right leg.  Patient does have pain with movement of her right hip.  She denies any head pain. Exam and ED course notable for pain with movement of the right femur.  Patient with no pain with movement of the right knee.  Right range of motion of the right clavicle.  Did obtain imaging of the chest shoulder hip and knee.  Patient has a displaced right femoral neck fracture.  These results were discussed with the orthopedic resident.  Patient will be admitted to the internal medicine team for orthopedic consult and further treatment.  Patient was initially given some Norco followed by some IM the morphine for pain control.  Patient and family are aware of the diagnosis and the plan of care.. After the completion of care in the emergency department, the patient was admitted to inpatient.      Peri  She Rodriguez MD  Emergency Medicine       Peri Rodriguez  [facility] EMERGENCY DEPARTMENT  1/23/2025     Peri Rodriguez MD  01/23/25 [id number]

## 2025-01-24 NOTE — BRIEF OP NOTE
Orthopaedic Surgery Brief Op-Note      Patient: Isadora Mendez  : 3/9/1931  Date of Service: 2025 10:18 AM    Pre-operative Diagnosis: Hip fracture (H) [S72.009A]  Post-operative Diagnosis: same    Procedure(s) Performed: Procedure(s):  Hemiarthroplasty Hip    Staff: Dr. Kowalski  Assistants:   MD Brandin Crews MD    Anesthesia: General    Implants:   Implant Name Type Inv. Item Serial No.  Lot No. LRB No. Used Action   CEMENT RESTRICTOR STRK FEMORAL [facility] UNIV U553-8091 - [id number] Total Joint Component/Insert CEMENT RESTRICTOR STRK FEMORAL [facility] B223-8103 [id number] [employer] [id number] Right 1 Implanted   IMP SPACER OSTEONICS DISTAL CEMENT 10MM 4119-6235 - [id number] Metallic Hardware/[place] IMP SPACER OSTEONICS DISTAL CEMENT 10MM 7245-9624  [employer] [id number] Right 1 Implanted   IMP STEM FEMORAL CHRIS LOR OMNIFIT SZ 5 132DEG 1339-0530 - [id number] Total Joint Component/Insert IMP STEM FEMORAL CHRIS [employer]MNIF[employer] SZ 5 132DEG 0040-1873  [employer] [id number] Right 1 Implanted   BONE CEMENT SIMPLEX FULL DOSE 6191-1-001 - [id number] Cement, Bone BONE CEMENT SIMPLEX FULL DOSE 6191-1-001  [employer] [id number] Right 1 Implanted   BONE CEMENT SIMPLEX FULL DOSE 6191-1-001 - [id number] Cement, Bone BONE CEMENT SIMPLEX FULL DOSE 6191-1-001  [employer] [id number] Right 1 Implanted   IMP HEAD STRK FEMORAL UHR BIPOLAR [id number] UH1-46-28 - [id number] Total Joint Component/Insert IMP HEAD STRK FEMORAL UHR BIPOLAR [id number] UH1-46-28  [employer] [id number] Right 1 Implanted   IMP HEAD STRK FEMORAL C-TAPER COCR LFIT 28MM +0MM  - [id number] Total Joint Component/Insert IMP HEAD STRK FEMORAL C-TAPER COCR LFIT 28MM +0MM   [employer] [id number] Right 1 Implanted     Drains: none  Intra-op Labs/Cxs/Specimens: None  Complications: No apparent complications during procedure  Findings: Please see dictated operative note for details    Disposition: Stable  to PACU, then admit to Orthopaedics    Orthopedics Primary  Activity: Up with assist and assistive devices as needed until independent. Posterior hip precautions to operative side x 3 months:  1) No hip flexion beyond 90 degrees  2) No adduction beyond midline  3) No internal rotation beyond neutral   Weight bearing status: WBAT  Antibiotics: Cefazolin x 24 hours  Diet: Begin with clear fluids and progress diet as tolerated. Bowel regimen. Anti-emetics PRN.    DVT prophylaxis:  Mechanical and home ASA to resume POD1  Elevation: Elevate heels off of bed on pillows   Wound Care: Mepilex x7d  Drains: none  Pain management: Orals PRN, IV for breakthrough only  X-rays: AP Pelvis and Lateral operative hip in PACU.   Physical Therapy: Mobilization, ROM, ADL's, Posterior hip precautions.   Occupational Therapy: ADL's  Labs: Trend Hgb on POD #1, 2  Consults: PT, OT. Hospitalist, appreciate assistance in caring for this patient throughout the perioperative period  Follow Up: 2 weeks with Dr. Kowalski's team     Disposition: Pending progress with therapies, pain control on orals, and medical stability, anticipate discharge to Home on POD #1-2.     Orthopedic Surgery staff for this patient is Dr. Kowalski.      Keanu Rabago MD  Orthopedic Surgery PGY2  [phone]    Please page me directly with any questions/concerns during regular weekday hours before 5 pm. If there is no response, if it is a weekend, or if it is during evening hours then please page the orthopedic surgery resident on call.

## 2025-01-25 ENCOUNTER — APPOINTMENT (OUTPATIENT)
Dept: OCCUPATIONAL THERAPY | Facility: CLINIC | Age: OVER 89
DRG: 522 | End: 2025-01-25
Attending: NURSE PRACTITIONER
Payer: COMMERCIAL

## 2025-01-25 ENCOUNTER — HEALTH MAINTENANCE LETTER (OUTPATIENT)
Age: OVER 89
End: 2025-01-25

## 2025-01-25 ENCOUNTER — APPOINTMENT (OUTPATIENT)
Dept: PHYSICAL THERAPY | Facility: CLINIC | Age: OVER 89
DRG: 522 | End: 2025-01-25
Attending: NURSE PRACTITIONER
Payer: COMMERCIAL

## 2025-01-25 LAB
ANION GAP SERPL CALCULATED.3IONS-SCNC: 10 MMOL/L (ref 7–15)
ANION GAP SERPL CALCULATED.3IONS-SCNC: 11 MMOL/L (ref 7–15)
BUN SERPL-MCNC: 32.9 MG/DL (ref 8–23)
BUN SERPL-MCNC: 36.2 MG/DL (ref 8–23)
CALCIUM SERPL-MCNC: 8.8 MG/DL (ref 8.8–10.4)
CALCIUM SERPL-MCNC: 9 MG/DL (ref 8.8–10.4)
CHLORIDE SERPL-SCNC: 102 MMOL/L (ref 98–107)
CHLORIDE SERPL-SCNC: 103 MMOL/L (ref 98–107)
CREAT SERPL-MCNC: 1.03 MG/DL (ref 0.51–0.95)
CREAT SERPL-MCNC: 1.12 MG/DL (ref 0.51–0.95)
EGFRCR SERPLBLD CKD-EPI 2021: 46 ML/MIN/1.73M2
EGFRCR SERPLBLD CKD-EPI 2021: 50 ML/MIN/1.73M2
ERYTHROCYTE [DISTWIDTH] IN BLOOD BY AUTOMATED COUNT: 13.5 % (ref 10–15)
GLUCOSE SERPL-MCNC: 122 MG/DL (ref 70–99)
GLUCOSE SERPL-MCNC: 138 MG/DL (ref 70–99)
HCO3 SERPL-SCNC: 20 MMOL/L (ref 22–29)
HCO3 SERPL-SCNC: 23 MMOL/L (ref 22–29)
HCT VFR BLD AUTO: 28.2 % (ref 35–47)
HGB BLD-MCNC: 9.1 G/DL (ref 11.7–15.7)
MCH RBC QN AUTO: 29.8 PG (ref 26.5–33)
MCHC RBC AUTO-ENTMCNC: 32.3 G/DL (ref 31.5–36.5)
MCV RBC AUTO: 93 FL (ref 78–100)
PLATELET # BLD AUTO: 168 10E3/UL (ref 150–450)
POTASSIUM SERPL-SCNC: 4.9 MMOL/L (ref 3.4–5.3)
POTASSIUM SERPL-SCNC: 5.4 MMOL/L (ref 3.4–5.3)
RBC # BLD AUTO: 3.05 10E6/UL (ref 3.8–5.2)
SODIUM SERPL-SCNC: 133 MMOL/L (ref 135–145)
SODIUM SERPL-SCNC: 136 MMOL/L (ref 135–145)
WBC # BLD AUTO: 11.6 10E3/UL (ref 4–11)

## 2025-01-25 PROCEDURE — 36415 COLL VENOUS BLD VENIPUNCTURE: CPT | Performed by: INTERNAL MEDICINE

## 2025-01-25 PROCEDURE — 97530 THERAPEUTIC ACTIVITIES: CPT | Mod: GP

## 2025-01-25 PROCEDURE — 97110 THERAPEUTIC EXERCISES: CPT | Mod: GP

## 2025-01-25 PROCEDURE — 97165 OT EVAL LOW COMPLEX 30 MIN: CPT | Mod: GO

## 2025-01-25 PROCEDURE — 82565 ASSAY OF CREATININE: CPT | Performed by: INTERNAL MEDICINE

## 2025-01-25 PROCEDURE — 99232 SBSQ HOSP IP/OBS MODERATE 35: CPT | Performed by: INTERNAL MEDICINE

## 2025-01-25 PROCEDURE — 250N000013 HC RX MED GY IP 250 OP 250 PS 637

## 2025-01-25 PROCEDURE — 97530 THERAPEUTIC ACTIVITIES: CPT | Mod: GO

## 2025-01-25 PROCEDURE — 250N000013 HC RX MED GY IP 250 OP 250 PS 637: Performed by: NURSE PRACTITIONER

## 2025-01-25 PROCEDURE — 258N000003 HC RX IP 258 OP 636: Performed by: INTERNAL MEDICINE

## 2025-01-25 PROCEDURE — 85041 AUTOMATED RBC COUNT: CPT | Performed by: INTERNAL MEDICINE

## 2025-01-25 PROCEDURE — 80048 BASIC METABOLIC PNL TOTAL CA: CPT | Performed by: INTERNAL MEDICINE

## 2025-01-25 PROCEDURE — 250N000013 HC RX MED GY IP 250 OP 250 PS 637: Performed by: INTERNAL MEDICINE

## 2025-01-25 PROCEDURE — 97161 PT EVAL LOW COMPLEX 20 MIN: CPT | Mod: GP

## 2025-01-25 PROCEDURE — 85018 HEMOGLOBIN: CPT | Performed by: INTERNAL MEDICINE

## 2025-01-25 PROCEDURE — 250N000011 HC RX IP 250 OP 636

## 2025-01-25 PROCEDURE — 97535 SELF CARE MNGMENT TRAINING: CPT | Mod: GO

## 2025-01-25 PROCEDURE — 120N000002 HC R&B MED SURG/OB UMMC

## 2025-01-25 RX ORDER — METOPROLOL SUCCINATE 25 MG/1
50 TABLET, EXTENDED RELEASE ORAL 2 TIMES DAILY
Status: DISCONTINUED | OUTPATIENT
Start: 2025-01-25 | End: 2025-01-30 | Stop reason: HOSPADM

## 2025-01-25 RX ORDER — METOPROLOL SUCCINATE 25 MG/1
50 TABLET, EXTENDED RELEASE ORAL 2 TIMES DAILY
Status: DISCONTINUED | OUTPATIENT
Start: 2025-01-25 | End: 2025-01-25

## 2025-01-25 RX ADMIN — ACETAMINOPHEN 650 MG: 325 TABLET, FILM COATED ORAL at 08:43

## 2025-01-25 RX ADMIN — FAMOTIDINE 20 MG: 20 TABLET ORAL at 09:33

## 2025-01-25 RX ADMIN — METHIMAZOLE 5 MG: 5 TABLET ORAL at 09:33

## 2025-01-25 RX ADMIN — ASPIRIN 81 MG: 81 TABLET, COATED ORAL at 21:17

## 2025-01-25 RX ADMIN — OXYCODONE HYDROCHLORIDE 10 MG: 10 TABLET ORAL at 15:30

## 2025-01-25 RX ADMIN — Medication 3 MG: at 21:17

## 2025-01-25 RX ADMIN — ASPIRIN 81 MG: 81 TABLET, COATED ORAL at 09:33

## 2025-01-25 RX ADMIN — SODIUM CHLORIDE 500 ML: 9 INJECTION, SOLUTION INTRAVENOUS at 10:06

## 2025-01-25 RX ADMIN — METOPROLOL SUCCINATE 50 MG: 25 TABLET, FILM COATED, EXTENDED RELEASE ORAL at 21:17

## 2025-01-25 RX ADMIN — Medication 12.5 MG: at 21:17

## 2025-01-25 RX ADMIN — ACETAMINOPHEN 650 MG: 325 TABLET, FILM COATED ORAL at 15:30

## 2025-01-25 RX ADMIN — SENNOSIDES AND DOCUSATE SODIUM 1 TABLET: 50; 8.6 TABLET ORAL at 09:33

## 2025-01-25 RX ADMIN — POLYETHYLENE GLYCOL 3350 17 G: 17 POWDER, FOR SOLUTION ORAL at 09:36

## 2025-01-25 RX ADMIN — CEFAZOLIN 1 G: 1 INJECTION, POWDER, FOR SOLUTION INTRAMUSCULAR; INTRAVENOUS at 03:58

## 2025-01-25 RX ADMIN — OXYCODONE 5 MG: 5 TABLET ORAL at 08:43

## 2025-01-25 RX ADMIN — SENNOSIDES AND DOCUSATE SODIUM 1 TABLET: 50; 8.6 TABLET ORAL at 21:17

## 2025-01-25 ASSESSMENT — ACTIVITIES OF DAILY LIVING (ADL)
ADLS_ACUITY_SCORE: 50

## 2025-01-25 NOTE — PROGRESS NOTES
01/25/25 0832   Appointment Info   Signing Clinician's Name / Credentials (PT) Victorina Chin DPT   Rehab Comments (PT) posterior hip prec   Living Environment   Current Living Arrangements house   Home Accessibility stairs to enter home;stairs within home   Number of Stairs, Main Entrance 3   Number of Stairs, Within Home, Primary other (see comments);greater than 10 stairs  (~10 to go upstairs, 10 stairs to basement)   Transportation Anticipated family or friend will provide   Living Environment Comments can stay on main level if needed, no BR on main level   Self-Care   Usual Activity Tolerance moderate   Current Activity Tolerance fair   Equipment Currently Used at Home commode chair;walker, rolling;shower chair  (FWW and 4WW)   Fall history within last six months yes   Number of times patient has fallen within last six months 3   Activity/Exercise/Self-Care Comment Reports IND with amb; intermittent with FWW use. Reports use of 4WW for exercise 5-10min. IND with i/ADLs; tub shower. Dgtr lives close by (shes a PTA)   General Information   Onset of Illness/Injury or Date of Surgery 01/23/25   Referring Physician Robert Anglin APRN CNP   Pertinent History of Current Problem (include personal factors and/or comorbidities that impact the POC) per chart; Isadora Mendez is a 89 year old female with  AF (on ASA), sick sinus syndrome (pacemaker), pulmonary HTN with a displaced subcapital FNF now s/p right hip hemiarthroplasty 01/24 with Dr. Kowalski   Existing Precautions/Restrictions fall;no hip IR;no hip ADD past midline;90 degree hip flexion   Weight-Bearing Status - LLE full weight-bearing   Weight-Bearing Status - RLE weight-bearing as tolerated   Cognition   Affect/Mental Status (Cognition) WFL   Orientation Status (Cognition) oriented x 4   Follows Commands (Cognition) WFL   Pain Assessment   Patient Currently in Pain Yes, see Vital Sign flowsheet   Posture    Posture Forward head  position;Protracted shoulders   Range of Motion (ROM)   Range of Motion ROM deficits secondary to surgical procedure;ROM deficits secondary to pain   Strength (Manual Muscle Testing)   Strength (Manual Muscle Testing) Deficits observed during functional mobility   Bed Mobility   Comment, (Bed Mobility) Radha with RLE with bed mobility   Transfers   Comment, (Transfers) CGA from elevated EOB with FWW   Gait/Stairs (Locomotion)   Comment, (Gait/Stairs) ~5ft with FWW and CGA, antalgic and step to pattern noted   Balance   Balance Comments IND with static sitting   Clinical Impression   Criteria for Skilled Therapeutic Intervention Yes, treatment indicated   PT Diagnosis (PT) impaired functional mobility   Influenced by the following impairments increased pain, posterior hip prec, decreased activity tolerance, weakness, impaired dyn balance   Functional limitations due to impairments bed mobility, transfers, ambulation, stairs   Clinical Presentation (PT Evaluation Complexity) stable   Clinical Presentation Rationale clinical reasoning   Clinical Decision Making (Complexity) low complexity   Planned Therapy Interventions (PT) balance training;bed mobility training;gait training;home exercise program;neuromuscular re-education;patient/family education;ROM (range of motion);stair training;strengthening;transfer training;progressive activity/exercise;risk factor education;home program guidelines   Risk & Benefits of therapy have been explained evaluation/treatment results reviewed;care plan/treatment goals reviewed;risks/benefits reviewed;current/potential barriers reviewed;patient;participants included;participants voiced agreement with care plan;daughter   PT Total Evaluation Time   PT Sharon, Low Complexity Minutes (04251) 6   Physical Therapy Goals   PT Frequency Daily   PT Predicted Duration/Target Date for Goal Attainment 02/01/25   PT Goals Bed Mobility;Transfers;Gait;Stairs;PT Goal 1   PT: Bed Mobility  Independent;Supine to/from sit;Rolling;Within precautions   PT: Transfers Modified independent;Sit to/from stand;Assistive device   PT: Gait Modified independent;100 feet;Assistive device   PT: Stairs Supervision/stand-by assist;3 stairs;Rail on right   PT: Goal 1 Pt will demo IND with HEP in order to progress strength and ROM post op   Interventions   Interventions Quick Adds Gait Training;Therapeutic Activity;Therapeutic Procedure   Therapeutic Procedure/Exercise   Ther. Procedure: strength, endurance, ROM, flexibillity Minutes (49312) 10   Symptoms Noted During/After Treatment fatigue;increased pain   Treatment Detail/Skilled Intervention Provided pt with FREDDIE exercise handout and posterior hip prec handout. Reviewed exercises including quad set, HS set, heel slide and APs. Pt with fair quad contraction bilaterally, unable to clear heel d/t pain and weakness. Assist needed for heel slide, able to go into extension without assist but assist for flexion. Assist with getting into position for HS set but able to demo exercise appropriately. ~5 reps of each exercise for sequencing   Therapeutic Activity   Therapeutic Activities: dynamic activities to improve functional performance Minutes (30521) 20   Symptoms Noted During/After Treatment Fatigue;Increased pain   Treatment Detail/Skilled Intervention treatment indicated; pt education provided on posterior hip prec and handout provided. Dgtr present and works as a PTA and reports ongoing reminders. Pt with ABD pillow in place upon approach. Ongoing education throughout session on role of mobility and benefits of getting up 3-4x/day for ambulation/change in position to assist with pain management. Pt with increased transition to EOB (see eval); at EOB pt with increased pain with RLE in dependent position. STS throughout session with CGA and FWW. Pt agreeable to short distance amb, but declined trial of stairs. Pt amb ~30ft with FWW and step to pattern with FWW and CGA.  Antalgic pattern with limited stance time on R noted. Pt demo poor eccentric lowering into sitting following gait; STS and lateral steps towards HOB. Return to supine with CGA for RLE managment, HOB elevated at end of session to assist with pt finishing her breakfast. All needs met at end of session.   Gait Training   Treatment Detail/Skilled Intervention facilitated gait training, see TA   Distance in Feet 30   PT Discharge Planning   PT Plan gait progression, trial stairs, review prec and HEP   PT Discharge Recommendation (DC Rec) other (see comments)  (defer to ortho)   PT Rationale for DC Rec Pt is mobilizing below baseline; limited by increased pain and posterior hip prec. Pt with supportive family, but has increased stairs at home. Pt family would like to avoid TCU, and would prefer to take pt home. Anticipate that pt would be able to d/c home pending ability to navigate stairs safely and with  PT to assist with home safety prior to transition to OP PT   PT Brief overview of current status CGA; limited amb tolerance   PT Total Distance Amb During Session (feet) 30   Physical Therapy Time and Intention   Timed Code Treatment Minutes 30   Total Session Time (sum of timed and untimed services) 36

## 2025-01-25 NOTE — PHARMACY-ADMISSION MEDICATION HISTORY
Pharmacist Admission Medication History    Admission medication history is complete. The information provided in this note is only as accurate as the sources available at the time of the update.    Information Source(s): Patient via in-person    Pertinent Information: n/a    Changes made to PTA medication list:  Added: None  Deleted: tumeric  Changed: Q10 daily to 2x/wk    Allergies reviewed with patient and updates made in EHR: yes    Medication History Completed By: Leroy Dawkins [facility] 1/24/2025 6:40 PM    PTA Med List   Medication Sig Last Dose/Taking    amLODIPine (NORVASC) 5 MG tablet Take 2.5 mg by mouth as needed. Past Week    aspirin 81 MG tablet Take 1 tablet by mouth daily. 1/23/2025 Morning    co-enzyme Q-10 100 MG CAPS capsule Take 100 mg by mouth twice a week. Past Week    diclofenac (VOLTAREN) 1 % topical gel Apply 2 g topically 4 times daily. For L knee Past Week    famotidine (PEPCID) 20 MG tablet Take 1 tablet (20 mg) by mouth 2 times daily 1/23/2025 Morning    lisinopril (ZESTRIL) 40 MG tablet Take 1 tablet (40 mg) by mouth daily 1/23/2025 Morning    melatonin 1 MG/ML LIQD liquid Take 1 mg by mouth nightly as needed for sleep Past Week    methimazole (TAPAZOLE) 5 MG tablet Take 1 tablet (5 mg) by mouth daily. 1/23/2025 Morning    metoprolol succinate ER (TOPROL XL) 50 MG 24 hr tablet TAKE 2 TABLETS BY MOUTH EVERY MORNING AND 1 TABLET EVERY EVENING 1/23/2025 Morning    ondansetron (ZOFRAN) 4 MG tablet Take 1 tablet (4 mg) by mouth every 6 hours as needed for nausea (give prior to meals for nausea) More than a month    spironolactone (ALDACTONE) 25 MG tablet Take 0.5 tablets (12.5 mg) by mouth daily 1/23/2025 Morning    triamcinolone (KENALOG) 0.1 % external cream Apply topically 2 times daily More than a month

## 2025-01-25 NOTE — PROGRESS NOTES
[facility]    Medicine Progress Note - Hospitalist Service, GOLD TEAM 19    Date of Admission:  1/23/2025    Assessment & Plan      Isadora Mendez is a 89 year old woman admitted on 1/23/2025. She has a history of hypertension, non-obstructive CAD, hyperlipidemia, atrial fibrillation, sick sinus syndrome s/p pacemaker placement, CKD and hyperparathyroidism who is admitted with a hip fracture, after a ground-level fall..    Today changes:  Overall doing better.  Potassium 5.4.  Status post IV fluid bolus.  Follow-up 4.9.  Postop hemoglobin 9.1.  No other concerns/changes.      #) R hip pain.   #) Right femoral neck fracture.     After a fall, patient lost her balance and fell on her right side.  Orthopedic surgery consulted.  Now status post right Hemiarthroplasty Hip   By Dr Kowalski.   No significant complications reported.    Postop management per orthopedic surgery.    Per orthopedic surgery:      Activity: Up with assist and assistive devices as needed until independent. Posterior hip precautions to operative side x 3 months:  1) No hip flexion beyond 90 degrees  2) No adduction beyond midline  3) No internal rotation beyond neutral   Weight bearing status: WBAT  Antibiotics: Cefazolin x 24 hours  Diet: Begin with clear fluids and progress diet as tolerated. Bowel regimen. Anti-emetics PRN.    DVT prophylaxis:  Mechanical and home ASA to resume POD1  Elevation: Elevate heels off of bed on pillows   Wound Care: Mepilex x7d  Drains: none  Pain management: Orals PRN, IV for breakthrough only  X-rays: AP Pelvis and Lateral operative hip in PACU.   Physical Therapy: Mobilization, ROM, ADL's, Posterior hip precautions.   Occupational Therapy: ADL's  Labs: Trend Hgb on POD #1, 2  Consults: PT, OT. Hospitalist, appreciate assistance in caring for this patient throughout the perioperative period  Follow Up: 2 weeks with Dr. Kowalski's team      #) History of  hypertension  Currently controlled.  -Hold home lisinopril,  spironolactone  -Resume PTA amlodipine, metoprolol with hold parameters  -Monitor blood pressure  = As needed hydralazine for systolic blood pressure more than 160    #) History of atrial fibrillation  - Only on ASA and metoprolol  -Continue metoprolol.  Resume aspirin when okay with surgical team.    #) History of heart failure with a preserved ejection fraction  -Hold home spironolactone.  -Maintain euvolemia  -Monitor closely    #) History of hyperthyroidism  - Continue home methimazole    #) Post op delirium, mild.    -Patient bit restless, otherwise alert interactive, nonfocal.  Very pleasant.  -Delirium precautions  -Judicious use of narcotics  -Pain control  -Low-dose Seroquel HS and pRN  -Melatonin at bedtime  - bed alarm, 1:1 prn.             Diet: Advance Diet as Tolerated: Regular Diet Adult    DVT Prophylaxis: per ortho  Covarrubias Catheter: Not present  Lines: None     Cardiac Monitoring: None  Code Status: Full Code      Clinically Significant Risk Factors        # Hyperkalemia: Highest K = 5.4 mmol/L in last 2 days, will monitor as appropriate  # Hyponatremia: Lowest Na = 133 mmol/L in last 2 days, will monitor as appropriate           # Hypertension: Noted on problem list                 # Pacemaker present       Social Drivers of Health    Housing Stability: High Risk (9/26/2024)    Housing Stability     Do you have housing? : No     Are you worried about losing your housing?: No          Disposition Plan     Medically Ready for Discharge: Anticipated in 2-4 Days  Discussed with family member at bedside           Rojas Acuna MD  Hospitalist Service, GOLD TEAM 19  [facility]  Securely message with mediaBunker (more info)  Text page via GI-View Paging/Directory   See signed in provider for up to date coverage information  ______________________________________________________________________    Interval  History     Currently doing well.  Slept well.  Family member at bedside.  Patient alert, interactive.  Pain better controlled.   No fever or chills.  No chest pain or shortness of breath.  No cough.  No vomiting.    Physical Exam   Vital Signs: Temp: 97.8  F (36.6  C) Temp src: Oral BP: 104/57 Pulse: 62   Resp: 16 SpO2: 99 % O2 Device: None (Room air) Oxygen Delivery: 2 LPM  Weight: 120 lbs 0 oz    General Appearance: Awake, interactive, NAD, thin built  HEENT: AT/NC, Anicteric, moist mucous membrane  Respiratory: Normal work of breathing.    Cardiovascular: S1 S2 Regular.  GI/Abd: Soft. NT. ND.   Extremities: Distally wwp. No pedal edema.  Neuro: Grossly non focal.   Psychiatry: Stable mood.     Medical Decision Making       48 MINUTES SPENT BY ME on the date of service doing chart review, history, exam, documentation & further activities per the note.      Data     I have personally reviewed the following data over the past 24 hrs:    11.6 (H)  \   9.1 (L)   / 168     133 (L) 102 36.2 (H) /  122 (H)   4.9 20 (L) 1.12 (H) \       Imaging results reviewed over the past 24 hrs:   No results found for this or any previous visit (from the past 24 hours).    Recent Labs   Lab 01/25/25  1213 01/25/25  0647 01/23/25  2107   WBC  --  11.6* 12.5*   HGB  --  9.1* 12.3   MCV  --  93 93   PLT  --  168 196   INR  --   --  1.05   * 136 136   POTASSIUM 4.9 5.4* 5.0   CHLORIDE 102 103 102   CO2 20* 23 23   BUN 36.2* 32.9* 31.0*   CR 1.12* 1.03* 1.05*   ANIONGAP 11 10 11   INO 8.8 9.0 10.5*   * 138* 120*   ALBUMIN  --   --  4.1   PROTTOTAL  --   --  7.6   BILITOTAL  --   --  0.5   ALKPHOS  --   --  119   ALT  --   --  21   AST  --   --  35

## 2025-01-25 NOTE — PLAN OF CARE
"Goal Outcome Evaluation:      Plan of Care Reviewed With: patient    Overall Patient Progress: improvingOverall Patient Progress: improving       VS: /57 (BP Location: Right arm)   Pulse 62   Temp 97.8  F (36.6  C) (Oral)   Resp 16   Ht 1.575 m (5' 2\")   Wt 54.4 kg (120 lb)   SpO2 99%   BMI 21.95 kg/m       O2: SpO2 > 90% on RA. Denied SOB/chest pain.    Output: Voiding adequately and spontaneously into toilet    Last BM: 1/25 - small    Activity: Asstx1 with walker and gaitbelt    Skin: R hip surgical incision    Pain: Managed with PRN 5mg oxycodone and tylenol   Pain tolerable at rest, increases with activity    CMS: A&O4. Denied N/T.    Dressing: CDI   Diet: Regular, tolerating well    LDA: L PIV SL   Equipment: IV pole, personal belongings, walker    Plan: Pending progress with therapies    Additional Info:            "

## 2025-01-25 NOTE — PROGRESS NOTES
"   01/25/25 1306   Appointment Info   Signing Clinician's Name / Credentials (OT) KHADIJAH Pereira   Student Supervision Line of sight supervision provided;Direct Patient Contact Provided   Rehab Comments (OT) Posterior Hip Precautions   Living Environment   People in Home spouse   Current Living Arrangements house   Home Accessibility stairs to enter home;stairs within home   Number of Stairs, Main Entrance 3   Number of Stairs, Within Home, Primary other (see comments);greater than 10 stairs  (~10 to go upstairs, 10 stairs to basement)   Transportation Anticipated family or friend will provide   Living Environment Comments Lives in single family home w/ spouse. All needs met on main level, able to sleep on main level temporarily. No BR main level, but will have commode. Tub shower upstairs w/ grab bars and a SC. Tall toilet.   Self-Care   Usual Activity Tolerance moderate   Current Activity Tolerance fair   Equipment Currently Used at Home commode chair;walker, rolling;shower chair;grab bar, tub/shower;dressing device   Fall history within last six months yes   Number of times patient has fallen within last six months 3   Activity/Exercise/Self-Care Comment PLOF: Ind all ADLs and IADLS, has help from spouse for meal prep, dishes, cleaning, etc. IND w/ ambulation, uses FWW occasionally as needed. Spouse or daughter (lives close) can assist w/ ADLs/IADLs as needed.   General Information   Onset of Illness/Injury or Date of Surgery 01/24/25   Referring Physician Rachell Ricks MD   Patient/Family Therapy Goal Statement (OT) Did not endorse   Additional Occupational Profile Info/Pertinent History of Current Problem Per chart: \"Isadora Mendez is a 89 year old female with  AF (on ASA), sick sinus syndrome (pacemaker), pulmonary HTN with a displaced subcapital FNF now s/p right hip hemiarthroplasty 01/24 with Dr. Kowalski\"   Existing Precautions/Restrictions fall;no hip IR;no hip ADD past midline;90 degree hip " flexion   Limitations/Impairments sensory  (Numbness/tingling R UE)   Left Upper Extremity (Weight-bearing Status) full weight-bearing (FWB)   Right Upper Extremity (Weight-bearing Status) full weight-bearing (FWB)   Left Lower Extremity (Weight-bearing Status) full weight-bearing (FWB)   Right Lower Extremity (Weight-bearing Status) weight-bearing as tolerated (WBAT)   Cognitive Status Examination   Orientation Status orientation to person, place and time   Cognitive Status Comments Intermittent confusion noted: additional verbal cues required for safety and task sequencing. See treatment note.   Visual Perception   Visual Impairment/Limitations corrective lenses full-time   Sensory   Sensory Quick Adds right UE   Sensory Comments Numbness + tingling R UE   Pain Assessment   Patient Currently in Pain Yes, see Vital Sign flowsheet   Posture   Posture forward head position;protracted shoulders   Range of Motion Comprehensive   General Range of Motion bilateral upper extremity ROM WFL   Strength Comprehensive (MMT)   Comment, General Manual Muscle Testing (MMT) Assessment WFL   Muscle Tone Assessment   Muscle Tone Comments WFL   Coordination   Upper Extremity Coordination No deficits were identified   Bed Mobility   Bed Mobility supine-sit   Supine-Sit [place] (Bed Mobility) contact guard   Transfers   Transfers sit-stand transfer   Sit-Stand Transfer   Sit-Stand [place] (Transfers) contact guard   Assistive Device (Sit-Stand Transfers) walker, front-wheeled   Balance   Balance Assessment sit to stand dynamic balance;standing dynamic balance   Activities of Daily Living   BADL Assessment/Intervention bathing;lower body dressing;grooming;toileting   Bathing Assessment/Intervention   [place] Level (Bathing) moderate assist (50% patient effort)   Lower Body Dressing Assessment/Training   [place] Level (Lower Body Dressing) maximum assist (25% patient effort)   Grooming Assessment/Training    [place] Level (Grooming) contact guard assist   Toileting   [place] Level (Toileting) contact guard assist   Clinical Impression   Criteria for Skilled Therapeutic Interventions Met (OT) Yes, treatment indicated   OT Diagnosis Decreased ability to safely/ind complete ADLs   OT Problem List-Impairments impacting ADL problems related to;activity tolerance impaired;mobility;range of motion (ROM);pain;post-surgical precautions   Assessment of Occupational Performance 3-5 Performance Deficits   Identified Performance Deficits LB bathing, LB dressing, toileting, functional mobility   Planned Therapy Interventions (OT) ADL retraining   Clinical Decision Making Complexity (OT) problem focused assessment/low complexity   Risk & Benefits of therapy have been explained evaluation/treatment results reviewed;care plan/treatment goals reviewed;risks/benefits reviewed;current/potential barriers reviewed;participants voiced agreement with care plan;participants included;patient   Clinical Impression Comments Pt unable to safely/ind complete ADLs and functional mobility d/t pain and post-surgical precautions. Pt will benefit from skilled OT to increase safe, functional ind during ADLs.   OT Total Evaluation Time   OT Eval, Low Complexity Minutes (66063) 10   OT Goals   Therapy Frequency (OT) Daily   OT Predicted Duration/Target Date for Goal Attainment 01/27/25   OT Goals Hygiene/Grooming;Lower Body Dressing;Lower Body Bathing;Toilet Transfer/Toileting   OT: Hygiene/Grooming modified independent;using adaptive equipment;within precautions;while standing   OT: Lower Body Dressing Modified independent;using adaptive equipment;within precautions   OT: Lower Body Bathing Supervision/stand-by assist;using adaptive equipment;with precautions   OT: Toilet Transfer/Toileting Modified independent;toilet transfer;cleaning and garment management;using adaptive equipment;within precautions   Interventions   Interventions Quick Adds  Self-Care/Home Management;Therapeutic Activity   Self-Care/Home Management   Self-Care/Home Mgmt/ADL, Compensatory, Meal Prep Minutes (23598) 38   Symptoms Noted During/After Treatment (Meal Preparation/Planning Training) fatigue;increased pain   Treatment Detail/Skilled Intervention Following eval: Pt supine in bed. Agreeable to therapy. Session focused on completion of ADLs w/in precautions. Pt able to recall 1/3 hip precautions when asked, educated on remaining w/ visual cues. In BR, stand to sit on toilet from FWW CGA, VC for grab bar use and extension of R LE. Pt educated on completion of shira cares w/in precautions. Pt stood to complete shira cares SBA w/ FWW, VC for precaution adherence. Ambulated sinkside w/ FWW CGA. Stood at sink to wash hands SBA, VC for FWW management. Sitting EOB, pt educated on use of AE to don pants and socks, pt demoed task SBA w/ reacher and sock aid, VC for AE use and clothing management. STS from EOB to FWW min A, pulled up garments SBA. Pt reports feeling fatigued and increasing pain. Stand to sit EOB CGA, VC for hand placement and R LE extension. Supine in bed, discussed sponge bathing only for first few days following d/c, progressing to use of upstairs tub as able, pt agreeable. Pt educated on tub transfer and LB bathing techniques w/ th demonstrating, pt reports understanding. Pt confirmed she has a shower chair and grab bars, educated on how to purchase a reacher, sock-aid, and long-handled sponge on [employer], pt agreeable, reports that [email or web address] can purchase. Provided handout explaining recommended equipment. Exited w/ pt in supine, call light and all other needs in reach.   Therapeutic Activities   Therapeutic Activity Minutes (96255) 8   Symptoms noted during/after treatment fatigue;increased pain   Treatment Detail/Skilled Intervention After eval: session focused on completion of functional mobility w/in precautions as needed for safe ADL completion. Pt supine in bed.  Transferred supine to sitting EOB CGA, VC for precaution adherence. STS from EOB to FWW CGA, VC for hand placement and extension of R LE. Ambulated to/from BR w/ FWW, CGA (~30 ft total). Upon return to room, stand to sit EOB CGA from FWW, VC for hand placement and extension of R LE. Transferred from EOB to supine CGA, VC for precaution adherence. TH facilitated comfortable supine position in bed.   OT Discharge Planning   OT Plan Reinforce precautions during ADLs, tub transfer, LB bathing, work towards SBA during functional mobility/ADLs   OT Discharge Recommendation (DC Rec) home with assist;home with home care occupational therapy   OT Rationale for DC Rec Pt below baseline: PLOF ind all ADLs/IADLs. Limited by pain and precautions. Requires min A-CGA for ADLs and functional mobility. Assist from spouse and daughter at home. Home set up sufficiently for needs. Pending progress and carryover of OT techniques, d/c home w/ assist recommended.  OT may be needed for carryover to ensure safe ADLs and functional mobility at home.   OT Brief overview of current status Ax1 FWW (CGA)   OT Total Distance Amb During Session (feet) 30   OT Equipment Needed at Discharge hip kit   Total Session Time   Timed Code Treatment Minutes 46   Total Session Time (sum of timed and untimed services) 56

## 2025-01-25 NOTE — PROGRESS NOTES
"Orthopedic Surgery Progress Note: 01/25/2025    Subjective:   AFVSS. No IV pain meds since surgery. Had BM ytd afternoon. Voiding appropriately.     No acute events overnight. Pain well-controlled. No new concerns or complaints. Denies SOB, chest Pain, fevers, chills.     Objective:   /57 (BP Location: Right arm)   Pulse 62   Temp 97.8  F (36.6  C) (Oral)   Resp 16   Ht 1.575 m (5' 2\")   Wt 54.4 kg (120 lb)   SpO2 99%   BMI 21.95 kg/m    No intake/output data recorded.  General: NAD. Resting comfortably in bed.  Respiratory: Nonlabored breathing  Musculoskeletal:    Focused Exam of Right Lower Extremity  Dressing C/D/I with minimal strikethrough  Fires EHL, FHL, TA, GSC  SILT DP, SP, Saph, Sural, Tibial Nerve Distributions  2+ DP pulse, foot WWP    Laboratory Data:  Lab Results   Component Value Date    WBC 11.6 (H) 01/25/2025    HGB 9.1 (L) 01/25/2025     01/25/2025    INR 1.05 01/23/2025         Assessment & Plan:   Isadora Mendez is a 89 year old female with  AF (on ASA), sick sinus syndrome (pacemaker), pulmonary HTN with a displaced subcapital FNF now s/p right hip hemiarthroplasty 01/24 with Dr. Kowalski     Plan for Today:  Work with PT  Dispo planning    Orthopedics Primary  Activity: Up with assist and assistive devices as needed until independent. Posterior hip precautions to operative side x 3 months:  1) No hip flexion beyond 90 degrees  2) No adduction beyond midline  3) No internal rotation beyond neutral   Weight bearing status: WBAT  Antibiotics: Cefazolin x 24 hours  Diet: Begin with clear fluids and progress diet as tolerated. Bowel regimen. Anti-emetics PRN.    DVT prophylaxis:  Mechanical and home ASA to resume POD1  Elevation: Elevate heels off of bed on pillows   Wound Care: Mepilex x7d  Drains: none  Pain management: Orals PRN, IV for breakthrough only  X-rays: AP Pelvis and Lateral operative hip in PACU.   Physical Therapy: Mobilization, ROM, ADL's, Posterior hip " precautions.   Occupational Therapy: ADL's  Labs: Trend Hgb on POD #1, 2  Consults: PT, OT. Hospitalist, appreciate assistance in caring for this patient throughout the perioperative period  Follow Up: 2 weeks with Dr. Kowalski's team     Disposition: Pending progress with therapies, pain control on orals, and medical stability, anticipate discharge to Home on POD #1-2.     Orthopedic Surgery staff for this patient is Dr. Kowalski.        Keanu Rabago MD  Orthopedic Surgery PGY2  [phone]       Please page me directly with any questions/concerns during regular weekday hours before 5 pm. If there is no response, if it is a weekend, or if it is during evening hours then please page the orthopedic surgery resident on call.      FOLLOWUP:    Future Appointments   Date Time Provider Department Center   1/25/2025  9:00 AM Victorina Chin, MARG[facility] [place]   1/29/2025  1:00 PM Nas Banda APRN CNP [facility] [place]   3/5/2025  1:00 PM [facility]   3/24/2025 12:00 AM  ICD REMOTE [facility]   7/25/2025 11:00 AM Jovana Macias MD [facility]

## 2025-01-25 NOTE — PLAN OF CARE
Goal Outcome Evaluation:      Plan of Care Reviewed With: patient    Overall Patient Progress: improving    VS: Temp: 97.4  F (36.3  C) Temp src: Axillary BP: 122/56 Pulse: 64   Resp: 16 SpO2: 99 % O2 Device: None (Room air)    O2: SpO2 > 95% and stable on RA. LS clear and equal. Denies chest pain and SOB.    Output: Voids spontaneously without difficulty to bathroom.   Last BM: 1/24/2025 pt had a small BM, denies abdominal discomfort. passing flatus.    Activity: Up with A*1 using walker and GB.   Skin: WDL except, R hip surgical incision, scabs at the fingers and Bruise    Pain: Pain managed with PRN Tylenol and Oxycodone.   CMS: Intact, AOx4. Denies numbness and tingling.   Dressing: CDI   Diet: Regular diet. Denies nausea/vomiting.    LDA: L PIV SL between ABX.    Equipment: IV pole, personal belongings,    Plan: Continue with plan of care and pain management. Call light within reach, pt able to make needs known.    Additional Info: Pt is [place], family brought pt hearing aid and it's in the pt room.

## 2025-01-26 ENCOUNTER — APPOINTMENT (OUTPATIENT)
Dept: OCCUPATIONAL THERAPY | Facility: CLINIC | Age: OVER 89
DRG: 522 | End: 2025-01-26
Payer: COMMERCIAL

## 2025-01-26 ENCOUNTER — APPOINTMENT (OUTPATIENT)
Dept: PHYSICAL THERAPY | Facility: CLINIC | Age: OVER 89
DRG: 522 | End: 2025-01-26
Payer: COMMERCIAL

## 2025-01-26 LAB
ANION GAP SERPL CALCULATED.3IONS-SCNC: 12 MMOL/L (ref 7–15)
BUN SERPL-MCNC: 42.7 MG/DL (ref 8–23)
CALCIUM SERPL-MCNC: 8.7 MG/DL (ref 8.8–10.4)
CHLORIDE SERPL-SCNC: 104 MMOL/L (ref 98–107)
CREAT SERPL-MCNC: 1.21 MG/DL (ref 0.51–0.95)
EGFRCR SERPLBLD CKD-EPI 2021: 42 ML/MIN/1.73M2
GLUCOSE SERPL-MCNC: 206 MG/DL (ref 70–99)
HCO3 SERPL-SCNC: 20 MMOL/L (ref 22–29)
HGB BLD-MCNC: 9 G/DL (ref 11.7–15.7)
POTASSIUM SERPL-SCNC: 4.6 MMOL/L (ref 3.4–5.3)
SODIUM SERPL-SCNC: 136 MMOL/L (ref 135–145)

## 2025-01-26 PROCEDURE — 250N000013 HC RX MED GY IP 250 OP 250 PS 637

## 2025-01-26 PROCEDURE — 99232 SBSQ HOSP IP/OBS MODERATE 35: CPT | Performed by: INTERNAL MEDICINE

## 2025-01-26 PROCEDURE — 120N000002 HC R&B MED SURG/OB UMMC

## 2025-01-26 PROCEDURE — 80048 BASIC METABOLIC PNL TOTAL CA: CPT | Performed by: INTERNAL MEDICINE

## 2025-01-26 PROCEDURE — 82310 ASSAY OF CALCIUM: CPT | Performed by: INTERNAL MEDICINE

## 2025-01-26 PROCEDURE — 97116 GAIT TRAINING THERAPY: CPT | Mod: GP

## 2025-01-26 PROCEDURE — 84520 ASSAY OF UREA NITROGEN: CPT | Performed by: INTERNAL MEDICINE

## 2025-01-26 PROCEDURE — 36415 COLL VENOUS BLD VENIPUNCTURE: CPT

## 2025-01-26 PROCEDURE — 85018 HEMOGLOBIN: CPT

## 2025-01-26 PROCEDURE — 250N000013 HC RX MED GY IP 250 OP 250 PS 637: Performed by: NURSE PRACTITIONER

## 2025-01-26 PROCEDURE — 250N000013 HC RX MED GY IP 250 OP 250 PS 637: Performed by: INTERNAL MEDICINE

## 2025-01-26 PROCEDURE — 97535 SELF CARE MNGMENT TRAINING: CPT | Mod: GO

## 2025-01-26 PROCEDURE — 97530 THERAPEUTIC ACTIVITIES: CPT | Mod: GP

## 2025-01-26 PROCEDURE — 258N000003 HC RX IP 258 OP 636: Performed by: INTERNAL MEDICINE

## 2025-01-26 RX ORDER — METHOCARBAMOL 500 MG/1
250 TABLET ORAL 4 TIMES DAILY
Status: DISCONTINUED | OUTPATIENT
Start: 2025-01-26 | End: 2025-01-28

## 2025-01-26 RX ORDER — SODIUM CHLORIDE, SODIUM LACTATE, POTASSIUM CHLORIDE, CALCIUM CHLORIDE 600; 310; 30; 20 MG/100ML; MG/100ML; MG/100ML; MG/100ML
INJECTION, SOLUTION INTRAVENOUS CONTINUOUS
Status: DISCONTINUED | OUTPATIENT
Start: 2025-01-26 | End: 2025-01-27

## 2025-01-26 RX ORDER — ACETAMINOPHEN 325 MG/1
975 TABLET ORAL 3 TIMES DAILY
Status: DISCONTINUED | OUTPATIENT
Start: 2025-01-26 | End: 2025-01-30 | Stop reason: HOSPADM

## 2025-01-26 RX ORDER — OXYCODONE HYDROCHLORIDE 5 MG/1
5 TABLET ORAL EVERY 4 HOURS PRN
Status: DISCONTINUED | OUTPATIENT
Start: 2025-01-26 | End: 2025-01-30 | Stop reason: HOSPADM

## 2025-01-26 RX ORDER — ACETAMINOPHEN 325 MG/1
650 TABLET ORAL DAILY PRN
Status: DISCONTINUED | OUTPATIENT
Start: 2025-01-26 | End: 2025-01-30 | Stop reason: HOSPADM

## 2025-01-26 RX ADMIN — POLYETHYLENE GLYCOL 3350 17 G: 17 POWDER, FOR SOLUTION ORAL at 09:13

## 2025-01-26 RX ADMIN — Medication 12.5 MG: at 21:15

## 2025-01-26 RX ADMIN — OXYCODONE HYDROCHLORIDE 10 MG: 10 TABLET ORAL at 06:14

## 2025-01-26 RX ADMIN — SENNOSIDES AND DOCUSATE SODIUM 1 TABLET: 50; 8.6 TABLET ORAL at 21:12

## 2025-01-26 RX ADMIN — SENNOSIDES AND DOCUSATE SODIUM 1 TABLET: 50; 8.6 TABLET ORAL at 09:13

## 2025-01-26 RX ADMIN — FAMOTIDINE 20 MG: 20 TABLET ORAL at 09:13

## 2025-01-26 RX ADMIN — ASPIRIN 81 MG: 81 TABLET, COATED ORAL at 21:12

## 2025-01-26 RX ADMIN — Medication 250 MG: at 23:43

## 2025-01-26 RX ADMIN — ACETAMINOPHEN 975 MG: 325 TABLET, FILM COATED ORAL at 23:43

## 2025-01-26 RX ADMIN — Medication 250 MG: at 17:03

## 2025-01-26 RX ADMIN — OXYCODONE 5 MG: 5 TABLET ORAL at 10:29

## 2025-01-26 RX ADMIN — METOPROLOL SUCCINATE 50 MG: 25 TABLET, FILM COATED, EXTENDED RELEASE ORAL at 09:12

## 2025-01-26 RX ADMIN — Medication 3 MG: at 21:15

## 2025-01-26 RX ADMIN — ACETAMINOPHEN 975 MG: 325 TABLET, FILM COATED ORAL at 14:45

## 2025-01-26 RX ADMIN — SODIUM CHLORIDE, POTASSIUM CHLORIDE, SODIUM LACTATE AND CALCIUM CHLORIDE: 600; 310; 30; 20 INJECTION, SOLUTION INTRAVENOUS at 13:26

## 2025-01-26 RX ADMIN — METOPROLOL SUCCINATE 50 MG: 25 TABLET, FILM COATED, EXTENDED RELEASE ORAL at 21:12

## 2025-01-26 RX ADMIN — ACETAMINOPHEN 650 MG: 325 TABLET, FILM COATED ORAL at 09:13

## 2025-01-26 RX ADMIN — METHIMAZOLE 5 MG: 5 TABLET ORAL at 09:13

## 2025-01-26 RX ADMIN — ASPIRIN 81 MG: 81 TABLET, COATED ORAL at 09:13

## 2025-01-26 ASSESSMENT — ACTIVITIES OF DAILY LIVING (ADL)
ADLS_ACUITY_SCORE: 50

## 2025-01-26 NOTE — PROGRESS NOTES
[facility]    Medicine Progress Note - Hospitalist Service, GOLD TEAM 19    Date of Admission:  1/23/2025    Assessment & Plan      Isadora Mendez is a 89 year old woman admitted on 1/23/2025. She has a history of hypertension, non-obstructive CAD, hyperlipidemia, atrial fibrillation, sick sinus syndrome s/p pacemaker placement, CKD and hyperparathyroidism who is admitted with a hip fracture, after a ground-level fall..    Today changes:  1/26/2025    Daughter at bedside.  Overall doing okay.  Progressing slowly with therapy.  Ongoing mild delirium, sleepy after narcotics, poor p.o. intake, dry mouth.  Noted JORJE.  Creatinine 1.21.  Postop hemoglobin 9.  Changes:  IV Ringer's lactate 75 mL/h until adequate p.o. intake.  Encourage oral fluids  Minimize narcotics.  Reduce oxycodone dose to 2.5-5 every 4 hours as needed.  DC amlodipine for soft blood pressure.  Delirium precautions  Further per primary team    #) R hip pain.   #) Right femoral neck fracture.     After a fall, patient lost her balance and fell on her right side.  Orthopedic surgery consulted.  Now status post right Hemiarthroplasty Hip   By Dr Kowalski.   No significant complications reported.    Postop management per orthopedic surgery.    Per orthopedic surgery:      Activity: Up with assist and assistive devices as needed until independent. Posterior hip precautions to operative side x 3 months:  1) No hip flexion beyond 90 degrees  2) No adduction beyond midline  3) No internal rotation beyond neutral   Weight bearing status: WBAT  Antibiotics: Cefazolin x 24 hours  Diet: Begin with clear fluids and progress diet as tolerated. Bowel regimen. Anti-emetics PRN.    DVT prophylaxis:  Mechanical and home ASA to resume POD1  Elevation: Elevate heels off of bed on pillows   Wound Care: Mepilex x7d  Drains: none  Pain management: Orals PRN, IV for breakthrough only  X-rays: AP Pelvis and Lateral operative hip in  PACU.   Physical Therapy: Mobilization, ROM, ADL's, Posterior hip precautions.   Occupational Therapy: ADL's  Labs: Trend Hgb on POD #1, 2  Consults: PT, OT. Hospitalist, appreciate assistance in caring for this patient throughout the perioperative period  Follow Up: 2 weeks with Dr. Kowalski's team      # Postop anemia of acute blood loss.  Monitor hemoglobin.  Transfuse for less than 7.    #) History of hypertension  Currently controlled.  -Continue to hold home lisinopril,  spironolactone  -Resume PTA  metoprolol with hold parameters.  DC amlodipine  -Monitor blood pressure  = As needed hydralazine for systolic blood pressure more than 160    #) History of atrial fibrillation  -PTA on ASA and metoprolol  -Continue PTA metoprolol.  Resume aspirin when okay with surgical team.    #) History of heart failure with a preserved ejection fraction  -Hold home spironolactone.  -Maintain euvolemia  -Monitor closely    #) History of hyperthyroidism  - Continue home methimazole    #) Post op delirium, mild.    --Delirium precautions  -Judicious use of narcotics  -Pain control  -Low-dose Seroquel HS and pRN  -Melatonin at bedtime  - bed alarm, 1:1 prn.   -Hydration  -Ensure good sleep.  -Keep bowel bladder regular            Diet: Advance Diet as Tolerated: Regular Diet Adult    DVT Prophylaxis: per ortho  Covarrubias Catheter: Not present  Lines: None     Cardiac Monitoring: None  Code Status: Full Code      Clinically Significant Risk Factors        # Hyperkalemia: Highest K = 5.4 mmol/L in last 2 days, will monitor as appropriate  # Hyponatremia: Lowest Na = 133 mmol/L in last 2 days, will monitor as appropriate           # Hypertension: Noted on problem list                 # Pacemaker present       Social Drivers of Health    Housing Stability: High Risk (9/26/2024)    Housing Stability     Do you have housing? : No     Are you worried about losing your housing?: No          Disposition Plan     Medically Ready for Discharge:  Anticipated Tomorrow  Discussed with family member at bedside           Rojas Acuna MD  Hospitalist Service, GOLD TEAM 19  [facility]  Securely message with [employer] (more info)  Text page via [employer] Paging/Directory   See signed in provider for up to date coverage information  ______________________________________________________________________    Interval History     Currently overall doing slightly better.  Bit sleepy after the narcotics  Mild ongoing delirium  Poor p.o. intake  Dry mouth  Pain mostly controlled  No fever or chills.  No chest pain or shortness of breath.  No cough.  No vomiting.    Physical Exam   Vital Signs: Temp: 98.2  F (36.8  C) Temp src: Oral BP: 125/64 Pulse: 63   Resp: 16 SpO2: 92 % O2 Device: None (Room air)    Weight: 120 lbs 0 oz    General Appearance: Awake, interactive, NAD, thin built  HEENT: AT/NC, Anicteric, dry mucous membrane  Respiratory: Normal work of breathing.    Cardiovascular: S1 S2 Regular.  GI/Abd: Soft. NT. ND.   Extremities: Distally wwp. No pedal edema.  Neuro: Grossly non focal.   Psychiatry: Stable mood.     Medical Decision Making       45 MINUTES SPENT BY ME on the date of service doing chart review, history, exam, documentation & further activities per the note.      Data     I have personally reviewed the following data over the past 24 hrs:    N/A  \   9.0 (L)   / N/A     136 104 42.7 (H) /  206 (H)   4.6 20 (L) 1.21 (H) \       Imaging results reviewed over the past 24 hrs:   No results found for this or any previous visit (from the past 24 hours).    Recent Labs   Lab 01/26/25  0744 01/25/25  1213 01/25/25  0647 01/23/25  2107   WBC  --   --  11.6* 12.5*   HGB 9.0*  --  9.1* 12.3   MCV  --   --  93 93   PLT  --   --  168 196   INR  --   --   --  1.05    133* 136 136   POTASSIUM 4.6 4.9 5.4* 5.0   CHLORIDE 104 102 103 102   CO2 20* 20* 23 23   BUN 42.7* 36.2* 32.9* 31.0*   CR 1.21* 1.12* 1.03* 1.05*    ANIONGAP 12 11 10 11   INO 8.7* 8.8 9.0 10.5*   * 122* 138* 120*   ALBUMIN  --   --   --  4.1   PROTTOTAL  --   --   --  7.6   BILITOTAL  --   --   --  0.5   ALKPHOS  --   --   --  119   ALT  --   --   --  21   AST  --   --   --  35

## 2025-01-26 NOTE — PROGRESS NOTES
VS: Temp: 98.2  F (36.8  C) Temp src: Oral BP: 118/49 Pulse: 60   Resp: 16 SpO2: 92 % O2 Device: None (Room air)     O2: On room air, denies SOB, no cough noted   Output: purewick   Last BM: 1/24   Activity: Not OOB this shift   Skin: Right hip incision   Pain: Scheduled medications, oxycodone   CMS: A&O/3   Dressing: CDI   Diet: REG   LDA: Left PIV, SL   Equipment: Personal belongings, wedge   Plan: Continue with POC   Additional Info:

## 2025-01-26 NOTE — PLAN OF CARE
"Goal Outcome Evaluation:      Plan of Care Reviewed With: patient    Overall Patient Progress: improvingOverall Patient Progress: improving       VS: /64 (BP Location: Right arm)   Pulse 63   Temp 98.2  F (36.8  C) (Oral)   Resp 16   Ht 1.575 m (5' 2\")   Wt 54.4 kg (120 lb)   SpO2 92%   BMI 21.95 kg/m       O2: SpO2 > 90% on RA. Denied SOB/chest pain.    Output: Voiding adequately and spontaneously into toilet. Per pt and family request do not use purewick overnight unless absolutely necessary for pain.    Last BM: 1/26   Activity: Asstx1 with walker and gaitbelt    Skin: R hip surgical incision    Pain: Managed with PRN 5mg oxycodone and tylenol    Pain tolerable at rest, increases with activity   Scheduled tylenol and robaxin ordered today - times adjusted for medications to be given overnight per family request    CMS: A&O4. Denied N/T.    Dressing: CDI   Diet: Regular, tolerating well    LDA: Lost IV access at 1445, paged flyer to place new IV. Continuous fluids will need to be restarted.    Equipment: IV pole, personal belongings, walker    Plan: Pending progress with therapies    Additional Info:            "

## 2025-01-26 NOTE — PROGRESS NOTES
"Orthopedic Surgery Progress Note: 01/26/2025    Subjective:   NAEO. Pain controlled. Was OOB with PT yesterday and ambulated 30 ft with walker.     Objective:   /49 (BP Location: Right arm, Cuff Size: Adult Small)   Pulse 60   Temp 98.2  F (36.8  C) (Oral)   Resp 16   Ht 1.575 m (5' 2\")   Wt 54.4 kg (120 lb)   SpO2 92%   BMI 21.95 kg/m    No intake/output data recorded.  General: NAD. Resting comfortably in bed.  Respiratory: Nonlabored breathing  Musculoskeletal:    Focused Exam of Right Lower Extremity:  Dressing C/D/I with minimal strikethrough  Fires EHL, FHL, TA, GSC  SILT DP, SP, Saph, Sural, Tibial Nerve Distributions    Laboratory Data:  Lab Results   Component Value Date    WBC 11.6 (H) 01/25/2025    HGB 9.1 (L) 01/25/2025     01/25/2025    INR 1.05 01/23/2025     Assessment & Plan:   Isadora Mendez is a 89 year old female with  AF (on ASA), sick sinus syndrome (pacemaker), pulmonary HTN with a displaced subcapital FNF now s/p right hip hemiarthroplasty 01/24 with Dr. Kowalski     Today  - Continue to mobilize as able  - Continue therapies    Orthopedics will follow this patient peripherally at this point, please reach out with questions or concerns.    Medicine Primary  Activity: Up with assist and assistive devices as needed until independent. Posterior hip precautions to operative side x 3 months:  1) No hip flexion beyond 90 degrees  2) No adduction beyond midline  3) No internal rotation beyond neutral   Weight bearing status: WBAT  Antibiotics: Cefazolin x 24 hours  Diet: Begin with clear fluids and progress diet as tolerated. Bowel regimen. Anti-emetics PRN.    DVT prophylaxis:  Mechanical and home ASA to resume POD1  Elevation: Elevate heels off of bed on pillows   Wound Care: Mepilex x7d  Drains: none  Pain management: Orals PRN, IV for breakthrough only  X-rays: AP Pelvis and Lateral operative hip in PACU.   Physical Therapy: Mobilization, ROM, ADL's, Posterior hip precautions. "   Occupational Therapy: ADL's  Labs: Trend Hgb on POD #1, 2  Consults: PT, OT. Hospitalist, appreciate assistance in caring for this patient throughout the perioperative period  Follow Up: 2 weeks with Dr. Kowalski's team     Disposition: Home vs TCU     Orthopedic Surgery staff for this patient is Dr. Kowalski.     Jasbir Hughes MD  Orthopedic Surgery PGY4

## 2025-01-26 NOTE — PLAN OF CARE
"Goal Outcome Evaluation:      Plan of Care Reviewed With: patient    Overall Patient Progress: improving  Overall Patient Progress: improving       VS: Blood pressure 118/49, pulse 60, temperature 98.2  F (36.8  C), temperature source Oral, resp. rate 16, height 1.575 m (5' 2\"), weight 54.4 kg (120 lb), SpO2 92%, not currently breastfeeding.    O2: RA   Output: Continent of bowel and bladder   Last BM: 1/24/25   Activity: Asst1 w/ gait+walker   Skin: R hip incision; BL scab finger; R thigh bruising   Pain: Oxycodone;schedule meds   CMS: Alert and orientedx3;disoriented time   Dressing:    Diet: Regular/thin/whole   LDA: L PIV   Equipment: Call light;personal belongings   Plan: Cont'd POC   Additional Info: Patient is alert and oriented, able to make needs known, c/o pain and received prn meds, no further concerns.          "

## 2025-01-27 ENCOUNTER — APPOINTMENT (OUTPATIENT)
Dept: OCCUPATIONAL THERAPY | Facility: CLINIC | Age: OVER 89
DRG: 522 | End: 2025-01-27
Payer: COMMERCIAL

## 2025-01-27 ENCOUNTER — APPOINTMENT (OUTPATIENT)
Dept: PHYSICAL THERAPY | Facility: CLINIC | Age: OVER 89
DRG: 522 | End: 2025-01-27
Payer: COMMERCIAL

## 2025-01-27 LAB
ANION GAP SERPL CALCULATED.3IONS-SCNC: 10 MMOL/L (ref 7–15)
ANION GAP SERPL CALCULATED.3IONS-SCNC: 11 MMOL/L (ref 7–15)
ANION GAP SERPL CALCULATED.3IONS-SCNC: 4 MMOL/L (ref 7–15)
BUN SERPL-MCNC: 36.8 MG/DL (ref 8–23)
BUN SERPL-MCNC: 40.8 MG/DL (ref 8–23)
BUN SERPL-MCNC: 41.1 MG/DL (ref 8–23)
CALCIUM SERPL-MCNC: 8.6 MG/DL (ref 8.8–10.4)
CALCIUM SERPL-MCNC: 8.8 MG/DL (ref 8.8–10.4)
CALCIUM SERPL-MCNC: 8.9 MG/DL (ref 8.8–10.4)
CHLORIDE SERPL-SCNC: 100 MMOL/L (ref 98–107)
CHLORIDE SERPL-SCNC: 100 MMOL/L (ref 98–107)
CHLORIDE SERPL-SCNC: 101 MMOL/L (ref 98–107)
CREAT SERPL-MCNC: 1.25 MG/DL (ref 0.51–0.95)
CREAT SERPL-MCNC: 1.31 MG/DL (ref 0.51–0.95)
CREAT SERPL-MCNC: 1.42 MG/DL (ref 0.51–0.95)
EGFRCR SERPLBLD CKD-EPI 2021: 34 ML/MIN/1.73M2
EGFRCR SERPLBLD CKD-EPI 2021: 38 ML/MIN/1.73M2
EGFRCR SERPLBLD CKD-EPI 2021: 40 ML/MIN/1.73M2
GLUCOSE SERPL-MCNC: 120 MG/DL (ref 70–99)
GLUCOSE SERPL-MCNC: 145 MG/DL (ref 70–99)
GLUCOSE SERPL-MCNC: 158 MG/DL (ref 70–99)
HCO3 SERPL-SCNC: 21 MMOL/L (ref 22–29)
HCO3 SERPL-SCNC: 22 MMOL/L (ref 22–29)
HCO3 SERPL-SCNC: 23 MMOL/L (ref 22–29)
HOLD SPECIMEN: NORMAL
HOLD SPECIMEN: NORMAL
POTASSIUM SERPL-SCNC: 5 MMOL/L (ref 3.4–5.3)
POTASSIUM SERPL-SCNC: 5.4 MMOL/L (ref 3.4–5.3)
POTASSIUM SERPL-SCNC: 5.6 MMOL/L (ref 3.4–5.3)
SODIUM SERPL-SCNC: 127 MMOL/L (ref 135–145)
SODIUM SERPL-SCNC: 132 MMOL/L (ref 135–145)
SODIUM SERPL-SCNC: 133 MMOL/L (ref 135–145)

## 2025-01-27 PROCEDURE — 250N000011 HC RX IP 250 OP 636: Performed by: INTERNAL MEDICINE

## 2025-01-27 PROCEDURE — 97110 THERAPEUTIC EXERCISES: CPT | Mod: GP

## 2025-01-27 PROCEDURE — 97530 THERAPEUTIC ACTIVITIES: CPT | Mod: GP

## 2025-01-27 PROCEDURE — 250N000013 HC RX MED GY IP 250 OP 250 PS 637

## 2025-01-27 PROCEDURE — 80048 BASIC METABOLIC PNL TOTAL CA: CPT | Performed by: INTERNAL MEDICINE

## 2025-01-27 PROCEDURE — 97116 GAIT TRAINING THERAPY: CPT | Mod: GP

## 2025-01-27 PROCEDURE — 250N000013 HC RX MED GY IP 250 OP 250 PS 637: Performed by: INTERNAL MEDICINE

## 2025-01-27 PROCEDURE — 250N000013 HC RX MED GY IP 250 OP 250 PS 637: Performed by: NURSE PRACTITIONER

## 2025-01-27 PROCEDURE — 99232 SBSQ HOSP IP/OBS MODERATE 35: CPT | Performed by: INTERNAL MEDICINE

## 2025-01-27 PROCEDURE — 258N000003 HC RX IP 258 OP 636: Performed by: INTERNAL MEDICINE

## 2025-01-27 PROCEDURE — 36415 COLL VENOUS BLD VENIPUNCTURE: CPT | Performed by: INTERNAL MEDICINE

## 2025-01-27 PROCEDURE — 120N000002 HC R&B MED SURG/OB UMMC

## 2025-01-27 PROCEDURE — 97535 SELF CARE MNGMENT TRAINING: CPT | Mod: GO

## 2025-01-27 PROCEDURE — 97530 THERAPEUTIC ACTIVITIES: CPT | Mod: GO

## 2025-01-27 RX ORDER — SODIUM CHLORIDE 9 MG/ML
INJECTION, SOLUTION INTRAVENOUS CONTINUOUS
Status: ACTIVE | OUTPATIENT
Start: 2025-01-27 | End: 2025-01-27

## 2025-01-27 RX ADMIN — SENNOSIDES AND DOCUSATE SODIUM 1 TABLET: 50; 8.6 TABLET ORAL at 19:13

## 2025-01-27 RX ADMIN — ACETAMINOPHEN 975 MG: 325 TABLET, FILM COATED ORAL at 08:28

## 2025-01-27 RX ADMIN — SODIUM ZIRCONIUM CYCLOSILICATE 10 G: 5 POWDER, FOR SUSPENSION ORAL at 13:42

## 2025-01-27 RX ADMIN — Medication 250 MG: at 05:55

## 2025-01-27 RX ADMIN — METHIMAZOLE 5 MG: 5 TABLET ORAL at 08:29

## 2025-01-27 RX ADMIN — SODIUM CHLORIDE: 9 INJECTION, SOLUTION INTRAVENOUS at 12:14

## 2025-01-27 RX ADMIN — ASPIRIN 81 MG: 81 TABLET, COATED ORAL at 19:13

## 2025-01-27 RX ADMIN — OXYCODONE 5 MG: 5 TABLET ORAL at 01:24

## 2025-01-27 RX ADMIN — SENNOSIDES AND DOCUSATE SODIUM 1 TABLET: 50; 8.6 TABLET ORAL at 08:29

## 2025-01-27 RX ADMIN — OXYCODONE 5 MG: 5 TABLET ORAL at 05:54

## 2025-01-27 RX ADMIN — Medication 250 MG: at 19:13

## 2025-01-27 RX ADMIN — METOPROLOL SUCCINATE 50 MG: 25 TABLET, FILM COATED, EXTENDED RELEASE ORAL at 08:28

## 2025-01-27 RX ADMIN — SODIUM CHLORIDE 500 ML: 9 INJECTION, SOLUTION INTRAVENOUS at 12:12

## 2025-01-27 RX ADMIN — SODIUM CHLORIDE, POTASSIUM CHLORIDE, SODIUM LACTATE AND CALCIUM CHLORIDE: 600; 310; 30; 20 INJECTION, SOLUTION INTRAVENOUS at 03:33

## 2025-01-27 RX ADMIN — METOPROLOL SUCCINATE 50 MG: 25 TABLET, FILM COATED, EXTENDED RELEASE ORAL at 19:13

## 2025-01-27 RX ADMIN — ASPIRIN 81 MG: 81 TABLET, COATED ORAL at 08:28

## 2025-01-27 RX ADMIN — FAMOTIDINE 20 MG: 20 TABLET ORAL at 08:29

## 2025-01-27 RX ADMIN — HYDROMORPHONE HYDROCHLORIDE 0.3 MG: 1 INJECTION, SOLUTION INTRAMUSCULAR; INTRAVENOUS; SUBCUTANEOUS at 02:16

## 2025-01-27 RX ADMIN — Medication 3 MG: at 21:26

## 2025-01-27 RX ADMIN — Medication 250 MG: at 12:12

## 2025-01-27 RX ADMIN — ACETAMINOPHEN 975 MG: 325 TABLET, FILM COATED ORAL at 23:43

## 2025-01-27 RX ADMIN — Medication 12.5 MG: at 21:26

## 2025-01-27 RX ADMIN — ACETAMINOPHEN 975 MG: 325 TABLET, FILM COATED ORAL at 16:50

## 2025-01-27 ASSESSMENT — ACTIVITIES OF DAILY LIVING (ADL)
ADLS_ACUITY_SCORE: 50

## 2025-01-27 NOTE — PROGRESS NOTES
VS: Temp: 97.5  F (36.4  C) Temp src: Oral BP: 107/52 Pulse: 65   Resp: 16 SpO2: 92 % O2 Device: None (Room air)     O2: On room air, denies SOB, no cough noted   Output: Voids spontaneously in the bathroom   Last BM: 1/26   Activity: Up with SBA of 1, walker /GB   Skin: Right hip incision   Pain: Scheduled robaxin, oxycodone, IV dilaudid   CMS: intact   Dressing: CDI   Diet: REG   LDA: Left PIV, continuous LR fluids   Equipment: Personal belongings, wedge   Plan: Continue with POC   Additional Info:

## 2025-01-27 NOTE — PROGRESS NOTES
[facility]    Medicine Progress Note - Hospitalist Service, GOLD TEAM 19    Date of Admission:  1/23/2025    Assessment & Plan      Isadora Mendez is a 89 year old woman admitted on 1/23/2025. She has a history of hypertension, non-obstructive CAD, hyperlipidemia, atrial fibrillation, sick sinus syndrome s/p pacemaker placement, CKD and hyperparathyroidism who is admitted with a hip fracture, after a ground-level fall..    Today changes:  1/27/2025    Daughter at bedside.  Overall doing better. Improved po intake.  Progressing slowly with therapy.  Ongoing mild delirium.       Postop hemoglobin 9.1/26.  Noted JORJE.  Creatinine 1.4. w Na 127 this am, repeat with no intervention:   Na 132, K 5.6. Cr 1.3.     Plan:      ml bolus ->100/hr x 5 hours. Follow-up bmp. Encourage, monitor po intake.   Lokelma 10 gm po x 1  Minimize narcotics.  Ct reduced oxycodone dose to 2.5-5 every 4 hours as needed.  Delirium precautions  Further per orthopedic surgery  PT, OT, SW  Likely dispo home 1-2 days.     #) R hip pain.   #) Right femoral neck fracture.     After a fall, patient lost her balance and fell on her right side.  Orthopedic surgery consulted.  Now status post right Hemiarthroplasty Hip   By Dr Kowalski.   No significant complications reported.    Postop management per orthopedic surgery.    Per orthopedic surgery:      Activity: Up with assist and assistive devices as needed until independent. Posterior hip precautions to operative side x 3 months:  1) No hip flexion beyond 90 degrees  2) No adduction beyond midline  3) No internal rotation beyond neutral   Weight bearing status: WBAT  Antibiotics: Cefazolin x 24 hours  Diet: Begin with clear fluids and progress diet as tolerated. Bowel regimen. Anti-emetics PRN.    DVT prophylaxis:  Mechanical and home ASA to resume POD1  Elevation: Elevate heels off of bed on pillows   Wound Care: Mepilex x7d  Drains: none  Pain  management: Orals PRN, IV for breakthrough only  X-rays: AP Pelvis and Lateral operative hip in PACU.   Physical Therapy: Mobilization, ROM, ADL's, Posterior hip precautions.   Occupational Therapy: ADL's  Labs: Trend Hgb on POD #1, 2  Consults: PT, OT. Hospitalist, appreciate assistance in caring for this patient throughout the perioperative period  Follow Up: 2 weeks with Dr. Kowalski's team         # Postop anemia of acute blood loss.  Monitor hemoglobin.  Transfuse for less than 7.    #) History of hypertension  Currently controlled.  -Continue to hold home lisinopril,  spironolactone  -Resume PTA  metoprolol with hold parameters.  Holding amlodipine  -Monitor blood pressure  -As needed hydralazine for systolic blood pressure more than 160    #) History of atrial fibrillation  -PTA on ASA and metoprolol  -Continue PTA metoprolol.  Resume aspirin when okay with surgical team.    #) History of heart failure with a preserved ejection fraction  -Hold home spironolactone.  -Maintain euvolemia  -Monitor closely    #) History of hyperthyroidism  - Continue home methimazole    #) Post op delirium, mild.    --Delirium precautions  -Judicious use of narcotics  -Pain control  -Low-dose Seroquel HS and pRN  -Melatonin at bedtime  - bed alarm, 1:1 prn.   -Hydration  -Ensure good sleep.  -Keep bowel bladder regular    # Hyperkalemia: Highest K = 5.6 mmol/L in last 2 days, will monitor as appropriate  - lokelma 10 gm po x 1.     # Hyponatremia: Lowest Na = 127 mmol/L in last 2 days, will monitor as appropriate   Suspect hypovolemia. Poor solute intake  Follow-up with iv nss.   More work up as needed.         Diet: Advance Diet as Tolerated: Regular Diet Adult    DVT Prophylaxis: per ortho  Covarrubias Catheter: Not present  Lines: None     Cardiac Monitoring: None  Code Status: Full Code      Clinically Significant Risk Factors                  # Hypertension: Noted on problem list                 # Pacemaker present       Social  Drivers of Health    Housing Stability: High Risk (9/26/2024)    Housing Stability     Do you have housing? : No     Are you worried about losing your housing?: No          Disposition Plan     Medically Ready for Discharge: Anticipated Tomorrow  Discussed with family member at bedside           Rojas Acuna MD  Hospitalist Service, GOLD TEAM 19  [facility]  Securely message with [employer] (more info)  Text page via [facility] Paging/Directory   See signed in provider for up to date coverage information  ______________________________________________________________________    Interval History     Currently overall doing slightly better.    Mild ongoing delirium  Poor p.o. intake  Pain better controlled  No fever or chills.  No chest pain or shortness of breath.  No cough.  No vomiting.  Daughter at bedside.     Physical Exam   Vital Signs: Temp: 98.6  F (37  C) Temp src: Axillary BP: 132/55 Pulse: 66   Resp: 18 SpO2: 100 % O2 Device: None (Room air)    Weight: 120 lbs 0 oz    General Appearance: Awake, interactive, NAD, thin built  HEENT: AT/NC, Anicteric, moist mucous membrane  Respiratory: Normal work of breathing.    Cardiovascular: S1 S2 Regular.  GI/Abd: Soft. NT. ND.   Extremities: Distally wwp.   Neuro: Grossly non focal.   Psychiatry: Stable mood.     Medical Decision Making       45 MINUTES SPENT BY ME on the date of service doing chart review, history, exam, documentation & further activities per the note.      Data     I have personally reviewed the following data over the past 24 hrs:    N/A  \   N/A   / N/A     132 (L) 100 41.1 (H) /  158 (H)   5.6 (H) 22 1.31 (H) \       Imaging results reviewed over the past 24 hrs:   No results found for this or any previous visit (from the past 24 hours).    Recent Labs   Lab 01/27/25  1105 01/27/25  0538 01/26/25  0744 01/25/25  1213 01/25/25  0647 01/23/25  2107   WBC  --   --   --   --  11.6* 12.5*   HGB  --   --  9.0*   --  9.1* 12.3   MCV  --   --   --   --  93 93   PLT  --   --   --   --  168 196   INR  --   --   --   --   --  1.05   * 127* 136   < > 136 136   POTASSIUM 5.6* 5.0 4.6   < > 5.4* 5.0   CHLORIDE 100 100 104   < > 103 102   CO2 22 23 20*   < > 23 23   BUN 41.1* 40.8* 42.7*   < > 32.9* 31.0*   CR 1.31* 1.42* 1.21*   < > 1.03* 1.05*   ANIONGAP 10 4* 12   < > 10 11   INO 8.9 8.8 8.7*   < > 9.0 10.5*   * 120* 206*   < > 138* 120*   ALBUMIN  --   --   --   --   --  4.1   PROTTOTAL  --   --   --   --   --  7.6   BILITOTAL  --   --   --   --   --  0.5   ALKPHOS  --   --   --   --   --  119   ALT  --   --   --   --   --  21   AST  --   --   --   --   --  35    < > = values in this interval not displayed.

## 2025-01-27 NOTE — PLAN OF CARE
"Goal Outcome Evaluation:      Plan of Care Reviewed With: patient    Overall Patient Progress: improving  Overall Patient Progress: improving       VS: Blood pressure 116/63, pulse 70, temperature 97.5  F (36.4  C), temperature source Oral, resp. rate 17, height 1.575 m (5' 2\"), weight 54.4 kg (120 lb), SpO2 94%, not currently breastfeeding.    O2: RA   Output: Continent of bowel and bladder   Last BM: 1/26/25   Activity: Asst 1 w/gait+walker   Skin: R hip incision;R thigh incision   Pain: denies   CMS: Alert and oriented   Dressing: CDI   Diet: Regular/thin/whole   LDA: L PIV   Equipment: Call light;personal belongings   Plan: Cont'd POC   Additional Info: Patient is alert and oriented, able to make needs known, denies pain, no further concerns.          "

## 2025-01-27 NOTE — CONSULTS
Care Management Initial Consult    General Information  Assessment completed with:  Care Team Member, Dr Acuna. Children, daughter, Aida. VM-chart review (Reviewed PT & OT notes.).  Type of CM/SW Visit: Offer D/C Planning    Primary Care Provider verified and updated as needed:  Yes. Dr Jovana Macias, [facility].   Readmission within the last 30 days: no previous admission in last 30 days   Reason for Consult: discharge planning  Advance Care Planning:        Communication Assessment  Patient's communication style: spoken language (English or Bilingual)    Hearing Difficulty or Deaf: no   Wear Glasses or Blind: yes    Cognitive  Cognitive/Neuro/Behavioral: .WDL except, speech  Level of Consciousness: alert  Arousal Level: opens eyes spontaneously  Orientation: oriented x 4  Mood/Behavior: cooperative  Best Language: 0 - No aphasia  Speech: garbled    Living Environment:   People in home: spouse     Current living Arrangements: house      Able to return to prior arrangements: yes (With assist from family & home care.)       Family/Social Support:  Care provided by: self  Provides care for: no one, unable/limited ability to care for self  Marital Status:   Support system: , Children          Description of Support System: Supportive         Current Resources:   Patient receiving home care services: No (Was scheduled to start home care on 01-28-25 with [facility]. This was arranged thru her primary clinic.    Community Resources:    Equipment currently used at home: commode chair, walker, rolling, shower chair, grab bar, tub/shower, dressing device  Supplies currently used at home:      Employment/Financial:  Employment Status:       Financial Concerns:       Does the patient's insurance plan have a 3 day qualifying hospital stay waiver?  Yes     Which insurance plan 3 day waiver is available? Alternative insurance waiver    Will the waiver be used for post-acute  placement? Undetermined at this time    Lifestyle & Psychosocial Needs:  Social Drivers of Health     Food Insecurity: Low Risk  (9/26/2024)    Food Insecurity     Within the past 12 months, did you worry that your food would run out before you got money to buy more?: No     Within the past 12 months, did the food you bought just not last and you didn t have money to get more?: No   Depression: Not at risk (1/17/2025)    PHQ-2     PHQ-2 Score: 0   Housing Stability: High Risk (9/26/2024)    Housing Stability     Do you have housing? : No     Are you worried about losing your housing?: No   Tobacco Use: Low Risk  (1/23/2025)    Patient History     Smoking Tobacco Use: Never     Smokeless Tobacco Use: Never     Passive Exposure: Never   Financial Resource Strain: Low Risk  (9/26/2024)    Financial Resource Strain     Within the past 12 months, have you or your family members you live with been unable to get utilities (heat, electricity) when it was really needed?: No   Alcohol Use: Not on file   Transportation Needs: Low Risk  (9/26/2024)    Transportation Needs     Within the past 12 months, has lack of transportation kept you from medical appointments, getting your medicines, non-medical meetings or appointments, work, or from getting things that you need?: No   Physical Activity: Not on file   Interpersonal Safety: Low Risk  (11/7/2024)    Interpersonal Safety     Do you feel physically and emotionally safe where you currently live?: Yes     Within the past 12 months, have you been hit, slapped, kicked or otherwise physically hurt by someone?: No     Within the past 12 months, have you been humiliated or emotionally abused in other ways by your partner or ex-partner?: No   Stress: Not on file   Social Connections: Not on file   Health Literacy: Not on file       Functional Status:  Prior to admission patient needed assistance:     Mental Health Status:        Chemical Dependency Status:            Values/Beliefs:  Spiritual, Cultural Beliefs, Orthodox Practices, Values that affect care:          Discussed  Partnership in Safe Discharge Planning  document with patient/family: No    Additional Information:  In 5 Ortho Discharge Rounds it was reported pt may be ready for discharge tomorrow, pt was confused, checking labs.   PT & OT recommending home with family assist from  & daughter and home PT & OT.   Went to meet with pt for discharge assessment and planning. OT was working with pt. I spoke with daughterAida in pt's room. Aida said pt was living with her . Pt has 6 children. They are working on plan to help pt at home. Pt's bedroom is on the upper level at home. They plan to make a bedroom on the main level for her. There is not a bathroom on the main level so pt will need to use a commode. They have a commode. There are 5 steps to enter the home and then 15 steps to the upper level.   Aida was agreeable to a home care referral. Aida said pt's primary had recently ordered home PT & it was going to start tomorrow, 01-28 with [facility] (see below). Pt was doing outpt therapy before but it was becoming more difficult for pt to get outside the home. Aida declined need for any further equipment. Discussed referral needs, RN, PT, OT & HHA and Aida was agreeable. It was distracting to continue conversation due to pt working with therapy. I deferred some assessment questions.    I reviewed Clinic Care Coordination note on 01-21-25 by Madhuri Galvan RN. On 01-21 the clinic was able to secure home care. Pt was accepted by 3 home care agencies, [facility] , [facility] & [facility]. [facility] was due to start home care on 01-28. (Pt was admitted to the hospital on 01-23.)  Primary is Dr Jovana Macias.     Referral sent to [facility] for skilled home care RN, HHA, PT & OT.   Addendum 4:29pm:  Communicated with Thelma Esposito, Liaison with  [facility]. Informed her of the above home care info. She spoke with the Hub; they will route the referral to [facility] first & confirm if they accept pt. If [facility] cannot accept pt again then the Hub will make referrals to other home care agencies.       Next Steps:   ---Follow-up home care referral  ---Potential discharge home tomorrow with family.         Pretty Wayne, RN Care Coordinator  [facility]  On 01-27-25 RNCC coverage for [address] [place] [address]. Call [phone].

## 2025-01-27 NOTE — PLAN OF CARE
"Goal Outcome Evaluation:                    VS: /55 (BP Location: Right arm)   Pulse 66   Temp 98.6  F (37  C) (Axillary)   Resp 18   Ht 1.575 m (5' 2\")   Wt 54.4 kg (120 lb)   SpO2 100%   BMI 21.95 kg/m       O2: On room air, denies SOB, no cough noted   Output: Voids spontaneously in the bathroom   Last BM: 1/27 loose x2   Activity: Up with SBA of 1, walker /GB   Skin: Right hip incision   Pain: Scheduled robaxin, oxycodone, IV dilaudid   CMS: intact   Dressing: CDI   Diet: REG   LDA: Left PIV, continuous  ml/hr fluids   Equipment: Personal belongings, wedge   Plan: Continue with POC   Additional Info:          "

## 2025-01-27 NOTE — PLAN OF CARE
Goal Outcome Evaluation:     Pt s/p fall with resulting in right hip femoral neck fracture. s/p posterior approach cemented hemiarthroplasty right    hip.   Anticipate discharge home with family and home RN & PT.       Pretty Wayne, RN Care Coordinator  [facility]  On 01- RNCC coverage for [address] [facility]. Call [phone].

## 2025-01-28 ENCOUNTER — APPOINTMENT (OUTPATIENT)
Dept: PHYSICAL THERAPY | Facility: CLINIC | Age: OVER 89
DRG: 522 | End: 2025-01-28
Payer: COMMERCIAL

## 2025-01-28 ENCOUNTER — APPOINTMENT (OUTPATIENT)
Dept: OCCUPATIONAL THERAPY | Facility: CLINIC | Age: OVER 89
DRG: 522 | End: 2025-01-28
Payer: COMMERCIAL

## 2025-01-28 LAB
ANION GAP SERPL CALCULATED.3IONS-SCNC: 8 MMOL/L (ref 7–15)
BUN SERPL-MCNC: 32.8 MG/DL (ref 8–23)
CALCIUM SERPL-MCNC: 8.6 MG/DL (ref 8.8–10.4)
CHLORIDE SERPL-SCNC: 107 MMOL/L (ref 98–107)
CREAT SERPL-MCNC: 1.21 MG/DL (ref 0.51–0.95)
EGFRCR SERPLBLD CKD-EPI 2021: 42 ML/MIN/1.73M2
GLUCOSE SERPL-MCNC: 94 MG/DL (ref 70–99)
HCO3 SERPL-SCNC: 22 MMOL/L (ref 22–29)
HGB BLD-MCNC: 8.2 G/DL (ref 11.7–15.7)
POTASSIUM SERPL-SCNC: 4.5 MMOL/L (ref 3.4–5.3)
SODIUM SERPL-SCNC: 137 MMOL/L (ref 135–145)

## 2025-01-28 PROCEDURE — 250N000013 HC RX MED GY IP 250 OP 250 PS 637: Performed by: INTERNAL MEDICINE

## 2025-01-28 PROCEDURE — 120N000002 HC R&B MED SURG/OB UMMC

## 2025-01-28 PROCEDURE — 97116 GAIT TRAINING THERAPY: CPT | Mod: GP | Performed by: PHYSICAL THERAPIST

## 2025-01-28 PROCEDURE — 80048 BASIC METABOLIC PNL TOTAL CA: CPT | Performed by: INTERNAL MEDICINE

## 2025-01-28 PROCEDURE — 99232 SBSQ HOSP IP/OBS MODERATE 35: CPT | Performed by: INTERNAL MEDICINE

## 2025-01-28 PROCEDURE — 97535 SELF CARE MNGMENT TRAINING: CPT | Mod: GO

## 2025-01-28 PROCEDURE — 85018 HEMOGLOBIN: CPT | Performed by: INTERNAL MEDICINE

## 2025-01-28 PROCEDURE — 250N000013 HC RX MED GY IP 250 OP 250 PS 637

## 2025-01-28 PROCEDURE — 36415 COLL VENOUS BLD VENIPUNCTURE: CPT | Performed by: INTERNAL MEDICINE

## 2025-01-28 PROCEDURE — 97530 THERAPEUTIC ACTIVITIES: CPT | Mod: GP | Performed by: PHYSICAL THERAPIST

## 2025-01-28 PROCEDURE — 250N000013 HC RX MED GY IP 250 OP 250 PS 637: Performed by: NURSE PRACTITIONER

## 2025-01-28 RX ORDER — METHOCARBAMOL 500 MG/1
250 TABLET ORAL 4 TIMES DAILY PRN
Status: DISCONTINUED | OUTPATIENT
Start: 2025-01-28 | End: 2025-01-30 | Stop reason: HOSPADM

## 2025-01-28 RX ADMIN — Medication 250 MG: at 00:46

## 2025-01-28 RX ADMIN — Medication 3 MG: at 22:47

## 2025-01-28 RX ADMIN — OXYCODONE 5 MG: 5 TABLET ORAL at 08:49

## 2025-01-28 RX ADMIN — ACETAMINOPHEN 975 MG: 325 TABLET, FILM COATED ORAL at 16:05

## 2025-01-28 RX ADMIN — ASPIRIN 81 MG: 81 TABLET, COATED ORAL at 20:56

## 2025-01-28 RX ADMIN — Medication 2.5 MG: at 22:47

## 2025-01-28 RX ADMIN — Medication 250 MG: at 12:31

## 2025-01-28 RX ADMIN — METHIMAZOLE 5 MG: 5 TABLET ORAL at 08:50

## 2025-01-28 RX ADMIN — HYDRALAZINE HYDROCHLORIDE 25 MG: 25 TABLET ORAL at 01:34

## 2025-01-28 RX ADMIN — Medication 250 MG: at 06:06

## 2025-01-28 RX ADMIN — SENNOSIDES AND DOCUSATE SODIUM 1 TABLET: 50; 8.6 TABLET ORAL at 20:56

## 2025-01-28 RX ADMIN — METOPROLOL SUCCINATE 50 MG: 25 TABLET, FILM COATED, EXTENDED RELEASE ORAL at 08:50

## 2025-01-28 RX ADMIN — OXYCODONE 5 MG: 5 TABLET ORAL at 01:34

## 2025-01-28 RX ADMIN — ASPIRIN 81 MG: 81 TABLET, COATED ORAL at 08:50

## 2025-01-28 RX ADMIN — ACETAMINOPHEN 975 MG: 325 TABLET, FILM COATED ORAL at 08:49

## 2025-01-28 RX ADMIN — SODIUM ZIRCONIUM CYCLOSILICATE 10 G: 5 POWDER, FOR SUSPENSION ORAL at 00:27

## 2025-01-28 RX ADMIN — FAMOTIDINE 20 MG: 20 TABLET ORAL at 08:50

## 2025-01-28 RX ADMIN — ACETAMINOPHEN 975 MG: 325 TABLET, FILM COATED ORAL at 22:47

## 2025-01-28 RX ADMIN — Medication 2.5 MG: at 12:36

## 2025-01-28 RX ADMIN — METOPROLOL SUCCINATE 50 MG: 25 TABLET, FILM COATED, EXTENDED RELEASE ORAL at 22:44

## 2025-01-28 RX ADMIN — Medication 250 MG: at 18:31

## 2025-01-28 ASSESSMENT — ACTIVITIES OF DAILY LIVING (ADL)
ADLS_ACUITY_SCORE: 50

## 2025-01-28 NOTE — CONSULTS
Acupuncture Clinical Internship Intake and Treatment Documentation   [facility]    Date:  1/28/2025  Patient Name:  Isadora Mendez   YOB: 1931     Repeat Patient:  No  Has patient had acupoint/acupressure treatment before:  No    Signed consent placed in the medical record:  Yes  Patient/Family verbalizes understanding of risks and benefits:  Yes  Required information provided to patient:  Yes    Diagnosis:  Fall, initial encounter [W19.XXXA]  Acute pain of right shoulder [M25.511]  Closed displaced fracture of neck of right femur (H) [S72.001A]    Patient condition and treatment:  Right shoulder pain, right femur injury   Reason for Intervention Today/Chief Complaint:  Pain in shoulder, anxiety     Isolation:  No  Type:  None    PRE-SCORE:  moderate    Other Western medical information:  Had a fall, hurt R femur     Medications  No current outpatient medications on file.   +  Current Facility-Administered Medications:     acetaminophen (TYLENOL) tablet 650 mg, 650 mg, Oral, Daily PRN **OR** [DISCONTINUED] acetaminophen (TYLENOL) Suppository 650 mg, 650 mg, Rectal, Q4H PRN, Robert Anglin APRN CNP    acetaminophen (TYLENOL) tablet 975 mg, 975 mg, Oral, TID, Dhital, MD Rojas, 975 mg at 01/28/25 0849    aspirin EC tablet 81 mg, 81 mg, Oral, BID, Rachell Ricks MD, 81 mg at 01/28/25 0850    benzocaine-menthol (CHLORASEPTIC) 6-10 MG lozenge 1 lozenge, 1 lozenge, Buccal, Q1H PRN, Rachell Ricks MD    bisacodyl (DULCOLAX) EC tablet 5 mg, 5 mg, Oral, Daily PRN **OR** bisacodyl (DULCOLAX) EC tablet 10 mg, 10 mg, Oral, Daily PRN, Robert Anglin APRN CNP    bisacodyl (DULCOLAX) suppository 10 mg, 10 mg, Rectal, Daily PRN, Rachell Ricks MD    calcium carbonate (TUMS) chewable tablet 1,000 mg, 1,000 mg, Oral, 4x Daily PRN, Robert Anglin APRN CNP    famotidine (PEPCID) tablet 20 mg, 20 mg, Oral, Daily, Robert Anglin APRN CNP, 20 mg at  01/28/25 0850    hydrALAZINE (APRESOLINE) tablet 25 mg, 25 mg, Oral, 4x Daily PRN, Rojas Acuna MD, 25 mg at 01/28/25 0134    HYDROmorphone (PF) (DILAUDID) injection 0.3 mg, 0.3 mg, Intravenous, Q2H PRN, Rojas Acuna MD, 0.3 mg at 01/27/25 0216    lidocaine (LMX4) cream, , Topical, Q1H PRN, Rachell Ricks MD    lidocaine 1 % 0.1-1 mL, 0.1-1 mL, Other, Q1H PRN, Rachell Ricks MD    [Held by provider] lisinopril (ZESTRIL) tablet 40 mg, 40 mg, Oral, Daily, Robert Anglin APRN CNP    magnesium hydroxide (MILK OF MAGNESIA) suspension 30 mL, 30 mL, Oral, Daily PRN, Rachell Ricks MD    melatonin tablet 3 mg, 3 mg, Oral, At Bedtime, oRjas Acuna MD, 3 mg at 01/27/25 2126    methimazole (TAPAZOLE) tablet 5 mg, 5 mg, Oral, Daily, Robert Anglin APRN CNP, 5 mg at 01/28/25 0850    methocarbamol (ROBAXIN) half-tab 250 mg, 250 mg, Oral, 4x Daily, Rojas Acuna MD, 250 mg at 01/28/25 0606    metoprolol succinate ER (TOPROL XL) 24 hr tablet 50 mg, 50 mg, Oral, BID, Rojas Acuna MD, 50 mg at 01/28/25 0850    naloxone (NARCAN) injection 0.2 mg, 0.2 mg, Intravenous, Q2 Min PRN **OR** naloxone (NARCAN) injection 0.4 mg, 0.4 mg, Intravenous, Q2 Min PRN **OR** naloxone (NARCAN) injection 0.2 mg, 0.2 mg, Intramuscular, Q2 Min PRN **OR** naloxone (NARCAN) injection 0.4 mg, 0.4 mg, Intramuscular, Q2 Min PRN, Latha Mccurdy DO    ondansetron (ZOFRAN ODT) ODT tab 4 mg, 4 mg, Oral, Q6H PRN **OR** ondansetron (ZOFRAN) injection 4 mg, 4 mg, Intravenous, Q6H PRN, Rachell Ricks MD, 4 mg at 01/24/25 1432    oxyCODONE IR (ROXICODONE) half-tab 2.5 mg, 2.5 mg, Oral, Q4H PRN **OR** oxyCODONE (ROXICODONE) tablet 5 mg, 5 mg, Oral, Q4H PRN, DhRojas uribe MD, 5 mg at 01/28/25 0849    polyethylene glycol (MIRALAX) Packet 17 g, 17 g, Oral, Daily, Rachell Ricks MD, 17 g at 01/26/25 0913    polyethylene glycol (MIRALAX) Packet 17 g, 17 g, Oral, BID PRN, Robert Anglin, APRN CNP     prochlorperazine (COMPAZINE) injection 5 mg, 5 mg, Intravenous, Q6H PRN **OR** prochlorperazine (COMPAZINE) tablet 5 mg, 5 mg, Oral, Q6H PRN, Rachell Ricks MD    QUEtiapine (SEROquel) half-tab 12.5 mg, 12.5 mg, Oral, BID PRN, Rojas Acuna MD    QUEtiapine (SEROquel) half-tab 12.5 mg, 12.5 mg, Oral, At Bedtime, Rojas Acuna MD, 12.5 mg at 01/27/25 2126    senna-docusate (SENOKOT-S/PERICOLACE) 8.6-50 MG per tablet 1 tablet, 1 tablet, Oral, BID, Rachell Ricks MD, 1 tablet at 01/27/25 1913    sodium chloride (PF) 0.9% PF flush 3 mL, 3 mL, Intracatheter, Q8H, Rachell Ricks MD, 3 mL at 01/28/25 0453    sodium chloride (PF) 0.9% PF flush 3 mL, 3 mL, Intracatheter, q1 min prn, Rachell Ricks MD    sodium chloride (PF) 0.9% PF flush 3 mL, 3 mL, Intracatheter, Q8H, Robert Anglin APRN CNP, 3 mL at 01/28/25 0853    sodium chloride (PF) 0.9% PF flush 3 mL, 3 mL, Intracatheter, q1 min prn, Robert Anglin APRN CNP    [Held by provider] spironolactone (ALDACTONE) half-tab 12.5 mg, 12.5 mg, Oral, Daily, Robert Anglin APRN CNP     Pre-Treatment Assessment  Chief Complaint/ Reason for Intervention Today:  Pain in right shoulder, anxiety   Chief Complaint Pre-Score:  moderate   Describe:  Numbness from right shoulder pain that radiates into forearm and hand   Pain Location:  Right shoulder   Pre Session Pain:  Mild  Pre Session Anxiety:  Mild  Pre Session Nausea:  None    10 Traditional Chinese Medicine Assessment Questions  - Cold/ Heat:  Neutral   - Sweat:  Did not ask patient   - Headaches/Body aches:  No headaches, body-aches in the shoulder and right side hip and leg   - Chest/Abdomen:  Patient reports no symptoms   - Digestion:  Patient did not report gas or bloating issues   - Bowel Movement/Urination:  Patient was constipated, was given medication to treat constipation, has a bowel movement every few days, loose   - Hearing/Vision:  Did not ask   - Sleep (prior to hospital):   Sleep is good and consistent   - Energy:  Low   - Emotions:  Anxious about mobility and independence issues post hospital stay   - Ob Gyn:  Did not discuss   - Miscellaneous:  N/A    Traditional Chinese Medicine Assessment  - TONGUE:  Dry tongue body, color was brown but patient just drank coffee, heavily fissured  - PULSE:  Slippery and tight   - OBSERVATIONS:  Patient was treated sitting upright in bed      Traditional Chinese Medicine Diagnosis  - BRANCH:  Pain due to traumatic injury causing blood stasis   - ROOT:  Kidney Essence Deficiency and Yin Deficiency      Traditional Chinese Medicine Treatment  - ACUPUNCTURE:    Auricular: Campbell Men, Shoulder Point (4)   Bilateral: Maryse 3 (2)   Right shoulder only: TW 14, LI 15 (2)     Number inserted: 8  Time inserted: 10:45am    Number removed: 8  Time removed: 11:10am     - TUI NA:  On right shoulder - An Fa, Rou Fa, Tui Fa / On Feet - An Fa, Rou Fa, Na Fa      Magnet informed consent signed and given:  No    Post Treatment Assessment  Chief complaint post score:  same  Post Session Observation:  Patient appeared relaxed  Patient/Family Education:  Discussed acupuncture benefits with daughter Aida who was in the room during treatment   Verbal information provided:  Yes  Written information provided:  No  All questions answered at time of treatment:  Yes    Treatment/Procedure(s) performed by:  Aida Collazo    Date: 1/28/2025     I attest that this acupuncture treatment was done under my supervision and this note is complete and true.     Supervising acupuncturist:    Thiago Jorgensen LAc [facility] [place] [facility]    [facility]    [facility] Acupuncture license #: [id number]  P: [phone]

## 2025-01-28 NOTE — PROGRESS NOTES
Care Management Discharge Note    Discharge Date: 01/25/2025       Discharge Disposition: Home, Home Care    Discharge Services: Home Care    [facility]  [address]  [address]  [place] [address]  Phone: [phone]  Fax: [phone]    Discharge DME:  NA    Discharge Transportation: family or friend will provide    Private pay costs discussed: Not applicable    Does the patient's insurance plan have a 3 day qualifying hospital stay waiver?  No    PAS Confirmation Code:    Patient/family educated on Medicare website which has current facility and service quality ratings: no (Pt was already set up to start [facility] thru her primary clinic. Therefore, Medicare Home Care list not given.)    Education Provided on the Discharge Plan: Yes  Persons Notified of Discharge Plans: patient and family  Patient/Family in Agreement with the Plan: yes    Handoff Referral Completed: Yes, [facility] PCP: Internal handoff referral completed    Additional Information:  Plan for patient to discharge to home today with home care and family assist. Patient has been accepted  by [facility] for RN, HHA, PT & OT. No further discharge concerns at this time. RNCC will sign off. Please re-consult CM for any further discharge concerns.    Alyx Avalos RN, BSN  Care Coordinator, [facility] Ortho  Phone [phone]

## 2025-01-28 NOTE — PLAN OF CARE
[id number]    Goal Outcome Evaluation:      Plan of Care Reviewed With: patient    Overall Patient Progress: no changeOverall Patient Progress: no change    Outcome Evaluation: No acute change in Pt's condition thIs shift    Overall Patient Progress: improving  Pt. is A & O X 4. stable. Denies pain, N/T,  N/V, CP, or acute distress.    Able to make needs known.   Pain managed with PRN Oxycodone and schedule Robaxin  Activity: A with walker/GB. to transfer. Independent with repositioning in bed. Wound dressing is CDI. Respiratory: Sats >90 Denied SOB or  lightheadedness. Continent of bladder & bowel. No discomfort with voiding.  Diet: Reg/Thin, medications administered Whole.  Lines/Drains: Rt  PIV flushed and patent  Discharge plan: Possibility of being discharged today.   Pt. Slept most of the night.  Fall risk, Bed alarm on.  Call button placed within reach. Plan of care is ongoing.

## 2025-01-28 NOTE — PLAN OF CARE
"Goal Outcome Evaluation:      Plan of Care Reviewed With: patient    Overall Patient Progress: improvingOverall Patient Progress: improving           VS: /55 (BP Location: Left arm)   Pulse 61   Temp 97.8  F (36.6  C) (Oral)   Resp 16   Ht 1.575 m (5' 2\")   Wt 54.4 kg (120 lb)   SpO2 95%   BMI 21.95 kg/m     O2: On RA, denies SOB   Output: Voids spontaneously in the bathroom   Last BM: 1/27   Activity: SBA with walker/GB   Skin: R hip incision   Pain: Scheduled robaxin and PRN oxycodone   CMS: A&O x 4   Dressing: CDI   Diet: Regular diet   LDA: L PIV SL   Equipment: Personal belongings and wedge   Plan: Continue with POC.    Additional Info:        "

## 2025-01-28 NOTE — PLAN OF CARE
"  VS: /55 (BP Location: Left arm)   Pulse 61   Temp 97.8  F (36.6  C) (Oral)   Resp 16   Ht 1.575 m (5' 2\")   Wt 54.4 kg (120 lb)   SpO2 95%   BMI 21.95 kg/m     O2: To room air, denies SOB and chest pain   Output: Continent both bowel and bladder   Last BM: 1-   Activity: Ax1 with gait belt and walker, WBAT on RLE   Skin: Surgical wound on R hip  Bruising on periphery of the surgical dressing   Pain: With moderate pain, on PRN oxycodone   CMS: A&Ox4, baseline numbness and tingling on RUE and BLE  No episode of confusion   Dressing: R hip, aquacel dressing more than 50% soiled, marked the dressing, informed ortho, continue to monitor, ok with ortho to reinforce if drain seeps beyond the dressing   Diet: Regular diet   LDA: PIV on L forearm saline locked   Equipment: Personal items, call light    Plan: TBD home with home care   Additional Info:        "

## 2025-01-28 NOTE — PROGRESS NOTES
[facility]    Medicine Progress Note - Hospitalist Service, GOLD TEAM 17    Date of Admission:  1/23/2025    Assessment & Plan     Isadora Mendez is a 89 year old woman admitted on 1/23/2025. She has a history of hypertension, non-obstructive CAD, hyperlipidemia, atrial fibrillation, sick sinus syndrome s/p pacemaker placement, CKD and hyperparathyroidism who is admitted with a hip fracture, after a ground-level fall,patient lost her balance and fell on her right side       1/28/2025  - daughter here  - patient  much more awake and alert oriented   - taking in po better   - discharge  once electrolytes remain stable ,next 1-3 days      R hip pain.   Right femoral neck fracture post mechanical fall .   - status post  Posterior approach cemented hemiarthroplasty right hip 1/24/25   - pain control with OTC  tylenol , PRN oxycodone, switched robaxin to PRN as well   - hip precautions per  orthopedic surgery     per orthopedic surgery :  Activity: Up with assist and assistive devices as needed until independent. Posterior hip precautions to operative side x 3 months:  1) No hip flexion beyond 90 degrees  2) No adduction beyond midline  3) No internal rotation beyond neutral   Weight bearing status: WBAT  Antibiotics: Cefazolin x 24 hours  Diet: Begin with clear fluids and progress diet as tolerated. Bowel regimen. Anti-emetics PRN.    DVT prophylaxis:  Mechanical and home ASA to resume POD1  Elevation: Elevate heels off of bed on pillows   Wound Care: Mepilex x7d    - follow up  with Dr Kowalski in 2 weeks   - clarify with orthopedic surgery  as per surgical note Keep Aquacel dressing intact x 2 weeks. OK to shower with dressing in-place.  And per progress note keep Mepilex x 7 days      Hip abduction pillow when sleeping x 6 weeks       Acute blood loss anemia  - Hg now 8.2  - transfuse if Hg < 7     Delirium  - she is doing much better, awake alert oriented   - minimize  narcotics, sedating medications   - here was started on Seeoquel 12.5 mg q HS, can stop on discharge      CAD  Paroxysmal Atrial fibrillation  Sick Sinus Syndrome status post  pacemaker   History nonsustained VT   Hypertension    Pulmonary hypertension  on echo of unclear etiology   - seen by cardiology  in past    - heart cath 2009 with 30-40% non obstructive lesions , nuclear stress test 2022 was negative fo  rinducibel ischemia   -Pacemaker 6/30/2010. Leads are [employer]tronic 5076. Generator change 9/14/2016 , MRI compatible per notes 10/10/24   - patient  had declined anticoagulation ,  also declined amiodarone   - PTA on toprol XL 50 mg     hyperkalemia  -Lokelma 10 gm po  1/27 , 1/28   - lisinopril and spironolactone  has been held  since admission and would  not restart with her hyperkalemia     Hypertension     - PTA lisinopril and spironolactone on hold due to hyperkalemia and would not restart, K was 5 on admission and got as high as 5.6 when Cr was up form baseline  to 1.25    - discussed with  daughter by bedside   - blood pressure  around 139, if needs medications would start amlodipine     Hyponatremia  - developed on hospital day # 4, went to 127  - now  133     JORJE  - highest creatinine 1.31 1/27   - received fluid bolus  1/27   - avoid nephrotoxic agents   - encourage po intake       Graves hyperthyroidism with goiter, thyrotoxicosis   - sees endo . Last seen 1/2024   - PTA on methimazole 5 mf a alli   - last TSH     Prediabetes   -blood sugar have been 90s to 158 , will defer back to endo on follow up        Mild hypercalcemia  - defer back to endo     Chronic left knee pain    Patient  and family would like to avoid TCU and planning on taking her home, 1 daughter is a  physical therapist . Other is a nurse     Likely dispo home 1-2 days.            Diet: 3 Gram Potassium (low potassium) Diet    DVT Prophylaxis:  ASA   Covarrubias Catheter: Not present  Lines: None     Cardiac Monitoring: None  Code Status:  Full Code      Clinically Significant Risk Factors        # Hyperkalemia: Highest K = 5.6 mmol/L in last 2 days, will monitor as appropriate  # Hyponatremia: Lowest Na = 127 mmol/L in last 2 days, will monitor as appropriate           # Hypertension: Noted on problem list                 # Pacemaker present       Social Drivers of Health    Housing Stability: High Risk (1/28/2025)    Housing Stability     Do you have housing? : No     Are you worried about losing your housing?: No          Disposition Plan     Medically Ready for Discharge: Anticipated in 2-4 Days           Radha Davila MD  Hospitalist Service, GOLD TEAM [address] [place] [address]  Securely message with [employer] (more info)  Text page via [facility] Paging/Directory   See signed in provider for up to date coverage information  ______________________________________________________________________    Interval History   Awake alert, daughter in room, states patient  is doing much better. She did do  till age 89 so has been very active   Patient  denies any chest pain  no shortness of breath    Physical Exam   Vital Signs: Temp: 98.3  F (36.8  C) Temp src: Oral BP: 114/71 Pulse: 66   Resp: 17 SpO2: 100 % O2 Device: None (Room air)    Weight: 120 lbs 0 oz  General appearence: awake alert  in  no apparent distress      RESPIRATORY: lungs clear   CARDIOVASCULAR:S1 S2 regular   GASTROINTESTINAL:soft, non-distended , non-tender , + bowel sounds,   SKIN: warm and dry, no mottling noted   NEUROLOGIC; awake alert and oriented,   EXTREMITIES: no clubbing, cyanosis or edema , has abductor pillow between legs      Data     I have personally reviewed the following data over the past 24 hrs:    N/A  \   8.2 (L)   / N/A     133 (L) 101 36.8 (H) /  145 (H)   5.4 (H) 21 (L) 1.25 (H) \       Imaging results reviewed over the past 24 hrs:   No results found for this or any previous visit (from the past 24 hours).

## 2025-01-29 ENCOUNTER — APPOINTMENT (OUTPATIENT)
Dept: OCCUPATIONAL THERAPY | Facility: CLINIC | Age: OVER 89
DRG: 522 | End: 2025-01-29
Payer: COMMERCIAL

## 2025-01-29 ENCOUNTER — APPOINTMENT (OUTPATIENT)
Dept: PHYSICAL THERAPY | Facility: CLINIC | Age: OVER 89
DRG: 522 | End: 2025-01-29
Payer: COMMERCIAL

## 2025-01-29 LAB
ANION GAP SERPL CALCULATED.3IONS-SCNC: 7 MMOL/L (ref 7–15)
BASOPHILS # BLD AUTO: 0 10E3/UL (ref 0–0.2)
BASOPHILS NFR BLD AUTO: 1 %
BUN SERPL-MCNC: 28.1 MG/DL (ref 8–23)
CALCIUM SERPL-MCNC: 8.9 MG/DL (ref 8.8–10.4)
CHLORIDE SERPL-SCNC: 107 MMOL/L (ref 98–107)
CREAT SERPL-MCNC: 1.34 MG/DL (ref 0.51–0.95)
EGFRCR SERPLBLD CKD-EPI 2021: 37 ML/MIN/1.73M2
EOSINOPHIL # BLD AUTO: 0.5 10E3/UL (ref 0–0.7)
EOSINOPHIL NFR BLD AUTO: 7 %
ERYTHROCYTE [DISTWIDTH] IN BLOOD BY AUTOMATED COUNT: 13.9 % (ref 10–15)
GLUCOSE SERPL-MCNC: 98 MG/DL (ref 70–99)
HCO3 SERPL-SCNC: 24 MMOL/L (ref 22–29)
HCT VFR BLD AUTO: 24.3 % (ref 35–47)
HGB BLD-MCNC: 8 G/DL (ref 11.7–15.7)
IMM GRANULOCYTES # BLD: 0 10E3/UL
IMM GRANULOCYTES NFR BLD: 0 %
LYMPHOCYTES # BLD AUTO: 2.2 10E3/UL (ref 0.8–5.3)
LYMPHOCYTES NFR BLD AUTO: 34 %
MCH RBC QN AUTO: 30.8 PG (ref 26.5–33)
MCHC RBC AUTO-ENTMCNC: 32.9 G/DL (ref 31.5–36.5)
MCV RBC AUTO: 94 FL (ref 78–100)
MONOCYTES # BLD AUTO: 0.7 10E3/UL (ref 0–1.3)
MONOCYTES NFR BLD AUTO: 11 %
NEUTROPHILS # BLD AUTO: 3.1 10E3/UL (ref 1.6–8.3)
NEUTROPHILS NFR BLD AUTO: 48 %
NRBC # BLD AUTO: 0 10E3/UL
NRBC BLD AUTO-RTO: 0 /100
PLATELET # BLD AUTO: 171 10E3/UL (ref 150–450)
POTASSIUM SERPL-SCNC: 4.9 MMOL/L (ref 3.4–5.3)
RBC # BLD AUTO: 2.6 10E6/UL (ref 3.8–5.2)
SODIUM SERPL-SCNC: 138 MMOL/L (ref 135–145)
WBC # BLD AUTO: 6.5 10E3/UL (ref 4–11)

## 2025-01-29 PROCEDURE — 120N000002 HC R&B MED SURG/OB UMMC

## 2025-01-29 PROCEDURE — 97116 GAIT TRAINING THERAPY: CPT | Mod: GP

## 2025-01-29 PROCEDURE — 36415 COLL VENOUS BLD VENIPUNCTURE: CPT | Performed by: INTERNAL MEDICINE

## 2025-01-29 PROCEDURE — 80048 BASIC METABOLIC PNL TOTAL CA: CPT | Performed by: INTERNAL MEDICINE

## 2025-01-29 PROCEDURE — 97535 SELF CARE MNGMENT TRAINING: CPT | Mod: GO

## 2025-01-29 PROCEDURE — 85025 COMPLETE CBC W/AUTO DIFF WBC: CPT | Performed by: INTERNAL MEDICINE

## 2025-01-29 PROCEDURE — 250N000013 HC RX MED GY IP 250 OP 250 PS 637: Performed by: INTERNAL MEDICINE

## 2025-01-29 PROCEDURE — 82565 ASSAY OF CREATININE: CPT | Performed by: INTERNAL MEDICINE

## 2025-01-29 PROCEDURE — 97530 THERAPEUTIC ACTIVITIES: CPT | Mod: GP

## 2025-01-29 PROCEDURE — 250N000013 HC RX MED GY IP 250 OP 250 PS 637

## 2025-01-29 PROCEDURE — 99232 SBSQ HOSP IP/OBS MODERATE 35: CPT | Performed by: INTERNAL MEDICINE

## 2025-01-29 PROCEDURE — 82310 ASSAY OF CALCIUM: CPT | Performed by: INTERNAL MEDICINE

## 2025-01-29 PROCEDURE — 250N000013 HC RX MED GY IP 250 OP 250 PS 637: Performed by: NURSE PRACTITIONER

## 2025-01-29 RX ORDER — ACETAMINOPHEN 325 MG/1
650 TABLET ORAL DAILY PRN
Qty: 100 TABLET | Refills: 0 | Status: SHIPPED | OUTPATIENT
Start: 2025-01-29

## 2025-01-29 RX ORDER — AMOXICILLIN 250 MG
1 CAPSULE ORAL 2 TIMES DAILY
Qty: 60 TABLET | Refills: 0 | Status: SHIPPED | OUTPATIENT
Start: 2025-01-29

## 2025-01-29 RX ORDER — OXYCODONE HYDROCHLORIDE 5 MG/1
2.5-5 TABLET ORAL EVERY 4 HOURS PRN
Qty: 20 TABLET | Refills: 0 | Status: SHIPPED | OUTPATIENT
Start: 2025-01-29

## 2025-01-29 RX ORDER — ASPIRIN 81 MG/1
81 TABLET ORAL 2 TIMES DAILY
Qty: 56 TABLET | Refills: 0 | Status: SHIPPED | OUTPATIENT
Start: 2025-01-29 | End: 2025-02-26

## 2025-01-29 RX ORDER — POLYETHYLENE GLYCOL 3350 17 G/17G
17 POWDER, FOR SOLUTION ORAL DAILY
Qty: 510 G | Refills: 0 | Status: SHIPPED | OUTPATIENT
Start: 2025-01-29

## 2025-01-29 RX ADMIN — Medication 3 MG: at 21:37

## 2025-01-29 RX ADMIN — METOPROLOL SUCCINATE 50 MG: 25 TABLET, FILM COATED, EXTENDED RELEASE ORAL at 08:01

## 2025-01-29 RX ADMIN — ACETAMINOPHEN 975 MG: 325 TABLET, FILM COATED ORAL at 08:01

## 2025-01-29 RX ADMIN — Medication 12.5 MG: at 21:37

## 2025-01-29 RX ADMIN — ASPIRIN 81 MG: 81 TABLET, COATED ORAL at 08:01

## 2025-01-29 RX ADMIN — Medication 2.5 MG: at 12:37

## 2025-01-29 RX ADMIN — Medication 250 MG: at 18:53

## 2025-01-29 RX ADMIN — ACETAMINOPHEN 650 MG: 325 TABLET, FILM COATED ORAL at 17:19

## 2025-01-29 RX ADMIN — SENNOSIDES AND DOCUSATE SODIUM 1 TABLET: 50; 8.6 TABLET ORAL at 08:01

## 2025-01-29 RX ADMIN — ASPIRIN 81 MG: 81 TABLET, COATED ORAL at 20:34

## 2025-01-29 RX ADMIN — Medication 250 MG: at 14:24

## 2025-01-29 RX ADMIN — Medication 250 MG: at 10:35

## 2025-01-29 RX ADMIN — FAMOTIDINE 20 MG: 20 TABLET ORAL at 08:01

## 2025-01-29 RX ADMIN — METHIMAZOLE 5 MG: 5 TABLET ORAL at 08:01

## 2025-01-29 RX ADMIN — ACETAMINOPHEN 975 MG: 325 TABLET, FILM COATED ORAL at 22:00

## 2025-01-29 RX ADMIN — Medication 12.5 MG: at 00:04

## 2025-01-29 RX ADMIN — METOPROLOL SUCCINATE 50 MG: 25 TABLET, FILM COATED, EXTENDED RELEASE ORAL at 20:34

## 2025-01-29 RX ADMIN — Medication 2.5 MG: at 21:59

## 2025-01-29 ASSESSMENT — ACTIVITIES OF DAILY LIVING (ADL)
ADLS_ACUITY_SCORE: 50
ADLS_ACUITY_SCORE: 49
ADLS_ACUITY_SCORE: 50
ADLS_ACUITY_SCORE: 49
ADLS_ACUITY_SCORE: 50
ADLS_ACUITY_SCORE: 49

## 2025-01-29 NOTE — PLAN OF CARE
Goal Outcome Evaluation:      Plan of Care Reviewed With: patient    Overall Patient Progress: improvingOverall Patient Progress: improving       VS: VSS   O2: >90% on room air, denies SOB and chest pain   Output: Continent both bowel and bladder   Last BM: 1-   Activity: Ax1 with gait belt and walker, WBAT on RLE   Skin: Surgical wound on R hip  Bruising on R hip    Pain:  PRN oxycodone available, denies    CMS: A&Ox4, baseline numbness and tingling on RUE and BLE  No episode of confusion   Dressing: R hip, aquacel dressing soiled, marked the dressing, ortho aware, continue to monitor, ok to reinforce if drain seeps beyond the dressing   Diet: Regular diet   LDA: PIV on L forearm saline locked   Equipment: Personal items, call light    Plan: TBD home with home care   Additional Info:

## 2025-01-29 NOTE — PLAN OF CARE
"Goal Outcome Evaluation:      Plan of Care Reviewed With: patient    Overall Patient Progress: improvingOverall Patient Progress: improving           VS: BP (!) 149/70 (BP Location: Left arm)   Pulse 64   Temp 98.1  F (36.7  C) (Oral)   Resp 16   Ht 1.575 m (5' 2\")   Wt 54.4 kg (120 lb)   SpO2 95%   BMI 21.95 kg/m     O2: On RA, denies SOB and CP.   Output: Continent both bladder and bowel   Last BM: 1-28-25   Activity: Ax1 with gait belt and walker, WBAT on RLE   Skin: Surgical wound on R hip  Bruising on R hip    Pain: Managed with PRN oxycodone and robaxin   CMS: A&Ox4, baseline numbness and tingling on RUE and BLE   Dressing: R hip, aquacel dressing soiled, marked area has increased, ortho aware, continue to monitor, ok to reinforce if drain seeps beyond the dressing   Diet: Regular diet   LDA: L PIV SL   Equipment: Personal belongings and call light   Plan: TBD   Additional Info:        "

## 2025-01-29 NOTE — DISCHARGE INSTRUCTIONS
DVT Prophylaxis: Take Aspirin 81 mg PO twice daily for 4 weeks then resume the prior dose Aspirin 81 mg PO daily as previously done prior to surgery.

## 2025-01-29 NOTE — PROGRESS NOTES
[facility]    Medicine Progress Note - Hospitalist Service, GOLD TEAM 17    Date of Admission:  1/23/2025    Assessment & Plan     Isadora Mendez is a 89 year old woman admitted on 1/23/2025. She has a history of hypertension, non-obstructive CAD, hyperlipidemia, atrial fibrillation, sick sinus syndrome s/p pacemaker placement, CKD and hyperparathyroidism who is admitted with a hip fracture, after a ground-level fall,patient lost her balance and fell on her right side       1/29/2025   - daughter here  - patient nicely awake   - Na 138, creatinine slightly up at 1.34   - hg 8 from 8.2   - discharge  once electrolytes remain stable ,next 1-3 days      R hip pain.   Right femoral neck fracture post mechanical fall .   - status post  Posterior approach cemented hemiarthroplasty right hip 1/24/25   - pain control with OTC  tylenol , PRN oxycodone, switched robaxin to PRN as well   - hip precautions per  orthopedic surgery     per orthopedic surgery :  Activity: Up with assist and assistive devices as needed until independent. Posterior hip precautions to operative side x 3 months:  1) No hip flexion beyond 90 degrees  2) No adduction beyond midline  3) No internal rotation beyond neutral   Weight bearing status: WBAT  Antibiotics: Cefazolin x 24 hours  Diet: Begin with clear fluids and progress diet as tolerated. Bowel regimen. Anti-emetics PRN.    DVT prophylaxis:  Mechanical and home ASA to resume POD1  Elevation: Elevate heels off of bed on pillows   Wound Care: Mepilex x7d    - follow up  with Dr Kowalski in 2 weeks   - did clarify with orthopedic surgery  and  Keep Aquacel dressing intact x 2 weeks ( 10-14 days ) . OK to shower with dressing in-place.      Hip abduction pillow when sleeping x 6 weeks       Acute blood loss anemia  - Hg 8.2---> 8   - transfuse if Hg < 7     Delirium  - she is doing much better, awake alert oriented   - minimize narcotics, sedating  medications   - here was started on Seeoquel 12.5 mg q HS, can stop on discharge      CAD  Paroxysmal Atrial fibrillation  Sick Sinus Syndrome status post  pacemaker   History nonsustained VT   Hypertension    Pulmonary hypertension  on echo of unclear etiology   - seen by cardiology  in past    - heart cath 2009 with 30-40% non obstructive lesions , nuclear stress test 2022 was negative fo  rinducibel ischemia   -Pacemaker 6/30/2010. Leads are [employer]tronic 5076. Generator change 9/14/2016 , MRI compatible per notes 10/10/24   - patient  had declined anticoagulation ,  also declined amiodarone   - PTA on toprol XL 50 mg     hyperkalemia  -Lokelma 10 gm po  1/27 , 1/28   - lisinopril and spironolactone  has been held  since admission and would  not restart with her hyperkalemia   - K 4.9 1/29     Hypertension     - PTA lisinopril and spironolactone on hold due to hyperkalemia and would not restart, K was 5 on admission and got as high as 5.6 when Cr was up form baseline  to 1.25    - discussed with  daughter by bedside   - blood pressure  around 139-140s , if needs medications would start amlodipine     Hyponatremia  - developed on hospital day # 4, went to 127  - now  at 138 1/29      JORJE  - highest creatinine 1.31 1/27 , improved but now at 1.34, encouraged po intake   - received fluid bolus  1/27   - avoid nephrotoxic agents   - encourage po intake       Graves hyperthyroidism with goiter, thyrotoxicosis   - sees endo . Last seen 1/2024   - PTA on methimazole 5 mf a alli   - last TSH     Prediabetes   -blood sugar have been 90s to 158 , will defer back to endo on follow up        Mild hypercalcemia  - defer back to endo     Chronic left knee pain    Patient  and family would like to avoid TCU and planning on taking her home, 1 daughter is a  physical therapist . Other is a nurse     Likely dispo home 1-2 days depending on electrolytes renal function  1/28, 1/29 discussed with  daughter by roxanne, 1/29 discussed with   orthopedic surgery  .            Diet: 3 Gram Potassium (low potassium) Diet  Discharge Instruction - Regular Diet Adult    DVT Prophylaxis:  ASA   Covarrubias Catheter: Not present  Lines: None     Cardiac Monitoring: None  Code Status: Full Code      Clinically Significant Risk Factors        # Hyperkalemia: Highest K = 5.6 mmol/L in last 2 days, will monitor as appropriate  # Hyponatremia: Lowest Na = 132 mmol/L in last 2 days, will monitor as appropriate           # Hypertension: Noted on problem list                 # Pacemaker present       Social Drivers of Health    Housing Stability: High Risk (1/28/2025)    Housing Stability     Do you have housing? : No     Are you worried about losing your housing?: No          Disposition Plan     Medically Ready for Discharge: Anticipated Tomorrow           Radha Davila MD  Hospitalist Service, GOLD TEAM 17  [facility]  Securely message with [employer] (more info)  Text page via [employer] Paging/Directory   See signed in provider for up to date coverage information  ______________________________________________________________________    Interval History   Doing ok, no Cp no shortness of breath , mouth dry, encouraged po intake   Physical Exam   Vital Signs: Temp: 98.1  F (36.7  C) Temp src: Oral BP: (!) 149/70 Pulse: 64   Resp: 16 SpO2: 95 % O2 Device: None (Room air)    Weight: 120 lbs 0 oz  General appearence: awake alert  in  no apparent distress    Mouth : dry mucus membranes   RESPIRATORY: lungs clear   CARDIOVASCULAR:S1 S2 regular   GASTROINTESTINAL:soft, non-distended , non-tender , + bowel sounds,   SKIN: warm and dry, no mottling noted   NEUROLOGIC; awake alert and oriented,   EXTREMITIES: no clubbing, cyanosis or edema , has abductor pillow between legs      Data     I have personally reviewed the following data over the past 24 hrs:    6.5  \   8.0 (L)   / 171     138 107 28.1 (H) /  98   4.9 24 1.34 (H) \       Imaging  results reviewed over the past 24 hrs:   No results found for this or any previous visit (from the past 24 hours).

## 2025-01-30 ENCOUNTER — APPOINTMENT (OUTPATIENT)
Dept: OCCUPATIONAL THERAPY | Facility: CLINIC | Age: OVER 89
DRG: 522 | End: 2025-01-30
Payer: COMMERCIAL

## 2025-01-30 ENCOUNTER — APPOINTMENT (OUTPATIENT)
Dept: PHYSICAL THERAPY | Facility: CLINIC | Age: OVER 89
DRG: 522 | End: 2025-01-30
Payer: COMMERCIAL

## 2025-01-30 VITALS
BODY MASS INDEX: 22.08 KG/M2 | WEIGHT: 120 LBS | HEIGHT: 62 IN | HEART RATE: 63 BPM | SYSTOLIC BLOOD PRESSURE: 129 MMHG | OXYGEN SATURATION: 95 % | DIASTOLIC BLOOD PRESSURE: 54 MMHG | RESPIRATION RATE: 16 BRPM | TEMPERATURE: 97.5 F

## 2025-01-30 LAB
ANION GAP SERPL CALCULATED.3IONS-SCNC: 11 MMOL/L (ref 7–15)
BUN SERPL-MCNC: 24.4 MG/DL (ref 8–23)
CALCIUM SERPL-MCNC: 8.9 MG/DL (ref 8.8–10.4)
CHLORIDE SERPL-SCNC: 102 MMOL/L (ref 98–107)
CREAT SERPL-MCNC: 1.02 MG/DL (ref 0.51–0.95)
EGFRCR SERPLBLD CKD-EPI 2021: 51 ML/MIN/1.73M2
GLUCOSE SERPL-MCNC: 113 MG/DL (ref 70–99)
HCO3 SERPL-SCNC: 22 MMOL/L (ref 22–29)
HGB BLD-MCNC: 8 G/DL (ref 11.7–15.7)
POTASSIUM SERPL-SCNC: 4.7 MMOL/L (ref 3.4–5.3)
SODIUM SERPL-SCNC: 135 MMOL/L (ref 135–145)

## 2025-01-30 PROCEDURE — 250N000013 HC RX MED GY IP 250 OP 250 PS 637: Performed by: NURSE PRACTITIONER

## 2025-01-30 PROCEDURE — 36415 COLL VENOUS BLD VENIPUNCTURE: CPT | Performed by: INTERNAL MEDICINE

## 2025-01-30 PROCEDURE — 99239 HOSP IP/OBS DSCHRG MGMT >30: CPT | Performed by: INTERNAL MEDICINE

## 2025-01-30 PROCEDURE — 250N000013 HC RX MED GY IP 250 OP 250 PS 637: Performed by: INTERNAL MEDICINE

## 2025-01-30 PROCEDURE — 97116 GAIT TRAINING THERAPY: CPT | Mod: GP | Performed by: PHYSICAL THERAPIST

## 2025-01-30 PROCEDURE — 85018 HEMOGLOBIN: CPT | Performed by: INTERNAL MEDICINE

## 2025-01-30 PROCEDURE — 250N000013 HC RX MED GY IP 250 OP 250 PS 637

## 2025-01-30 PROCEDURE — 97530 THERAPEUTIC ACTIVITIES: CPT | Mod: GP | Performed by: PHYSICAL THERAPIST

## 2025-01-30 PROCEDURE — 97535 SELF CARE MNGMENT TRAINING: CPT | Mod: GO

## 2025-01-30 PROCEDURE — 80048 BASIC METABOLIC PNL TOTAL CA: CPT | Performed by: INTERNAL MEDICINE

## 2025-01-30 RX ORDER — AMLODIPINE BESYLATE 5 MG/1
5 TABLET ORAL DAILY
Qty: 30 TABLET | Refills: 0 | Status: SHIPPED | OUTPATIENT
Start: 2025-01-30

## 2025-01-30 RX ORDER — AMLODIPINE BESYLATE 5 MG/1
5 TABLET ORAL DAILY
Status: DISCONTINUED | OUTPATIENT
Start: 2025-01-30 | End: 2025-01-30 | Stop reason: HOSPADM

## 2025-01-30 RX ADMIN — Medication 2.5 MG: at 18:07

## 2025-01-30 RX ADMIN — POLYETHYLENE GLYCOL 3350 17 G: 17 POWDER, FOR SOLUTION ORAL at 08:43

## 2025-01-30 RX ADMIN — AMLODIPINE BESYLATE 5 MG: 5 TABLET ORAL at 10:17

## 2025-01-30 RX ADMIN — Medication 250 MG: at 10:17

## 2025-01-30 RX ADMIN — Medication 2.5 MG: at 13:19

## 2025-01-30 RX ADMIN — ACETAMINOPHEN 975 MG: 325 TABLET, FILM COATED ORAL at 18:03

## 2025-01-30 RX ADMIN — Medication 250 MG: at 00:31

## 2025-01-30 RX ADMIN — Medication 2.5 MG: at 08:43

## 2025-01-30 RX ADMIN — METOPROLOL SUCCINATE 50 MG: 25 TABLET, FILM COATED, EXTENDED RELEASE ORAL at 08:43

## 2025-01-30 RX ADMIN — ACETAMINOPHEN 975 MG: 325 TABLET, FILM COATED ORAL at 08:43

## 2025-01-30 RX ADMIN — ASPIRIN 81 MG: 81 TABLET, COATED ORAL at 08:43

## 2025-01-30 RX ADMIN — Medication 2.5 MG: at 03:47

## 2025-01-30 RX ADMIN — METHIMAZOLE 5 MG: 5 TABLET ORAL at 08:43

## 2025-01-30 RX ADMIN — FAMOTIDINE 20 MG: 20 TABLET ORAL at 08:43

## 2025-01-30 RX ADMIN — SENNOSIDES AND DOCUSATE SODIUM 1 TABLET: 50; 8.6 TABLET ORAL at 08:43

## 2025-01-30 ASSESSMENT — ACTIVITIES OF DAILY LIVING (ADL)
ADLS_ACUITY_SCORE: 49

## 2025-01-30 NOTE — PLAN OF CARE
Goal Outcome Evaluation:      Plan of Care Reviewed With: patient    Overall Patient Progress: improvingOverall Patient Progress: improving    Outcome Evaluation: pt is A&O x4, has call light in reach and is able to make needs known. R hip dressing with drainage, marked, ortho provider aware and plans to see pt in AM. dressing has not begun to seep out of aquacel so at this point is not reinforced. discharge TBD, continue POC      VS: Temp: 98.2  F (36.8  C) Temp src: Oral BP: (!) 148/61 Pulse: 60   Resp: 16 SpO2: 95 % O2 Device: None (Room air)       O2: SpO2>90% on room air, denies SOB and  CP   Output: Voiding without difficulty in bathroom, continent   Last BM: 1/28/2025   Activity: Up with Ax1, walker, GB. WBAT to RLE   Skin: R hip surgical incision site  R hip bruising  Discoloration on pt's BLE (this is her baseline)   Pain: R hip pain managed with prn oxycodone and scheduled tylenol, positioning   CMS: A&O x4, N/T to RUE, BLE at baseline   Dressing: R hip aquacel dressing with marked drainage, ortho to see tomorrow    Diet: Regular, denies nausea   LDA: L PIV SL   Equipment: Call light, personal belongigs   Plan: TBD   Additional Info:

## 2025-01-30 NOTE — PLAN OF CARE
"Physical Therapy Discharge Summary    Reason for therapy discharge:    Discharged to home.    Progress towards therapy goal(s). See goals on Care Plan in [facility] electronic health record for goal details.  Goals partially met.  Barriers to achieving goals:   Pt denies need to practice stairs, saying he will have people help get him upstairs \"one way or another\", potentially with carrying him up in a wc.    Therapy recommendation(s):    No further therapy is recommended.      "

## 2025-01-30 NOTE — PLAN OF CARE
Goal Outcome Evaluation:  Alert and oriented X 4. Able to make needs known. Call light in reach. VSS. Afebrile. Maintaining O2 sats in mid 90s on room air. Right hip pain managed with Oxycodone 2.5 mg, Tylenol and Robaxin, Right hip dressing intact with moderate amount of drainage, marked. CMS/Neuros WNLs X reports baseline numbness and tingling in bilateral L/Es and right U/E.  Right PIV patent, saline locked. Up to BR with assist of 1, gait belt and walker, Continent of bowel and bladder. Appears to be sleeping during rounds. No new issues or changes overnight. Bed alarm on for safety. Continue with plan of care.

## 2025-01-30 NOTE — PLAN OF CARE
Occupational Therapy Discharge Summary    Reason for therapy discharge:    All goals and outcomes met, no further needs identified.    Progress towards therapy goal(s). See goals on Care Plan in [facility] electronic health record for goal details.  Goals met    Therapy recommendation(s):    Continued therapy is recommended.  Rationale/Recommendations:  HHOT for continued strength and ind with ADL/IADL completion upon discharge.

## 2025-01-30 NOTE — PLAN OF CARE
Goal Outcome Evaluation:      Plan of Care Reviewed With: patient    Overall Patient Progress: improvingOverall Patient Progress: improving    Outcome Evaluation: Pt is A&Ox4. Transfers with 1A, GB, and FWW to bathroom. Family gave pt a shower before discharging home. PIV was removed prior to discharge. Dressing was changed by ortho. Pain was managed with PRN Oxycodone and PRN Robaxin. Baseline neuropathy to BLE and RUE. Pt is able to make needs known, call light within reach. Pt discharged home.

## 2025-01-30 NOTE — PLAN OF CARE
Physical Therapy Discharge Summary    Reason for therapy discharge:    Discharged to home with home therapy.    Progress towards therapy goal(s). See goals on Care Plan in [facility] electronic health record for goal details.  Goals partially met.  Barriers to achieving goals:   discharge from facility.    Therapy recommendation(s):    Continued therapy is recommended.  Rationale/Recommendations:  Pt would benefit from home PT for LE strengthening and increase independence with all functional mobility/gait.  Continue home exercise program.

## 2025-01-30 NOTE — DISCHARGE SUMMARY
[facility]  Hospitalist Discharge Summary      Date of Admission:  1/23/2025  Date of Discharge:  1/30/2025   Discharging Provider: Radha Davila MD  Discharge Service: Hospitalist Service, GOLD TEAM 17    Discharge Diagnoses   Right femoral neck fracture post mechanical fall .   Acute blood loss anemia  Delirium  CAD  Paroxysmal Atrial fibrillation  Sick Sinus Syndrome status post  pacemaker   History nonsustained VT   Hypertension    Pulmonary hypertension  on echo of unclear etiology   hyperkalemia  Hypertension     Hyponatremia  JROJE  Graves hyperthyroidism with goiter, thyrotoxicosis   Prediabetes   Mild hypercalcemia  Chronic left knee pain  Clinically Significant Risk Factors          Follow-ups Needed After Discharge   Follow-up Appointments       Adult [facility] Follow-up and recommended labs and tests      Follow up with primary care provider, Jovana Macias, within 7 days for hospital follow- up.  No follow up labs or test are needed.  Monitor blood pressure , repeat BMP     Appointments on [place] and/or [facility] (with [facility] or [facility] provider or service). Call [phone] if you haven't heard regarding these appointments within 7 days of discharge.        Follow Up Care      Follow-up with your Surgeon Team in 2 weeks for wound check.                Discharge Disposition   Discharged to home  Condition at discharge: Stable    Hospital Course   Isadora Mendez is a 89 year old woman admitted on 1/23/2025. She has a history of hypertension, non-obstructive CAD, hyperlipidemia, atrial fibrillation, sick sinus syndrome s/p pacemaker placement, CKD and hyperparathyroidism who is admitted with a hip fracture, after a ground-level fall,patient lost her balance and fell on her right side        R hip pain due to fracture .   Right femoral neck fracture post mechanical fall .   - status post  Posterior approach cemented hemiarthroplasty right hip  1/24/25   - pain control with OTC  tylenol , PRN oxycodone, switched robaxin to PRN as well   - hip precautions per  orthopedic surgery     per orthopedic surgery :  Activity: Up with assist and assistive devices as needed until independent. Posterior hip precautions to operative side x 3 months:  1) No hip flexion beyond 90 degrees  2) No adduction beyond midline  3) No internal rotation beyond neutral   Weight bearing status: WBAT  Antibiotics: Cefazolin x 24 hours  Diet: Begin with clear fluids and progress diet as tolerated. Bowel regimen. Anti-emetics PRN.    DVT prophylaxis:  Mechanical and home ASA to resume POD1  Elevation: Elevate heels off of bed on pillows        - follow up  with Dr Kowalski in 2 weeks   - did clarify with orthopedic surgery  and  Keep Aquacel dressing intact x 2 weeks ( 10-14 days ) . OK to shower with dressing in-place.       Hip abduction pillow when sleeping x 6 weeks         Acute blood loss anemia  - Hg 8.2---> 8   - transfuse if Hg < 7      Delirium  - she is doing much better, awake alert oriented   - minimize narcotics, sedating medications   - here was started on Seeoquel 12.5 mg q HS,  stop on discharge       CAD  Paroxysmal Atrial fibrillation  Sick Sinus Syndrome status post  pacemaker   History nonsustained VT   Hypertension    Pulmonary hypertension  on echo of unclear etiology   - seen by cardiology  in past    - heart cath 2009 with 30-40% non obstructive lesions , nuclear stress test 2022 was negative fo  rinducibel ischemia   -Pacemaker 6/30/2010. Leads are [employer] 5076. Generator change 9/14/2016 , MRI compatible per notes 10/10/24   - patient  had declined anticoagulation ,  also declined amiodarone   - PTA on toprol XL 50 mg      hyperkalemia  -Lokelma 10 gm po  1/27 , 1/28   - lisinopril and spironolactone  has been held  since admission and would  not restart with her hyperkalemia   - K 4.9 1/29 and 4.7 1/30    - monitor CMP as out patient        Hypertension      - PTA lisinopril and spironolactone on hold due to hyperkalemia and would not restart, K was 5 on admission and got as high as 5.6 when Cr was up form baseline  to 1.25    - discussed with  daughter by bedside   - blood pressures up, started amlodipine 5 mg daily      Hyponatremia  - developed on hospital day # 4, went to 127  - now  at 138 1/29 ---> 135 1/30   - follow labs a sop, did order labs for 2/3       JORJE  - highest creatinine 1.31 1/27 , improved but now at 1.34, encouraged po intake and now creatinine is 1.02   - received fluid bolus  1/27   - avoid nephrotoxic agents   - encourage po intake         Graves hyperthyroidism with goiter, thyrotoxicosis   - sees endo . Last seen 1/2024   - PTA on methimazole 5 mf a alli   - last TSH      Prediabetes   -blood sugar have been 90s to 158 , will defer back to endo on follow up          Mild hypercalcemia  - defer back to endo      Chronic left knee pain          Consultations This Hospital Stay   ORTHOPAEDIC SURGERY ADULT/PEDS IP CONSULT  PHYSICAL THERAPY ADULT IP CONSULT  OCCUPATIONAL THERAPY ADULT IP CONSULT  PHYSICAL THERAPY ADULT IP CONSULT  OCCUPATIONAL THERAPY ADULT IP CONSULT  CARE MANAGEMENT / SOCIAL WORK IP CONSULT  CARE MANAGEMENT / SOCIAL WORK IP CONSULT    Code Status   Full Code    Time Spent on this Encounter   I, Radha Davila MD, personally saw the patient today and spent greater than 30 minutes discharging this patient.       Radha Davila MD  [facility] MED SURG ORTHOPEDIC  [address] [address]  Phone: [phone]  Fax: [phone]  ______________________________________________________________________    Physical Exam   Vital Signs: Temp: 98.2  F (36.8  C) Temp src: Oral BP: (!) 160/58 Pulse: 60   Resp: 16 SpO2: 95 % O2 Device: None (Room air)    Weight: 120 lbs 0 oz  General appearence: awake alert  in  no apparent distress     Mouth : dry mucus membranes   RESPIRATORY: lungs clear  "  CARDIOVASCULAR:S1 S2 regular   GASTROINTESTINAL:soft, non-distended , non-tender , + bowel sounds,   SKIN: warm and dry, no mottling noted   NEUROLOGIC; awake alert and oriented,   EXTREMITIES: no clubbing, cyanosis or edema ,    Primary Care Physician   Jovana Macias    Discharge Orders      Primary Care - Care Coordination Referral      Primary Care - Care Coordination Referral      Home Care Referral      Reason for your hospital stay    Right Hip Hemiarthroplasty     When to call - Contact Surgeon Team    You may experience symptoms that require follow-up before your scheduled appointment. Refer to the \"Stoplight Tool\" for instructions on when to contact your Surgeon Team if you are concerned about pain control, blood clots, constipation, or if you are unable to urinate.     When to call - Reach out to Urgent Care    If you are not able to reach your Surgeon Team and you need immediate care, go to the Orthopedic Walk-in Clinic or Urgent Care at your Surgeon's office.  Do NOT go to the Emergency Room unless you have shortness of breath, chest pain, or other signs of a medical emergency.     When to call - Reasons to Call 911    Call 911 immediately if you experience sudden-onset chest pain, arm weakness/numbness, slurred speech, or shortness of breath     Discharge Instruction - Breathing exercises    Perform breathing exercises 10 times per hours while awake for 2 weeks. (If given, use your Incentive Spirometer)     Symptoms - Fever Management    A low grade fever can be expected after surgery.  Use acetaminophen (TYLENOL) as needed for fever management.  Contact your Surgeon Team if you have a fever greater than 101.5 F, chills, and/or night sweats.     Symptoms - Constipation management    Constipation (hard, dry bowel movements) is expected after surgery due to the combination of being less active, the anesthetic, and the opioid pain medication.  You can do the following to help reduce " constipation:  ~  FLUIDS:  Drink clear liquids (water or Gatorade), or juice (apple/prune).  ~  DIET:  Eat a fiber rich diet.    ~  ACTIVITY:  Get up and move around several times a day.  Increase your activity as you are able.  MEDICATIONS:  Reduce the risk of constipation by starting medications before you are constipated.  You can take Miralax   (1 packet as directed) and/or a stool softener (Senokot 1-2 tablets 1-2 times a day).  If you already have constipation and these medications are not working, you can get magnesium citrate and use as directed.  If you continue to have constipation you can try an over the counter suppository or enema.  Call your Surgeon Team if it has been greater than 3 days since your last bowel movement.     Symptoms - Reduced Urine Output    Changes in the amount of fluids you drank before and after surgery may result in problems urinating.  It is important to stay well-hydrated after surgery and drink plenty of water. If it has been greater than 8 hours since you have urinated despite drinking plenty of water, call your Surgeon Team.     Activity - Exercises to prevent blood clots    Unless otherwise directed by your Surgeon team, perform the following exercises at least three times per day for the first four weeks after surgery to prevent blood clots in your legs: 1) Point and flex your feet (Ankle Pumps), 2) Move your ankle around in big circles, 3) Wiggle your toes, 4) Walk, even for short distances, several times a day, will help decrease the risk of blood clots.     Comfort and Pain Management - Pain after Surgery    Pain after surgery is normal and expected.  You will have some amount of pain for several weeks after surgery.  Your pain will improve with time.  There are several things you can do to help reduce your pain including: rest, ice, elevation, and using pain medications as needed. Contact your Surgeon Team if you have pain that persists or worsens after surgery despite  rest, ice, elevation, and taking your medication(s) as prescribed. Contact your Surgeon Team if you have new numbness, tingling, or weakness in your operative extremity.     Comfort and Pain Management - Swelling after Surgery    Swelling and/or bruising of the surgical extremity is common and may persist for several months after surgery. In addition to frequent icing and elevation, gentle compressive support with an ACE wrap or tubigrip may help with swelling. Apply compression regularly, removing at least twice daily to perform skin checks. Contact your Surgeon Team if your swelling increases and is NOT associated with an increase in your activity level, or if your swelling increases and is associated with redness and pain.     Comfort and Pain Management - Cold therapy    Ice can be used to control swelling and discomfort after surgery. Place a thin towel over your operative site and apply the ice pack overtop. Leave ice pack in place for 20 minutes, then remove for 20 minutes. Repeat this 20 minutes on/20 minutes off routine as often as tolerated.     Medication Instructions - Acetaminophen (TYLENOL) Instructions    You were discharged with acetaminophen (TYLENOL) for pain management after surgery. Acetaminophen most effectively manages pain symptoms when it is taken on a schedule without missing doses (every four, six, or eight hours). Your Provider will prescribe a safe daily dose between 3000 - 4000 mg.  Do NOT exceed this daily dose. Most patients use acetaminophen for pain control for the first four weeks after surgery.  You can wean from this medication as your pain decreases.     Medication Instructions - NSAID Instructions    You were discharged with an anti-inflammatory medication for pain management to use in combination with acetaminophen (TYLENOL) and the narcotic pain medication.  Take this medication exactly as directed.  You should only take one anti-inflammatory at a time.  Some common  "anti-inflammatories include: ibuprofen (ADVIL, MOTRIN), naproxen (ALEVE, NAPROSYN), celecoxib (CELEBREX), meloxicam (MOBIC), ketorolac (TORADOL).  Take this medication with food and water.     Follow Up Care    Follow-up with your Surgeon Team in 2 weeks for wound check.     Medication instructions -  Anticoagulation - aspirin    Take the aspirin as prescribed for a total of four weeks after surgery.  This is given to help minimize your risk of blood clot.     Comfort and Pain Management - LOWER Extremity Elevation    Swelling is expected for several months after surgery. This type of swelling is usually associated with gravity and activity, and can be improved with elevation.   The best way to do this is to get your \"toes above your nose\" by laying down and placing several pillows lengthwise under your calf and heel. When elevating your leg keep your knee completely straight. Perform this elevation as often as possible especially for the first two weeks after surgery.     Return to Driving    Return to driving - Driving is NOT permitted until directed by your provider. Under no circumstance are you permitted to drive while using narcotic pain medications.     Dressing / Wound Care - NO Tub Bathing    Tub bathing, swimming, or any other activities that will cause your incision to be submerged in water should be avoided until allowed by your Surgeon.     No flexion past 90 degrees    No bending at waist past 90 degrees.     No internal rotation    No pivoting on your operative leg.     No adduction past midline    No crossing your operative leg.     Weight bearing as tolerated    Weight bearing as tolerated on your operative extremity.     Dressing / Wound care - Shower with wound/dressing covered    You must COVER your dressing or incision with saran wrap (or any other non-permeable covering) to allow the incision to remain dry while showering. You may shower 3 days after surgery as long as the surgical wound stays " dry. Continue to cover your dressing or incision for showering until your first office visit.  You are strictly prohibited from soaking or submerging the surgical wound underwater.     Dressing / Wound Care - Wound    You have a clean dressing on your surgical wound. Dressing change instructions as follows: remove your dressing in  14 days, and leave incision open to air. Contact your Surgeon Team if you have increased redness, warmth around the surgical wound, and/or drainage from the surgical wound.     Activity    Posterior Hip precaution x 3 months  Hip abduction pillow when sleeping x 3 months     Reason for your hospital stay    Hip fracture     Adult [facility] Follow-up and recommended labs and tests    Follow up with primary care provider, Jovana Macias, within 7 days for hospital follow- up.  No follow up labs or test are needed.  Monitor blood pressure , repeat BMP     Appointments on [place] and/or [facility] (with [facility] or [facility] provider or service). Call [phone] if you haven't heard regarding these appointments within 7 days of discharge.     Discharge Instruction - Regular Diet Adult    Return to your pre-surgery diet unless instructed otherwise     Diet    Follow this diet upon discharge: Regular adult. Low potassium       Significant Results and Procedures   Most Recent 3 CBC's:  Recent Labs   Lab Test 01/30/25  0602 01/29/25  0540 01/28/25  0552 01/26/25  0744 01/25/25  0647 01/23/25  2107   WBC  --  6.5  --   --  11.6* 12.5*   HGB 8.0* 8.0* 8.2*   < > 9.1* 12.3   MCV  --  94  --   --  93 93   PLT  --  171  --   --  168 196    < > = values in this interval not displayed.     Most Recent 3 BMP's:  Recent Labs   Lab Test 01/30/25  0602 01/29/25  0540 01/28/25  0552    138 137   POTASSIUM 4.7 4.9 4.5   CHLORIDE 102 107 107   CO2 22 24 22   BUN 24.4* 28.1* 32.8*   CR 1.02* 1.34* 1.21*   ANIONGAP 11 7 8   INO 8.9 8.9 8.6*   * 98 94     Most Recent 2 LFT's:  Recent Labs    Lab Test 01/23/25 2107 06/26/24  1113 07/21/23  1044   AST 35  --  22   ALT 21 12 10   ALKPHOS 119  --  116*   BILITOTAL 0.5  --  0.4     Most Recent 3 INR's:  Recent Labs   Lab Test 01/23/25 2107 07/21/23  1044 05/24/23  1852   INR 1.05 1.08 1.26*     Most Recent 3 Troponin's:  Recent Labs   Lab Test 11/13/17  1510   TROPI <0.015     Most Recent 3 BNP's:  Recent Labs   Lab Test 07/21/23  1044 05/24/23  1852 10/20/22  2026 12/14/18  0707 11/13/17  1510   NTBNPI  --  2,414* 820  --   --    NTBNP 704  --   --  419 484*     7-Day Micro Results       No results found for the last 168 hours.          Most Recent TSH and T4:  Recent Labs   Lab Test 12/05/24  1335   TSH 5.19*   T4 1.13     Most Recent Hemoglobin A1c:  Recent Labs   Lab Test 03/07/24  1426   A1C 5.9*   ,   Results for orders placed or performed during the hospital encounter of 01/23/25   XR Chest 2 Views    Narrative    EXAM: XR CHEST 2 VIEWS  LOCATION: [facility]  DATE: 1/23/2025    INDICATION: Fall.  COMPARISON: 5/24/2023.      Impression    IMPRESSION: No focal airspace consolidation. No pleural effusion or pneumothorax.    Heart size is mildly enlarged. Atherosclerotic calcifications of the thoracic aorta. Left chest wall cardiac implantable electronic device.   XR Shoulder Right G/E 3 Views    Narrative    EXAM: XR SHOULDER RIGHT G/E 3 VIEWS  LOCATION: [facility]  DATE: 1/23/2025    INDICATION: right shoulder pain; fall  COMPARISON: None.      Impression    IMPRESSION: No acute fracture or dislocation.   XR Femur Right 2 Views    Narrative    EXAM: XR KNEE RIGHT 1/2 VIEWS, XR FEMUR RIGHT 2 VIEWS  LOCATION: [facility]  DATE: 1/23/2025    INDICATION: fall, pain  COMPARISON: None.      Impression    IMPRESSION: Angulated and displaced femoral neck fracture. No other fracture or dislocation. Moderate osteoarthritis  in the knee.   XR Pelvis 1/2 Views    Narrative    EXAM: XR PELVIS 1/2 VIEWS  LOCATION: [facility]  DATE: 1/23/2025    INDICATION: fall  COMPARISON: None.      Impression    IMPRESSION: A mildly displaced transverse fracture of the right femoral neck is noted with mild superior elevation of the right femoral shaft. Right femoral head remains seated in the right acetabulum. Mild degenerative narrowing of bilateral hip joints.   Moderate osteoarthritis of the right knee joint is suboptimally evaluated.   XR Knee Right 1/2 Views    Narrative    EXAM: XR KNEE RIGHT 1/2 VIEWS, XR FEMUR RIGHT 2 VIEWS  LOCATION: [facility]  DATE: 1/23/2025    INDICATION: fall, pain  COMPARISON: None.      Impression    IMPRESSION: Angulated and displaced femoral neck fracture. No other fracture or dislocation. Moderate osteoarthritis in the knee.   XR Pelvis w Hip Port Right G/E 2 Views    Narrative    EXAM: XR PELVIS AND HIP PORTABLE RIGHT 2 VIEWS  LOCATION: [facility]  DATE: 1/24/2025    INDICATION: Status post Hip surgery  COMPARISON: 01/23/2025      Impression    IMPRESSION: There is a new bipolar right hip hemiarthroplasty managing a proximal femoral fracture in expected position. No displaced periprosthetic fracture. Expected soft tissue and intra-articular gas. Josee overlie the right hip. Calcification of   the hamstring origins.       Discharge Medications   Current Discharge Medication List        START taking these medications    Details   acetaminophen (TYLENOL) 325 MG tablet Take 2 tablets (650 mg) by mouth daily as needed for mild pain or other (and adjunct with moderate or severe pain or per patient request).  Qty: 100 tablet, Refills: 0    Associated Diagnoses: Status post hip hemiarthroplasty      aspirin 81 MG EC tablet Take 1 tablet (81 mg) by mouth 2 times daily for 28 days. DVT  Prophylaxis: Take Aspirin 81 mg PO twice daily for 4 weeks then resume the prior dose Aspirin 81 mg PO daily as previously done prior to surgery.  Qty: 56 tablet, Refills: 0    Associated Diagnoses: Status post hip hemiarthroplasty      oxyCODONE (ROXICODONE) 5 MG tablet Take 0.5-1 tablets (2.5-5 mg) by mouth every 4 hours as needed for moderate pain.  Qty: 20 tablet, Refills: 0    Associated Diagnoses: Status post hip hemiarthroplasty      polyethylene glycol (MIRALAX) 17 GM/Dose powder Take 17 g by mouth daily.  Qty: 510 g, Refills: 0    Associated Diagnoses: Status post hip hemiarthroplasty      senna-docusate (SENOKOT-S/PERICOLACE) 8.6-50 MG tablet Take 1 tablet by mouth 2 times daily.  Qty: 60 tablet, Refills: 0    Associated Diagnoses: Status post hip hemiarthroplasty           CONTINUE these medications which have CHANGED    Details   amLODIPine (NORVASC) 5 MG tablet Take 1 tablet (5 mg) by mouth daily.  Qty: 30 tablet, Refills: 0    Associated Diagnoses: Hypertension, unspecified type           CONTINUE these medications which have NOT CHANGED    Details   co-enzyme Q-10 100 MG CAPS capsule Take 100 mg by mouth twice a week.      famotidine (PEPCID) 20 MG tablet Take 1 tablet (20 mg) by mouth 2 times daily  Qty: 180 tablet, Refills: 3    Associated Diagnoses: Gastroesophageal reflux disease with esophagitis without hemorrhage      melatonin 1 MG/ML LIQD liquid Take 1 mg by mouth nightly as needed for sleep      methimazole (TAPAZOLE) 5 MG tablet Take 1 tablet (5 mg) by mouth daily.  Qty: 30 tablet, Refills: 4    Associated Diagnoses: Graves disease      metoprolol succinate ER (TOPROL XL) 50 MG 24 hr tablet TAKE 2 TABLETS BY MOUTH EVERY MORNING AND 1 TABLET EVERY EVENING  Qty: 270 tablet, Refills: 1    Associated Diagnoses: Paroxysmal A-fib (H)      ondansetron (ZOFRAN) 4 MG tablet Take 1 tablet (4 mg) by mouth every 6 hours as needed for nausea (give prior to meals for nausea)  Qty: 90 tablet, Refills: 1     Associated Diagnoses: Nausea; Chronic cholecystitis; Acute nausea with nonbilious vomiting      triamcinolone (KENALOG) 0.1 % external cream Apply topically 2 times daily  Qty: 453.6 g, Refills: 0    Associated Diagnoses: Itching           STOP taking these medications       aspirin 81 MG tablet Comments:   Reason for Stopping:         diclofenac (VOLTAREN) 1 % topical gel Comments:   Reason for Stopping:         lisinopril (ZESTRIL) 40 MG tablet Comments:   Reason for Stopping:         spironolactone (ALDACTONE) 25 MG tablet Comments:   Reason for Stopping:             Allergies   No Known Allergies

## 2025-01-30 NOTE — PROGRESS NOTES
"  VS: BP (!) 151/56   Pulse 60   Temp 97.6  F (36.4  C) (Oral)   Resp 14   Ht 1.575 m (5' 2\")   Wt 54.4 kg (120 lb)   SpO2 99%   BMI 21.95 kg/m       O2: 99% room air, denies Shortness of Breath or Coughing   Output: Voiding without difficulty, last BM 1/28/2025   Last BM: last BM 1/28/2025   Activity: Up with 1 person assist, walker   Skin: Intact, except incision; UTV. Integrity low, skin integrity slow return to original state.    Pain: Experiencing cramping, spasmic pain (5/10). Roboxin PRN (Muscle relaxant), Tylenol.   CMS: Regular/Baseline. Alert and Oriented   Dressing: Right hip surgical dressing, with drainage marked, ortho is aware, to be assessed in the AM. Can reinforce with ABD's if needed   Diet: Regular   LDA: Line Saline Locked (R PIV)   Equipment: IV pole, walker, call light, and personal belongings   Plan: To be discharged in the next few days   Additional Info:        "

## 2025-01-31 ENCOUNTER — PATIENT OUTREACH (OUTPATIENT)
Dept: CARE COORDINATION | Facility: CLINIC | Age: OVER 89
End: 2025-01-31
Payer: COMMERCIAL

## 2025-01-31 NOTE — PROGRESS NOTES
CARE MANAGEMENT QUICK NOTE  Writer received voicemail from 1/30 at 14:20 from [facility]. Mary from intake states that pt was accepted fro home care RN/PT/OT services. They cannot complete blood draws and asked if we would help navigate this issue. Writer called back at this time. Mary states that they reached out to the pt's daughter, scheduled the pt, and informed her that the pt will have to go to outpt lab or get mobile lab services.    Patient is not using [facility] per Mary. Writer called [facility] to verify that patient is NOT a client of theirs.    Accepting Home Care Agency  [facility]  Phone: [phone]  Fax: [phone]    Aida Lake, MSN, APRN, AGCNS-BC   Covering 5O [phone]  Nurse Coordinator  Securely message with Antonio, 5 Med Surg Vocera

## 2025-01-31 NOTE — PROGRESS NOTES
Clinic Care Coordination Contact    Situation: Patient chart reviewed by care coordinator.    Background: Referred by Inpatient RN for Care Transition related to HC discharge (therapy recs home PT/OT) on 1/26/25.     Assessment: Per inpatient notes:   Plan for patient to discharge to home today with home care and family assist. Patient has been accepted  by [facility] for RN, HHA, PT & OT. No further discharge concerns at this time. RNCC will sign off. Please re-consult CM for any further discharge concerns.     Plan/Recommendations: RN to contact pt for TCM call and CC.     JAMEL LYLES RN on 1/31/2025 at 10:01 AM

## 2025-01-31 NOTE — DISCHARGE SUMMARY
DISCHARGE SUMMARY    Pt discharging to: home   Transportation: daughters via private vehicle   AVS given and discussed: Pt was given AVS and pt states understanding of content. Pt has no further questions.   Stoplight Tool given and discussed: Yes, no further questions.  Medications given: Yes, discussed. No further questions.   Belongings returned: Yes, ensured all belongings packed and sent with pt. No items in security.   Comments: Escorted safely to elevators. Pt left at 1820

## 2025-01-31 NOTE — PROGRESS NOTES
[facility]   ORTHOPEDIC SURGERY CLINIC  [place]    PATIENT:   Isadora Mendez  YOB: 1931  DATE OF SERVICE:   2/6/25    CHIEF COMPLAINT:  Right hip - orthopedic aftercare    PROCEDURES:  1/24/2025 - Posterior approach cemented hemiarthroplasty right hip     HISTORY OF PRESENT ILLNESS:  Isadora Mendez is a 89 year old who is following up post-operatively for the above procedure.  She states the hip is doing well, and improving since surgery.  She is having muscle spasms which are very bothersome, especially at night. She is taking Robaxin still which is helping with the spasms.  She is also taking oxycodone and Tylenol for pain.  She is also doing PT and which is going well.  She presents today with a wheelchair. She is able to ambulate with a walker.  Present with her daughter who is here for the entire history and physical.  They report she has been compliant with use of walker as well as use of abduction pillow.  She has been mainly compliant with posterior precautions although they do admit there has been some dilations without any consequences.  No feelings of instability.    PHYSICAL EXAM:  Awake alert and oriented in no apparent distress.  Body mass index is 21.95 kg/m .   Focused exam of the right lower extremity demonstrates no deformity.    Skin is clean, dry and intact.  Incision clean dry and intact   No erythema, warmth, ecchymosis or swelling.  No lymphedema.  No knee effusion.  No tenderness to palpation.  Range of motion:  Hip: extension 0 , flexion 90 , internal rotation 20 , external rotation 40 .  Knee: extension 0 , flexion 130   Ankle: dorsiflexion 20 , plantarflexion 40 , subtalar motion normal  Knee is stable to anterior, posterior, varus and valgus stress  Ankle stable to anterior drawer.  Negative external rotation stress test .  CMS intact distally.  Intact sensation in lateral femoral cutaneous, saphenous, sural, superficial  peroneal, deep peroneal and tibial distributions.    Motor intact in quadriceps, hamstrings, abductors, tibialis anterior, extensor hallucis longus, and gastrocsoleus.   Pedal pulses intact and capillary refill brisk.    IMAGING:  AP and crosstable lateral right hip obtained today personally reviewed by me.  Stable appropriate alignment of dot hip arthroplasty components.  No evidence of loosening, migration or failure.  No periprosthetic fracture.  No evidence of osteomyelitis.    LABS:  None    ASSESSMENT:  Doing well 2 weeks postoperatively with no apparent acute complications.    PLAN:  Weight-bear as tolerated  Posterior hip precautions for 6 additional weeks consisting of no hip flexion past 90 degrees, no internal rotation past neutral and no abduction past midline  Otherwise range of motion to tolerance  Walker, gait belt and one-person assist at all times to prevent future falls   Continue DVT prophylaxis for 2 additional weeks  Refills for Robaxin and oxycodone provided for muscle spasm and breakthrough pain  Return to clinic in 6 weeks with repeat x-rays    X-RAYS NEXT VISIT:  AP pelvis and crosstable lateral right hip    Victor Manuel Kowalski MD, FAAOS    Orthopedic Trauma  Adult Reconstruction  Department of Orthopedic Surgery  [facility]

## 2025-02-03 ENCOUNTER — MEDICAL CORRESPONDENCE (OUTPATIENT)
Dept: HEALTH INFORMATION MANAGEMENT | Facility: CLINIC | Age: OVER 89
End: 2025-02-03

## 2025-02-03 NOTE — PROGRESS NOTES
Clinic Care Coordination Contact  Transitions of Care Outreach    Chief Complaint   Patient presents with    Clinic Care Coordination - Initial       Most Recent Admission Date: 1/23/2025   Most Recent Admission Diagnosis: Fall, initial encounter - W19.XXXA  Acute pain of right shoulder - M25.511  Closed displaced fracture of neck of right femur (H) - S72.001A     Most Recent Discharge Date: 1/30/2025   Most Recent Discharge Diagnosis: Fall, initial encounter - W19.XXXA  Acute pain of right shoulder - M25.511  Closed displaced fracture of neck of right femur (H) - S72.001A  Right hip pain - M25.551  Right knee pain, unspecified chronicity - M25.561  Anticoagulated - Z79.01  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 Per patient's daughter, she has had either herself (PT) or Isadora's other daughter Florina (RN) with her for 24-7 and overnight care. She states that she works for a home care agency. She shares that the home care saman does not do labs, which is something that Isadora really needs due to medication switches and high potassium in the hospital. They will take her in if needed but really are hoping for in home lab service. RN provided the resources for in home lab connection and shared that if it works out to call my direct phone number for orders if needed, faxed orders. RN encouraged her to ask home care RN as well- she states she was supposed to come at 11 today and still isn't there so she is hoping to investigate this with  agency further. She will call with anything that she needs- care coordination declined at this time due to extensive support from pt's daughters who are  providing this to her at this time.  How are your symptoms? (Red Flag symptoms escalate to triage hotline per guidelines): Improved  Do you know how to contact your clinic care team if you have future questions or changes to your health status? : Yes  Does the patient have their discharge instructions? : Yes  Does the patient have questions regarding their discharge instructions? : No  Were you started on any new medications or were there changes to any of your previous medications? : Yes  Does the patient have all of their medications?: Yes  Do you have questions regarding any of your medications? : No  Do you have all of your needed medical supplies or equipment (DME)?  (i.e. oxygen tank, CPAP, cane, etc.): Yes              Follow up Plan     Discharge Follow-Up  Discharge follow up appointment scheduled in alignment with recommended follow up timeframe or Transitions of Risk Category? (Low = within 30 days; Moderate= within 14 days; High= within 7 days): Yes  Discharge Follow Up Appointment Date: 02/07/25  Discharge Follow Up Appointment Scheduled with?: Primary Care Provider (Dr. Johnson due to Dr. Macias not having availability)    Future Appointments   Date Time Provider Department Center   2/6/2025  2:00 PM [facility]   2/7/2025  4:30 PM Suzanne Johnson MD [facility]   3/5/2025  1:00 PM June Acuña MD [facility]   3/24/2025 12:00 AM  ICD REMOTE [facility]   4/14/2025  1:00 PM Nas Banda APRN [facility]   6/30/2025 12:00 AM  ICD REMOTE [facility]   7/25/2025 11:00 AM Jovana Macias MD [facility]       Outpatient Plan as outlined on AVS reviewed with patient.      For any urgent concerns, please contact our 24 hour nurse triage line: [phone]       JAMEL LYLES RN

## 2025-02-04 ENCOUNTER — LAB (OUTPATIENT)
Dept: LAB | Facility: CLINIC | Age: OVER 89
End: 2025-02-04
Payer: COMMERCIAL

## 2025-02-04 ENCOUNTER — TELEPHONE (OUTPATIENT)
Dept: INTERNAL MEDICINE | Facility: CLINIC | Age: OVER 89
End: 2025-02-04

## 2025-02-04 DIAGNOSIS — N18.31 CHRONIC KIDNEY DISEASE, STAGE 3A (H): Primary | ICD-10-CM

## 2025-02-04 DIAGNOSIS — E05.00 GRAVES DISEASE: ICD-10-CM

## 2025-02-04 DIAGNOSIS — E87.5 HYPERKALEMIA: ICD-10-CM

## 2025-02-04 LAB
ANION GAP SERPL CALCULATED.3IONS-SCNC: 11 MMOL/L (ref 7–15)
BUN SERPL-MCNC: 16.7 MG/DL (ref 8–23)
CALCIUM SERPL-MCNC: 9.2 MG/DL (ref 8.8–10.4)
CHLORIDE SERPL-SCNC: 101 MMOL/L (ref 98–107)
CREAT SERPL-MCNC: 0.87 MG/DL (ref 0.51–0.95)
CREAT UR-MCNC: 41.2 MG/DL
EGFRCR SERPLBLD CKD-EPI 2021: 62 ML/MIN/1.73M2
GLUCOSE SERPL-MCNC: 120 MG/DL (ref 70–99)
HCO3 SERPL-SCNC: 22 MMOL/L (ref 22–29)
MICROALBUMIN UR-MCNC: <12 MG/L
MICROALBUMIN/CREAT UR: NORMAL MG/G{CREAT}
POTASSIUM SERPL-SCNC: 4.8 MMOL/L (ref 3.4–5.3)
SODIUM SERPL-SCNC: 134 MMOL/L (ref 135–145)
T4 FREE SERPL-MCNC: 1.4 NG/DL (ref 0.9–1.7)
TSH SERPL DL<=0.005 MIU/L-ACNC: 4.71 UIU/ML (ref 0.3–4.2)

## 2025-02-04 PROCEDURE — 84439 ASSAY OF FREE THYROXINE: CPT

## 2025-02-04 PROCEDURE — 82570 ASSAY OF URINE CREATININE: CPT

## 2025-02-04 PROCEDURE — 36415 COLL VENOUS BLD VENIPUNCTURE: CPT

## 2025-02-04 PROCEDURE — 84443 ASSAY THYROID STIM HORMONE: CPT

## 2025-02-04 PROCEDURE — 82043 UR ALBUMIN QUANTITATIVE: CPT

## 2025-02-04 PROCEDURE — 80048 BASIC METABOLIC PNL TOTAL CA: CPT

## 2025-02-04 NOTE — TELEPHONE ENCOUNTER
Left detailed VM on confidential voicemail box for Ping with the following verbal order(s): Home Care Orders: Skilled nursinx week for 1 week, then 1x  week for 4 weeks; PT: 2x for 4 weeks then 1 x week for 3 weeks; OT: 1x week for 6 weeks.  Nancy BELLO LPN  [facility]

## 2025-02-04 NOTE — TELEPHONE ENCOUNTER
[facility] Call Center    Phone Message    May a detailed message be left on voicemail: yes     Reason for Call: Order(s): Home Care Orders: Skilled Nursing: Orders skilled nursing PT and OT    Skilled nursinx week 1, then 1x  week for 4 weeks,   PT :2x for 4 weeks then 1 x week for 3 weeks,   OT: 1x week for 6 weeks  Right femur fx, home safety education, complex wound assessment,conditioning endurance, ADL's and HEP.     Action Taken: Message routed to:  [facility]    Travel Screening: Not Applicable     Date of Service:

## 2025-02-06 ENCOUNTER — OFFICE VISIT (OUTPATIENT)
Dept: ORTHOPEDICS | Facility: CLINIC | Age: OVER 89
End: 2025-02-06
Payer: COMMERCIAL

## 2025-02-06 VITALS — WEIGHT: 120 LBS | SYSTOLIC BLOOD PRESSURE: 134 MMHG | BODY MASS INDEX: 21.95 KG/M2 | DIASTOLIC BLOOD PRESSURE: 56 MMHG

## 2025-02-06 DIAGNOSIS — Z47.89 ORTHOPEDIC AFTERCARE: Primary | ICD-10-CM

## 2025-02-06 RX ORDER — METHOCARBAMOL 500 MG/1
500 TABLET, FILM COATED ORAL 3 TIMES DAILY
Qty: 42 TABLET | Refills: 0 | Status: SHIPPED | OUTPATIENT
Start: 2025-02-06

## 2025-02-06 RX ORDER — OXYCODONE HYDROCHLORIDE 5 MG/1
5 TABLET ORAL EVERY 6 HOURS PRN
Qty: 10 TABLET | Refills: 0 | Status: SHIPPED | OUTPATIENT
Start: 2025-02-06 | End: 2025-02-09

## 2025-02-06 NOTE — LETTER
2/6/2025      Isadora Mendez  [address] [facility] [id number]      Dear Colleague,    Thank you for referring your patient, Isadora Mendez, to the [facility] ORTH[facility] [place]. Please see a copy of my visit note below.    [facility] PHYS[facility]ANS   [facility]   ORTHOPEDIC SURGERY CLINIC  [place]    PATIENT:   Isadora Mendez  YOB: 1931  DATE OF SERVICE:   2/6/25    CHIEF COMPLAINT:  Right hip - orthopedic aftercare    PROCEDURES:  1/24/2025 - Posterior approach cemented hemiarthroplasty right hip     HISTORY OF PRESENT ILLNESS:  Isadora Mendez is a 89 year old who is following up post-operatively for the above procedure.  She states the hip is doing well, and improving since surgery.  She is having muscle spasms which are very bothersome, especially at night. She is taking Robaxin still which is helping with the spasms.  She is also taking oxycodone and Tylenol for pain.  She is also doing PT and which is going well.  She presents today with a wheelchair. She is able to ambulate with a walker.  Present with her daughter who is here for the entire history and physical.  They report she has been compliant with use of walker as well as use of abduction pillow.  She has been mainly compliant with posterior precautions although they do admit there has been some dilations without any consequences.  No feelings of instability.    PHYSICAL EXAM:  Awake alert and oriented in no apparent distress.  Body mass index is 21.95 kg/m .   Focused exam of the right lower extremity demonstrates no deformity.    Skin is clean, dry and intact.  Incision clean dry and intact   No erythema, warmth, ecchymosis or swelling.  No lymphedema.  No knee effusion.  No tenderness to palpation.  Range of motion:  Hip: extension 0 , flexion 90 , internal rotation 20 , external rotation 40 .  Knee: extension 0 , flexion 130   Ankle: dorsiflexion 20 , plantarflexion 40 , subtalar motion  normal  Knee is stable to anterior, posterior, varus and valgus stress  Ankle stable to anterior drawer.  Negative external rotation stress test .  CMS intact distally.  Intact sensation in lateral femoral cutaneous, saphenous, sural, superficial peroneal, deep peroneal and tibial distributions.    Motor intact in quadriceps, hamstrings, abductors, tibialis anterior, extensor hallucis longus, and gastrocsoleus.   Pedal pulses intact and capillary refill brisk.    IMAGING:  AP and crosstable lateral right hip obtained today personally reviewed by me.  Stable appropriate alignment of dot hip arthroplasty components.  No evidence of loosening, migration or failure.  No periprosthetic fracture.  No evidence of osteomyelitis.    LABS:  None    ASSESSMENT:  Doing well 2 weeks postoperatively with no apparent acute complications.    PLAN:  Weight-bear as tolerated  Posterior hip precautions for 6 additional weeks consisting of no hip flexion past 90 degrees, no internal rotation past neutral and no abduction past midline  Otherwise range of motion to tolerance  Walker, gait belt and one-person assist at all times to prevent future falls   Continue DVT prophylaxis for 2 additional weeks  Refills for Robaxin and oxycodone provided for muscle spasm and breakthrough pain  Return to clinic in 6 weeks with repeat x-rays    X-RAYS NEXT VISIT:  AP pelvis and crosstable lateral right hip    Victor Manuel Kowalski MD, FAAOS    Orthopedic Trauma  Adult Reconstruction  Department of Orthopedic Surgery  [facility]      Again, thank you for allowing me to participate in the care of your patient.        Sincerely,        Victor Manuel Kowalski MD    Electronically signed

## 2025-02-12 ENCOUNTER — DOCUMENTATION ONLY (OUTPATIENT)
Dept: INTERNAL MEDICINE | Facility: CLINIC | Age: OVER 89
End: 2025-02-12
Payer: COMMERCIAL

## 2025-02-12 ENCOUNTER — TELEPHONE (OUTPATIENT)
Dept: ORTHOPEDICS | Facility: CLINIC | Age: OVER 89
End: 2025-02-12
Payer: COMMERCIAL

## 2025-02-12 NOTE — PROGRESS NOTES
Type of Form Received: [employer] 74754    Form Received (Date) 2/10/25   Form Filled out No   Placed in provider folder Yes

## 2025-02-12 NOTE — TELEPHONE ENCOUNTER
"Home health nurse asks about wound care instructions.    Patient had a Posterior approach cemented hemiarthroplasty right hip by Dr. Kowalski on 1/24/25.     Closed in layers interrupted #2 Ehibond and running #1 StrataFix for fascia, 2-0 Vicryl for subcutaneous layer and 3-0 Monocryl and Dermabond for skin. A sterile dressing was applied with Aquacel.      Keep Aquacel dressing intact x 2 weeks. OK to shower with dressing in-place.      Aquacel should have come off on February 7.  I called Zee from [facility] to let her know the dressing instructions.  I asked if the wound was healed at this time and if there was still Dermabond on the skin.  She said there was one area that was not completely healed because it was \"pink\" and there was still tape across the incision.  I'm not sure what the tape was and she wasn't sure if it was steri-strips.  Could it be these \"tails\" that the surgeon tapes instead of tying off a knot?  She will have the daughter take a picture and upload it to My Chart or Zee will call back and find another way to get a photo to us.      Until then, she should only shower over the area with warm soapy water.  No soaking.  And the area can stay open to air if it's not draining.  Otherwise she can cover with a dressing.  Zee will notify the patient and her daughter.    "

## 2025-02-12 NOTE — TELEPHONE ENCOUNTER
Other: Zee from [facility] called she would like someone to give her a call for clarification if anything is suppose to be done with her incision since the bandage was removed because Isadora's daughter misplaced her aftercare paperwork.     Could we send this information to you in [facility] or would you prefer to receive a phone call?:   Patient would prefer a phone call   Okay to leave a detailed message?: Yes at Other phone number:  [phone] Zee

## 2025-02-14 DIAGNOSIS — Z53.9 DIAGNOSIS NOT YET DEFINED: Primary | ICD-10-CM

## 2025-02-18 ENCOUNTER — MYC MEDICAL ADVICE (OUTPATIENT)
Dept: ORTHOPEDICS | Facility: CLINIC | Age: OVER 89
End: 2025-02-18
Payer: COMMERCIAL

## 2025-02-26 ENCOUNTER — TELEPHONE (OUTPATIENT)
Dept: INTERNAL MEDICINE | Facility: CLINIC | Age: OVER 89
End: 2025-02-26
Payer: COMMERCIAL

## 2025-02-26 NOTE — TELEPHONE ENCOUNTER
Spoke to Zee - [facility] - at visit today patients BP taken manually was 110/46 & 110/50, then taken with the automatic BP cuff it was 118/63.  Patient was asymptomatic.  All other vitals were WNL.Zee calling to discuss blood pressure parameters.  Patients daughter had a hand written note from the hospital stating to hold BP medication if DBP was <60.  Zee asking if this means she should hold both the amLODIPine (NORVASC) 5 MG tablet  (last prescribed by hospitalist on 1/30/25) and metoprolol succinate ER (TOPROL XL) 50 MG 24 hr tablet (prescribed by Dr. Macias). Zee DEL RIO advised patient to hold her BP medications until she gets a response from Dr. Macias.  Per Zee patient was going to continue checking her BP and if the BP increases she will contact Zee for further direction.     Writer forwarded message to Dr. Macias for her review.     Alka Overton RN on 2/26/2025 at 12:40 PM

## 2025-02-26 NOTE — TELEPHONE ENCOUNTER
"Spoke to Zee - [facility] informed her of Dr. Macias's response \"Please have her hold the amlodipine for BP <110/60 \"    Alka Overton RN on 2/26/2025 at 2:38 PM  "

## 2025-02-26 NOTE — TELEPHONE ENCOUNTER
[facility] Call Center    Phone Message    May a detailed message be left on voicemail: yes     Reason for Call: Other: home care nurse calling and is at the patients home requesting to know if a medication needs to be held due to a low blood pressure, please call thank you.      Action Taken: Message routed to:  [facility] pcc    Travel Screening: Not Applicable     Date of Service:

## 2025-03-03 ENCOUNTER — LAB (OUTPATIENT)
Dept: LAB | Facility: CLINIC | Age: OVER 89
End: 2025-03-03
Payer: COMMERCIAL

## 2025-03-03 DIAGNOSIS — E05.00 GRAVES DISEASE: ICD-10-CM

## 2025-03-03 LAB
HOLD SPECIMEN: NORMAL

## 2025-03-03 PROCEDURE — 99000 SPECIMEN HANDLING OFFICE-LAB: CPT

## 2025-03-03 PROCEDURE — 36415 COLL VENOUS BLD VENIPUNCTURE: CPT

## 2025-03-03 PROCEDURE — 84443 ASSAY THYROID STIM HORMONE: CPT

## 2025-03-03 PROCEDURE — 84445 ASSAY OF TSI GLOBULIN: CPT | Mod: 90

## 2025-03-03 PROCEDURE — 84439 ASSAY OF FREE THYROXINE: CPT

## 2025-03-04 DIAGNOSIS — E05.00 GRAVES DISEASE: Primary | ICD-10-CM

## 2025-03-04 LAB
T4 FREE SERPL-MCNC: 1.24 NG/DL (ref 0.9–1.7)
TSH SERPL DL<=0.005 MIU/L-ACNC: 5.48 UIU/ML (ref 0.3–4.2)

## 2025-03-05 ENCOUNTER — OFFICE VISIT (OUTPATIENT)
Dept: ENDOCRINOLOGY | Facility: CLINIC | Age: OVER 89
End: 2025-03-05
Payer: COMMERCIAL

## 2025-03-05 VITALS
WEIGHT: 128 LBS | OXYGEN SATURATION: 97 % | HEIGHT: 61 IN | DIASTOLIC BLOOD PRESSURE: 70 MMHG | HEART RATE: 67 BPM | SYSTOLIC BLOOD PRESSURE: 148 MMHG | BODY MASS INDEX: 24.17 KG/M2

## 2025-03-05 DIAGNOSIS — R29.890 LOSS OF HEIGHT: ICD-10-CM

## 2025-03-05 DIAGNOSIS — E05.00 GRAVES DISEASE: Primary | ICD-10-CM

## 2025-03-05 DIAGNOSIS — M81.0 AGE RELATED OSTEOPOROSIS, UNSPECIFIED PATHOLOGICAL FRACTURE PRESENCE: ICD-10-CM

## 2025-03-05 DIAGNOSIS — Z78.9 TAKES DIETARY SUPPLEMENTS: ICD-10-CM

## 2025-03-05 DIAGNOSIS — Z78.0 POSTMENOPAUSAL STATUS: ICD-10-CM

## 2025-03-05 PROCEDURE — 3077F SYST BP >= 140 MM HG: CPT

## 2025-03-05 PROCEDURE — 99215 OFFICE O/P EST HI 40 MIN: CPT | Mod: 24

## 2025-03-05 PROCEDURE — G2211 COMPLEX E/M VISIT ADD ON: HCPCS

## 2025-03-05 PROCEDURE — 1126F AMNT PAIN NOTED NONE PRSNT: CPT

## 2025-03-05 PROCEDURE — 99417 PROLNG OP E/M EACH 15 MIN: CPT

## 2025-03-05 PROCEDURE — 3078F DIAST BP <80 MM HG: CPT

## 2025-03-05 RX ORDER — METHIMAZOLE 5 MG/1
5 TABLET ORAL DAILY
Qty: 30 TABLET | Refills: 4 | Status: SHIPPED | OUTPATIENT
Start: 2025-03-05

## 2025-03-05 RX ORDER — ALENDRONATE SODIUM 70 MG/1
70 TABLET ORAL
Qty: 12 TABLET | Refills: 4 | Status: SHIPPED | OUTPATIENT
Start: 2025-03-05

## 2025-03-05 RX ORDER — METHIMAZOLE 5 MG/1
2.5 TABLET ORAL DAILY
Qty: 30 TABLET | Refills: 4 | Status: CANCELLED | OUTPATIENT
Start: 2025-03-05

## 2025-03-05 ASSESSMENT — PAIN SCALES - GENERAL: PAINLEVEL_OUTOF10: NO PAIN (0)

## 2025-03-05 NOTE — LETTER
3/5/2025       RE: Isadora Mendez  [address] [facility] [id number]     Dear Colleague,    Thank you for referring your patient, Isadora Mendez, to the [facility] ENDOCRINOLOGY [facility] [place] [facility]. Please see a copy of my visit note below.    02/24/25 9:49 AM  PATIENT LAB/IMAGING STATUS : Orders completed / No further action needed       Endocrine      Attending Assessment/Plan :     Graves hyperthroidism  with goiter.  On Methimazole 5 mg/day ;  Add on TSI to labs from yesterday  IF TSI is normal we will Reduce MMI to 2.5 mg/day and repeat labs one month   IF TSI is high we will keep the MMI as is.     Osteoporotic fractures define the osteoporosis - wrist 2019, hip 2025; significant height loss. I have counseled them on this extensively .  Recommend treatment  Start alendronate 70 mg/week; I have counseled them on calcium intake (see AVS)  DXA and VFA     Prediabetes --     Thyroid nodules - most recent US was 6/15/23.     Takes dietary supplements.   This topic kept coming up.  I have counseled them on this.      The Longitudinal plan of care for graves and osteoporosis condition was addressed during this visit. Due to added complexity of care, we will continue to support Isadora , and the subsequent management of this condition(s) and with the ongoing continuity of care of this condition.    67_ minutes spent on the date of the encounter doing chart review, history and exam, documentation and further activities as noted above..  June Acuña MD    Chief complaint:  HISTORY OF PRESENT ILLNESS  Isadora returns for follow up of Graves' hyperthyroidism with goiter.   She was last seen by me 1/30/2024.   At that time , she was on MMI 5 mg/dayh.  We briefly increased the murrell to 7.5 mg/day.  She has been back on 5 mg/day since after the 5/21/24 labs.  She already had labs  in anticipation of this appt.     I initially met Isadora during the  "hospitalization [facility] 5/24-5/27/23 when she was admitted for complaints of dizziness, nausea and vomiting.   TFTS were very high.  CRP inflammation was also high.  Nontender goiter was noted on exam.  She was started on methimazole.  The MMI dose has been  5 mg/day since 11/21/23.    Isadora was hospitalized with hip fracture 1/23/25.-1/30/25. This occurred after a fall while she was on her way to grocery shopping at the [facility].  The fall was caught on camera which the family has seen.      Selected Endocrine relevant labs are as follows:  5/24/23 TSH < 0.01, free T4 > 7.77, BNP 2414, troponin 38, glucose 122, Ca 9.9, creatinine 0.89, WBC 7000, platelets 146K  5/25/23 TSH < 0.01, T3 420, free t4 7.71, T4 16.9, CRP 9.1, Ca 9.8  5/24/23 TSI 2.5 - consistent with Graves'   5/30/23 TSH < 0.01, free T4 5.3, T3 264, Ca 10, phos 4.2, albumin 3.1, 25OHD 69, PTH 33,  on MMI 10 mg bid.  6/2/23 weight 118#   1/8/24 TSH 2.76. free T4 1/14  1/17/24 weight 128 Ca 9.7, creatinine 1.23  1/22/24 Ca 9.6, creatinine 0.88  3/7/24 TSH 0.25, free T4 1.52 ;  Increase MMI from 5 to 7.5 mg/day.   5/21/24 TSH 6.04, free T4 1.05-- reduced MMI to 5 mg/day  6/26/24 TSH 1.01  8/29/24 TSH 6.06, free T4 1.2  10/2/24 TSH 4.17 on MMI 5 mg/day.   12/5/24 TSH 5.19, free T4 1.13   2/4/25 TSH 4.71, free T4 1.4, Ca 9.2 creatinine 0.87  3/3/25 TSH 5.48, free T4 1.24    Relevant imaging is as follows: (as read by me as it pertains to endocrine relevant organs)  There is no thyroid imaging  4/6/2023 CT abdomen and pelvis: adrenals appear normal  5/21/2023 CXR: small pleural effusion; no obvious evidence of large goiter   6/15/23 thyroid US:   Right mid 2 1.6 x 1.3 x  1.4 cm   Right lower 3  0.8 x 0.7  X 0.8   Right lower 4 posterior  1.1 x 1 x 1.6 cm       REVIEW OF SYSTEMS  Maximum height was 5'5.5\" as teen then  5'4' now 5'1  Not sleeping well-- using abducter pillow on the RLE;   Itchins back of head and other certain spots that itch  Cardiac: negative; " daughter notes her feet are both more swollen  Respiratory:  breathing feels  a little more difficult with activity   GI: gassy; takes acidophilus; loose BM due to Mg muscle relaxant   Muscle spasms  Restless legs   A lot of pain in the hip - getting better  Hip precautions   Some knee and shoulder pain -- fell on the right shoudler too  ? Carpal tunnel right that I haven't taken care of -- the RUE goes a little numb   Questions about vitamin D and K  Questions about pynogenol tree bark supplement     Past Medical History  Past Medical History:   Diagnosis Date     Atrial fibrillation (H) 01/01/2011     Cataract      Closed nondisplaced fracture of styloid process of radius      Dry eyes      Facial fracture (H) 01/01/2006     Hypertension      Infection due to 2019 novel coronavirus 10/2022    or 11/22- took paxolovid     Low bone density      NSVT (nonsustained ventricular tachycardia) (H) 01/01/2009    during exercise echo, neg EP study     Pacemaker 07/01/2010     Postmenopausal status      S/P cardiac catheterization 01/01/2009    30-40% non obstructive lesion     SSS (sick sinus syndrome) (H) 10/2022     Ventricular tachycardia, nonsustained (H) 01/01/2009    during stress echo     Past Surgical History:   Procedure Laterality Date     CATARACT EXTRACTION W/ INTRAOCULAR LENS IMPLANT Right 12/04/2018     HEMIARTHROPLASTY HIP Right 1/24/2025    Procedure: Hemiarthroplasty Hip;  Surgeon: Victor Manuel Kowalski MD;  Location: [facility] OR     IMPLANT PACEMAKER       PPM  06/30/2010    Permanent Pacemaker     Medications  Current Outpatient Medications   Medication Sig Dispense Refill     acetaminophen (TYLENOL) 325 MG tablet Take 2 tablets (650 mg) by mouth daily as needed for mild pain or other (and adjunct with moderate or severe pain or per patient request). 100 tablet 0     amLODIPine (NORVASC) 5 MG tablet Take 1 tablet (5 mg) by mouth daily. 30 tablet 0     co-enzyme Q-10 100 MG CAPS capsule Take 100 mg by mouth twice a  week.       famotidine (PEPCID) 20 MG tablet Take 1 tablet (20 mg) by mouth 2 times daily 180 tablet 3     melatonin 1 MG/ML LIQD liquid Take 1 mg by mouth nightly as needed for sleep       methimazole (TAPAZOLE) 5 MG tablet Take 1 tablet (5 mg) by mouth daily. 30 tablet 4     methocarbamol (ROBAXIN) 500 MG tablet Take 1 tablet (500 mg) by mouth 3 times daily. (Patient not taking: Reported on 2/7/2025) 42 tablet 0     methocarbamol (ROBAXIN) 500 MG tablet Take 1 tablet (500 mg) by mouth 3 times daily. 45 tablet 0     metoprolol succinate ER (TOPROL XL) 50 MG 24 hr tablet TAKE 2 TABLETS BY MOUTH EVERY MORNING AND 1 TABLET EVERY EVENING 270 tablet 1     ondansetron (ZOFRAN) 4 MG tablet Take 1 tablet (4 mg) by mouth every 6 hours as needed for nausea (give prior to meals for nausea) (Patient not taking: Reported on 2/7/2025) 90 tablet 1     oxyCODONE (ROXICODONE) 5 MG tablet Take 0.5-1 tablets (2.5-5 mg) by mouth every 4 hours as needed for moderate pain. (Patient not taking: Reported on 2/7/2025) 20 tablet 0     polyethylene glycol (MIRALAX) 17 GM/Dose powder Take 17 g by mouth daily. 510 g 0     senna-docusate (SENOKOT-S/PERICOLACE) 8.6-50 MG tablet Take 1 tablet by mouth 2 times daily. 60 tablet 0     triamcinolone (KENALOG) 0.1 % external cream Apply topically 2 times daily 453.6 g 0     Used to take vitamin D and K but not x 1.5 years  Cardiotropin? Supplement  Cardio plus supplement   She does not take calcium      Not much milk  Cottage cheese about 4 times/week  Likes cheese  Yogurt not a lot lately   Ice cream  not much     Allergies  No Known Allergies    Family History  family history includes Arrhythmia in her brother, brother, and sister; Atrial fibrillation in her son; Blood Disease in her son; Breast Cancer (age of onset: 50) in her daughter; Cardiovascular in her paternal grandfather; Cardiovascular (age of onset: 76) in her father; Colon Cancer in her sister; Coronary Artery Disease in her father and  "paternal grandfather; Diabetes Type 2  in her maternal grandfather; Hypertension in her mother; Leukemia in her maternal grandfather; Macular Degeneration in her mother; Myocardial Infarction (age of onset: 70) in her father; Myocardial Infarction (age of onset: 88) in her mother; Neuropathy in her sister; Thyroid Disease in her daughter; Uterine Cancer in her daughter.    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    NECK: Thyroid palpable, somewhat irregular, firm but smaller than in the past; no definable nodules .  LUNGS: clear to auscultation bilaterally.   CARDIAC: RRR, S1, S2 without murmurs, rubs or gallops.    BACK: normal spinal contour.  No gross kyphosis   NEURO: Alert, responds appropriately to questions,  moves all extremities, no tremor of the outstretched hand    DATA REVIEW     Latest Ref Rng 8/29/2024  11:54 AM 10/2/2024  11:24 AM 12/5/2024  1:35 PM 2/4/2025  1:19 PM 3/3/2025  3:53 PM   ENDO THYROID LABS-UMP         TSH 0.30 - 4.20 uIU/mL 6.06 (H)  4.17  5.19 (H)  4.71 (H)  5.48 (H)    Triiodothyronine (T3) 85 - 202 ng/dL        T4 4.5 - 11.7 ug/dL        FREE T4 0.90 - 1.70 ng/dL 1.20   1.13  1.40  1.24    Thyroid Stim Immunog <=1.3 TSI index            Latest Ref Rng 1/30/2025  6:02 AM 2/4/2025  1:19 PM   ENDO CALCIUM LABS-UMP      Calcium 8.8 - 10.4 mg/dL 8.9  9.2    Urea Nitrogen 7 - 30 mg/dL     Urea Nitrogen 8.0 - 23.0 mg/dL 24.4 (H)  16.7    Creatinine 0.51 - 0.95 mg/dL 1.02 (H)  0.87       Legend:  (H) High      Again, thank you for allowing me to participate in " the care of your patient.      Sincerely,    June Acuña MD

## 2025-03-05 NOTE — PROGRESS NOTES
Endocrine      Attending Assessment/Plan :     Graves hyperthroidism  with goiter.  On Methimazole 5 mg/day ;  Add on TSI to labs from yesterday  IF TSI is normal we will Reduce MMI to 2.5 mg/day and repeat labs one month   IF TSI is high we will keep the MMI as is.     Osteoporotic fractures define the osteoporosis - wrist 2019, hip 2025; significant height loss. I have counseled them on this extensively .  Recommend treatment  Start alendronate 70 mg/week; I have counseled them on calcium intake (see AVS)  DXA and VFA     Prediabetes --     Thyroid nodules - most recent US was 6/15/23.     Takes dietary supplements.   This topic kept coming up.  I have counseled them on this.      The Longitudinal plan of care for graves and osteoporosis condition was addressed during this visit. Due to added complexity of care, we will continue to support Isadora , and the subsequent management of this condition(s) and with the ongoing continuity of care of this condition.    67_ minutes spent on the date of the encounter doing chart review, history and exam, documentation and further activities as noted above..  June Acuña MD    Chief complaint:  HISTORY OF PRESENT ILLNESS  Isadora returns for follow up of Graves' hyperthyroidism with goiter.   She was last seen by me 1/30/2024.   At that time , she was on MMI 5 mg/dayh.  We briefly increased the murrell to 7.5 mg/day.  She has been back on 5 mg/day since after the 5/21/24 labs.  She already had labs  in anticipation of this appt.     I initially met Isadora during the hospitalization [facility] 5/24-5/27/23 when she was admitted for complaints of dizziness, nausea and vomiting.   TFTS were very high.  CRP inflammation was also high.  Nontender goiter was noted on exam.  She was started on methimazole.  The MMI dose has been  5 mg/day since 11/21/23.    Isadora was hospitalized with hip fracture 1/23/25.-1/30/25. This occurred after a fall while she was on her way to grocery shopping at the  [place].  The fall was caught on camera which the family has seen.      Selected Endocrine relevant labs are as follows:  5/24/23 TSH < 0.01, free T4 > 7.77, BNP 2414, troponin 38, glucose 122, Ca 9.9, creatinine 0.89, WBC 7000, platelets 146K  5/25/23 TSH < 0.01, T3 420, free t4 7.71, T4 16.9, CRP 9.1, Ca 9.8  5/24/23 TSI 2.5 - consistent with Graves'   5/30/23 TSH < 0.01, free T4 5.3, T3 264, Ca 10, phos 4.2, albumin 3.1, 25OHD 69, PTH 33,  on MMI 10 mg bid.  6/2/23 weight 118#   1/8/24 TSH 2.76. free T4 1/14  1/17/24 weight 128 Ca 9.7, creatinine 1.23  1/22/24 Ca 9.6, creatinine 0.88  3/7/24 TSH 0.25, free T4 1.52 ;  Increase MMI from 5 to 7.5 mg/day.   5/21/24 TSH 6.04, free T4 1.05-- reduced MMI to 5 mg/day  6/26/24 TSH 1.01  8/29/24 TSH 6.06, free T4 1.2  10/2/24 TSH 4.17 on MMI 5 mg/day.   12/5/24 TSH 5.19, free T4 1.13   2/4/25 TSH 4.71, free T4 1.4, Ca 9.2 creatinine 0.87  3/3/25 TSH 5.48, free T4 1.24    Relevant imaging is as follows: (as read by me as it pertains to endocrine relevant organs)  There is no thyroid imaging  4/6/2023 CT abdomen and pelvis: adrenals appear normal  5/21/2023 CXR: small pleural effusion; no obvious evidence of large goiter   6/15/23 thyroid US:   Right mid 2 1.6 x 1.3 x  1.4 cm   Right lower 3  0.8 x 0.7  X 0.8   Right lower 4 posterior  1.1 x 1 x 1.6 cm       REVIEW OF SYSTEMS  Maximum height was 5'5.5\" as teen then  5'4' now 5'1  Not sleeping well-- using abducter pillow on the RLE;   Itchins back of head and other certain spots that itch  Cardiac: negative; daughter notes her feet are both more swollen  Respiratory:  breathing feels  a little more difficult with activity   GI: gassy; takes acidophilus; loose BM due to Mg muscle relaxant   Muscle spasms  Restless legs   A lot of pain in the hip - getting better  Hip precautions   Some knee and shoulder pain -- fell on the right shoudler too  ? Carpal tunnel right that I haven't taken care of -- the RUE goes a little numb "   Questions about vitamin D and K  Questions about pynogenol tree bark supplement     Past Medical History  Past Medical History:   Diagnosis Date    Atrial fibrillation (H) 01/01/2011    Cataract     Closed nondisplaced fracture of styloid process of radius     Dry eyes     Facial fracture (H) 01/01/2006    Hypertension     Infection due to 2019 novel coronavirus 10/2022    or 11/22- took paxolovid    Low bone density     NSVT (nonsustained ventricular tachycardia) (H) 01/01/2009    during exercise echo, neg EP study    Pacemaker 07/01/2010    Postmenopausal status     S/P cardiac catheterization 01/01/2009    30-40% non obstructive lesion    SSS (sick sinus syndrome) (H) 10/2022    Ventricular tachycardia, nonsustained (H) 01/01/2009    during stress echo     Past Surgical History:   Procedure Laterality Date    CATARACT EXTRACTION W/ INTRAOCULAR LENS IMPLANT Right 12/04/2018    HEMIARTHROPLASTY HIP Right 1/24/2025    Procedure: Hemiarthroplasty Hip;  Surgeon: Victor Manuel Kowalski MD;  Location: [facility] OR    IMPLANT PACEMAKER      PPM  06/30/2010    Permanent Pacemaker     Medications  Current Outpatient Medications   Medication Sig Dispense Refill    acetaminophen (TYLENOL) 325 MG tablet Take 2 tablets (650 mg) by mouth daily as needed for mild pain or other (and adjunct with moderate or severe pain or per patient request). 100 tablet 0    amLODIPine (NORVASC) 5 MG tablet Take 1 tablet (5 mg) by mouth daily. 30 tablet 0    co-enzyme Q-10 100 MG CAPS capsule Take 100 mg by mouth twice a week.      famotidine (PEPCID) 20 MG tablet Take 1 tablet (20 mg) by mouth 2 times daily 180 tablet 3    melatonin 1 MG/ML LIQD liquid Take 1 mg by mouth nightly as needed for sleep      methimazole (TAPAZOLE) 5 MG tablet Take 1 tablet (5 mg) by mouth daily. 30 tablet 4    methocarbamol (ROBAXIN) 500 MG tablet Take 1 tablet (500 mg) by mouth 3 times daily. (Patient not taking: Reported on 2/7/2025) 42 tablet 0    methocarbamol (ROBAXIN)  500 MG tablet Take 1 tablet (500 mg) by mouth 3 times daily. 45 tablet 0    metoprolol succinate ER (TOPROL XL) 50 MG 24 hr tablet TAKE 2 TABLETS BY MOUTH EVERY MORNING AND 1 TABLET EVERY EVENING 270 tablet 1    ondansetron (ZOFRAN) 4 MG tablet Take 1 tablet (4 mg) by mouth every 6 hours as needed for nausea (give prior to meals for nausea) (Patient not taking: Reported on 2/7/2025) 90 tablet 1    oxyCODONE (ROXICODONE) 5 MG tablet Take 0.5-1 tablets (2.5-5 mg) by mouth every 4 hours as needed for moderate pain. (Patient not taking: Reported on 2/7/2025) 20 tablet 0    polyethylene glycol (MIRALAX) 17 GM/Dose powder Take 17 g by mouth daily. 510 g 0    senna-docusate (SENOKOT-S/PERICOLACE) 8.6-50 MG tablet Take 1 tablet by mouth 2 times daily. 60 tablet 0    triamcinolone (KENALOG) 0.1 % external cream Apply topically 2 times daily 453.6 g 0     Used to take vitamin D and K but not x 1.5 years  Cardiotropin? Supplement  Cardio plus supplement   She does not take calcium      Not much milk  Cottage cheese about 4 times/week  Likes cheese  Yogurt not a lot lately   Ice cream  not much     Allergies  No Known Allergies    Family History  family history includes Arrhythmia in her brother, brother, and sister; Atrial fibrillation in her son; Blood Disease in her son; Breast Cancer (age of onset: 50) in her daughter; Cardiovascular in her paternal grandfather; Cardiovascular (age of onset: 76) in her father; Colon Cancer in her sister; Coronary Artery Disease in her father and paternal grandfather; Diabetes Type 2  in her maternal grandfather; Hypertension in her mother; Leukemia in her maternal grandfather; Macular Degeneration in her mother; Myocardial Infarction (age of onset: 70) in her father; Myocardial Infarction (age of onset: 88) in her mother; Neuropathy in her sister; Thyroid Disease in her daughter; Uterine Cancer in her daughter.    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    NECK: Thyroid palpable, somewhat irregular, firm but smaller than in the past; no definable nodules .  LUNGS: clear to auscultation bilaterally.   CARDIAC: RRR, S1, S2 without murmurs, rubs or gallops.    BACK: normal spinal contour.  No gross kyphosis   NEURO: Alert, responds appropriately to questions,  moves all extremities, no tremor of the outstretched hand    DATA REVIEW     Latest Ref Rn 8/29/2024  11:54 AM 10/2/2024  11:24 AM 12/5/2024  1:35 PM 2/4/2025  1:19 PM 3/3/2025  3:53 PM   ENDO THYROID LABS-UMP         TSH 0.30 - 4.20 uIU/mL 6.06 (H)  4.17  5.19 (H)  4.71 (H)  5.48 (H)    Triiodothyronine (T3) 85 - 202 ng/dL        T4 4.5 - 11.7 ug/dL        FREE T4 0.90 - 1.70 ng/dL 1.20   1.13  1.40  1.24    Thyroid Stim Immunog <=1.3 TSI index            Latest Ref Rn 1/30/2025  6:02 AM 2/4/2025  1:19 PM   ENDO CALCIUM LABS-UMP      Calcium 8.8 - 10.4 mg/dL 8.9  9.2    Urea Nitrogen 7 - 30 mg/dL     Urea Nitrogen 8.0 - 23.0 mg/dL 24.4 (H)  16.7    Creatinine 0.51 - 0.95 mg/dL 1.02 (H)  0.87       Legend:  (H) High    "

## 2025-03-05 NOTE — PATIENT INSTRUCTIONS
I will get back to you about the thyroid stimulating immunoglobulin which is pending   IF this is normal , we will reduce the methimazole dose,  if it is high , we will keep the dose the same.   This will determine when we will do the next blood test.     You have osteoporosis  DXA and vertebral fracture analysis bone density test     Please call and schedule at one of these locations that provide the service you need.     DEXA, CT, MRI, US, XRAY    [facility]   ([facility] [place] [place]) [phone]   [facility] ([place], [place], [place]) [phone]   [facility] ([place], [place]) [phone]   [facility] ([facility]) [phone]         I recommend we start alendronate 70 mg/WEEK, with the plan to give it for 5 years    Because it is poorly absorbed, and because we don't want it to have to compete against other medication or food, and because we want it to get to the intended location in the gut, this medication has to be taken on an empty stomach, with a full glass of water, remaining upright and not eating for 60 minutes..      You should make sure your dental care is up to date, and that you get any needed procedures,  prior to starting alendronate.       CALCIUM Recommendation 1200 mg/day in divided dose of no more than 500 mg/dose. This includes what is in your food and also what is in any supplements you are taking.      Dietary sources of calcium: These also contain vitamin D  Milk / buttermilk              8 oz            300 mg  Dry milk powder       5 Tbsp             300 mg  Yogurt                          1 cup           400 mg  Ice cream   1/2 cup          100 mg  Hard cheese                     1.5 oz          300 mg  Cottage cheese                1/2 cup        65 mg  Spinach, cooked  1 cup  240 mg  Tofu, soft regular           4 oz          120 to 390 mg  Sardines                       3 oz               370 mg  Mackerel canned         3 oz                250 mg  Canned  salmon with bones 3 oz        170-210 mg  Calcium fortified cereals are available  SILK soy and almond milk products are calcium fortified  Orange juice  Fortified with Calcium       8 oz            300 mg  Low fat dairy sources are recommended    Recommended exercise goals:    30 minutes of moderate exercise on most days of the week .  Weight bearing exercise (ie, walking, jogging, hiking, dancing)    Strength training 2 or more times/week in addition to other weight -being exercise.  Strength training -- uses weight or resistance beyond that seen in everyday activities -(pilates, weight training with free weights, weight machines or resistance bands)    Bone density DXA may be performed every 2 years.  It must be performed on the same machine for comparison.

## 2025-03-05 NOTE — NURSING NOTE
"Chief Complaint   Patient presents with    Thyroid Problem     Vital signs:      BP: (!) 157/69 Pulse: 66     SpO2: 97 %     Height: 154.9 cm (5' 1\") Weight: 58.1 kg (128 lb)  Estimated body mass index is 24.19 kg/m  as calculated from the following:    Height as of this encounter: 1.549 m (5' 1\").    Weight as of this encounter: 58.1 kg (128 lb).      Vital signs:      BP: (!) 148/70 Pulse: 67     SpO2: 97 %     Height: 154.9 cm (5' 1\") Weight: 58.1 kg (128 lb)  Estimated body mass index is 24.19 kg/m  as calculated from the following:    Height as of this encounter: 1.549 m (5' 1\").    Weight as of this encounter: 58.1 kg (128 lb).        "

## 2025-03-07 PROBLEM — M25.562 ACUTE PAIN OF LEFT KNEE: Status: RESOLVED | Noted: 2024-09-26 | Resolved: 2025-03-07

## 2025-03-07 PROBLEM — M25.511 ACUTE PAIN OF RIGHT SHOULDER: Status: RESOLVED | Noted: 2025-01-23 | Resolved: 2025-03-07

## 2025-03-11 DIAGNOSIS — K21.00 GASTROESOPHAGEAL REFLUX DISEASE WITH ESOPHAGITIS WITHOUT HEMORRHAGE: ICD-10-CM

## 2025-03-11 LAB — TSI SER-ACNC: <1 TSI INDEX

## 2025-03-12 RX ORDER — FAMOTIDINE 20 MG/1
20 TABLET, FILM COATED ORAL 2 TIMES DAILY
Qty: 180 TABLET | Refills: 3 | Status: SHIPPED | OUTPATIENT
Start: 2025-03-12

## 2025-03-17 ENCOUNTER — DOCUMENTATION ONLY (OUTPATIENT)
Dept: INTERNAL MEDICINE | Facility: CLINIC | Age: OVER 89
End: 2025-03-17
Payer: COMMERCIAL

## 2025-03-17 NOTE — PROGRESS NOTES
Type of Form Received: [employer] 30182    Form Received (Date) 3/17/25   Form Filled out No   Placed in provider folder Yes

## 2025-03-20 ENCOUNTER — DOCUMENTATION ONLY (OUTPATIENT)
Dept: INTERNAL MEDICINE | Facility: CLINIC | Age: OVER 89
End: 2025-03-20
Payer: COMMERCIAL

## 2025-03-20 NOTE — PROGRESS NOTES
Type of Form Received: [employer] 02133    Form Received (Date) 3/19/25   Form Filled out No   Placed in provider folder Yes

## 2025-03-21 ENCOUNTER — MEDICAL CORRESPONDENCE (OUTPATIENT)
Dept: HEALTH INFORMATION MANAGEMENT | Facility: CLINIC | Age: OVER 89
End: 2025-03-21
Payer: COMMERCIAL

## 2025-03-25 NOTE — PROGRESS NOTES
[facility]   ORTHOPEDIC SURGERY CLINIC  [place]    PATIENT:   Isadora Mendez  YOB: 1931  DATE OF SERVICE:   03/27/25    CHIEF COMPLAINT:  Right hip - orthopedic aftercare     PROCEDURES:  1/24/2025 - Posterior approach cemented hemiarthroplasty right hip    HISTORY OF PRESENT ILLNESS:  Isadora Mendez is a 89 year old who is following up post-operatively for the above procedure, now 9 weeks post-op.  She states he is healing slowly, but has been good with her progress.  She ambulates with a walker, she is stiff to start, but once she takes a few steps, she feels better.  She takes Advil most days for pain.  She has not been sleeping well, but states there is a long standing history of sleep issues.  She states her muscles are jumpy.  Isadora has recently been taking gabapentin to control this.  She does not complain of any clicking in the hip. Denies instability. Denies numbness, tingling or weakness.     PHYSICAL EXAM:  Awake alert and oriented in no apparent distress.  Focused exam of the right lower extremity demonstrates no deformity.    Incision well-healed  Skin is clean, dry and intact.   No erythema, warmth, ecchymosis or swelling.  No lymphedema.  No knee effusion.  No tenderness to palpation   Range of motion:  Hip: extension 0 , flexion 90 , internal rotation 20 , external rotation 40 .  Knee: extension 0 , flexion 130   Ankle: dorsiflexion 20 , plantarflexion 40 , subtalar motion normal  Knee is stable to anterior, posterior, varus and valgus stress  Ankle stable to anterior drawer.  Negative external rotation stress test .  CMS intact distally.  Intact sensation in lateral femoral cutaneous, saphenous, sural, superficial peroneal, deep peroneal and tibial distributions.    Motor intact in quadriceps, hamstrings, abductors, tibialis anterior, extensor hallucis longus, and gastrocsoleus.   Pedal pulses intact and capillary refill  brisk.    IMAGING:  AP and crosstable lateral right hip obtained today personally reviewed by me.  Comparisons: 2/6/2025, 1/4/2025  Stable appropriate alignment of hemiarthroplasty components.  No evidence of loosening, migration or failure.  No periprosthetic fracture.  No evidence of osteomyelitis.    LABS:  None    ASSESSMENT:  Doing well 9 weeks postoperatively with no apparent acute complications.     PLAN:  Weight-bear as tolerated  Range of motion as tolerated  Overall advance activities as tolerated  No further precautions required  Assistive devices encouraged to prevent future falls  Ensuring living environment is handicap accessible and safe was encouraged  Discussed high risk of future falls and future fractures at length with the significant potential morbidity mortality associated with repeated falls and fractures  Return as needed    X-RAYS NEXT VISIT:  To be determined    Victor Manuel Kowalski MD, FAAOS    Orthopedic Trauma  Adult Reconstruction  Department of Orthopedic Surgery  [facility]

## 2025-03-27 ENCOUNTER — ANCILLARY PROCEDURE (OUTPATIENT)
Dept: GENERAL RADIOLOGY | Facility: CLINIC | Age: OVER 89
End: 2025-03-27
Attending: ORTHOPAEDIC SURGERY
Payer: COMMERCIAL

## 2025-03-27 ENCOUNTER — OFFICE VISIT (OUTPATIENT)
Dept: ORTHOPEDICS | Facility: CLINIC | Age: OVER 89
End: 2025-03-27
Payer: COMMERCIAL

## 2025-03-27 DIAGNOSIS — Z47.89 ORTHOPEDIC AFTERCARE: ICD-10-CM

## 2025-03-27 DIAGNOSIS — Z47.89 ORTHOPEDIC AFTERCARE: Primary | ICD-10-CM

## 2025-03-27 PROCEDURE — 73501 X-RAY EXAM HIP UNI 1 VIEW: CPT | Mod: TC | Performed by: RADIOLOGY

## 2025-03-27 NOTE — LETTER
3/27/2025      Isadora Mendez  [address] [facility] [address]      Dear Colleague,    Thank you for referring your patient, Isadora Mendez, to the [facility] ORTHOPEDIC CLINIC [place]. Please see a copy of my visit note below.    [facility] PHYSICIANS   [facility]   ORTHOPEDIC SURGERY CLINIC  [place]    PATIENT:   Isadora Mendez  YOB: 1931  DATE OF SERVICE:   03/27/25    CHIEF COMPLAINT:  Right hip - orthopedic aftercare     PROCEDURES:  1/24/2025 - Posterior approach cemented hemiarthroplasty right hip    HISTORY OF PRESENT ILLNESS:  Isadora Mendez is a 89 year old who is following up post-operatively for the above procedure, now 9 weeks post-op.  She states he is healing slowly, but has been good with her progress.  She ambulates with a walker, she is stiff to start, but once she takes a few steps, she feels better.  She takes Advil most days for pain.  She has not been sleeping well, but states there is a long standing history of sleep issues.  She states her muscles are jumpy.  Isadora has recently been taking gabapentin to control this.  She does not complain of any clicking in the hip. Denies instability. Denies numbness, tingling or weakness.     PHYSICAL EXAM:  Awake alert and oriented in no apparent distress.  Focused exam of the right lower extremity demonstrates no deformity.    Incision well-healed  Skin is clean, dry and intact.   No erythema, warmth, ecchymosis or swelling.  No lymphedema.  No knee effusion.  No tenderness to palpation   Range of motion:  Hip: extension 0 , flexion 90 , internal rotation 20 , external rotation 40 .  Knee: extension 0 , flexion 130   Ankle: dorsiflexion 20 , plantarflexion 40 , subtalar motion normal  Knee is stable to anterior, posterior, varus and valgus stress  Ankle stable to anterior drawer.  Negative external rotation stress test .  CMS intact distally.  Intact sensation in lateral femoral cutaneous, saphenous,  sural, superficial peroneal, deep peroneal and tibial distributions.    Motor intact in quadriceps, hamstrings, abductors, tibialis anterior, extensor hallucis longus, and gastrocsoleus.   Pedal pulses intact and capillary refill brisk.    IMAGING:  AP and crosstable lateral right hip obtained today personally reviewed by me.  Comparisons: 2/6/2025, 1/4/2025  Stable appropriate alignment of hemiarthroplasty components.  No evidence of loosening, migration or failure.  No periprosthetic fracture.  No evidence of osteomyelitis.    LABS:  None    ASSESSMENT:  Doing well 9 weeks postoperatively with no apparent acute complications.     PLAN:  Weight-bear as tolerated  Range of motion as tolerated  Overall advance activities as tolerated  No further precautions required  Assistive devices encouraged to prevent future falls  Ensuring living environment is handicap accessible and safe was encouraged  Discussed high risk of future falls and future fractures at length with the significant potential morbidity mortality associated with repeated falls and fractures  Return as needed    X-RAYS NEXT VISIT:  To be determined    Victor Manuel Kowalski MD, FAAOS    Orthopedic Trauma  Adult Reconstruction  Department of Orthopedic Surgery  [facility]      Again, thank you for allowing me to participate in the care of your patient.        Sincerely,        Victor Manuel Kowalski MD    Electronically signed

## 2025-03-31 ENCOUNTER — ANCILLARY PROCEDURE (OUTPATIENT)
Dept: CARDIOLOGY | Facility: CLINIC | Age: OVER 89
End: 2025-03-31
Attending: INTERNAL MEDICINE
Payer: COMMERCIAL

## 2025-03-31 ENCOUNTER — ANCILLARY ORDERS (OUTPATIENT)
Dept: CARDIOLOGY | Facility: CLINIC | Age: OVER 89
End: 2025-03-31

## 2025-03-31 DIAGNOSIS — I49.5 SICK SINUS SYNDROME (H): Primary | ICD-10-CM

## 2025-03-31 PROCEDURE — 93294 REM INTERROG EVL PM/LDLS PM: CPT | Performed by: INTERNAL MEDICINE

## 2025-03-31 PROCEDURE — 93296 REM INTERROG EVL PM/IDS: CPT

## 2025-04-01 LAB
MDC_IDC_EPISODE_DTM: NORMAL
MDC_IDC_EPISODE_DURATION: 1 S
MDC_IDC_EPISODE_DURATION: 1 S
MDC_IDC_EPISODE_DURATION: 106 S
MDC_IDC_EPISODE_DURATION: 107 S
MDC_IDC_EPISODE_DURATION: 1144 S
MDC_IDC_EPISODE_DURATION: 1261 S
MDC_IDC_EPISODE_DURATION: 159 S
MDC_IDC_EPISODE_DURATION: 167 S
MDC_IDC_EPISODE_DURATION: 170 S
MDC_IDC_EPISODE_DURATION: 1709 S
MDC_IDC_EPISODE_DURATION: 176 S
MDC_IDC_EPISODE_DURATION: 18 S
MDC_IDC_EPISODE_DURATION: 186 S
MDC_IDC_EPISODE_DURATION: 186 S
MDC_IDC_EPISODE_DURATION: 193 S
MDC_IDC_EPISODE_DURATION: 1985 S
MDC_IDC_EPISODE_DURATION: 200 S
MDC_IDC_EPISODE_DURATION: 2069 S
MDC_IDC_EPISODE_DURATION: 2095 S
MDC_IDC_EPISODE_DURATION: 21 S
MDC_IDC_EPISODE_DURATION: 228 S
MDC_IDC_EPISODE_DURATION: 2387 S
MDC_IDC_EPISODE_DURATION: 250 S
MDC_IDC_EPISODE_DURATION: 256 S
MDC_IDC_EPISODE_DURATION: 2628 S
MDC_IDC_EPISODE_DURATION: 28 S
MDC_IDC_EPISODE_DURATION: 291 S
MDC_IDC_EPISODE_DURATION: 314 S
MDC_IDC_EPISODE_DURATION: 320 S
MDC_IDC_EPISODE_DURATION: 328 S
MDC_IDC_EPISODE_DURATION: 332 S
MDC_IDC_EPISODE_DURATION: 348 S
MDC_IDC_EPISODE_DURATION: 349 S
MDC_IDC_EPISODE_DURATION: 396 S
MDC_IDC_EPISODE_DURATION: 40 S
MDC_IDC_EPISODE_DURATION: 48 S
MDC_IDC_EPISODE_DURATION: 492 S
MDC_IDC_EPISODE_DURATION: 51 S
MDC_IDC_EPISODE_DURATION: 521 S
MDC_IDC_EPISODE_DURATION: 544 S
MDC_IDC_EPISODE_DURATION: 544 S
MDC_IDC_EPISODE_DURATION: 562 S
MDC_IDC_EPISODE_DURATION: 575 S
MDC_IDC_EPISODE_DURATION: 622 S
MDC_IDC_EPISODE_DURATION: 69 S
MDC_IDC_EPISODE_DURATION: 692 S
MDC_IDC_EPISODE_DURATION: 70 S
MDC_IDC_EPISODE_DURATION: 7351 S
MDC_IDC_EPISODE_DURATION: 736 S
MDC_IDC_EPISODE_DURATION: 741 S
MDC_IDC_EPISODE_DURATION: 864 S
MDC_IDC_EPISODE_DURATION: 89 S
MDC_IDC_EPISODE_DURATION: 94 S
MDC_IDC_EPISODE_DURATION: NORMAL S
MDC_IDC_EPISODE_DURATION: NORMAL S
MDC_IDC_EPISODE_ID: 745
MDC_IDC_EPISODE_ID: 746
MDC_IDC_EPISODE_ID: 747
MDC_IDC_EPISODE_ID: 748
MDC_IDC_EPISODE_ID: 749
MDC_IDC_EPISODE_ID: 750
MDC_IDC_EPISODE_ID: 751
MDC_IDC_EPISODE_ID: 752
MDC_IDC_EPISODE_ID: 753
MDC_IDC_EPISODE_ID: 754
MDC_IDC_EPISODE_ID: 755
MDC_IDC_EPISODE_ID: 756
MDC_IDC_EPISODE_ID: 757
MDC_IDC_EPISODE_ID: 758
MDC_IDC_EPISODE_ID: 759
MDC_IDC_EPISODE_ID: 760
MDC_IDC_EPISODE_ID: 761
MDC_IDC_EPISODE_ID: 762
MDC_IDC_EPISODE_ID: 763
MDC_IDC_EPISODE_ID: 764
MDC_IDC_EPISODE_ID: 765
MDC_IDC_EPISODE_ID: 766
MDC_IDC_EPISODE_ID: 767
MDC_IDC_EPISODE_ID: 768
MDC_IDC_EPISODE_ID: 769
MDC_IDC_EPISODE_ID: 770
MDC_IDC_EPISODE_ID: 771
MDC_IDC_EPISODE_ID: 772
MDC_IDC_EPISODE_ID: 773
MDC_IDC_EPISODE_ID: 774
MDC_IDC_EPISODE_ID: 775
MDC_IDC_EPISODE_ID: 776
MDC_IDC_EPISODE_ID: 777
MDC_IDC_EPISODE_ID: 778
MDC_IDC_EPISODE_ID: 779
MDC_IDC_EPISODE_ID: 780
MDC_IDC_EPISODE_ID: 781
MDC_IDC_EPISODE_ID: 782
MDC_IDC_EPISODE_ID: 783
MDC_IDC_EPISODE_ID: 784
MDC_IDC_EPISODE_ID: 785
MDC_IDC_EPISODE_ID: 786
MDC_IDC_EPISODE_ID: 787
MDC_IDC_EPISODE_ID: 788
MDC_IDC_EPISODE_ID: 789
MDC_IDC_EPISODE_ID: 790
MDC_IDC_EPISODE_ID: 791
MDC_IDC_EPISODE_ID: 792
MDC_IDC_EPISODE_ID: 793
MDC_IDC_EPISODE_ID: 794
MDC_IDC_EPISODE_ID: 795
MDC_IDC_EPISODE_ID: 796
MDC_IDC_EPISODE_ID: 797
MDC_IDC_EPISODE_ID: 798
MDC_IDC_EPISODE_ID: 799
MDC_IDC_EPISODE_TYPE: NORMAL
MDC_IDC_LEAD_CONNECTION_STATUS: NORMAL
MDC_IDC_LEAD_CONNECTION_STATUS: NORMAL
MDC_IDC_LEAD_IMPLANT_DT: NORMAL
MDC_IDC_LEAD_IMPLANT_DT: NORMAL
MDC_IDC_LEAD_LOCATION: NORMAL
MDC_IDC_LEAD_LOCATION: NORMAL
MDC_IDC_LEAD_LOCATION_DETAIL_1: NORMAL
MDC_IDC_LEAD_LOCATION_DETAIL_1: NORMAL
MDC_IDC_LEAD_MFG: NORMAL
MDC_IDC_LEAD_MFG: NORMAL
MDC_IDC_LEAD_MODEL: NORMAL
MDC_IDC_LEAD_MODEL: NORMAL
MDC_IDC_LEAD_POLARITY_TYPE: NORMAL
MDC_IDC_LEAD_POLARITY_TYPE: NORMAL
MDC_IDC_LEAD_SERIAL: NORMAL
MDC_IDC_LEAD_SERIAL: NORMAL
MDC_IDC_LEAD_SPECIAL_FUNCTION: NORMAL
MDC_IDC_LEAD_SPECIAL_FUNCTION: NORMAL
MDC_IDC_MSMT_BATTERY_DTM: NORMAL
MDC_IDC_MSMT_BATTERY_REMAINING_LONGEVITY: 28 MO
MDC_IDC_MSMT_BATTERY_RRT_TRIGGER: 2.83
MDC_IDC_MSMT_BATTERY_STATUS: NORMAL
MDC_IDC_MSMT_BATTERY_VOLTAGE: 2.93 V
MDC_IDC_MSMT_LEADCHNL_RA_IMPEDANCE_VALUE: 285 OHM
MDC_IDC_MSMT_LEADCHNL_RA_IMPEDANCE_VALUE: 399 OHM
MDC_IDC_MSMT_LEADCHNL_RA_PACING_THRESHOLD_AMPLITUDE: 0.75 V
MDC_IDC_MSMT_LEADCHNL_RA_PACING_THRESHOLD_PULSEWIDTH: 0.4 MS
MDC_IDC_MSMT_LEADCHNL_RA_SENSING_INTR_AMPL: 0.88 MV
MDC_IDC_MSMT_LEADCHNL_RA_SENSING_INTR_AMPL: 0.88 MV
MDC_IDC_MSMT_LEADCHNL_RV_IMPEDANCE_VALUE: 418 OHM
MDC_IDC_MSMT_LEADCHNL_RV_IMPEDANCE_VALUE: 437 OHM
MDC_IDC_MSMT_LEADCHNL_RV_PACING_THRESHOLD_AMPLITUDE: 0.62 V
MDC_IDC_MSMT_LEADCHNL_RV_PACING_THRESHOLD_PULSEWIDTH: 0.4 MS
MDC_IDC_MSMT_LEADCHNL_RV_SENSING_INTR_AMPL: 9.75 MV
MDC_IDC_MSMT_LEADCHNL_RV_SENSING_INTR_AMPL: 9.75 MV
MDC_IDC_PG_IMPLANT_DTM: NORMAL
MDC_IDC_PG_MFG: NORMAL
MDC_IDC_PG_MODEL: NORMAL
MDC_IDC_PG_SERIAL: NORMAL
MDC_IDC_PG_TYPE: NORMAL
MDC_IDC_SESS_CLINIC_NAME: NORMAL
MDC_IDC_SESS_DTM: NORMAL
MDC_IDC_SESS_TYPE: NORMAL
MDC_IDC_SET_BRADY_AT_MODE_SWITCH_RATE: 171 {BEATS}/MIN
MDC_IDC_SET_BRADY_HYSTRATE: NORMAL
MDC_IDC_SET_BRADY_LOWRATE: 60 {BEATS}/MIN
MDC_IDC_SET_BRADY_MAX_SENSOR_RATE: 130 {BEATS}/MIN
MDC_IDC_SET_BRADY_MAX_TRACKING_RATE: 130 {BEATS}/MIN
MDC_IDC_SET_BRADY_MODE: NORMAL
MDC_IDC_SET_BRADY_PAV_DELAY_LOW: 180 MS
MDC_IDC_SET_BRADY_SAV_DELAY_LOW: 150 MS
MDC_IDC_SET_LEADCHNL_RA_PACING_AMPLITUDE: 1.5 V
MDC_IDC_SET_LEADCHNL_RA_PACING_ANODE_ELECTRODE_1: NORMAL
MDC_IDC_SET_LEADCHNL_RA_PACING_ANODE_LOCATION_1: NORMAL
MDC_IDC_SET_LEADCHNL_RA_PACING_CAPTURE_MODE: NORMAL
MDC_IDC_SET_LEADCHNL_RA_PACING_CATHODE_ELECTRODE_1: NORMAL
MDC_IDC_SET_LEADCHNL_RA_PACING_CATHODE_LOCATION_1: NORMAL
MDC_IDC_SET_LEADCHNL_RA_PACING_POLARITY: NORMAL
MDC_IDC_SET_LEADCHNL_RA_PACING_PULSEWIDTH: 0.4 MS
MDC_IDC_SET_LEADCHNL_RA_SENSING_ANODE_ELECTRODE_1: NORMAL
MDC_IDC_SET_LEADCHNL_RA_SENSING_ANODE_LOCATION_1: NORMAL
MDC_IDC_SET_LEADCHNL_RA_SENSING_CATHODE_ELECTRODE_1: NORMAL
MDC_IDC_SET_LEADCHNL_RA_SENSING_CATHODE_LOCATION_1: NORMAL
MDC_IDC_SET_LEADCHNL_RA_SENSING_POLARITY: NORMAL
MDC_IDC_SET_LEADCHNL_RA_SENSING_SENSITIVITY: 0.3 MV
MDC_IDC_SET_LEADCHNL_RV_PACING_AMPLITUDE: 2 V
MDC_IDC_SET_LEADCHNL_RV_PACING_ANODE_ELECTRODE_1: NORMAL
MDC_IDC_SET_LEADCHNL_RV_PACING_ANODE_LOCATION_1: NORMAL
MDC_IDC_SET_LEADCHNL_RV_PACING_CAPTURE_MODE: NORMAL
MDC_IDC_SET_LEADCHNL_RV_PACING_CATHODE_ELECTRODE_1: NORMAL
MDC_IDC_SET_LEADCHNL_RV_PACING_CATHODE_LOCATION_1: NORMAL
MDC_IDC_SET_LEADCHNL_RV_PACING_POLARITY: NORMAL
MDC_IDC_SET_LEADCHNL_RV_PACING_PULSEWIDTH: 0.4 MS
MDC_IDC_SET_LEADCHNL_RV_SENSING_ANODE_ELECTRODE_1: NORMAL
MDC_IDC_SET_LEADCHNL_RV_SENSING_ANODE_LOCATION_1: NORMAL
MDC_IDC_SET_LEADCHNL_RV_SENSING_CATHODE_ELECTRODE_1: NORMAL
MDC_IDC_SET_LEADCHNL_RV_SENSING_CATHODE_LOCATION_1: NORMAL
MDC_IDC_SET_LEADCHNL_RV_SENSING_POLARITY: NORMAL
MDC_IDC_SET_LEADCHNL_RV_SENSING_SENSITIVITY: 0.9 MV
MDC_IDC_SET_ZONE_DETECTION_INTERVAL: 350 MS
MDC_IDC_SET_ZONE_DETECTION_INTERVAL: 400 MS
MDC_IDC_SET_ZONE_STATUS: NORMAL
MDC_IDC_SET_ZONE_STATUS: NORMAL
MDC_IDC_SET_ZONE_TYPE: NORMAL
MDC_IDC_SET_ZONE_VENDOR_TYPE: NORMAL
MDC_IDC_STAT_AT_BURDEN_PERCENT: 1.2 %
MDC_IDC_STAT_AT_DTM_END: NORMAL
MDC_IDC_STAT_AT_DTM_START: NORMAL
MDC_IDC_STAT_BRADY_AP_VP_PERCENT: 0.22 %
MDC_IDC_STAT_BRADY_AP_VS_PERCENT: 98.31 %
MDC_IDC_STAT_BRADY_AS_VP_PERCENT: 0.14 %
MDC_IDC_STAT_BRADY_AS_VS_PERCENT: 1.33 %
MDC_IDC_STAT_BRADY_DTM_END: NORMAL
MDC_IDC_STAT_BRADY_DTM_START: NORMAL
MDC_IDC_STAT_BRADY_RA_PERCENT_PACED: 97.61 %
MDC_IDC_STAT_BRADY_RV_PERCENT_PACED: 0.32 %
MDC_IDC_STAT_EPISODE_RECENT_COUNT: 0
MDC_IDC_STAT_EPISODE_RECENT_COUNT: 0
MDC_IDC_STAT_EPISODE_RECENT_COUNT: 2
MDC_IDC_STAT_EPISODE_RECENT_COUNT: 73
MDC_IDC_STAT_EPISODE_RECENT_COUNT_DTM_END: NORMAL
MDC_IDC_STAT_EPISODE_RECENT_COUNT_DTM_START: NORMAL
MDC_IDC_STAT_EPISODE_TOTAL_COUNT: 0
MDC_IDC_STAT_EPISODE_TOTAL_COUNT: 1
MDC_IDC_STAT_EPISODE_TOTAL_COUNT: 234
MDC_IDC_STAT_EPISODE_TOTAL_COUNT: 557
MDC_IDC_STAT_EPISODE_TOTAL_COUNT_DTM_END: NORMAL
MDC_IDC_STAT_EPISODE_TOTAL_COUNT_DTM_START: NORMAL
MDC_IDC_STAT_EPISODE_TYPE: NORMAL
MDC_IDC_STAT_TACHYTHERAPY_RECENT_DTM_END: NORMAL
MDC_IDC_STAT_TACHYTHERAPY_RECENT_DTM_START: NORMAL
MDC_IDC_STAT_TACHYTHERAPY_TOTAL_DTM_END: NORMAL
MDC_IDC_STAT_TACHYTHERAPY_TOTAL_DTM_START: NORMAL

## 2025-04-07 ENCOUNTER — ANCILLARY PROCEDURE (OUTPATIENT)
Dept: BONE DENSITY | Facility: CLINIC | Age: OVER 89
End: 2025-04-07
Payer: COMMERCIAL

## 2025-04-07 DIAGNOSIS — R29.890 LOSS OF HEIGHT: ICD-10-CM

## 2025-04-07 DIAGNOSIS — Z78.0 POSTMENOPAUSAL STATUS: ICD-10-CM

## 2025-04-07 DIAGNOSIS — M81.0 AGE RELATED OSTEOPOROSIS, UNSPECIFIED PATHOLOGICAL FRACTURE PRESENCE: ICD-10-CM

## 2025-04-07 PROCEDURE — 77086 VRT FRACTURE ASSMT VIA DXA: CPT | Performed by: INTERNAL MEDICINE

## 2025-04-07 PROCEDURE — 77080 DXA BONE DENSITY AXIAL: CPT | Performed by: INTERNAL MEDICINE

## 2025-04-13 ENCOUNTER — ANCILLARY PROCEDURE (OUTPATIENT)
Dept: CARDIOLOGY | Facility: CLINIC | Age: OVER 89
End: 2025-04-13
Attending: INTERNAL MEDICINE
Payer: COMMERCIAL

## 2025-04-13 DIAGNOSIS — Z45.02 FITTING AND ADJUSTMENT OF AUTOMATIC IMPLANTABLE CARDIOVERTER-DEFIBRILLATOR: ICD-10-CM

## 2025-04-13 DIAGNOSIS — Z45.02 FITTING AND ADJUSTMENT OF AUTOMATIC IMPLANTABLE CARDIOVERTER-DEFIBRILLATOR: Primary | ICD-10-CM

## 2025-04-13 PROCEDURE — 99207 CARDIAC DEVICE CHECK - REMOTE: CPT | Performed by: INTERNAL MEDICINE

## 2025-04-14 ENCOUNTER — TELEPHONE (OUTPATIENT)
Dept: CARDIOLOGY | Facility: CLINIC | Age: OVER 89
End: 2025-04-14

## 2025-04-14 DIAGNOSIS — Z01.812 PRE-PROCEDURE LAB EXAM: ICD-10-CM

## 2025-04-14 DIAGNOSIS — I48.0 PAROXYSMAL A-FIB (H): Primary | ICD-10-CM

## 2025-04-14 LAB
MDC_IDC_EPISODE_DTM: NORMAL
MDC_IDC_EPISODE_DURATION: 1 S
MDC_IDC_EPISODE_DURATION: 1 S
MDC_IDC_EPISODE_DURATION: 10 S
MDC_IDC_EPISODE_DURATION: 1024 S
MDC_IDC_EPISODE_DURATION: 1095 S
MDC_IDC_EPISODE_DURATION: 1140 S
MDC_IDC_EPISODE_DURATION: 117 S
MDC_IDC_EPISODE_DURATION: 1416 S
MDC_IDC_EPISODE_DURATION: 1442 S
MDC_IDC_EPISODE_DURATION: 152 S
MDC_IDC_EPISODE_DURATION: 163 S
MDC_IDC_EPISODE_DURATION: 183 S
MDC_IDC_EPISODE_DURATION: 183 S
MDC_IDC_EPISODE_DURATION: 187 S
MDC_IDC_EPISODE_DURATION: 200 S
MDC_IDC_EPISODE_DURATION: 212 S
MDC_IDC_EPISODE_DURATION: 22 S
MDC_IDC_EPISODE_DURATION: 228 S
MDC_IDC_EPISODE_DURATION: 251 S
MDC_IDC_EPISODE_DURATION: 253 S
MDC_IDC_EPISODE_DURATION: 257 S
MDC_IDC_EPISODE_DURATION: 268 S
MDC_IDC_EPISODE_DURATION: 301 S
MDC_IDC_EPISODE_DURATION: 311 S
MDC_IDC_EPISODE_DURATION: 314 S
MDC_IDC_EPISODE_DURATION: 318 S
MDC_IDC_EPISODE_DURATION: 320 S
MDC_IDC_EPISODE_DURATION: 326 S
MDC_IDC_EPISODE_DURATION: 331 S
MDC_IDC_EPISODE_DURATION: 340 S
MDC_IDC_EPISODE_DURATION: 35 S
MDC_IDC_EPISODE_DURATION: 366 S
MDC_IDC_EPISODE_DURATION: 375 S
MDC_IDC_EPISODE_DURATION: 387 S
MDC_IDC_EPISODE_DURATION: 394 S
MDC_IDC_EPISODE_DURATION: 40 S
MDC_IDC_EPISODE_DURATION: 414 S
MDC_IDC_EPISODE_DURATION: 425 S
MDC_IDC_EPISODE_DURATION: 462 S
MDC_IDC_EPISODE_DURATION: 47 S
MDC_IDC_EPISODE_DURATION: 485 S
MDC_IDC_EPISODE_DURATION: 492 S
MDC_IDC_EPISODE_DURATION: 581 S
MDC_IDC_EPISODE_DURATION: 594 S
MDC_IDC_EPISODE_DURATION: 6 S
MDC_IDC_EPISODE_DURATION: 64 S
MDC_IDC_EPISODE_DURATION: 694 S
MDC_IDC_EPISODE_DURATION: 697 S
MDC_IDC_EPISODE_DURATION: 717 S
MDC_IDC_EPISODE_DURATION: 741 S
MDC_IDC_EPISODE_DURATION: 758 S
MDC_IDC_EPISODE_DURATION: 82 S
MDC_IDC_EPISODE_ID: 1000
MDC_IDC_EPISODE_ID: 1001
MDC_IDC_EPISODE_ID: 1002
MDC_IDC_EPISODE_ID: 1003
MDC_IDC_EPISODE_ID: 1004
MDC_IDC_EPISODE_ID: 1005
MDC_IDC_EPISODE_ID: 1006
MDC_IDC_EPISODE_ID: 1007
MDC_IDC_EPISODE_ID: 1008
MDC_IDC_EPISODE_ID: 1009
MDC_IDC_EPISODE_ID: 1010
MDC_IDC_EPISODE_ID: 1011
MDC_IDC_EPISODE_ID: 836
MDC_IDC_EPISODE_ID: 840
MDC_IDC_EPISODE_ID: 961
MDC_IDC_EPISODE_ID: 962
MDC_IDC_EPISODE_ID: 963
MDC_IDC_EPISODE_ID: 964
MDC_IDC_EPISODE_ID: 965
MDC_IDC_EPISODE_ID: 966
MDC_IDC_EPISODE_ID: 967
MDC_IDC_EPISODE_ID: 968
MDC_IDC_EPISODE_ID: 969
MDC_IDC_EPISODE_ID: 970
MDC_IDC_EPISODE_ID: 971
MDC_IDC_EPISODE_ID: 972
MDC_IDC_EPISODE_ID: 973
MDC_IDC_EPISODE_ID: 974
MDC_IDC_EPISODE_ID: 975
MDC_IDC_EPISODE_ID: 976
MDC_IDC_EPISODE_ID: 977
MDC_IDC_EPISODE_ID: 978
MDC_IDC_EPISODE_ID: 979
MDC_IDC_EPISODE_ID: 980
MDC_IDC_EPISODE_ID: 981
MDC_IDC_EPISODE_ID: 982
MDC_IDC_EPISODE_ID: 983
MDC_IDC_EPISODE_ID: 984
MDC_IDC_EPISODE_ID: 985
MDC_IDC_EPISODE_ID: 986
MDC_IDC_EPISODE_ID: 987
MDC_IDC_EPISODE_ID: 988
MDC_IDC_EPISODE_ID: 989
MDC_IDC_EPISODE_ID: 990
MDC_IDC_EPISODE_ID: 991
MDC_IDC_EPISODE_ID: 992
MDC_IDC_EPISODE_ID: 993
MDC_IDC_EPISODE_ID: 994
MDC_IDC_EPISODE_ID: 995
MDC_IDC_EPISODE_ID: 996
MDC_IDC_EPISODE_ID: 997
MDC_IDC_EPISODE_ID: 998
MDC_IDC_EPISODE_ID: 999
MDC_IDC_EPISODE_TYPE: NORMAL
MDC_IDC_LEAD_CONNECTION_STATUS: NORMAL
MDC_IDC_LEAD_CONNECTION_STATUS: NORMAL
MDC_IDC_LEAD_IMPLANT_DT: NORMAL
MDC_IDC_LEAD_IMPLANT_DT: NORMAL
MDC_IDC_LEAD_LOCATION: NORMAL
MDC_IDC_LEAD_LOCATION: NORMAL
MDC_IDC_LEAD_LOCATION_DETAIL_1: NORMAL
MDC_IDC_LEAD_LOCATION_DETAIL_1: NORMAL
MDC_IDC_LEAD_MFG: NORMAL
MDC_IDC_LEAD_MFG: NORMAL
MDC_IDC_LEAD_MODEL: NORMAL
MDC_IDC_LEAD_MODEL: NORMAL
MDC_IDC_LEAD_POLARITY_TYPE: NORMAL
MDC_IDC_LEAD_POLARITY_TYPE: NORMAL
MDC_IDC_LEAD_SERIAL: NORMAL
MDC_IDC_LEAD_SERIAL: NORMAL
MDC_IDC_LEAD_SPECIAL_FUNCTION: NORMAL
MDC_IDC_LEAD_SPECIAL_FUNCTION: NORMAL
MDC_IDC_MSMT_BATTERY_DTM: NORMAL
MDC_IDC_MSMT_BATTERY_REMAINING_LONGEVITY: 28 MO
MDC_IDC_MSMT_BATTERY_RRT_TRIGGER: 2.83
MDC_IDC_MSMT_BATTERY_STATUS: NORMAL
MDC_IDC_MSMT_BATTERY_VOLTAGE: 2.93 V
MDC_IDC_MSMT_LEADCHNL_RA_IMPEDANCE_VALUE: 266 OHM
MDC_IDC_MSMT_LEADCHNL_RA_IMPEDANCE_VALUE: 418 OHM
MDC_IDC_MSMT_LEADCHNL_RA_PACING_THRESHOLD_AMPLITUDE: 0.75 V
MDC_IDC_MSMT_LEADCHNL_RA_PACING_THRESHOLD_PULSEWIDTH: 0.4 MS
MDC_IDC_MSMT_LEADCHNL_RA_SENSING_INTR_AMPL: 0.38 MV
MDC_IDC_MSMT_LEADCHNL_RA_SENSING_INTR_AMPL: 0.38 MV
MDC_IDC_MSMT_LEADCHNL_RV_IMPEDANCE_VALUE: 399 OHM
MDC_IDC_MSMT_LEADCHNL_RV_IMPEDANCE_VALUE: 418 OHM
MDC_IDC_MSMT_LEADCHNL_RV_PACING_THRESHOLD_AMPLITUDE: 0.62 V
MDC_IDC_MSMT_LEADCHNL_RV_PACING_THRESHOLD_PULSEWIDTH: 0.4 MS
MDC_IDC_MSMT_LEADCHNL_RV_SENSING_INTR_AMPL: 11 MV
MDC_IDC_MSMT_LEADCHNL_RV_SENSING_INTR_AMPL: 11 MV
MDC_IDC_PG_IMPLANT_DTM: NORMAL
MDC_IDC_PG_MFG: NORMAL
MDC_IDC_PG_MODEL: NORMAL
MDC_IDC_PG_SERIAL: NORMAL
MDC_IDC_PG_TYPE: NORMAL
MDC_IDC_SESS_CLINIC_NAME: NORMAL
MDC_IDC_SESS_DTM: NORMAL
MDC_IDC_SESS_TYPE: NORMAL
MDC_IDC_SET_BRADY_AT_MODE_SWITCH_RATE: 171 {BEATS}/MIN
MDC_IDC_SET_BRADY_HYSTRATE: NORMAL
MDC_IDC_SET_BRADY_LOWRATE: 60 {BEATS}/MIN
MDC_IDC_SET_BRADY_MAX_SENSOR_RATE: 130 {BEATS}/MIN
MDC_IDC_SET_BRADY_MAX_TRACKING_RATE: 130 {BEATS}/MIN
MDC_IDC_SET_BRADY_MODE: NORMAL
MDC_IDC_SET_BRADY_PAV_DELAY_LOW: 180 MS
MDC_IDC_SET_BRADY_SAV_DELAY_LOW: 150 MS
MDC_IDC_SET_LEADCHNL_RA_PACING_AMPLITUDE: 1.5 V
MDC_IDC_SET_LEADCHNL_RA_PACING_ANODE_ELECTRODE_1: NORMAL
MDC_IDC_SET_LEADCHNL_RA_PACING_ANODE_LOCATION_1: NORMAL
MDC_IDC_SET_LEADCHNL_RA_PACING_CAPTURE_MODE: NORMAL
MDC_IDC_SET_LEADCHNL_RA_PACING_CATHODE_ELECTRODE_1: NORMAL
MDC_IDC_SET_LEADCHNL_RA_PACING_CATHODE_LOCATION_1: NORMAL
MDC_IDC_SET_LEADCHNL_RA_PACING_POLARITY: NORMAL
MDC_IDC_SET_LEADCHNL_RA_PACING_PULSEWIDTH: 0.4 MS
MDC_IDC_SET_LEADCHNL_RA_SENSING_ANODE_ELECTRODE_1: NORMAL
MDC_IDC_SET_LEADCHNL_RA_SENSING_ANODE_LOCATION_1: NORMAL
MDC_IDC_SET_LEADCHNL_RA_SENSING_CATHODE_ELECTRODE_1: NORMAL
MDC_IDC_SET_LEADCHNL_RA_SENSING_CATHODE_LOCATION_1: NORMAL
MDC_IDC_SET_LEADCHNL_RA_SENSING_POLARITY: NORMAL
MDC_IDC_SET_LEADCHNL_RA_SENSING_SENSITIVITY: 0.3 MV
MDC_IDC_SET_LEADCHNL_RV_PACING_AMPLITUDE: 2 V
MDC_IDC_SET_LEADCHNL_RV_PACING_ANODE_ELECTRODE_1: NORMAL
MDC_IDC_SET_LEADCHNL_RV_PACING_ANODE_LOCATION_1: NORMAL
MDC_IDC_SET_LEADCHNL_RV_PACING_CAPTURE_MODE: NORMAL
MDC_IDC_SET_LEADCHNL_RV_PACING_CATHODE_ELECTRODE_1: NORMAL
MDC_IDC_SET_LEADCHNL_RV_PACING_CATHODE_LOCATION_1: NORMAL
MDC_IDC_SET_LEADCHNL_RV_PACING_POLARITY: NORMAL
MDC_IDC_SET_LEADCHNL_RV_PACING_PULSEWIDTH: 0.4 MS
MDC_IDC_SET_LEADCHNL_RV_SENSING_ANODE_ELECTRODE_1: NORMAL
MDC_IDC_SET_LEADCHNL_RV_SENSING_ANODE_LOCATION_1: NORMAL
MDC_IDC_SET_LEADCHNL_RV_SENSING_CATHODE_ELECTRODE_1: NORMAL
MDC_IDC_SET_LEADCHNL_RV_SENSING_CATHODE_LOCATION_1: NORMAL
MDC_IDC_SET_LEADCHNL_RV_SENSING_POLARITY: NORMAL
MDC_IDC_SET_LEADCHNL_RV_SENSING_SENSITIVITY: 0.9 MV
MDC_IDC_SET_ZONE_DETECTION_INTERVAL: 350 MS
MDC_IDC_SET_ZONE_DETECTION_INTERVAL: 400 MS
MDC_IDC_SET_ZONE_STATUS: NORMAL
MDC_IDC_SET_ZONE_STATUS: NORMAL
MDC_IDC_SET_ZONE_TYPE: NORMAL
MDC_IDC_SET_ZONE_VENDOR_TYPE: NORMAL
MDC_IDC_STAT_AT_BURDEN_PERCENT: 23.2 %
MDC_IDC_STAT_AT_DTM_END: NORMAL
MDC_IDC_STAT_AT_DTM_START: NORMAL
MDC_IDC_STAT_BRADY_AP_VP_PERCENT: 3.81 %
MDC_IDC_STAT_BRADY_AP_VS_PERCENT: 75.1 %
MDC_IDC_STAT_BRADY_AS_VP_PERCENT: 4.09 %
MDC_IDC_STAT_BRADY_AS_VS_PERCENT: 16.99 %
MDC_IDC_STAT_BRADY_DTM_END: NORMAL
MDC_IDC_STAT_BRADY_DTM_START: NORMAL
MDC_IDC_STAT_BRADY_RA_PERCENT_PACED: 67.85 %
MDC_IDC_STAT_BRADY_RV_PERCENT_PACED: 6.13 %
MDC_IDC_STAT_EPISODE_RECENT_COUNT: 0
MDC_IDC_STAT_EPISODE_RECENT_COUNT: 0
MDC_IDC_STAT_EPISODE_RECENT_COUNT: 2
MDC_IDC_STAT_EPISODE_RECENT_COUNT: 210
MDC_IDC_STAT_EPISODE_RECENT_COUNT_DTM_END: NORMAL
MDC_IDC_STAT_EPISODE_RECENT_COUNT_DTM_START: NORMAL
MDC_IDC_STAT_EPISODE_TOTAL_COUNT: 0
MDC_IDC_STAT_EPISODE_TOTAL_COUNT: 1
MDC_IDC_STAT_EPISODE_TOTAL_COUNT: 236
MDC_IDC_STAT_EPISODE_TOTAL_COUNT: 767
MDC_IDC_STAT_EPISODE_TOTAL_COUNT_DTM_END: NORMAL
MDC_IDC_STAT_EPISODE_TOTAL_COUNT_DTM_START: NORMAL
MDC_IDC_STAT_EPISODE_TYPE: NORMAL
MDC_IDC_STAT_TACHYTHERAPY_RECENT_DTM_END: NORMAL
MDC_IDC_STAT_TACHYTHERAPY_RECENT_DTM_START: NORMAL
MDC_IDC_STAT_TACHYTHERAPY_TOTAL_DTM_END: NORMAL
MDC_IDC_STAT_TACHYTHERAPY_TOTAL_DTM_START: NORMAL

## 2025-04-14 RX ORDER — MAGNESIUM SULFATE HEPTAHYDRATE 40 MG/ML
2 INJECTION, SOLUTION INTRAVENOUS
Status: CANCELLED | OUTPATIENT
Start: 2025-04-14

## 2025-04-14 RX ORDER — LIDOCAINE 40 MG/G
CREAM TOPICAL
Status: CANCELLED | OUTPATIENT
Start: 2025-04-14

## 2025-04-14 RX ORDER — POTASSIUM CHLORIDE 1500 MG/1
20 TABLET, EXTENDED RELEASE ORAL
Status: CANCELLED | OUTPATIENT
Start: 2025-04-14 | End: 2025-04-14

## 2025-04-14 RX ORDER — SODIUM CHLORIDE 9 MG/ML
INJECTION, SOLUTION INTRAVENOUS CONTINUOUS
Status: ACTIVE | OUTPATIENT
Start: 2025-04-14

## 2025-04-14 RX ORDER — DABIGATRAN ETEXILATE 150 MG/1
150 CAPSULE ORAL 2 TIMES DAILY
Qty: 180 CAPSULE | Refills: 3 | Status: SHIPPED | OUTPATIENT
Start: 2025-04-14

## 2025-04-14 NOTE — TELEPHONE ENCOUNTER
30 days supply test claims requested for the drugs below:  Xarelto 20mg, Eliquis 5mg BID, Dabigatran etexilate 150mg BID    Xarelto 20mg daily  -$100.00 copay          Eliquis 5mg BID  -$100.00  copay          Dabigatran etexilate 150mg BID  -$5.00 copay        Pradaxa 150mg BID  -PA needed

## 2025-04-14 NOTE — TELEPHONE ENCOUNTER
Provider recommendation    <<Karrie Mendoza PA-C Clerge, Ingrid, RN  Caller: Unspecified (Today,  1:19 PM)  Raymond Boyd,  Pradaxa 150 mg BID is perfect. Please have her start that today and STOP the aspirin. Please schedule her for LIANE/DCCV tomorrow and advise her to go to the ER if worsening symptoms. Since she's able to get in for the cardioversion right away, I don't think we need to adjust her metoprolol and potential drop her BP since it was on the lower end of normal last time it was checked.  Thanks so much!!  Karrie>>     Patient contacted the above reviewed, patient and daughter expressed understanding and in agreement with the plan. Preparations/instructions reviewed for LIANE/cardioversion. Appointment/Date/time/location reviewed. Script sent for [id number] mg P.O BID. Patient will call to cancel/reschedule OT appointment tomorrow.       You are scheduled for a Cardioversion with Transesophageal Echocardiogram (LIANE), at [facility]. The hospital is located at [address] [place] [address] on [place] the [place].  If you need to cancel this procedure, please call [phone].       Visitor Policy: Two visitors.    Date:___4/15/2025___  Time: ____11:30 AM___________To the Gold Waiting Room at the [facility]  Cardioversion    Please do not eat anything for 8 hours prior to your procedure. You may have sips of water up until 2 hours prior to your arrival.  2. Medications to continue:  - Anticoagulant Pradaxa ( start taking today)  - Take all meds as prescribed, except for those noted below.  3. Medications to hold: No need to hold any medications.   4. You will discharge same day. You will need a .    If you have further questions, please utilize [employer] to contact us.   If your question concerns the above instructions, contact:  Lyndsey Bradshaw RN   Electrophysiology Nurse Coordinator.  [phone]    If your question concerns the schedule/appointment times,  contact:  SANJAY [facility] Procedure   [phone]

## 2025-04-14 NOTE — TELEPHONE ENCOUNTER
Called and spoke with patient daughter, Pradaxa will be ready in half an hour at the pharmacy, no concerns at this time. RN reiterated to take 1 tablet tonight and tomorrow morning prior to cardioversion.

## 2025-04-14 NOTE — TELEPHONE ENCOUNTER
RN called and spoke with patient and daughter/Aida. Patient reported feeling tired and winded even at rest, and also noted mild chest heaviness. Heart rate was reported as 94 bpm earlier today and increased to 133 bpm during the call. The patient denied experiencing any dizziness or lightheadedness. She is agreeable to proceeding with the blood thinner. Test claim was reviewed, pt opted for the least expensive option which is Pradaxa ( generic) 150 mg BID. Patient agreeable to move forward with LIANE/DCCV. Patient has been taking baby aspirin at home for the past couple days.           ----- Message from Karrie Mendoza sent at 4/14/2025 10:10 AM CDT -----  Regarding: Starting AC and LIANE/DCCV  Raymond Boyd,    I received this notice from the device team stating that this patient has been in AF for 3 days, not on anticoagulation. Could you please do a pharmacy consult to determine which DOAC would be most affordable and then have her start this today?  I have not received an update as to if she became more symptomatic or whether she continues to be in AF, but if she would be open to it we could also get her set up for a LIANE/DCCV this week after she starts the anticoagulant to get her out of AF. Please let me know what she thinks and if she is willing to start an AC!    Thanks so much!!  Karrie  ----- Message -----  From: Josefina Roger RN  Sent: 4/13/2025   8:17 PM CDT  To: MANOLO Alexander,    Patient has seen Dr. Mary Dixon in the past. She has been in AF for past 3 days. She is not taking an anticoagulant. She has refused in the past. Patient and her daughter report that she is now ready to start an anticoagulant. Please see notes below regarding this episode of AF. She did not want to go to ER. So, will call me in morning to check in. If she becomes more symptomatic she will go to ER. Just wondering if you can address below and the anticoagulant. Thank you so much! Josefina    Received page from  patient who reports that her heart has been out of rhythm for the past couple of days. Patient reports she was in [place] for a . Patient reports that she is feeling out of breath with exertion. Patient reports that she feels fine at rest. Patient reports that she took an extra Metoprolol during the night and another one at around 3:30 pm today. Patient reports that she usually converts to a regular rhythm ~ 4-6 hours after taking the extra Metoprolol. Encouraged patient to go to ER. Patient is hesitant and would like to wait to see if she converts to a regular rhythm. Patient instructed to go to the ER immediately with increase in symptoms. Patient and her daughter are agreeable to plan. Requested that patient call device RN in the morning to check in. In addition, patient reports she is ready to start taking an anticoagulant now. GUILLAUME Alexander notified of results. Remote pacemaker transmission received and reviewed. Device transmission sent per MD orders.    Device: Hipcricket [id number] Griselda LAWLER  Normal Device Function  Mode: AAIR<=>DDDR  bpm  AP: 78.9%  : 7.9%  Presenting EGM: AF with vent rate ~ 130 bpm. Patient reports she had just climbed up the stairs to send the transmission.  Short V-V intervals: 0  Lead Trends Appear Stable: Yes  Estimated battery longevity to RRT = 2 years. Battery voltage = 2.93 V.   Atrial Arrhythmia: 210 AT/AF episodes recorded - 6 sec-24 min 2 sec. Total AT/AF time = 3 days.  AF Mobile: 23.2%  Anticoagulant: None  Ventricular Arrhythmia: 2 NSVT episodes recorded on 4/10/25 - 1 sec, 154-160 bpm. Stored EGMs reveal AF with RVR.    ALVA Roger RN    Remote pacemaker transmission    I have reviewed and interpreted the device interrogation, settings, programming and nurse's summary. The device is functioning within normal device parameters. I agree with the current findings, assessment and plan.

## 2025-04-15 ENCOUNTER — HOSPITAL ENCOUNTER (OUTPATIENT)
Dept: CARDIOLOGY | Facility: CLINIC | Age: OVER 89
Discharge: HOME | End: 2025-04-15
Payer: COMMERCIAL

## 2025-04-15 ENCOUNTER — ANESTHESIA (OUTPATIENT)
Dept: SURGERY | Facility: CLINIC | Age: OVER 89
End: 2025-04-15
Payer: COMMERCIAL

## 2025-04-15 ENCOUNTER — ANESTHESIA EVENT (OUTPATIENT)
Dept: SURGERY | Facility: CLINIC | Age: OVER 89
End: 2025-04-15
Payer: COMMERCIAL

## 2025-04-15 ENCOUNTER — LAB (OUTPATIENT)
Dept: LAB | Facility: CLINIC | Age: OVER 89
End: 2025-04-15
Attending: INTERNAL MEDICINE
Payer: COMMERCIAL

## 2025-04-15 ENCOUNTER — HOSPITAL ENCOUNTER (OUTPATIENT)
Facility: CLINIC | Age: OVER 89
Discharge: HOME | End: 2025-04-15
Attending: INTERNAL MEDICINE | Admitting: INTERNAL MEDICINE
Payer: COMMERCIAL

## 2025-04-15 VITALS
SYSTOLIC BLOOD PRESSURE: 130 MMHG | HEART RATE: 60 BPM | RESPIRATION RATE: 18 BRPM | DIASTOLIC BLOOD PRESSURE: 75 MMHG | OXYGEN SATURATION: 94 %

## 2025-04-15 VITALS
DIASTOLIC BLOOD PRESSURE: 61 MMHG | HEART RATE: 60 BPM | SYSTOLIC BLOOD PRESSURE: 114 MMHG | OXYGEN SATURATION: 98 % | RESPIRATION RATE: 17 BRPM

## 2025-04-15 DIAGNOSIS — I48.0 PAROXYSMAL A-FIB (H): ICD-10-CM

## 2025-04-15 DIAGNOSIS — E83.19 OTHER DISORDERS OF IRON METABOLISM: ICD-10-CM

## 2025-04-15 DIAGNOSIS — E05.00 GRAVES DISEASE: ICD-10-CM

## 2025-04-15 DIAGNOSIS — Z01.812 PRE-PROCEDURE LAB EXAM: ICD-10-CM

## 2025-04-15 LAB
ATRIAL RATE - MUSE: 60 BPM
DIASTOLIC BLOOD PRESSURE - MUSE: NORMAL MMHG
FERRITIN SERPL-MCNC: 45 NG/ML (ref 11–328)
INTERPRETATION ECG - MUSE: NORMAL
LVEF ECHO: NORMAL
MAGNESIUM SERPL-MCNC: 2 MG/DL (ref 1.7–2.3)
P AXIS - MUSE: 28 DEGREES
POTASSIUM SERPL-SCNC: 4.4 MMOL/L (ref 3.4–5.3)
PR INTERVAL - MUSE: 222 MS
QRS DURATION - MUSE: 96 MS
QT - MUSE: 430 MS
QTC - MUSE: 430 MS
R AXIS - MUSE: -41 DEGREES
SYSTOLIC BLOOD PRESSURE - MUSE: NORMAL MMHG
T AXIS - MUSE: -48 DEGREES
T4 FREE SERPL-MCNC: 1.37 NG/DL (ref 0.9–1.7)
TSH SERPL DL<=0.005 MIU/L-ACNC: 3.09 UIU/ML (ref 0.3–4.2)
VENTRICULAR RATE- MUSE: 60 BPM

## 2025-04-15 PROCEDURE — 99152 MOD SED SAME PHYS/QHP 5/>YRS: CPT | Performed by: STUDENT IN AN ORGANIZED HEALTH CARE EDUCATION/TRAINING PROGRAM

## 2025-04-15 PROCEDURE — 84132 ASSAY OF SERUM POTASSIUM: CPT

## 2025-04-15 PROCEDURE — 370N000017 HC ANESTHESIA TECHNICAL FEE, PER MIN

## 2025-04-15 PROCEDURE — 83735 ASSAY OF MAGNESIUM: CPT

## 2025-04-15 PROCEDURE — 250N000011 HC RX IP 250 OP 636: Mod: JZ | Performed by: STUDENT IN AN ORGANIZED HEALTH CARE EDUCATION/TRAINING PROGRAM

## 2025-04-15 PROCEDURE — 999N000054 HC STATISTIC EKG NON-CHARGEABLE

## 2025-04-15 PROCEDURE — 36415 COLL VENOUS BLD VENIPUNCTURE: CPT

## 2025-04-15 PROCEDURE — 76376 3D RENDER W/INTRP POSTPROCES: CPT | Mod: 26 | Performed by: STUDENT IN AN ORGANIZED HEALTH CARE EDUCATION/TRAINING PROGRAM

## 2025-04-15 PROCEDURE — 84439 ASSAY OF FREE THYROXINE: CPT

## 2025-04-15 PROCEDURE — 84443 ASSAY THYROID STIM HORMONE: CPT

## 2025-04-15 PROCEDURE — 93005 ELECTROCARDIOGRAM TRACING: CPT

## 2025-04-15 PROCEDURE — 82728 ASSAY OF FERRITIN: CPT

## 2025-04-15 PROCEDURE — 93312 ECHO TRANSESOPHAGEAL: CPT | Mod: 26 | Performed by: STUDENT IN AN ORGANIZED HEALTH CARE EDUCATION/TRAINING PROGRAM

## 2025-04-15 PROCEDURE — 93320 DOPPLER ECHO COMPLETE: CPT | Mod: 26 | Performed by: STUDENT IN AN ORGANIZED HEALTH CARE EDUCATION/TRAINING PROGRAM

## 2025-04-15 PROCEDURE — 92960 CARDIOVERSION ELECTRIC EXT: CPT

## 2025-04-15 PROCEDURE — 93325 DOPPLER ECHO COLOR FLOW MAPG: CPT

## 2025-04-15 PROCEDURE — 250N000009 HC RX 250

## 2025-04-15 PROCEDURE — 93325 DOPPLER ECHO COLOR FLOW MAPG: CPT | Mod: 26 | Performed by: STUDENT IN AN ORGANIZED HEALTH CARE EDUCATION/TRAINING PROGRAM

## 2025-04-15 PROCEDURE — 250N000009 HC RX 250: Performed by: STUDENT IN AN ORGANIZED HEALTH CARE EDUCATION/TRAINING PROGRAM

## 2025-04-15 RX ORDER — NALOXONE HYDROCHLORIDE 0.4 MG/ML
0.2 INJECTION, SOLUTION INTRAMUSCULAR; INTRAVENOUS; SUBCUTANEOUS
Status: DISCONTINUED | OUTPATIENT
Start: 2025-04-15 | End: 2025-04-16 | Stop reason: HOSPADM

## 2025-04-15 RX ORDER — LIDOCAINE HYDROCHLORIDE 20 MG/ML
15 SOLUTION OROPHARYNGEAL ONCE
Status: COMPLETED | OUTPATIENT
Start: 2025-04-15 | End: 2025-04-15

## 2025-04-15 RX ORDER — POTASSIUM CHLORIDE 750 MG/1
20 TABLET, EXTENDED RELEASE ORAL
Status: ACTIVE | OUTPATIENT
Start: 2025-04-15 | End: 2025-04-15

## 2025-04-15 RX ORDER — LIDOCAINE 40 MG/G
CREAM TOPICAL
Status: DISCONTINUED | OUTPATIENT
Start: 2025-04-15 | End: 2025-04-16 | Stop reason: HOSPADM

## 2025-04-15 RX ORDER — FLUMAZENIL 0.1 MG/ML
0.2 INJECTION, SOLUTION INTRAVENOUS
Status: DISCONTINUED | OUTPATIENT
Start: 2025-04-15 | End: 2025-04-16 | Stop reason: HOSPADM

## 2025-04-15 RX ORDER — MAGNESIUM SULFATE HEPTAHYDRATE 40 MG/ML
2 INJECTION, SOLUTION INTRAVENOUS
Status: DISCONTINUED | OUTPATIENT
Start: 2025-04-15 | End: 2025-04-16 | Stop reason: HOSPADM

## 2025-04-15 RX ORDER — NALOXONE HYDROCHLORIDE 0.4 MG/ML
0.4 INJECTION, SOLUTION INTRAMUSCULAR; INTRAVENOUS; SUBCUTANEOUS
Status: DISCONTINUED | OUTPATIENT
Start: 2025-04-15 | End: 2025-04-16 | Stop reason: HOSPADM

## 2025-04-15 RX ORDER — ACYCLOVIR 200 MG/1
9.5 CAPSULE ORAL
Status: DISCONTINUED | OUTPATIENT
Start: 2025-04-15 | End: 2025-04-16 | Stop reason: HOSPADM

## 2025-04-15 RX ORDER — SODIUM CHLORIDE 9 MG/ML
1000 INJECTION, SOLUTION INTRAVENOUS CONTINUOUS
Status: DISCONTINUED | OUTPATIENT
Start: 2025-04-15 | End: 2025-04-16 | Stop reason: HOSPADM

## 2025-04-15 RX ORDER — FENTANYL CITRATE 50 UG/ML
25 INJECTION, SOLUTION INTRAMUSCULAR; INTRAVENOUS
Status: DISCONTINUED | OUTPATIENT
Start: 2025-04-15 | End: 2025-04-16 | Stop reason: HOSPADM

## 2025-04-15 RX ADMIN — METHOHEXITAL SODIUM 20 MG: 500 INJECTION, POWDER, LYOPHILIZED, FOR SOLUTION INTRAMUSCULAR; INTRAVENOUS; RECTAL at 14:02

## 2025-04-15 RX ADMIN — TOPICAL ANESTHETIC 0.5 ML: 200 SPRAY DENTAL; PERIODONTAL at 13:22

## 2025-04-15 RX ADMIN — MIDAZOLAM 2 MG: 1 INJECTION INTRAMUSCULAR; INTRAVENOUS at 13:36

## 2025-04-15 RX ADMIN — LIDOCAINE HYDROCHLORIDE 15 ML: 20 SOLUTION ORAL at 13:22

## 2025-04-15 RX ADMIN — FENTANYL CITRATE 25 MCG: 50 INJECTION, SOLUTION INTRAMUSCULAR; INTRAVENOUS at 13:36

## 2025-04-15 ASSESSMENT — ACTIVITIES OF DAILY LIVING (ADL)
ADLS_ACUITY_SCORE: 58

## 2025-04-15 ASSESSMENT — ENCOUNTER SYMPTOMS: DYSRHYTHMIAS: 1

## 2025-04-15 NOTE — ANESTHESIA PREPROCEDURE EVALUATION
Anesthesia Pre-Procedure Evaluation    Patient: Isadora Mendez   MRN: [id number] : 3/9/1931        Procedure : Procedure(s):  Anesthesia cardioversion@1400          Past Medical History:   Diagnosis Date    Atrial fibrillation (H) 2011    Cataract     Closed nondisplaced fracture of styloid process of radius 2019    Dry eyes     Facial fracture (H) 2006    Hypertension     Infection due to  novel coronavirus 10/2022    or  paxolovid    Infection due to  novel coronavirus 2021    Low bone density     NSVT (nonsustained ventricular tachycardia) (H) 2009    during exercise echo, neg EP study    Pacemaker 2010    Postmenopausal status     S/P cardiac catheterization 2009    30-40% non obstructive lesion    SSS (sick sinus syndrome) (H) 10/2022    Thyroid disease     Ventricular tachycardia, nonsustained (H) 2009    during stress echo      Past Surgical History:   Procedure Laterality Date    CATARACT EXTRACTION W/ INTRAOCULAR LENS IMPLANT Right 2018    HEMIARTHROPLASTY HIP Right 2025    Procedure: Hemiarthroplasty Hip;  Surgeon: Victor Manuel Kowalski MD;  Location: [facility] OR    IMPLANT PACEMAKER      PPM  2010    Permanent Pacemaker    TONSILLECTOMY        No Known Allergies   Social History     Tobacco Use    Smoking status: Never     Passive exposure: Never    Smokeless tobacco: Never   Substance Use Topics    Alcohol use: No      Wt Readings from Last 1 Encounters:   25 59.4 kg (130 lb 14.4 oz)        Anesthesia Evaluation   Pt has had prior anesthetic.     No history of anesthetic complications       ROS/MED HX  ENT/Pulmonary:  - neg pulmonary ROS     Neurologic:  - neg neurologic ROS     Cardiovascular: Comment: Atrial fibrillation, sick sinus syndrome s/p pacemaker placement. Takes metoprolol    (+)  hypertension- -   -  - -           MCKNIGHT.   pacemaker, Reason placed: SSS. type: medtronic AAIR / DDDR,  - Patient is dependent on  "pacemaker.      dysrhythmias, a-fib and Sick Sinus Syndrome,             METS/Exercise Tolerance: >4 METS    Hematologic:  - neg hematologic  ROS     Musculoskeletal:  - neg musculoskeletal ROS     GI/Hepatic:  - neg GI/hepatic ROS     Renal/Genitourinary:  - neg Renal ROS   (+) renal disease,             Endo:  - neg endo ROS   (+)          thyroid problem, hyperthyroidism on methymazole,           Psychiatric/Substance Use:  - neg psychiatric ROS     Infectious Disease:  - neg infectious disease ROS     Malignancy:  - neg malignancy ROS     Other:  - neg other ROS             OUTSIDE LABS:  CBC:   Lab Results   Component Value Date    WBC 6.5 01/29/2025    WBC 11.6 (H) 01/25/2025    HGB 8.0 (L) 01/30/2025    HGB 8.0 (L) 01/29/2025    HCT 24.3 (L) 01/29/2025    HCT 28.2 (L) 01/25/2025     01/29/2025     01/25/2025     BMP:   Lab Results   Component Value Date     (L) 02/04/2025     01/30/2025    POTASSIUM 4.4 04/15/2025    POTASSIUM 4.8 02/04/2025    CHLORIDE 101 02/04/2025    CHLORIDE 102 01/30/2025    CO2 22 02/04/2025    CO2 22 01/30/2025    BUN 16.7 02/04/2025    BUN 24.4 (H) 01/30/2025    CR 0.87 02/04/2025    CR 1.02 (H) 01/30/2025     (H) 02/04/2025     (H) 01/30/2025     COAGS:   Lab Results   Component Value Date    PTT 29 01/23/2025    INR 1.05 01/23/2025     POC: No results found for: \"BGM\", \"HCG\", \"HCGS\"  HEPATIC:   Lab Results   Component Value Date    ALBUMIN 4.1 01/23/2025    PROTTOTAL 7.6 01/23/2025    ALT 21 01/23/2025    AST 35 01/23/2025    ALKPHOS 119 01/23/2025    BILITOTAL 0.5 01/23/2025     OTHER:   Lab Results   Component Value Date    LACT 1.4 05/24/2023    A1C 5.9 (H) 03/07/2024    INO 9.2 02/04/2025    PHOS 4.2 05/30/2023    MAG 2.0 04/15/2025    LIPASE 46 05/24/2023    AMYLASE 129 (H) 04/05/2023    TSH 3.09 04/15/2025    T4 1.37 04/15/2025    T3 90 10/12/2023    CRP 4.8 05/06/2021       Anesthesia Plan    ASA Status:  3    NPO Status:  NPO " Appropriate    Anesthesia Type: General.     - Airway: Native airway   Induction: Intravenous.   Maintenance: N/A.        Consents            Postoperative Care            Comments:               Trevon Ledezma MD    Clinically Significant Risk Factors Present on Admission                # Drug Induced Coagulation Defect: home medication list includes an anticoagulant medication    # Hypertension: Noted on problem list                # Pacemaker present

## 2025-04-15 NOTE — DISCHARGE INSTRUCTIONS
GOING HOME AFTER YOUR TRANSESOPHAGEAL ECHOCARDIOGRAM AND CARDIOVERSION    FOR NEXT 24 HOURS:  An adult should stay with you.  Relax and take it easy.  DO NOT make any important legal decisions.  DO NOT drive or operate machines at home or at work.  DO NOT consume any alcohol today.  Resume your regular diet 2 HOURS after procedure @ __________ and drink plenty of fluids.  If your throat is sore, eat cold, bland, soft foods.  If you have any redness/skin sorness where the patches were placed, you may use aloe vera gel or 1% hydrocortisone cream to the skin (sold at drug stores)  Take Tylenol (Acetaminophen) per your provider's recommendations as needed to help relieve local pain.     CALL YOUR HEALTHCARE PROVIDER IF:  New pain or trouble swallowing  New stomach or chest pain  You develop nausea or vomiting  Fever above 100.6 F or greater  You develop hives or a rash or any unexplained itching  Have irregular heartbeat or fast pulse  Feel faint, dizzy, or lightheaded  Have chest pain with increased activity  Have bleeding issues from blood-thinning medicines (vomiting that looks like coffee grounds or contains blood OR black, bloody stools).    CALL 911 RIGHT AWAY IF YOU HAVE:  Pain in your chest, arm, shoulder, neck, or upper back  Shortness of breath  Loss of vision, speech, or strength or coordination in any body part  Weakness in your arm or leg  Uncontrolled bleeding  Feel unstable in any way     FOLLOW UP APPOINTMENT:    Follow up as scheduled with EP/Cardiology Provider     ADDITIONAL INFORMATION:    If you have any questions:        Call the Echo Lab Nurse at [phone], press 4 (Monday-Friday 7:00 am - 4:00 pm)        Call the hospital: [phone] and ask them to page [phone] (after 4:00 pm and on   weekends or holidays)      Cardiovascular Clinic:   [address] [place] [address]. [place] [address]  Your Care Team:  EP Cardiology   Telephone Number     Lyndsey Nunn RN [phone]    After  business hours: [phone], select option 4, ask for EP Fellow on call to be paged.     For scheduling appts or procedures:    Freya Ivory   [phone]   For the Device Clinic (Pacemakers and ICD's)   RN's  During business hours: [phone]     After business hours:   [phone]- select option 4 and ask for job code [id number].       As always, Thank you for trusting us with your health care needs!

## 2025-04-15 NOTE — ANESTHESIA POSTPROCEDURE EVALUATION
Patient: Isadroa Mendez    Procedure: Procedure(s):  Anesthesia cardioversion@1400       Anesthesia Type:  No value filed.    Note:  Disposition: Outpatient   Postop Pain Control: Uneventful            Sign Out: Well controlled pain   PONV: No   Neuro/Psych: Uneventful            Sign Out: Acceptable/Baseline neuro status   Airway/Respiratory: Uneventful            Sign Out: Acceptable/Baseline resp. status   CV/Hemodynamics: Uneventful            Sign Out: Acceptable CV status; No obvious hypovolemia; No obvious fluid overload   Other NRE: NONE   DID A NON-ROUTINE EVENT OCCUR? No    Event details/Postop Comments:  Care transferred to echo team           Last vitals:  Vitals:    04/15/25 1408   BP: 108/63   Resp: 16   SpO2: 99%       Electronically Signed By: Elvira Juarez MD  April 15, 2025  2:09 PM

## 2025-04-15 NOTE — TELEPHONE ENCOUNTER
Left Voicemail (1st Attempt) and Sent Mychart (1st Attempt) for the patient to call back and schedule the following:    Appointment type: RTN EP  Provider: WANDY  Return date: NEXT AVAILABLE   Specialty phone number: [phone] OPT 1  Additional appointment(s) needed: N/A  Additonal Notes: PT PREFERRED [facility] - NEXT AVAILABLE IS ROUGHLY SEPTEMBER. IF PATIENT WANTS [facility] SCHEDULE NEXT AVAILABLE AND WAITLIST - ALL WE CAN DO FOR NOW. OTHERWISE PROVIDER HAS SOONER APPTS IN [place].

## 2025-04-15 NOTE — PROGRESS NOTES
Pt arrived in ECHO department for scheduled LIANE.   Procedure explained, questions answered and consent signed. Discharge instructions discussed with patient. Daughter, Aida, is  home.  Pt's throat sprayed at 1320, therefore pt will not be able to eat or drink until 2 hours after at 1520. Informed pt of this time and encouraged to start with warm fluids and soft foods.    Pt tolerated procedure well, and was given a total of 25 mcg IV fentanyl and 2 mg IV versed for conscious sedation. Pt denied throat or chest pain after LIANE complete.   LIANE probe 68 used for procedure.  No clots visualized on LIANE so proceeded with DCCV. Anesthesia gave pt 20 mg IV brevital for sedation and pt was DCCV at 150 Joules to a SR.  Pt denied chest or throat pain after procedure and was transferred to 2A for recovery.   Report given to ALISSON Francis RN.

## 2025-04-15 NOTE — PRE-PROCEDURE
GENERAL PRE-PROCEDURE:   Procedure:  Transesophageal echocardiogram  Date/Time:  4/15/2025 12:26 PM    Written consent obtained?: Yes    Risks and benefits: Risks, benefits and alternatives were discussed    Consent given by:  Patient  Patient states understanding of procedure being performed: Yes    Patient's understanding of procedure matches consent: Yes    Procedure consent matches procedure scheduled: Yes    Expected level of sedation:  Moderate  Appropriately NPO:  Yes  ASA Class:  3  Mallampati  :  Grade 3- soft palate visible, posterior pharyngeal wall not visible  Lungs:  Lungs clear with good breath sounds bilaterally  Heart:  Normal heart sounds and rate  History & Physical reviewed:  History and physical reviewed and no updates needed  Statement of review:  I have reviewed the lab findings, diagnostic data, medications, and the plan for sedation

## 2025-04-15 NOTE — ANESTHESIA CARE TRANSFER NOTE
Patient: Isadora Mendez    Procedure: Procedure(s):  Anesthesia cardioversion@1400       Diagnosis: Paroxysmal atrial fibrillation (H) [I48.0]  Diagnosis Additional Information: No value filed.    Anesthesia Type:   No value filed.     Note:    Oropharynx: oropharynx clear of all foreign objects and spontaneously breathing  Level of Consciousness: drowsy  Oxygen Supplementation: nasal cannula  Level of Supplemental Oxygen (L/min / FiO2): 6  Independent Airway: airway patency satisfactory and stable  Dentition: dentition unchanged  Vital Signs Stable: post-procedure vital signs reviewed and stable  Report to RN Given: handoff report given  Patient transferred to: Cardiac Special Care          Vitals:  Vitals Value Taken Time   /63 04/15/25 1409   Temp     Pulse 61 04/15/25 1409   Resp 18 04/15/25 1409   SpO2 97 % 04/15/25 1409       Electronically Signed By: IBRAHIMA Jacobson CRNA  April 15, 2025  2:07 PM

## 2025-04-15 NOTE — PROGRESS NOTES
Tolerated recovery well and without issue. She has ambulated with her walker, tolerated a small snack, and voided without issue. Previous RN went through discharge instructions, no questions asked. PIV removed. Assisted to the main entrance via wheelchair to meet daughter at [facility] to get car for discharge home. Daughter has all belongings.

## 2025-04-15 NOTE — PROCEDURES
[facility]    Procedure: Cardioversion    Date/Time: 4/15/2025 2:08 PM    Performed by: Paris Barajas PA-C  Authorized by: Karrie Mendoza PA-C      UNIVERSAL PROTOCOL   Site Marked: NA  Prior Images Obtained and Reviewed:  NA  Required items: Required blood products, implants, devices and special equipment available    Patient identity confirmed:  Verbally with patient and arm band  Patient was reevaluated immediately before administering moderate or deep sedation or anesthesia  Confirmation Checklist:  Patient's identity using two indicators  Time out: Immediately prior to the procedure a time out was called    Universal Protocol: the Joint Commission Universal Protocol was followed       ANESTHESIA    Anesthesia was administered and monitored by anesthesiology.  See anesthesia documentation for details.    PROCEDURE DETAILS  Pre-procedure rhythm: atrial fibrillation  Patient position: patient was placed in a supine position  Chest area: chest area exposed  Electrodes: pads  Electrodes placed: anterior-posterior  Number of attempts: 1    Details of Attempts:  Echo staff performed LIANE prior and reported no intracardiac thrombus. 150J biphasic synchronized shock delivered with successful conversion.     Post-procedure rhythm: paced-atrial      PROCEDURE    Patient Tolerance:  Patient tolerated the procedure well with no immediate complications    Paris Barajas PA-C

## 2025-04-15 NOTE — PROGRESS NOTES
Arrived to unit 2A, room 13, via litter from Echo s/p LIANE and cardioversion. Patient will begin to eat and drink at 1520. She is alert and oriented and able to make needs known. Her daughter is at bedside and will transport patient home once discharge criteria is met.

## 2025-04-16 LAB
ATRIAL RATE - MUSE: NORMAL BPM
DIASTOLIC BLOOD PRESSURE - MUSE: NORMAL MMHG
INTERPRETATION ECG - MUSE: NORMAL
P AXIS - MUSE: NORMAL DEGREES
PR INTERVAL - MUSE: NORMAL MS
QRS DURATION - MUSE: 94 MS
QT - MUSE: 334 MS
QTC - MUSE: 465 MS
R AXIS - MUSE: -36 DEGREES
SYSTOLIC BLOOD PRESSURE - MUSE: NORMAL MMHG
T AXIS - MUSE: 168 DEGREES
VENTRICULAR RATE- MUSE: 117 BPM

## 2025-04-17 NOTE — TELEPHONE ENCOUNTER
Patient Contacted for the patient to call back and schedule the following:    Appointment type: RTN EP  Provider: WANDY  Return date: 6/10/2025  Specialty phone number: [phone] OPT 1  Additional appointment(s) needed: N/A  Additonal Notes: SCHEDULED PATIENT FOR [facility] PER NOTE FROM RN. ADDED PT TO WAITLIST FOR BOTH LOCATIONS.

## 2025-04-22 ENCOUNTER — MYC MEDICAL ADVICE (OUTPATIENT)
Dept: INTERNAL MEDICINE | Facility: CLINIC | Age: OVER 89
End: 2025-04-22

## 2025-04-22 ENCOUNTER — THERAPY VISIT (OUTPATIENT)
Age: OVER 89
End: 2025-04-22
Payer: COMMERCIAL

## 2025-04-22 DIAGNOSIS — S72.001A: Primary | ICD-10-CM

## 2025-04-22 PROCEDURE — 97161 PT EVAL LOW COMPLEX 20 MIN: CPT | Mod: GP | Performed by: PHYSICAL THERAPIST

## 2025-04-22 PROCEDURE — 97110 THERAPEUTIC EXERCISES: CPT | Mod: GP | Performed by: PHYSICAL THERAPIST

## 2025-04-22 PROCEDURE — 97530 THERAPEUTIC ACTIVITIES: CPT | Mod: GP | Performed by: PHYSICAL THERAPIST

## 2025-04-22 ASSESSMENT — ACTIVITIES OF DAILY LIVING (ADL)
WALKING_DOWN_STEEP_HILLS: MODERATE DIFFICULTY
STARTING_AND_STOPPING_QUICKLY: SLIGHT DIFFICULTY
HOW_WOULD_YOU_RATE_YOUR_CURRENT_LEVEL_OF_FUNCTION_DURING_YOUR_SPORTS_RELATED_ACTIVITIES_FROM_0_TO_100_WITH_100_BEING_YOUR_LEVEL_OF_FUNCTION_PRIOR_TO_YOUR_HIP_PROBLEM_AND_0_BEING_THE_INABILITY_TO_PERFORM_ANY_OF_YOUR_USUAL_DAILY_ACTIVITIES?: 35
SPORTS_SCORE(%): 0
GETTING INTO AND OUT OF AN AVERAGE CAR: SLIGHT DIFFICULTY
DEEP_SQUATTING: MODERATE DIFFICULTY
WALKING_INITIALLY: MODERATE DIFFICULTY
WALKING_UP_STEEP_HILLS: MODERATE DIFFICULTY
LOW_IMPACT_ACTIVITIES_LIKE_FAST_WALKING: MODERATE DIFFICULTY
HOS_ADL_SCORE(%): 63.24
HEAVY_WORK: MODERATE DIFFICULTY
SITTING FOR 15 MINUTES: NO DIFFICULTY AT ALL
ADL_TOTAL_ITEM_SCORE: 0
WALKING_UP_STEEP_HILLS: MODERATE DIFFICULTY
SITTING_FOR_15_MINUTES: NO DIFFICULTY AT ALL
ADL_HIGHEST_POTENTIAL_SCORE: 68
ROLLING OVER IN BED: MODERATE DIFFICULTY
STANDING FOR 15 MINUTES: SLIGHT DIFFICULTY
WALKING_INITIALLY: MODERATE DIFFICULTY
HEAVY_WORK: MODERATE DIFFICULTY
SPORTS_COUNT: 9
STANDING_FOR_15_MINUTES: SLIGHT DIFFICULTY
WALKING_DOWN_STEEP_HILLS: MODERATE DIFFICULTY
STEPPING_UP_AND_DOWN_CURBS: SLIGHT DIFFICULTY
HOS_ADL_HIGHEST_POTENTIAL_SCORE: 68
WALKING_FOR_APPROXIMATELY_10_MINUTES: MODERATE DIFFICULTY
HOW_WOULD_YOU_RATE_YOUR_CURRENT_LEVEL_OF_FUNCTION_DURING_YOUR_USUAL_ACTIVITIES_OF_DAILY_LIVING_FROM_0_TO_100_WITH_100_BEING_YOUR_LEVEL_OF_FUNCTION_PRIOR_TO_YOUR_HIP_PROBLEM_AND_0_BEING_THE_INABILITY_TO_PERFORM_ANY_OF_YOUR_USUAL_DAILY_ACTIVITIES?: 35
TWISTING/PIVOTING_ON_INVOLVED_LEG: SLIGHT DIFFICULTY
PUTTING_ON_SOCKS_AND_SHOES: MODERATE DIFFICULTY
HOS_ADL_ITEM_SCORE_TOTAL: 43
RECREATIONAL_ACTIVITIES: NO DIFFICULTY AT ALL
HOW_WOULD_YOU_RATE_YOUR_CURRENT_LEVEL_OF_FUNCTION?: ABNORMAL
WALKING_15_MINUTES_OR_GREATER: MODERATE DIFFICULTY
RECREATIONAL ACTIVITIES: NO DIFFICULTY AT ALL
ADL_COUNT: 17
ROLLING_OVER_IN_BED: MODERATE DIFFICULTY
HOW_WOULD_YOU_RATE_YOUR_CURRENT_LEVEL_OF_FUNCTION_DURING_YOUR_USUAL_ACTIVITIES_OF_DAILY_LIVING_FROM_0_TO_100_WITH_100_BEING_YOUR_LEVEL_OF_FUNCTION_PRIOR_TO_YOUR_HIP_PROBLEM_AND_0_BEING_THE_INABILITY_TO_PERFORM_ANY_OF_YOUR_USUAL_DAILY_ACTIVITIES?: 35
GOING_UP_1_FLIGHT_OF_STAIRS: MODERATE DIFFICULTY
SPORTS_HIGHEST_POTENTIAL_SCORE: 36
GETTING_INTO_AND_OUT_OF_A_BATHTUB: SLIGHT DIFFICULTY
STEPPING UP AND DOWN CURBS: SLIGHT DIFFICULTY
WALKING_15_MINUTES_OR_GREATER: MODERATE DIFFICULTY
PUTTING ON SOCKS AND SHOES: MODERATE DIFFICULTY
GETTING_INTO_AND_OUT_OF_A_BATHTUB: SLIGHT DIFFICULTY
ADL_SCORE(%): 0
SPORTS_TOTAL_ITEM_SCORE: 0
LIGHT_TO_MODERATE_WORK: SLIGHT DIFFICULTY
LIGHT_TO_MODERATE_WORK: SLIGHT DIFFICULTY
GOING_DOWN_1_FLIGHT_OF_STAIRS: SLIGHT DIFFICULTY
GOING DOWN 1 FLIGHT OF STAIRS: SLIGHT DIFFICULTY
PLEASE_INDICATE_YOR_PRIMARY_REASON_FOR_REFERRAL_TO_THERAPY:: HIP
WALKING_APPROXIMATELY_10_MINUTES: MODERATE DIFFICULTY
GETTING_INTO_AND_OUT_OF_AN_AVERAGE_CAR: SLIGHT DIFFICULTY
TWISTING/PIVOTING ON INVOLVED LEG: SLIGHT DIFFICULTY
DEEP SQUATTING: MODERATE DIFFICULTY
GOING UP 1 FLIGHT OF STAIRS: MODERATE DIFFICULTY

## 2025-04-22 NOTE — PROGRESS NOTES
PHYSICAL THERAPY EVALUATION  Type of Visit: Evaluation       Fall Risk Screen:  Have you fallen 2 or more times in the past year?: Yes  Have you fallen and had an injury in the past year?: Yes      Subjective      Condition type:   r hip orhtopedic aftercare  Cause of current episode:      fell in Jan   Nature of treatment:   Posterior approach cemented hemiarthroplasty right hip aftercare  Functional ability:   walking with walker, difficulty with stairs, decreased endurance  Documented POC (choose all that apply):   ROM, strength and balance  Briefly describe symptoms:   slight pain with activity, weakness, balance  How did the symptoms start:   fracture in Jan  Average pain/intensity last 24 hours:   3/10  Average pain/intensity past week:   3-4/10  Frequency of Symptoms:   daily  Symptom impact on ADLs:   using walker vs cane, slow, unable to do stairs  Condition change since eval:     General health reported by patient:   see epic chart      Presenting condition or subjective complaint: right hip fracture  Date of onset: 01/24/25    Relevant medical history: Hearing problems; Heart problems; High blood pressure; History of fractures; Implanted device; Menopause; Osteoporosis; Pacemaker; Pain at night or rest   Dates & types of surgery: jan 24    Prior diagnostic imaging/testing results: X-ray     Prior therapy history for the same diagnosis, illness or injury: No      Prior Level of Function  Transfers: Assistive equipment  Ambulation: Assistive equipment  ADL: Assistive equipment and person  IADL:     Living Environment  Social support: With a significant other or spouse   Type of home: House; 2-story; Basement   Stairs to enter the home: Yes 6 Is there a railing: Yes     Ramp: No   Stairs inside the home: Yes 15 Is there a railing: Yes     Help at home: Self Cares (home health aide/personal care attendant, family, etc); Home management tasks (cooking, cleaning); Medication and/or finances; Home and Yard  maintenance tasks; Assist for driving and community activities  Equipment owned: Straight Cane; Walker with wheels; Standard wheelchair; Bedrail; Grab bars; Commode; Bath bench; Dressing equipment     Employment: No    Hobbies/Interests: cooking and cleaning    Patient goals for therapy: walk and balance    Pain assessment: Pain present     Objective   HIP EVALUATION  PAIN: Pain Level at Rest: 3/10  Pain Level with Use: 3/10  Pain Location: hip  Pain Quality: Aching and stiffness  Pain Frequency: intermittent  Pain is Worst: daytime  Pain is Exacerbated By: walking  Pain is Relieved By: heat and otc medications  Pain Progression: Improved  INTEGUMENTARY (edema, incisions): WNL, swelling both LE  POSTURE: Sitting Posture: Rounded shoulders, Forward head, Thoracic kyphosis increased  GAIT:   Weightbearing Status: WBAT  Assistive Device(s): Walker (front wheeled)  Gait Deviations: Base of support decreased  Stride length decreased  BALANCE/PROPRIOCEPTION:  not tested today  WEIGHTBEARING ALIGNMENT:   NON-WEIGHTBEARING ALIGNMENT:    ROM:  AROM hip abduction 30, standing flexion to 90, ext 20 (asses PROM in supine at next appt)    PELVIC/SI SCREEN:   STRENGTH:  not formally tested today  LE FLEXIBILITY:   SPECIAL TESTS:   FUNCTIONAL TESTS: Double Leg Squat: Anterior knee translation, Knee valgus, Hip internal rotation, and Improper use of glutes/hips  PALPATION:   JOINT MOBILITY: not tested  Lower Extremity Physical Performance Testing    Surgery/Injury: R hip fracture repair posterior approach cemented hemiarthroplasty      Involved Extremity: right   Date of Surgery/Injury: Jan/24/2024    Surgeon/MD: Victor Manuel Kowalski  Therapist performing test: Shonda Lake PT     Primary Treating Therapist: Shonda Lake or Eufemia Avitia    Patient subjective symptom/function report: Pt notes she has some R hip pain, unable to walk without walker at home or outside of her house, pain and stiffness when sitting for prolonged periods. Would  like to work on strength and balance.     LSI% =Limb Symmetry Index (score comparison between involved/uninvolved extremity)    Anthropomorphic Measures      Range of Motion Jt. Line Circum. Measurement 15 cm Prox Circum. Measurement   Uninvolved - degrees     Involved 90-- degrees     Difference   cm      Hand Held Dynamometry      Involved Limb Uninvolved Limb   Quadriceps     LSI%         Return to Function (Level I): Balance, Muscle Strength   Testing Protocol: Recorded values = best effort of 3 attempts (after 2 practice attempts).    Single Leg Stand and Reach Anterior/Medial Anterior/Lateral   Uninvolved Extremity  cm  cm   Involved Extremity  cm    cm   LSI% %  %     Single Leg Balance Max = 60 seconds   Uninvolved Extremity 10 seconds   Involved Extremity 0 seconds   LSI% %     Single Leg Squat not tested    Uninvolved Extremity degrees   Involved Extremity  degrees   LSI% %     Retro Step    Uninvolved Extremity  in.   Involved Extremity  in.     LSI% %       Return to Function (Level I): Core Stability   Perceived Exertion Ratin to 5 point scale, (0) Very Easy << >> (5) Maximal Exertion.  1 verbal cuing episode for alignment correction allowed before testing terminated.  Progress patient from basic to advanced versions of core poses as strength/endurance/control improves.    Prone Plank Hold: Pose:  X/60 Seconds   Hold Time /60 seconds   LSI% %     Side Plank Hold: Pose:  X/60 Seconds   Uninvolved Side Down /60 seconds   Involved Side Down /60 seconds   LSI% %     Single Leg Bridge Reps (Tempo 60 BPM) # of Reps to Failure   Uninvolved Extremity    Involved Extremity    LSI% %       Return to Fitness (Level II): Muscle Endurance, Power   Level II Functional Prerequisites:   1) All Level I tests >=85% of uninvolved side or maximal value, and  2) Bilateral squats >=90  knee flexion x 20 reps with good trunk, L/E alignment control.    Perceived Exertion Ratin to 5 point scale, (0) Very Easy << >> (5)  Maximal Exertion.  2 verbal cuing episodes allowed for alignment correction before testing terminated.  Only reps completed with good alignment counted toward total reps.    Star Excursion Balance Test Anterior Reach Posteromedial Reach Posterolateral Reach   Uninvolved Extremity  cm  cm  cm   Involved Extremity  cm  cm  cm   LSI% % % %     Single Leg Squat Endurance (Reps to 60 KF, 60bpm x 2 minutes) X/60 Reps Percent Return   Uninvolved Extremity /60 reps %   Involved Extremity /60 reps %   LSI% %      Single Leg Hop    Uninvolved Extremity  M   Involved Extremity  M   LSI% %     Return to Sport (Level III): Power   Level III Functional Prerequisites:   1) All Level I tests >=85% of uninvolved side or maximal value, and  2) All Level II tests >=85% of uninvolved side.    6M Timed Hop    Uninvolved Extremity  seconds   Involved Extremity  seconds   LSI% %     Single Leg Crossover Hop    Uninvolved Extremity  M   Involved Extremity  M   LSI% %       Notes on QUALITY of body movement patterns:  Pt walks well with walker, weakness present in hip - unable to stand on one leg. Pierce swelling LE, possibly due to heart issues she has been dealing with the past week.     Assessment & Plan   CLINICAL IMPRESSIONS  Medical Diagnosis: Closed displaced fracture of neck of right femur    Treatment Diagnosis: R hip fracture   Impression/Assessment: Patient is a 89 year old female with right hip complaints.  The following significant findings have been identified: Pain, Decreased ROM/flexibility, Decreased strength, and Impaired balance. These impairments interfere with their ability to perform self care tasks, recreational activities, household chores, driving , household mobility, and community mobility as compared to previous level of function.     Clinical Decision Making (Complexity):  Clinical Presentation: Stable/Uncomplicated  Clinical Presentation Rationale: based on medical and personal factors listed in PT  evaluation  Clinical Decision Making (Complexity): Low complexity    PLAN OF CARE  Treatment Interventions:  Interventions: Gait Training, Manual Therapy, Neuromuscular Re-education, Therapeutic Activity, Therapeutic Exercise    Long Term Goals     PT Goal 1  Goal Description: pt will ambulate without assistive device with out a limp  Rationale: to maximize safety and independence with performance of ADLs and functional tasks;to maximize safety and independence within the home;to maximize safety and independence within the community;to maximize safety and independence with transportation;to maximize safety and independence with self cares  Target Date: 07/15/25      Frequency of Treatment: 2x/month  Duration of Treatment: 3 months    Recommended Referrals to Other Professionals: Physical Therapy  Education Assessment:   Learner/Method: Patient;Family;Pictures/Video    Risks and benefits of evaluation/treatment have been explained.   Patient/Family/caregiver agrees with Plan of Care.     Evaluation Time:     PT Eval, Low Complexity Minutes (64304): 12  Evaluation Only     Signing Clinician: Shonda Lake PT        [facility]                                                                                   OUTPATIENT PHYSICAL THERAPY      PLAN OF TREATMENT FOR OUTPATIENT REHABILITATION   Patient's Last Name, First Name, Isadora Horne YOB: 1931   Provider's Name   [facility]   Medical Record No.  [id number]     Onset Date: 01/24/25  Start of Care Date: 04/22/25     Medical Diagnosis:  Closed displaced fracture of neck of right femur      PT Treatment Diagnosis:  R hip fracture Plan of Treatment  Frequency/Duration: 2x/month/ 3 months    Certification date from 04/22/25 to 07/15/25         See note for plan of treatment details and functional goals     Shonda Lake PT                         I CERTIFY THE NEED FOR THESE SERVICES  FURNISHED UNDER        THIS PLAN OF TREATMENT AND WHILE UNDER MY CARE     (Physician attestation of this document indicates review and certification of the therapy plan).              Referring Provider:  Jovana Macias    Initial Assessment  See Epic Evaluation- Start of Care Date: 04/22/25

## 2025-04-24 ENCOUNTER — ANCILLARY PROCEDURE (OUTPATIENT)
Dept: GENERAL RADIOLOGY | Facility: CLINIC | Age: OVER 89
End: 2025-04-24
Attending: INTERNAL MEDICINE
Payer: COMMERCIAL

## 2025-04-24 ENCOUNTER — OFFICE VISIT (OUTPATIENT)
Dept: INTERNAL MEDICINE | Facility: CLINIC | Age: OVER 89
End: 2025-04-24
Payer: COMMERCIAL

## 2025-04-24 ENCOUNTER — LAB (OUTPATIENT)
Dept: LAB | Facility: CLINIC | Age: OVER 89
End: 2025-04-24
Payer: COMMERCIAL

## 2025-04-24 VITALS
DIASTOLIC BLOOD PRESSURE: 71 MMHG | SYSTOLIC BLOOD PRESSURE: 134 MMHG | HEART RATE: 67 BPM | WEIGHT: 132.7 LBS | BODY MASS INDEX: 25.09 KG/M2 | TEMPERATURE: 98 F | OXYGEN SATURATION: 95 %

## 2025-04-24 DIAGNOSIS — E87.70 HYPERVOLEMIA, UNSPECIFIED HYPERVOLEMIA TYPE: ICD-10-CM

## 2025-04-24 DIAGNOSIS — R06.09 DYSPNEA ON EXERTION: ICD-10-CM

## 2025-04-24 DIAGNOSIS — E87.70 HYPERVOLEMIA, UNSPECIFIED HYPERVOLEMIA TYPE: Primary | ICD-10-CM

## 2025-04-24 DIAGNOSIS — S72.001A: ICD-10-CM

## 2025-04-24 LAB
ANION GAP SERPL CALCULATED.3IONS-SCNC: 8 MMOL/L (ref 7–15)
BASOPHILS # BLD AUTO: 0.1 10E3/UL (ref 0–0.2)
BASOPHILS NFR BLD AUTO: 1 %
BUN SERPL-MCNC: 19.5 MG/DL (ref 8–23)
CALCIUM SERPL-MCNC: 9.8 MG/DL (ref 8.8–10.4)
CHLORIDE SERPL-SCNC: 104 MMOL/L (ref 98–107)
CREAT SERPL-MCNC: 0.77 MG/DL (ref 0.51–0.95)
EGFRCR SERPLBLD CKD-EPI 2021: 71 ML/MIN/1.73M2
EOSINOPHIL # BLD AUTO: 0.3 10E3/UL (ref 0–0.7)
EOSINOPHIL NFR BLD AUTO: 3 %
ERYTHROCYTE [DISTWIDTH] IN BLOOD BY AUTOMATED COUNT: 14.7 % (ref 10–15)
GLUCOSE SERPL-MCNC: 107 MG/DL (ref 70–99)
HCO3 SERPL-SCNC: 25 MMOL/L (ref 22–29)
HCT VFR BLD AUTO: 33.7 % (ref 35–47)
HGB BLD-MCNC: 10.4 G/DL (ref 11.7–15.7)
IMM GRANULOCYTES # BLD: 0.1 10E3/UL
IMM GRANULOCYTES NFR BLD: 1 %
LYMPHOCYTES # BLD AUTO: 2.3 10E3/UL (ref 0.8–5.3)
LYMPHOCYTES NFR BLD AUTO: 22 %
MCH RBC QN AUTO: 27.6 PG (ref 26.5–33)
MCHC RBC AUTO-ENTMCNC: 30.9 G/DL (ref 31.5–36.5)
MCV RBC AUTO: 89 FL (ref 78–100)
MONOCYTES # BLD AUTO: 0.8 10E3/UL (ref 0–1.3)
MONOCYTES NFR BLD AUTO: 7 %
NEUTROPHILS # BLD AUTO: 7.1 10E3/UL (ref 1.6–8.3)
NEUTROPHILS NFR BLD AUTO: 67 %
NRBC # BLD AUTO: 0 10E3/UL
NRBC BLD AUTO-RTO: 0 /100
NT-PROBNP SERPL-MCNC: 973 PG/ML (ref 0–1800)
PLATELET # BLD AUTO: 244 10E3/UL (ref 150–450)
POTASSIUM SERPL-SCNC: 4.5 MMOL/L (ref 3.4–5.3)
RBC # BLD AUTO: 3.77 10E6/UL (ref 3.8–5.2)
SODIUM SERPL-SCNC: 137 MMOL/L (ref 135–145)
WBC # BLD AUTO: 10.5 10E3/UL (ref 4–11)

## 2025-04-24 PROCEDURE — 84145 PROCALCITONIN (PCT): CPT | Performed by: INTERNAL MEDICINE

## 2025-04-24 PROCEDURE — 99000 SPECIMEN HANDLING OFFICE-LAB: CPT | Performed by: PATHOLOGY

## 2025-04-24 RX ORDER — GABAPENTIN 100 MG/1
200 CAPSULE ORAL
Qty: 60 CAPSULE | Refills: 3 | Status: SHIPPED | OUTPATIENT
Start: 2025-04-24

## 2025-04-24 RX ORDER — FUROSEMIDE 20 MG/1
20 TABLET ORAL DAILY
Qty: 7 TABLET | Refills: 0 | Status: SHIPPED | OUTPATIENT
Start: 2025-04-24

## 2025-04-24 NOTE — PROGRESS NOTES
HPI  89 year old female with PMHx atrial fibrillation, previous episodes of HF, HTN, HLD, pacemaker and pulmonary HTN presents to the clinic with dyspnea on exertion, orthopnea, cough, and lower leg swelling for over 1 week. She stated that since her cardioversion, she has had a wet cough, minimally productive of sputum as well as shortness of breath on ambulation. She normally is able to walk around her house without becoming short of breath, but lately has been short of breath even with minimal activity. She was on lisinopril and spironolactone until recently but these were discontinued. No fever or chills, no sick contacts, no sputum production. She does have notable leg swelling, 3+ bilaterally to the thigh. Physical exam significant for S3 on cardiac auscultation, bilateral fine inspiratory crackles, JVP elevated to mandible with HOB @ 30 degrees. She has previous hospitalizations for hypothyroidism during which she was found to have signs of heart failure.     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 At this time, there is notable fluid overload. Will treat with a course of 7 days furosemide 20 mg as the patient is lasix naive. Discussed with patient and her daughter who assists with care that if symptoms continue to worsen she should go to the ED for admission for HF exacerbation. Patient will follow up with clinic in 1 week at that time, recommend reassessing volume status and symptoms and considering a long term diuretic for the patient. She was previously on hydrochlorothiazide and tolerated this well, thus this may be the best option to restart. While presentation is most likely cardiac in etiology, will rule out pneumonia with CXR and procalcitonin. If these show signs of infection, will treat as CAP as well.     (E87.70)  Hypervolemia, unspecified hypervolemia type  (primary encounter diagnosis)    Plan: furosemide (LASIX) 20 MG tablet, Basic         metabolic panel  (Ca, Cl, CO2, Creat, Gluc, K,         Na, BUN), Procalcitonin, BNP-N terminal pro, XR        Chest 2 Views, CBC with platelets and         differential            (S72.001A) Closed displaced fracture of neck of right femur (H)    Plan: gabapentin (NEURONTIN) 100 MG capsule            (R06.09) Dyspnea on exertion    Plan: furosemide (LASIX) 20 MG tablet, Basic         metabolic panel  (Ca, Cl, CO2, Creat, Gluc, K,         Na, BUN), Procalcitonin, BNP-N terminal pro, XR        Chest 2 Views, CBC with platelets and         differential                    {Follow-up (Optional):613117}    Subjective   Isadora Mendez is a 89 year old female that presents in clinic today for the following:     Chief Complaint   Patient presents with    URI     Productive cough the past few weeks, especially in the mornings, and at night  SOB with minimal exertion/activity          4/24/2025     1:09 PM   Additional Questions   Roomed by Maicol   Accompanied by Aida (daughter)     History of Present Illness       Hypertension: She presents for follow up of hypertension.  She does check blood pressure  regularly outside of the clinic. Outside blood pressures have been over 140/90. She follows a low salt diet.     She eats 2-3 servings of fruits and vegetables daily.She consumes 1 sweetened beverage(s) daily.She exercises with enough effort to increase her heart rate 20 to 29 minutes per day.  She exercises with enough effort to increase her heart rate 5 days per week.   She is taking medications regularly.        Raspy cough morning and night. Small amount of mucus. Doesn't know the color. She describes it as loose, rattly. No fever, chills, night sweats.     Weight up: Weight around 130, she is usually 125.  She feels full and short of breath.   Diuretic discontinued  Feet somewhat  swollen    Taken of lisinopril and spironolactone when her hip was fractured due to hyperkalemia    She has previous hospitalizations for hypothyroidism during which she was found to have signs of heart failure.     {MA/LPN/RN Pre-Provider Visit Orders- hCG/UA/Strep (Optional):510054}  {SUPERLIST (Optional):697129}  {additonal problems for provider to add (Optional):271906}    {ROS Picklists (Optional):916495}      Objective    /71 (BP Location: Right arm, Patient Position: Sitting, Cuff Size: Adult Regular)   Pulse 67   Temp 98  F (36.7  C)   Wt 60.2 kg (132 lb 11.2 oz)   SpO2 95%   BMI 25.09 kg/m    Body mass index is 25.09 kg/m .  Physical Exam   GENERAL: alert and no distress  NECK: no adenopathy, no asymmetry, masses, or scars  RESP: lungs with bilateral fine crackles  CV: regular rate and rhythm, normal S1 S2, positive S3, no murmur, click or rub, 3+ peripheral edema to just above knees  ABDOMEN: soft, nontender, no hepatosplenomegaly, no masses and bowel sounds normal  MS: no gross musculoskeletal defects noted    {Diagnostic Test Results (Optional):135487}        Signed Electronically by: Dale Miranda MD  {Email feedback regarding this note to [email or web address]   :[id number]}

## 2025-05-01 ENCOUNTER — OFFICE VISIT (OUTPATIENT)
Dept: INTERNAL MEDICINE | Facility: CLINIC | Age: OVER 89
End: 2025-05-01
Payer: COMMERCIAL

## 2025-05-01 ENCOUNTER — LAB (OUTPATIENT)
Dept: LAB | Facility: CLINIC | Age: OVER 89
End: 2025-05-01
Payer: COMMERCIAL

## 2025-05-01 VITALS
WEIGHT: 129.5 LBS | HEART RATE: 83 BPM | BODY MASS INDEX: 24.45 KG/M2 | RESPIRATION RATE: 16 BRPM | HEIGHT: 61 IN | TEMPERATURE: 97.6 F | OXYGEN SATURATION: 98 % | DIASTOLIC BLOOD PRESSURE: 72 MMHG | SYSTOLIC BLOOD PRESSURE: 132 MMHG

## 2025-05-01 DIAGNOSIS — R60.0 BILATERAL LOWER EXTREMITY EDEMA: ICD-10-CM

## 2025-05-01 DIAGNOSIS — I10 PRIMARY HYPERTENSION: ICD-10-CM

## 2025-05-01 DIAGNOSIS — I10 PRIMARY HYPERTENSION: Primary | ICD-10-CM

## 2025-05-01 LAB
ANION GAP SERPL CALCULATED.3IONS-SCNC: 8 MMOL/L (ref 7–15)
BUN SERPL-MCNC: 18.2 MG/DL (ref 8–23)
CALCIUM SERPL-MCNC: 9.5 MG/DL (ref 8.8–10.4)
CHLORIDE SERPL-SCNC: 105 MMOL/L (ref 98–107)
CREAT SERPL-MCNC: 0.78 MG/DL (ref 0.51–0.95)
EGFRCR SERPLBLD CKD-EPI 2021: 70 ML/MIN/1.73M2
GLUCOSE SERPL-MCNC: 139 MG/DL (ref 70–99)
HCO3 SERPL-SCNC: 25 MMOL/L (ref 22–29)
POTASSIUM SERPL-SCNC: 4.2 MMOL/L (ref 3.4–5.3)
SODIUM SERPL-SCNC: 138 MMOL/L (ref 135–145)

## 2025-05-01 PROCEDURE — 80048 BASIC METABOLIC PNL TOTAL CA: CPT | Performed by: PATHOLOGY

## 2025-05-01 PROCEDURE — 99213 OFFICE O/P EST LOW 20 MIN: CPT

## 2025-05-01 PROCEDURE — 3075F SYST BP GE 130 - 139MM HG: CPT

## 2025-05-01 PROCEDURE — 36415 COLL VENOUS BLD VENIPUNCTURE: CPT | Performed by: PATHOLOGY

## 2025-05-01 PROCEDURE — 3078F DIAST BP <80 MM HG: CPT

## 2025-05-01 RX ORDER — HYDROCHLOROTHIAZIDE 12.5 MG/1
12.5 TABLET ORAL DAILY
Qty: 30 TABLET | Refills: 2 | Status: SHIPPED | OUTPATIENT
Start: 2025-05-01

## 2025-05-01 NOTE — PROGRESS NOTES
"  {PROVIDER CHARTING PREFERENCE:415985}    Carmen Muir is a 89 year old, presenting for the following health issues:  Follow Up      5/1/2025     8:22 AM   Additional Questions   Roomed by Floresita BUTT   Accompanied by Aida (daughter)     History of Present Illness       Hypertension: She presents for follow up of hypertension.  She does check blood pressure  regularly outside of the clinic. Outside blood pressures have been over 140/90. She follows a low salt diet.     She eats 2-3 servings of fruits and vegetables daily.She consumes 1 sweetened beverage(s) daily.She exercises with enough effort to increase her heart rate 20 to 29 minutes per day.  She exercises with enough effort to increase her heart rate 5 days per week.   She is taking medications regularly.        She feels improved from last week. She continues to have a wet cough in the morning, though this has been improving. LE swelling has improved. She does weigh herself daily, baseline 126-127 lbs. Back in baseline. She is using two pillows, feels improved breathing from last week. Able to walk to the bathroom without shortness of breath.       Review of Systems  Constitutional, HEENT, cardiovascular, pulmonary, gi and gu systems are negative, except as otherwise noted.      Objective    /72 (BP Location: Right arm, Patient Position: Sitting, Cuff Size: Adult Regular)   Pulse 83   Temp 97.6  F (36.4  C) (Oral)   Resp 16   Ht 1.549 m (5' 0.98\")   Wt 58.7 kg (129 lb 8 oz)   SpO2 98%   BMI 24.48 kg/m    Body mass index is 24.48 kg/m .  Physical Exam   GENERAL: alert and no distress  RESP: lungs clear to auscultation - no rales, rhonchi or wheezes  CV: regular rate and rhythm, normal S1 S2, no S3 or S4, no murmur, click or rub, no peripheral edema  MS: {:511795}  PSYCH: mentation appears normal, affect normal/bright    {Diagnostic Test Results (Optional):000734}        Signed Electronically by: Nadja Strange NP  {Email feedback regarding " this note to [email or web address]   :002408}

## 2025-05-07 ENCOUNTER — THERAPY VISIT (OUTPATIENT)
Age: OVER 89
End: 2025-05-07
Payer: COMMERCIAL

## 2025-05-07 DIAGNOSIS — S72.001A: Primary | ICD-10-CM

## 2025-05-07 PROCEDURE — 97530 THERAPEUTIC ACTIVITIES: CPT | Mod: GP | Performed by: PHYSICAL THERAPIST

## 2025-05-07 PROCEDURE — 97110 THERAPEUTIC EXERCISES: CPT | Mod: GP | Performed by: PHYSICAL THERAPIST

## 2025-05-15 ENCOUNTER — RESULTS FOLLOW-UP (OUTPATIENT)
Dept: ENDOCRINOLOGY | Facility: CLINIC | Age: OVER 89
End: 2025-05-15

## 2025-05-15 ENCOUNTER — LAB (OUTPATIENT)
Dept: LAB | Facility: CLINIC | Age: OVER 89
End: 2025-05-15
Payer: COMMERCIAL

## 2025-05-15 ENCOUNTER — OFFICE VISIT (OUTPATIENT)
Dept: INTERNAL MEDICINE | Facility: CLINIC | Age: OVER 89
End: 2025-05-15
Payer: COMMERCIAL

## 2025-05-15 VITALS
DIASTOLIC BLOOD PRESSURE: 62 MMHG | RESPIRATION RATE: 16 BRPM | HEART RATE: 73 BPM | SYSTOLIC BLOOD PRESSURE: 118 MMHG | TEMPERATURE: 97.5 F | BODY MASS INDEX: 23.71 KG/M2 | OXYGEN SATURATION: 96 % | HEIGHT: 61 IN | WEIGHT: 125.6 LBS

## 2025-05-15 DIAGNOSIS — I10 PRIMARY HYPERTENSION: ICD-10-CM

## 2025-05-15 DIAGNOSIS — R60.0 BILATERAL LOWER EXTREMITY EDEMA: ICD-10-CM

## 2025-05-15 DIAGNOSIS — E05.00 GRAVES DISEASE: ICD-10-CM

## 2025-05-15 DIAGNOSIS — I10 PRIMARY HYPERTENSION: Primary | ICD-10-CM

## 2025-05-15 LAB
ANION GAP SERPL CALCULATED.3IONS-SCNC: 11 MMOL/L (ref 7–15)
BUN SERPL-MCNC: 31.6 MG/DL (ref 8–23)
CALCIUM SERPL-MCNC: 9.9 MG/DL (ref 8.8–10.4)
CHLORIDE SERPL-SCNC: 102 MMOL/L (ref 98–107)
CREAT SERPL-MCNC: 0.92 MG/DL (ref 0.51–0.95)
EGFRCR SERPLBLD CKD-EPI 2021: 57 ML/MIN/1.73M2
GLUCOSE SERPL-MCNC: 117 MG/DL (ref 70–99)
HCO3 SERPL-SCNC: 24 MMOL/L (ref 22–29)
POTASSIUM SERPL-SCNC: 4.6 MMOL/L (ref 3.4–5.3)
SODIUM SERPL-SCNC: 137 MMOL/L (ref 135–145)
T4 FREE SERPL-MCNC: 1.22 NG/DL (ref 0.9–1.7)
TSH SERPL DL<=0.005 MIU/L-ACNC: 4.88 UIU/ML (ref 0.3–4.2)

## 2025-05-15 RX ORDER — METHIMAZOLE 5 MG/1
2.5 TABLET ORAL
Qty: 30 TABLET | Refills: 2 | Status: SHIPPED | OUTPATIENT
Start: 2025-05-15

## 2025-05-15 RX ORDER — HYDROCHLOROTHIAZIDE 12.5 MG/1
12.5 TABLET ORAL DAILY
Qty: 90 TABLET | Refills: 1 | Status: SHIPPED | OUTPATIENT
Start: 2025-05-15

## 2025-05-15 NOTE — PROGRESS NOTES
Assessment & Plan     Primary hypertension  Bilateral lower extremity edema  Stable today. OK to continue to hold amlodipine if SBP <110. She has been tolerating hydrochlorothiazide, improved swelling. Will update BMP today. Plan to update me/PCP if BP consistently low, would plan to stop amlodipine if needed.   - Basic metabolic panel  (Ca, Cl, CO2, Creat, Gluc, K, Na, BUN)  - hydroCHLOROthiazide 12.5 MG tablet  Dispense: 90 tablet; Refill: 1    Keep follow-up with PCP as scheduled.     Carmen Muir is a 89 year old, presenting for the following health issues:  Follow Up and Heart Problem (Follow up on A-fib, heart failure, high blood pressure)        5/15/2025    10:13 AM   Additional Questions   Roomed by bakari   Accompanied by vidal (daughter)     Heart Problem    History of Present Illness       Heart Failure:  She presents for follow up of heart failure. She is experiencing shortness of breath with activity only, which is same as usual. She is experiencing lower extremity edema which is same as usual.   She denies orthopenea and is not coughing at night. Patient is checking weight daily. She has recently had a None.  She has no side effects from medications.  She has had no other medical visits for heart failure since the last visit.    Hypertension: She presents for follow up of hypertension.  She does check blood pressure  regularly outside of the clinic. Outside blood pressures have been over 140/90. She follows a low salt diet.     She eats 2-3 servings of fruits and vegetables daily.She consumes 0 sweetened beverage(s) daily.She exercises with enough effort to increase her heart rate 30 to 60 minutes per day.  She exercises with enough effort to increase her heart rate 7 days per week.   She is taking medications regularly.      Last Echo:   Echo result w/o MOPS: Interpretation SummaryLeft ventricular size, wall motion and function are normal. The ejectionfraction is 55-60%. Right ventricular  "function, chamber size, wall motion, and thickness arenormal. Mild pulmonary hypertension is present.The right ventricular systolic pressureis 33mmHg above the right atrial pressure. Moderate tricuspid insufficiency is present. Compared to prior imaging on 5/25/2023 there has been interval decrease in theestimate pasp however this may be explained by errors in testing methods.         Isadora Mendez presents to the clinic today for follow-up. She has been feeling improved. Swelling continues to improve though has not completely resolved. She tries to prop her legs up when sitting but will have dependent at times. Breathing has improved. Will still have some dyspnea with exertion but improved. Weighs herself daily. Weight at home 124-125 lbs. She has been checking her blood pressure daily, one day had a reading with systolic in the 80s, though improved later. Will hold amlodipine in SBP<110.    Review of Systems  Constitutional, HEENT, cardiovascular, pulmonary, gi and gu systems are negative, except as otherwise noted.      Objective    /62 (BP Location: Left arm, Patient Position: Sitting, Cuff Size: Adult Regular)   Pulse 73   Temp 97.5  F (36.4  C) (Oral)   Resp 16   Ht 1.549 m (5' 0.98\")   Wt 57 kg (125 lb 9.6 oz)   SpO2 96%   BMI 23.74 kg/m    Body mass index is 23.74 kg/m .  Physical Exam   GENERAL: alert and no distress  RESP: lungs clear to auscultation - no rales, rhonchi or wheezes  CV: regular rates and rhythm, normal S1 S2, no S3 or S4, no murmur, click or rub, and  nonpitting edema over tibial region bilaterally  PSYCH: mentation appears normal, affect normal/bright            Signed Electronically by: Nadja Strange NP    " monthly or less

## 2025-06-03 NOTE — PROGRESS NOTES
ELECTROPHYSIOLOGY CLINIC VISIT    Assessment/Recommendations   Assessment/Plan:    Ms. Mendez is a 89 year old  female with SSS s/p pacemaker implant (2010), device detected AF, NSVT with normal LV function, mild pulmonary hypertension, thyrotoxicosis, and HTN.     # SSS s/p pacemaker (2010)  Device interrogation 4/13/25 with normal device function, stable lead parameters, AP 78.9%,  7.9%, AF [place] 23.2%. Patient has not had a device interrogation since the cardioversion.   - continue routine device interrogations     Paroxysmal Atrial Fibrillation:   I had a long discussion with the patient about AF, including the natural history of the disease, risk of embolic events, role of antithrombotic therapy, role of rate control therapy, the role of antiarrhythmic medications, and the role of an ablation.   1. Stroke Prophylaxis: CHADSVASC 4 (++age, +female, +HTN) 4, corresponding to a 4.0% annual stroke / systemic embolism event rate. Indicating need for long term AC. Continue pradaxa 150 mg BID. No bleeding issues. Most recent Cr 0.92, which correlates to calculated CrCl of 35 mL/min. No indication for dose adjustment at this time. Patient expressed disappointment in no longer being able to take tumeric for joint pain since starting eliquis. We discussed that she could try topical voltaren gel for her joint pain as an alternative as topicals do not interact with AC.   2. Rate Control: Continue metoprolol XL 50 mg daily.   3. Rhythm Control: Cardioversion, Antiarrhythmics and/or ablation are options for rhythm control. Hx of brief episodes, 7 AT/AF episodes lasting 14 sec - 19 min. ventricular rates stable, asymptomatic. Recent device interrogation from 4/13/25 showed 210 AT/AF episodes recorded - 6 sec-24 min 2 sec. Total AT/AF time = 3 days. AF burden 23.2%. She was started on an AC and underwent LIANE/DCCV 4/15/25 with successful conversion to NSR. EKG today with atrial paced rhythm. Patient denies AF recurrences  to her knowledge since the DCCV.   4. Risk Factor Management: Statin, BP control, and NIKHIL evaluation as indicated. BP in clinic today 120/70. Encouraged patient to continue monitoring BP at home.     Follow up in one year with device check prior, or sooner as needed.       History of Present Illness/Subjective    Ms. Isadora Mendez is a 89 year old female who comes in today for EP follow-up of AF, SSS s/p PPM.    Ms. Mendez is a 89 year old  female with SSS s/p pacemaker implant (), device detected AF, NSVT with normal LV function, mild pulmonary hypertension, thyrotoxicosis, and HTN. She was most recently seen by EP KAILEE in 2023 after hospitalization for thyrotoxicosis and acute HFpEF exacerbation. Her AF burden was 0.3% with rates up to 150s. Metoprolol was switched to Diltiazem. She called a week later reporting lower extremity swelling without any changes in breathing, diltiazem was stopped and she is back on metoprolol. Hospitalization also showed PA pressure of 68 mmHg and she saw Dr. Grimes in July who thought it was likely to be due to high output state, recommended repeat echo when thyroxicosis is treated. Previously followed with Dr. TOÑITO Dixon. Most recently saw Gabbi Mart NP on 24, at which time she denied cardiac symptoms or prolonged AF recurrences.    Device check 25 showed 210 AT/AF episodes recorded - 6 sec-24 min 2 sec. Total AT/AF time = 3 days. AF burden 23.2%. She was started on an AC and underwent LIANE/DCCV 4/15/25 with successful conversion to NSR.    She presents today in follow up. She reports feeling well. She denies issues since her cardioversion. She stated that she had been dealing with some fluid overload and her brother had recently , which may have triggered AF. Denies AF recurrences to her knowledge since. She has some longstanding SOB, but states this has been stable and she had a hip fracture in January that contributed to some deconditioning. She otherwise denies  "chest discomfort, palpitations, abdominal fullness/bloating or peripheral edema, paroxysmal nocturnal dyspnea, orthopnea, lightheadedness, dizziness, pre-syncope, or syncope. Presenting 12 lead ECG shows atrial paced rhythm Vent Rate 74 bpm,  ms, QRS 96 ms, QTc 426 ms. Current cardiac medications include: amlodipine 5 mg, pradaxa 150 mg BID, hydrochlorothiazide 12.5 mg, metoprolol XL 50 mg.     I have reviewed and updated the patient's Past Medical History, Social History, Family History and Medication List.     Cardiographics (Personally Reviewed) :   Echo 11/3/2023:   Interpretation Summary  Left ventricular size, wall motion and function are normal. The ejection  fraction is 55-60%.     Right ventricular function, chamber size, wall motion, and thickness are  normal.     Mild pulmonary hypertension is present.The right ventricular systolic pressure  is 33mmHg above the right atrial pressure.     Moderate tricuspid insufficiency is present.     Compared to prior imaging on 5/25/2023 there has been interval decrease in the  estimate pasp however this may be explained by errors in testing methods.     Echo 10/21/2022:  Interpretation Summary  Global and regional left ventricular function is normal with an EF of 60-65%.  Global right ventricular function is normal.  Mild mitral and tricuspid insufficiency present.  Pulmonary artery systolic pressure is normal.  The inferior vena cava is normal.  Trivial pericardial effusion is present.       Physical Examination   /70 (BP Location: Right arm, Patient Position: Sitting, Cuff Size: Adult Regular)   Pulse 74   Ht 1.549 m (5' 0.98\")   Wt 56.4 kg (124 lb 6.4 oz)   SpO2 96%   BMI 23.52 kg/m    Wt Readings from Last 3 Encounters:   06/10/25 56.4 kg (124 lb 6.4 oz)   05/15/25 57 kg (125 lb 9.6 oz)   05/01/25 58.7 kg (129 lb 8 oz)     General Appearance:   Alert, well-appearing and in no acute distress.   HEENT: Atraumatic, normocephalic.    Chest/Lungs:   " Respirations unlabored.     Cardiovascular:   Regular rate and rhythm.     Abdomen:  Soft, nontender, nondistended.   Extremities: No cyanosis or clubbing. No edema.     Musculoskeletal: Moves all extremities.     Skin: Warm, dry, intact.    Neurologic: Mood and affect are appropriate.  Alert and oriented to person, place, time, and situation.          Medications  Allergies   Current Outpatient Medications   Medication Sig Dispense Refill    acetaminophen (TYLENOL) 325 MG tablet Take 2 tablets (650 mg) by mouth daily as needed for mild pain or other (and adjunct with moderate or severe pain or per patient request). 100 tablet 0    alendronate (FOSAMAX) 70 MG tablet Take 1 tablet (70 mg) by mouth every 7 days. Take 60 minutes before am meal with 8 oz. water. Remain upright for 30 minutes. 12 tablet 4    amLODIPine (NORVASC) 5 MG tablet Take 1 tablet (5 mg) by mouth daily. 90 tablet 1    dabigatran ANTICOAGULANT (PRADAXA) 150 MG capsule Take 1 capsule (150 mg) by mouth 2 times daily. Store in original 's bottle or blister pack; use within 120 days of opening. 180 capsule 3    famotidine (PEPCID) 20 MG tablet TAKE 1 TABLET(20 MG) BY MOUTH TWICE DAILY 180 tablet 3    gabapentin (NEURONTIN) 100 MG capsule Take 2 capsules (200 mg) by mouth nightly as needed for other (restless legs). Taking 200 mg at HS 60 capsule 3    hydroCHLOROthiazide 12.5 MG tablet Take 1 tablet (12.5 mg) by mouth daily. 90 tablet 1    melatonin 1 MG/ML LIQD liquid Take 1 mg by mouth nightly as needed for sleep      methimazole (TAPAZOLE) 5 MG tablet Take 0.5 tablets (2.5 mg) by mouth five times a week. Skipping WED and SAT 30 tablet 2    metoprolol succinate ER (TOPROL XL) 50 MG 24 hr tablet TAKE 2 TABLETS BY MOUTH EVERY MORNING AND 1 TABLET EVERY EVENING 270 tablet 1    ondansetron (ZOFRAN) 4 MG tablet Take 1 tablet (4 mg) by mouth every 6 hours as needed for nausea (give prior to meals for nausea) 90 tablet 1    triamcinolone  (KENALOG) 0.1 % external cream Apply topically 2 times daily 453.6 g 0    UNABLE TO FIND MEDICATION NAME: Cardiotrophin PMG      co-enzyme Q-10 100 MG CAPS capsule Take 100 mg by mouth twice a week. (Patient not taking: Reported on 6/10/2025)      DHA-EPA-Vit B6-B12-Folic Acid (CARDIOVID PLUS) CAPS Take by mouth. (Patient not taking: Reported on 6/10/2025)      methocarbamol (ROBAXIN) 500 MG tablet Take 1 tablet (500 mg) by mouth 3 times daily. (Patient not taking: Reported on 6/10/2025) 42 tablet 0    No Known Allergies      Lab Results (Personally Reviewed)    Chemistry/lipid CBC Cardiac Enzymes/BNP/TSH/INR   Lab Results   Component Value Date    BUN 31.6 (H) 05/15/2025     05/15/2025    CO2 24 05/15/2025     Creatinine   Date Value Ref Range Status   05/15/2025 0.92 0.51 - 0.95 mg/dL Final   01/16/2020 0.79 0.52 - 1.04 mg/dL Final       Lab Results   Component Value Date    CHOL 155 06/26/2024    HDL 63 06/26/2024    LDL 74 06/26/2024      Lab Results   Component Value Date    WBC 10.5 04/24/2025    HGB 10.4 (L) 04/24/2025    HCT 33.7 (L) 04/24/2025    MCV 89 04/24/2025     04/24/2025    Lab Results   Component Value Date    TSH 4.88 (H) 05/15/2025    INR 1.05 01/23/2025        The patient states understanding and is agreeable with the plan.     Karrie Mendoza PA-C  [facility]  Electrophysiology Consult Service  Pager #[phone]    I spent a total of 20 minutes face to face with Isadora Mendez during today's office visit. I have spent an additional 15 minutes today on chart review and documentation.

## 2025-06-05 ENCOUNTER — MYC MEDICAL ADVICE (OUTPATIENT)
Dept: INTERNAL MEDICINE | Facility: CLINIC | Age: OVER 89
End: 2025-06-05
Payer: COMMERCIAL

## 2025-06-05 DIAGNOSIS — I10 HYPERTENSION, UNSPECIFIED TYPE: ICD-10-CM

## 2025-06-09 ENCOUNTER — THERAPY VISIT (OUTPATIENT)
Age: OVER 89
End: 2025-06-09
Payer: COMMERCIAL

## 2025-06-09 DIAGNOSIS — S72.001A: Primary | ICD-10-CM

## 2025-06-09 PROCEDURE — 97110 THERAPEUTIC EXERCISES: CPT | Mod: GP | Performed by: PHYSICAL THERAPIST

## 2025-06-09 RX ORDER — AMLODIPINE BESYLATE 5 MG/1
5 TABLET ORAL DAILY
Qty: 90 TABLET | Refills: 1 | Status: SHIPPED | OUTPATIENT
Start: 2025-06-09

## 2025-06-10 ENCOUNTER — OFFICE VISIT (OUTPATIENT)
Dept: CARDIOLOGY | Facility: CLINIC | Age: OVER 89
End: 2025-06-10
Payer: COMMERCIAL

## 2025-06-10 VITALS
DIASTOLIC BLOOD PRESSURE: 70 MMHG | OXYGEN SATURATION: 96 % | HEIGHT: 61 IN | SYSTOLIC BLOOD PRESSURE: 120 MMHG | HEART RATE: 74 BPM | BODY MASS INDEX: 23.49 KG/M2 | WEIGHT: 124.4 LBS

## 2025-06-10 DIAGNOSIS — I48.0 PAROXYSMAL A-FIB (H): Primary | ICD-10-CM

## 2025-06-10 DIAGNOSIS — I49.5 SICK SINUS SYNDROME (H): ICD-10-CM

## 2025-06-10 DIAGNOSIS — Z95.0 CARDIAC PACEMAKER IN SITU: ICD-10-CM

## 2025-06-10 ASSESSMENT — PAIN SCALES - GENERAL: PAINLEVEL_OUTOF10: NO PAIN (0)

## 2025-06-10 NOTE — NURSING NOTE
"Chief Complaint   Patient presents with    Follow Up     Return EP for follow up post cardioversion 4/15/25       Initial /70 (BP Location: Right arm, Patient Position: Sitting, Cuff Size: Adult Regular)   Pulse 74   Ht 1.549 m (5' 0.98\")   Wt 56.4 kg (124 lb 6.4 oz)   SpO2 96%   BMI 23.52 kg/m   Estimated body mass index is 23.52 kg/m  as calculated from the following:    Height as of this encounter: 1.549 m (5' 0.98\").    Weight as of this encounter: 56.4 kg (124 lb 6.4 oz).  BP completed using cuff size: regular      Gabbi Almeida, EMT  "

## 2025-06-10 NOTE — PATIENT INSTRUCTIONS
Take your medicines every day, as directed     Changes made today:  NO medication changes made today  Try topical voltaren gel for your joint pain       Cardiology Care Coordinators:      Deb JEAN BAPTISTE RN     Cardiology Rooming staff:  Gabbi SCHUMACHER    Phone  [phone]      Fax [phone]    To Contact us     During Business Hours:  [phone]     If you are needing refills please contact your pharmacy.     For urgent after hour care please call the [facility] at [phone] or the [facility] at [phone] and ask to speak to the cardiologist on call.            HOW TO CHECK YOUR BLOOD PRESSURE AT HOME:     Avoid eating, smoking, and exercising for at least 30 minutes before taking a reading.     Be sure you have taken your BP medication at least 2-3 hours before you check it.      Sit quietly for 10 minutes before a reading.      Sit in a chair with your feet flat on the floor. Rest your  arm on a table so that the arm cuff is at the same level as your heart.     Remain still during the reading.  Record your blood pressure and pulse in a log and bring to your next appointment.       Use [employer] allows you to communicate directly with your heart team through secure messaging.  [employer] can be accessed any time on your phone, computer, or tablet.  If you need assistance, we'd be happy to help!             Keep your Heart Appointments:     1 year with a device check prior, or sooner as needed.

## 2025-06-11 LAB
ATRIAL RATE - MUSE: 74 BPM
DIASTOLIC BLOOD PRESSURE - MUSE: NORMAL MMHG
INTERPRETATION ECG - MUSE: NORMAL
P AXIS - MUSE: 27 DEGREES
PR INTERVAL - MUSE: 218 MS
QRS DURATION - MUSE: 96 MS
QT - MUSE: 384 MS
QTC - MUSE: 426 MS
R AXIS - MUSE: -59 DEGREES
SYSTOLIC BLOOD PRESSURE - MUSE: NORMAL MMHG
T AXIS - MUSE: 34 DEGREES
VENTRICULAR RATE- MUSE: 74 BPM

## 2025-06-13 PROBLEM — I27.20 PULMONARY HTN (H): Status: RESOLVED | Noted: 2024-01-17 | Resolved: 2025-06-13

## 2025-06-16 ENCOUNTER — ANCILLARY PROCEDURE (OUTPATIENT)
Dept: CARDIOLOGY | Facility: CLINIC | Age: OVER 89
End: 2025-06-16
Attending: INTERNAL MEDICINE
Payer: COMMERCIAL

## 2025-06-16 DIAGNOSIS — I49.5 SICK SINUS SYNDROME (H): ICD-10-CM

## 2025-06-16 PROCEDURE — 99207 CARDIAC DEVICE CHECK - REMOTE: CPT | Performed by: INTERNAL MEDICINE

## 2025-06-17 ENCOUNTER — MYC MEDICAL ADVICE (OUTPATIENT)
Dept: CARDIOLOGY | Facility: CLINIC | Age: OVER 89
End: 2025-06-17
Payer: COMMERCIAL

## 2025-06-17 DIAGNOSIS — I48.0 PAROXYSMAL A-FIB (H): Primary | ICD-10-CM

## 2025-06-17 DIAGNOSIS — I48.91 A-FIB (H): ICD-10-CM

## 2025-06-17 RX ORDER — SODIUM CHLORIDE 9 MG/ML
INJECTION, SOLUTION INTRAVENOUS CONTINUOUS
OUTPATIENT
Start: 2025-06-17

## 2025-06-17 RX ORDER — LIDOCAINE 40 MG/G
CREAM TOPICAL
OUTPATIENT
Start: 2025-06-17

## 2025-06-18 ENCOUNTER — TELEPHONE (OUTPATIENT)
Dept: CARDIOLOGY | Facility: CLINIC | Age: OVER 89
End: 2025-06-18
Payer: COMMERCIAL

## 2025-06-18 ENCOUNTER — MYC MEDICAL ADVICE (OUTPATIENT)
Dept: CARDIOLOGY | Facility: CLINIC | Age: OVER 89
End: 2025-06-18
Payer: COMMERCIAL

## 2025-06-18 NOTE — TELEPHONE ENCOUNTER
----- Message from Guera WALKER sent at 6/17/2025  3:55 PM CDT -----  Regarding: Cardioversion  Hi Freya,    This patient has been having afib episodes. Karrie started this patient on amiodarone and would like patient to be set up for a cardioversion in 2 weeks. Orders should be in and letter started.    Deb,  Just an fyi, patient was started on amiodarone 400 mg BID for 2 weeks then to have cardioversion then to go down to amiodarone 200 mg daily.    Thanks,    Guera

## 2025-06-18 NOTE — TELEPHONE ENCOUNTER
Raymond Lynne,      Yes that is fine to try that first! As I said before, if symptoms are well managed and she remains on the blood thinner it would be okay to stay in Afib and not pursue any further rhythm control. If that is the path she would like to go down, I would just want to repeat an echo in 3 months to ensure that persistent Afib is not affecting her heart function.     Thanks so much for following up on this!  Karrie  Me to Karrie Mendoza PA-C  [facility] Cardiology Adult Padmini Day Nurse  Guera Khalil RN  [place]      6/18/25 12:28 PM  Raymond Yoon, patient no longer wishes to pursue amiodarone and cardioversion and wishes to instead increase metoprolol for a few days before fully deciding. They wanted the amiodarone rx still sent in which was sent in yesterday. Is it okay for patient to try increase metoprolol 100 mg BID for the rest of the week and follow up on Monday with if they change their mind?     [employer] message sent to patient.    Guera Khalil RN, BSN  Cardiology RN Care Coordinator   [place]   Phone: [phone]  Fax: [phone] ([place]) [phone] ([place])

## 2025-06-18 NOTE — TELEPHONE ENCOUNTER
Raymond Lynne,      Yes that is fine to try that first! As I said before, if symptoms are well managed and she remains on the blood thinner it would be okay to stay in Afib and not pursue any further rhythm control. If that is the path she would like to go down, I would just want to repeat an echo in 3 months to ensure that persistent Afib is not affecting her heart function.     Thanks so much for following up on this!  Karrie  Me to Karrie Mendoza PA-C  [facility] Cardiology Adult Padmini Day Nurse  Guera Khalil RN  [place]      6/18/25 12:28 PM  Raymond Yoon, patient no longer wishes to pursue amiodarone and cardioversion and wishes to instead increase metoprolol for a few days before fully deciding. They wanted the amiodarone rx still sent in which was sent in yesterday. Is it okay for patient to try increase metoprolol 100 mg BID for the rest of the week and follow up on Monday with if they change their mind?     See mychart encounter for follow up.    Guera Khalil RN, BSN  Cardiology RN Care Coordinator   [place]   Phone: [phone]  Fax: [phone] ([place]) [phone] ([place])

## 2025-06-18 NOTE — TELEPHONE ENCOUNTER
Noted. Patient wishing to no longer pursue amiodarone and cardioversion. Provider to be updated.    Guera Khalil, RN, BSN  Cardiology RN Care Coordinator   [place]   Phone: [phone]  Fax: [phone] ([place]) [phone] ([place])

## 2025-06-18 NOTE — TELEPHONE ENCOUNTER
EP  spoke to daughter and she stated that the patient has decided to try the 2nd option given her and re-dose the metoprolol since that has worked in the past. Also the  is really ill at this time,  the daughter would like a call back on Monday.     Freya Dawson  Periop Electrophysiology   [phone]

## 2025-06-19 LAB
MDC_IDC_EPISODE_DTM: NORMAL
MDC_IDC_EPISODE_DURATION: 1085 S
MDC_IDC_EPISODE_DURATION: 113 S
MDC_IDC_EPISODE_DURATION: 114 S
MDC_IDC_EPISODE_DURATION: 116 S
MDC_IDC_EPISODE_DURATION: 1180 S
MDC_IDC_EPISODE_DURATION: 1212 S
MDC_IDC_EPISODE_DURATION: 131 S
MDC_IDC_EPISODE_DURATION: 132 S
MDC_IDC_EPISODE_DURATION: 157 S
MDC_IDC_EPISODE_DURATION: 1634 S
MDC_IDC_EPISODE_DURATION: 1724 S
MDC_IDC_EPISODE_DURATION: 174 S
MDC_IDC_EPISODE_DURATION: 1762 S
MDC_IDC_EPISODE_DURATION: 1786 S
MDC_IDC_EPISODE_DURATION: 203 S
MDC_IDC_EPISODE_DURATION: 2121 S
MDC_IDC_EPISODE_DURATION: 234 S
MDC_IDC_EPISODE_DURATION: 252 S
MDC_IDC_EPISODE_DURATION: 2606 S
MDC_IDC_EPISODE_DURATION: 2663 S
MDC_IDC_EPISODE_DURATION: 2769 S
MDC_IDC_EPISODE_DURATION: 286 S
MDC_IDC_EPISODE_DURATION: 315 S
MDC_IDC_EPISODE_DURATION: 32 S
MDC_IDC_EPISODE_DURATION: 364 S
MDC_IDC_EPISODE_DURATION: 379 S
MDC_IDC_EPISODE_DURATION: 384 S
MDC_IDC_EPISODE_DURATION: 4040 S
MDC_IDC_EPISODE_DURATION: 53 S
MDC_IDC_EPISODE_DURATION: 532 S
MDC_IDC_EPISODE_DURATION: 542 S
MDC_IDC_EPISODE_DURATION: 56 S
MDC_IDC_EPISODE_DURATION: 582 S
MDC_IDC_EPISODE_DURATION: 595 S
MDC_IDC_EPISODE_DURATION: 598 S
MDC_IDC_EPISODE_DURATION: 622 S
MDC_IDC_EPISODE_DURATION: 640 S
MDC_IDC_EPISODE_DURATION: 6471 S
MDC_IDC_EPISODE_DURATION: 68 S
MDC_IDC_EPISODE_DURATION: 707 S
MDC_IDC_EPISODE_DURATION: 7166 S
MDC_IDC_EPISODE_DURATION: 80 S
MDC_IDC_EPISODE_DURATION: 80 S
MDC_IDC_EPISODE_DURATION: 8347 S
MDC_IDC_EPISODE_DURATION: 895 S
MDC_IDC_EPISODE_DURATION: 94 S
MDC_IDC_EPISODE_DURATION: 95 S
MDC_IDC_EPISODE_DURATION: NORMAL S
MDC_IDC_EPISODE_ID: 1175
MDC_IDC_EPISODE_ID: 1176
MDC_IDC_EPISODE_ID: 1177
MDC_IDC_EPISODE_ID: 1178
MDC_IDC_EPISODE_ID: 1179
MDC_IDC_EPISODE_ID: 1180
MDC_IDC_EPISODE_ID: 1181
MDC_IDC_EPISODE_ID: 1182
MDC_IDC_EPISODE_ID: 1183
MDC_IDC_EPISODE_ID: 1184
MDC_IDC_EPISODE_ID: 1185
MDC_IDC_EPISODE_ID: 1186
MDC_IDC_EPISODE_ID: 1187
MDC_IDC_EPISODE_ID: 1188
MDC_IDC_EPISODE_ID: 1189
MDC_IDC_EPISODE_ID: 1190
MDC_IDC_EPISODE_ID: 1191
MDC_IDC_EPISODE_ID: 1192
MDC_IDC_EPISODE_ID: 1193
MDC_IDC_EPISODE_ID: 1194
MDC_IDC_EPISODE_ID: 1195
MDC_IDC_EPISODE_ID: 1196
MDC_IDC_EPISODE_ID: 1197
MDC_IDC_EPISODE_ID: 1198
MDC_IDC_EPISODE_ID: 1199
MDC_IDC_EPISODE_ID: 1200
MDC_IDC_EPISODE_ID: 1201
MDC_IDC_EPISODE_ID: 1202
MDC_IDC_EPISODE_ID: 1203
MDC_IDC_EPISODE_ID: 1204
MDC_IDC_EPISODE_ID: 1205
MDC_IDC_EPISODE_ID: 1206
MDC_IDC_EPISODE_ID: 1207
MDC_IDC_EPISODE_ID: 1208
MDC_IDC_EPISODE_ID: 1209
MDC_IDC_EPISODE_ID: 1210
MDC_IDC_EPISODE_ID: 1211
MDC_IDC_EPISODE_ID: 1212
MDC_IDC_EPISODE_ID: 1213
MDC_IDC_EPISODE_ID: 1214
MDC_IDC_EPISODE_ID: 1215
MDC_IDC_EPISODE_ID: 1216
MDC_IDC_EPISODE_ID: 1217
MDC_IDC_EPISODE_ID: 1218
MDC_IDC_EPISODE_ID: 1219
MDC_IDC_EPISODE_ID: 1220
MDC_IDC_EPISODE_ID: 1221
MDC_IDC_EPISODE_ID: 1222
MDC_IDC_EPISODE_ID: 1223
MDC_IDC_EPISODE_ID: 1224
MDC_IDC_EPISODE_ID: 1225
MDC_IDC_EPISODE_TYPE: NORMAL
MDC_IDC_LEAD_CONNECTION_STATUS: NORMAL
MDC_IDC_LEAD_CONNECTION_STATUS: NORMAL
MDC_IDC_LEAD_IMPLANT_DT: NORMAL
MDC_IDC_LEAD_IMPLANT_DT: NORMAL
MDC_IDC_LEAD_LOCATION: NORMAL
MDC_IDC_LEAD_LOCATION: NORMAL
MDC_IDC_LEAD_LOCATION_DETAIL_1: NORMAL
MDC_IDC_LEAD_LOCATION_DETAIL_1: NORMAL
MDC_IDC_LEAD_MFG: NORMAL
MDC_IDC_LEAD_MFG: NORMAL
MDC_IDC_LEAD_MODEL: NORMAL
MDC_IDC_LEAD_MODEL: NORMAL
MDC_IDC_LEAD_POLARITY_TYPE: NORMAL
MDC_IDC_LEAD_POLARITY_TYPE: NORMAL
MDC_IDC_LEAD_SERIAL: NORMAL
MDC_IDC_LEAD_SERIAL: NORMAL
MDC_IDC_LEAD_SPECIAL_FUNCTION: NORMAL
MDC_IDC_LEAD_SPECIAL_FUNCTION: NORMAL
MDC_IDC_MSMT_BATTERY_DTM: NORMAL
MDC_IDC_MSMT_BATTERY_REMAINING_LONGEVITY: 29 MO
MDC_IDC_MSMT_BATTERY_RRT_TRIGGER: 2.83
MDC_IDC_MSMT_BATTERY_STATUS: NORMAL
MDC_IDC_MSMT_BATTERY_VOLTAGE: 2.93 V
MDC_IDC_MSMT_LEADCHNL_RA_IMPEDANCE_VALUE: 266 OHM
MDC_IDC_MSMT_LEADCHNL_RA_IMPEDANCE_VALUE: 418 OHM
MDC_IDC_MSMT_LEADCHNL_RA_PACING_THRESHOLD_AMPLITUDE: 0.75 V
MDC_IDC_MSMT_LEADCHNL_RA_PACING_THRESHOLD_PULSEWIDTH: 0.4 MS
MDC_IDC_MSMT_LEADCHNL_RA_SENSING_INTR_AMPL: 0.4 MV
MDC_IDC_MSMT_LEADCHNL_RV_IMPEDANCE_VALUE: 437 OHM
MDC_IDC_MSMT_LEADCHNL_RV_IMPEDANCE_VALUE: 456 OHM
MDC_IDC_MSMT_LEADCHNL_RV_PACING_THRESHOLD_AMPLITUDE: 0.62 V
MDC_IDC_MSMT_LEADCHNL_RV_PACING_THRESHOLD_PULSEWIDTH: 0.4 MS
MDC_IDC_MSMT_LEADCHNL_RV_SENSING_INTR_AMPL: 11.4 MV
MDC_IDC_PG_IMPLANT_DTM: NORMAL
MDC_IDC_PG_MFG: NORMAL
MDC_IDC_PG_MODEL: NORMAL
MDC_IDC_PG_SERIAL: NORMAL
MDC_IDC_PG_TYPE: NORMAL
MDC_IDC_SESS_CLINIC_NAME: NORMAL
MDC_IDC_SESS_DTM: NORMAL
MDC_IDC_SESS_TYPE: NORMAL
MDC_IDC_SET_BRADY_AT_MODE_SWITCH_RATE: 171 {BEATS}/MIN
MDC_IDC_SET_BRADY_HYSTRATE: NORMAL
MDC_IDC_SET_BRADY_LOWRATE: 60 {BEATS}/MIN
MDC_IDC_SET_BRADY_MAX_SENSOR_RATE: 130 {BEATS}/MIN
MDC_IDC_SET_BRADY_MAX_TRACKING_RATE: 130 {BEATS}/MIN
MDC_IDC_SET_BRADY_MODE: NORMAL
MDC_IDC_SET_BRADY_PAV_DELAY_LOW: 180 MS
MDC_IDC_SET_BRADY_SAV_DELAY_LOW: 150 MS
MDC_IDC_SET_LEADCHNL_RA_PACING_AMPLITUDE: 1.5 V
MDC_IDC_SET_LEADCHNL_RA_PACING_ANODE_ELECTRODE_1: NORMAL
MDC_IDC_SET_LEADCHNL_RA_PACING_ANODE_LOCATION_1: NORMAL
MDC_IDC_SET_LEADCHNL_RA_PACING_CAPTURE_MODE: NORMAL
MDC_IDC_SET_LEADCHNL_RA_PACING_CATHODE_ELECTRODE_1: NORMAL
MDC_IDC_SET_LEADCHNL_RA_PACING_CATHODE_LOCATION_1: NORMAL
MDC_IDC_SET_LEADCHNL_RA_PACING_POLARITY: NORMAL
MDC_IDC_SET_LEADCHNL_RA_PACING_PULSEWIDTH: 0.4 MS
MDC_IDC_SET_LEADCHNL_RA_SENSING_ANODE_ELECTRODE_1: NORMAL
MDC_IDC_SET_LEADCHNL_RA_SENSING_ANODE_LOCATION_1: NORMAL
MDC_IDC_SET_LEADCHNL_RA_SENSING_CATHODE_ELECTRODE_1: NORMAL
MDC_IDC_SET_LEADCHNL_RA_SENSING_CATHODE_LOCATION_1: NORMAL
MDC_IDC_SET_LEADCHNL_RA_SENSING_POLARITY: NORMAL
MDC_IDC_SET_LEADCHNL_RA_SENSING_SENSITIVITY: 0.3 MV
MDC_IDC_SET_LEADCHNL_RV_PACING_AMPLITUDE: 2 V
MDC_IDC_SET_LEADCHNL_RV_PACING_ANODE_ELECTRODE_1: NORMAL
MDC_IDC_SET_LEADCHNL_RV_PACING_ANODE_LOCATION_1: NORMAL
MDC_IDC_SET_LEADCHNL_RV_PACING_CAPTURE_MODE: NORMAL
MDC_IDC_SET_LEADCHNL_RV_PACING_CATHODE_ELECTRODE_1: NORMAL
MDC_IDC_SET_LEADCHNL_RV_PACING_CATHODE_LOCATION_1: NORMAL
MDC_IDC_SET_LEADCHNL_RV_PACING_POLARITY: NORMAL
MDC_IDC_SET_LEADCHNL_RV_PACING_PULSEWIDTH: 0.4 MS
MDC_IDC_SET_LEADCHNL_RV_SENSING_ANODE_ELECTRODE_1: NORMAL
MDC_IDC_SET_LEADCHNL_RV_SENSING_ANODE_LOCATION_1: NORMAL
MDC_IDC_SET_LEADCHNL_RV_SENSING_CATHODE_ELECTRODE_1: NORMAL
MDC_IDC_SET_LEADCHNL_RV_SENSING_CATHODE_LOCATION_1: NORMAL
MDC_IDC_SET_LEADCHNL_RV_SENSING_POLARITY: NORMAL
MDC_IDC_SET_LEADCHNL_RV_SENSING_SENSITIVITY: 0.9 MV
MDC_IDC_SET_ZONE_DETECTION_INTERVAL: 350 MS
MDC_IDC_SET_ZONE_DETECTION_INTERVAL: 400 MS
MDC_IDC_SET_ZONE_STATUS: NORMAL
MDC_IDC_SET_ZONE_STATUS: NORMAL
MDC_IDC_SET_ZONE_TYPE: NORMAL
MDC_IDC_SET_ZONE_VENDOR_TYPE: NORMAL
MDC_IDC_STAT_AT_BURDEN_PERCENT: 4.2 %
MDC_IDC_STAT_AT_DTM_END: NORMAL
MDC_IDC_STAT_AT_DTM_START: NORMAL
MDC_IDC_STAT_BRADY_AP_VP_PERCENT: 0.65 %
MDC_IDC_STAT_BRADY_AP_VS_PERCENT: 95.78 %
MDC_IDC_STAT_BRADY_AS_VP_PERCENT: 0.63 %
MDC_IDC_STAT_BRADY_AS_VS_PERCENT: 2.94 %
MDC_IDC_STAT_BRADY_DTM_END: NORMAL
MDC_IDC_STAT_BRADY_DTM_START: NORMAL
MDC_IDC_STAT_BRADY_RA_PERCENT_PACED: 93.72 %
MDC_IDC_STAT_BRADY_RV_PERCENT_PACED: 1.12 %
MDC_IDC_STAT_EPISODE_RECENT_COUNT: 0
MDC_IDC_STAT_EPISODE_RECENT_COUNT: 194
MDC_IDC_STAT_EPISODE_RECENT_COUNT_DTM_END: NORMAL
MDC_IDC_STAT_EPISODE_RECENT_COUNT_DTM_START: NORMAL
MDC_IDC_STAT_EPISODE_TOTAL_COUNT: 0
MDC_IDC_STAT_EPISODE_TOTAL_COUNT: 1
MDC_IDC_STAT_EPISODE_TOTAL_COUNT: 236
MDC_IDC_STAT_EPISODE_TOTAL_COUNT: 981
MDC_IDC_STAT_EPISODE_TOTAL_COUNT_DTM_END: NORMAL
MDC_IDC_STAT_EPISODE_TOTAL_COUNT_DTM_START: NORMAL
MDC_IDC_STAT_EPISODE_TYPE: NORMAL
MDC_IDC_STAT_TACHYTHERAPY_RECENT_DTM_END: NORMAL
MDC_IDC_STAT_TACHYTHERAPY_RECENT_DTM_START: NORMAL
MDC_IDC_STAT_TACHYTHERAPY_TOTAL_DTM_END: NORMAL
MDC_IDC_STAT_TACHYTHERAPY_TOTAL_DTM_START: NORMAL

## 2025-06-20 ENCOUNTER — APPOINTMENT (OUTPATIENT)
Dept: GENERAL RADIOLOGY | Facility: CLINIC | Age: OVER 89
End: 2025-06-20
Attending: EMERGENCY MEDICINE
Payer: COMMERCIAL

## 2025-06-20 ENCOUNTER — HOSPITAL ENCOUNTER (EMERGENCY)
Facility: CLINIC | Age: OVER 89
Discharge: HOME | End: 2025-06-20
Attending: EMERGENCY MEDICINE | Admitting: EMERGENCY MEDICINE
Payer: COMMERCIAL

## 2025-06-20 VITALS
RESPIRATION RATE: 18 BRPM | HEIGHT: 61 IN | HEART RATE: 60 BPM | SYSTOLIC BLOOD PRESSURE: 133 MMHG | TEMPERATURE: 97.3 F | BODY MASS INDEX: 24.26 KG/M2 | DIASTOLIC BLOOD PRESSURE: 80 MMHG | OXYGEN SATURATION: 99 % | WEIGHT: 128.5 LBS

## 2025-06-20 DIAGNOSIS — I48.91 ATRIAL FIBRILLATION WITH RAPID VENTRICULAR RESPONSE (H): ICD-10-CM

## 2025-06-20 LAB
ALBUMIN SERPL BCG-MCNC: 3.9 G/DL (ref 3.5–5.2)
ALP SERPL-CCNC: 117 U/L (ref 40–150)
ALT SERPL W P-5'-P-CCNC: 29 U/L (ref 0–50)
ANION GAP SERPL CALCULATED.3IONS-SCNC: 10 MMOL/L (ref 7–15)
AST SERPL W P-5'-P-CCNC: 34 U/L (ref 0–45)
ATRIAL RATE - MUSE: NORMAL BPM
BASOPHILS # BLD AUTO: 0.1 10E3/UL (ref 0–0.2)
BASOPHILS NFR BLD AUTO: 1 %
BILIRUB SERPL-MCNC: 0.4 MG/DL
BUN SERPL-MCNC: 26.1 MG/DL (ref 8–23)
CALCIUM SERPL-MCNC: 9.6 MG/DL (ref 8.8–10.4)
CHLORIDE SERPL-SCNC: 101 MMOL/L (ref 98–107)
CREAT SERPL-MCNC: 1.05 MG/DL (ref 0.51–0.95)
DIASTOLIC BLOOD PRESSURE - MUSE: NORMAL MMHG
EGFRCR SERPLBLD CKD-EPI 2021: 49 ML/MIN/1.73M2
EOSINOPHIL # BLD AUTO: 0.2 10E3/UL (ref 0–0.7)
EOSINOPHIL NFR BLD AUTO: 2 %
ERYTHROCYTE [DISTWIDTH] IN BLOOD BY AUTOMATED COUNT: 17.6 % (ref 10–15)
GLUCOSE SERPL-MCNC: 126 MG/DL (ref 70–99)
HCO3 SERPL-SCNC: 21 MMOL/L (ref 22–29)
HCT VFR BLD AUTO: 34.2 % (ref 35–47)
HGB BLD-MCNC: 10.8 G/DL (ref 11.7–15.7)
IMM GRANULOCYTES # BLD: 0 10E3/UL
IMM GRANULOCYTES NFR BLD: 0 %
INTERPRETATION ECG - MUSE: NORMAL
LYMPHOCYTES # BLD AUTO: 3.1 10E3/UL (ref 0.8–5.3)
LYMPHOCYTES NFR BLD AUTO: 34 %
MCH RBC QN AUTO: 28.2 PG (ref 26.5–33)
MCHC RBC AUTO-ENTMCNC: 31.6 G/DL (ref 31.5–36.5)
MCV RBC AUTO: 89 FL (ref 78–100)
MONOCYTES # BLD AUTO: 0.8 10E3/UL (ref 0–1.3)
MONOCYTES NFR BLD AUTO: 9 %
NEUTROPHILS # BLD AUTO: 4.9 10E3/UL (ref 1.6–8.3)
NEUTROPHILS NFR BLD AUTO: 54 %
NRBC # BLD AUTO: 0 10E3/UL
NRBC BLD AUTO-RTO: 0 /100
P AXIS - MUSE: NORMAL DEGREES
PLATELET # BLD AUTO: 228 10E3/UL (ref 150–450)
POTASSIUM SERPL-SCNC: 4.8 MMOL/L (ref 3.4–5.3)
PR INTERVAL - MUSE: NORMAL MS
PROT SERPL-MCNC: 7 G/DL (ref 6.4–8.3)
QRS DURATION - MUSE: 98 MS
QT - MUSE: 294 MS
QTC - MUSE: 397 MS
R AXIS - MUSE: -53 DEGREES
RBC # BLD AUTO: 3.83 10E6/UL (ref 3.8–5.2)
SODIUM SERPL-SCNC: 132 MMOL/L (ref 135–145)
SYSTOLIC BLOOD PRESSURE - MUSE: NORMAL MMHG
T AXIS - MUSE: 268 DEGREES
TROPONIN T SERPL HS-MCNC: 24 NG/L
TROPONIN T SERPL HS-MCNC: 24 NG/L
VENTRICULAR RATE- MUSE: 110 BPM
WBC # BLD AUTO: 9.1 10E3/UL (ref 4–11)

## 2025-06-20 PROCEDURE — 92960 CARDIOVERSION ELECTRIC EXT: CPT | Performed by: EMERGENCY MEDICINE

## 2025-06-20 PROCEDURE — 80053 COMPREHEN METABOLIC PANEL: CPT | Performed by: EMERGENCY MEDICINE

## 2025-06-20 PROCEDURE — 250N000011 HC RX IP 250 OP 636: Performed by: EMERGENCY MEDICINE

## 2025-06-20 PROCEDURE — 250N000009 HC RX 250: Performed by: EMERGENCY MEDICINE

## 2025-06-20 PROCEDURE — 99156 MOD SED OTH PHYS/QHP 5/>YRS: CPT | Performed by: EMERGENCY MEDICINE

## 2025-06-20 PROCEDURE — 96361 HYDRATE IV INFUSION ADD-ON: CPT | Mod: 59 | Performed by: EMERGENCY MEDICINE

## 2025-06-20 PROCEDURE — 71046 X-RAY EXAM CHEST 2 VIEWS: CPT | Mod: 26 | Performed by: RADIOLOGY

## 2025-06-20 PROCEDURE — 99291 CRITICAL CARE FIRST HOUR: CPT | Mod: 25 | Performed by: EMERGENCY MEDICINE

## 2025-06-20 PROCEDURE — 71046 X-RAY EXAM CHEST 2 VIEWS: CPT

## 2025-06-20 PROCEDURE — 99291 CRITICAL CARE FIRST HOUR: CPT | Performed by: EMERGENCY MEDICINE

## 2025-06-20 PROCEDURE — 85004 AUTOMATED DIFF WBC COUNT: CPT | Performed by: EMERGENCY MEDICINE

## 2025-06-20 PROCEDURE — 258N000003 HC RX IP 258 OP 636: Performed by: EMERGENCY MEDICINE

## 2025-06-20 PROCEDURE — 250N000013 HC RX MED GY IP 250 OP 250 PS 637: Performed by: EMERGENCY MEDICINE

## 2025-06-20 PROCEDURE — 999N000157 HC STATISTIC RCP TIME EA 10 MIN

## 2025-06-20 PROCEDURE — 96374 THER/PROPH/DIAG INJ IV PUSH: CPT | Mod: 59 | Performed by: EMERGENCY MEDICINE

## 2025-06-20 PROCEDURE — 84484 ASSAY OF TROPONIN QUANT: CPT | Performed by: EMERGENCY MEDICINE

## 2025-06-20 PROCEDURE — 96375 TX/PRO/DX INJ NEW DRUG ADDON: CPT | Mod: 59 | Performed by: EMERGENCY MEDICINE

## 2025-06-20 PROCEDURE — 36415 COLL VENOUS BLD VENIPUNCTURE: CPT | Performed by: EMERGENCY MEDICINE

## 2025-06-20 PROCEDURE — 99223 1ST HOSP IP/OBS HIGH 75: CPT | Mod: 25 | Performed by: INTERNAL MEDICINE

## 2025-06-20 RX ORDER — AMIODARONE HYDROCHLORIDE 200 MG/1
400 TABLET ORAL ONCE
Status: COMPLETED | OUTPATIENT
Start: 2025-06-20 | End: 2025-06-20

## 2025-06-20 RX ORDER — ETOMIDATE 2 MG/ML
6 INJECTION INTRAVENOUS ONCE
Status: COMPLETED | OUTPATIENT
Start: 2025-06-20 | End: 2025-06-20

## 2025-06-20 RX ADMIN — AMIODARONE HYDROCHLORIDE 150 MG: 1.5 INJECTION, SOLUTION INTRAVENOUS at 11:48

## 2025-06-20 RX ADMIN — AMIODARONE HYDROCHLORIDE 400 MG: 200 TABLET ORAL at 12:10

## 2025-06-20 RX ADMIN — SODIUM CHLORIDE 500 ML: 0.9 INJECTION, SOLUTION INTRAVENOUS at 12:11

## 2025-06-20 RX ADMIN — ETOMIDATE 6 MG: 40 INJECTION, SOLUTION INTRAVENOUS at 12:58

## 2025-06-20 ASSESSMENT — COLUMBIA-SUICIDE SEVERITY RATING SCALE - C-SSRS
1. IN THE PAST MONTH, HAVE YOU WISHED YOU WERE DEAD OR WISHED YOU COULD GO TO SLEEP AND NOT WAKE UP?: NO
2. HAVE YOU ACTUALLY HAD ANY THOUGHTS OF KILLING YOURSELF IN THE PAST MONTH?: NO
6. HAVE YOU EVER DONE ANYTHING, STARTED TO DO ANYTHING, OR PREPARED TO DO ANYTHING TO END YOUR LIFE?: NO

## 2025-06-20 ASSESSMENT — ACTIVITIES OF DAILY LIVING (ADL)
ADLS_ACUITY_SCORE: 58

## 2025-06-20 NOTE — ED PROVIDER NOTES
[place] EMERGENCY DEPARTMENT ([facility])    6/20/25       ED PROVIDER NOTE      History     Chief Complaint   Patient presents with    Shortness of Breath    Irregular Heart Beat     HPI  Isadora Mendez is a 89 year old female with a history of hypertension, atrial fibrillation, pulmonary hypertension, [employer] pacemaker, CKD 3A who presents to the emergency department due to feeling as though she is in atrial fibrillation. The patient states that she can tell when she is in atrial fibrillation and believes she has been in it for the past week or so. She states that she becomes short of breath when she goes into atril fibrillation. She recently switched her dose of metoprolol from 100mg in the AM and 50 in the PM to 100mg in the AM and 100mg in the PM over the past 3 days. She states that meoprolol generally works pretty well at managing he afib. She denies being lightheaded or dizzy. No other pain. The patient is on Pradaxa. Her main hope in coming to the is to be pulled out of afib. There is also a possibility that this could be a stress response the patient reports, as her  is quite ill right now.      Past Medical History  Past Medical History:   Diagnosis Date    Arthritis     Atrial fibrillation (H) 01/01/2011    Cataract     Cerebral infarction (H)     Closed nondisplaced fracture of styloid process of radius 2019    Congestive heart failure (H)     Dry eyes     Facial fracture (H) 01/01/2006    Hypertension     Infection due to 2019 novel coronavirus 10/2022    or 11/22- took paxolovid    Infection due to 2019 novel coronavirus 11/19/2021    Low bone density 2010    NSVT (nonsustained ventricular tachycardia) (H) 01/01/2009    during exercise echo, neg EP study    Pacemaker 07/01/2010    Postmenopausal status     S/P cardiac catheterization 01/01/2009    30-40% non obstructive lesion    SSS (sick sinus syndrome) (H) 10/2022    Thyroid disease     Ventricular tachycardia, nonsustained (H)  2009    during stress echo     Past Surgical History:   Procedure Laterality Date    ANESTHESIA CARDIOVERSION N/A 04/15/2025    Procedure: Anesthesia cardioversion@1400;  Surgeon: GENERIC ANESTHESIA PROVIDER;  Location: [facility] OR    CATARACT EXTRACTION W/ INTRAOCULAR LENS IMPLANT Right 2018    EYE SURGERY      HEMIARTHROPLASTY HIP Right 2025    Procedure: Hemiarthroplasty Hip;  Surgeon: Victor Manuel Kowalski MD;  Location: [facility] OR    IMPLANT PACEMAKER      PPM  2010    Permanent Pacemaker    TONSILLECTOMY       acetaminophen (TYLENOL) 325 MG tablet  alendronate (FOSAMAX) 70 MG tablet  amiodarone (PACERONE) 200 MG tablet  co-enzyme Q-10 100 MG CAPS capsule  dabigatran ANTICOAGULANT (PRADAXA) 150 MG capsule  DHA-EPA-Vit B6-B12-Folic Acid (CARDIOVID PLUS) CAPS  famotidine (PEPCID) 20 MG tablet  gabapentin (NEURONTIN) 100 MG capsule  hydroCHLOROthiazide 12.5 MG tablet  melatonin 1 MG/ML LIQD liquid  methimazole (TAPAZOLE) 5 MG tablet  methocarbamol (ROBAXIN) 500 MG tablet  metoprolol succinate ER (TOPROL XL) 50 MG 24 hr tablet  ondansetron (ZOFRAN) 4 MG tablet  triamcinolone (KENALOG) 0.1 % external cream  UNABLE TO FIND      No Known Allergies  Family History  Family History   Problem Relation Age of Onset    Myocardial Infarction Mother 88        lived to 100    Hypertension Mother     Macular Degeneration Mother     Cardiovascular Father 76         age 80, MI 76 and CHF 80's    Coronary Artery Disease Father     Myocardial Infarction Father 70    Arrhythmia Sister         PPM    Neuropathy Sister     Colon Cancer Sister     Arrhythmia Brother         pacemaker    Arrhythmia Brother         pacemaker, hx rheumatic fever, heart failure    Leukemia Maternal Grandfather     Diabetes Type 2  Maternal Grandfather     Cardiovascular Paternal Grandfather          age 76 of CVD    Coronary Artery Disease Paternal Grandfather     Breast Cancer Daughter 50        Breast, uterine, 2nd breast cancer, HTN     Uterine Cancer Daughter     Other Cancer Daughter         Uterine, melanoma    Thyroid Disease Daughter         bout of thyroiditis with thyrotoxicosis followed by hypothyroidism    Blood Disease Son         hemochromatosis?    Atrial fibrillation Son      Social History   Social History     Tobacco Use    Smoking status: Never     Passive exposure: Never    Smokeless tobacco: Never   Vaping Use    Vaping status: Never Used   Substance Use Topics    Alcohol use: No    Drug use: No      Past medical history, past surgical history, medications, allergies, family history, and social history were reviewed with the patient. No additional pertinent items.   A medically appropriate review of systems was performed with pertinent positives and negatives noted in the HPI, and all other systems negative.    Physical Exam   BP: 116/68  Pulse: (!) 130  Temp: 97.3  F (36.3  C)  Resp: 18  SpO2: 97 %  Physical Exam  Constitutional:       General: She is not in acute distress.     Appearance: She is well-developed. She is not ill-appearing, toxic-appearing or diaphoretic.   HENT:      Head: Normocephalic and atraumatic.   Cardiovascular:      Rate and Rhythm: Normal rate. Rhythm irregular.      Heart sounds: Normal heart sounds.   Pulmonary:      Effort: Pulmonary effort is normal. No respiratory distress.      Breath sounds: Normal breath sounds. No decreased breath sounds or wheezing.   Abdominal:      General: There is no distension.      Palpations: Abdomen is soft.      Tenderness: There is no abdominal tenderness. There is no rebound.   Musculoskeletal:         General: No tenderness.      Cervical back: Normal range of motion.   Skin:     General: Skin is warm and dry.   Neurological:      General: No focal deficit present.      Mental Status: She is alert and oriented to person, place, and time.   Psychiatric:         Mood and Affect: Mood normal.         Behavior: Behavior normal.         Thought Content: Thought content  normal.           ED Course, Procedures, & Data      [facility]    -Cardioversion External    Date/Time: 6/20/2025 5:57 PM    Performed by: Peri Rodriguez MD  Authorized by: Peri Rodriguez MD    Emergent condition/consent implied      PRE-PROCEDURE DETAILS:     Cardioversion basis:  Emergent    Rhythm:  Atrial fibrillation    Electrode placement:  Anterior-posterior  Attempt one:     Cardioversion mode:  Synchronous    Waveform:  Biphasic    Shock (Joules):  150    Shock outcome:  Conversion to normal sinus rhythm  Post-procedure details:     Patient status:  Awake      PROCEDURE    Patient Tolerance:  Patient tolerated the procedure well with no immediate complications              EKG Interpretation:      Interpreted by Peri Rodriguez MD  Time reviewed: 1048  Symptoms at time of EKG: None   Rhythm: atrial fibrillation - rapid  Rate: Tachycardia  Axis: Left Axis Deviation  Ectopy: none  Conduction: normal  ST Segments/ T Waves: No ST-T wave changes  Q Waves: III and aVf  Comparison to prior: hx of Afib; new RVR    Clinical Impression: atrial fibrillation (chronic)           EKG Interpretation:      Interpreted by Peri Rodriguez MD  Time reviewed: 1048  Symptoms at time of EKG: None   Rhythm: atrial fibrillation - rapid  Rate: Tachycardia  Axis: Left Axis Deviation  Ectopy: none  Conduction: normal  ST Segments/ T Waves: No ST-T wave changes  Q Waves: III and aVf  Comparison to prior: hx of Afib; new RVR    Clinical Impression: atrial fibrillation (chronic)    Results for orders placed or performed during the hospital encounter of 06/20/25   XR Chest 2 Views     Status: None    Narrative    Exam: XR CHEST 2 VIEWS, 6/20/2025 12:29 PM    Comparison: Chest x-ray 4/24/2025    History: sob    Findings:  AP and lateral radiographs of the chest. Left chest wall cardiac  pacemaker with dual leads.  Trachea is midline. Mediastinum is within  normal limits.  Cardiopulmonary silhouette is within normal limits. No acute airspace  disease. There is no pneumothorax or pleural effusion. Small blunting  of bilateral costophrenic angles, likely scarring. The upper abdomen  is unremarkable.      Impression    Impression: No acute airspace abnormality.    I have personally reviewed the examination and initial interpretation  and I agree with the findings.    SAROJ ABBASI MD         SYSTEM ID:  [id number]   Comprehensive metabolic panel     Status: Abnormal   Result Value Ref Range    Sodium 132 (L) 135 - 145 mmol/L    Potassium 4.8 3.4 - 5.3 mmol/L    Carbon Dioxide (CO2) 21 (L) 22 - 29 mmol/L    Anion Gap 10 7 - 15 mmol/L    Urea Nitrogen 26.1 (H) 8.0 - 23.0 mg/dL    Creatinine 1.05 (H) 0.51 - 0.95 mg/dL    GFR Estimate 49 (L) >60 mL/min/1.73m2    Calcium 9.6 8.8 - 10.4 mg/dL    Chloride 101 98 - 107 mmol/L    Glucose 126 (H) 70 - 99 mg/dL    Alkaline Phosphatase 117 40 - 150 U/L    AST 34 0 - 45 U/L    ALT 29 0 - 50 U/L    Protein Total 7.0 6.4 - 8.3 g/dL    Albumin 3.9 3.5 - 5.2 g/dL    Bilirubin Total 0.4 <=1.2 mg/dL   Troponin T, High Sensitivity     Status: Abnormal   Result Value Ref Range    Troponin T, High Sensitivity 24 (H) <=14 ng/L   CBC with platelets and differential     Status: Abnormal   Result Value Ref Range    WBC Count 9.1 4.0 - 11.0 10e3/uL    RBC Count 3.83 3.80 - 5.20 10e6/uL    Hemoglobin 10.8 (L) 11.7 - 15.7 g/dL    Hematocrit 34.2 (L) 35.0 - 47.0 %    MCV 89 78 - 100 fL    MCH 28.2 26.5 - 33.0 pg    MCHC 31.6 31.5 - 36.5 g/dL    RDW 17.6 (H) 10.0 - 15.0 %    Platelet Count 228 150 - 450 10e3/uL    % Neutrophils 54 %    % Lymphocytes 34 %    % Monocytes 9 %    % Eosinophils 2 %    % Basophils 1 %    % Immature Granulocytes 0 %    NRBCs per 100 WBC 0 <1 /100    Absolute Neutrophils 4.9 1.6 - 8.3 10e3/uL    Absolute Lymphocytes 3.1 0.8 - 5.3 10e3/uL    Absolute Monocytes 0.8 0.0 - 1.3 10e3/uL    Absolute Eosinophils 0.2 0.0 - 0.7 10e3/uL     Absolute Basophils 0.1 0.0 - 0.2 10e3/uL    Absolute Immature Granulocytes 0.0 <=0.4 10e3/uL    Absolute NRBCs 0.0 10e3/uL   [place] Draw     Status: None (In process)    Narrative    The following orders were created for panel order [place] Draw.  Procedure                               Abnormality         Status                     ---------                               -----------         ------                     Extra Blue Top Tube[[id number]]                             In process                 Extra Red Top Tube[[id number]]                              In process                   Please view results for these tests on the individual orders.   Troponin T, High Sensitivity     Status: Abnormal   Result Value Ref Range    Troponin T, High Sensitivity 24 (H) <=14 ng/L   EKG 12-lead     Status: None (Preliminary result)   Result Value Ref Range    Systolic Blood Pressure  mmHg    Diastolic Blood Pressure  mmHg    Ventricular Rate 110 BPM    Atrial Rate  BPM    AK Interval  ms    QRS Duration 98 ms     ms    QTc 397 ms    P Axis  degrees    R AXIS -53 degrees    T Axis 268 degrees    Interpretation ECG       Atrial fibrillation with rapid ventricular response  Left anterior fascicular block  Minimal voltage criteria for LVH, may be normal variant ( Mike product )  Septal infarct , age undetermined  ST & T wave abnormality, consider inferior ischemia  Abnormal ECG     CBC with platelets differential     Status: Abnormal    Narrative    The following orders were created for panel order CBC with platelets differential.  Procedure                               Abnormality         Status                     ---------                               -----------         ------                     CBC with platelets and ...[[id number]]  Abnormal            Final result                 Please view results for these tests on the individual orders.     Medications   amiodarone (NEXTERONE) bolus 150 mg (0 mg  Intravenous Stopped 6/20/25 1203)   amiodarone (PACERONE) tablet 400 mg (400 mg Oral $Given 6/20/25 1210)   sodium chloride 0.9% BOLUS 500 mL (0 mLs Intravenous Stopped 6/20/25 1311)   etomidate (AMIDATE) injection 6 mg (6 mg Intravenous $Given 6/20/25 1258)     .       Results for orders placed or performed during the hospital encounter of 06/20/25   EKG 12-lead   Result Value Ref Range    Systolic Blood Pressure  mmHg    Diastolic Blood Pressure  mmHg    Ventricular Rate 110 BPM    Atrial Rate  BPM    NC Interval  ms    QRS Duration 98 ms     ms    QTc 397 ms    P Axis  degrees    R AXIS -53 degrees    T Axis 268 degrees    Interpretation ECG       Atrial fibrillation with rapid ventricular response  Left anterior fascicular block  Minimal voltage criteria for LVH, may be normal variant ( Bradley product )  Septal infarct , age undetermined  ST & T wave abnormality, consider inferior ischemia  Abnormal ECG       Medications - No data to display       Critical care was not performed.     Medical Decision Making  The patient's presentation was of high complexity (a chronic illness severe exacerbation, progression, or side effect of treatment).    The patient's evaluation involved:  review of external note(s) from 3+ sources (see separate area of note for details)  review of 3+ test result(s) ordered prior to this encounter (see separate area of note for details)  ordering and/or review of 3+ test(s) in this encounter (see separate area of note for details)    The patient's management necessitated moderate risk (prescription drug management including medications given in the ED) and high risk (a decision regarding emergency major procedure (cardioversion)).    Assessment & Plan    Very nice 89-year-old female that was brought to the ER by family members due to persistent atrial fibrillation.  She has been appropriately anticoagulated and icatibant for the past several weeks.  Patient tried taking increased  doses of metoprolol without any change in her rhythm.  Patient's case was discussed with electrophysiology.  They recommended an IV bolus of amiodarone followed by cardioversion.  Cardioversion was performed and converted the patient back to a sinus rhythm.  Patient was feeling better.  She was watched in the ER for several hours and was able to ambulate around the ER without any symptoms.  Patient will be discharged home on amiodarone which she already has a prescription of.  She should take the amiodarone 2 times a day as previously recommended.  Critical care of this patient was 30 minutes due to coordination of care performing procedures and performing postprocedure evaluations and care.    I have reviewed the nursing notes. I have reviewed the findings, diagnosis, plan and need for follow up with the patient.    New Prescriptions    No medications on file       Final diagnoses:   Atrial fibrillation with rapid ventricular response (H)   I, Marco A Campbell, am serving as a trained medical scribe to document services personally performed by Peri Rodriguez MD, based on the provider's statements to me.     IPeri MD, was physically present and have reviewed and verified the accuracy of this note documented by Marco A Campbell.      Peri Rodriguez MD  [facility] EMERGENCY DEPARTMENT  6/20/2025     Peri Rodriguez MD  06/20/25 [id number]

## 2025-06-20 NOTE — ED TRIAGE NOTES
"Pt c/o \"going into a fib\" and shortness of breath. Thinks she went back into a regular rhythm last evening, and has felt like she is in a fib since 4am (\"gets SOB and can feel it in her chest\")  Pt took extra metoprolol at 0630, and took regular medication metoprolol at 8am.  A&Ox4  HR between 100-130 in triage. EKG ordered.          "

## 2025-06-20 NOTE — ED NOTES
Bed: [facility]  Expected date:   Expected time:   Means of arrival:   Comments:  Ed Room 1

## 2025-06-20 NOTE — DISCHARGE INSTRUCTIONS
You were cardioverted in the ER of atrial fibrillation.    Please take the amiodarone 2 times a day as prescribed.     Please continue to take the dabigatran (Pradaxa) as prescribed.     Follow up wit your cardiology service.

## 2025-06-20 NOTE — ED PROVIDER NOTES
[facility] Procedure Note        Sedation:      Performed by: Aliya Acosta DO  Authorized by: Aliya Acosta DO    Pre-Procedure Assessment done at 12:50pm.    Sedation Level:  Deep Sedation    Indication:  Sedation is required to allow for Cardioversion    Consent obtained from patient after discussing the risks, benefits and alternatives.    PO Intake:  Appropriately NPO for procedure    ASA Class:  Class 3 - SEVERE SYSTEMIC DISEASE, DEFINITE FUNCTIONAL LIMITATIONS.    Mallampati:  Grade 3:  Soft palate visible, posterior pharyngeal wall not visible    Lungs: Lungs Clear with good breath sounds bilaterally.     Heart: irregularly irreglar    History and physical reviewed and no updates needed. I have reviewed the lab findings, diagnostic data, medications, and the plan for sedation. I have determined this patient to be an appropriate candidate for the planned sedation and procedure and have reassessed the patient IMMEDIATELY PRIOR to sedation and procedure.      Sedation Post Procedure Summary:    Prior to the start of the procedure and with procedural staff participation, I verbally confirmed the patient s identity using two indicators, relevant allergies, that the procedure was appropriate and matched the consent or emergent situation, and that the correct equipment/implants were available. Immediately prior to starting the procedure I conducted the Time Out with the procedural staff and re-confirmed the patient s name, procedure, and site/side. (The Joint Commission universal protocol was followed.)  Yes      Sedatives: Etomidate    Vital signs, airway, End Tidal CO2 and pulse oximetry were monitored and remained stable throughout the procedure and sedation was maintained until the procedure was complete.  The patient was monitored by staff until sedation discharge criteria were met.    Patient tolerance: Patient tolerated the procedure well with no immediate complications.    Time of sedation in  minutes:  10 minutes from beginning to end of physician one to one monitoring.             Aliya Acosta DO  06/20/25 2021

## 2025-06-20 NOTE — CONSULTS
Electrophysiology Consultation Note   EP Attending: .   Reason for consultation: Atrial Fibrillation.   Provider requesting consultation: Dr.Shukala KODY LOPEZ.  Date of Service: 6/20/2025      HPI:   Ms. Mendez is a 89 year old  female with SSS s/p pacemaker implant (2010), device detected AF, NSVT with normal LV function, mild pulmonary hypertension, thyrotoxicosis, and HTN. She was most recently seen by EP KAILEE in June 2023 after hospitalization for thyrotoxicosis and acute HFpEF exacerbation. Her AF burden was 0.3% with rates up to 150s. Metoprolol was switched to Diltiazem. She called a week later reporting lower extremity swelling without any changes in breathing, diltiazem was stopped and she is back on metoprolol. Hospitalization also showed PA pressure of 68 mmHg and she saw Dr. Grimes in July who thought it was likely to be due to high output state, recommended repeat echo when thyroxicosis is treated. Previously followed with Dr. TOÑITO Dixon. She also saw Gabbi Mart NP on 12/6/24, at which time she denied cardiac symptoms or prolonged AF recurrences. Device check 4/13/25 showed 210 AT/AF episodes recorded - 6 sec-24 min 2 sec. Total AT/AF time = 3 days. AF burden 23.2%. She was started on an AC and underwent LIANE/DCCV 4/15/25 with successful conversion to NSR. She followed up with Karrie Mendoza in EP clinic on 6/10/25 and was doing well after the last DCCV.   She now presents 6/20/2025 with feeling she has been in AF for the past week or so. She has SOB/MCKNIGHT. She reports she is under a lot of stress right now as well as her  is quite ill. She had been advised by outpatient team to start amiodarone and do a DCCV. However, she decided not to do amiodarone. A recent TSH mildly elevated with normal T4. Presenting ECG in ER shows AF with  bpm.  Current cardiac medications include: Toprol XL, hydrochlorothiazide, and Pradaxa.   Past Medical History:   Past Medical History:   Diagnosis Date     Arthritis     Atrial fibrillation (H) 01/01/2011    Cataract     Cerebral infarction (H)     Closed nondisplaced fracture of styloid process of radius 2019    Congestive heart failure (H)     Dry eyes     Facial fracture (H) 01/01/2006    Hypertension     Infection due to 2019 novel coronavirus 10/2022    or 11/22- took paxolovid    Infection due to 2019 novel coronavirus 11/19/2021    Low bone density 2010    NSVT (nonsustained ventricular tachycardia) (H) 01/01/2009    during exercise echo, neg EP study    Pacemaker 07/01/2010    Postmenopausal status     S/P cardiac catheterization 01/01/2009    30-40% non obstructive lesion    SSS (sick sinus syndrome) (H) 10/2022    Thyroid disease     Ventricular tachycardia, nonsustained (H) 01/01/2009    during stress echo     Past Surgical History:   Past Surgical History:   Procedure Laterality Date    ANESTHESIA CARDIOVERSION N/A 04/15/2025    Procedure: Anesthesia cardioversion@1400;  Surgeon: GENERIC ANESTHESIA PROVIDER;  Location: [facility] OR    CATARACT EXTRACTION W/ INTRAOCULAR LENS IMPLANT Right 12/04/2018    EYE SURGERY      HEMIARTHROPLASTY HIP Right 01/24/2025    Procedure: Hemiarthroplasty Hip;  Surgeon: Victor Manuel Kowalski MD;  Location: [facility] OR    IMPLANT PACEMAKER      PPM  06/30/2010    Permanent Pacemaker    TONSILLECTOMY       Allergies: Per MAR   No Known Allergies  Medications:   Per MAR current outpatient cardiovascular medications include: (Not in a hospital admission)    Current Outpatient Medications   Medication Sig Dispense Refill    acetaminophen (TYLENOL) 325 MG tablet Take 2 tablets (650 mg) by mouth daily as needed for mild pain or other (and adjunct with moderate or severe pain or per patient request). 100 tablet 0    alendronate (FOSAMAX) 70 MG tablet Take 1 tablet (70 mg) by mouth every 7 days. Take 60 minutes before am meal with 8 oz. water. Remain upright for 30 minutes. 12 tablet 4    amiodarone (PACERONE) 200 MG tablet Take 2 tablets (400 mg) by  mouth twice a day for 2 weeks. After repeat cardioversion, decrease amiodarone to 1 tablet (200 mg) daily 90 tablet 1    co-enzyme Q-10 100 MG CAPS capsule Take 100 mg by mouth twice a week. (Patient not taking: Reported on 6/13/2025)      dabigatran ANTICOAGULANT (PRADAXA) 150 MG capsule Take 1 capsule (150 mg) by mouth 2 times daily. Store in original 's bottle or blister pack; use within 120 days of opening. 180 capsule 3    DHA-EPA-Vit B6-B12-Folic Acid (CARDIOVID PLUS) CAPS Take by mouth. (Patient not taking: Reported on 6/13/2025)      famotidine (PEPCID) 20 MG tablet TAKE 1 TABLET(20 MG) BY MOUTH TWICE DAILY 180 tablet 3    gabapentin (NEURONTIN) 100 MG capsule Take 2 capsules (200 mg) by mouth nightly as needed for other (restless legs). Taking 200 mg at HS 60 capsule 3    hydroCHLOROthiazide 12.5 MG tablet Take 1 tablet (12.5 mg) by mouth daily. 90 tablet 1    melatonin 1 MG/ML LIQD liquid Take 1 mg by mouth nightly as needed for sleep      methimazole (TAPAZOLE) 5 MG tablet Take 0.5 tablets (2.5 mg) by mouth five times a week. Skipping WED and SAT 30 tablet 2    methocarbamol (ROBAXIN) 500 MG tablet Take 1 tablet (500 mg) by mouth 3 times daily. (Patient not taking: Reported on 6/13/2025) 42 tablet 0    metoprolol succinate ER (TOPROL XL) 50 MG 24 hr tablet TAKE 2 TABLETS BY MOUTH EVERY MORNING AND 1 TABLET EVERY EVENING 270 tablet 1    ondansetron (ZOFRAN) 4 MG tablet Take 1 tablet (4 mg) by mouth every 6 hours as needed for nausea (give prior to meals for nausea) 90 tablet 1    triamcinolone (KENALOG) 0.1 % external cream Apply topically 2 times daily 453.6 g 0    UNABLE TO FIND MEDICATION NAME: Cardiotrophin PMG       Current Facility-Administered Medications   Medication Dose Route Frequency Provider Last Rate Last Admin    amiodarone (NEXTERONE) bolus 150 mg  150 mg Intravenous Once Peri Rodriguez MD        sodium chloride 0.9 % infusion   Intravenous Continuous Karrie Mendoza  MANOLO         Current Outpatient Medications   Medication Sig Dispense Refill    acetaminophen (TYLENOL) 325 MG tablet Take 2 tablets (650 mg) by mouth daily as needed for mild pain or other (and adjunct with moderate or severe pain or per patient request). 100 tablet 0    alendronate (FOSAMAX) 70 MG tablet Take 1 tablet (70 mg) by mouth every 7 days. Take 60 minutes before am meal with 8 oz. water. Remain upright for 30 minutes. 12 tablet 4    amiodarone (PACERONE) 200 MG tablet Take 2 tablets (400 mg) by mouth twice a day for 2 weeks. After repeat cardioversion, decrease amiodarone to 1 tablet (200 mg) daily 90 tablet 1    co-enzyme Q-10 100 MG CAPS capsule Take 100 mg by mouth twice a week. (Patient not taking: Reported on 6/13/2025)      dabigatran ANTICOAGULANT (PRADAXA) 150 MG capsule Take 1 capsule (150 mg) by mouth 2 times daily. Store in original 's bottle or blister pack; use within 120 days of opening. 180 capsule 3    DHA-EPA-Vit B6-B12-Folic Acid (CARDIOVID PLUS) CAPS Take by mouth. (Patient not taking: Reported on 6/13/2025)      famotidine (PEPCID) 20 MG tablet TAKE 1 TABLET(20 MG) BY MOUTH TWICE DAILY 180 tablet 3    gabapentin (NEURONTIN) 100 MG capsule Take 2 capsules (200 mg) by mouth nightly as needed for other (restless legs). Taking 200 mg at HS 60 capsule 3    hydroCHLOROthiazide 12.5 MG tablet Take 1 tablet (12.5 mg) by mouth daily. 90 tablet 1    melatonin 1 MG/ML LIQD liquid Take 1 mg by mouth nightly as needed for sleep      methimazole (TAPAZOLE) 5 MG tablet Take 0.5 tablets (2.5 mg) by mouth five times a week. Skipping WED and SAT 30 tablet 2    methocarbamol (ROBAXIN) 500 MG tablet Take 1 tablet (500 mg) by mouth 3 times daily. (Patient not taking: Reported on 6/13/2025) 42 tablet 0    metoprolol succinate ER (TOPROL XL) 50 MG 24 hr tablet TAKE 2 TABLETS BY MOUTH EVERY MORNING AND 1 TABLET EVERY EVENING 270 tablet 1    ondansetron (ZOFRAN) 4 MG tablet Take 1 tablet (4 mg) by  mouth every 6 hours as needed for nausea (give prior to meals for nausea) 90 tablet 1    triamcinolone (KENALOG) 0.1 % external cream Apply topically 2 times daily 453.6 g 0    UNABLE TO FIND MEDICATION NAME: Cardiotrophin PMG       Family History:   Family History   Problem Relation Age of Onset    Myocardial Infarction Mother 88        lived to 100    Hypertension Mother     Macular Degeneration Mother     Cardiovascular Father 76         age 80, MI 76 and CHF 80's    Coronary Artery Disease Father     Myocardial Infarction Father 70    Arrhythmia Sister         PPM    Neuropathy Sister     Colon Cancer Sister     Arrhythmia Brother         pacemaker    Arrhythmia Brother         pacemaker, hx rheumatic fever, heart failure    Leukemia Maternal Grandfather     Diabetes Type 2  Maternal Grandfather     Cardiovascular Paternal Grandfather          age 76 of CVD    Coronary Artery Disease Paternal Grandfather     Breast Cancer Daughter 50        Breast, uterine, 2nd breast cancer, HTN    Uterine Cancer Daughter     Other Cancer Daughter         Uterine, melanoma    Thyroid Disease Daughter         bout of thyroiditis with thyrotoxicosis followed by hypothyroidism    Blood Disease Son         hemochromatosis?    Atrial fibrillation Son      Social History:   Social History     Tobacco Use    Smoking status: Never     Passive exposure: Never    Smokeless tobacco: Never   Substance Use Topics    Alcohol use: No       ROS:   A comprehensive 10 point ROS was negative other than as mentioned in HPI.    Physical Examination:   VITALS: /68   Pulse (!) 130   Temp 97.3  F (36.3  C) (Oral)   Resp 18   SpO2 97%     GENERAL APPEARANCE: AxO, NAD   HEENT: NCAT, EOMI, MMM.   NECK: Supple. No JVD or bruit. Good carotid upstroke.   CHEST: CTAB   CARDIOVASCULAR: S1S2, irreg, No m/r/g.   ABDOMEN: BS+, soft, No pulsatile masses or bruits.   EXTREMITIES: No pedal edema. Distal pulses intact.   NEURO: Grossly nonfocal.  "  PSYCH: Normal affect.  SKIN: Warm and dry.   Data:   Labs:  BMPNo lab results found in last 7 days.  CBC  Recent Labs   Lab 06/20/25  1119   WBC 9.1   RBC 3.83   HGB 10.8*   HCT 34.2*   MCV 89   MCH 28.2   MCHC 31.6   RDW 17.6*        INRNo lab results found in last 7 days.  No results found for: \"CKTOTAL\", \"CKMB\", \"TROPN\"  Cholesterol (mg/dL)   Date Value   06/26/2024 155   07/21/2023 158   05/06/2021 161   12/14/2018 166   12/01/2017 177   08/26/2016 139     Cholesterol/HDL Ratio (no units)   Date Value   10/27/2014 1.8   09/03/2013 2.6   08/21/2012 2.4   08/31/2010 2.7     HDL Cholesterol (mg/dL)   Date Value   05/06/2021 73   12/14/2018 78   12/01/2017 90   08/26/2016 65     Direct Measure HDL (mg/dL)   Date Value   06/26/2024 63   07/21/2023 54     LDL Cholesterol Calculated (mg/dL)   Date Value   06/26/2024 74   07/21/2023 86   05/06/2021 76   12/14/2018 79   12/01/2017 75   08/26/2016 61     EKG:     10/21/22 NM Stress Test:  Result Text       The nuclear stress test is negative for inducible myocardial ischemia or infarction.    Left ventricular function is hyperdynamic.    There is no prior study for comparison.  11/3/23 ECHO:   Interpretation Summary  Left ventricular size, wall motion and function are normal. The ejection  fraction is 55-60%.     Right ventricular function, chamber size, wall motion, and thickness are  normal.     Mild pulmonary hypertension is present.The right ventricular systolic pressure  is 33mmHg above the right atrial pressure.     Moderate tricuspid insufficiency is present.     Compared to prior imaging on 5/25/2023 there has been interval decrease in the  estimate pasp however this may be explained by errors in testing methods.    4/15/25 LIANE:  Left Ventricle  There is no thrombus. Global and regional left ventricular function is normal  with an EF of 55-60%. Left ventricular size is normal. Left ventricular wall  thickness is normal.     Right Ventricle  The right " ventricle is normal size. Global right ventricular function is  normal.     Atria  The right atria appears normal. The left atrial appendage is normal. It is  free of spontaneous echo contrast and thrombus. Moderate left atrial  enlargement is present. The atrial septum is intact as assessed by color  Doppler .     Mitral Valve  Moderate mitral insufficiency is present.     Aortic Valve  The aortic valve is tricuspid. Trace aortic insufficiency is present.     Tricuspid Valve  Trace tricuspid insufficiency is present. The right ventricular systolic  pressure is approximated at 7.2 mmHg plus the right atrial pressure.     Pulmonic Valve  Trace pulmonic insufficiency is present.     Vessels  The thoracic aorta is normal. The aorta root is normal.     Pericardium  No pericardial effusion is present.     Compared to Previous Study  No prior LIANE for comparison.    Assessment:   Ms. Mendez is a 89 year old  female with SSS s/p pacemaker implant (2010), device detected AF, NSVT with normal LV function, mild pulmonary hypertension, thyrotoxicosis, and HTN. She was most recently seen by EP KAILEE in June 2023 after hospitalization for thyrotoxicosis and acute HFpEF exacerbation. Her AF burden was 0.3% with rates up to 150s. Metoprolol was switched to Diltiazem. She called a week later reporting lower extremity swelling without any changes in breathing, diltiazem was stopped and she is back on metoprolol. Hospitalization also showed PA pressure of 68 mmHg and she saw Dr. Grimes in July who thought it was likely to be due to high output state, recommended repeat echo when thyroxicosis is treated. Previously followed with Dr. TOÑITO Dixon. She also saw Gabbi Mart NP on 12/6/24, at which time she denied cardiac symptoms or prolonged AF recurrences. Device check 4/13/25 showed 210 AT/AF episodes recorded - 6 sec-24 min 2 sec. Total AT/AF time = 3 days. AF burden 23.2%. She was started on an AC and underwent LIANE/DCCV 4/15/25 with  successful conversion to NSR. She followed up with Karrie Mendoza in EP clinic on 6/10/25 and was doing well after the last DCCV.   She now presents 6/20/2025 with feeling she has been in AF for the past week or so. She has SOB/MCKNIGHT. She reports she is under a lot of stress right now as well as her  is quite ill. She had been advised by outpatient team to start amiodarone and do a DCCV. However, she decided not to do amiodarone. A recent TSH mildly elevated with normal T4. Presenting ECG in ER shows AF with  bpm.  Current cardiac medications include: Toprol XL, hydrochlorothiazide, and Pradaxa.   EP Recommendations:  SSS s/p pacemaker (2010):  Device transmission from 6/16/25 showed normal device function, stable lead parameters, ongoing AF (some undersensing of fib waves noted). AP 93.7%,  1.1%.   Continue routine device clinic follow-up.     Paroxysmal Atrial Fibrillation:   I had a long discussion with the patient about AF, including the natural history of the disease, risk of embolic events, role of antithrombotic therapy, role of rate control therapy, the role of antiarrhythmic medications, and the role of an ablation.   1. Stroke Prophylaxis: CHADSVASC 4 (++age, +female, +HTN) 4, corresponding to a 4.0% annual stroke / systemic embolism event rate. Indicating need for long term AC. Continue pradaxa 150 mg BID. No bleeding issues. Most recent Cr 0.92, which correlates to calculated CrCl of 35 mL/min. No indication for dose adjustment at this time. Patient expressed disappointment in no longer being able to take tumeric for joint pain since starting eliquis. We discussed that she could try topical voltaren gel for her joint pain as an alternative as topicals do not interact with AC. She reports no missed doses of Pradaxa.   2. Rate Control: Continue metoprolol XL 50 mg daily.   3. Rhythm Control: Cardioversion, Antiarrhythmics and/or ablation are options for rhythm control. Hx of brief episodes,  7 AT/AF episodes lasting 14 sec - 19 min. ventricular rates stable, asymptomatic. Recent device interrogation from 4/13/25 showed 210 AT/AF episodes recorded - 6 sec-24 min 2 sec. Total AT/AF time = 3 days. AF burden 23.2%. She was started on an AC and underwent LIANE/DCCV 4/15/25 with successful conversion to NSR. Converted back to AF around 6/13/25. She was advised to start on amiodarone and do another DCCV, but she deferred originally. In ED, plan for IV bolus of Amiodarone followed by cardioversion in ED. Continue Amiodarone 400mg BID x2 weeks, then 200mg daily. Plan to follow up with EP in 3 months as OP.   4. Risk Factor Management: Statin, BP control, and NIKHIL evaluation as indicated.       The patient states understanding and is agreeable with plan.   Thank you for allowing us to participate in the care of this patient.     The patient was discussed w/ Dr. Campbell.  The above note reflects our joint plan.    Paris Barajas PA-C  Electrophysiology Consult Service  Securely message with [employer]   Text page via [employer]     KAILEE Total time spent on patient visit, reviewing notes, imaging, labs, orders, and completing necessary documentation: 60 minutes.  >50% of visit spent on counseling patient and/or coordination of care.    EP STAFF NOTE  I have seen and examined the patient as part of a shared visit with the EP KAILEE.  I agree with the note above. I reviewed today's vital signs and medications. I have reviewed and discussed with the advanced practice provider their physical examination, assessment, and plan   Briefly, PAF, PPM for SND  My key history/exam findings are: AF.   The key management decisions made by me: amio and DCCV, follow-up as outppatient.  Total time spent on patient visit, reviewing notes, imaging, labs, orders, and completing necessary documentation: 15 minutes.  >50% of visit spent on counseling patient and/or coordination of care.     Major Campbell MD [facility]  Cardiology -  Electrophysiology

## 2025-06-22 LAB
ATRIAL RATE - MUSE: 63 BPM
DIASTOLIC BLOOD PRESSURE - MUSE: NORMAL MMHG
INTERPRETATION ECG - MUSE: NORMAL
P AXIS - MUSE: 23 DEGREES
PR INTERVAL - MUSE: 232 MS
QRS DURATION - MUSE: 102 MS
QT - MUSE: 422 MS
QTC - MUSE: 431 MS
R AXIS - MUSE: -46 DEGREES
SYSTOLIC BLOOD PRESSURE - MUSE: NORMAL MMHG
T AXIS - MUSE: -47 DEGREES
VENTRICULAR RATE- MUSE: 63 BPM

## 2025-06-30 ENCOUNTER — TELEPHONE (OUTPATIENT)
Dept: CARDIOLOGY | Facility: CLINIC | Age: OVER 89
End: 2025-06-30
Payer: COMMERCIAL

## 2025-06-30 NOTE — TELEPHONE ENCOUNTER
Left Voicemail with Family Member (1st Attempt) for the patient to call back and schedule the following:    Appointment type: Echo Complete  Provider: JONNATHAN  Return date: 10/15 or before  Specialty phone number: [phone] opt 1  Additional appointment(s) needed: NA  Additonal Notes: Need to schedule Echo before appt with Chaka on 10/15/2025

## 2025-07-03 ENCOUNTER — HOSPITAL ENCOUNTER (EMERGENCY)
Facility: CLINIC | Age: OVER 89
End: 2025-07-03
Attending: EMERGENCY MEDICINE
Payer: COMMERCIAL

## 2025-07-03 ENCOUNTER — APPOINTMENT (OUTPATIENT)
Dept: GENERAL RADIOLOGY | Facility: CLINIC | Age: OVER 89
End: 2025-07-03
Attending: EMERGENCY MEDICINE
Payer: COMMERCIAL

## 2025-07-03 VITALS
TEMPERATURE: 97.7 F | OXYGEN SATURATION: 98 % | SYSTOLIC BLOOD PRESSURE: 129 MMHG | DIASTOLIC BLOOD PRESSURE: 62 MMHG | HEART RATE: 60 BPM | RESPIRATION RATE: 20 BRPM

## 2025-07-03 DIAGNOSIS — Z95.0 CARDIAC PACEMAKER IN SITU: ICD-10-CM

## 2025-07-03 DIAGNOSIS — R07.9 CHEST PAIN, UNSPECIFIED TYPE: ICD-10-CM

## 2025-07-03 LAB
ALBUMIN SERPL BCG-MCNC: 4 G/DL (ref 3.5–5.2)
ALP SERPL-CCNC: 98 U/L (ref 40–150)
ALT SERPL W P-5'-P-CCNC: 16 U/L (ref 0–50)
ANION GAP SERPL CALCULATED.3IONS-SCNC: 11 MMOL/L (ref 7–15)
AST SERPL W P-5'-P-CCNC: 33 U/L (ref 0–45)
ATRIAL RATE - MUSE: 119 BPM
BASOPHILS # BLD AUTO: 0.1 10E3/UL (ref 0–0.2)
BASOPHILS NFR BLD AUTO: 1 %
BILIRUB SERPL-MCNC: 0.3 MG/DL
BUN SERPL-MCNC: 17.8 MG/DL (ref 8–23)
CALCIUM SERPL-MCNC: 9.4 MG/DL (ref 8.8–10.4)
CHLORIDE SERPL-SCNC: 99 MMOL/L (ref 98–107)
CREAT SERPL-MCNC: 0.97 MG/DL (ref 0.51–0.95)
DIASTOLIC BLOOD PRESSURE - MUSE: NORMAL MMHG
EGFRCR SERPLBLD CKD-EPI 2021: 54 ML/MIN/1.73M2
EOSINOPHIL # BLD AUTO: 0.1 10E3/UL (ref 0–0.7)
EOSINOPHIL NFR BLD AUTO: 1 %
ERYTHROCYTE [DISTWIDTH] IN BLOOD BY AUTOMATED COUNT: 17.5 % (ref 10–15)
FLUAV RNA SPEC QL NAA+PROBE: NEGATIVE
FLUBV RNA RESP QL NAA+PROBE: NEGATIVE
GLUCOSE SERPL-MCNC: 142 MG/DL (ref 70–99)
HCO3 SERPL-SCNC: 24 MMOL/L (ref 22–29)
HCT VFR BLD AUTO: 39.4 % (ref 35–47)
HGB BLD-MCNC: 12.4 G/DL (ref 11.7–15.7)
IMM GRANULOCYTES # BLD: 0 10E3/UL
IMM GRANULOCYTES NFR BLD: 0 %
INTERPRETATION ECG - MUSE: NORMAL
LYMPHOCYTES # BLD AUTO: 3.6 10E3/UL (ref 0.8–5.3)
LYMPHOCYTES NFR BLD AUTO: 37 %
MAGNESIUM SERPL-MCNC: 2 MG/DL (ref 1.7–2.3)
MCH RBC QN AUTO: 29.1 PG (ref 26.5–33)
MCHC RBC AUTO-ENTMCNC: 31.5 G/DL (ref 31.5–36.5)
MCV RBC AUTO: 93 FL (ref 78–100)
MONOCYTES # BLD AUTO: 0.8 10E3/UL (ref 0–1.3)
MONOCYTES NFR BLD AUTO: 8 %
NEUTROPHILS # BLD AUTO: 5.1 10E3/UL (ref 1.6–8.3)
NEUTROPHILS NFR BLD AUTO: 52 %
NRBC # BLD AUTO: 0 10E3/UL
NRBC BLD AUTO-RTO: 0 /100
P AXIS - MUSE: NORMAL DEGREES
PHOSPHATE SERPL-MCNC: 3.2 MG/DL (ref 2.5–4.5)
PLATELET # BLD AUTO: 226 10E3/UL (ref 150–450)
POTASSIUM SERPL-SCNC: 4.4 MMOL/L (ref 3.4–5.3)
PR INTERVAL - MUSE: NORMAL MS
PROT SERPL-MCNC: 7.5 G/DL (ref 6.4–8.3)
QRS DURATION - MUSE: 178 MS
QT - MUSE: 428 MS
QTC - MUSE: 594 MS
R AXIS - MUSE: -83 DEGREES
RBC # BLD AUTO: 4.26 10E6/UL (ref 3.8–5.2)
RSV RNA SPEC NAA+PROBE: NEGATIVE
SARS-COV-2 RNA RESP QL NAA+PROBE: NEGATIVE
SODIUM SERPL-SCNC: 134 MMOL/L (ref 135–145)
SYSTOLIC BLOOD PRESSURE - MUSE: NORMAL MMHG
T AXIS - MUSE: 82 DEGREES
TROPONIN T SERPL HS-MCNC: 23 NG/L
TSH SERPL DL<=0.005 MIU/L-ACNC: 3.61 UIU/ML (ref 0.3–4.2)
VENTRICULAR RATE- MUSE: 116 BPM
WBC # BLD AUTO: 9.7 10E3/UL (ref 4–11)

## 2025-07-03 PROCEDURE — 84443 ASSAY THYROID STIM HORMONE: CPT | Performed by: EMERGENCY MEDICINE

## 2025-07-03 PROCEDURE — 99284 EMERGENCY DEPT VISIT MOD MDM: CPT | Performed by: EMERGENCY MEDICINE

## 2025-07-03 PROCEDURE — 71045 X-RAY EXAM CHEST 1 VIEW: CPT | Mod: 26 | Performed by: RADIOLOGY

## 2025-07-03 PROCEDURE — 85025 COMPLETE CBC W/AUTO DIFF WBC: CPT | Performed by: EMERGENCY MEDICINE

## 2025-07-03 PROCEDURE — 93005 ELECTROCARDIOGRAM TRACING: CPT | Performed by: EMERGENCY MEDICINE

## 2025-07-03 PROCEDURE — 36415 COLL VENOUS BLD VENIPUNCTURE: CPT | Performed by: EMERGENCY MEDICINE

## 2025-07-03 PROCEDURE — 84484 ASSAY OF TROPONIN QUANT: CPT | Performed by: EMERGENCY MEDICINE

## 2025-07-03 PROCEDURE — 83735 ASSAY OF MAGNESIUM: CPT | Performed by: EMERGENCY MEDICINE

## 2025-07-03 PROCEDURE — 87637 SARSCOV2&INF A&B&RSV AMP PRB: CPT | Performed by: EMERGENCY MEDICINE

## 2025-07-03 PROCEDURE — 93010 ELECTROCARDIOGRAM REPORT: CPT | Performed by: EMERGENCY MEDICINE

## 2025-07-03 PROCEDURE — 99285 EMERGENCY DEPT VISIT HI MDM: CPT | Mod: 25 | Performed by: EMERGENCY MEDICINE

## 2025-07-03 PROCEDURE — 71045 X-RAY EXAM CHEST 1 VIEW: CPT

## 2025-07-03 PROCEDURE — 84100 ASSAY OF PHOSPHORUS: CPT | Performed by: EMERGENCY MEDICINE

## 2025-07-03 PROCEDURE — 80053 COMPREHEN METABOLIC PANEL: CPT | Performed by: EMERGENCY MEDICINE

## 2025-07-03 ASSESSMENT — ACTIVITIES OF DAILY LIVING (ADL): ADLS_ACUITY_SCORE: 58

## 2025-07-04 VITALS
HEART RATE: 72 BPM | OXYGEN SATURATION: 98 % | RESPIRATION RATE: 20 BRPM | TEMPERATURE: 97.7 F | DIASTOLIC BLOOD PRESSURE: 71 MMHG | SYSTOLIC BLOOD PRESSURE: 145 MMHG

## 2025-07-04 LAB
ATRIAL RATE - MUSE: 119 BPM
DIASTOLIC BLOOD PRESSURE - MUSE: NORMAL MMHG
INTERPRETATION ECG - MUSE: NORMAL
P AXIS - MUSE: NORMAL DEGREES
PR INTERVAL - MUSE: NORMAL MS
QRS DURATION - MUSE: 178 MS
QT - MUSE: 428 MS
QTC - MUSE: 594 MS
R AXIS - MUSE: -83 DEGREES
SYSTOLIC BLOOD PRESSURE - MUSE: NORMAL MMHG
T AXIS - MUSE: 82 DEGREES
VENTRICULAR RATE- MUSE: 116 BPM

## 2025-07-04 ASSESSMENT — ACTIVITIES OF DAILY LIVING (ADL): ADLS_ACUITY_SCORE: 58

## 2025-07-04 NOTE — ED TRIAGE NOTES
"Ambulatory to triage with CC of SOB onset \" a little bit last night\" ongoing thru today. Reports another concern of something not sitting right in throat and needing to take an antacid and sips of water to help this resolve.  in triage. EKG done.     Triage Assessment (Adult)       Row Name 07/03/25 2538          Triage Assessment    Airway WDL WDL        Respiratory WDL    Respiratory WDL X;rhythm/pattern     Rhythm/Pattern, Respiratory shortness of breath        Skin Circulation/Temperature WDL    Skin Circulation/Temperature WDL WDL        Cardiac WDL    Cardiac WDL WDL        Peripheral/Neurovascular WDL    Peripheral Neurovascular WDL WDL        Cognitive/Neuro/Behavioral WDL    Cognitive/Neuro/Behavioral WDL WDL                     "

## 2025-07-04 NOTE — DISCHARGE INSTRUCTIONS
I placed a new referral for electrophysiology.  Make an appointment to see them for follow-up for arrhythmia and medication check

## 2025-07-04 NOTE — ED PROVIDER NOTES
[place] EMERGENCY DEPARTMENT ([facility])    25       ED PROVIDER NOTE       History     Chief Complaint   Patient presents with    Shortness of Breath     HPI  Isadora Mendez is a 89 year old female with a history of hypertension, atrial fibrillation, pulmonary hypertension, [employer] pacemaker, CKD 3A who presents to the emergency department who presents to the emergency department with shortness of breath.    Patient reports shortness of breath and a mild productive cough that onset last night.  She went to sleep, and felt better this morning.  Patient states later on she was walking to her car and noticed she felt short of breath.  She then reports feeling a lump in her throat today when trying to eat around 3-4 PM.  Patient's family notes the patient's  recently passed on 2025 and they were coming home from  planning.  Denies any lightheadedness, dizziness, or passing out.  Denies new weight gain or lower extremity edema.  Denies any chest pain.  Patient's family notes her cardiologist wanted to do an echocardiogram after her last ED visit.  Patient's family reports this presentation is similar to her A-fib presentation.  Patient reports she has been taking 100 mg metoprolol in the morning and 50 mg at night daily.  She took 100 mg this morning, has not had the remaining 50 mg tonight.  She has been also been taking 400 MG amiodarone x 2 daily since her ED visit on 2025.    Per chart review,  Patient was seen on 2025 due to feeling like she was in atrial fibrillation. Patient tried taking increased doses of metoprolol without any change in her rhythm. Patient's case was discussed with electrophysiology.  They recommended an IV bolus of amiodarone followed by cardioversion.  Cardioversion was performed and converted the patient back to a sinus rhythm.  Patient was feeling better.  She was discharged home on 400 MG amiodarone x 2 daily which she already had a  prescription of.     Past Medical History  Past Medical History:   Diagnosis Date    Arthritis     Atrial fibrillation (H) 01/01/2011    Cataract     Cerebral infarction (H)     Closed nondisplaced fracture of styloid process of radius 2019    Congestive heart failure (H)     Dry eyes     Facial fracture (H) 01/01/2006    Hypertension     Infection due to 2019 novel coronavirus 10/2022    or 11/22- took paxolovid    Infection due to 2019 novel coronavirus 11/19/2021    Low bone density 2010    NSVT (nonsustained ventricular tachycardia) (H) 01/01/2009    during exercise echo, neg EP study    Pacemaker 07/01/2010    Postmenopausal status     S/P cardiac catheterization 01/01/2009    30-40% non obstructive lesion    SSS (sick sinus syndrome) (H) 10/2022    Thyroid disease     Ventricular tachycardia, nonsustained (H) 01/01/2009    during stress echo     Past Surgical History:   Procedure Laterality Date    ANESTHESIA CARDIOVERSION N/A 04/15/2025    Procedure: Anesthesia cardioversion@1400;  Surgeon: GENERIC ANESTHESIA PROVIDER;  Location: [facility] OR    CATARACT EXTRACTION W/ INTRAOCULAR LENS IMPLANT Right 12/04/2018    EYE SURGERY      HEMIARTHROPLASTY HIP Right 01/24/2025    Procedure: Hemiarthroplasty Hip;  Surgeon: Victor Manuel Kowalski MD;  Location: [facility] OR    IMPLANT PACEMAKER      PPM  06/30/2010    Permanent Pacemaker    TONSILLECTOMY       acetaminophen (TYLENOL) 325 MG tablet  alendronate (FOSAMAX) 70 MG tablet  amiodarone (PACERONE) 200 MG tablet  co-enzyme Q-10 100 MG CAPS capsule  dabigatran ANTICOAGULANT (PRADAXA) 150 MG capsule  DHA-EPA-Vit B6-B12-Folic Acid (CARDIOVID PLUS) CAPS  famotidine (PEPCID) 20 MG tablet  gabapentin (NEURONTIN) 100 MG capsule  hydroCHLOROthiazide 12.5 MG tablet  melatonin 1 MG/ML LIQD liquid  methimazole (TAPAZOLE) 5 MG tablet  methocarbamol (ROBAXIN) 500 MG tablet  metoprolol succinate ER (TOPROL XL) 50 MG 24 hr tablet  ondansetron (ZOFRAN) 4 MG tablet  triamcinolone (KENALOG) 0.1 %  external cream  UNABLE TO FIND      No Known Allergies  Family History  Family History   Problem Relation Age of Onset    Myocardial Infarction Mother 88        lived to 100    Hypertension Mother     Macular Degeneration Mother     Cardiovascular Father 76         age 80, MI 76 and CHF 80's    Coronary Artery Disease Father     Myocardial Infarction Father 70    Arrhythmia Sister         PPM    Neuropathy Sister     Colon Cancer Sister     Arrhythmia Brother         pacemaker    Arrhythmia Brother         pacemaker, hx rheumatic fever, heart failure    Leukemia Maternal Grandfather     Diabetes Type 2  Maternal Grandfather     Cardiovascular Paternal Grandfather          age 76 of CVD    Coronary Artery Disease Paternal Grandfather     Breast Cancer Daughter 50        Breast, uterine, 2nd breast cancer, HTN    Uterine Cancer Daughter     Other Cancer Daughter         Uterine, melanoma    Thyroid Disease Daughter         bout of thyroiditis with thyrotoxicosis followed by hypothyroidism    Blood Disease Son         hemochromatosis?    Atrial fibrillation Son      Social History   Social History     Tobacco Use    Smoking status: Never     Passive exposure: Never    Smokeless tobacco: Never   Vaping Use    Vaping status: Never Used   Substance Use Topics    Alcohol use: No    Drug use: No      A medically appropriate review of systems was performed with pertinent positives and negatives noted in the HPI, and all other systems negative.    Physical Exam   BP: (!) 147/89  Pulse: (!) 123  Temp: 97.7  F (36.5  C)  Resp: 20  SpO2: 97 %  Physical Exam  General:  No acute distress.  HENT:  Normocephalic and atraumatic.   Eyes: EOMI. Conjunctivae normal.   Cardiovascular: Normal rate and regular rhythm.  Normal heart sounds. No murmur heard.  Pulmonary:  No respiratory distress. Normal breath sounds.   Abdominal: There is no distension.  Abdomen is soft. There is no mass.  There is no abdominal tenderness.  "  Musculoskeletal: No swelling or tenderness.  Moving all extremities spontaneously.    Skin: Warm and dry  Neurological: No focal deficit present.   Mood and Affect: Mood normal.      ED Course, Procedures, & Data      Procedures       {ED Course Selections (Optional):750246}  {ED Sepsis CMS Documentation (Optional):735489::\" \"}       Results for orders placed or performed during the hospital encounter of 07/03/25   EKG 12-lead, tracing only   Result Value Ref Range    Systolic Blood Pressure  mmHg    Diastolic Blood Pressure  mmHg    Ventricular Rate 116 BPM    Atrial Rate 119 BPM    GA Interval  ms    QRS Duration 178 ms     ms    QTc 594 ms    P Axis  degrees    R AXIS -83 degrees    T Axis 82 degrees    Interpretation ECG Ventricular-paced rhythm  Abnormal ECG        Medications - No data to display       {Critical Care Performed?:524877}    Assessment & Plan    ***    I have reviewed the nursing notes. I have reviewed the findings, diagnosis, plan and need for follow up with the patient.    New Prescriptions    No medications on file       Final diagnoses:   None   Maureen GLASER, am serving as a trained medical scribe to document services personally performed by Aliya Acosta DO based on the provider's statements to me on July 3, 2025.  This document has been checked and approved by the attending provider.    IAliya DO, was physically present and have reviewed and verified the accuracy of this note documented by Maureen Camacho medical scribe.      Aliya Acosta DO   [facility] EMERGENCY DEPARTMENT  7/3/2025  " expedited outpatient follow up, new referral was placed so a  could help facilitate this appointment    Patient felt comfortable with discharge, with two daughters    Discussed staying inpatient     I have reviewed the nursing notes. I have reviewed the findings, diagnosis, plan and need for follow up with the patient.    New Prescriptions    No medications on file       Final diagnoses:   None   Maureen GLASER, am serving as a trained medical scribe to document services personally performed by Aliya Acosta DO based on the provider's statements to me on July 3, 2025.  This document has been checked and approved by the attending provider.    I, Aliya Acosta DO, was physically present and have reviewed and verified the accuracy of this note documented by Maureen Camacho, medical scribe.      Aliya Acosta DO   [facility] EMERGENCY DEPARTMENT  7/3/2025

## 2025-07-05 ENCOUNTER — PATIENT OUTREACH (OUTPATIENT)
Dept: CARE COORDINATION | Facility: CLINIC | Age: OVER 89
End: 2025-07-05
Payer: COMMERCIAL

## 2025-07-07 ENCOUNTER — PATIENT OUTREACH (OUTPATIENT)
Dept: CARE COORDINATION | Facility: CLINIC | Age: OVER 89
End: 2025-07-07
Payer: COMMERCIAL

## 2025-07-22 ENCOUNTER — OFFICE VISIT (OUTPATIENT)
Dept: SLEEP MEDICINE | Facility: CLINIC | Age: OVER 89
End: 2025-07-22
Payer: COMMERCIAL

## 2025-07-22 DIAGNOSIS — I48.0 PAROXYSMAL A-FIB (H): ICD-10-CM

## 2025-07-22 DIAGNOSIS — R40.0 DAYTIME SLEEPINESS: ICD-10-CM

## 2025-07-22 NOTE — PROGRESS NOTES
Pt is completing a home sleep test. Pt was instructed on how to put on the Noxturnal T3 device and associated equipment before going to bed and given the opportunity to practice putting it on before leaving the sleep center. Pt was reminded to bring the home sleep test kit back to the center tomorrow, at the scheduled time for download and reporting. Patient was instructed to complete study using the following treatment?  None  Neck circumference: 34 CM / 13.25 inches.  ESS: 7  Stop Bang: 3  Device number: 7

## 2025-07-23 ENCOUNTER — LAB (OUTPATIENT)
Dept: LAB | Facility: CLINIC | Age: OVER 89
End: 2025-07-23
Payer: COMMERCIAL

## 2025-07-23 DIAGNOSIS — Z13.6 SCREENING FOR CARDIOVASCULAR CONDITION: Primary | ICD-10-CM

## 2025-07-23 DIAGNOSIS — E78.5 HYPERLIPIDEMIA LDL GOAL <100: ICD-10-CM

## 2025-07-23 DIAGNOSIS — E05.00 GRAVES DISEASE: ICD-10-CM

## 2025-07-23 PROCEDURE — 36415 COLL VENOUS BLD VENIPUNCTURE: CPT

## 2025-07-23 PROCEDURE — 84439 ASSAY OF FREE THYROXINE: CPT

## 2025-07-23 PROCEDURE — 84443 ASSAY THYROID STIM HORMONE: CPT

## 2025-07-24 LAB
T4 FREE SERPL-MCNC: 1.48 NG/DL (ref 0.9–1.7)
TSH SERPL DL<=0.005 MIU/L-ACNC: 2.79 UIU/ML (ref 0.3–4.2)

## 2025-07-24 NOTE — PROGRESS NOTES
[facility] POST-STUDY QUESTIONNAIRE    What time did you go to bed?  11  How long do you think it took to fall asleep?  Up till 1:00 am took melatonin  What time did you wake up to start the day?  7:15, device off - up @8am  Did you get up during the night at all?  Yes to use br  If you woke up, do you remember approximately what time(s)? 0?  Did you have any difficulty with the equipment?  No  Did you us any type of treatment with this study?  None  Was the head of the bed elevated? No  Did you sleep in a recliner?  No  Did you stop using CPAP at least 3 days before this test?  NA  Any other information you'd like us to know?

## 2025-08-05 ENCOUNTER — OFFICE VISIT (OUTPATIENT)
Dept: CARDIOLOGY | Facility: CLINIC | Age: OVER 89
End: 2025-08-05
Payer: COMMERCIAL

## 2025-08-05 ENCOUNTER — ANCILLARY PROCEDURE (OUTPATIENT)
Dept: CARDIOLOGY | Facility: CLINIC | Age: OVER 89
End: 2025-08-05
Payer: COMMERCIAL

## 2025-08-05 ENCOUNTER — TELEPHONE (OUTPATIENT)
Dept: CARDIOLOGY | Facility: CLINIC | Age: OVER 89
End: 2025-08-05

## 2025-08-05 VITALS
HEART RATE: 84 BPM | OXYGEN SATURATION: 98 % | HEIGHT: 61 IN | WEIGHT: 127 LBS | DIASTOLIC BLOOD PRESSURE: 58 MMHG | SYSTOLIC BLOOD PRESSURE: 138 MMHG | BODY MASS INDEX: 23.98 KG/M2

## 2025-08-05 DIAGNOSIS — I48.91 A-FIB (H): ICD-10-CM

## 2025-08-05 DIAGNOSIS — Z79.899 ON AMIODARONE THERAPY: ICD-10-CM

## 2025-08-05 DIAGNOSIS — R07.9 CHEST PAIN, UNSPECIFIED TYPE: ICD-10-CM

## 2025-08-05 DIAGNOSIS — Z95.0 CARDIAC PACEMAKER IN SITU: ICD-10-CM

## 2025-08-05 DIAGNOSIS — I48.0 PAROXYSMAL A-FIB (H): Primary | ICD-10-CM

## 2025-08-05 LAB — LVEF ECHO: NORMAL

## 2025-08-05 PROCEDURE — 99215 OFFICE O/P EST HI 40 MIN: CPT | Mod: 25

## 2025-08-05 PROCEDURE — 3078F DIAST BP <80 MM HG: CPT

## 2025-08-05 PROCEDURE — 93000 ELECTROCARDIOGRAM COMPLETE: CPT

## 2025-08-05 PROCEDURE — 93306 TTE W/DOPPLER COMPLETE: CPT | Performed by: STUDENT IN AN ORGANIZED HEALTH CARE EDUCATION/TRAINING PROGRAM

## 2025-08-05 PROCEDURE — 3075F SYST BP GE 130 - 139MM HG: CPT

## 2025-08-06 DIAGNOSIS — S72.001A: ICD-10-CM

## 2025-08-06 LAB
ATRIAL RATE - MUSE: 67 BPM
DIASTOLIC BLOOD PRESSURE - MUSE: NORMAL MMHG
INTERPRETATION ECG - MUSE: NORMAL
P AXIS - MUSE: 32 DEGREES
PR INTERVAL - MUSE: 276 MS
QRS DURATION - MUSE: 96 MS
QT - MUSE: 424 MS
QTC - MUSE: 448 MS
R AXIS - MUSE: -60 DEGREES
SYSTOLIC BLOOD PRESSURE - MUSE: NORMAL MMHG
T AXIS - MUSE: 24 DEGREES
VENTRICULAR RATE- MUSE: 67 BPM

## 2025-08-12 RX ORDER — GABAPENTIN 100 MG/1
200 CAPSULE ORAL
Qty: 60 CAPSULE | Refills: 3 | Status: SHIPPED | OUTPATIENT
Start: 2025-08-12

## (undated) DEVICE — STRAP KNEE/BODY 31143004

## (undated) DEVICE — VIAL DECANTER STERILE WHITE DYNJDEC06

## (undated) DEVICE — DRSG AQUACEL AG HYDROFIBER  3.5X10" 422605

## (undated) DEVICE — PACK TOTAL HIP W/POUCH RIVERSIDE LATEX FREE

## (undated) DEVICE — DRSG SILVERCEL 4.25X4.25" 900404

## (undated) DEVICE — BLADE SAW SAGITTAL STRK 18X90X1.37MM HD SYS 6 6118-137-090

## (undated) DEVICE — HOOD SURG T7PLUS PEEL AWAY FACE SHIELD STRL LF 0416-801-100

## (undated) DEVICE — STPL SKIN 35W ROTATING HEAD PRW35

## (undated) DEVICE — DRAPE IOBAN INCISE 36X23" 6651EZ

## (undated) DEVICE — CLEANSER JET LAVAGE IRRISEPT 0.05% CHG IRRISEPT45USA

## (undated) DEVICE — LINEN GOWN X4 5410

## (undated) DEVICE — SU VICRYL 0 CT-1 27" J340H

## (undated) DEVICE — SU VICRYL 2-0 CT-1 27" UND J259H

## (undated) DEVICE — STRAP STIRRUP W/SLIP 30187-030

## (undated) DEVICE — BRUSH FEMORAL CANAL 110003

## (undated) DEVICE — SUCTION IRR SYSTEM W/O TIP INTERPULSE HANDPIECE 0210-100-000

## (undated) DEVICE — SU DERMABOND ADVANCED .7ML DNX12

## (undated) DEVICE — DRAPE STERI TOWEL LG 1010

## (undated) DEVICE — BONE CLEANING TIP INTERPULSE  0210-010-000

## (undated) DEVICE — SU STRATAFIX PDS PLUS 1 CT-1 18" SXPP1A404

## (undated) DEVICE — SOL WATER IRRIG 1000ML BOTTLE 2F7114

## (undated) DEVICE — LINEN MAYO STAND COVER OVERSIZE PACK 5458

## (undated) DEVICE — SU ETHIBOND 2 V-37 4X30" MX69G

## (undated) DEVICE — PREP CHLORAPREP 26ML TINTED HI-LITE ORANGE 930815

## (undated) DEVICE — LINEN TOWEL PACK X5 5464

## (undated) DEVICE — GOWN IMPERVIOUS SPECIALTY XLG/XLONG 32474

## (undated) DEVICE — GLOVE BIOGEL PI ULTRATOUCH G SZ 7.5 42175

## (undated) DEVICE — GLOVE BIOGEL PI MICRO INDICATOR UNDERGLOVE SZ 8.0 48980

## (undated) DEVICE — TUBING SMOKE EVAC SUCTION SLEEVE 0703-005-065

## (undated) DEVICE — DEVICE RETRIEVER HEWSON 71111579

## (undated) DEVICE — SOL NACL 0.9% IRRIG 1000ML BOTTLE 2F7124

## (undated) DEVICE — BONE CEMENT MIXEVAC HI VAC W/CARTRIDGE 0306-536-000

## (undated) DEVICE — ESU GROUND PAD ADULT W/CORD E7507

## (undated) DEVICE — POSITIONER ABDUCTION PILLOW FOAM MED FP-ABDUCTM

## (undated) DEVICE — SU MONOCRYL 3-0 PS-2 27" Y427H

## (undated) DEVICE — BONE CLEANING TIP INTERPULSE FEMORAL CANAL 0210-008-000

## (undated) DEVICE — BRUSH SURGICAL SCRUB W/4% CHLORHEXIDINE GLUCONATE SOL 4458A

## (undated) DEVICE — DRSG TEGADERM 4X10" 1627

## (undated) DEVICE — SOL NACL 0.9% IRRIG 3000ML BAG 2B7477

## (undated) DEVICE — SUCTION MANIFOLD NEPTUNE 2 SYS 4 PORT 0702-020-000

## (undated) DEVICE — LINEN BACK PACK 5440

## (undated) RX ORDER — HYDROMORPHONE HYDROCHLORIDE 1 MG/ML
INJECTION, SOLUTION INTRAMUSCULAR; INTRAVENOUS; SUBCUTANEOUS
Status: DISPENSED
Start: 2025-01-24

## (undated) RX ORDER — REGADENOSON 0.08 MG/ML
INJECTION, SOLUTION INTRAVENOUS
Status: DISPENSED
Start: 2022-10-21

## (undated) RX ORDER — FENTANYL CITRATE 50 UG/ML
INJECTION, SOLUTION INTRAMUSCULAR; INTRAVENOUS
Status: DISPENSED
Start: 2025-01-24

## (undated) RX ORDER — MORPHINE SULFATE 2 MG/ML
INJECTION, SOLUTION INTRAMUSCULAR; INTRAVENOUS
Status: DISPENSED
Start: 2023-04-26

## (undated) RX ORDER — FENTANYL CITRATE 50 UG/ML
INJECTION, SOLUTION INTRAMUSCULAR; INTRAVENOUS
Status: DISPENSED
Start: 2025-04-15

## (undated) RX ORDER — VANCOMYCIN HYDROCHLORIDE 1 G/20ML
INJECTION, POWDER, LYOPHILIZED, FOR SOLUTION INTRAVENOUS
Status: DISPENSED
Start: 2025-01-24

## (undated) RX ORDER — PROPOFOL 10 MG/ML
INJECTION, EMULSION INTRAVENOUS
Status: DISPENSED
Start: 2025-01-24

## (undated) RX ORDER — LIDOCAINE HYDROCHLORIDE 20 MG/ML
SOLUTION OROPHARYNGEAL
Status: DISPENSED
Start: 2025-04-15

## (undated) RX ORDER — EPHEDRINE SULFATE 50 MG/ML
INJECTION, SOLUTION INTRAMUSCULAR; INTRAVENOUS; SUBCUTANEOUS
Status: DISPENSED
Start: 2025-01-24